# Patient Record
Sex: FEMALE | Race: WHITE | NOT HISPANIC OR LATINO
[De-identification: names, ages, dates, MRNs, and addresses within clinical notes are randomized per-mention and may not be internally consistent; named-entity substitution may affect disease eponyms.]

---

## 2017-03-30 ENCOUNTER — APPOINTMENT (OUTPATIENT)
Dept: RHEUMATOLOGY | Facility: CLINIC | Age: 67
End: 2017-03-30

## 2018-12-30 ENCOUNTER — INPATIENT (INPATIENT)
Facility: HOSPITAL | Age: 68
LOS: 2 days | Discharge: NURSING, REHAB OR LONG-TERM CARE | DRG: 689 | End: 2019-01-02
Attending: HOSPITALIST | Admitting: HOSPITALIST
Payer: MEDICARE

## 2018-12-30 VITALS
HEART RATE: 89 BPM | SYSTOLIC BLOOD PRESSURE: 118 MMHG | OXYGEN SATURATION: 96 % | WEIGHT: 145.06 LBS | HEIGHT: 65 IN | TEMPERATURE: 98 F | RESPIRATION RATE: 18 BRPM | DIASTOLIC BLOOD PRESSURE: 66 MMHG

## 2018-12-30 DIAGNOSIS — Z98.49 CATARACT EXTRACTION STATUS, UNSPECIFIED EYE: Chronic | ICD-10-CM

## 2018-12-30 DIAGNOSIS — E03.9 HYPOTHYROIDISM, UNSPECIFIED: ICD-10-CM

## 2018-12-30 DIAGNOSIS — F42.9 OBSESSIVE-COMPULSIVE DISORDER, UNSPECIFIED: ICD-10-CM

## 2018-12-30 DIAGNOSIS — Z96.653 PRESENCE OF ARTIFICIAL KNEE JOINT, BILATERAL: Chronic | ICD-10-CM

## 2018-12-30 DIAGNOSIS — Z98.89 OTHER SPECIFIED POSTPROCEDURAL STATES: Chronic | ICD-10-CM

## 2018-12-30 DIAGNOSIS — N30.00 ACUTE CYSTITIS WITHOUT HEMATURIA: ICD-10-CM

## 2018-12-30 DIAGNOSIS — E86.0 DEHYDRATION: ICD-10-CM

## 2018-12-30 DIAGNOSIS — Z29.9 ENCOUNTER FOR PROPHYLACTIC MEASURES, UNSPECIFIED: ICD-10-CM

## 2018-12-30 LAB
ALBUMIN SERPL ELPH-MCNC: 3.2 G/DL — LOW (ref 3.3–5)
ALP SERPL-CCNC: 98 U/L — SIGNIFICANT CHANGE UP (ref 30–120)
ALT FLD-CCNC: 28 U/L DA — SIGNIFICANT CHANGE UP (ref 10–60)
ANION GAP SERPL CALC-SCNC: 5 MMOL/L — SIGNIFICANT CHANGE UP (ref 5–17)
APPEARANCE UR: CLEAR — SIGNIFICANT CHANGE UP
AST SERPL-CCNC: 17 U/L — SIGNIFICANT CHANGE UP (ref 10–40)
BASOPHILS # BLD AUTO: 0.01 K/UL — SIGNIFICANT CHANGE UP (ref 0–0.2)
BASOPHILS NFR BLD AUTO: 0.2 % — SIGNIFICANT CHANGE UP (ref 0–2)
BILIRUB SERPL-MCNC: 0.3 MG/DL — SIGNIFICANT CHANGE UP (ref 0.2–1.2)
BILIRUB UR-MCNC: NEGATIVE — SIGNIFICANT CHANGE UP
BUN SERPL-MCNC: 18 MG/DL — SIGNIFICANT CHANGE UP (ref 7–23)
CALCIUM SERPL-MCNC: 9.3 MG/DL — SIGNIFICANT CHANGE UP (ref 8.4–10.5)
CHLORIDE SERPL-SCNC: 101 MMOL/L — SIGNIFICANT CHANGE UP (ref 96–108)
CO2 SERPL-SCNC: 32 MMOL/L — HIGH (ref 22–31)
COLOR SPEC: YELLOW — SIGNIFICANT CHANGE UP
CREAT SERPL-MCNC: 1.01 MG/DL — SIGNIFICANT CHANGE UP (ref 0.5–1.3)
DIFF PNL FLD: NEGATIVE — SIGNIFICANT CHANGE UP
EOSINOPHIL # BLD AUTO: 0.18 K/UL — SIGNIFICANT CHANGE UP (ref 0–0.5)
EOSINOPHIL NFR BLD AUTO: 4.4 % — SIGNIFICANT CHANGE UP (ref 0–6)
GLUCOSE SERPL-MCNC: 92 MG/DL — SIGNIFICANT CHANGE UP (ref 70–99)
GLUCOSE UR QL: NEGATIVE MG/DL — SIGNIFICANT CHANGE UP
HCT VFR BLD CALC: 30.3 % — LOW (ref 34.5–45)
HGB BLD-MCNC: 9.9 G/DL — LOW (ref 11.5–15.5)
IMM GRANULOCYTES NFR BLD AUTO: 0.5 % — SIGNIFICANT CHANGE UP (ref 0–1.5)
KETONES UR-MCNC: NEGATIVE — SIGNIFICANT CHANGE UP
LEUKOCYTE ESTERASE UR-ACNC: ABNORMAL
LYMPHOCYTES # BLD AUTO: 0.53 K/UL — LOW (ref 1–3.3)
LYMPHOCYTES # BLD AUTO: 12.9 % — LOW (ref 13–44)
MCHC RBC-ENTMCNC: 32.7 GM/DL — SIGNIFICANT CHANGE UP (ref 32–36)
MCHC RBC-ENTMCNC: 35.1 PG — HIGH (ref 27–34)
MCV RBC AUTO: 107.4 FL — HIGH (ref 80–100)
MONOCYTES # BLD AUTO: 0.38 K/UL — SIGNIFICANT CHANGE UP (ref 0–0.9)
MONOCYTES NFR BLD AUTO: 9.2 % — SIGNIFICANT CHANGE UP (ref 2–14)
NEUTROPHILS # BLD AUTO: 2.99 K/UL — SIGNIFICANT CHANGE UP (ref 1.8–7.4)
NEUTROPHILS NFR BLD AUTO: 72.8 % — SIGNIFICANT CHANGE UP (ref 43–77)
NITRITE UR-MCNC: POSITIVE
PH UR: 7 — SIGNIFICANT CHANGE UP (ref 5–8)
PLATELET # BLD AUTO: 165 K/UL — SIGNIFICANT CHANGE UP (ref 150–400)
POTASSIUM SERPL-MCNC: 4.7 MMOL/L — SIGNIFICANT CHANGE UP (ref 3.5–5.3)
POTASSIUM SERPL-SCNC: 4.7 MMOL/L — SIGNIFICANT CHANGE UP (ref 3.5–5.3)
PROT SERPL-MCNC: 6.3 G/DL — SIGNIFICANT CHANGE UP (ref 6–8.3)
PROT UR-MCNC: NEGATIVE MG/DL — SIGNIFICANT CHANGE UP
RBC # BLD: 2.82 M/UL — LOW (ref 3.8–5.2)
RBC # FLD: 12.8 % — SIGNIFICANT CHANGE UP (ref 10.3–14.5)
SODIUM SERPL-SCNC: 138 MMOL/L — SIGNIFICANT CHANGE UP (ref 135–145)
SP GR SPEC: 1 — LOW (ref 1.01–1.02)
UROBILINOGEN FLD QL: NEGATIVE MG/DL — SIGNIFICANT CHANGE UP
WBC # BLD: 4.11 K/UL — SIGNIFICANT CHANGE UP (ref 3.8–10.5)
WBC # FLD AUTO: 4.11 K/UL — SIGNIFICANT CHANGE UP (ref 3.8–10.5)

## 2018-12-30 PROCEDURE — 70450 CT HEAD/BRAIN W/O DYE: CPT | Mod: 26

## 2018-12-30 PROCEDURE — 72125 CT NECK SPINE W/O DYE: CPT | Mod: 26

## 2018-12-30 PROCEDURE — 99285 EMERGENCY DEPT VISIT HI MDM: CPT

## 2018-12-30 PROCEDURE — 99223 1ST HOSP IP/OBS HIGH 75: CPT | Mod: AI

## 2018-12-30 PROCEDURE — 71046 X-RAY EXAM CHEST 2 VIEWS: CPT | Mod: 26

## 2018-12-30 RX ORDER — CEFTRIAXONE 500 MG/1
1 INJECTION, POWDER, FOR SOLUTION INTRAMUSCULAR; INTRAVENOUS EVERY 24 HOURS
Qty: 0 | Refills: 0 | Status: DISCONTINUED | OUTPATIENT
Start: 2018-12-30 | End: 2019-01-02

## 2018-12-30 RX ORDER — METHYLPREDNISOLONE 4 MG
500 TABLET ORAL
Qty: 0 | Refills: 0 | Status: DISCONTINUED | OUTPATIENT
Start: 2018-12-30 | End: 2018-12-30

## 2018-12-30 RX ORDER — SODIUM CHLORIDE 9 MG/ML
1000 INJECTION, SOLUTION INTRAVENOUS
Qty: 0 | Refills: 0 | Status: DISCONTINUED | OUTPATIENT
Start: 2018-12-30 | End: 2019-01-02

## 2018-12-30 RX ORDER — BUPROPION HYDROCHLORIDE 150 MG/1
300 TABLET, EXTENDED RELEASE ORAL DAILY
Qty: 0 | Refills: 0 | Status: DISCONTINUED | OUTPATIENT
Start: 2018-12-30 | End: 2019-01-02

## 2018-12-30 RX ORDER — GABAPENTIN 400 MG/1
1200 CAPSULE ORAL AT BEDTIME
Qty: 0 | Refills: 0 | Status: DISCONTINUED | OUTPATIENT
Start: 2018-12-30 | End: 2019-01-02

## 2018-12-30 RX ORDER — MAGNESIUM OXIDE 400 MG ORAL TABLET 241.3 MG
400 TABLET ORAL
Qty: 0 | Refills: 0 | Status: DISCONTINUED | OUTPATIENT
Start: 2018-12-30 | End: 2019-01-02

## 2018-12-30 RX ORDER — BENZTROPINE MESYLATE 1 MG
1 TABLET ORAL AT BEDTIME
Qty: 0 | Refills: 0 | Status: DISCONTINUED | OUTPATIENT
Start: 2018-12-30 | End: 2019-01-02

## 2018-12-30 RX ORDER — LEVOTHYROXINE SODIUM 125 MCG
1 TABLET ORAL
Qty: 0 | Refills: 0 | COMMUNITY

## 2018-12-30 RX ORDER — GABAPENTIN 400 MG/1
600 CAPSULE ORAL DAILY
Qty: 0 | Refills: 0 | Status: DISCONTINUED | OUTPATIENT
Start: 2018-12-30 | End: 2019-01-02

## 2018-12-30 RX ORDER — ZIPRASIDONE HYDROCHLORIDE 20 MG/1
40 CAPSULE ORAL
Qty: 0 | Refills: 0 | Status: DISCONTINUED | OUTPATIENT
Start: 2018-12-30 | End: 2019-01-02

## 2018-12-30 RX ORDER — ALPRAZOLAM 0.25 MG
1 TABLET ORAL
Qty: 0 | Refills: 0 | Status: DISCONTINUED | OUTPATIENT
Start: 2018-12-30 | End: 2019-01-02

## 2018-12-30 RX ORDER — AMITRIPTYLINE HCL 25 MG
50 TABLET ORAL AT BEDTIME
Qty: 0 | Refills: 0 | Status: DISCONTINUED | OUTPATIENT
Start: 2018-12-30 | End: 2019-01-02

## 2018-12-30 RX ORDER — CEFTRIAXONE 500 MG/1
1 INJECTION, POWDER, FOR SOLUTION INTRAMUSCULAR; INTRAVENOUS ONCE
Qty: 0 | Refills: 0 | Status: COMPLETED | OUTPATIENT
Start: 2018-12-30 | End: 2018-12-30

## 2018-12-30 RX ORDER — LEVOTHYROXINE SODIUM 125 MCG
75 TABLET ORAL
Qty: 0 | Refills: 0 | Status: DISCONTINUED | OUTPATIENT
Start: 2018-12-30 | End: 2019-01-02

## 2018-12-30 RX ORDER — SODIUM CHLORIDE 9 MG/ML
1000 INJECTION INTRAMUSCULAR; INTRAVENOUS; SUBCUTANEOUS ONCE
Qty: 0 | Refills: 0 | Status: COMPLETED | OUTPATIENT
Start: 2018-12-30 | End: 2018-12-30

## 2018-12-30 RX ADMIN — Medication 1 MILLIGRAM(S): at 22:39

## 2018-12-30 RX ADMIN — GABAPENTIN 1200 MILLIGRAM(S): 400 CAPSULE ORAL at 22:38

## 2018-12-30 RX ADMIN — ZIPRASIDONE HYDROCHLORIDE 40 MILLIGRAM(S): 20 CAPSULE ORAL at 22:37

## 2018-12-30 RX ADMIN — SODIUM CHLORIDE 1000 MILLILITER(S): 9 INJECTION INTRAMUSCULAR; INTRAVENOUS; SUBCUTANEOUS at 16:00

## 2018-12-30 RX ADMIN — CEFTRIAXONE 1 GRAM(S): 500 INJECTION, POWDER, FOR SOLUTION INTRAMUSCULAR; INTRAVENOUS at 18:36

## 2018-12-30 RX ADMIN — CEFTRIAXONE 100 GRAM(S): 500 INJECTION, POWDER, FOR SOLUTION INTRAMUSCULAR; INTRAVENOUS at 18:36

## 2018-12-30 RX ADMIN — Medication 50 MILLIGRAM(S): at 22:38

## 2018-12-30 RX ADMIN — MAGNESIUM OXIDE 400 MG ORAL TABLET 400 MILLIGRAM(S): 241.3 TABLET ORAL at 22:38

## 2018-12-30 NOTE — ED PROVIDER NOTE - GASTROINTESTINAL NEGATIVE STATEMENT, MLM
no abdominal pain, no bloating, no nausea and no vomiting. + watery diarrhea x 5 days, 6-7 episodes in a day. NO diarrhea today.

## 2018-12-30 NOTE — H&P ADULT - PROBLEM SELECTOR PLAN 5
IMPROVE VTE Individual Risk Assessment          RISK                                                          Points  [  ] Previous VTE                                                 3  [  ] Thrombophilia                                              2  [  ] Lower limb paralysis                                    2        (unable to hold up >15 seconds)    [  ] Current Cancer                                             2         (within 6 months)  [  ] Immobilization > 24 hrs                              1  [  ] ICU/CCU stay > 24 hours                            1  [X] Age > 60                                                        1    IMPROVE VTE Score 1    - will use lovenox for DVT ppx

## 2018-12-30 NOTE — H&P ADULT - ASSESSMENT
68F with anxiety, OCD, non-Hodgkin's lymphoma (treated with chemo @[facility] [place], in remission for 2 years), hypothyroidism who presents with diarrhea, weakness, and AMS.   Likely 2/2 dehydration from UTI/diarrhea.

## 2018-12-30 NOTE — ED ADULT NURSE NOTE - NSIMPLEMENTINTERV_GEN_ALL_ED
Implemented All Universal Safety Interventions:  [place] to call system. Call bell, personal items and telephone within reach. Instruct patient to call for assistance. Room bathroom lighting operational. Non-slip footwear when patient is off stretcher. Physically safe environment: no spills, clutter or unnecessary equipment. Stretcher in lowest position, wheels locked, appropriate side rails in place. Specific interventions were implemented:

## 2018-12-30 NOTE — H&P ADULT - PMH
Bipolar depression    Depression    Hypercholesteremia    Lymphoma  NHL[place] [facility] [place]  OCD (obsessive compulsive disorder)    Osteoarthritis of left knee    Osteoarthritis of right knee

## 2018-12-30 NOTE — ED ADULT NURSE NOTE - INTERVENTIONS DEFINITIONS
[place] to call system/Non-slip footwear when patient is off stretcher/Physically safe environment: no spills, clutter or unnecessary equipment/Provide visual cue, wrist band, yellow gown, etc./Monitor for mental status changes and reorient to person, place, and time/Provide visual clues: red socks/Call bell, personal items and telephone within reach/Instruct patient to call for assistance/Reinforce activity limits and safety measures with patient and family/Stretcher in lowest position, wheels locked, appropriate side rails in place/Monitor gait and stability

## 2018-12-30 NOTE — ED PROVIDER NOTE - CHPI ED SYMPTOMS NEG
no chills/no hematuria/no abdominal distension/no burning urination/no vomiting/no blood in stool/no fever/no nausea/no dysuria

## 2018-12-30 NOTE — H&P ADULT - PSH
H/O oral surgery  2014  S/P cataract surgery    S/P knee surgery  1978  S/P TKR (total knee replacement), bilateral  discharged nov 5th, 2014

## 2018-12-30 NOTE — H&P ADULT - NSHPSOCIALHISTORY_GEN_ALL_CORE
- denies any smoking, etoh, or illicit drug use  - lives alone though son lives next door  - walks with a walker

## 2018-12-30 NOTE — ED PROVIDER NOTE - PROGRESS NOTE DETAILS
Attending Contribution to Care: Pt seen in exam room and discussed with PA. Agree with treatment and plan. 69 y/o F pt presents to the ED BIB son c/o increased moderate weakness, confusion, severe diarrhea for x5 days. Confirms lethargy, frequent falls. Denies recent abx use. PMD: Sajan Last. PE: dehydrated, more alert after rehydrations, otherwise benign. Labs reveal UTI. Treat with IV Rocephin. Admit for IV abx, IV fluids due to pt weakness and AMS. Head CT performed and negative.

## 2018-12-30 NOTE — H&P ADULT - NSHPLABSRESULTS_GEN_ALL_CORE
LABS:                        9.9<L>  4.11  )-----------( 165      ( 30 Dec 2018 16:33 )             30.3<L>    138    |  101    |  18     ----------------------------<  92       30 Dec 2018 16:33  4.7     |  32<H>  |  1.01         Ca 9.3           30 Dec 2018 16:33        TPro  6.3    /  Alb  3.2<L>  /  TBili  0.3    /  DBili  x      /  AST  17     /  ALT  28     /  AlkPhos  98     30 Dec 2018 16:33      Urinalysis Basic - ( 30 Dec 2018 17:27 )    Color: Yellow / Appearance: Clear / S.005 / pH: x  Gluc: x / Ketone: Negative  / Bili: Negative / Urobili: Negative mg/dL   Blood: x / Protein: Negative mg/dL / Nitrite: Positive   Leuk Esterase: Small / RBC: 0-2 /HPF / WBC 11-25   Sq Epi: x / Non Sq Epi: Few / Bacteria: Moderate

## 2018-12-30 NOTE — H&P ADULT - HISTORY OF PRESENT ILLNESS
68F with anxiety, OCD, non-Hodgkin's lymphoma (treated with [place] [place], [place] for 2 years), hypothyroidism who presents with diarrhea, weakness, and AMS.  Patient's symptoms started about a week ago at her daughter's place.  Patient would have bouts of constipation and diarrhea (she would take only prune juice when she was constipated).  Non-bloody.  Consequently, patient had become very weak, confused, and dizzy.  Multiple falls (at least 4x) in the past week  but denied any head trauma or lost of consciousness.  Denied any urinary symptoms.  Denied any pain.  No fevers.  No nausea/vomiting.  No sick contacts.  No CP or SOB.  No abdominal pain.  Patient came home and was still noted to be weak and confused and the son decided to bring her here for further evaluation.      In the ED, patient was given IV hydration with immediate improvement.  Mental status also improved as per son.  However, patient's UA showed possible UTI and was started on ceftriaxone.  Patient is being admitted because of AMS/weakness caused by dehydration caused by diarrhea/UTI.

## 2018-12-30 NOTE — H&P ADULT - FAMILY HISTORY
Family history of COPD (chronic obstructive pulmonary disease)     Family history of breast cancer     Grandparent  Still living? No  Family history of stomach cancer, Age at diagnosis: Age Unknown  Family history of prostate cancer, Age at diagnosis: Age Unknown     Father  Still living? Unknown  Family history of diabetes mellitus, Age at diagnosis: Age Unknown

## 2018-12-30 NOTE — H&P ADULT - NSHPREVIEWOFSYSTEMS_GEN_ALL_CORE
REVIEW OF SYSTEMS:  CONSTITUTIONAL:    +weakness, no fever or weight loss  EYES:    no eye pain or visual disturbances or discharge  ENMT:     no difficulty hearing or tinnitus or vertigo, no sinus or throat pain  NECK:    no pain or stiffness  RESPIRATORY:    no cough or wheezing or chills or hemoptysis, no shortness of breath  CARDIOVASCULAR:    no chest pain or palpitations or dizziness or leg swelling  GASTROINTESTINAL:    +diarrhea/constipation, no abdominal or epigastric pain. no nausea or vomiting or hematemesis, no melena or hematochezia.  GENITOURINARY:    no dysuria or frequency or hematuria or incontinence  NEUROLOGICAL:    no headaches or memory loss or loss of strength or numbness or tremors  SKIN:    no itching or burning or rashes or lesions   LYMPH NODES:    no enlarged glands  ENDOCRINE:    no heat or cold intolerance, no hair loss, no polydipsia or polyuria  MUSCULOSKELETAL:    no joint pain or swelling, no muscle or back or extremity pain  PSYCHIATRIC:    +was confused, +OCD, +anxiety,   HEME/LYMPH:    no easy bruising or bleeding gums  ALLERGY AND IMMUNOLOGIC:    no hives or eczema

## 2018-12-30 NOTE — H&P ADULT - NSHPPHYSICALEXAM_GEN_ALL_CORE
PHYSICAL EXAM:  Vital Signs Last 24 Hrs  T(C): 36.6 (30 Dec 2018 20:27), Max: 36.7 (30 Dec 2018 15:28)  T(F): 97.9 (30 Dec 2018 20:27), Max: 98 (30 Dec 2018 15:28)  HR: 84 (30 Dec 2018 20:27) (84 - 89)  BP: 104/66 (30 Dec 2018 20:27) (104/66 - 121/67)  BP(mean): --  RR: 16 (30 Dec 2018 20:27) (16 - 18)  SpO2: 97% (30 Dec 2018 20:27) (96% - 97%)    GENERAL:     elderly female in NAD, well-appearing  HEAD:     atraumatic, normocephalic  EYES:     EOMI, conjunctiva and sclera clear  ENMT:     no tonsillar erythema or exudates or enlargement, no oral lesions, moist mucous membranes, good dentition  NECK:     supple, no JVD  RESPIRATORY:     clear to auscultation bilaterally, no rales or rhonchi or wheezing or rubs  CARDIOVASCULAR:     regular rate and rhythm, no murmurs or rubs or gallops, 2+ peripheral pulses  GASTROINTESTINAL:     soft, nontender, nondistended, no hepatosplenomegaly palpated, bowel sounds present  EXTREMITIES:     no clubbing or cyanosis or edema  MUSCULOSKELETAL:     no joint pain or swelling or deformities  NERVOUS SYSTEM:     +facial tics occasionally, motor strength intact with 5/5 B/L upper and lower extremities, no gross sensory deficits  SKIN:     no rashes or lesions  PSYCH:     appropriate, alert and orientated x3, good concentration

## 2018-12-30 NOTE — H&P ADULT - PROBLEM SELECTOR PLAN 3
- cont with wellbutrin  - cont with ziprasidone  - cont with amitriptyline  - cont with alprazolam  - cont with benztropine

## 2018-12-30 NOTE — ED PROVIDER NOTE - OBJECTIVE STATEMENT
69 yo female, accompanied by son, c/o weakness, diarrhea x 5 days, 6-7 episodes x watery diarrhea. Per son patient had associated confusion, worse at night. Denies, fever, chills, vomiting, nausea. Denies urinary symptoms. Per son patient had fall several days ago.  Denies CP, SOB.

## 2018-12-31 LAB
ALBUMIN SERPL ELPH-MCNC: 3.2 G/DL — LOW (ref 3.3–5)
ALP SERPL-CCNC: 98 U/L — SIGNIFICANT CHANGE UP (ref 30–120)
ALT FLD-CCNC: 27 U/L DA — SIGNIFICANT CHANGE UP (ref 10–60)
ANION GAP SERPL CALC-SCNC: 9 MMOL/L — SIGNIFICANT CHANGE UP (ref 5–17)
AST SERPL-CCNC: 18 U/L — SIGNIFICANT CHANGE UP (ref 10–40)
BASOPHILS # BLD AUTO: 0.02 K/UL — SIGNIFICANT CHANGE UP (ref 0–0.2)
BASOPHILS NFR BLD AUTO: 0.3 % — SIGNIFICANT CHANGE UP (ref 0–2)
BILIRUB SERPL-MCNC: 0.2 MG/DL — SIGNIFICANT CHANGE UP (ref 0.2–1.2)
BUN SERPL-MCNC: 14 MG/DL — SIGNIFICANT CHANGE UP (ref 7–23)
CALCIUM SERPL-MCNC: 8.8 MG/DL — SIGNIFICANT CHANGE UP (ref 8.4–10.5)
CHLORIDE SERPL-SCNC: 107 MMOL/L — SIGNIFICANT CHANGE UP (ref 96–108)
CO2 SERPL-SCNC: 27 MMOL/L — SIGNIFICANT CHANGE UP (ref 22–31)
CREAT SERPL-MCNC: 0.97 MG/DL — SIGNIFICANT CHANGE UP (ref 0.5–1.3)
EOSINOPHIL # BLD AUTO: 0.22 K/UL — SIGNIFICANT CHANGE UP (ref 0–0.5)
EOSINOPHIL NFR BLD AUTO: 3.8 % — SIGNIFICANT CHANGE UP (ref 0–6)
GLUCOSE SERPL-MCNC: 86 MG/DL — SIGNIFICANT CHANGE UP (ref 70–99)
HCT VFR BLD CALC: 32.6 % — LOW (ref 34.5–45)
HGB BLD-MCNC: 10.5 G/DL — LOW (ref 11.5–15.5)
IMM GRANULOCYTES NFR BLD AUTO: 0.9 % — SIGNIFICANT CHANGE UP (ref 0–1.5)
LYMPHOCYTES # BLD AUTO: 0.57 K/UL — LOW (ref 1–3.3)
LYMPHOCYTES # BLD AUTO: 9.8 % — LOW (ref 13–44)
MCHC RBC-ENTMCNC: 32.2 GM/DL — SIGNIFICANT CHANGE UP (ref 32–36)
MCHC RBC-ENTMCNC: 35.2 PG — HIGH (ref 27–34)
MCV RBC AUTO: 109.4 FL — HIGH (ref 80–100)
MONOCYTES # BLD AUTO: 0.43 K/UL — SIGNIFICANT CHANGE UP (ref 0–0.9)
MONOCYTES NFR BLD AUTO: 7.4 % — SIGNIFICANT CHANGE UP (ref 2–14)
NEUTROPHILS # BLD AUTO: 4.52 K/UL — SIGNIFICANT CHANGE UP (ref 1.8–7.4)
NEUTROPHILS NFR BLD AUTO: 77.8 % — HIGH (ref 43–77)
NRBC # BLD: 0 /100 WBCS — SIGNIFICANT CHANGE UP (ref 0–0)
PLATELET # BLD AUTO: 184 K/UL — SIGNIFICANT CHANGE UP (ref 150–400)
POTASSIUM SERPL-MCNC: 4.1 MMOL/L — SIGNIFICANT CHANGE UP (ref 3.5–5.3)
POTASSIUM SERPL-SCNC: 4.1 MMOL/L — SIGNIFICANT CHANGE UP (ref 3.5–5.3)
PROT SERPL-MCNC: 6.6 G/DL — SIGNIFICANT CHANGE UP (ref 6–8.3)
RBC # BLD: 2.98 M/UL — LOW (ref 3.8–5.2)
RBC # FLD: 13 % — SIGNIFICANT CHANGE UP (ref 10.3–14.5)
SODIUM SERPL-SCNC: 143 MMOL/L — SIGNIFICANT CHANGE UP (ref 135–145)
WBC # BLD: 5.81 K/UL — SIGNIFICANT CHANGE UP (ref 3.8–10.5)
WBC # FLD AUTO: 5.81 K/UL — SIGNIFICANT CHANGE UP (ref 3.8–10.5)

## 2018-12-31 PROCEDURE — 99232 SBSQ HOSP IP/OBS MODERATE 35: CPT

## 2018-12-31 RX ORDER — INFLUENZA VIRUS VACCINE 15; 15; 15; 15 UG/.5ML; UG/.5ML; UG/.5ML; UG/.5ML
0.5 SUSPENSION INTRAMUSCULAR ONCE
Qty: 0 | Refills: 0 | Status: DISCONTINUED | OUTPATIENT
Start: 2018-12-31 | End: 2019-01-02

## 2018-12-31 RX ADMIN — BUPROPION HYDROCHLORIDE 300 MILLIGRAM(S): 150 TABLET, EXTENDED RELEASE ORAL at 12:24

## 2018-12-31 RX ADMIN — GABAPENTIN 600 MILLIGRAM(S): 400 CAPSULE ORAL at 12:24

## 2018-12-31 RX ADMIN — GABAPENTIN 1200 MILLIGRAM(S): 400 CAPSULE ORAL at 21:33

## 2018-12-31 RX ADMIN — MAGNESIUM OXIDE 400 MG ORAL TABLET 400 MILLIGRAM(S): 241.3 TABLET ORAL at 18:21

## 2018-12-31 RX ADMIN — MAGNESIUM OXIDE 400 MG ORAL TABLET 400 MILLIGRAM(S): 241.3 TABLET ORAL at 09:31

## 2018-12-31 RX ADMIN — SODIUM CHLORIDE 75 MILLILITER(S): 9 INJECTION, SOLUTION INTRAVENOUS at 18:46

## 2018-12-31 RX ADMIN — CEFTRIAXONE 100 GRAM(S): 500 INJECTION, POWDER, FOR SOLUTION INTRAMUSCULAR; INTRAVENOUS at 18:21

## 2018-12-31 RX ADMIN — ZIPRASIDONE HYDROCHLORIDE 40 MILLIGRAM(S): 20 CAPSULE ORAL at 21:33

## 2018-12-31 RX ADMIN — Medication 1 MILLIGRAM(S): at 18:21

## 2018-12-31 RX ADMIN — Medication 1 MILLIGRAM(S): at 06:41

## 2018-12-31 RX ADMIN — Medication 75 MICROGRAM(S): at 06:34

## 2018-12-31 RX ADMIN — Medication 50 MILLIGRAM(S): at 21:33

## 2018-12-31 RX ADMIN — Medication 1 MILLIGRAM(S): at 21:33

## 2018-12-31 NOTE — PROGRESS NOTE ADULT - SUBJECTIVE AND OBJECTIVE BOX
Patient is a 68y old  Female who presents with a chief complaint of diarrhea, weakness, AMS (30 Dec 2018 21:00)      INTERVAL History of Present Illness/OVERNIGHT EVENTS: per son, patient appears much improved.  more lucid  still generalized weakness - would benefit from rehab    MEDICATIONS  (STANDING):  ALPRAZolam 1 milliGRAM(s) Oral two times a day  amitriptyline 50 milliGRAM(s) Oral at bedtime  benztropine 1 milliGRAM(s) Oral at bedtime  buPROPion XL . 300 milliGRAM(s) Oral daily  cefTRIAXone   IVPB 1 Gram(s) IV Intermittent every 24 hours  gabapentin 600 milliGRAM(s) Oral daily  gabapentin 1200 milliGRAM(s) Oral at bedtime  influenza   Vaccine 0.5 milliLiter(s) IntraMuscular once  lactated ringers. 1000 milliLiter(s) (75 mL/Hr) IV Continuous <Continuous>  levothyroxine 75 MICROGram(s) Oral <User Schedule>  magnesium oxide 400 milliGRAM(s) Oral two times a day with meals  ziprasidone 40 milliGRAM(s) Oral <User Schedule>    MEDICATIONS  (PRN):      Allergies    honeydew melon (Other)  penicillin (Other; Hives)    Intolerances        REVIEW OF SYSTEMS:  Negative unless otherwise specified above.    Vital Signs Last 24 Hrs  T(C): 36.6 (31 Dec 2018 08:12), Max: 36.7 (30 Dec 2018 15:28)  T(F): 97.8 (31 Dec 2018 08:12), Max: 98.1 (31 Dec 2018 00:41)  HR: 82 (31 Dec 2018 08:12) (82 - 89)  BP: 116/77 (31 Dec 2018 08:12) (104/66 - 121/77)  BP(mean): 86 (31 Dec 2018 06:30) (86 - 92)  RR: 18 (31 Dec 2018 08:12) (16 - 18)  SpO2: 96% (31 Dec 2018 08:12) (96% - 97%)    Height (cm): 165.1 ( @ 15:28)  Weight (kg): 65.8 ( @ 15:28)  BMI (kg/m2): 24.1 ( @ 15:28)  BSA (m2): 1.73 ( @ 15:28)    PHYSICAL EXAM:  GENERAL: No apparent distress  HEAD:  Atraumatic, Normocephalic  EYES: conjunctiva and sclera clear  ENMT: Moist mucous membranes  NECK: Supple  CHEST/LUNG: Clear to auscultation bilaterally  HEART: Regular rate and rhythm  ABDOMEN: Soft, Nontender, Nondistended; Bowel sounds present  EXTREMITIES:  No clubbing, cyanosis or edema  SKIN: No rashes or lesions  NERVOUS SYSTEM:  Alert & Oriented X2; Bilateral Lower extremity mobile, sensation to light touch intact      LABS:                        10.5   5.81  )-----------( 184      ( 31 Dec 2018 11:53 )             32.6     31 Dec 2018 11:53    143    |  107    |  14     ----------------------------<  86     4.1     |  27     |  0.97     Ca    8.8        31 Dec 2018 11:53    TPro  6.6    /  Alb  3.2    /  TBili  0.2    /  DBili  x      /  AST  18     /  ALT  27     /  AlkPhos  98     31 Dec 2018 11:53      Urinalysis Basic - ( 30 Dec 2018 17:27 )    Color: Yellow / Appearance: Clear / S.005 / pH: x  Gluc: x / Ketone: Negative  / Bili: Negative / Urobili: Negative mg/dL   Blood: x / Protein: Negative mg/dL / Nitrite: Positive   Leuk Esterase: Small / RBC: 0-2 /HPF / WBC 11-25   Sq Epi: x / Non Sq Epi: Few / Bacteria: Moderate      CAPILLARY BLOOD GLUCOSE          RADIOLOGY & ADDITIONAL TESTS:    Images reviewed personally    Consultant Notes Reviewed and Care Discussed with relevant Consultants/Other Providers.

## 2018-12-31 NOTE — PHYSICAL THERAPY INITIAL EVALUATION ADULT - ADDITIONAL COMMENTS
Lives alone in house.  3 stairs to enter with railing; pt stays on main level.  Has  3 days x 4 hours.

## 2019-01-01 LAB
ALBUMIN SERPL ELPH-MCNC: 3 G/DL — LOW (ref 3.3–5)
ALP SERPL-CCNC: 93 U/L — SIGNIFICANT CHANGE UP (ref 30–120)
ALT FLD-CCNC: 26 U/L DA — SIGNIFICANT CHANGE UP (ref 10–60)
ANION GAP SERPL CALC-SCNC: 9 MMOL/L — SIGNIFICANT CHANGE UP (ref 5–17)
AST SERPL-CCNC: 15 U/L — SIGNIFICANT CHANGE UP (ref 10–40)
BILIRUB SERPL-MCNC: 0.2 MG/DL — SIGNIFICANT CHANGE UP (ref 0.2–1.2)
BUN SERPL-MCNC: 12 MG/DL — SIGNIFICANT CHANGE UP (ref 7–23)
CALCIUM SERPL-MCNC: 9 MG/DL — SIGNIFICANT CHANGE UP (ref 8.4–10.5)
CHLORIDE SERPL-SCNC: 104 MMOL/L — SIGNIFICANT CHANGE UP (ref 96–108)
CO2 SERPL-SCNC: 25 MMOL/L — SIGNIFICANT CHANGE UP (ref 22–31)
CREAT SERPL-MCNC: 0.89 MG/DL — SIGNIFICANT CHANGE UP (ref 0.5–1.3)
GLUCOSE SERPL-MCNC: 109 MG/DL — HIGH (ref 70–99)
HCT VFR BLD CALC: 29.7 % — LOW (ref 34.5–45)
HGB BLD-MCNC: 9.9 G/DL — LOW (ref 11.5–15.5)
MCHC RBC-ENTMCNC: 33.3 GM/DL — SIGNIFICANT CHANGE UP (ref 32–36)
MCHC RBC-ENTMCNC: 35.1 PG — HIGH (ref 27–34)
MCV RBC AUTO: 105.3 FL — HIGH (ref 80–100)
NRBC # BLD: 0 /100 WBCS — SIGNIFICANT CHANGE UP (ref 0–0)
PLATELET # BLD AUTO: 186 K/UL — SIGNIFICANT CHANGE UP (ref 150–400)
POTASSIUM SERPL-MCNC: 4.5 MMOL/L — SIGNIFICANT CHANGE UP (ref 3.5–5.3)
POTASSIUM SERPL-SCNC: 4.5 MMOL/L — SIGNIFICANT CHANGE UP (ref 3.5–5.3)
PROT SERPL-MCNC: 6.2 G/DL — SIGNIFICANT CHANGE UP (ref 6–8.3)
RBC # BLD: 2.82 M/UL — LOW (ref 3.8–5.2)
RBC # FLD: 12.8 % — SIGNIFICANT CHANGE UP (ref 10.3–14.5)
SODIUM SERPL-SCNC: 138 MMOL/L — SIGNIFICANT CHANGE UP (ref 135–145)
WBC # BLD: 3.91 K/UL — SIGNIFICANT CHANGE UP (ref 3.8–10.5)
WBC # FLD AUTO: 3.91 K/UL — SIGNIFICANT CHANGE UP (ref 3.8–10.5)

## 2019-01-01 PROCEDURE — 99232 SBSQ HOSP IP/OBS MODERATE 35: CPT

## 2019-01-01 RX ADMIN — GABAPENTIN 1200 MILLIGRAM(S): 400 CAPSULE ORAL at 21:43

## 2019-01-01 RX ADMIN — MAGNESIUM OXIDE 400 MG ORAL TABLET 400 MILLIGRAM(S): 241.3 TABLET ORAL at 17:34

## 2019-01-01 RX ADMIN — GABAPENTIN 600 MILLIGRAM(S): 400 CAPSULE ORAL at 12:55

## 2019-01-01 RX ADMIN — MAGNESIUM OXIDE 400 MG ORAL TABLET 400 MILLIGRAM(S): 241.3 TABLET ORAL at 09:52

## 2019-01-01 RX ADMIN — ZIPRASIDONE HYDROCHLORIDE 40 MILLIGRAM(S): 20 CAPSULE ORAL at 21:43

## 2019-01-01 RX ADMIN — Medication 1 MILLIGRAM(S): at 16:28

## 2019-01-01 RX ADMIN — Medication 50 MILLIGRAM(S): at 21:43

## 2019-01-01 RX ADMIN — Medication 1 MILLIGRAM(S): at 06:39

## 2019-01-01 RX ADMIN — CEFTRIAXONE 100 GRAM(S): 500 INJECTION, POWDER, FOR SOLUTION INTRAMUSCULAR; INTRAVENOUS at 17:35

## 2019-01-01 RX ADMIN — SODIUM CHLORIDE 75 MILLILITER(S): 9 INJECTION, SOLUTION INTRAVENOUS at 21:44

## 2019-01-01 RX ADMIN — BUPROPION HYDROCHLORIDE 300 MILLIGRAM(S): 150 TABLET, EXTENDED RELEASE ORAL at 12:55

## 2019-01-01 RX ADMIN — Medication 75 MICROGRAM(S): at 06:39

## 2019-01-01 RX ADMIN — Medication 1 MILLIGRAM(S): at 21:43

## 2019-01-01 NOTE — PROGRESS NOTE ADULT - SUBJECTIVE AND OBJECTIVE BOX
Patient is a 68y old  Female who presents with a chief complaint of diarrhea, weakness, AMS (31 Dec 2018 14:31)      INTERVAL History of Present Illness/OVERNIGHT EVENTS: no new issues.  await urine culture    MEDICATIONS  (STANDING):  ALPRAZolam 1 milliGRAM(s) Oral two times a day  amitriptyline 50 milliGRAM(s) Oral at bedtime  benztropine 1 milliGRAM(s) Oral at bedtime  buPROPion XL . 300 milliGRAM(s) Oral daily  cefTRIAXone   IVPB 1 Gram(s) IV Intermittent every 24 hours  gabapentin 600 milliGRAM(s) Oral daily  gabapentin 1200 milliGRAM(s) Oral at bedtime  influenza   Vaccine 0.5 milliLiter(s) IntraMuscular once  lactated ringers. 1000 milliLiter(s) (75 mL/Hr) IV Continuous <Continuous>  levothyroxine 75 MICROGram(s) Oral <User Schedule>  magnesium oxide 400 milliGRAM(s) Oral two times a day with meals  ziprasidone 40 milliGRAM(s) Oral <User Schedule>    MEDICATIONS  (PRN):      Allergies    honeydew melon (Other)  penicillin (Other; Hives)    Intolerances        REVIEW OF SYSTEMS:  Negative unless otherwise specified above.    Vital Signs Last 24 Hrs  T(C): 36.5 (2019 08:07), Max: 36.7 (31 Dec 2018 18:48)  T(F): 97.7 (2019 08:07), Max: 98.1 (31 Dec 2018 18:48)  HR: 81 (2019 08:07) (81 - 89)  BP: 104/62 (2019 08:07) (103/69 - 123/76)  BP(mean): --  RR: 16 (2019 08:07) (14 - 16)  SpO2: 97% (2019 08:07) (94% - 98%)        PHYSICAL EXAM:  GENERAL: No apparent distress  HEAD:  Atraumatic, Normocephalic  EYES: conjunctiva and sclera clear  ENMT: Moist mucous membranes  NECK: Supple  CHEST/LUNG: Clear to auscultation bilaterally  HEART: Regular rate and rhythm  ABDOMEN: Soft, Nontender, Nondistended; Bowel sounds present  EXTREMITIES:  No clubbing, cyanosis or edema  SKIN: No rashes or lesions  NERVOUS SYSTEM:  Alert & Oriented X3; Bilateral Lower extremity mobile, sensation to light touch intact      LABS:      Ca    8.8        31 Dec 2018 11:53        Urinalysis Basic - ( 30 Dec 2018 17:27 )    Color: Yellow / Appearance: Clear / S.005 / pH: x  Gluc: x / Ketone: Negative  / Bili: Negative / Urobili: Negative mg/dL   Blood: x / Protein: Negative mg/dL / Nitrite: Positive   Leuk Esterase: Small / RBC: 0-2 /HPF / WBC 11-25   Sq Epi: x / Non Sq Epi: Few / Bacteria: Moderate      CAPILLARY BLOOD GLUCOSE          RADIOLOGY & ADDITIONAL TESTS:    Images reviewed personally    Consultant Notes Reviewed and Care Discussed with relevant Consultants/Other Providers.

## 2019-01-02 ENCOUNTER — TRANSCRIPTION ENCOUNTER (OUTPATIENT)
Age: 69
End: 2019-01-02

## 2019-01-02 VITALS
TEMPERATURE: 99 F | HEART RATE: 112 BPM | SYSTOLIC BLOOD PRESSURE: 103 MMHG | RESPIRATION RATE: 18 BRPM | DIASTOLIC BLOOD PRESSURE: 69 MMHG | OXYGEN SATURATION: 96 %

## 2019-01-02 LAB
APPEARANCE UR: CLEAR — SIGNIFICANT CHANGE UP
BILIRUB UR-MCNC: NEGATIVE — SIGNIFICANT CHANGE UP
COLOR SPEC: YELLOW — SIGNIFICANT CHANGE UP
DIFF PNL FLD: NEGATIVE — SIGNIFICANT CHANGE UP
GLUCOSE UR QL: NEGATIVE MG/DL — SIGNIFICANT CHANGE UP
KETONES UR-MCNC: NEGATIVE — SIGNIFICANT CHANGE UP
LEUKOCYTE ESTERASE UR-ACNC: NEGATIVE — SIGNIFICANT CHANGE UP
NITRITE UR-MCNC: NEGATIVE — SIGNIFICANT CHANGE UP
PH UR: 8 — SIGNIFICANT CHANGE UP (ref 5–8)
PROT UR-MCNC: NEGATIVE MG/DL — SIGNIFICANT CHANGE UP
RBC CASTS # UR COMP ASSIST: SIGNIFICANT CHANGE UP /HPF (ref 0–4)
SP GR SPEC: 1.01 — SIGNIFICANT CHANGE UP (ref 1.01–1.02)
UROBILINOGEN FLD QL: NEGATIVE MG/DL — SIGNIFICANT CHANGE UP
WBC UR QL: SIGNIFICANT CHANGE UP

## 2019-01-02 PROCEDURE — 71046 X-RAY EXAM CHEST 2 VIEWS: CPT

## 2019-01-02 PROCEDURE — 80053 COMPREHEN METABOLIC PANEL: CPT

## 2019-01-02 PROCEDURE — 70450 CT HEAD/BRAIN W/O DYE: CPT

## 2019-01-02 PROCEDURE — 81001 URINALYSIS AUTO W/SCOPE: CPT

## 2019-01-02 PROCEDURE — 85027 COMPLETE CBC AUTOMATED: CPT

## 2019-01-02 PROCEDURE — 99285 EMERGENCY DEPT VISIT HI MDM: CPT | Mod: 25

## 2019-01-02 PROCEDURE — 97110 THERAPEUTIC EXERCISES: CPT

## 2019-01-02 PROCEDURE — 99239 HOSP IP/OBS DSCHRG MGMT >30: CPT

## 2019-01-02 PROCEDURE — 36415 COLL VENOUS BLD VENIPUNCTURE: CPT

## 2019-01-02 PROCEDURE — 97161 PT EVAL LOW COMPLEX 20 MIN: CPT

## 2019-01-02 PROCEDURE — 72125 CT NECK SPINE W/O DYE: CPT

## 2019-01-02 PROCEDURE — 97116 GAIT TRAINING THERAPY: CPT

## 2019-01-02 PROCEDURE — 97530 THERAPEUTIC ACTIVITIES: CPT

## 2019-01-02 RX ORDER — ENOXAPARIN SODIUM 100 MG/ML
40 INJECTION SUBCUTANEOUS DAILY
Qty: 0 | Refills: 0 | Status: DISCONTINUED | OUTPATIENT
Start: 2019-01-02 | End: 2019-01-02

## 2019-01-02 RX ORDER — BUPROPION HYDROCHLORIDE 150 MG/1
0 TABLET, EXTENDED RELEASE ORAL
Qty: 0 | Refills: 0 | COMMUNITY

## 2019-01-02 RX ORDER — BUPROPION HYDROCHLORIDE 150 MG/1
1 TABLET, EXTENDED RELEASE ORAL
Qty: 0 | Refills: 0 | COMMUNITY
Start: 2019-01-02

## 2019-01-02 RX ORDER — CEFUROXIME AXETIL 250 MG
250 TABLET ORAL EVERY 12 HOURS
Qty: 0 | Refills: 0 | Status: DISCONTINUED | OUTPATIENT
Start: 2019-01-02 | End: 2019-01-02

## 2019-01-02 RX ORDER — CEFUROXIME AXETIL 250 MG
1 TABLET ORAL
Qty: 0 | Refills: 0 | COMMUNITY
Start: 2019-01-02

## 2019-01-02 RX ORDER — BENZTROPINE MESYLATE 1 MG
1 TABLET ORAL
Qty: 0 | Refills: 0 | COMMUNITY
Start: 2019-01-02

## 2019-01-02 RX ADMIN — Medication 75 MICROGRAM(S): at 06:09

## 2019-01-02 RX ADMIN — BUPROPION HYDROCHLORIDE 300 MILLIGRAM(S): 150 TABLET, EXTENDED RELEASE ORAL at 12:27

## 2019-01-02 RX ADMIN — MAGNESIUM OXIDE 400 MG ORAL TABLET 400 MILLIGRAM(S): 241.3 TABLET ORAL at 08:00

## 2019-01-02 RX ADMIN — MAGNESIUM OXIDE 400 MG ORAL TABLET 400 MILLIGRAM(S): 241.3 TABLET ORAL at 17:52

## 2019-01-02 RX ADMIN — Medication 1 MILLIGRAM(S): at 06:09

## 2019-01-02 RX ADMIN — Medication 250 MILLIGRAM(S): at 17:54

## 2019-01-02 RX ADMIN — GABAPENTIN 600 MILLIGRAM(S): 400 CAPSULE ORAL at 12:27

## 2019-01-02 RX ADMIN — ENOXAPARIN SODIUM 40 MILLIGRAM(S): 100 INJECTION SUBCUTANEOUS at 12:30

## 2019-01-02 NOTE — PROGRESS NOTE ADULT - SUBJECTIVE AND OBJECTIVE BOX
Patient is a 68y old  Female who presents with a chief complaint of diarrhea, weakness, AMS (02 Jan 2019 11:28)      INTERVAL History of Present Illness/OVERNIGHT EVENTS: no new issues.  baseline status    MEDICATIONS  (STANDING):  ALPRAZolam 1 milliGRAM(s) Oral two times a day  amitriptyline 50 milliGRAM(s) Oral at bedtime  benztropine 1 milliGRAM(s) Oral at bedtime  buPROPion XL . 300 milliGRAM(s) Oral daily  cefTRIAXone   IVPB 1 Gram(s) IV Intermittent every 24 hours  enoxaparin Injectable 40 milliGRAM(s) SubCutaneous daily  gabapentin 600 milliGRAM(s) Oral daily  gabapentin 1200 milliGRAM(s) Oral at bedtime  influenza   Vaccine 0.5 milliLiter(s) IntraMuscular once  lactated ringers. 1000 milliLiter(s) (75 mL/Hr) IV Continuous <Continuous>  levothyroxine 75 MICROGram(s) Oral <User Schedule>  magnesium oxide 400 milliGRAM(s) Oral two times a day with meals  ziprasidone 40 milliGRAM(s) Oral <User Schedule>    MEDICATIONS  (PRN):      Allergies    honeydew melon (Other)  penicillin (Other; Hives)    Intolerances        REVIEW OF SYSTEMS:  Negative unless otherwise specified above.    Vital Signs Last 24 Hrs  T(C): 36.7 (02 Jan 2019 07:29), Max: 36.9 (01 Jan 2019 15:25)  T(F): 98.1 (02 Jan 2019 07:29), Max: 98.5 (01 Jan 2019 15:25)  HR: 102 (02 Jan 2019 07:29) (87 - 106)  BP: 98/74 (02 Jan 2019 07:29) (98/74 - 134/82)  BP(mean): --  RR: 18 (02 Jan 2019 07:29) (14 - 18)  SpO2: 97% (02 Jan 2019 07:29) (94% - 97%)        PHYSICAL EXAM:  GENERAL: No apparent distress  HEAD:  Atraumatic, Normocephalic  EYES: conjunctiva and sclera clear  ENMT: Moist mucous membranes  NECK: Supple  CHEST/LUNG: Clear to auscultation bilaterally  HEART: Regular rate and rhythm  ABDOMEN: Soft, Nontender, Nondistended; Bowel sounds present  EXTREMITIES:  No clubbing, cyanosis or edema  SKIN: No rashes or lesions  NERVOUS SYSTEM:  Alert & Oriented X3; Bilateral Lower extremity mobile, sensation to light touch intact      LABS:                        9.9    3.91  )-----------( 186      ( 01 Jan 2019 16:28 )             29.7     01 Jan 2019 16:28    138    |  104    |  12     ----------------------------<  109    4.5     |  25     |  0.89     Ca    9.0        01 Jan 2019 16:28    TPro  6.2    /  Alb  3.0    /  TBili  0.2    /  DBili  x      /  AST  15     /  ALT  26     /  AlkPhos  93     01 Jan 2019 16:28        CAPILLARY BLOOD GLUCOSE          RADIOLOGY & ADDITIONAL TESTS:    Images reviewed personally    Consultant Notes Reviewed and Care Discussed with relevant Consultants/Other Providers.

## 2019-01-02 NOTE — DISCHARGE NOTE ADULT - PLAN OF CARE
cure  infection finish abx course.  report any fever, chills or dysuria to local MD home meds resolved with IVF

## 2019-01-02 NOTE — DISCHARGE NOTE ADULT - HOSPITAL COURSE
68F with anxiety, OCD, non-Hodgkin's lymphoma (treated with chemo @[facility] [place], in remission for 2 years), hypothyroidism who presents with diarrhea, weakness, and AMS.  Patient's symptoms started about a week ago at her daughter's place.  Patient would have bouts of constipation and diarrhea (she would take only prune juice when she was constipated).  Non-bloody.  Consequently, patient had become very weak, confused, and dizzy.  Multiple falls (at least 4x) in the past week  but denied any head trauma or lost of consciousness.  Denied any urinary symptoms.  Denied any pain.  No fevers.  No nausea/vomiting.  No sick contacts.  No CP or SOB.  No abdominal pain.  Patient came home and was still noted to be weak and confused and the son decided to bring her here for further evaluation.      In the ED, patient was given IV hydration with immediate improvement.  Mental status also improved as per son.  However, patient's UA showed possible UTI and was started on ceftriaxone.  Patient is being admitted because of AMS/weakness caused by dehydration caused by diarrhea/UTI.   Back to baseline status - repeat urine studies sent for culture.  No fever or chills or urinary complaints.  seen by PT - NIKHIL discharge

## 2019-01-02 NOTE — DISCHARGE NOTE ADULT - PATIENT PORTAL LINK FT
You can access the [facility] Patient Portal, offered by [facility], by registering with the following website: [email or web address]

## 2019-01-02 NOTE — PROGRESS NOTE ADULT - PROBLEM SELECTOR PLAN 3
- cont with synthroid
- cont with synthroid
- cont with wellbutrin  - cont with ziprasidone  - cont with amitriptyline  - cont with alprazolam  - cont with benztropine

## 2019-01-02 NOTE — PROGRESS NOTE ADULT - PROBLEM SELECTOR PLAN 4
IMPROVE VTE Individual Risk Assessment          RISK                                                          Points  [  ] Previous VTE                                                 3  [  ] Thrombophilia                                              2  [  ] Lower limb paralysis                                    2        (unable to hold up >15 seconds)    [  ] Current Cancer                                             2         (within 6 months)  [  ] Immobilization > 24 hrs                              1  [  ] ICU/CCU stay > 24 hours                            1  [X] Age > 60                                                        1    IMPROVE VTE Score 1    - will use lovenox for DVT ppx
IMPROVE VTE Individual Risk Assessment          RISK                                                          Points  [  ] Previous VTE                                                 3  [  ] Thrombophilia                                              2  [  ] Lower limb paralysis                                    2        (unable to hold up >15 seconds)    [  ] Current Cancer                                             2         (within 6 months)  [  ] Immobilization > 24 hrs                              1  [  ] ICU/CCU stay > 24 hours                            1  [X] Age > 60                                                        1    IMPROVE VTE Score 1    - will use lovenox for DVT ppx
- cont with synthroid  - check TSH

## 2019-01-02 NOTE — PROGRESS NOTE ADULT - PROBLEM SELECTOR PROBLEM 2
Obsessive-compulsive disorder, unspecified type
Obsessive-compulsive disorder, unspecified type
Dehydration

## 2019-01-02 NOTE — PROGRESS NOTE ADULT - ATTENDING COMMENTS
Dispo: NIKHIL 1/2/2019 probably
Dispo: NIKHIL 1/2  d/w family/RN/SW
dispo: NKIHIL once repeat urine studies available

## 2019-01-02 NOTE — DISCHARGE NOTE ADULT - MEDICATION SUMMARY - MEDICATIONS TO TAKE
I will START or STAY ON the medications listed below when I get home from the hospital:    gabapentin 600 mg oral tablet  -- orally once a day in AM  -- Indication: For neuropathy    gabapentin 600 mg oral tablet  -- 2 cap(s) by mouth once a day (at bedtime)  -- Indication: For neuropathy    amitriptyline 50 mg oral tablet  -- 1 tab(s) by mouth once a day (at bedtime)  -- Indication: For insomnia    benztropine 1 mg oral tablet  -- 1 tab(s) by mouth once a day (at bedtime)  -- Indication: For OCD    ziprasidone 40 mg oral capsule  -- orally once a day  -- Indication: For OCD    ALPRAZolam 1 mg oral tablet  -- orally 2 times a day  -- Indication: For OCD    cefuroxime 250 mg oral tablet  -- 1 tab(s) by mouth every 12 hours  -- Indication: For UTI    magnesium amino acids chelate  -- 500 milligram(s) by mouth 2 times a day  -- Indication: For SUPPLEMENT    buPROPion 300 mg/24 hours (XL) oral tablet, extended release  -- 1 tab(s) by mouth once a day  -- Indication: For OCD    levothyroxine 75 mcg (0.075 mg)/mL oral solution  -- Takes Mon - Fri   -- Indication: For Hypothyroidism, unspecified type

## 2019-01-02 NOTE — PROGRESS NOTE ADULT - ASSESSMENT
68F with anxiety, OCD, non-Hodgkin's lymphoma (treated with chemo @[facility] [place], in remission for 2 years), hypothyroidism who presents with diarrhea, weakness, and AMS.   Likely 2/2 dehydration from UTI/diarrhea.
68F with anxiety, OCD, non-Hodgkin's lymphoma ([place] with [place] [place], [place] for 2 years), hypothyroidism who presents with diarrhea, weakness, and AMS.   Likely 2/2 dehydration from UTI/diarrhea.
68F with anxiety, OCD, non-Hodgkin's [place] [place] hypothyroidism who presents with diarrhea, weakness, and AMS.   Likely 2/2 dehydration from UTI/diarrhea.

## 2019-01-02 NOTE — DISCHARGE NOTE ADULT - CARE PLAN
Principal Discharge DX:	Acute cystitis without hematuria  Goal:	cure  infection  Assessment and plan of treatment:	finish abx course.  report any fever, chills or dysuria to local MD  Secondary Diagnosis:	Bipolar depression  Assessment and plan of treatment:	home meds  Secondary Diagnosis:	Dehydration  Assessment and plan of treatment:	resolved with IVF  Secondary Diagnosis:	Obsessive-compulsive disorder, unspecified type  Assessment and plan of treatment:	home meds

## 2019-01-02 NOTE — DISCHARGE NOTE ADULT - CARE PROVIDER_API CALL
Last, Sajan LUIS (), Internal Medicine  [address] [place] [address]  Phone: [phone]  Fax: [phone]

## 2019-01-02 NOTE — PROGRESS NOTE ADULT - PROBLEM SELECTOR PLAN 2
- cont with wellbutrin  - cont with ziprasidone  - cont with amitriptyline  - cont with alprazolam  - cont with benztropine
- cont with wellbutrin  - cont with ziprasidone  - cont with amitriptyline  - cont with alprazolam  - cont with benztropine
- monitor intake and output.  - improved

## 2019-01-02 NOTE — PROGRESS NOTE ADULT - PROBLEM SELECTOR PLAN 1
- complete abx course  - f/u UCX  - monitor intake and output.
- continue with ceftriaxone  - f/u UCX  - monitor intake and output.
- continue with ceftriaxone  - f/u UCX  - monitor intake and output.

## 2019-01-02 NOTE — PROGRESS NOTE ADULT - PROBLEM SELECTOR PROBLEM 3
Hypothyroidism, unspecified type
Hypothyroidism, unspecified type
Obsessive-compulsive disorder, unspecified type

## 2019-01-20 ENCOUNTER — TRANSCRIPTION ENCOUNTER (OUTPATIENT)
Age: 69
End: 2019-01-20

## 2019-01-20 ENCOUNTER — INPATIENT (INPATIENT)
Facility: HOSPITAL | Age: 69
LOS: 5 days | Discharge: NURSING, REHAB OR LONG-TERM CARE | DRG: 470 | End: 2019-01-26
Attending: FAMILY MEDICINE | Admitting: FAMILY MEDICINE
Payer: MEDICARE

## 2019-01-20 VITALS
SYSTOLIC BLOOD PRESSURE: 141 MMHG | TEMPERATURE: 99 F | RESPIRATION RATE: 16 BRPM | DIASTOLIC BLOOD PRESSURE: 78 MMHG | HEIGHT: 61 IN | WEIGHT: 139.99 LBS | OXYGEN SATURATION: 97 % | HEART RATE: 87 BPM

## 2019-01-20 DIAGNOSIS — Z98.89 OTHER SPECIFIED POSTPROCEDURAL STATES: Chronic | ICD-10-CM

## 2019-01-20 DIAGNOSIS — Z96.653 PRESENCE OF ARTIFICIAL KNEE JOINT, BILATERAL: Chronic | ICD-10-CM

## 2019-01-20 DIAGNOSIS — Z98.49 CATARACT EXTRACTION STATUS, UNSPECIFIED EYE: Chronic | ICD-10-CM

## 2019-01-20 DIAGNOSIS — S72.009A FRACTURE OF UNSPECIFIED PART OF NECK OF UNSPECIFIED FEMUR, INITIAL ENCOUNTER FOR CLOSED FRACTURE: ICD-10-CM

## 2019-01-20 DIAGNOSIS — S72.001A FRACTURE OF UNSPECIFIED PART OF NECK OF RIGHT FEMUR, INITIAL ENCOUNTER FOR CLOSED FRACTURE: ICD-10-CM

## 2019-01-20 LAB
ALBUMIN SERPL ELPH-MCNC: 3.7 G/DL — SIGNIFICANT CHANGE UP (ref 3.3–5)
ALP SERPL-CCNC: 121 U/L — HIGH (ref 30–120)
ALT FLD-CCNC: 53 U/L DA — SIGNIFICANT CHANGE UP (ref 10–60)
ANION GAP SERPL CALC-SCNC: 9 MMOL/L — SIGNIFICANT CHANGE UP (ref 5–17)
APPEARANCE UR: ABNORMAL
APTT BLD: 28.4 SEC — LOW (ref 28.5–37)
AST SERPL-CCNC: 23 U/L — SIGNIFICANT CHANGE UP (ref 10–40)
BACTERIA # UR AUTO: ABNORMAL
BASOPHILS # BLD AUTO: 0.01 K/UL — SIGNIFICANT CHANGE UP (ref 0–0.2)
BASOPHILS NFR BLD AUTO: 0.1 % — SIGNIFICANT CHANGE UP (ref 0–2)
BILIRUB SERPL-MCNC: 0.3 MG/DL — SIGNIFICANT CHANGE UP (ref 0.2–1.2)
BILIRUB UR-MCNC: NEGATIVE — SIGNIFICANT CHANGE UP
BLD GP AB SCN SERPL QL: SIGNIFICANT CHANGE UP
BUN SERPL-MCNC: 16 MG/DL — SIGNIFICANT CHANGE UP (ref 7–23)
CALCIUM SERPL-MCNC: 9.1 MG/DL — SIGNIFICANT CHANGE UP (ref 8.4–10.5)
CHLORIDE SERPL-SCNC: 100 MMOL/L — SIGNIFICANT CHANGE UP (ref 96–108)
CO2 SERPL-SCNC: 30 MMOL/L — SIGNIFICANT CHANGE UP (ref 22–31)
COLOR SPEC: YELLOW — SIGNIFICANT CHANGE UP
CREAT SERPL-MCNC: 1.12 MG/DL — SIGNIFICANT CHANGE UP (ref 0.5–1.3)
DIFF PNL FLD: NEGATIVE — SIGNIFICANT CHANGE UP
EOSINOPHIL # BLD AUTO: 0.01 K/UL — SIGNIFICANT CHANGE UP (ref 0–0.5)
EOSINOPHIL NFR BLD AUTO: 0.1 % — SIGNIFICANT CHANGE UP (ref 0–6)
EPI CELLS # UR: SIGNIFICANT CHANGE UP
GLUCOSE SERPL-MCNC: 134 MG/DL — HIGH (ref 70–99)
GLUCOSE UR QL: NEGATIVE MG/DL — SIGNIFICANT CHANGE UP
HCT VFR BLD CALC: 32.7 % — LOW (ref 34.5–45)
HGB BLD-MCNC: 10.7 G/DL — LOW (ref 11.5–15.5)
IMM GRANULOCYTES NFR BLD AUTO: 0.6 % — SIGNIFICANT CHANGE UP (ref 0–1.5)
INR BLD: 1.04 RATIO — SIGNIFICANT CHANGE UP (ref 0.88–1.16)
KETONES UR-MCNC: NEGATIVE — SIGNIFICANT CHANGE UP
LEUKOCYTE ESTERASE UR-ACNC: ABNORMAL
LYMPHOCYTES # BLD AUTO: 0.48 K/UL — LOW (ref 1–3.3)
LYMPHOCYTES # BLD AUTO: 5.8 % — LOW (ref 13–44)
MCHC RBC-ENTMCNC: 32.7 GM/DL — SIGNIFICANT CHANGE UP (ref 32–36)
MCHC RBC-ENTMCNC: 35.5 PG — HIGH (ref 27–34)
MCV RBC AUTO: 108.6 FL — HIGH (ref 80–100)
MONOCYTES # BLD AUTO: 0.42 K/UL — SIGNIFICANT CHANGE UP (ref 0–0.9)
MONOCYTES NFR BLD AUTO: 5.1 % — SIGNIFICANT CHANGE UP (ref 2–14)
NEUTROPHILS # BLD AUTO: 7.27 K/UL — SIGNIFICANT CHANGE UP (ref 1.8–7.4)
NEUTROPHILS NFR BLD AUTO: 88.3 % — HIGH (ref 43–77)
NITRITE UR-MCNC: NEGATIVE — SIGNIFICANT CHANGE UP
NRBC # BLD: 0 /100 WBCS — SIGNIFICANT CHANGE UP (ref 0–0)
PH UR: 7 — SIGNIFICANT CHANGE UP (ref 5–8)
PLATELET # BLD AUTO: 210 K/UL — SIGNIFICANT CHANGE UP (ref 150–400)
POTASSIUM SERPL-MCNC: 4.3 MMOL/L — SIGNIFICANT CHANGE UP (ref 3.5–5.3)
POTASSIUM SERPL-SCNC: 4.3 MMOL/L — SIGNIFICANT CHANGE UP (ref 3.5–5.3)
PROT SERPL-MCNC: 6.6 G/DL — SIGNIFICANT CHANGE UP (ref 6–8.3)
PROT UR-MCNC: NEGATIVE MG/DL — SIGNIFICANT CHANGE UP
PROTHROM AB SERPL-ACNC: 11.4 SEC — SIGNIFICANT CHANGE UP (ref 10–12.9)
RBC # BLD: 3.01 M/UL — LOW (ref 3.8–5.2)
RBC # FLD: 13.4 % — SIGNIFICANT CHANGE UP (ref 10.3–14.5)
RBC CASTS # UR COMP ASSIST: ABNORMAL /HPF (ref 0–4)
SODIUM SERPL-SCNC: 139 MMOL/L — SIGNIFICANT CHANGE UP (ref 135–145)
SP GR SPEC: 1.01 — SIGNIFICANT CHANGE UP (ref 1.01–1.02)
UROBILINOGEN FLD QL: NEGATIVE MG/DL — SIGNIFICANT CHANGE UP
WBC # BLD: 8.24 K/UL — SIGNIFICANT CHANGE UP (ref 3.8–10.5)
WBC # FLD AUTO: 8.24 K/UL — SIGNIFICANT CHANGE UP (ref 3.8–10.5)
WBC UR QL: ABNORMAL

## 2019-01-20 PROCEDURE — 71045 X-RAY EXAM CHEST 1 VIEW: CPT | Mod: 26

## 2019-01-20 PROCEDURE — 70450 CT HEAD/BRAIN W/O DYE: CPT | Mod: 26

## 2019-01-20 PROCEDURE — 93010 ELECTROCARDIOGRAM REPORT: CPT

## 2019-01-20 PROCEDURE — 73502 X-RAY EXAM HIP UNI 2-3 VIEWS: CPT | Mod: 26,RT

## 2019-01-20 PROCEDURE — 99223 1ST HOSP IP/OBS HIGH 75: CPT | Mod: AI

## 2019-01-20 PROCEDURE — 99285 EMERGENCY DEPT VISIT HI MDM: CPT

## 2019-01-20 RX ORDER — BUPROPION HYDROCHLORIDE 150 MG/1
300 TABLET, EXTENDED RELEASE ORAL DAILY
Qty: 0 | Refills: 0 | Status: DISCONTINUED | OUTPATIENT
Start: 2019-01-20 | End: 2019-01-21

## 2019-01-20 RX ORDER — ONDANSETRON 8 MG/1
4 TABLET, FILM COATED ORAL EVERY 6 HOURS
Qty: 0 | Refills: 0 | Status: DISCONTINUED | OUTPATIENT
Start: 2019-01-20 | End: 2019-01-22

## 2019-01-20 RX ORDER — SODIUM CHLORIDE 9 MG/ML
1000 INJECTION INTRAMUSCULAR; INTRAVENOUS; SUBCUTANEOUS ONCE
Qty: 0 | Refills: 0 | Status: COMPLETED | OUTPATIENT
Start: 2019-01-20 | End: 2019-01-20

## 2019-01-20 RX ORDER — ALPRAZOLAM 0.25 MG
1 TABLET ORAL DAILY
Qty: 0 | Refills: 0 | Status: DISCONTINUED | OUTPATIENT
Start: 2019-01-20 | End: 2019-01-22

## 2019-01-20 RX ORDER — LEVOTHYROXINE SODIUM 125 MCG
75 TABLET ORAL
Qty: 0 | Refills: 0 | Status: DISCONTINUED | OUTPATIENT
Start: 2019-01-20 | End: 2019-01-22

## 2019-01-20 RX ORDER — HEPARIN SODIUM 5000 [USP'U]/ML
5000 INJECTION INTRAVENOUS; SUBCUTANEOUS EVERY 12 HOURS
Qty: 0 | Refills: 0 | Status: DISCONTINUED | OUTPATIENT
Start: 2019-01-20 | End: 2019-01-21

## 2019-01-20 RX ORDER — INFLUENZA VIRUS VACCINE 15; 15; 15; 15 UG/.5ML; UG/.5ML; UG/.5ML; UG/.5ML
0.5 SUSPENSION INTRAMUSCULAR ONCE
Qty: 0 | Refills: 0 | Status: COMPLETED | OUTPATIENT
Start: 2019-01-20 | End: 2019-01-20

## 2019-01-20 RX ORDER — AMITRIPTYLINE HCL 25 MG
1 TABLET ORAL
Qty: 0 | Refills: 0 | COMMUNITY

## 2019-01-20 RX ORDER — MORPHINE SULFATE 50 MG/1
1 CAPSULE, EXTENDED RELEASE ORAL EVERY 6 HOURS
Qty: 0 | Refills: 0 | Status: DISCONTINUED | OUTPATIENT
Start: 2019-01-20 | End: 2019-01-22

## 2019-01-20 RX ORDER — MAGNESIUM HYDROXIDE 400 MG/1
30 TABLET, CHEWABLE ORAL DAILY
Qty: 0 | Refills: 0 | Status: DISCONTINUED | OUTPATIENT
Start: 2019-01-20 | End: 2019-01-22

## 2019-01-20 RX ORDER — GABAPENTIN 400 MG/1
100 CAPSULE ORAL DAILY
Qty: 0 | Refills: 0 | Status: DISCONTINUED | OUTPATIENT
Start: 2019-01-20 | End: 2019-01-20

## 2019-01-20 RX ORDER — MORPHINE SULFATE 50 MG/1
2 CAPSULE, EXTENDED RELEASE ORAL ONCE
Qty: 0 | Refills: 0 | Status: DISCONTINUED | OUTPATIENT
Start: 2019-01-20 | End: 2019-01-20

## 2019-01-20 RX ORDER — SODIUM CHLORIDE 9 MG/ML
1000 INJECTION, SOLUTION INTRAVENOUS
Qty: 0 | Refills: 0 | Status: DISCONTINUED | OUTPATIENT
Start: 2019-01-20 | End: 2019-01-21

## 2019-01-20 RX ORDER — GABAPENTIN 400 MG/1
300 CAPSULE ORAL
Qty: 0 | Refills: 0 | Status: DISCONTINUED | OUTPATIENT
Start: 2019-01-20 | End: 2019-01-22

## 2019-01-20 RX ADMIN — Medication 1 MILLIGRAM(S): at 17:37

## 2019-01-20 RX ADMIN — MORPHINE SULFATE 1 MILLIGRAM(S): 50 CAPSULE, EXTENDED RELEASE ORAL at 19:33

## 2019-01-20 RX ADMIN — SODIUM CHLORIDE 1000 MILLILITER(S): 9 INJECTION INTRAMUSCULAR; INTRAVENOUS; SUBCUTANEOUS at 17:15

## 2019-01-20 RX ADMIN — Medication 75 MICROGRAM(S): at 17:29

## 2019-01-20 RX ADMIN — SODIUM CHLORIDE 80 MILLILITER(S): 9 INJECTION, SOLUTION INTRAVENOUS at 15:55

## 2019-01-20 RX ADMIN — MORPHINE SULFATE 1 MILLIGRAM(S): 50 CAPSULE, EXTENDED RELEASE ORAL at 20:03

## 2019-01-20 RX ADMIN — SODIUM CHLORIDE 80 MILLILITER(S): 9 INJECTION, SOLUTION INTRAVENOUS at 19:39

## 2019-01-20 RX ADMIN — GABAPENTIN 300 MILLIGRAM(S): 400 CAPSULE ORAL at 17:29

## 2019-01-20 RX ADMIN — HEPARIN SODIUM 5000 UNIT(S): 5000 INJECTION INTRAVENOUS; SUBCUTANEOUS at 17:29

## 2019-01-20 RX ADMIN — MORPHINE SULFATE 2 MILLIGRAM(S): 50 CAPSULE, EXTENDED RELEASE ORAL at 15:46

## 2019-01-20 RX ADMIN — MORPHINE SULFATE 2 MILLIGRAM(S): 50 CAPSULE, EXTENDED RELEASE ORAL at 15:30

## 2019-01-20 RX ADMIN — BUPROPION HYDROCHLORIDE 300 MILLIGRAM(S): 150 TABLET, EXTENDED RELEASE ORAL at 17:29

## 2019-01-20 NOTE — H&P ADULT - PROBLEM SELECTOR PLAN 4
- cont with synthroid  - check TSH - cont with wellbutrin  - cont with ziprasidone  - cont with amitriptyline  - cont with alprazolam  - cont with benztropine - cont with wellbutrin  -  hold  ziprasidone    - cont with alprazolam

## 2019-01-20 NOTE — H&P ADULT - ASSESSMENT
68F with anxiety, OCD, non-Hodgkin's [place] [place] hypothyroidism , dementia  with recent diarrhea  UTI , sent to rehab . 68F with anxiety, OCD, non-Hodgkin's lymphoma (treated with chemo @[facility] [place], in remission for 2 years), hypothyroidism , dementia  with recent diarrhea  UTI , sent to rehab and came in today with s/p fall and right hip fracture.   .

## 2019-01-20 NOTE — H&P ADULT - PROBLEM SELECTOR PLAN 1
- admit to medicine  - continue with ceftriaxone  - f/u UCX Xray showed - Right hip with full pelvic view. 4 images submitted.   Patient fell with local trauma.  There is a fairly high neck fracture of the right femur with typical   upward displacement of the distal fracture fragment.    Hips are relatively free of degeneration and appear symmetric. Xray showed - Right hip with full pelvic view. 4 images submitted.   Patient fell with local trauma.  There is a fairly high neck fracture of the right femur with typical   upward displacement of the distal fracture fragment.  Hips are relatively free of degeneration and appear symmetric.  Orthopedic evaluation .   Possible OR in am.   NPO after midnight   c/w IV fluids .   PT evaluation .   Morphine PRN for pain. Xray showed - Right hip with full pelvic view. 4 images submitted.   Patient fell with local trauma.  There is a fairly high neck fracture of the right femur with typical   upward displacement of the distal fracture fragment.  Hips are relatively free of degeneration and appear symmetric.  Orthopedic evaluation .   Possible OR in am.   NPO after midnight   c/w IV fluids .   PT evaluation .   Morphine PRN for pain.  Cardiac evaluation. Xray showed - Right hip with full pelvic view. 4 images submitted.   Patient fell with local trauma.  There is a fairly high neck fracture of the right femur with typical   upward displacement of the distal fracture fragment.  Hips are relatively free of degeneration and appear symmetric.  Orthopedic evaluation .   Possible OR in am.   NPO after midnight   c/w IV fluids .   PT evaluation .   Morphine PRN for pain.  Cardiac evaluation.  Follow up CT head - to r/o ICH.

## 2019-01-20 NOTE — ED PROVIDER NOTE - PMH
Bipolar depression    Depression    Hypercholesteremia    Lymphoma  [place] [facility] [place]  OCD (obsessive compulsive disorder)    Osteoarthritis of left knee    Osteoarthritis of right knee

## 2019-01-20 NOTE — H&P ADULT - PROBLEM SELECTOR PLAN 3
- cont with wellbutrin  - cont with ziprasidone  - cont with amitriptyline  - cont with alprazolam  - cont with benztropine - continue with IV hydration  - physical therapy eval

## 2019-01-20 NOTE — CONSULT NOTE ADULT - ASSESSMENT
The patient is a 68 year old female with a history of NHL, hypothyroidism, dementia who presents with fall and right hip fracture.    Plan:  - ECG with no evidence of ischemia or infarction. Nonspecific findings noted.  - Appears clinically dry. Will give 1 L NS now and continue maintenance fluids.  - The patient is asymptomatic from a cardiac perspective and there are no active cardiac issues. The patient is low/intermediate risk for cardiac events for an intermediate risk surgery. She is optimized to proceed without additional cardiac testing.

## 2019-01-20 NOTE — H&P ADULT - PROBLEM SELECTOR PLAN 2
- continue with IV hydration  - physical therapy eval - admit to medicine  - continue with ceftriaxone  - f/u UCX - continue with  levofloxacin  - f/u UCX -UA positive   continue with  levofloxacin  - f/u UCX

## 2019-01-20 NOTE — H&P ADULT - PROBLEM SELECTOR PLAN 6
IMPROVE VTE Individual Risk Assessment          RISK                                                          Points  [  ] Previous VTE                                                 3  [  ] Thrombophilia                                              2  [  ] Lower limb paralysis                                    2        (unable to hold up >15 seconds)    [  ] Current Cancer                                             2         (within 6 months)  [  ] Immobilization > 24 hrs                              1  [  ] ICU/CCU stay > 24 hours                            1  [X] Age > 60                                                        1    IMPROVE VTE Score 1    - will use lovenox for DVT ppx IMPROVE VTE Individual Risk Assessment          RISK                                                          Points  [  ] Previous VTE                                                 3  [  ] Thrombophilia                                              2  [  ] Lower limb paralysis                                    2        (unable to hold up >15 seconds)    [  ] Current Cancer                                             2         (within 6 months)  [  ] Immobilization > 24 hrs                              1  [  ] ICU/CCU stay > 24 hours                            1  [X] Age > 60                                                        1    IMPROVE VTE Score 1    - Heparin s/q  for DVT ppx

## 2019-01-20 NOTE — ED ADULT NURSE NOTE - NSIMPLEMENTINTERV_GEN_ALL_ED
Implemented All Fall with Harm Risk Interventions:  [place] to call system. Call bell, personal items and telephone within reach. Instruct patient to call for assistance. Room bathroom lighting operational. Non-slip footwear when patient is off stretcher. Physically safe environment: no spills, clutter or unnecessary equipment. Stretcher in lowest position, wheels locked, appropriate side rails in place. Provide visual cue, wrist band, yellow gown, etc. Monitor gait and stability. Monitor for mental status changes and reorient to person, place, and time. Review medications for side effects contributing to fall risk. Reinforce activity limits and safety measures with patient and family. Provide visual clues: red socks.

## 2019-01-20 NOTE — ED PROVIDER NOTE - MEDICAL DECISION MAKING DETAILS
69 y/o female with a PMHx of bipolar depression, HLD, NHL inr remission, OCD, dementia presents to the ED from [facility] and rehabilitation center c/o right hip pain s/p fall today. Plan for XR, ortho consult and admit.

## 2019-01-20 NOTE — H&P ADULT - HISTORY OF PRESENT ILLNESS
69 y/o female with a PMHx of bipolar depression, HLD, NHL inr remission, OCD, dementia presents to the ED from [facility] and rehabilitation center c/o right hip pain s/p fall today. As per XR done at assisted, pt has a right hip fx. Denies head trauma, HA or neck pain. Pt recently admitted to [facility] for a UTI.  and was transferred to rehabilitation.   Ortho- Dr. Escalera. . Full HPI unobtainable secondary to dementia, hx obtained from son.  ICU Vital Signs Last 24 Hrs  T(C): 37 (20 Jan 2019 12:17), Max: 37 (20 Jan 2019 12:17)  T(F): 98.6 (20 Jan 2019 12:17), Max: 98.6 (20 Jan 2019 12:17)  HR: 87 (20 Jan 2019 12:17) (87 - 87)  BP: 141/78 (20 Jan 2019 12:17) (141/78 - 141/78)  BP(mean): --  ABP: --  ABP(mean): --  RR: 16 (20 Jan 2019 12:17) (16 - 16)  SpO2: 97% (20 Jan 2019 12:17) (97% - 97%) 67 y/o female with a PMHx of bipolar depression, HLD, NHL inr remission, OCD, dementia presents to the ED from [facility] and rehabilitation center c/o right hip pain s/p fall today. As per XR done at group home, pt has a right hip fx. Denies head trauma, HA or neck pain. Pt recently admitted to [facility] for a UTI.  and was transferred to rehabilitation.    . Patient is a poor historian secondary to dementia, information  obtained from son.  Patient was going to bathroom today , her room was dark , she fell down and injured her hip  , denied any head trauma   Consequently, patient  became very weak, confused, and  c/o  severe pain .    She Had Multiple falls (at least 4x) in the past week  but denied any head trauma or lost of consciousness.  Denied any urinary symptoms.  Denied any pain.  No fevers.  No nausea/vomiting.  No sick contacts.  No CP or SOB.  No abdominal pain.   ICU Vital Signs Last 24 Hrs  T(C): 37 (20 Jan 2019 12:17), Max: 37 (20 Jan 2019 12:17)  T(F): 98.6 (20 Jan 2019 12:17), Max: 98.6 (20 Jan 2019 12:17)  HR: 87 (20 Jan 2019 12:17) (87 - 87)  BP: 141/78 (20 Jan 2019 12:17) (141/78 - 141/78)  BP(mean): --  ABP: --  ABP(mean): --  RR: 16 (20 Jan 2019 12:17) (16 - 16)  SpO2: 97% (20 Jan 2019 12:17) (97% - 97%)

## 2019-01-20 NOTE — H&P ADULT - FAMILY HISTORY
Family history of COPD (chronic obstructive pulmonary disease)     Family history of breast cancer     Family history of diabetes mellitus     Grandparent  Still living? No  Family history of stomach cancer, Age at diagnosis: Age Unknown  Family history of prostate cancer, Age at diagnosis: Age Unknown

## 2019-01-20 NOTE — ED ADULT NURSE NOTE - PMH
Bipolar depression    Depression    Hypercholesteremia    Lymphoma  NHL, treated with chemo @ [facility] [place], in remission since 2016  OCD (obsessive compulsive disorder)    Osteoarthritis of left knee    Osteoarthritis of right knee

## 2019-01-20 NOTE — ED ADULT NURSE REASSESSMENT NOTE - NS ED NURSE REASSESS COMMENT FT1
pt with pains and spasm with movement offered and declined pains meds wants her xanax for anxiety instead pt medicated awaiting dinner and inpt bed
pains better ct scan complete antibiotics given pt feels a lot better wants food awaiting dinner pt offers no other c/o at present pending inpt bed
pt rolling over to side and c/o increased pains and medicated for pains  pt pending admission
pains better aFTER PAIN MEDS GIVEN  " a lot better now" pt pending admission no bed available

## 2019-01-20 NOTE — ED ADULT NURSE NOTE - OBJECTIVE STATEMENT
pt fell at facility on arrival right hip pains with palpation and bladder distended pt  skin warm and dry no resp distress lungs clear and equal b/l ascultation abd soft non tender + bs  straight cath for urine and 1 liter talha urine slightly cloudy obtained  right leg with slight shortening and extremal rotation

## 2019-01-20 NOTE — ED PROVIDER NOTE - OBJECTIVE STATEMENT
67 y/o female with a PMHx of bipolar depression, HLD, NHL inr remission, OCD, dementia presents to the ED from [facility] and rehabilitation center c/o right hip pain s/p fall today. As per XR done at care home, pt has a right hip fx. Denies head trauma, HA or neck pain. Pt recently admitted to [facility] for a UTI. Ortho- Dr. Escalera. PCP- Dr. Sajan Mora. Full HPI unobtainable secondary to dementia, hx obtained from son.

## 2019-01-20 NOTE — H&P ADULT - PROBLEM SELECTOR PROBLEM 1
Acute cystitis without hematuria Hip fracture requiring operative repair, right, closed, initial encounter

## 2019-01-20 NOTE — H&P ADULT - PMH
Bipolar depression    Depression    Hypercholesteremia    Lymphoma  NHL, treated with chemo @ [facility], in remission since 2016  OCD (obsessive compulsive disorder)    Osteoarthritis of left knee    Osteoarthritis of right knee

## 2019-01-21 ENCOUNTER — RESULT REVIEW (OUTPATIENT)
Age: 69
End: 2019-01-21

## 2019-01-21 LAB
ALBUMIN SERPL ELPH-MCNC: 2.7 G/DL — LOW (ref 3.3–5)
ALP SERPL-CCNC: 95 U/L — SIGNIFICANT CHANGE UP (ref 30–120)
ALT FLD-CCNC: 34 U/L DA — SIGNIFICANT CHANGE UP (ref 10–60)
ANION GAP SERPL CALC-SCNC: 8 MMOL/L — SIGNIFICANT CHANGE UP (ref 5–17)
APTT BLD: 27.3 SEC — LOW (ref 28.5–37)
AST SERPL-CCNC: 16 U/L — SIGNIFICANT CHANGE UP (ref 10–40)
BILIRUB SERPL-MCNC: 0.3 MG/DL — SIGNIFICANT CHANGE UP (ref 0.2–1.2)
BUN SERPL-MCNC: 10 MG/DL — SIGNIFICANT CHANGE UP (ref 7–23)
CALCIUM SERPL-MCNC: 7.9 MG/DL — LOW (ref 8.4–10.5)
CHLORIDE SERPL-SCNC: 104 MMOL/L — SIGNIFICANT CHANGE UP (ref 96–108)
CHOLEST SERPL-MCNC: 170 MG/DL — SIGNIFICANT CHANGE UP (ref 10–199)
CO2 SERPL-SCNC: 27 MMOL/L — SIGNIFICANT CHANGE UP (ref 22–31)
CREAT SERPL-MCNC: 0.87 MG/DL — SIGNIFICANT CHANGE UP (ref 0.5–1.3)
GLUCOSE SERPL-MCNC: 129 MG/DL — HIGH (ref 70–99)
HBA1C BLD-MCNC: 5.4 % — SIGNIFICANT CHANGE UP (ref 4–5.6)
HCT VFR BLD CALC: 25.4 % — LOW (ref 34.5–45)
HCT VFR BLD CALC: 27.1 % — LOW (ref 34.5–45)
HDLC SERPL-MCNC: 62 MG/DL — SIGNIFICANT CHANGE UP
HGB BLD-MCNC: 8.2 G/DL — LOW (ref 11.5–15.5)
HGB BLD-MCNC: 8.9 G/DL — LOW (ref 11.5–15.5)
INR BLD: 1.2 RATIO — HIGH (ref 0.88–1.16)
LIPID PNL WITH DIRECT LDL SERPL: 86 MG/DL — SIGNIFICANT CHANGE UP
MCHC RBC-ENTMCNC: 32.3 GM/DL — SIGNIFICANT CHANGE UP (ref 32–36)
MCHC RBC-ENTMCNC: 32.8 GM/DL — SIGNIFICANT CHANGE UP (ref 32–36)
MCHC RBC-ENTMCNC: 35.2 PG — HIGH (ref 27–34)
MCHC RBC-ENTMCNC: 35.3 PG — HIGH (ref 27–34)
MCV RBC AUTO: 107.1 FL — HIGH (ref 80–100)
MCV RBC AUTO: 109.5 FL — HIGH (ref 80–100)
MRSA PCR RESULT.: SIGNIFICANT CHANGE UP
NRBC # BLD: 0 /100 WBCS — SIGNIFICANT CHANGE UP (ref 0–0)
NRBC # BLD: 0 /100 WBCS — SIGNIFICANT CHANGE UP (ref 0–0)
PLATELET # BLD AUTO: 156 K/UL — SIGNIFICANT CHANGE UP (ref 150–400)
PLATELET # BLD AUTO: 163 K/UL — SIGNIFICANT CHANGE UP (ref 150–400)
POTASSIUM SERPL-MCNC: 3.9 MMOL/L — SIGNIFICANT CHANGE UP (ref 3.5–5.3)
POTASSIUM SERPL-SCNC: 3.9 MMOL/L — SIGNIFICANT CHANGE UP (ref 3.5–5.3)
PROT SERPL-MCNC: 5.1 G/DL — LOW (ref 6–8.3)
PROTHROM AB SERPL-ACNC: 13.2 SEC — HIGH (ref 10–12.9)
RBC # BLD: 2.32 M/UL — LOW (ref 3.8–5.2)
RBC # BLD: 2.53 M/UL — LOW (ref 3.8–5.2)
RBC # FLD: 13.3 % — SIGNIFICANT CHANGE UP (ref 10.3–14.5)
RBC # FLD: 13.4 % — SIGNIFICANT CHANGE UP (ref 10.3–14.5)
S AUREUS DNA NOSE QL NAA+PROBE: SIGNIFICANT CHANGE UP
SODIUM SERPL-SCNC: 139 MMOL/L — SIGNIFICANT CHANGE UP (ref 135–145)
T3 SERPL-MCNC: 61 NG/DL — LOW (ref 80–200)
T4 AB SER-ACNC: 5.5 UG/DL — SIGNIFICANT CHANGE UP (ref 4.6–12)
TOTAL CHOLESTEROL/HDL RATIO MEASUREMENT: 2.7 RATIO — LOW (ref 3.3–7.1)
TRIGL SERPL-MCNC: 112 MG/DL — SIGNIFICANT CHANGE UP (ref 10–149)
TSH SERPL-MCNC: 3.53 UIU/ML — SIGNIFICANT CHANGE UP (ref 0.27–4.2)
WBC # BLD: 4.85 K/UL — SIGNIFICANT CHANGE UP (ref 3.8–10.5)
WBC # BLD: 5.7 K/UL — SIGNIFICANT CHANGE UP (ref 3.8–10.5)
WBC # FLD AUTO: 4.85 K/UL — SIGNIFICANT CHANGE UP (ref 3.8–10.5)
WBC # FLD AUTO: 5.7 K/UL — SIGNIFICANT CHANGE UP (ref 3.8–10.5)

## 2019-01-21 PROCEDURE — 27125 PARTIAL HIP REPLACEMENT: CPT | Mod: AS,RT

## 2019-01-21 PROCEDURE — 88311 DECALCIFY TISSUE: CPT | Mod: 26

## 2019-01-21 PROCEDURE — 99233 SBSQ HOSP IP/OBS HIGH 50: CPT

## 2019-01-21 PROCEDURE — 72170 X-RAY EXAM OF PELVIS: CPT | Mod: 26

## 2019-01-21 PROCEDURE — 88305 TISSUE EXAM BY PATHOLOGIST: CPT | Mod: 26

## 2019-01-21 RX ORDER — SENNA PLUS 8.6 MG/1
2 TABLET ORAL AT BEDTIME
Qty: 0 | Refills: 0 | Status: DISCONTINUED | OUTPATIENT
Start: 2019-01-22 | End: 2019-01-26

## 2019-01-21 RX ORDER — VANCOMYCIN HCL 1 G
1000 VIAL (EA) INTRAVENOUS ONCE
Qty: 0 | Refills: 0 | Status: COMPLETED | OUTPATIENT
Start: 2019-01-21 | End: 2019-01-21

## 2019-01-21 RX ORDER — GABAPENTIN 400 MG/1
600 CAPSULE ORAL AT BEDTIME
Qty: 0 | Refills: 0 | Status: DISCONTINUED | OUTPATIENT
Start: 2019-01-22 | End: 2019-01-26

## 2019-01-21 RX ORDER — SODIUM CHLORIDE 9 MG/ML
1000 INJECTION, SOLUTION INTRAVENOUS ONCE
Qty: 0 | Refills: 0 | Status: COMPLETED | OUTPATIENT
Start: 2019-01-21 | End: 2019-01-21

## 2019-01-21 RX ORDER — ALPRAZOLAM 0.25 MG
1 TABLET ORAL DAILY
Qty: 0 | Refills: 0 | Status: DISCONTINUED | OUTPATIENT
Start: 2019-01-22 | End: 2019-01-26

## 2019-01-21 RX ORDER — OXYCODONE HYDROCHLORIDE 5 MG/1
10 TABLET ORAL
Qty: 0 | Refills: 0 | Status: DISCONTINUED | OUTPATIENT
Start: 2019-01-22 | End: 2019-01-23

## 2019-01-21 RX ORDER — LEVOTHYROXINE SODIUM 125 MCG
75 TABLET ORAL
Qty: 0 | Refills: 0 | Status: DISCONTINUED | OUTPATIENT
Start: 2019-01-22 | End: 2019-01-26

## 2019-01-21 RX ORDER — ACETAMINOPHEN 500 MG
1000 TABLET ORAL EVERY 8 HOURS
Qty: 0 | Refills: 0 | Status: DISCONTINUED | OUTPATIENT
Start: 2019-01-22 | End: 2019-01-26

## 2019-01-21 RX ORDER — DOCUSATE SODIUM 100 MG
100 CAPSULE ORAL THREE TIMES A DAY
Qty: 0 | Refills: 0 | Status: DISCONTINUED | OUTPATIENT
Start: 2019-01-22 | End: 2019-01-26

## 2019-01-21 RX ORDER — ZIPRASIDONE HYDROCHLORIDE 20 MG/1
40 CAPSULE ORAL
Qty: 0 | Refills: 0 | Status: DISCONTINUED | OUTPATIENT
Start: 2019-01-21 | End: 2019-01-26

## 2019-01-21 RX ORDER — OXYCODONE HYDROCHLORIDE 5 MG/1
5 TABLET ORAL
Qty: 0 | Refills: 0 | Status: DISCONTINUED | OUTPATIENT
Start: 2019-01-22 | End: 2019-01-23

## 2019-01-21 RX ORDER — ACETAMINOPHEN 500 MG
1000 TABLET ORAL EVERY 6 HOURS
Qty: 0 | Refills: 0 | Status: COMPLETED | OUTPATIENT
Start: 2019-01-22 | End: 2019-01-22

## 2019-01-21 RX ORDER — SODIUM CHLORIDE 9 MG/ML
1000 INJECTION, SOLUTION INTRAVENOUS
Qty: 0 | Refills: 0 | Status: DISCONTINUED | OUTPATIENT
Start: 2019-01-21 | End: 2019-01-22

## 2019-01-21 RX ORDER — BUPROPION HYDROCHLORIDE 150 MG/1
150 TABLET, EXTENDED RELEASE ORAL DAILY
Qty: 0 | Refills: 0 | Status: DISCONTINUED | OUTPATIENT
Start: 2019-01-21 | End: 2019-01-26

## 2019-01-21 RX ORDER — ENOXAPARIN SODIUM 100 MG/ML
40 INJECTION SUBCUTANEOUS EVERY 24 HOURS
Qty: 0 | Refills: 0 | Status: DISCONTINUED | OUTPATIENT
Start: 2019-01-22 | End: 2019-01-26

## 2019-01-21 RX ORDER — POLYETHYLENE GLYCOL 3350 17 G/17G
17 POWDER, FOR SOLUTION ORAL DAILY
Qty: 0 | Refills: 0 | Status: DISCONTINUED | OUTPATIENT
Start: 2019-01-22 | End: 2019-01-26

## 2019-01-21 RX ORDER — ONDANSETRON 8 MG/1
4 TABLET, FILM COATED ORAL EVERY 6 HOURS
Qty: 0 | Refills: 0 | Status: DISCONTINUED | OUTPATIENT
Start: 2019-01-22 | End: 2019-01-26

## 2019-01-21 RX ORDER — SODIUM CHLORIDE 9 MG/ML
1000 INJECTION, SOLUTION INTRAVENOUS
Qty: 0 | Refills: 0 | Status: DISCONTINUED | OUTPATIENT
Start: 2019-01-22 | End: 2019-01-25

## 2019-01-21 RX ORDER — HYDROMORPHONE HYDROCHLORIDE 2 MG/ML
0.5 INJECTION INTRAMUSCULAR; INTRAVENOUS; SUBCUTANEOUS
Qty: 0 | Refills: 0 | Status: DISCONTINUED | OUTPATIENT
Start: 2019-01-22 | End: 2019-01-26

## 2019-01-21 RX ORDER — MORPHINE SULFATE 50 MG/1
1 CAPSULE, EXTENDED RELEASE ORAL ONCE
Qty: 0 | Refills: 0 | Status: DISCONTINUED | OUTPATIENT
Start: 2019-01-21 | End: 2019-01-22

## 2019-01-21 RX ORDER — MAGNESIUM HYDROXIDE 400 MG/1
30 TABLET, CHEWABLE ORAL DAILY
Qty: 0 | Refills: 0 | Status: DISCONTINUED | OUTPATIENT
Start: 2019-01-22 | End: 2019-01-26

## 2019-01-21 RX ORDER — GABAPENTIN 400 MG/1
600 CAPSULE ORAL DAILY
Qty: 0 | Refills: 0 | Status: DISCONTINUED | OUTPATIENT
Start: 2019-01-22 | End: 2019-01-26

## 2019-01-21 RX ORDER — HYDROMORPHONE HYDROCHLORIDE 2 MG/ML
0.25 INJECTION INTRAMUSCULAR; INTRAVENOUS; SUBCUTANEOUS
Qty: 0 | Refills: 0 | Status: DISCONTINUED | OUTPATIENT
Start: 2019-01-21 | End: 2019-01-22

## 2019-01-21 RX ORDER — ALPRAZOLAM 0.25 MG
1 TABLET ORAL
Qty: 0 | Refills: 0 | Status: DISCONTINUED | OUTPATIENT
Start: 2019-01-22 | End: 2019-01-26

## 2019-01-21 RX ADMIN — SODIUM CHLORIDE 100 MILLILITER(S): 9 INJECTION, SOLUTION INTRAVENOUS at 20:33

## 2019-01-21 RX ADMIN — SODIUM CHLORIDE 1000 MILLILITER(S): 9 INJECTION, SOLUTION INTRAVENOUS at 23:17

## 2019-01-21 RX ADMIN — GABAPENTIN 300 MILLIGRAM(S): 400 CAPSULE ORAL at 06:02

## 2019-01-21 RX ADMIN — HYDROMORPHONE HYDROCHLORIDE 0.25 MILLIGRAM(S): 2 INJECTION INTRAMUSCULAR; INTRAVENOUS; SUBCUTANEOUS at 20:52

## 2019-01-21 RX ADMIN — MORPHINE SULFATE 1 MILLIGRAM(S): 50 CAPSULE, EXTENDED RELEASE ORAL at 08:45

## 2019-01-21 RX ADMIN — Medication 250 MILLIGRAM(S): at 12:09

## 2019-01-21 RX ADMIN — MORPHINE SULFATE 1 MILLIGRAM(S): 50 CAPSULE, EXTENDED RELEASE ORAL at 01:10

## 2019-01-21 RX ADMIN — Medication 75 MICROGRAM(S): at 06:02

## 2019-01-21 RX ADMIN — HEPARIN SODIUM 5000 UNIT(S): 5000 INJECTION INTRAVENOUS; SUBCUTANEOUS at 06:02

## 2019-01-21 RX ADMIN — MORPHINE SULFATE 1 MILLIGRAM(S): 50 CAPSULE, EXTENDED RELEASE ORAL at 01:40

## 2019-01-21 RX ADMIN — HYDROMORPHONE HYDROCHLORIDE 0.25 MILLIGRAM(S): 2 INJECTION INTRAMUSCULAR; INTRAVENOUS; SUBCUTANEOUS at 20:34

## 2019-01-21 RX ADMIN — MORPHINE SULFATE 1 MILLIGRAM(S): 50 CAPSULE, EXTENDED RELEASE ORAL at 09:20

## 2019-01-21 RX ADMIN — ZIPRASIDONE HYDROCHLORIDE 40 MILLIGRAM(S): 20 CAPSULE ORAL at 22:24

## 2019-01-21 RX ADMIN — Medication 1 MILLIGRAM(S): at 12:33

## 2019-01-21 NOTE — PROGRESS NOTE ADULT - ASSESSMENT
68F with anxiety, OCD, non-Hodgkin's [place] [place] hypothyroidism , dementia  with recent diarrhea  UTI , sent to rehab and came in today with s/p fall and right hip fracture.   .

## 2019-01-21 NOTE — PROGRESS NOTE ADULT - SUBJECTIVE AND OBJECTIVE BOX
Chief Complaint: Fall    Interval Events: No events overnight.    Review of Systems:  General: No fevers, chills, weight loss or gain  Skin: No rashes, color changes  Cardiovascular: No chest pain, orthopnea  Respiratory: No shortness of breath, cough  Gastrointestinal: No nausea, abdominal pain  Genitourinary: No incontinence, pain with urination  Musculoskeletal: No pain, swelling, decreased range of motion  Neurological: No headache, weakness  Psychiatric: No depression, anxiety  Endocrine: No weight loss or gain, increased thirst  All other systems are comprehensively negative.    Physical Exam:  Vitals:        Vital Signs Last 24 Hrs  T(C): 37.3 (21 Jan 2019 07:48), Max: 37.3 (21 Jan 2019 07:48)  T(F): 99.1 (21 Jan 2019 07:48), Max: 99.1 (21 Jan 2019 07:48)  HR: 87 (21 Jan 2019 08:45) (85 - 92)  BP: 125/52 (21 Jan 2019 08:45) (96/40 - 125/52)  BP(mean): --  RR: 16 (21 Jan 2019 07:48) (16 - 19)  SpO2: 99% (21 Jan 2019 07:48) (96% - 100%)  General: NAD  HEENT: MMM  Neck: No JVD, no carotid bruit  Lungs: CTAB  CV: RRR, nl S1/S2, no M/R/G  Abdomen: S/NT/ND, +BS  Extremities: No LE edema, no cyanosis  Neuro: AAOx3, non-focal  Skin: No rash    Labs:                        8.9    5.70  )-----------( 156      ( 21 Jan 2019 10:13 )             27.1     01-21    139  |  104  |  10  ----------------------------<  129<H>  3.9   |  27  |  0.87    Ca    7.9<L>      21 Jan 2019 05:52    TPro  5.1<L>  /  Alb  2.7<L>  /  TBili  0.3  /  DBili  x   /  AST  16  /  ALT  34  /  AlkPhos  95  01-21        PT/INR - ( 21 Jan 2019 05:52 )   PT: 13.2 sec;   INR: 1.20 ratio         PTT - ( 21 Jan 2019 05:52 )  PTT:27.3 sec

## 2019-01-21 NOTE — CONSULT NOTE ADULT - SUBJECTIVE AND OBJECTIVE BOX
History of Present Illness: The patient is a 68 year old female with a history of NHL, hypothyroidism, dementia who presents with fall. The patient is unable to describe what happened. However, she denies any chest pain or shortness of breath. She feels a bit dry and dehydrated. No recent cardiac testing.    Past Medical/Surgical History:  NHL, hypothyroidism, dementia     Medications:  Home Medications:  ALPRAZolam 1 mg oral tablet: orally 2 times a day (20 Jan 2019 15:19)  buPROPion 300 mg/24 hours (XL) oral tablet, extended release: 1 tab(s) orally once a day (20 Jan 2019 15:19)  Claritin 10 mg oral tablet: 1 tab(s) orally once a day (20 Jan 2019 15:19)  gabapentin 600 mg oral tablet: orally once a day in AM (20 Jan 2019 15:19)  gabapentin 600 mg oral tablet: 2 cap(s) orally once a day (at bedtime) (20 Jan 2019 15:19)  levothyroxine 75 mcg (0.075 mg)/mL oral solution: Takes Mon - Fri  (20 Jan 2019 15:19)  magnesium amino acids chelate: 500 milligram(s) orally 2 times a day (20 Jan 2019 15:19)  Milk of Magnesia: 30 milliliter(s) orally , As Needed (20 Jan 2019 15:19)  Xanax 1 mg oral tablet: orally once a day, As Needed (20 Jan 2019 15:19)  ziprasidone 40 mg oral capsule: orally once a day (20 Jan 2019 15:19)      Family History: Non-contributory family history of premature cardiovascular atherosclerotic disease    Social History: No tobacco, alcohol or drug use    Review of Systems:  General: No fevers, chills, weight loss or gain  Skin: No rashes, color changes  Cardiovascular: No chest pain, orthopnea  Respiratory: No shortness of breath, cough  Gastrointestinal: No nausea, abdominal pain  Genitourinary: No incontinence, pain with urination  Musculoskeletal: No pain, swelling, decreased range of motion  Neurological: No headache, weakness  Psychiatric: No depression, anxiety  Endocrine: No weight loss or gain, increased thirst  All other systems are comprehensively negative.    Physical Exam:  Vitals:        Vital Signs Last 24 Hrs  T(C): 37 (20 Jan 2019 12:17), Max: 37 (20 Jan 2019 12:17)  T(F): 98.6 (20 Jan 2019 12:17), Max: 98.6 (20 Jan 2019 12:17)  HR: 88 (20 Jan 2019 15:47) (87 - 88)  BP: 112/48 (20 Jan 2019 15:47) (112/48 - 141/78)  BP(mean): --  RR: 16 (20 Jan 2019 15:47) (16 - 16)  SpO2: 96% (20 Jan 2019 15:47) (96% - 97%)  General: NAD  HEENT: dry MM  Neck: No JVD, no carotid bruit  Lungs: CTAB  CV: RRR, nl S1/S2, no M/R/G  Abdomen: S/NT/ND, +BS  Extremities: No LE edema, no cyanosis  Neuro: AAOx3, non-focal  Skin: No rash    Labs:                        10.7   8.24  )-----------( 210      ( 20 Jan 2019 13:03 )             32.7     01-20    139  |  100  |  16  ----------------------------<  134<H>  4.3   |  30  |  1.12    Ca    9.1      20 Jan 2019 13:03    TPro  6.6  /  Alb  3.7  /  TBili  0.3  /  DBili  x   /  AST  23  /  ALT  53  /  AlkPhos  121<H>  01-20        PT/INR - ( 20 Jan 2019 13:03 )   PT: 11.4 sec;   INR: 1.04 ratio         PTT - ( 20 Jan 2019 13:03 )  PTT:28.4 sec    ECG: NSR, LAD, nonspecific ST abnormality
full consult dict [id number]  r fem neck fx  plan for hemiarthroplasty  spoke with son by phone risks/benefits/alternatives  seen by cardiology - stable  plan for OR later this afternoon.

## 2019-01-21 NOTE — PROGRESS NOTE ADULT - SUBJECTIVE AND OBJECTIVE BOX
Patient is a 68y old  Female who presents with a chief complaint of S/P Fall and Right Hip Fracture. (2019 09:52)      INTERVAL HPI/OVERNIGHT EVENTS: no acute overnight events , pt is awaiting surgery for hip fracture.     MEDICATIONS  (STANDING):  buPROPion XL . 300 milliGRAM(s) Oral daily  dextrose 5% + sodium chloride 0.9%. 1000 milliLiter(s) (80 mL/Hr) IV Continuous <Continuous>  gabapentin 300 milliGRAM(s) Oral two times a day  influenza   Vaccine 0.5 milliLiter(s) IntraMuscular once  levoFLOXacin IVPB 500 milliGRAM(s) IV Intermittent every 24 hours  levoFLOXacin IVPB      levothyroxine 75 MICROGram(s) Oral <User Schedule>    MEDICATIONS  (PRN):  ALPRAZolam 1 milliGRAM(s) Oral daily PRN anxiety  magnesium hydroxide Suspension 30 milliLiter(s) Oral daily PRN Constipation  morphine  - Injectable 1 milliGRAM(s) IV Push every 6 hours PRN Moderate Pain (4 - 6)  ondansetron Injectable 4 milliGRAM(s) IV Push every 6 hours PRN Nausea      Allergies    honeydew melon (Other)  penicillin (Other; Hives)    Intolerances        REVIEW OF SYSTEMS:  CONSTITUTIONAL: No fever or chills  ROS is limited .        Vital Signs Last 24 Hrs  T(C): 37.3 (2019 07:48), Max: 37.3 (2019 07:48)  T(F): 99.1 (2019 07:48), Max: 99.1 (2019 07:48)  HR: 87 (2019 08:45) (85 - 92)  BP: 125/52 (2019 08:45) (96/40 - 125/52)  BP(mean): --  RR: 16 (2019 07:48) (16 - 19)  SpO2: 99% (2019 07:48) (96% - 100%)    PHYSICAL EXAM:  GENERAL: Elderly  female lying in the bed not in acute distress.   HEENT:  EOMI, mmm  CHEST/LUNG:  CTA b/l, no rales, wheezes, or rhonchi  HEART:  RRR, S1, S2, []murmur  ABDOMEN:  BS+, soft, NT, ND  EXTREMITIES: no edema or calf tenderness , positive tenderness in the right hip and shortened right leg.   NERVOUS SYSTEM: AA&Ox3    DIET:     Cultures:     LABS:                        8.9    5.70  )-----------( 156      ( 2019 10:13 )             27.1     CBC Full  -  ( 2019 10:13 )  WBC Count : 5.70 K/uL  Hemoglobin : 8.9 g/dL  Hematocrit : 27.1 %  Platelet Count - Automated : 156 K/uL  Mean Cell Volume : 107.1 fl  Mean Cell Hemoglobin : 35.2 pg  Mean Cell Hemoglobin Concentration : 32.8 gm/dL  Auto Neutrophil # : x  Auto Lymphocyte # : x  Auto Monocyte # : x  Auto Eosinophil # : x  Auto Basophil # : x  Auto Neutrophil % : x  Auto Lymphocyte % : x  Auto Monocyte % : x  Auto Eosinophil % : x  Auto Basophil % : x    2019 05:52    139    |  104    |  10     ----------------------------<  129    3.9     |  27     |  0.87     Ca    7.9        2019 05:52    TPro  5.1    /  Alb  2.7    /  TBili  0.3    /  DBili  x      /  AST  16     /  ALT  34     /  AlkPhos  95     2019 05:52    PT/INR - ( 2019 05:52 )   PT: 13.2 sec;   INR: 1.20 ratio         PTT - ( 2019 05:52 )  PTT:27.3 sec  Urinalysis Basic - ( 2019 13:03 )    Color: Yellow / Appearance: Slightly Turbid / S.010 / pH: x  Gluc: x / Ketone: Negative  / Bili: Negative / Urobili: Negative mg/dL   Blood: x / Protein: Negative mg/dL / Nitrite: Negative   Leuk Esterase: Moderate / RBC: 3-5 /HPF / WBC 11-25   Sq Epi: x / Non Sq Epi: Occasional / Bacteria: Many      CAPILLARY BLOOD GLUCOSE              RADIOLOGY & ADDITIONAL TESTS:    Personally reviewed.     Consultant(s) Notes Reviewed:  [x] YES  [ ] NO    Care Discussed with [ ] Consultants  [x] Patient  [ ] Family  [x]      [ ] Other; RN  DVT ppx  Advanced directive

## 2019-01-21 NOTE — PROGRESS NOTE ADULT - PROBLEM SELECTOR PLAN 6
IMPROVE VTE Individual Risk Assessment          RISK                                                          Points  [  ] Previous VTE                                                 3  [  ] Thrombophilia                                              2  [  ] Lower limb paralysis                                    2        (unable to hold up >15 seconds)    [  ] Current Cancer                                             2         (within 6 months)  [  ] Immobilization > 24 hrs                              1  [  ] ICU/CCU stay > 24 hours                            1  [X] Age > 60                                                        1    IMPROVE VTE Score 1    - Heparin s/q  for DVT ppx IMPROVE VTE Individual Risk Assessment          RISK                                                          Points  [  ] Previous VTE                                                 3  [  ] Thrombophilia                                              2  [  ] Lower limb paralysis                                    2        (unable to hold up >15 seconds)    [  ] Current Cancer                                             2         (within 6 months)  [  ] Immobilization > 24 hrs                              1  [  ] ICU/CCU stay > 24 hours                            1  [X] Age > 60                                                        1    IMPROVE VTE Score 1    - DVT ppx as per orthopedic recommendation.

## 2019-01-21 NOTE — BRIEF OPERATIVE NOTE - PROCEDURE
<<-----Click on this checkbox to enter Procedure Hemiarthroplasty hip partial  01/21/2019  Right  Active  Papo Sandoval

## 2019-01-21 NOTE — PROGRESS NOTE ADULT - PROBLEM SELECTOR PLAN 1
Xray showed - Right hip with full pelvic view. 4 images submitted.   Patient fell with local trauma.  There is a fairly high neck fracture of the right femur with typical   upward displacement of the distal fracture fragment.  Hips are relatively free of degeneration and appear symmetric.  Orthopedic evaluation  appreciated .    OR at 5;30   I spoke to Dr Crowder -ortho , and ortho PA.  Hb is 8.9 , informed Dr Crowder , advised to hold transfusion at this time  .   Type and screen sent.   NPO    c/w IV fluids .   PT evaluation .   Morphine PRN for pain.   CT head - neg  ICH. Xray showed - Right hip with full pelvic view. 4 images submitted.   Patient fell with local trauma.  There is a fairly high neck fracture of the right femur with typical   upward displacement of the distal fracture fragment.  Hips are relatively free of degeneration and appear symmetric.  Orthopedic evaluation  appreciated .    OR at 5;30   I spoke to Dr Crowder -ortho , and ortho PA.  Hb is 8.9 , informed Dr Crowder , advised to hold transfusion at this time  .   Type and screen sent.   NPO    c/w IV fluids .   PT evaluation .   Morphine PRN for pain as per ortho.   CT head - neg  ICH.

## 2019-01-21 NOTE — PROGRESS NOTE ADULT - ASSESSMENT
The patient is a 68 year old female with a history of NHL, hypothyroidism, dementia who presents with fall and right hip fracture.    Plan:  - ECG with no evidence of ischemia or infarction. Nonspecific findings noted.  - Continue IV fluids  - The patient is asymptomatic from a cardiac perspective and there are no active cardiac issues. The patient is low/intermediate risk for cardiac events for an intermediate risk surgery. She is optimized to proceed without additional cardiac testing.

## 2019-01-22 ENCOUNTER — TRANSCRIPTION ENCOUNTER (OUTPATIENT)
Age: 69
End: 2019-01-22

## 2019-01-22 DIAGNOSIS — D50.0 IRON DEFICIENCY ANEMIA SECONDARY TO BLOOD LOSS (CHRONIC): ICD-10-CM

## 2019-01-22 LAB
-  AMIKACIN: SIGNIFICANT CHANGE UP
-  AMOXICILLIN/CLAVULANIC ACID: SIGNIFICANT CHANGE UP
-  AMPICILLIN/SULBACTAM: SIGNIFICANT CHANGE UP
-  AMPICILLIN: SIGNIFICANT CHANGE UP
-  AZTREONAM: SIGNIFICANT CHANGE UP
-  CEFAZOLIN: SIGNIFICANT CHANGE UP
-  CEFEPIME: SIGNIFICANT CHANGE UP
-  CEFOXITIN: SIGNIFICANT CHANGE UP
-  CEFTRIAXONE: SIGNIFICANT CHANGE UP
-  CIPROFLOXACIN: SIGNIFICANT CHANGE UP
-  ERTAPENEM: SIGNIFICANT CHANGE UP
-  GENTAMICIN: SIGNIFICANT CHANGE UP
-  IMIPENEM: SIGNIFICANT CHANGE UP
-  LEVOFLOXACIN: SIGNIFICANT CHANGE UP
-  MEROPENEM: SIGNIFICANT CHANGE UP
-  NITROFURANTOIN: SIGNIFICANT CHANGE UP
-  PIPERACILLIN/TAZOBACTAM: SIGNIFICANT CHANGE UP
-  TIGECYCLINE: SIGNIFICANT CHANGE UP
-  TOBRAMYCIN: SIGNIFICANT CHANGE UP
-  TRIMETHOPRIM/SULFAMETHOXAZOLE: SIGNIFICANT CHANGE UP
CULTURE RESULTS: SIGNIFICANT CHANGE UP
HCT VFR BLD CALC: 21.5 % — LOW (ref 34.5–45)
HCT VFR BLD CALC: 25.5 % — LOW (ref 34.5–45)
HGB BLD-MCNC: 7.2 G/DL — LOW (ref 11.5–15.5)
HGB BLD-MCNC: 8.6 G/DL — LOW (ref 11.5–15.5)
MCHC RBC-ENTMCNC: 33.7 GM/DL — SIGNIFICANT CHANGE UP (ref 32–36)
MCHC RBC-ENTMCNC: 34.5 PG — HIGH (ref 27–34)
MCV RBC AUTO: 102.4 FL — HIGH (ref 80–100)
METHOD TYPE: SIGNIFICANT CHANGE UP
NRBC # BLD: 0 /100 WBCS — SIGNIFICANT CHANGE UP (ref 0–0)
ORGANISM # SPEC MICROSCOPIC CNT: SIGNIFICANT CHANGE UP
ORGANISM # SPEC MICROSCOPIC CNT: SIGNIFICANT CHANGE UP
PLATELET # BLD AUTO: 140 K/UL — LOW (ref 150–400)
RBC # BLD: 2.49 M/UL — LOW (ref 3.8–5.2)
RBC # FLD: 16.9 % — HIGH (ref 10.3–14.5)
SPECIMEN SOURCE: SIGNIFICANT CHANGE UP
WBC # BLD: 5.81 K/UL — SIGNIFICANT CHANGE UP (ref 3.8–10.5)
WBC # FLD AUTO: 5.81 K/UL — SIGNIFICANT CHANGE UP (ref 3.8–10.5)

## 2019-01-22 PROCEDURE — 99233 SBSQ HOSP IP/OBS HIGH 50: CPT

## 2019-01-22 RX ORDER — VANCOMYCIN HCL 1 G
1000 VIAL (EA) INTRAVENOUS ONCE
Qty: 0 | Refills: 0 | Status: COMPLETED | OUTPATIENT
Start: 2019-01-22 | End: 2019-01-22

## 2019-01-22 RX ORDER — BUPROPION HYDROCHLORIDE 150 MG/1
300 TABLET, EXTENDED RELEASE ORAL DAILY
Qty: 0 | Refills: 0 | Status: DISCONTINUED | OUTPATIENT
Start: 2019-01-22 | End: 2019-01-22

## 2019-01-22 RX ORDER — SODIUM CHLORIDE 9 MG/ML
1000 INJECTION, SOLUTION INTRAVENOUS ONCE
Qty: 0 | Refills: 0 | Status: COMPLETED | OUTPATIENT
Start: 2019-01-22 | End: 2019-01-22

## 2019-01-22 RX ADMIN — SENNA PLUS 2 TABLET(S): 8.6 TABLET ORAL at 21:42

## 2019-01-22 RX ADMIN — Medication 1000 MILLIGRAM(S): at 01:46

## 2019-01-22 RX ADMIN — ZIPRASIDONE HYDROCHLORIDE 40 MILLIGRAM(S): 20 CAPSULE ORAL at 21:43

## 2019-01-22 RX ADMIN — Medication 400 MILLIGRAM(S): at 00:29

## 2019-01-22 RX ADMIN — Medication 75 MICROGRAM(S): at 08:54

## 2019-01-22 RX ADMIN — Medication 1000 MILLIGRAM(S): at 15:13

## 2019-01-22 RX ADMIN — Medication 1 MILLIGRAM(S): at 00:36

## 2019-01-22 RX ADMIN — Medication 1000 MILLIGRAM(S): at 21:42

## 2019-01-22 RX ADMIN — SODIUM CHLORIDE 100 MILLILITER(S): 9 INJECTION, SOLUTION INTRAVENOUS at 02:23

## 2019-01-22 RX ADMIN — ENOXAPARIN SODIUM 40 MILLIGRAM(S): 100 INJECTION SUBCUTANEOUS at 08:53

## 2019-01-22 RX ADMIN — Medication 100 MILLIGRAM(S): at 21:42

## 2019-01-22 RX ADMIN — Medication 400 MILLIGRAM(S): at 15:10

## 2019-01-22 RX ADMIN — Medication 1 MILLIGRAM(S): at 17:29

## 2019-01-22 RX ADMIN — Medication 250 MILLIGRAM(S): at 06:03

## 2019-01-22 RX ADMIN — BUPROPION HYDROCHLORIDE 150 MILLIGRAM(S): 150 TABLET, EXTENDED RELEASE ORAL at 08:54

## 2019-01-22 RX ADMIN — Medication 1 MILLIGRAM(S): at 05:15

## 2019-01-22 RX ADMIN — SODIUM CHLORIDE 1000 MILLILITER(S): 9 INJECTION, SOLUTION INTRAVENOUS at 15:15

## 2019-01-22 RX ADMIN — GABAPENTIN 600 MILLIGRAM(S): 400 CAPSULE ORAL at 12:55

## 2019-01-22 RX ADMIN — Medication 100 MILLIGRAM(S): at 15:13

## 2019-01-22 RX ADMIN — Medication 1000 MILLIGRAM(S): at 06:03

## 2019-01-22 RX ADMIN — GABAPENTIN 600 MILLIGRAM(S): 400 CAPSULE ORAL at 21:43

## 2019-01-22 RX ADMIN — Medication 400 MILLIGRAM(S): at 05:16

## 2019-01-22 NOTE — PROGRESS NOTE ADULT - SUBJECTIVE AND OBJECTIVE BOX
Post Op Day # 1    SUBJECTIVE    69yo Female status post Right hip hemiarthroplasty .   Patient is alert and comfortable.    Pain is controlled with current pain regimen.  Denies nausea, vomiting, chest pain, shortness of breath, abdominal pain or fever.   No new complaints.    OBJECTIVE    Vital Signs Last 24 Hrs  T(C): 36.7 (22 Jan 2019 08:10), Max: 37.3 (21 Jan 2019 20:02)  T(F): 98 (22 Jan 2019 08:10), Max: 99.2 (21 Jan 2019 20:02)  HR: 85 (22 Jan 2019 08:10) (82 - 97)  BP: 91/51 (22 Jan 2019 08:10) (91/51 - 129/56)  BP(mean): --  RR: 18 (22 Jan 2019 08:10) (14 - 18)  SpO2: 99% (22 Jan 2019 08:10) (97% - 100%)  I&O's Summary    21 Jan 2019 07:01  -  22 Jan 2019 07:00  --------------------------------------------------------  IN: 3000 mL / OUT: 4200 mL / NET: -1200 mL        PHYSICAL EXAM    Right Hip dressing is clean, dry and intact.    The calf is supple/nontender.   Passive range of motion is acceptable to due postoperative pain.   Sensation to light touch is grossly intact distally.   The lateral cutaneous nerve is intact.   Motor function distally is intact.   No foot drop.   (2+) dorsalis pedis pulse. Capillary refill is less than 2 seconds. No cyanosis.                          7.2<L>  x     )-----------( x        ( 22 Jan 2019 07:08 )             21.5<L>  22 Jan 2019 07:08                        8.9<L>  5.70  )-----------( 156      ( 21 Jan 2019 10:13 )             27.1<L>  21 Jan 2019 10:13    21 Jan 2019 05:52    139    |  104    |  10     ----------------------------<  129<H>  3.9     |  27     |  0.87   20 Jan 2019 13:03    139    |  100    |  16     ----------------------------<  134<H>  4.3     |  30     |  1.12     Ca    7.9<L>      21 Jan 2019 05:52  Ca    9.1        20 Jan 2019 13:03    TPro  5.1<L>  /  Alb  2.7<L>  /  TBili  0.3    /  DBili  x      /  AST  16     /  ALT  34     /  AlkPhos  95     21 Jan 2019 05:52  TPro  6.6    /  Alb  3.7    /  TBili  0.3    /  DBili  x      /  AST  23     /  ALT  53     /  AlkPhos  121<H>  20 Jan 2019 13:03      ASSESSMENT AND PLAN    - Orthopedically stable  - Anemia likely due to acute blood loss, with hypotension. Will transfuse 1 unit PRBC's. Risks/benefits/ possible side effects discussed with patient. She verbalized understanding of this and agrees to blood transfusion. D/W Dr. Bower  - Munguia catheter in place with 500cc in bag. Consider TOV after blood transfusion  - DVT prophylaxis: PAS + Lovenox  - Continue physical therapy and occupational therapy after blood transfusion  - Weight bearing as tolerated of the right lower extremity with assistance of a walker  - Incentive spirometry encouraged  - Pain control as clinically indicated  - Disposition: Home vs subacute rehabilitation pending progress

## 2019-01-22 NOTE — PROGRESS NOTE ADULT - PROBLEM SELECTOR PLAN 1
s/p orif.   Pain Management: acceptable- continue current care Tylenol ATC/ Oxycodone PRN  Continue PT/OT once bp and hgb improved.   DVT proph: [  ] low risk - Aspirin  [ x ] high risk -Lovenox [  ] high risk - Eliquis [  ] other:_________  DC plan:  [  ] Home with HC  [ x ] Rehab   [  ] TBD  [  ]other:___________

## 2019-01-22 NOTE — PROGRESS NOTE ADULT - SUBJECTIVE AND OBJECTIVE BOX
Patient is a 68y old  Female who presents with a chief complaint of S/P Fall and Right Hip Fracture. (2019 11:04)      INTERVAL HPI/OVERNIGHT EVENTS: denies complaints.  mildly confused.  tolerating diet.     MEDICATIONS  (STANDING):  acetaminophen   Tablet .. 1000 milliGRAM(s) Oral every 8 hours  acetaminophen  IVPB .. 1000 milliGRAM(s) IV Intermittent every 6 hours  ALPRAZolam 1 milliGRAM(s) Oral two times a day  buPROPion XL . 150 milliGRAM(s) Oral daily  docusate sodium 100 milliGRAM(s) Oral three times a day  enoxaparin Injectable 40 milliGRAM(s) SubCutaneous every 24 hours  gabapentin 600 milliGRAM(s) Oral daily  gabapentin 600 milliGRAM(s) Oral at bedtime  influenza   Vaccine 0.5 milliLiter(s) IntraMuscular once  lactated ringers. 1000 milliLiter(s) (100 mL/Hr) IV Continuous <Continuous>  levoFLOXacin IVPB 500 milliGRAM(s) IV Intermittent every 24 hours  levoFLOXacin IVPB      levothyroxine 75 MICROGram(s) Oral <User Schedule>  senna 2 Tablet(s) Oral at bedtime  ziprasidone 40 milliGRAM(s) Oral <User Schedule>    MEDICATIONS  (PRN):  ALPRAZolam 1 milliGRAM(s) Oral daily PRN anxiety  HYDROmorphone  Injectable 0.5 milliGRAM(s) IV Push every 3 hours PRN Severe Pain (7 - 10)  magnesium hydroxide Suspension 30 milliLiter(s) Oral daily PRN Constipation  morphine  - Injectable 1 milliGRAM(s) IV Push once PRN Severe Pain (7 - 10)  ondansetron Injectable 4 milliGRAM(s) IV Push every 6 hours PRN Nausea and/or Vomiting  oxyCODONE    IR 5 milliGRAM(s) Oral every 3 hours PRN Mild Pain (1 - 3)  oxyCODONE    IR 10 milliGRAM(s) Oral every 3 hours PRN Moderate Pain (4 - 6)  polyethylene glycol 3350 17 Gram(s) Oral daily PRN Constipation      Allergies  honeydew melon (Other)  penicillin (Other; Hives)    REVIEW OF SYSTEMS:  CONSTITUTIONAL: No fever, weight loss,  EYES: No eye pain, visual disturbances, or discharge  ENMT:  No difficulty hearing, tinnitus, vertigo; No sinus or throat pain  NECK: No pain or stiffness  BREASTS: No pain, masses, or nipple discharge  RESPIRATORY: No cough, wheezing, chills or hemoptysis; No shortness of breath  CARDIOVASCULAR: No chest pain, palpitations, lightheadedness,  GASTROINTESTINAL: No abdominal or epigastric pain. No nausea, vomiting, or hematemesis; No diarrhea or constipation. No melena or hematochezia.  GENITOURINARY: has yanes  NEUROLOGICAL: No headaches, , vertigo, loss of strength, numbness, or tremors  SKIN: No itching, burning, rashes, or lesions   LYMPH NODES: No enlarged glands  ENDOCRINE: No heat or cold intolerance; No hair loss; No polydipsia or polyuria  MUSCULOSKELETAL: No back pain  PSYCHIATRIC: No depression, anxiety, or mood swings  HEME/LYMPH: No easy bruising, or bleeding gums  ALLERGY AND IMMUNOLOGIC: No hives or eczema    Vital Signs Last 24 Hrs  T(C): 36.7 (2019 08:10), Max: 37.3 (2019 20:02)  T(F): 98 (2019 08:10), Max: 99.2 (2019 20:02)  HR: 87 (2019 11:49) (82 - 97)  BP: 70/40 (2019 11:49) (70/40 - 129/56)  BP(mean): --  RR: 18 (2019 08:10) (14 - 18)  SpO2: 99% (2019 08:10) (97% - 100%)    PHYSICAL EXAM:  GENERAL: NAD, pale  HEAD:  Atraumatic, Normocephalic  EYES: conjunctiva and sclera clear  ENMT: Moist mucous membranes,  NECK: Supple, No JVD,  NERVOUS SYSTEM:  Alert & Oriented X2 (person, place, events but not time), Good concentration; All 4 extremities mobile, no gross sensory deficits.   CHEST/LUNG: Clear to auscultation bilaterally;   HEART: Regular rate and rhythm;   ABDOMEN: Soft, Nontender, Nondistended; Bowel sounds present  EXTREMITIES:  2+ Peripheral Pulses,       LABS:                        7.2    x     )-----------( x        ( 2019 07:08 )             21.5       Ca    7.9        2019 05:52      PT/INR - ( 2019 05:52 )   PT: 13.2 sec;   INR: 1.20 ratio         PTT - ( 2019 05:52 )  PTT:27.3 sec  Urinalysis Basic - ( 2019 13:03 )    Color: Yellow / Appearance: Slightly Turbid / S.010 / pH: x  Gluc: x / Ketone: Negative  / Bili: Negative / Urobili: Negative mg/dL   Blood: x / Protein: Negative mg/dL / Nitrite: Negative   Leuk Esterase: Moderate / RBC: 3-5 /HPF / WBC 11-25   Sq Epi: x / Non Sq Epi: Occasional / Bacteria: Many      CAPILLARY BLOOD GLUCOSE      POCT Blood Glucose.: 146 mg/dL (2019 16:48)      RADIOLOGY & ADDITIONAL TESTS:    Imaging Personally Reviewed:  [ ] YES     Consultant(s) Notes Reviewed:      Care Discussed with Consultants/Other Providers: orthopedic pa - plan of care    Advanced Directives: [ ] DNR  [ ] No feeding tube  [ ] MOLST in chart  [ ] MOLST completed today  [ ] Unknown

## 2019-01-22 NOTE — DISCHARGE NOTE ADULT - CARE PROVIDER_API CALL
Conor Crowder), Orthopaedic Surgery  [address] [place] [address]  Phone: [phone]  Fax: [phone] Conor Crowder), Orthopaedic Surgery  [address] [place] [address]  Phone: [phone]  Fax: [phone]    Sajan Mora), Internal Medicine  [address] [place] [address]  Phone: [phone]  Fax: [phone]

## 2019-01-22 NOTE — DISCHARGE NOTE ADULT - ADDITIONAL INSTRUCTIONS
Follow up with Dr. Crowder 2 weeks after your surgery. Please call his office to make an appointment Follow up with Dr. Crowder 2 weeks after your surgery. Please call his office to make an appointment.  Finish Invanz course Follow up with Dr. Crowder 2 weeks after your surgery. Please call his office to make an appointment.  Finish Invanz course last day 1/29/2019

## 2019-01-22 NOTE — PROGRESS NOTE ADULT - ASSESSMENT
68F with anxiety, OCD, non-Hodgkin's lymphoma (treated with chemo [facility], in remission for 2 years), hypothyroidism , dementia  with recent diarrhea  UTI , sent to rehab and came in today with s/p fall and right hip fracture.

## 2019-01-22 NOTE — PROGRESS NOTE ADULT - SUBJECTIVE AND OBJECTIVE BOX
Chief Complaint: Fall    Interval Events: No events overnight.    Review of Systems:  General: No fevers, chills, weight loss or gain  Skin: No rashes, color changes  Cardiovascular: No chest pain, orthopnea  Respiratory: No shortness of breath, cough  Gastrointestinal: No nausea, abdominal pain  Genitourinary: No incontinence, pain with urination  Musculoskeletal: No pain, swelling, decreased range of motion  Neurological: No headache, weakness  Psychiatric: No depression, anxiety  Endocrine: No weight loss or gain, increased thirst  All other systems are comprehensively negative.    Physical Exam:  Vital Signs Last 24 Hrs  T(C): 36.7 (22 Jan 2019 08:10), Max: 37.3 (21 Jan 2019 20:02)  T(F): 98 (22 Jan 2019 08:10), Max: 99.2 (21 Jan 2019 20:02)  HR: 90 (22 Jan 2019 14:58) (82 - 97)  BP: 89/50 (22 Jan 2019 14:58) (70/40 - 129/56)  BP(mean): --  RR: 18 (22 Jan 2019 08:10) (14 - 18)  SpO2: 99% (22 Jan 2019 08:10) (97% - 100%)  General: NAD  HEENT: MMM  Neck: No JVD, no carotid bruit  Lungs: CTAB  CV: RRR, nl S1/S2, no M/R/G  Abdomen: S/NT/ND, +BS  Extremities: No LE edema, no cyanosis  Neuro: AAOx3, non-focal  Skin: No rash    Labs:             01-21    139  |  104  |  10  ----------------------------<  129<H>  3.9   |  27  |  0.87    Ca    7.9<L>      21 Jan 2019 05:52    TPro  5.1<L>  /  Alb  2.7<L>  /  TBili  0.3  /  DBili  x   /  AST  16  /  ALT  34  /  AlkPhos  95  01-21                        7.2    x     )-----------( x        ( 22 Jan 2019 07:08 )             21.5

## 2019-01-22 NOTE — PROGRESS NOTE ADULT - ASSESSMENT
The patient is a 68 year old female with a history of NHL, hypothyroidism, dementia who presents with fall and right hip fracture.    Plan:  - ECG with no evidence of ischemia or infarction. Nonspecific findings noted.  - Continue IV fluids as needed  - On levofloxacin  - Pain control  - PT

## 2019-01-22 NOTE — DISCHARGE NOTE ADULT - HOSPITAL COURSE
This is a 68 y.o.  F admitted to  [facility] through the ED on 1/20/19 after sustaining a right femoral neck hip fracture post fall at [facility].   Received H&P 1/20/19 including complete medical history, physical exam, and medication review.  Pre-Op medical evaluation was performed by Dr. Avila on admission. Surgical consent was obtained for Right Hip Hemiarthroplasty.  Transferred 1/20/19 into OR. Received Caren-Op parenteral antibiotics for SCIP protocol .  Underwent uncomplicated Right hip hemiarthroplasty by Dr. Florentino Crowder.   Stable PACU course then transferred to Surgical floor for acute postoperative care.  Pre-Op meds continued post-op and modified as medically necessary. Post-Op medical eval by Hospitalist team for post-op medical comanagement.  Extended course of IV  antibiotics to complete surgical prophylaxis.   Pain management with interval narcotic RX.  VTE prophylaxis with Lovenox + PAS.  Post-Op PT/OT modalities for ambulation stairs. ADLs, &  transfers.  Tolerated diet well.  Patient received 1 unit PRBCs on 1/22/19 for low H & H.   Stable neuromotor exam of Right Lower extremity.   Discharge planning for Sub-Acute Rehab.  All D/C instructions and medication reconciliation completed including pain Rx & VTEP Rx.  All Orthopedic care instructions on enclosed D/C plan.  Pt. to see Surgeon post facility discharge  in office for aftercare. This is a 68 y.o.  F admitted to  [facility] through the ED on 1/20/19 after sustaining a right femoral neck hip fracture post fall at [facility].   Received H&P 1/20/19 including complete medical history, physical exam, and medication review.  Pre-Op medical evaluation was performed by Dr. Avila on admission. Surgical consent was obtained for Right Hip Hemiarthroplasty.  Transferred 1/20/19 into OR. Received Caren-Op parenteral antibiotics for SCIP protocol .  Underwent uncomplicated Right hip hemiarthroplasty by Dr. Florentino Crowder.   Stable PACU course then transferred to Surgical floor for acute postoperative care.  Pre-Op meds continued post-op and modified as medically necessary. Post-Op medical eval by Hospitalist team for post-op medical comanagement.  Extended course of IV  antibiotics to complete surgical prophylaxis.   Pain management with interval narcotic RX.  VTE prophylaxis with Lovenox + PAS.  Post-Op PT/OT modalities for ambulation stairs. ADLs, &  transfers.  Tolerated diet well.  Patient received 1 unit PRBCs on 1/22/19 for low H & H. She received a second unit of PRBCs on 1/24 for h/h 7/23.  Stable neuromotor exam of Right Lower extremity.   Discharge planning for Sub-Acute Rehab.  All D/C instructions and medication reconciliation completed including pain Rx & VTEP Rx.  All Orthopedic care instructions on enclosed D/C plan.  Pt. to see Surgeon post facility discharge  in office for aftercare. This is a 68 y.o.  F admitted to  [facility] through the ED on 1/20/19 after sustaining a right femoral neck hip fracture post fall at [facility].   Received H&P 1/20/19 including complete medical history, physical exam, and medication review.  Pre-Op medical evaluation was performed by Dr. Avila on admission. Surgical consent was obtained for Right Hip Hemiarthroplasty.  Transferred 1/20/19 into OR. Received Caren-Op parenteral antibiotics for SCIP protocol .  Underwent uncomplicated Right hip hemiarthroplasty by Dr. Florentino Crowder.   Stable PACU course then transferred to Surgical floor for acute postoperative care.  Pre-Op meds continued post-op and modified as medically necessary. Post-Op medical eval by Hospitalist team for post-op medical comanagement.  UTI on admission - ESBL E Coli.  started on invanz 1gm daily x 7 days.  midline placed.   Yanes removed - failed TOV.  yanes replaced for urine retention.   Pain management with interval narcotic RX.  VTE prophylaxis with Lovenox + PAS.  Post-Op PT/OT modalities for ambulation stairs. ADLs, &  transfers.  Tolerated diet well.  Patient received 1 unit PRBCs on 1/22/19 for low H & H. She received a second unit of PRBCs on 1/24 for h/h 7/23.  Stable neuromotor exam of Right Lower extremity.   Discharge planning for Sub-Acute Rehab.  All D/C instructions and medication reconciliation completed including pain Rx & VTEP Rx.  All Orthopedic care instructions on enclosed D/C plan.  Pt. to see Surgeon post facility discharge  in office for aftercare.        PHYSICAL EXAM:  Vital Signs Last 24 Hrs  T(C): 36.9 (25 Jan 2019 07:44), Max: 36.9 (24 Jan 2019 15:15)  T(F): 98.4 (25 Jan 2019 07:44), Max: 98.4 (24 Jan 2019 15:15)  HR: 94 (25 Jan 2019 07:44) (89 - 96)  BP: 110/66 (25 Jan 2019 07:44) (102/51 - 123/73)  BP(mean): --  RR: 18 (25 Jan 2019 07:44) (17 - 18)  SpO2: 90% (25 Jan 2019 07:44) (90% - 95%)    GENERAL: NAD, well-groomed, well-developed  ENMT: Moist mucous membranes,   NECK: Supple, No JVD  NERVOUS SYSTEM:  Alert & Oriented X3, Good concentration;   CHEST/LUNG: Clear to auscultation bilaterally;   HEART: Regular rate and rhythm;   ABDOMEN: Soft, Nontender, Nondistended; Bowel sounds present  EXTREMITIES:  2+ Peripheral Pulses, edema left >right.     discharge time approx 40 minutes. This is a 68 y.o.  F admitted to  [facility] through the ED on 1/20/19 after sustaining a right femoral neck hip fracture post fall at [facility].   Received H&P 1/20/19 including complete medical history, physical exam, and medication review.  Pre-Op medical evaluation was performed by Dr. Avila on admission. Surgical consent was obtained for Right Hip Hemiarthroplasty.  Transferred 1/20/19 into OR. Received Caren-Op parenteral antibiotics for SCIP protocol .  Underwent uncomplicated Right hip hemiarthroplasty by Dr. Florentino Crowder.   Stable PACU course then transferred to Surgical floor for acute postoperative care.  Pre-Op meds continued post-op and modified as medically necessary. Post-Op medical eval by Hospitalist team for post-op medical comanagement.  UTI on admission - ESBL E Coli.  started on invanz 1gm daily x 7 days total.  midline placed.   Yanes removed - failed TOV.  ynaes replaced for urine retention.   Pain management with interval narcotic RX.  VTE prophylaxis with Lovenox + PAS.  Post-Op PT/OT modalities for ambulation stairs. ADLs, &  transfers.  Tolerated diet well.  Patient received 1 unit PRBCs on 1/22/19 for low H & H. She received a second unit of PRBCs on 1/24 for h/h 7/23.  Stable neuromotor exam of Right Lower extremity.   Discharge planning for Sub-Acute Rehab.  All D/C instructions and medication reconciliation completed including pain Rx & VTEP Rx.  All Orthopedic care instructions on enclosed D/C plan.  Pt. to see Surgeon post facility discharge  in office for aftercare.        PHYSICAL EXAM:  Vital Signs Last 24 Hrs  T(C): 36.9 (25 Jan 2019 07:44), Max: 36.9 (24 Jan 2019 15:15)  T(F): 98.4 (25 Jan 2019 07:44), Max: 98.4 (24 Jan 2019 15:15)  HR: 94 (25 Jan 2019 07:44) (89 - 96)  BP: 110/66 (25 Jan 2019 07:44) (102/51 - 123/73)  BP(mean): --  RR: 18 (25 Jan 2019 07:44) (17 - 18)  SpO2: 90% (25 Jan 2019 07:44) (90% - 95%)    GENERAL: NAD, well-groomed, well-developed  ENMT: Moist mucous membranes,   NECK: Supple, No JVD  NERVOUS SYSTEM:  Alert & Oriented X3, Good concentration;   CHEST/LUNG: Clear to auscultation bilaterally;   HEART: Regular rate and rhythm;   ABDOMEN: Soft, Nontender, Nondistended; Bowel sounds present  EXTREMITIES:  2+ Peripheral Pulses, edema left >right.     discharge time approx 40 minutes.

## 2019-01-22 NOTE — DISCHARGE NOTE ADULT - PLAN OF CARE
Restore function Your Physical Therapy /Occupational Therapy will include Ambulation, Transfers , Stairs, ADLs (activities of daily living), range of motion, and isometrics.  Your participation is vital for the fullest recovery and best results.  You may bear full weight as tolerated with rolling walker, cane or assistive device.     HIP PRECAUTIONS   Do not bend your hip more than 90 degrees.   Do not rotate your leg drastically inward.   Continue abduction pillow while in bed.   Sit in Hip Chair or a chair that does not allow your to bend more than 90 degrees.  Do not sit on low chairs or low toilet seats.  Do not take a tub bath yet.   Do not resume driving until you have your surgeon’s permission.     Keep incision clean and dry.  Change the dressing daily if there is drainage noted.  When there is no drainage the wound may be open to air.   The wound is closed with staples, which should be removed 2 weeks after surgery at rehab facility or in surgeon's office.  If there is no wet drainage you may shower and pat dry with a clean towel. esbl ecoli.   invanz 1gm iv x7 days started jan 23 failed TOV, probably retry next week.

## 2019-01-22 NOTE — DISCHARGE NOTE ADULT - MEDICATION SUMMARY - MEDICATIONS TO TAKE
I will START or STAY ON the medications listed below when I get home from the hospital:    acetaminophen 500 mg oral tablet  -- 2 tab(s) by mouth every 8 hours  -- Indication: For pain    HYDROmorphone 2 mg oral tablet  -- 1 tab(s) by mouth every 3 hours, As needed, Mild Pain (1 - 3)  -- Indication: For pain    HYDROmorphone 4 mg oral tablet  -- 1 tab(s) by mouth every 3 hours, As needed, Moderate Pain (4 - 6)  -- Indication: For pain    magnesium hydroxide 8% oral suspension  -- 30 milliliter(s) by mouth once a day, As needed, Constipation  -- Indication: For Constipation    enoxaparin  -- 40 milligram(s) subcutaneously once a day for 14 days total post-operatively  -- Indication: For dvt proph    gabapentin 600 mg oral tablet  -- orally once a day in AM  -- Indication: For neuropathy    gabapentin 600 mg oral tablet  -- 2 cap(s) by mouth once a day (at bedtime)  -- Indication: For neuropathy    Claritin 10 mg oral tablet  -- 1 tab(s) by mouth once a day  -- Indication: For Allergy    ziprasidone 40 mg oral capsule  -- orally once a day  -- Indication: For ocd    ALPRAZolam 1 mg oral tablet  -- orally 2 times a day  -- Indication: For ocd    Xanax 1 mg oral tablet  -- orally once a day, As Needed  -- Indication: For ocd    INVanz 1 g injection  -- 1 gram(s) injectable once a day  -- Indication: For esbl ecoli    senna oral tablet  -- 2 tab(s) by mouth once a day (at bedtime)  -- Indication: For Constipation    docusate sodium 100 mg oral capsule  -- 1 cap(s) by mouth 3 times a day  -- Indication: For Constipation    polyethylene glycol 3350 oral powder for reconstitution  -- 17 gram(s) by mouth once a day, As needed, Constipation  -- Indication: For Constipation    buPROPion 300 mg/24 hours (XL) oral tablet, extended release  -- 1 tab(s) by mouth once a day  -- Indication: For ocd    levothyroxine 75 mcg (0.075 mg)/mL oral solution  -- Takes Mon - Fri   -- Indication: For Hypothyroid

## 2019-01-22 NOTE — DISCHARGE NOTE ADULT - PATIENT PORTAL LINK FT
You can access the [facility] Patient Portal, offered by [facility], by registering with the following website: [email or web address]

## 2019-01-23 LAB
24R-OH-CALCIDIOL SERPL-MCNC: 28.4 NG/ML — LOW (ref 30–80)
ANION GAP SERPL CALC-SCNC: 5 MMOL/L — SIGNIFICANT CHANGE UP (ref 5–17)
BUN SERPL-MCNC: 10 MG/DL — SIGNIFICANT CHANGE UP (ref 7–23)
CALCIUM SERPL-MCNC: 8.4 MG/DL — SIGNIFICANT CHANGE UP (ref 8.4–10.5)
CHLORIDE SERPL-SCNC: 108 MMOL/L — SIGNIFICANT CHANGE UP (ref 96–108)
CO2 SERPL-SCNC: 27 MMOL/L — SIGNIFICANT CHANGE UP (ref 22–31)
CREAT SERPL-MCNC: 0.83 MG/DL — SIGNIFICANT CHANGE UP (ref 0.5–1.3)
GLUCOSE SERPL-MCNC: 80 MG/DL — SIGNIFICANT CHANGE UP (ref 70–99)
HCT VFR BLD CALC: 26.2 % — LOW (ref 34.5–45)
HGB BLD-MCNC: 8.9 G/DL — LOW (ref 11.5–15.5)
MCHC RBC-ENTMCNC: 34 GM/DL — SIGNIFICANT CHANGE UP (ref 32–36)
MCHC RBC-ENTMCNC: 34.9 PG — HIGH (ref 27–34)
MCV RBC AUTO: 102.7 FL — HIGH (ref 80–100)
NRBC # BLD: 0 /100 WBCS — SIGNIFICANT CHANGE UP (ref 0–0)
PLATELET # BLD AUTO: 125 K/UL — LOW (ref 150–400)
POTASSIUM SERPL-MCNC: 3.9 MMOL/L — SIGNIFICANT CHANGE UP (ref 3.5–5.3)
POTASSIUM SERPL-SCNC: 3.9 MMOL/L — SIGNIFICANT CHANGE UP (ref 3.5–5.3)
RBC # BLD: 2.55 M/UL — LOW (ref 3.8–5.2)
RBC # FLD: 17.4 % — HIGH (ref 10.3–14.5)
SODIUM SERPL-SCNC: 140 MMOL/L — SIGNIFICANT CHANGE UP (ref 135–145)
WBC # BLD: 4.35 K/UL — SIGNIFICANT CHANGE UP (ref 3.8–10.5)
WBC # FLD AUTO: 4.35 K/UL — SIGNIFICANT CHANGE UP (ref 3.8–10.5)

## 2019-01-23 PROCEDURE — 99233 SBSQ HOSP IP/OBS HIGH 50: CPT

## 2019-01-23 RX ORDER — SODIUM CHLORIDE 9 MG/ML
1000 INJECTION, SOLUTION INTRAVENOUS ONCE
Qty: 0 | Refills: 0 | Status: COMPLETED | OUTPATIENT
Start: 2019-01-23 | End: 2019-01-23

## 2019-01-23 RX ORDER — POLYETHYLENE GLYCOL 3350 17 G/17G
17 POWDER, FOR SOLUTION ORAL
Qty: 0 | Refills: 0 | COMMUNITY
Start: 2019-01-23

## 2019-01-23 RX ORDER — SENNA PLUS 8.6 MG/1
2 TABLET ORAL
Qty: 0 | Refills: 0 | COMMUNITY
Start: 2019-01-23

## 2019-01-23 RX ORDER — ENOXAPARIN SODIUM 100 MG/ML
40 INJECTION SUBCUTANEOUS
Qty: 0 | Refills: 0 | COMMUNITY
Start: 2019-01-23

## 2019-01-23 RX ORDER — ERTAPENEM SODIUM 1 G/1
1000 INJECTION, POWDER, LYOPHILIZED, FOR SOLUTION INTRAMUSCULAR; INTRAVENOUS EVERY 24 HOURS
Qty: 0 | Refills: 0 | Status: DISCONTINUED | OUTPATIENT
Start: 2019-01-23 | End: 2019-01-26

## 2019-01-23 RX ORDER — HYDROMORPHONE HYDROCHLORIDE 2 MG/ML
4 INJECTION INTRAMUSCULAR; INTRAVENOUS; SUBCUTANEOUS
Qty: 0 | Refills: 0 | Status: DISCONTINUED | OUTPATIENT
Start: 2019-01-23 | End: 2019-01-26

## 2019-01-23 RX ORDER — DOCUSATE SODIUM 100 MG
1 CAPSULE ORAL
Qty: 0 | Refills: 0 | COMMUNITY
Start: 2019-01-23

## 2019-01-23 RX ORDER — ACETAMINOPHEN 500 MG
2 TABLET ORAL
Qty: 0 | Refills: 0 | COMMUNITY
Start: 2019-01-23 | End: 2019-02-04

## 2019-01-23 RX ORDER — MAGNESIUM OXIDE/MAG AA CHELATE 133 MG
500 TABLET ORAL
Qty: 0 | Refills: 0 | COMMUNITY

## 2019-01-23 RX ORDER — MAGNESIUM HYDROXIDE 400 MG/1
30 TABLET, CHEWABLE ORAL
Qty: 0 | Refills: 0 | COMMUNITY

## 2019-01-23 RX ORDER — ACETAMINOPHEN 500 MG
3 TABLET ORAL
Qty: 0 | Refills: 0 | DISCHARGE
Start: 2019-01-23 | End: 2019-02-04

## 2019-01-23 RX ORDER — HYDROMORPHONE HYDROCHLORIDE 2 MG/ML
2 INJECTION INTRAMUSCULAR; INTRAVENOUS; SUBCUTANEOUS
Qty: 0 | Refills: 0 | Status: DISCONTINUED | OUTPATIENT
Start: 2019-01-23 | End: 2019-01-26

## 2019-01-23 RX ORDER — MAGNESIUM HYDROXIDE 400 MG/1
30 TABLET, CHEWABLE ORAL
Qty: 0 | Refills: 0 | COMMUNITY
Start: 2019-01-23

## 2019-01-23 RX ORDER — SODIUM CHLORIDE 9 MG/ML
500 INJECTION, SOLUTION INTRAVENOUS ONCE
Qty: 0 | Refills: 0 | Status: COMPLETED | OUTPATIENT
Start: 2019-01-23 | End: 2019-12-22

## 2019-01-23 RX ADMIN — GABAPENTIN 600 MILLIGRAM(S): 400 CAPSULE ORAL at 21:51

## 2019-01-23 RX ADMIN — Medication 100 MILLIGRAM(S): at 05:55

## 2019-01-23 RX ADMIN — Medication 1000 MILLIGRAM(S): at 03:39

## 2019-01-23 RX ADMIN — Medication 1 MILLIGRAM(S): at 05:56

## 2019-01-23 RX ADMIN — HYDROMORPHONE HYDROCHLORIDE 2 MILLIGRAM(S): 2 INJECTION INTRAMUSCULAR; INTRAVENOUS; SUBCUTANEOUS at 15:15

## 2019-01-23 RX ADMIN — Medication 100 MILLIGRAM(S): at 11:39

## 2019-01-23 RX ADMIN — SENNA PLUS 2 TABLET(S): 8.6 TABLET ORAL at 21:51

## 2019-01-23 RX ADMIN — BUPROPION HYDROCHLORIDE 150 MILLIGRAM(S): 150 TABLET, EXTENDED RELEASE ORAL at 10:40

## 2019-01-23 RX ADMIN — ERTAPENEM SODIUM 120 MILLIGRAM(S): 1 INJECTION, POWDER, LYOPHILIZED, FOR SOLUTION INTRAMUSCULAR; INTRAVENOUS at 10:39

## 2019-01-23 RX ADMIN — Medication 1000 MILLIGRAM(S): at 21:52

## 2019-01-23 RX ADMIN — SODIUM CHLORIDE 1000 MILLILITER(S): 9 INJECTION, SOLUTION INTRAVENOUS at 03:40

## 2019-01-23 RX ADMIN — Medication 1000 MILLIGRAM(S): at 05:55

## 2019-01-23 RX ADMIN — Medication 75 MICROGRAM(S): at 09:21

## 2019-01-23 RX ADMIN — ENOXAPARIN SODIUM 40 MILLIGRAM(S): 100 INJECTION SUBCUTANEOUS at 09:21

## 2019-01-23 RX ADMIN — ZIPRASIDONE HYDROCHLORIDE 40 MILLIGRAM(S): 20 CAPSULE ORAL at 21:51

## 2019-01-23 RX ADMIN — Medication 1 MILLIGRAM(S): at 20:14

## 2019-01-23 RX ADMIN — GABAPENTIN 600 MILLIGRAM(S): 400 CAPSULE ORAL at 10:39

## 2019-01-23 RX ADMIN — Medication 1000 MILLIGRAM(S): at 11:40

## 2019-01-23 RX ADMIN — Medication 100 MILLIGRAM(S): at 21:51

## 2019-01-23 RX ADMIN — Medication 1000 MILLIGRAM(S): at 05:56

## 2019-01-23 RX ADMIN — HYDROMORPHONE HYDROCHLORIDE 2 MILLIGRAM(S): 2 INJECTION INTRAMUSCULAR; INTRAVENOUS; SUBCUTANEOUS at 14:45

## 2019-01-23 NOTE — PHYSICAL THERAPY INITIAL EVALUATION ADULT - MANUAL MUSCLE TESTING RESULTS, REHAB EVAL
grossly assessed due to/Bilateral upper extremity and left lower extremity 3/5. Right lower extremity not tested due to pain post-surgery.

## 2019-01-23 NOTE — PROGRESS NOTE ADULT - SUBJECTIVE AND OBJECTIVE BOX
POST OPERATIVE DAY #:  2  STATUS POST: Right HHA                       SUBJECTIVE: Patient seen and examined. Resting in bed comfortably.  Was up with PT  Reported Pain Score = 0 at rest      OBJECTIVE:     Vital Signs Last 24 Hrs  T(C): 36.5 (23 Jan 2019 07:43), Max: 36.8 (22 Jan 2019 15:11)  T(F): 97.7 (23 Jan 2019 07:43), Max: 98.3 (22 Jan 2019 15:11)  HR: 79 (23 Jan 2019 07:43) (75 - 93)  BP: 93/59 (23 Jan 2019 07:43) (70/40 - 104/53)  RR: 16 (23 Jan 2019 07:43) (16 - 18)  SpO2: 99% (23 Jan 2019 07:43) (93% - 99%)    Right Hip:          Dressing: clean/dry/intact    Bilateral LEs:         Sensation:  intact to light touch          Motor exam:  5/5 dorsiflexion/plantarflexion/EHL          2+ DP pulses          calf supple, NT         Abduction pillow in place         SCDs in place  Munguia catheter in place    LABS:                        8.9    4.35  )-----------( 125      ( 23 Jan 2019 07:24 )             26.2     01-23    140  |  108  |  10  ----------------------------<  80  3.9   |  27  |  0.83    Ca    8.4      23 Jan 2019 07:24            MEDICATIONS:  Anticoagulation:  enoxaparin Injectable 40 milliGRAM(s) SubCutaneous every 24 hours      Pain medications:   acetaminophen   Tablet .. 1000 milliGRAM(s) Oral every 8 hours  gabapentin 600 milliGRAM(s) Oral daily  gabapentin 600 milliGRAM(s) Oral at bedtime  HYDROmorphone   Tablet 2 milliGRAM(s) Oral every 3 hours PRN  HYDROmorphone   Tablet 4 milliGRAM(s) Oral every 3 hours PRN  HYDROmorphone  Injectable 0.5 milliGRAM(s) IV Push every 3 hours PRN      A/P :  patient orthopedically stable s/p Right THR POD # 2  -    hemoglobin and hematocrit improved since transfusion yesterday  -    has UTI treated with Levaquin per Hospitalist  -    Pain control  -    DVT ppx: Lovenox  -    Weight bearing status: WBAT  -    Continue total hip precautions   -    Physical Therapy  -    Occupational Therapy  -    Discharge plan:  rehab tomorrow

## 2019-01-23 NOTE — PHYSICAL THERAPY INITIAL EVALUATION ADULT - ADDITIONAL COMMENTS
Pt lives at home by herself with no need for stair negotiation. Pt has rolling walker and straight cane.

## 2019-01-23 NOTE — PROVIDER CONTACT NOTE (CRITICAL VALUE NOTIFICATION) - SITUATION
Urine culture collected 1/20/19 came back positive for Ecoli, >[id number] CFU. Pt is currently on 500mg Levaquin IVPB q daily. Pt is afebrile.

## 2019-01-23 NOTE — PROGRESS NOTE ADULT - ASSESSMENT
The patient is a 68 year old female with a history of NHL, hypothyroidism, dementia who presents with fall and right hip fracture.    Plan:  - ECG with no evidence of ischemia or infarction. Nonspecific findings noted.  - On levofloxacin  - Pain control  - PT

## 2019-01-23 NOTE — OCCUPATIONAL THERAPY INITIAL EVALUATION ADULT - ADDITIONAL COMMENTS
Prior to being admitted to [facility]  Pt lives alone 3 [address] +railing with a HHA a few hour a day + stall shower with shower chair

## 2019-01-23 NOTE — PHARMACY COMMUNICATION NOTE - COMMENTS
ASP: restricted antibiotic  CrCl = 63.1ml/min  Ertapenem 1gm IVPB q24h  ID physician approved as per attending: Dr. Sutherland

## 2019-01-23 NOTE — OCCUPATIONAL THERAPY INITIAL EVALUATION ADULT - PERTINENT HX OF CURRENT PROBLEM, REHAB EVAL
Right Hip rhea  s/p fall. [facility] from [facility] c/o right hip pain s/p fall

## 2019-01-23 NOTE — PROGRESS NOTE ADULT - SUBJECTIVE AND OBJECTIVE BOX
Chief Complaint: Fall    Interval Events: No events overnight. Sleeping.    Review of Systems:  General: No fevers, chills, weight loss or gain  Skin: No rashes, color changes  Cardiovascular: No chest pain, orthopnea  Respiratory: No shortness of breath, cough  Gastrointestinal: No nausea, abdominal pain  Genitourinary: No incontinence, pain with urination  Musculoskeletal: No pain, swelling, decreased range of motion  Neurological: No headache, weakness  Psychiatric: No depression, anxiety  Endocrine: No weight loss or gain, increased thirst  All other systems are comprehensively negative.    Physical Exam:  Vital Signs Last 24 Hrs  T(C): 36.5 (23 Jan 2019 07:43), Max: 36.8 (22 Jan 2019 15:11)  T(F): 97.7 (23 Jan 2019 07:43), Max: 98.3 (22 Jan 2019 15:11)  HR: 79 (23 Jan 2019 07:43) (75 - 93)  BP: 93/59 (23 Jan 2019 07:43) (70/40 - 104/53)  BP(mean): --  RR: 16 (23 Jan 2019 07:43) (16 - 18)  SpO2: 99% (23 Jan 2019 07:43) (93% - 99%)  General: NAD  HEENT: MMM  Neck: No JVD, no carotid bruit  Lungs: CTAB  CV: RRR, nl S1/S2, no M/R/G  Abdomen: S/NT/ND, +BS  Extremities: No LE edema, no cyanosis  Neuro: AAOx3, non-focal  Skin: No rash    Labs:             01-23    140  |  108  |  10  ----------------------------<  80  3.9   |  27  |  0.83    Ca    8.4      23 Jan 2019 07:24                          8.9    4.35  )-----------( 125      ( 23 Jan 2019 07:24 )             26.2

## 2019-01-23 NOTE — PROGRESS NOTE ADULT - ASSESSMENT
68F with anxiety, OCD, non-Hodgkin's lymphoma (treated with chemo @[facility] [place], in remission for 2 years), hypothyroidism , dementia  with recent diarrhea  UTI , sent to rehab and came in today with s/p fall and right hip fracture.

## 2019-01-23 NOTE — PHYSICAL THERAPY INITIAL EVALUATION ADULT - PERTINENT HX OF CURRENT PROBLEM, REHAB EVAL
s/p right hemiarthroplasty hip partial s/p right hemiarthroplasty hip partial for right femoral neck fracture. Pt's CT Head Scan (+) for chronic small vessel ischemic changes.

## 2019-01-23 NOTE — PROGRESS NOTE ADULT - PROBLEM SELECTOR PLAN 3
-UA positive; Ucx growing e coli - ESBL  d/w Dr Dawson - Invanz 1gm iv daily x 7 days  will need midline for Rehab.

## 2019-01-23 NOTE — PROGRESS NOTE ADULT - SUBJECTIVE AND OBJECTIVE BOX
Patient is a 68y old  Female who presents with a chief complaint of S/P Fall and Right Hip Fracture. (23 Jan 2019 10:00)    INTERVAL HPI/OVERNIGHT EVENTS: feeling well,  no major issues.      MEDICATIONS  (STANDING):  acetaminophen   Tablet .. 1000 milliGRAM(s) Oral every 8 hours  ALPRAZolam 1 milliGRAM(s) Oral two times a day  buPROPion XL . 150 milliGRAM(s) Oral daily  docusate sodium 100 milliGRAM(s) Oral three times a day  enoxaparin Injectable 40 milliGRAM(s) SubCutaneous every 24 hours  ertapenem  IVPB 1000 milliGRAM(s) IV Intermittent every 24 hours  gabapentin 600 milliGRAM(s) Oral daily  gabapentin 600 milliGRAM(s) Oral at bedtime  influenza   Vaccine 0.5 milliLiter(s) IntraMuscular once  lactated ringers. 1000 milliLiter(s) (100 mL/Hr) IV Continuous <Continuous>  levothyroxine 75 MICROGram(s) Oral <User Schedule>  senna 2 Tablet(s) Oral at bedtime  ziprasidone 40 milliGRAM(s) Oral <User Schedule>    MEDICATIONS  (PRN):  ALPRAZolam 1 milliGRAM(s) Oral daily PRN anxiety  HYDROmorphone   Tablet 2 milliGRAM(s) Oral every 3 hours PRN Mild Pain (1 - 3)  HYDROmorphone   Tablet 4 milliGRAM(s) Oral every 3 hours PRN Moderate Pain (4 - 6)  HYDROmorphone  Injectable 0.5 milliGRAM(s) IV Push every 3 hours PRN Severe Pain (7 - 10)  magnesium hydroxide Suspension 30 milliLiter(s) Oral daily PRN Constipation  ondansetron Injectable 4 milliGRAM(s) IV Push every 6 hours PRN Nausea and/or Vomiting  polyethylene glycol 3350 17 Gram(s) Oral daily PRN Constipation      Allergies  honeydew melon (Other)  penicillin (Other; Hives)    REVIEW OF SYSTEMS:  CONSTITUTIONAL: No fever, weight loss, or fatigue  EYES: No eye pain, visual disturbances, or discharge  ENMT:  No difficulty hearing, tinnitus, vertigo; No sinus or throat pain  NECK: No pain or stiffness  BREASTS: No pain, masses, or nipple discharge  RESPIRATORY: No cough, wheezing, chills or hemoptysis; No shortness of breath  CARDIOVASCULAR: No chest pain, palpitations, or lightheadedness  GASTROINTESTINAL: No abdominal or epigastric pain. No nausea, vomiting, or hematemesis; No diarrhea or constipation. No melena or hematochezia.  GENITOURINARY: has yanes  NEUROLOGICAL: No headaches, vertigo, memory loss, loss of strength, numbness, or tremors  SKIN: No itching, burning, rashes, or lesions   LYMPH NODES: No enlarged glands  ENDOCRINE: No heat or cold intolerance; No hair loss; No polydipsia or polyuria  MUSCULOSKELETAL: No back pain  PSYCHIATRIC: No depression, anxiety, or mood swings  HEME/LYMPH: No easy bruising, or bleeding gums  ALLERGY AND IMMUNOLOGIC: No hives or eczema    Vital Signs Last 24 Hrs  T(C): 36.5 (23 Jan 2019 07:43), Max: 36.8 (22 Jan 2019 15:11)  T(F): 97.7 (23 Jan 2019 07:43), Max: 98.3 (22 Jan 2019 15:11)  HR: 79 (23 Jan 2019 07:43) (75 - 93)  BP: 93/59 (23 Jan 2019 07:43) (89/50 - 104/53)  BP(mean): --  RR: 16 (23 Jan 2019 07:43) (16 - 18)  SpO2: 99% (23 Jan 2019 07:43) (93% - 99%)    PHYSICAL EXAM:  GENERAL: NAD, well-groomed, well-developed  HEAD:  Atraumatic, Normocephalic  EYES: EOMI, PERRLA, conjunctiva and sclera clear  ENMT: Moist mucous membranes,  NECK: Supple, No JVD, Normal thyroid  NERVOUS SYSTEM:  Alert & Oriented X3, Good concentration; Bilateral LE mobile, sensation to light touch intact  CHEST/LUNG: Clear to auscultation bilaterally; No rales, rhonchi, wheezing, or rubs  HEART: Regular rate and rhythm; No murmurs, rubs, or gallops  ABDOMEN: Soft, Nontender, Nondistended; Bowel sounds present  EXTREMITIES:  2+ Peripheral Pulses, No clubbing or cyanosis  LYMPH: No lymphadenopathy noted  SKIN: No rashes or lesions  INCISION:  Dressing dry and intact    LABS:                        8.9    4.35  )-----------( 125      ( 23 Jan 2019 07:24 )             26.2     23 Jan 2019 07:24    140    |  108    |  10     ----------------------------<  80     3.9     |  27     |  0.83     Ca    8.4        23 Jan 2019 07:24      CAPILLARY BLOOD GLUCOSE          RADIOLOGY & ADDITIONAL TESTS:    Imaging Personally Reviewed:      [ ] Consultant(s) Notes Reviewed  [x] Care Discussed with Consultants/Other Providers:  Ortho PA- plan of care

## 2019-01-23 NOTE — PHYSICAL THERAPY INITIAL EVALUATION ADULT - RANGE OF MOTION EXAMINATION, REHAB EVAL
deficits as listed below/right lower extremity not tested due to pain post-surgery/bilateral upper extremity ROM was WFL (within functional limits)/Left LE ROM was WFL (within functional limits)

## 2019-01-24 LAB
ANION GAP SERPL CALC-SCNC: 7 MMOL/L — SIGNIFICANT CHANGE UP (ref 5–17)
BLD GP AB SCN SERPL QL: SIGNIFICANT CHANGE UP
BUN SERPL-MCNC: 12 MG/DL — SIGNIFICANT CHANGE UP (ref 7–23)
CALCIUM SERPL-MCNC: 8.2 MG/DL — LOW (ref 8.4–10.5)
CHLORIDE SERPL-SCNC: 106 MMOL/L — SIGNIFICANT CHANGE UP (ref 96–108)
CO2 SERPL-SCNC: 26 MMOL/L — SIGNIFICANT CHANGE UP (ref 22–31)
CREAT SERPL-MCNC: 0.77 MG/DL — SIGNIFICANT CHANGE UP (ref 0.5–1.3)
GLUCOSE SERPL-MCNC: 76 MG/DL — SIGNIFICANT CHANGE UP (ref 70–99)
HCT VFR BLD CALC: 23.8 % — LOW (ref 34.5–45)
HGB BLD-MCNC: 7.8 G/DL — LOW (ref 11.5–15.5)
MCHC RBC-ENTMCNC: 32.8 GM/DL — SIGNIFICANT CHANGE UP (ref 32–36)
MCHC RBC-ENTMCNC: 33.6 PG — SIGNIFICANT CHANGE UP (ref 27–34)
MCV RBC AUTO: 102.6 FL — HIGH (ref 80–100)
NRBC # BLD: 0 /100 WBCS — SIGNIFICANT CHANGE UP (ref 0–0)
PLATELET # BLD AUTO: 126 K/UL — LOW (ref 150–400)
POTASSIUM SERPL-MCNC: 3.9 MMOL/L — SIGNIFICANT CHANGE UP (ref 3.5–5.3)
POTASSIUM SERPL-SCNC: 3.9 MMOL/L — SIGNIFICANT CHANGE UP (ref 3.5–5.3)
RBC # BLD: 2.32 M/UL — LOW (ref 3.8–5.2)
RBC # FLD: 16.7 % — HIGH (ref 10.3–14.5)
SODIUM SERPL-SCNC: 139 MMOL/L — SIGNIFICANT CHANGE UP (ref 135–145)
WBC # BLD: 4.62 K/UL — SIGNIFICANT CHANGE UP (ref 3.8–10.5)
WBC # FLD AUTO: 4.62 K/UL — SIGNIFICANT CHANGE UP (ref 3.8–10.5)

## 2019-01-24 PROCEDURE — 73562 X-RAY EXAM OF KNEE 3: CPT | Mod: 26,RT

## 2019-01-24 PROCEDURE — 99233 SBSQ HOSP IP/OBS HIGH 50: CPT

## 2019-01-24 RX ORDER — SODIUM CHLORIDE 9 MG/ML
500 INJECTION, SOLUTION INTRAVENOUS ONCE
Qty: 0 | Refills: 0 | Status: DISCONTINUED | OUTPATIENT
Start: 2019-01-24 | End: 2019-01-24

## 2019-01-24 RX ORDER — SODIUM CHLORIDE 9 MG/ML
500 INJECTION, SOLUTION INTRAVENOUS ONCE
Qty: 0 | Refills: 0 | Status: COMPLETED | OUTPATIENT
Start: 2019-01-24 | End: 2019-01-24

## 2019-01-24 RX ADMIN — Medication 1000 MILLIGRAM(S): at 14:20

## 2019-01-24 RX ADMIN — Medication 100 MILLIGRAM(S): at 21:26

## 2019-01-24 RX ADMIN — HYDROMORPHONE HYDROCHLORIDE 2 MILLIGRAM(S): 2 INJECTION INTRAMUSCULAR; INTRAVENOUS; SUBCUTANEOUS at 09:39

## 2019-01-24 RX ADMIN — Medication 100 MILLIGRAM(S): at 05:48

## 2019-01-24 RX ADMIN — GABAPENTIN 600 MILLIGRAM(S): 400 CAPSULE ORAL at 21:26

## 2019-01-24 RX ADMIN — HYDROMORPHONE HYDROCHLORIDE 2 MILLIGRAM(S): 2 INJECTION INTRAMUSCULAR; INTRAVENOUS; SUBCUTANEOUS at 10:09

## 2019-01-24 RX ADMIN — Medication 1000 MILLIGRAM(S): at 05:48

## 2019-01-24 RX ADMIN — Medication 1000 MILLIGRAM(S): at 05:49

## 2019-01-24 RX ADMIN — ERTAPENEM SODIUM 120 MILLIGRAM(S): 1 INJECTION, POWDER, LYOPHILIZED, FOR SOLUTION INTRAMUSCULAR; INTRAVENOUS at 09:39

## 2019-01-24 RX ADMIN — BUPROPION HYDROCHLORIDE 150 MILLIGRAM(S): 150 TABLET, EXTENDED RELEASE ORAL at 11:58

## 2019-01-24 RX ADMIN — Medication 1 MILLIGRAM(S): at 05:49

## 2019-01-24 RX ADMIN — SENNA PLUS 2 TABLET(S): 8.6 TABLET ORAL at 21:26

## 2019-01-24 RX ADMIN — Medication 75 MICROGRAM(S): at 09:39

## 2019-01-24 RX ADMIN — SODIUM CHLORIDE 500 MILLILITER(S): 9 INJECTION, SOLUTION INTRAVENOUS at 02:41

## 2019-01-24 RX ADMIN — Medication 1 MILLIGRAM(S): at 18:39

## 2019-01-24 RX ADMIN — ZIPRASIDONE HYDROCHLORIDE 40 MILLIGRAM(S): 20 CAPSULE ORAL at 21:26

## 2019-01-24 RX ADMIN — ENOXAPARIN SODIUM 40 MILLIGRAM(S): 100 INJECTION SUBCUTANEOUS at 09:41

## 2019-01-24 RX ADMIN — GABAPENTIN 600 MILLIGRAM(S): 400 CAPSULE ORAL at 11:58

## 2019-01-24 RX ADMIN — Medication 100 MILLIGRAM(S): at 14:20

## 2019-01-24 NOTE — PROGRESS NOTE ADULT - SUBJECTIVE AND OBJECTIVE BOX
Procedure: Right  Posterior hemiarthroplasty for fracture  POD#: 3    S: Pt without complaints. No SOB,CP, N/V. Tolerated Fluids / Diet well.   Pain comfortable  Interval Rx + Tylenol + Celebrex. +BM + flatus, No abdominal pain.  acetaminophen   Tablet .. 1000 milliGRAM(s) Oral every 8 hours  ALPRAZolam 1 milliGRAM(s) Oral two times a day  ALPRAZolam 1 milliGRAM(s) Oral daily PRN  buPROPion XL . 150 milliGRAM(s) Oral daily  gabapentin 600 milliGRAM(s) Oral daily  gabapentin 600 milliGRAM(s) Oral at bedtime  HYDROmorphone   Tablet 2 milliGRAM(s) Oral every 3 hours PRN  HYDROmorphone   Tablet 4 milliGRAM(s) Oral every 3 hours PRN  HYDROmorphone  Injectable 0.5 milliGRAM(s) IV Push every 3 hours PRN  ondansetron Injectable 4 milliGRAM(s) IV Push every 6 hours PRN  ziprasidone 40 milliGRAM(s) Oral <User Schedule>    O: General: Pt Alert and oriented, On exam NAD,   VS: Vital Signs Last 24 Hrs  T(C): 36.6 (24 Jan 2019 07:10), Max: 36.8 (23 Jan 2019 23:13)  T(F): 97.9 (24 Jan 2019 07:10), Max: 98.3 (23 Jan 2019 23:13)  HR: 91 (24 Jan 2019 09:48) (81 - 93)  BP: 98/56 (24 Jan 2019 09:48) (84/54 - 104/66)  BP(mean): --  RR: 20 (24 Jan 2019 07:10) (16 - 20)  SpO2: 90% (24 Jan 2019 07:10) (90% - 99%)  Heart: RRR, no murmur  Lungs: BS clear bilat.  Abdomen: Soft; no distention, benign exam    Ext Right  Post. Hip : Aquacel   Dressing;  Posterior Incision clean, dry, & intact (old bloody drainage noted), ; thigh soft  Neurologic: Has sensation bilat. feet & toes ;  Full AROM bilat feet & toes. EHL / AT  = Bilat: 5/5   Vascular: Feet toes warm, pink. DP = 2+. Calves soft ; w/o tenderness bilat..  VTEP: On Bilat. Venodynes + enoxaparin Injectable 40 milliGRAM(s) SubCutaneous every 24 hours    Activity in PT yesterday Noted. Walked 5 ft. with walker  5                        7.8    4.62  )-----------( 126      ( 24 Jan 2019 07:58 )             23.8     01-24    139  |  106  |  12  ----------------------------<  76  3.9   |  26  |  0.77    Ca    8.2<L>      24 Jan 2019 07:58        Hospitalist input noted    Primary Orthopedic Assessment:  • Stable from Orthopedic perspective  • Neuro motor exam stable  • Labs: post op anemia      Plan:   • Continue:  PT/OT/Weightbearing as tolerated with assistance of a walker/Posterior THR precautions/Ice to hip/          Incentive spirometry encouraged   • Continue DVT prophylaxis as prescribed, including use of compression devices and ankle pumps  • Continue Pain Rx  • Posterior THR hip precautions reviewed with patient  • Plans per Medicine / Anesthesia / Cardiology  • Discharge planning – anticipated discharge is Home D/C with home care & home PT when medically stable & cleared by PT/OT Procedure: Right  Posterior hemiarthroplasty for fracture  POD#: 3    S: Pt without complaints. No SOB,CP, N/V. Tolerated Fluids / Diet well.   Pain comfortable  Interval Rx + Tylenol + Celebrex. +BM + flatus, No abdominal pain.  acetaminophen   Tablet .. 1000 milliGRAM(s) Oral every 8 hours  ALPRAZolam 1 milliGRAM(s) Oral two times a day  ALPRAZolam 1 milliGRAM(s) Oral daily PRN  buPROPion XL . 150 milliGRAM(s) Oral daily  gabapentin 600 milliGRAM(s) Oral daily  gabapentin 600 milliGRAM(s) Oral at bedtime  HYDROmorphone   Tablet 2 milliGRAM(s) Oral every 3 hours PRN  HYDROmorphone   Tablet 4 milliGRAM(s) Oral every 3 hours PRN  HYDROmorphone  Injectable 0.5 milliGRAM(s) IV Push every 3 hours PRN  ondansetron Injectable 4 milliGRAM(s) IV Push every 6 hours PRN  ziprasidone 40 milliGRAM(s) Oral <User Schedule>    O: General: Pt Alert and oriented, On exam NAD,   VS: Vital Signs Last 24 Hrs  T(C): 36.6 (24 Jan 2019 07:10), Max: 36.8 (23 Jan 2019 23:13)  T(F): 97.9 (24 Jan 2019 07:10), Max: 98.3 (23 Jan 2019 23:13)  HR: 91 (24 Jan 2019 09:48) (81 - 93)  BP: 98/56 (24 Jan 2019 09:48) (84/54 - 104/66)  BP(mean): --  RR: 20 (24 Jan 2019 07:10) (16 - 20)  SpO2: 90% (24 Jan 2019 07:10) (90% - 99%)  Heart: RRR, no murmur  Lungs: BS clear bilat.  Abdomen: Soft; no distention, benign exam    Ext Right  Post. Hip : Aquacel   Dressing;  Posterior Incision clean, dry, & intact (old bloody drainage noted), ; thigh soft  Neurologic: Has sensation bilat. feet & toes ;  Full AROM bilat feet & toes. EHL / AT  = Bilat: 5/5   Vascular: Feet toes warm, pink. DP = 2+. Calves soft ; w/o tenderness bilat..  VTEP: On Bilat. Venodynes + enoxaparin Injectable 40 milliGRAM(s) SubCutaneous every 24 hours    Activity in PT yesterday Noted. Walked 5 ft. with walker  5                        7.8    4.62  )-----------( 126      ( 24 Jan 2019 07:58 )             23.8     01-24    139  |  106  |  12  ----------------------------<  76  3.9   |  26  |  0.77    Ca    8.2<L>      24 Jan 2019 07:58        Hospitalist input noted    Primary Orthopedic Assessment:  • Stable from Orthopedic perspective  • Neuro motor exam stable  • Labs: post op anemia      Plan:   • Continue:  PT/OT/Weightbearing as tolerated with assistance of a walker/Posterior THR precautions/Ice to hip/          Incentive spirometry encouraged   • Continue DVT prophylaxis as prescribed, including use of compression devices and ankle pumps  • Continue Pain Rx  • Posterior THR hip precautions reviewed with patient  • Plans per Medicine / Anesthesia / Cardiology  • Discharge planning – anticipated discharge is subacute when medically stable & cleared by PT/OT

## 2019-01-24 NOTE — PROGRESS NOTE ADULT - ASSESSMENT
68F with anxiety, OCD, non-Hodgkin's lymphoma (treated with chemo @[facility] [place], in remission for 2 years), hypothyroidism , dementia  with recent diarrhea  UTI , sent to rehab and came in today with s/p fall and right hip fracture.

## 2019-01-24 NOTE — PROGRESS NOTE ADULT - SUBJECTIVE AND OBJECTIVE BOX
Patient is a 68y old  Female who presents with a chief complaint of S/P Fall and Right Hip Fracture. (24 Jan 2019 10:16)      INTERVAL HPI/OVERNIGHT EVENTS: feeling well.  complaining of pain in right knee.     MEDICATIONS  (STANDING):  acetaminophen   Tablet .. 1000 milliGRAM(s) Oral every 8 hours  ALPRAZolam 1 milliGRAM(s) Oral two times a day  buPROPion XL . 150 milliGRAM(s) Oral daily  docusate sodium 100 milliGRAM(s) Oral three times a day  enoxaparin Injectable 40 milliGRAM(s) SubCutaneous every 24 hours  ertapenem  IVPB 1000 milliGRAM(s) IV Intermittent every 24 hours  gabapentin 600 milliGRAM(s) Oral daily  gabapentin 600 milliGRAM(s) Oral at bedtime  influenza   Vaccine 0.5 milliLiter(s) IntraMuscular once  lactated ringers. 1000 milliLiter(s) (100 mL/Hr) IV Continuous <Continuous>  levothyroxine 75 MICROGram(s) Oral <User Schedule>  senna 2 Tablet(s) Oral at bedtime  ziprasidone 40 milliGRAM(s) Oral <User Schedule>    MEDICATIONS  (PRN):  ALPRAZolam 1 milliGRAM(s) Oral daily PRN anxiety  bisacodyl Suppository 10 milliGRAM(s) Rectal daily PRN If no bowel movement by POD#2  HYDROmorphone   Tablet 2 milliGRAM(s) Oral every 3 hours PRN Mild Pain (1 - 3)  HYDROmorphone   Tablet 4 milliGRAM(s) Oral every 3 hours PRN Moderate Pain (4 - 6)  HYDROmorphone  Injectable 0.5 milliGRAM(s) IV Push every 3 hours PRN Severe Pain (7 - 10)  magnesium hydroxide Suspension 30 milliLiter(s) Oral daily PRN Constipation  ondansetron Injectable 4 milliGRAM(s) IV Push every 6 hours PRN Nausea and/or Vomiting  polyethylene glycol 3350 17 Gram(s) Oral daily PRN Constipation      Allergies    honeydew melon (Other)  penicillin (Other; Hives)    REVIEW OF SYSTEMS:  CONSTITUTIONAL: No fever, weight loss, or fatigue  EYES: No eye pain, visual disturbances, or discharge  ENMT:  No difficulty hearing, tinnitus, vertigo; No sinus or throat pain  NECK: No pain or stiffness  BREASTS: No pain, masses, or nipple discharge  RESPIRATORY: No cough, wheezing, chills or hemoptysis; No shortness of breath  CARDIOVASCULAR: No chest pain, palpitations, or lightheadedness  GASTROINTESTINAL: No abdominal or epigastric pain. No nausea, vomiting, or hematemesis; No diarrhea or constipation. No melena or hematochezia.  GENITOURINARY: No dysuria, frequency, hematuria, or incontinence  NEUROLOGICAL: No headaches, vertigo, memory loss, loss of strength, numbness, or tremors  SKIN: No itching, burning, rashes, or lesions   LYMPH NODES: No enlarged glands  ENDOCRINE: No heat or cold intolerance; No hair loss; No polydipsia or polyuria  MUSCULOSKELETAL: No back pain  PSYCHIATRIC: No depression, anxiety, or mood swings  HEME/LYMPH: No easy bruising, or bleeding gums  ALLERGY AND IMMUNOLOGIC: No hives or eczema    Vital Signs Last 24 Hrs  T(C): 36.6 (24 Jan 2019 07:10), Max: 36.8 (23 Jan 2019 23:13)  T(F): 97.9 (24 Jan 2019 07:10), Max: 98.3 (23 Jan 2019 23:13)  HR: 91 (24 Jan 2019 09:48) (81 - 93)  BP: 98/56 (24 Jan 2019 09:48) (84/54 - 104/66)  BP(mean): --  RR: 20 (24 Jan 2019 07:10) (16 - 20)  SpO2: 90% (24 Jan 2019 07:10) (90% - 99%)    PHYSICAL EXAM:  GENERAL: NAD  EYES: conjunctiva and sclera clear  ENMT: Moist mucous membranes  NECK: Supple, No JVD  NERVOUS SYSTEM:  Alert & Oriented X2, Good concentration; Bilateral LE mobile, sensation to light touch intact  CHEST/LUNG: Clear to auscultation bilaterally; No rales, rhonchi, wheezing, or rubs  HEART: Regular rate and rhythm; No murmurs, rubs, or gallops  ABDOMEN: Soft, Nontender, Nondistended; Bowel sounds present  EXTREMITIES:  2+ Peripheral Pulses, No clubbing or cyanosis; right leg/thigh is very swollen, non pitting edema.   LYMPH: No lymphadenopathy noted  SKIN: No rashes or lesions  INCISION:  Dressing dry and intact    LABS:                        7.8    4.62  )-----------( 126      ( 24 Jan 2019 07:58 )             23.8     24 Jan 2019 07:58    139    |  106    |  12     ----------------------------<  76     3.9     |  26     |  0.77     Ca    8.2        24 Jan 2019 07:58      CAPILLARY BLOOD GLUCOSE      RADIOLOGY & ADDITIONAL TESTS:    Imaging Personally Reviewed:      [ ] Consultant(s) Notes Reviewed  [x] Care Discussed with Consultants/Other Providers:  Ortho PA- plan of care

## 2019-01-24 NOTE — PROGRESS NOTE ADULT - ASSESSMENT
The patient is a 68 year old female with a history of NHL, hypothyroidism, dementia who presents with fall and right hip fracture.    Plan:  - On ertapenem  - Transfuse to hemoglobin above 8  - BP on low side due to hypovolemia, blood loss  - Pain control  - PT

## 2019-01-24 NOTE — PROGRESS NOTE ADULT - SUBJECTIVE AND OBJECTIVE BOX
Chief Complaint: Fall    Interval Events: No significant changes. Hemoglobin low.    Review of Systems:  General: No fevers, chills, weight loss or gain  Skin: No rashes, color changes  Cardiovascular: No chest pain, orthopnea  Respiratory: No shortness of breath, cough  Gastrointestinal: No nausea, abdominal pain  Genitourinary: No incontinence, pain with urination  Musculoskeletal: No pain, swelling, decreased range of motion  Neurological: No headache, weakness  Psychiatric: No depression, anxiety  Endocrine: No weight loss or gain, increased thirst  All other systems are comprehensively negative.    Physical Exam:  Vital Signs Last 24 Hrs  T(C): 36.6 (24 Jan 2019 07:10), Max: 36.8 (23 Jan 2019 23:13)  T(F): 97.9 (24 Jan 2019 07:10), Max: 98.3 (23 Jan 2019 23:13)  HR: 91 (24 Jan 2019 09:48) (81 - 93)  BP: 98/56 (24 Jan 2019 09:48) (84/54 - 104/66)  BP(mean): --  RR: 20 (24 Jan 2019 07:10) (16 - 20)  SpO2: 90% (24 Jan 2019 07:10) (90% - 99%)  General: NAD  HEENT: MMM  Neck: No JVD, no carotid bruit  Lungs: CTAB  CV: RRR, nl S1/S2, no M/R/G  Abdomen: S/NT/ND, +BS  Extremities: No LE edema, no cyanosis  Neuro: AAOx3, non-focal  Skin: No rash    Labs:             01-24    139  |  106  |  12  ----------------------------<  76  3.9   |  26  |  0.77    Ca    8.2<L>      24 Jan 2019 07:58                          7.8    4.62  )-----------( 126      ( 24 Jan 2019 07:58 )             23.8

## 2019-01-24 NOTE — PROGRESS NOTE ADULT - PROBLEM SELECTOR PLAN 3
-UA positive; Ucx growing e coli - ESBL  d/w Dr Dawson - Invanz 1gm iv daily -today day 2/7  Midline in place.

## 2019-01-25 LAB
ANION GAP SERPL CALC-SCNC: 5 MMOL/L — SIGNIFICANT CHANGE UP (ref 5–17)
BUN SERPL-MCNC: 7 MG/DL — SIGNIFICANT CHANGE UP (ref 7–23)
CALCIUM SERPL-MCNC: 8.6 MG/DL — SIGNIFICANT CHANGE UP (ref 8.4–10.5)
CHLORIDE SERPL-SCNC: 104 MMOL/L — SIGNIFICANT CHANGE UP (ref 96–108)
CO2 SERPL-SCNC: 30 MMOL/L — SIGNIFICANT CHANGE UP (ref 22–31)
CREAT SERPL-MCNC: 0.82 MG/DL — SIGNIFICANT CHANGE UP (ref 0.5–1.3)
CULTURE RESULTS: SIGNIFICANT CHANGE UP
GLUCOSE SERPL-MCNC: 100 MG/DL — HIGH (ref 70–99)
HCT VFR BLD CALC: 28.2 % — LOW (ref 34.5–45)
HGB BLD-MCNC: 9.5 G/DL — LOW (ref 11.5–15.5)
MCHC RBC-ENTMCNC: 33 PG — SIGNIFICANT CHANGE UP (ref 27–34)
MCHC RBC-ENTMCNC: 33.7 GM/DL — SIGNIFICANT CHANGE UP (ref 32–36)
MCV RBC AUTO: 97.9 FL — SIGNIFICANT CHANGE UP (ref 80–100)
NRBC # BLD: 0 /100 WBCS — SIGNIFICANT CHANGE UP (ref 0–0)
PLATELET # BLD AUTO: 137 K/UL — LOW (ref 150–400)
POTASSIUM SERPL-MCNC: 3.6 MMOL/L — SIGNIFICANT CHANGE UP (ref 3.5–5.3)
POTASSIUM SERPL-SCNC: 3.6 MMOL/L — SIGNIFICANT CHANGE UP (ref 3.5–5.3)
RBC # BLD: 2.88 M/UL — LOW (ref 3.8–5.2)
RBC # FLD: 17.6 % — HIGH (ref 10.3–14.5)
SODIUM SERPL-SCNC: 139 MMOL/L — SIGNIFICANT CHANGE UP (ref 135–145)
SPECIMEN SOURCE: SIGNIFICANT CHANGE UP
WBC # BLD: 6.35 K/UL — SIGNIFICANT CHANGE UP (ref 3.8–10.5)
WBC # FLD AUTO: 6.35 K/UL — SIGNIFICANT CHANGE UP (ref 3.8–10.5)

## 2019-01-25 PROCEDURE — 99232 SBSQ HOSP IP/OBS MODERATE 35: CPT

## 2019-01-25 PROCEDURE — 12345: CPT | Mod: NC

## 2019-01-25 RX ORDER — HYDROMORPHONE HYDROCHLORIDE 2 MG/ML
1 INJECTION INTRAMUSCULAR; INTRAVENOUS; SUBCUTANEOUS
Qty: 0 | Refills: 0 | COMMUNITY
Start: 2019-01-25

## 2019-01-25 RX ORDER — ERTAPENEM SODIUM 1 G/1
1 INJECTION, POWDER, LYOPHILIZED, FOR SOLUTION INTRAMUSCULAR; INTRAVENOUS
Qty: 0 | Refills: 0 | COMMUNITY
Start: 2019-01-25

## 2019-01-25 RX ADMIN — Medication 1 MILLIGRAM(S): at 17:50

## 2019-01-25 RX ADMIN — Medication 100 MILLIGRAM(S): at 05:21

## 2019-01-25 RX ADMIN — BUPROPION HYDROCHLORIDE 150 MILLIGRAM(S): 150 TABLET, EXTENDED RELEASE ORAL at 15:03

## 2019-01-25 RX ADMIN — Medication 1000 MILLIGRAM(S): at 15:03

## 2019-01-25 RX ADMIN — ENOXAPARIN SODIUM 40 MILLIGRAM(S): 100 INJECTION SUBCUTANEOUS at 10:16

## 2019-01-25 RX ADMIN — Medication 75 MICROGRAM(S): at 15:02

## 2019-01-25 RX ADMIN — SENNA PLUS 2 TABLET(S): 8.6 TABLET ORAL at 21:44

## 2019-01-25 RX ADMIN — Medication 100 MILLIGRAM(S): at 15:02

## 2019-01-25 RX ADMIN — ERTAPENEM SODIUM 120 MILLIGRAM(S): 1 INJECTION, POWDER, LYOPHILIZED, FOR SOLUTION INTRAMUSCULAR; INTRAVENOUS at 10:16

## 2019-01-25 RX ADMIN — ZIPRASIDONE HYDROCHLORIDE 40 MILLIGRAM(S): 20 CAPSULE ORAL at 21:44

## 2019-01-25 RX ADMIN — HYDROMORPHONE HYDROCHLORIDE 2 MILLIGRAM(S): 2 INJECTION INTRAMUSCULAR; INTRAVENOUS; SUBCUTANEOUS at 09:27

## 2019-01-25 RX ADMIN — Medication 1000 MILLIGRAM(S): at 21:44

## 2019-01-25 RX ADMIN — Medication 100 MILLIGRAM(S): at 21:44

## 2019-01-25 RX ADMIN — GABAPENTIN 600 MILLIGRAM(S): 400 CAPSULE ORAL at 15:02

## 2019-01-25 RX ADMIN — GABAPENTIN 600 MILLIGRAM(S): 400 CAPSULE ORAL at 21:44

## 2019-01-25 RX ADMIN — Medication 1000 MILLIGRAM(S): at 05:21

## 2019-01-25 RX ADMIN — Medication 1 MILLIGRAM(S): at 05:22

## 2019-01-25 NOTE — PROGRESS NOTE ADULT - PROBLEM SELECTOR PLAN 3
-UA positive; Ucx growing e coli - ESBL  d/w Dr Dawson - Invanz 1gm iv daily -today day 3/7  Midline in place.

## 2019-01-25 NOTE — PROGRESS NOTE ADULT - SUBJECTIVE AND OBJECTIVE BOX
POST OPERATIVE DAY #: 4    68y Female  s/p right hip hemiarthroplasty                      SUBJECTIVE: Patient seen and examined at bedside.     Pain:  well controlled      poorly controlled  Pain scale:   /10  Denies CP, SOB, N/V/D, weakness, numbness   No new complains     OBJECTIVE:     Vital Signs Last 24 Hrs  T(C): 36.9 (25 Jan 2019 07:44), Max: 36.9 (24 Jan 2019 15:15)  T(F): 98.4 (25 Jan 2019 07:44), Max: 98.4 (24 Jan 2019 15:15)  HR: 94 (25 Jan 2019 07:44) (89 - 96)  BP: 110/66 (25 Jan 2019 07:44) (98/56 - 123/73)  BP(mean): --  RR: 18 (25 Jan 2019 07:44) (17 - 18)  SpO2: 90% (25 Jan 2019 07:44) (90% - 95%)    Affected extremity: RLE         Dressing: changed, + serosanguinous  discharge                      Sensation: intact to light touch          Motor exam:   5/ 5 Tibialis Anterior/Gastrocnemius-Soleus, EHL/FHL         warm, well-perfused; capillary refill < 3 seconds         negative calf tenderness B/L LE    LABS:                        9.5    6.35  )-----------( 137      ( 25 Jan 2019 08:40 )             28.2     01-25    139  |  104  |  7   ----------------------------<  100<H>  3.6   |  30  |  0.82    Ca    8.6      25 Jan 2019 08:40          MEDICATIONS:  Infection prophylaxis:  ertapenem  IVPB 1000 milliGRAM(s) IV Intermittent every 24 hours    Pain management:  acetaminophen   Tablet .. 1000 milliGRAM(s) Oral every 8 hours  ALPRAZolam 1 milliGRAM(s) Oral two times a day  ALPRAZolam 1 milliGRAM(s) Oral daily PRN  buPROPion XL . 150 milliGRAM(s) Oral daily  gabapentin 600 milliGRAM(s) Oral daily  gabapentin 600 milliGRAM(s) Oral at bedtime  HYDROmorphone   Tablet 2 milliGRAM(s) Oral every 3 hours PRN  HYDROmorphone   Tablet 4 milliGRAM(s) Oral every 3 hours PRN  HYDROmorphone  Injectable 0.5 milliGRAM(s) IV Push every 3 hours PRN  ondansetron Injectable 4 milliGRAM(s) IV Push every 6 hours PRN  ziprasidone 40 milliGRAM(s) Oral <User Schedule>    DVT prophylaxis:   enoxaparin Injectable 40 milliGRAM(s) SubCutaneous every 24 hours      RADIOLOGY & ADDITIONAL STUDIES:    ASSESSMENT AND PLAN:     - Analgesic pain control   - DVT prophylaxis: ASA 81 mg twice a  Lovenox 40mg daily    SCDs       - Antibiotics:  UTI IV Ertapenem through midline ID following  - PT/OT: Weight Bearing Status:  Weight bearing as tolerated, OOBTC          Posteiror Hip precautions   Abduction pillow   -  Incentive spirometry  - IVF  - Advance diet as tolerated  - Hospitalist is following  -  Follow up labs  -  Disposition: Subacute Rehab

## 2019-01-25 NOTE — PROGRESS NOTE ADULT - ASSESSMENT
68F with anxiety, OCD, non-Hodgkin's [place] [place] hypothyroidism , dementia  with recent diarrhea  UTI , sent to rehab and came in today with s/p fall and right hip fracture.

## 2019-01-25 NOTE — PROGRESS NOTE ADULT - PROBLEM SELECTOR PLAN 1
s/p orif.   Pain Management: acceptable- continue current care Tylenol ATC/ dilaudid PRN  Continue PT/OT once bp and hgb improved.   DVT proph: [  ] low risk - Aspirin  [ x ] high risk -Lovenox [  ] high risk - Eliquis [  ] other:_________  DC plan:  [  ] Home with HC  [ x ] Rehab   [  ] TBD  [  ]other:___________

## 2019-01-25 NOTE — PROGRESS NOTE ADULT - SUBJECTIVE AND OBJECTIVE BOX
Patient is a 68y old  Female who presents with a chief complaint of S/P Fall and Right Hip Fracture. (24 Jan 2019 10:16)      INTERVAL HPI/OVERNIGHT EVENTS: feeling well.  complaining of pain in right knee.     MEDICATIONS  (STANDING):  acetaminophen   Tablet .. 1000 milliGRAM(s) Oral every 8 hours  ALPRAZolam 1 milliGRAM(s) Oral two times a day  buPROPion XL . 150 milliGRAM(s) Oral daily  docusate sodium 100 milliGRAM(s) Oral three times a day  enoxaparin Injectable 40 milliGRAM(s) SubCutaneous every 24 hours  ertapenem  IVPB 1000 milliGRAM(s) IV Intermittent every 24 hours  gabapentin 600 milliGRAM(s) Oral daily  gabapentin 600 milliGRAM(s) Oral at bedtime  influenza   Vaccine 0.5 milliLiter(s) IntraMuscular once  lactated ringers. 1000 milliLiter(s) (100 mL/Hr) IV Continuous <Continuous>  levothyroxine 75 MICROGram(s) Oral <User Schedule>  senna 2 Tablet(s) Oral at bedtime  ziprasidone 40 milliGRAM(s) Oral <User Schedule>    MEDICATIONS  (PRN):  ALPRAZolam 1 milliGRAM(s) Oral daily PRN anxiety  bisacodyl Suppository 10 milliGRAM(s) Rectal daily PRN If no bowel movement by POD#2  HYDROmorphone   Tablet 2 milliGRAM(s) Oral every 3 hours PRN Mild Pain (1 - 3)  HYDROmorphone   Tablet 4 milliGRAM(s) Oral every 3 hours PRN Moderate Pain (4 - 6)  HYDROmorphone  Injectable 0.5 milliGRAM(s) IV Push every 3 hours PRN Severe Pain (7 - 10)  magnesium hydroxide Suspension 30 milliLiter(s) Oral daily PRN Constipation  ondansetron Injectable 4 milliGRAM(s) IV Push every 6 hours PRN Nausea and/or Vomiting  polyethylene glycol 3350 17 Gram(s) Oral daily PRN Constipation      Allergies    honeydew melon (Other)  penicillin (Other; Hives)    REVIEW OF SYSTEMS:  CONSTITUTIONAL: No fever, weight loss, or fatigue  EYES: No eye pain, visual disturbances, or discharge  ENMT:  No difficulty hearing, tinnitus, vertigo; No sinus or throat pain  NECK: No pain or stiffness  BREASTS: No pain, masses, or nipple discharge  RESPIRATORY: No cough, wheezing, chills or hemoptysis; No shortness of breath  CARDIOVASCULAR: No chest pain, palpitations, or lightheadedness  GASTROINTESTINAL: No abdominal or epigastric pain. No nausea, vomiting, or hematemesis; No diarrhea or constipation. No melena or hematochezia.  GENITOURINARY: No dysuria, frequency, hematuria, or incontinence  NEUROLOGICAL: No headaches, vertigo, memory loss, loss of strength, numbness, or tremors  SKIN: No itching, burning, rashes, or lesions   LYMPH NODES: No enlarged glands  ENDOCRINE: No heat or cold intolerance; No hair loss; No polydipsia or polyuria  MUSCULOSKELETAL: No back pain  PSYCHIATRIC: No depression, anxiety, or mood swings  HEME/LYMPH: No easy bruising, or bleeding gums  ALLERGY AND IMMUNOLOGIC: No hives or eczema    Vital Signs Last 24 Hrs  T(C): 36.8 (25 Jan 2019 15:06), Max: 36.9 (25 Jan 2019 07:44)  T(F): 98.3 (25 Jan 2019 15:06), Max: 98.4 (25 Jan 2019 07:44)  HR: 92 (25 Jan 2019 15:06) (86 - 96)  BP: 121/70 (25 Jan 2019 15:06) (97/61 - 123/73)  BP(mean): --  RR: 16 (25 Jan 2019 15:06) (16 - 18)  SpO2: 93% (25 Jan 2019 15:06) (90% - 99%)    PHYSICAL EXAM:  GENERAL: NAD  EYES: conjunctiva and sclera clear  ENMT: Moist mucous membranes  NECK: Supple, No JVD  NERVOUS SYSTEM:  Alert & Oriented X2, Good concentration; Bilateral LE mobile, sensation to light touch intact  CHEST/LUNG: Clear to auscultation bilaterally; No rales, rhonchi, wheezing, or rubs  HEART: Regular rate and rhythm; No murmurs, rubs, or gallops  ABDOMEN: Soft, Nontender, Nondistended; Bowel sounds present  EXTREMITIES:  2+ Peripheral Pulses, No clubbing or cyanosis; right leg/thigh is very swollen, non pitting edema.   LYMPH: No lymphadenopathy noted  SKIN: No rashes or lesions  INCISION:  Dressing dry and intact    LABS:                                     9.5    6.35  )-----------( 137      ( 25 Jan 2019 08:40 )             28.2         CAPILLARY BLOOD GLUCOSE      RADIOLOGY & ADDITIONAL TESTS:    Imaging Personally Reviewed:      [ ] Consultant(s) Notes Reviewed  [x] Care Discussed with Consultants/Other Providers:  Ortho PA- plan of care

## 2019-01-26 VITALS
OXYGEN SATURATION: 99 % | DIASTOLIC BLOOD PRESSURE: 71 MMHG | RESPIRATION RATE: 14 BRPM | TEMPERATURE: 98 F | SYSTOLIC BLOOD PRESSURE: 111 MMHG | HEART RATE: 75 BPM

## 2019-01-26 LAB
ANION GAP SERPL CALC-SCNC: 2 MMOL/L — LOW (ref 5–17)
BUN SERPL-MCNC: 11 MG/DL — SIGNIFICANT CHANGE UP (ref 7–23)
CALCIUM SERPL-MCNC: 8.5 MG/DL — SIGNIFICANT CHANGE UP (ref 8.4–10.5)
CHLORIDE SERPL-SCNC: 105 MMOL/L — SIGNIFICANT CHANGE UP (ref 96–108)
CO2 SERPL-SCNC: 33 MMOL/L — HIGH (ref 22–31)
CREAT SERPL-MCNC: 0.77 MG/DL — SIGNIFICANT CHANGE UP (ref 0.5–1.3)
GLUCOSE SERPL-MCNC: 84 MG/DL — SIGNIFICANT CHANGE UP (ref 70–99)
HCT VFR BLD CALC: 29.1 % — LOW (ref 34.5–45)
HGB BLD-MCNC: 9.6 G/DL — LOW (ref 11.5–15.5)
MCHC RBC-ENTMCNC: 33 GM/DL — SIGNIFICANT CHANGE UP (ref 32–36)
MCHC RBC-ENTMCNC: 33.2 PG — SIGNIFICANT CHANGE UP (ref 27–34)
MCV RBC AUTO: 100.7 FL — HIGH (ref 80–100)
NRBC # BLD: 0 /100 WBCS — SIGNIFICANT CHANGE UP (ref 0–0)
PLATELET # BLD AUTO: 129 K/UL — LOW (ref 150–400)
POTASSIUM SERPL-MCNC: 3.8 MMOL/L — SIGNIFICANT CHANGE UP (ref 3.5–5.3)
POTASSIUM SERPL-SCNC: 3.8 MMOL/L — SIGNIFICANT CHANGE UP (ref 3.5–5.3)
RBC # BLD: 2.89 M/UL — LOW (ref 3.8–5.2)
RBC # FLD: 17.6 % — HIGH (ref 10.3–14.5)
SODIUM SERPL-SCNC: 140 MMOL/L — SIGNIFICANT CHANGE UP (ref 135–145)
WBC # BLD: 4.18 K/UL — SIGNIFICANT CHANGE UP (ref 3.8–10.5)
WBC # FLD AUTO: 4.18 K/UL — SIGNIFICANT CHANGE UP (ref 3.8–10.5)

## 2019-01-26 PROCEDURE — 99285 EMERGENCY DEPT VISIT HI MDM: CPT | Mod: 25

## 2019-01-26 PROCEDURE — C1889: CPT

## 2019-01-26 PROCEDURE — 86923 COMPATIBILITY TEST ELECTRIC: CPT

## 2019-01-26 PROCEDURE — 86900 BLOOD TYPING SEROLOGIC ABO: CPT

## 2019-01-26 PROCEDURE — 84443 ASSAY THYROID STIM HORMONE: CPT

## 2019-01-26 PROCEDURE — 82962 GLUCOSE BLOOD TEST: CPT

## 2019-01-26 PROCEDURE — 85610 PROTHROMBIN TIME: CPT

## 2019-01-26 PROCEDURE — 99239 HOSP IP/OBS DSCHRG MGMT >30: CPT

## 2019-01-26 PROCEDURE — 87040 BLOOD CULTURE FOR BACTERIA: CPT

## 2019-01-26 PROCEDURE — 80061 LIPID PANEL: CPT

## 2019-01-26 PROCEDURE — 85730 THROMBOPLASTIN TIME PARTIAL: CPT

## 2019-01-26 PROCEDURE — 87641 MR-STAPH DNA AMP PROBE: CPT

## 2019-01-26 PROCEDURE — 85018 HEMOGLOBIN: CPT

## 2019-01-26 PROCEDURE — 97166 OT EVAL MOD COMPLEX 45 MIN: CPT

## 2019-01-26 PROCEDURE — 99233 SBSQ HOSP IP/OBS HIGH 50: CPT

## 2019-01-26 PROCEDURE — 97530 THERAPEUTIC ACTIVITIES: CPT

## 2019-01-26 PROCEDURE — 93005 ELECTROCARDIOGRAM TRACING: CPT

## 2019-01-26 PROCEDURE — 73502 X-RAY EXAM HIP UNI 2-3 VIEWS: CPT

## 2019-01-26 PROCEDURE — 85027 COMPLETE CBC AUTOMATED: CPT

## 2019-01-26 PROCEDURE — 97161 PT EVAL LOW COMPLEX 20 MIN: CPT

## 2019-01-26 PROCEDURE — 88305 TISSUE EXAM BY PATHOLOGIST: CPT

## 2019-01-26 PROCEDURE — 71045 X-RAY EXAM CHEST 1 VIEW: CPT

## 2019-01-26 PROCEDURE — 36573 INSJ PICC RS&I 5 YR+: CPT

## 2019-01-26 PROCEDURE — 84436 ASSAY OF TOTAL THYROXINE: CPT

## 2019-01-26 PROCEDURE — P9016: CPT

## 2019-01-26 PROCEDURE — 86901 BLOOD TYPING SEROLOGIC RH(D): CPT

## 2019-01-26 PROCEDURE — 84480 ASSAY TRIIODOTHYRONINE (T3): CPT

## 2019-01-26 PROCEDURE — 80053 COMPREHEN METABOLIC PANEL: CPT

## 2019-01-26 PROCEDURE — 88311 DECALCIFY TISSUE: CPT

## 2019-01-26 PROCEDURE — 73562 X-RAY EXAM OF KNEE 3: CPT

## 2019-01-26 PROCEDURE — 87086 URINE CULTURE/COLONY COUNT: CPT

## 2019-01-26 PROCEDURE — 97110 THERAPEUTIC EXERCISES: CPT

## 2019-01-26 PROCEDURE — 72170 X-RAY EXAM OF PELVIS: CPT

## 2019-01-26 PROCEDURE — C1751: CPT

## 2019-01-26 PROCEDURE — 86850 RBC ANTIBODY SCREEN: CPT

## 2019-01-26 PROCEDURE — 36430 TRANSFUSION BLD/BLD COMPNT: CPT

## 2019-01-26 PROCEDURE — 80048 BASIC METABOLIC PNL TOTAL CA: CPT

## 2019-01-26 PROCEDURE — 87640 STAPH A DNA AMP PROBE: CPT

## 2019-01-26 PROCEDURE — 36415 COLL VENOUS BLD VENIPUNCTURE: CPT

## 2019-01-26 PROCEDURE — 87186 SC STD MICRODIL/AGAR DIL: CPT

## 2019-01-26 PROCEDURE — 82306 VITAMIN D 25 HYDROXY: CPT

## 2019-01-26 PROCEDURE — 81001 URINALYSIS AUTO W/SCOPE: CPT

## 2019-01-26 PROCEDURE — 97116 GAIT TRAINING THERAPY: CPT

## 2019-01-26 PROCEDURE — 70450 CT HEAD/BRAIN W/O DYE: CPT

## 2019-01-26 PROCEDURE — 83036 HEMOGLOBIN GLYCOSYLATED A1C: CPT

## 2019-01-26 PROCEDURE — 85014 HEMATOCRIT: CPT

## 2019-01-26 PROCEDURE — 96374 THER/PROPH/DIAG INJ IV PUSH: CPT

## 2019-01-26 RX ORDER — ENOXAPARIN SODIUM 100 MG/ML
40 INJECTION SUBCUTANEOUS
Qty: 0 | Refills: 0 | COMMUNITY
Start: 2019-01-26

## 2019-01-26 RX ADMIN — Medication 1000 MILLIGRAM(S): at 12:36

## 2019-01-26 RX ADMIN — HYDROMORPHONE HYDROCHLORIDE 2 MILLIGRAM(S): 2 INJECTION INTRAMUSCULAR; INTRAVENOUS; SUBCUTANEOUS at 12:30

## 2019-01-26 RX ADMIN — ENOXAPARIN SODIUM 40 MILLIGRAM(S): 100 INJECTION SUBCUTANEOUS at 08:26

## 2019-01-26 RX ADMIN — Medication 100 MILLIGRAM(S): at 12:35

## 2019-01-26 RX ADMIN — Medication 1 MILLIGRAM(S): at 05:08

## 2019-01-26 RX ADMIN — BUPROPION HYDROCHLORIDE 150 MILLIGRAM(S): 150 TABLET, EXTENDED RELEASE ORAL at 12:35

## 2019-01-26 RX ADMIN — Medication 1000 MILLIGRAM(S): at 05:07

## 2019-01-26 RX ADMIN — ERTAPENEM SODIUM 120 MILLIGRAM(S): 1 INJECTION, POWDER, LYOPHILIZED, FOR SOLUTION INTRAMUSCULAR; INTRAVENOUS at 08:59

## 2019-01-26 RX ADMIN — Medication 100 MILLIGRAM(S): at 05:07

## 2019-01-26 RX ADMIN — GABAPENTIN 600 MILLIGRAM(S): 400 CAPSULE ORAL at 12:35

## 2019-01-26 NOTE — PROGRESS NOTE ADULT - PROBLEM SELECTOR PLAN 1
s/p hemiarthroplasty  Pain Management: acceptable- continue current care Tylenol ATC/ dilaudid PRN  Continue PT/OT   DVT proph: [  ] low risk - Aspirin  [ x ] high risk -Lovenox [  ] high risk - Eliquis [  ] other:_________  DC plan:  [  ] Home with HC  [ x ] Rehab   [  ] TBD  [  ]other:___________

## 2019-01-26 NOTE — PROGRESS NOTE ADULT - SUBJECTIVE AND OBJECTIVE BOX
Patient is a 68y old  Female who presents with a chief complaint of S/P Fall and Right Hip Fracture. (26 Jan 2019 09:37)      Subjective: day 4/7 Invanz for ESBL UTI    MEDICATIONS  (STANDING):  acetaminophen   Tablet .. 1000 milliGRAM(s) Oral every 8 hours  ALPRAZolam 1 milliGRAM(s) Oral two times a day  buPROPion XL . 150 milliGRAM(s) Oral daily  docusate sodium 100 milliGRAM(s) Oral three times a day  enoxaparin Injectable 40 milliGRAM(s) SubCutaneous every 24 hours  ertapenem  IVPB 1000 milliGRAM(s) IV Intermittent every 24 hours  gabapentin 600 milliGRAM(s) Oral daily  gabapentin 600 milliGRAM(s) Oral at bedtime  levothyroxine 75 MICROGram(s) Oral <User Schedule>  senna 2 Tablet(s) Oral at bedtime  ziprasidone 40 milliGRAM(s) Oral <User Schedule>    MEDICATIONS  (PRN):  ALPRAZolam 1 milliGRAM(s) Oral daily PRN anxiety  bisacodyl Suppository 10 milliGRAM(s) Rectal daily PRN If no bowel movement by POD#2  HYDROmorphone   Tablet 2 milliGRAM(s) Oral every 3 hours PRN Mild Pain (1 - 3)  HYDROmorphone   Tablet 4 milliGRAM(s) Oral every 3 hours PRN Moderate Pain (4 - 6)  HYDROmorphone  Injectable 0.5 milliGRAM(s) IV Push every 3 hours PRN Severe Pain (7 - 10)  magnesium hydroxide Suspension 30 milliLiter(s) Oral daily PRN Constipation  ondansetron Injectable 4 milliGRAM(s) IV Push every 6 hours PRN Nausea and/or Vomiting  polyethylene glycol 3350 17 Gram(s) Oral daily PRN Constipation      Allergies    honeydew melon (Other)  penicillin (Other; Hives)    Intolerances        REVIEW OF SYSTEMS:  negative unless otherwise specified above.    Vital Signs Last 24 Hrs  T(C): 36.4 (26 Jan 2019 07:06), Max: 36.8 (25 Jan 2019 15:06)  T(F): 97.5 (26 Jan 2019 07:06), Max: 98.3 (25 Jan 2019 15:06)  HR: 75 (26 Jan 2019 07:06) (75 - 92)  BP: 111/71 (26 Jan 2019 07:06) (97/61 - 121/70)  BP(mean): --  RR: 14 (26 Jan 2019 07:06) (14 - 16)  SpO2: 99% (26 Jan 2019 07:06) (93% - 99%)    PHYSICAL EXAM:  GENERAL: No apparent distress  HEAD:  Atraumatic, Normocephalic  EYES: conjunctiva and sclera clear  ENMT: Moist mucous membranes  NECK: Supple  CHEST/LUNG: Clear to auscultation bilaterally  HEART: Regular rate and rhythm  ABDOMEN: Soft, Nontender, Nondistended; Bowel sounds present  EXTREMITIES:  No clubbing, cyanosis or edema  SKIN: No rashes or lesions  NERVOUS SYSTEM:  Alert & Oriented X3; Bilateral Lower extremity mobile, sensation to light touch intact  INCISION:  Dressing dry and intact  : [place]        LABS:   Platelet Count - Automated: 129 K/uL <L> (01-26 @ 07:52)  Hematocrit: 29.1 % <L> (01-26 @ 07:52)  RBC Count: 2.89 M/uL <L> (01-26 @ 07:52)  Hemoglobin: 9.6 g/dL <L> (01-26 @ 07:52)  WBC Count: 4.18 K/uL (01-26 @ 07:52)      01-26    140  |  105  |  11  ----------------------------<  84  3.8   |  33<H>  |  0.77    Ca    8.5      26 Jan 2019 07:52                                        IMAGING: images reviewed personally      Consultant Notes Reviewed and Care Discussed with relevant Consultants/Other Providers.

## 2019-01-26 NOTE — PROGRESS NOTE ADULT - ASSESSMENT
68F with anxiety, OCD, non-Hodgkin's lymphoma ([place] with [place] [place], in [place] for 2 years), hypothyroidism , dementia  with recent diarrhea  UTI, sent to rehab and came in today with s/p fall and right hip fracture.

## 2019-01-26 NOTE — PROGRESS NOTE ADULT - ASSESSMENT
The patient is a 68 year old female with a history of NHL, hypothyroidism, dementia who presents with fall and right hip fracture.    1/26/19  Patient sitting in chair.  No significant cardiac complaints offered.  Son at bedside.    Plan:  - On ertapenem  - Pain control  - PT  - Post-op care  - Discharge planning

## 2019-01-26 NOTE — PROGRESS NOTE ADULT - PROVIDER SPECIALTY LIST ADULT
Cardiology
Hospitalist
Orthopedics

## 2019-01-26 NOTE — PROGRESS NOTE ADULT - SUBJECTIVE AND OBJECTIVE BOX
Patient is a 68y Female with a known history of :  Anemia due to blood loss (D50.0)  Hip fracture requiring operative repair, right, closed, initial encounter (S72.001A)    HPI:  67 y/o female with a PMHx of bipolar depression, HLD, NHL inr remission, OCD, dementia presents to the ED from [facility] c/o right hip pain s/p fall today. As per XR done at [facility], pt has a right hip fx. Denies head trauma, HA or neck pain. Pt recently admitted to [facility] for a UTI.  and was transferred to rehabilitation.    . Patient is a poor historian secondary to dementia, information  obtained from son.  Patient was going to bathroom today , her room was dark , she fell down and injured her hip  , denied any head trauma   Consequently, patient  became very weak, confused, and  c/o  severe pain .    She Had Multiple falls (at least 4x) in the past week  but denied any head trauma or lost of consciousness.  Denied any urinary symptoms.  Denied any pain.  No fevers.  No nausea/vomiting.  No sick contacts.  No CP or SOB.  No abdominal pain.   ICU Vital Signs Last 24 Hrs  T(C): 37 (20 Jan 2019 12:17), Max: 37 (20 Jan 2019 12:17)  T(F): 98.6 (20 Jan 2019 12:17), Max: 98.6 (20 Jan 2019 12:17)  HR: 87 (20 Jan 2019 12:17) (87 - 87)  BP: 141/78 (20 Jan 2019 12:17) (141/78 - 141/78)  BP(mean): --  ABP: --  ABP(mean): --  RR: 16 (20 Jan 2019 12:17) (16 - 16)  SpO2: 97% (20 Jan 2019 12:17) (97% - 97%) (20 Jan 2019 15:34)      REVIEW OF SYSTEMS:    CONSTITUTIONAL: No fever, weight loss, or fatigue  EYES: No eye pain, visual disturbances, or discharge  ENMT:  No difficulty hearing, tinnitus, vertigo; No sinus or throat pain  NECK: No pain or stiffness  BREASTS: No pain, masses, or nipple discharge  RESPIRATORY: No cough, wheezing, chills or hemoptysis; No shortness of breath  CARDIOVASCULAR: No chest pain, palpitations, dizziness, or leg swelling  GASTROINTESTINAL: No abdominal or epigastric pain. No nausea, vomiting, or hematemesis; No diarrhea or constipation. No melena or hematochezia.  GENITOURINARY: No dysuria, frequency, hematuria, or incontinence  NEUROLOGICAL: No headaches, memory loss, loss of strength, numbness, or tremors  SKIN: No itching, burning, rashes, or lesions   LYMPH NODES: No enlarged glands  ENDOCRINE: No heat or cold intolerance; No hair loss  MUSCULOSKELETAL: No joint pain or swelling; No muscle, back, or extremity pain  PSYCHIATRIC: No depression, anxiety, mood swings, or difficulty sleeping  HEME/LYMPH: No easy bruising, or bleeding gums  ALLERGY AND IMMUNOLOGIC: No hives or eczema    MEDICATIONS  (STANDING):  acetaminophen   Tablet .. 1000 milliGRAM(s) Oral every 8 hours  ALPRAZolam 1 milliGRAM(s) Oral two times a day  buPROPion XL . 150 milliGRAM(s) Oral daily  docusate sodium 100 milliGRAM(s) Oral three times a day  enoxaparin Injectable 40 milliGRAM(s) SubCutaneous every 24 hours  ertapenem  IVPB 1000 milliGRAM(s) IV Intermittent every 24 hours  gabapentin 600 milliGRAM(s) Oral daily  gabapentin 600 milliGRAM(s) Oral at bedtime  levothyroxine 75 MICROGram(s) Oral <User Schedule>  senna 2 Tablet(s) Oral at bedtime  ziprasidone 40 milliGRAM(s) Oral <User Schedule>    MEDICATIONS  (PRN):  ALPRAZolam 1 milliGRAM(s) Oral daily PRN anxiety  bisacodyl Suppository 10 milliGRAM(s) Rectal daily PRN If no bowel movement by POD#2  HYDROmorphone   Tablet 2 milliGRAM(s) Oral every 3 hours PRN Mild Pain (1 - 3)  HYDROmorphone   Tablet 4 milliGRAM(s) Oral every 3 hours PRN Moderate Pain (4 - 6)  HYDROmorphone  Injectable 0.5 milliGRAM(s) IV Push every 3 hours PRN Severe Pain (7 - 10)  magnesium hydroxide Suspension 30 milliLiter(s) Oral daily PRN Constipation  ondansetron Injectable 4 milliGRAM(s) IV Push every 6 hours PRN Nausea and/or Vomiting  polyethylene glycol 3350 17 Gram(s) Oral daily PRN Constipation      ALLERGIES: honeydew melon (Other)  penicillin (Other; Hives)      FAMILY HISTORY:  Family history of diabetes mellitus  Family history of prostate cancer (Grandparent)  Family history of stomach cancer (Grandparent)  Family history of breast cancer  Family history of COPD (chronic obstructive pulmonary disease)      Social history:  Alochol:   Smoking:   Drug Use:   Marital Status:     PHYSICAL EXAMINATION:  -----------------------------  T(C): 36.4 (01-26-19 @ 07:06), Max: 36.8 (01-25-19 @ 15:06)  HR: 75 (01-26-19 @ 07:06) (75 - 92)  BP: 111/71 (01-26-19 @ 07:06) (97/61 - 121/70)  RR: 14 (01-26-19 @ 07:06) (14 - 16)  SpO2: 99% (01-26-19 @ 07:06) (93% - 99%)  Wt(kg): --    01-25 @ 07:01 - 01-26 @ 07:00  --------------------------------------------------------  IN:  Total IN: 0 mL    OUT:    Indwelling Catheter - Urethral: 2000 mL  Total OUT: 2000 mL    Total NET: -2000 mL      01-26 @ 07:01 - 01-26 @ 12:50  --------------------------------------------------------  IN:  Total IN: 0 mL    OUT:    Indwelling Catheter - Urethral: 700 mL  Total OUT: 700 mL    Total NET: -700 mL            Constitutional: well developed, normal appearance, well groomed, well nourished, no deformities and no acute distress.   Eyes: the conjunctiva exhibited no abnormalities and the eyelids demonstrated no xanthelasmas.   HEENT: normal oral mucosa, no oral pallor and no oral cyanosis.   Neck: normal jugular venous A waves present, normal jugular venous V waves present and no jugular venous bonilla A waves.   Pulmonary: no respiratory distress, normal respiratory rhythm and effort, no accessory muscle use and lungs were clear to auscultation bilaterally. Anteriorly  Cardiovascular: heart rate and rhythm were normal, normal S1 and S2 and no murmur, gallop, rub, heave or thrill are present.    Musculoskeletal: the gait could not be assessed.   Extremities: no clubbing of the fingernails, no localized cyanosis, no petechial hemorrhages and no ischemic changes.   Skin: normal skin color and pigmentation, no rash, no venous stasis, no skin lesions, no skin ulcer and no xanthoma was observed.   Psychiatric: oriented to person, place, and time, the affect was normal, the mood was normal and not feeling anxious.     LABS:   --------  01-26    140  |  105  |  11  ----------------------------<  84  3.8   |  33<H>  |  0.77    Ca    8.5      26 Jan 2019 07:52                           9.6    4.18  )-----------( 129      ( 26 Jan 2019 07:52 )             29.1                   Radiology:

## 2019-01-26 NOTE — PROGRESS NOTE ADULT - REASON FOR ADMISSION
S/P Fall and Right Hip Fracture.

## 2019-01-26 NOTE — PROGRESS NOTE ADULT - SUBJECTIVE AND OBJECTIVE BOX
ORTHOPEDIC PA PROGRESS NOTE  WILI PARKER      68y Female                                                                                                                               POD #    5    STATUS POST:               Pre-Op Dx: Fracture of hip: Right    Post-Op Dx:  Fracture of hip: Right    Procedure: Hemiarthroplasty hip partial: Right                                                Pain (0-10):   Current Pain Management:  [ ] PCA   [x ] Po Analgesics [ ] IM /IV Anagesics     T(F): 97.5  HR: 75  BP: 111/71  RR: 14  SpO2: 99%                        9.6    4.18  )-----------( 129      ( 26 Jan 2019 07:52 )             29.1                     01-26    140  |  105  |  11  ----------------------------<  84  3.8   |  33<H>  |  0.77    Ca    8.5      26 Jan 2019 07:52      Physical Exam :    -   Dressing changed sterile.   -   Wound C/D/I.   -   Distal Neurvascular status intact grossly.   -   Warm well perfused; capillary refill <3 seconds   -   (+)EHL/FHL 5/5  -   (+) Sensation to light touch  -   (-) Calf tenderness Bilaterally    A/P: 68y Female s/p Fracture of hip: Right    -   Ortho Stable  -   Pain control   -   Medicine to follow  -   DVT ppx:     [x ]SCDs     [ ] ASA     [ ] Eliquis     [x ] Lovenox  -   Weight bearing status:  WBAT [ ]        PWB    [ ]     TTWB  [ ]      NWB  [ ]   -  Dispo:     Home [ ]     Acute Rehab [ ]     NIKHIL [x ]     TBD [ ]

## 2019-01-26 NOTE — PROGRESS NOTE ADULT - PROBLEM SELECTOR PLAN 3
-UA positive; Ucx growing e coli - ESBL  d/w Dr Dawson - Invanz 1gm iv daily -today day 4/7  Midline in place.

## 2019-02-04 ENCOUNTER — INPATIENT (INPATIENT)
Facility: HOSPITAL | Age: 69
LOS: 42 days | Discharge: NURSING, REHAB OR LONG-TERM CARE | DRG: 853 | End: 2019-03-19
Attending: INTERNAL MEDICINE | Admitting: SURGERY
Payer: MEDICARE

## 2019-02-04 VITALS
RESPIRATION RATE: 29 BRPM | WEIGHT: 134.7 LBS | SYSTOLIC BLOOD PRESSURE: 87 MMHG | DIASTOLIC BLOOD PRESSURE: 49 MMHG | TEMPERATURE: 98 F | HEIGHT: 61 IN | OXYGEN SATURATION: 100 % | HEART RATE: 94 BPM

## 2019-02-04 DIAGNOSIS — Z98.49 CATARACT EXTRACTION STATUS, UNSPECIFIED EYE: Chronic | ICD-10-CM

## 2019-02-04 DIAGNOSIS — Z96.653 PRESENCE OF ARTIFICIAL KNEE JOINT, BILATERAL: Chronic | ICD-10-CM

## 2019-02-04 DIAGNOSIS — I95.9 HYPOTENSION, UNSPECIFIED: ICD-10-CM

## 2019-02-04 DIAGNOSIS — Z98.89 OTHER SPECIFIED POSTPROCEDURAL STATES: Chronic | ICD-10-CM

## 2019-02-04 LAB
ALBUMIN SERPL ELPH-MCNC: 3 G/DL — LOW (ref 3.3–5)
ALP SERPL-CCNC: 105 U/L — SIGNIFICANT CHANGE UP (ref 40–120)
ALT FLD-CCNC: 11 U/L — SIGNIFICANT CHANGE UP (ref 10–45)
ANION GAP SERPL CALC-SCNC: 12 MMOL/L — SIGNIFICANT CHANGE UP (ref 5–17)
APPEARANCE UR: CLEAR — SIGNIFICANT CHANGE UP
APTT BLD: 26.4 SEC — LOW (ref 27.5–36.3)
AST SERPL-CCNC: 10 U/L — SIGNIFICANT CHANGE UP (ref 10–40)
BACTERIA # UR AUTO: ABNORMAL
BASOPHILS # BLD AUTO: 0 K/UL — SIGNIFICANT CHANGE UP (ref 0–0.2)
BASOPHILS NFR BLD AUTO: 0 % — SIGNIFICANT CHANGE UP (ref 0–2)
BILIRUB SERPL-MCNC: 0.6 MG/DL — SIGNIFICANT CHANGE UP (ref 0.2–1.2)
BILIRUB UR-MCNC: NEGATIVE — SIGNIFICANT CHANGE UP
BLD GP AB SCN SERPL QL: NEGATIVE — SIGNIFICANT CHANGE UP
BUN SERPL-MCNC: 21 MG/DL — SIGNIFICANT CHANGE UP (ref 7–23)
C DIFF GDH STL QL: POSITIVE — SIGNIFICANT CHANGE UP
C DIFF GDH STL QL: SIGNIFICANT CHANGE UP
CALCIUM SERPL-MCNC: 8.7 MG/DL — SIGNIFICANT CHANGE UP (ref 8.4–10.5)
CHLORIDE SERPL-SCNC: 97 MMOL/L — SIGNIFICANT CHANGE UP (ref 96–108)
CK SERPL-CCNC: 79 U/L — SIGNIFICANT CHANGE UP (ref 25–170)
CO2 SERPL-SCNC: 25 MMOL/L — SIGNIFICANT CHANGE UP (ref 22–31)
COLOR SPEC: YELLOW — SIGNIFICANT CHANGE UP
CREAT SERPL-MCNC: 1.06 MG/DL — SIGNIFICANT CHANGE UP (ref 0.5–1.3)
DIFF PNL FLD: ABNORMAL
EOSINOPHIL # BLD AUTO: 0 K/UL — SIGNIFICANT CHANGE UP (ref 0–0.5)
EOSINOPHIL NFR BLD AUTO: 0 % — SIGNIFICANT CHANGE UP (ref 0–6)
EPI CELLS # UR: 1 /HPF — SIGNIFICANT CHANGE UP
GAS PNL BLDA: SIGNIFICANT CHANGE UP
GAS PNL BLDV: SIGNIFICANT CHANGE UP
GAS PNL BLDV: SIGNIFICANT CHANGE UP
GLUCOSE SERPL-MCNC: 179 MG/DL — HIGH (ref 70–99)
GLUCOSE UR QL: NEGATIVE — SIGNIFICANT CHANGE UP
HCT VFR BLD CALC: 28.5 % — LOW (ref 34.5–45)
HCT VFR BLD CALC: 36.3 % — SIGNIFICANT CHANGE UP (ref 34.5–45)
HCT VFR BLD CALC: 38.5 % — SIGNIFICANT CHANGE UP (ref 34.5–45)
HGB BLD-MCNC: 12.7 G/DL — SIGNIFICANT CHANGE UP (ref 11.5–15.5)
HGB BLD-MCNC: 12.9 G/DL — SIGNIFICANT CHANGE UP (ref 11.5–15.5)
HGB BLD-MCNC: 9.9 G/DL — LOW (ref 11.5–15.5)
HYALINE CASTS # UR AUTO: 1 /LPF — SIGNIFICANT CHANGE UP (ref 0–2)
INR BLD: 1.2 RATIO — HIGH (ref 0.88–1.16)
KETONES UR-MCNC: NEGATIVE — SIGNIFICANT CHANGE UP
LEUKOCYTE ESTERASE UR-ACNC: NEGATIVE — SIGNIFICANT CHANGE UP
LIDOCAIN IGE QN: 5 U/L — LOW (ref 7–60)
LYMPHOCYTES # BLD AUTO: 0.6 K/UL — LOW (ref 1–3.3)
LYMPHOCYTES # BLD AUTO: 5 % — LOW (ref 13–44)
MCHC RBC-ENTMCNC: 32.6 PG — SIGNIFICANT CHANGE UP (ref 27–34)
MCHC RBC-ENTMCNC: 33.5 GM/DL — SIGNIFICANT CHANGE UP (ref 32–36)
MCHC RBC-ENTMCNC: 33.6 PG — SIGNIFICANT CHANGE UP (ref 27–34)
MCHC RBC-ENTMCNC: 34.9 GM/DL — SIGNIFICANT CHANGE UP (ref 32–36)
MCHC RBC-ENTMCNC: 35 GM/DL — SIGNIFICANT CHANGE UP (ref 32–36)
MCHC RBC-ENTMCNC: 35.2 PG — HIGH (ref 27–34)
MCV RBC AUTO: 101 FL — HIGH (ref 80–100)
MCV RBC AUTO: 96.3 FL — SIGNIFICANT CHANGE UP (ref 80–100)
MCV RBC AUTO: 97.3 FL — SIGNIFICANT CHANGE UP (ref 80–100)
MONOCYTES # BLD AUTO: 0.7 K/UL — SIGNIFICANT CHANGE UP (ref 0–0.9)
MONOCYTES NFR BLD AUTO: 4 % — SIGNIFICANT CHANGE UP (ref 2–14)
NEUTROPHILS # BLD AUTO: 8.5 K/UL — HIGH (ref 1.8–7.4)
NEUTROPHILS NFR BLD AUTO: 73 % — SIGNIFICANT CHANGE UP (ref 43–77)
NEUTS BAND # BLD: 18 % — HIGH (ref 0–8)
NITRITE UR-MCNC: NEGATIVE — SIGNIFICANT CHANGE UP
PH UR: 6 — SIGNIFICANT CHANGE UP (ref 5–8)
PLAT MORPH BLD: NORMAL — SIGNIFICANT CHANGE UP
PLATELET # BLD AUTO: 226 K/UL — SIGNIFICANT CHANGE UP (ref 150–400)
PLATELET # BLD AUTO: 248 K/UL — SIGNIFICANT CHANGE UP (ref 150–400)
PLATELET # BLD AUTO: 280 K/UL — SIGNIFICANT CHANGE UP (ref 150–400)
POTASSIUM SERPL-MCNC: 4 MMOL/L — SIGNIFICANT CHANGE UP (ref 3.5–5.3)
POTASSIUM SERPL-SCNC: 4 MMOL/L — SIGNIFICANT CHANGE UP (ref 3.5–5.3)
PROT SERPL-MCNC: 5.4 G/DL — LOW (ref 6–8.3)
PROT UR-MCNC: SIGNIFICANT CHANGE UP
PROTHROM AB SERPL-ACNC: 13.8 SEC — HIGH (ref 10–12.9)
RBC # BLD: 2.82 M/UL — LOW (ref 3.8–5.2)
RBC # BLD: 3.77 M/UL — LOW (ref 3.8–5.2)
RBC # BLD: 3.95 M/UL — SIGNIFICANT CHANGE UP (ref 3.8–5.2)
RBC # FLD: 14.9 % — HIGH (ref 10.3–14.5)
RBC # FLD: 15.9 % — HIGH (ref 10.3–14.5)
RBC # FLD: 15.9 % — HIGH (ref 10.3–14.5)
RBC BLD AUTO: SIGNIFICANT CHANGE UP
RBC CASTS # UR COMP ASSIST: 3 /HPF — SIGNIFICANT CHANGE UP (ref 0–4)
SODIUM SERPL-SCNC: 134 MMOL/L — LOW (ref 135–145)
SP GR SPEC: 1.03 — HIGH (ref 1.01–1.02)
UROBILINOGEN FLD QL: NEGATIVE — SIGNIFICANT CHANGE UP
WBC # BLD: 8.9 K/UL — SIGNIFICANT CHANGE UP (ref 3.8–10.5)
WBC # BLD: 9.8 K/UL — SIGNIFICANT CHANGE UP (ref 3.8–10.5)
WBC # BLD: 9.9 K/UL — SIGNIFICANT CHANGE UP (ref 3.8–10.5)
WBC # FLD AUTO: 8.9 K/UL — SIGNIFICANT CHANGE UP (ref 3.8–10.5)
WBC # FLD AUTO: 9.8 K/UL — SIGNIFICANT CHANGE UP (ref 3.8–10.5)
WBC # FLD AUTO: 9.9 K/UL — SIGNIFICANT CHANGE UP (ref 3.8–10.5)
WBC UR QL: 1 /HPF — SIGNIFICANT CHANGE UP (ref 0–5)

## 2019-02-04 PROCEDURE — 76705 ECHO EXAM OF ABDOMEN: CPT | Mod: 26

## 2019-02-04 PROCEDURE — 99285 EMERGENCY DEPT VISIT HI MDM: CPT | Mod: GC

## 2019-02-04 PROCEDURE — 73502 X-RAY EXAM HIP UNI 2-3 VIEWS: CPT | Mod: 26,RT

## 2019-02-04 PROCEDURE — 72125 CT NECK SPINE W/O DYE: CPT | Mod: 26

## 2019-02-04 PROCEDURE — 74177 CT ABD & PELVIS W/CONTRAST: CPT | Mod: 26

## 2019-02-04 PROCEDURE — 73552 X-RAY EXAM OF FEMUR 2/>: CPT | Mod: 26,RT

## 2019-02-04 PROCEDURE — 99291 CRITICAL CARE FIRST HOUR: CPT

## 2019-02-04 PROCEDURE — 72128 CT CHEST SPINE W/O DYE: CPT | Mod: 26

## 2019-02-04 PROCEDURE — 71260 CT THORAX DX C+: CPT | Mod: 26

## 2019-02-04 PROCEDURE — 72131 CT LUMBAR SPINE W/O DYE: CPT | Mod: 26

## 2019-02-04 PROCEDURE — 72170 X-RAY EXAM OF PELVIS: CPT | Mod: 26,59

## 2019-02-04 PROCEDURE — 70450 CT HEAD/BRAIN W/O DYE: CPT | Mod: 26

## 2019-02-04 RX ORDER — VANCOMYCIN HCL 1 G
125 VIAL (EA) INTRAVENOUS EVERY 6 HOURS
Qty: 0 | Refills: 0 | Status: DISCONTINUED | OUTPATIENT
Start: 2019-02-04 | End: 2019-02-05

## 2019-02-04 RX ORDER — SENNA PLUS 8.6 MG/1
2 TABLET ORAL AT BEDTIME
Qty: 0 | Refills: 0 | Status: DISCONTINUED | OUTPATIENT
Start: 2019-02-04 | End: 2019-02-04

## 2019-02-04 RX ORDER — DOCUSATE SODIUM 100 MG
100 CAPSULE ORAL THREE TIMES A DAY
Qty: 0 | Refills: 0 | Status: DISCONTINUED | OUTPATIENT
Start: 2019-02-04 | End: 2019-02-04

## 2019-02-04 RX ORDER — MEROPENEM 1 G/30ML
1000 INJECTION INTRAVENOUS ONCE
Qty: 0 | Refills: 0 | Status: COMPLETED | OUTPATIENT
Start: 2019-02-04 | End: 2019-02-04

## 2019-02-04 RX ORDER — ENOXAPARIN SODIUM 100 MG/ML
40 INJECTION SUBCUTANEOUS DAILY
Qty: 0 | Refills: 0 | Status: DISCONTINUED | OUTPATIENT
Start: 2019-02-04 | End: 2019-02-05

## 2019-02-04 RX ORDER — SODIUM CHLORIDE 9 MG/ML
500 INJECTION, SOLUTION INTRAVENOUS ONCE
Qty: 0 | Refills: 0 | Status: COMPLETED | OUTPATIENT
Start: 2019-02-04 | End: 2019-02-04

## 2019-02-04 RX ORDER — MEROPENEM 1 G/30ML
1000 INJECTION INTRAVENOUS EVERY 8 HOURS
Qty: 0 | Refills: 0 | Status: DISCONTINUED | OUTPATIENT
Start: 2019-02-04 | End: 2019-02-04

## 2019-02-04 RX ORDER — INFLUENZA VIRUS VACCINE 15; 15; 15; 15 UG/.5ML; UG/.5ML; UG/.5ML; UG/.5ML
0.5 SUSPENSION INTRAMUSCULAR ONCE
Qty: 0 | Refills: 0 | Status: DISCONTINUED | OUTPATIENT
Start: 2019-02-04 | End: 2019-03-19

## 2019-02-04 RX ORDER — ACETAMINOPHEN 500 MG
1000 TABLET ORAL ONCE
Qty: 0 | Refills: 0 | Status: COMPLETED | OUTPATIENT
Start: 2019-02-04 | End: 2019-02-04

## 2019-02-04 RX ORDER — LEVOTHYROXINE SODIUM 125 MCG
37 TABLET ORAL AT BEDTIME
Qty: 0 | Refills: 0 | Status: DISCONTINUED | OUTPATIENT
Start: 2019-02-04 | End: 2019-02-05

## 2019-02-04 RX ORDER — CHLORHEXIDINE GLUCONATE 213 G/1000ML
1 SOLUTION TOPICAL
Qty: 0 | Refills: 0 | Status: DISCONTINUED | OUTPATIENT
Start: 2019-02-04 | End: 2019-02-22

## 2019-02-04 RX ORDER — SODIUM CHLORIDE 9 MG/ML
1000 INJECTION, SOLUTION INTRAVENOUS ONCE
Qty: 0 | Refills: 0 | Status: COMPLETED | OUTPATIENT
Start: 2019-02-04 | End: 2019-02-04

## 2019-02-04 RX ORDER — SODIUM CHLORIDE 9 MG/ML
1000 INJECTION, SOLUTION INTRAVENOUS
Qty: 0 | Refills: 0 | Status: DISCONTINUED | OUTPATIENT
Start: 2019-02-04 | End: 2019-02-09

## 2019-02-04 RX ORDER — MEROPENEM 1 G/30ML
INJECTION INTRAVENOUS
Qty: 0 | Refills: 0 | Status: DISCONTINUED | OUTPATIENT
Start: 2019-02-04 | End: 2019-02-04

## 2019-02-04 RX ADMIN — Medication 37 MICROGRAM(S): at 21:32

## 2019-02-04 RX ADMIN — Medication 125 MILLIGRAM(S): at 17:45

## 2019-02-04 RX ADMIN — Medication 1000 MILLIGRAM(S): at 11:30

## 2019-02-04 RX ADMIN — SODIUM CHLORIDE 100 MILLILITER(S): 9 INJECTION, SOLUTION INTRAVENOUS at 17:45

## 2019-02-04 RX ADMIN — Medication 1000 MILLIGRAM(S): at 17:00

## 2019-02-04 RX ADMIN — Medication 400 MILLIGRAM(S): at 11:00

## 2019-02-04 RX ADMIN — Medication 125 MILLIGRAM(S): at 20:33

## 2019-02-04 RX ADMIN — SODIUM CHLORIDE 3000 MILLILITER(S): 9 INJECTION, SOLUTION INTRAVENOUS at 13:33

## 2019-02-04 RX ADMIN — SODIUM CHLORIDE 100 MILLILITER(S): 9 INJECTION, SOLUTION INTRAVENOUS at 11:04

## 2019-02-04 RX ADMIN — SODIUM CHLORIDE 3000 MILLILITER(S): 9 INJECTION, SOLUTION INTRAVENOUS at 17:46

## 2019-02-04 RX ADMIN — MEROPENEM 100 MILLIGRAM(S): 1 INJECTION INTRAVENOUS at 11:12

## 2019-02-04 RX ADMIN — Medication 400 MILLIGRAM(S): at 16:30

## 2019-02-04 NOTE — CONSULT NOTE ADULT - ASSESSMENT
A/P: 68 y F w/ R Hemiarthroplasty done on 1/21, s/p MF, r/out R periprosthetic Hip Fracture     -Based on Physical exam, patient does not currently examine as a hip periprosthetic hip fracture   -Will FU dedicated CT of the R hip and Femur for further definitive imaging   -Based on current CT abd/pel, possible subacute periprosthetic fracture, will FU official repeat imaging to confirm. If present, possible fracture is subacute in nature, perioperative?  - Low Back/Lower Right Sided flank pain, possible due to sacral insufficiency fracture S1-S2, seen and CT and L5 VCF  -C/w SICU care, recs appreciated  -Analgesia  -DVT ppx, per primary team  -Non weight bearing RLE   -Will follow up imaging  -Will discuss with attending and advise if plan changes.

## 2019-02-04 NOTE — ED PROVIDER NOTE - MEDICAL DECISION MAKING DETAILS
Attending Isabel Ly: 67 y/o female presenting after fall. unclear how long pt on the ground.upon arrival pt with slurred speech. airway intact with b/l breath sounds. repeat BP's stable and abdomen soft. will obtain labs, ct head and d/w trauma

## 2019-02-04 NOTE — CONSULT NOTE ADULT - ASSESSMENT
Rohini Sheikh  68F pmhx NHL s/p recent R hip replacement found down at rehab brought in as level 1 trauma, hypotensive and tachycardic, found to have mild L5 compression NAS leyva, FC.  - No acute neurosurgical intervention  - Pain control  - LSO brace for comfort (can call Binh Parra [phone] for fitting prn)  - f/u outpatient in 4-6 weeks from discharge or sooner with symptoms

## 2019-02-04 NOTE — ED ADULT NURSE NOTE - CHPI ED NUR SYMPTOMS NEG
no tingling/no abrasion/no confusion/no vomiting/no bleeding/no fever/no weakness/no deformity/no loss of consciousness/no numbness

## 2019-02-04 NOTE — H&P ADULT - ASSESSMENT
68 year old woman who presents with multiple fractures and pancolitis.    Plan:  - Admit to Trauma; Dr. Vu  - Consult SICU  - Consult Orthopedics  - Consult Neurosurgery  - Multimodal pain control  - F/u labs and imaging  - DVT PPx  - Diet: NPO  - Activity: advance as tolerated  - Tertiary in AM  - Care per SICU  - D/w Dr. Vu 68 year old woman who presents with multiple fractures and C. diff colitis.    Plan:  - Admit to Trauma; Dr. Vu  - Consult SICU  - Consult Orthopedics  - Consult Neurosurgery  - Multimodal pain control  - IV antibiotics  - F/u labs and imaging  - DVT PPx  - Diet: NPO  - Activity: advance as tolerated  - Tertiary in AM  - Care per SICU  - D/w Dr. Vu

## 2019-02-04 NOTE — H&P ADULT - NSHPLABSRESULTS_GEN_ALL_CORE
LABS:                        12.7   9.8   )-----------( 248      ( 04 Feb 2019 16:54 )             36.3       02-04    134<L>  |  97  |  21  ----------------------------<  179<H>  4.0   |  25  |  1.06    Ca    8.7      04 Feb 2019 09:06    TPro  5.4<L>  /  Alb  3.0<L>  /  TBili  0.6  /  DBili  x   /  AST  10  /  ALT  11  /  AlkPhos  105  02-04      ABG - ( 02-04-19 @ 11:03 )  pH: 7.40 /  pCO2: 40 /  pO2: 101 / HCO3: 24 / Base Excess: .0 /  SaO2: 98<H> on 21 FiO2      Negative 02-04-19 @ 11:01    IMAGING:    < from: CT Thoracic Spine Reform No Cont (02.04.19 @ 09:50) >    IMPRESSION:    Head CT: . Volume loss and microvascular disease with areas of remote and   age-indeterminate lacunar infarction, MR is more sensitive for new   ischemic change. There is no  hemorrhage, mass effect or displaced   calvarial fracture    Cervical spine CT: No acute fracture or traumatic malalignment.    Thoracic spine CT: No acute fracture or traumatic malalignment.    Lumbar spine CT: Nondisplaced sacral insufficiency fracture extending   from the left sacroiliac joint to the ipsilateral S1-S2 sacral foramen.   Age-indeterminate mild superior endplate compression of L5.    MR is more sensitive for soft tissue, disc or ligamentous injury and may   be obtained if persistent pain as clinically warranted.    < end of copied text >    < from: CT Chest w/ IV Cont (02.04.19 @ 09:50) >    IMPRESSION:   Pancolitis.  Partially included right proximal femoral periprosthetic fracture which   is most likely early subacute. CT of the right hip can be performed for   full visualization of the fracture line if clinically warranted.  Skin staples and a fluid collection in the right greater trochanteric   bursa in this recent postoperative patient which could be related to a   seroma although superinfection should be evaluated for on a clinical   basis.  Age-indeterminate moderate L5 compression fracture.    < end of copied text >

## 2019-02-04 NOTE — H&P ADULT - HISTORY OF PRESENT ILLNESS
68 year old female with a PMHx of bipolar depression, Hyperlipidemia, NHL (in remission), OCD, dementia, with recent hospitalization (1/20-1/26) for right hip hemiarthroplasty for right femoral neck fracture after fall. Hospital course complicated by ESBL E. Coli UTI on Ertapenem (1/23-1/29). Patient found down in rehab, reported as possibly rolling out of bed.     On arrival to [facility] ED, Level 2 trauma activated was upgraded to a Level 1 for worsening hypotension during secondary exam despite fluid resuscitation. SBP 80s, 2 large-bore pIV placed, e-FAST done twice, with no fluid in the intraperitoneal space or pericardial space, no pneumothorax, suggestion of a very small pleural effusion on the left.

## 2019-02-04 NOTE — ED PROVIDER NOTE - PHYSICAL EXAMINATION
Attending Isabel Ly: Gen: NAD, heent: atrauamtic, eomi, perrla, mmm, op pink, uvula midline, neck; nttp, no nuchal rigidity, chest: nttp, no crepitus, cv: rrr, no murmurs, lungs: ctab, abd: soft, nontender, nondistended, no peritoneal signs, +BS, no guarding, ext: wwp, swelling to right hip, back: thoracolumbar ttpskin: no rash, neuro: awake and alert, following commands, speech slurred, sensation and strength intact, no focal deficits

## 2019-02-04 NOTE — CONSULT NOTE ADULT - ATTENDING COMMENTS
Pt evaluated with JENIFFER Valerio continued to be reevaluated  WILI PARKER is a 68 year old female with a PMHx of bipolar depression, Hyperlipidemia, NHL (in remission), OCD, dementia, with recent hospitalization (1/20-1/26) for right hip hemiarthroplasty for right femoral neck fracture after fall. Hospital course complicated by ESBL E. Coli UTI on Ertapenem (1/23-1/29  Hypotensive likely from possible C dif colitis  Stool for C dif, start Vanco  IVF  Not bleeding, HCT stable  Ortho called for pelvic fracture    Spoke to Dr Vu  Time spent > 45 mts managing this critically ill pt, this does not include procedure or teaching time

## 2019-02-04 NOTE — ED PROVIDER NOTE - PROGRESS NOTE DETAILS
Attending Isabel Ly: pt became hypotensive in the ed. on repeat exam pt with lower abdominal ttp. upgraded to level 1. repeat FAST negative Attending Isabel Ly: pt admitted to trauma to sicu

## 2019-02-04 NOTE — H&P ADULT - NSHPPHYSICALEXAM_GEN_ALL_CORE
Primary survey:  Airway intact, patient able to speak (questionably slurred speech)  Bilateral breath sounds; CTAB;  Circulation: HR/BP soft (90s/60s then hypotensive to systolic of 70s), extremities well perfused  Able to move all 4 extremities; follows commands    Secondary survey:  HEENT: NCAT, PERRL, GCS 15, C collar in place  Chest: CTAB, rrr, no m/r/g  Abd: soft, NT, ND  Back: midline tenderness over thoracic and lumbar spine  Pelvis: stable, tender  Ext: no deformities

## 2019-02-04 NOTE — CONSULT NOTE ADULT - ASSESSMENT
68 year old female with a PMHx of bipolar depression, Hyperlipidemia, NHL (in remission), OCD, dementia, with recent hospitalization (1/20-1/26) for right hip hemiarthroplasty for right femoral neck fracture after fall. Hospital course complicated by ESBL E. Coli UTI on Ertapenem (1/23-1/29) now presents with likely Hypovolemic/ Distributive shock secondary to sepsis 2/2 Pancolitis, likely secondary to C. Diff Colitis, Age Indeterminate L5 vertebral body fracture, possible acute as tender to palpation, Nondisplaced sacral insufficiency, Right proximal femoral periprosthetic fracture following unwitnessed fall    NEURO: ?Dementia, Bipolar disorder, OCD, ? Acute L5 Vetebral body fracture  - Will continue to hold home neurological medications, given somnolence at the moment  - Acetaminophen for acute pain  - Will consult Neurospine  - Patient will likely need a LSO brace for comfort    RESP: no active issues  - Encourage ISS/ OOB, Non-weight bearing RLE when neurologically improved    CV: Hypotension, likely related to hypovolemia/ distributive shock secondary to Sepsis, unlikely hemorrhage after reviewing CT images  - Will administer 1L Plasmalyte now  - Will perform bedside TTE  - Plasmalyte @ 100ml/hr  - Will trend Lactate    GI: Pancolitis likely related to C. Diff  - Will maintain NPO for now    RENAL: Acute (AKIN I) on Chronic Kidney Disease, CKD II, Baseline Cr-0.7, GFR: 72ml/min, Hypotonic Hyponatremia, S. Osm- 285, likely related to hypovolemia  - Will continue with Plasmalyte @ 100ml/hr  - BMP daily    HEME: ? Hemorrhagic shock, although unlikely given CT images  -Will check CBC q 4hours x 2, if stable will start VTE prophylaxis, Favor Lovenox 40mg daily    ID: Pancolitis, likely related to C. Diff Coltiis  - Will send C. Diff EIA  - Will start PO Vanco 125mg q 6hours    ENDO: Hypothyroidism  - Will administer IV synthroid for now, until tolerating PO    MUSCULOSKELETAL Nondisplaced sacral insufficiency, Right proximal femoral periprosthetic fracture  - Will consult Orthopedic services  - Will maintain NWB RLE for now    Dispo: Guarded  Discussed with Dr Tod Nava PA-C  Critical Care Physician Assistant  [facility] #[id number]

## 2019-02-04 NOTE — CONSULT NOTE ADULT - SUBJECTIVE AND OBJECTIVE BOX
p ([id number])     HPI:  69 y/o female h/o NHL presenting after unwitnessed fall. pt found on the ground at rehab s/p recent R hip replacement. pt reports pain to her right leg. per EMS en route slurring speech and mildly hypotensive requesting trauma.unclear how long pt on the ground.    Imaging:  Mild L5 compression fx    Exam:  AOx3, FC, PERRL, EOMI  LOVELL ag  SILT      --Anticoagulation:    =====================  PAST MEDICAL HISTORY   Osteoarthritis of left knee  Lymphoma  Hypercholesteremia  Bipolar depression  OCD (obsessive compulsive disorder)  Depression  Osteoarthritis of right knee    PAST SURGICAL HISTORY   S/P TKR (total knee replacement), bilateral  S/P cataract surgery  H/O oral surgery  S/P knee surgery    honeydew melon (Other)  penicillin (Other; Hives)      MEDICATIONS:  Antibiotics:    Neuro:    Other:  docusate sodium 100 milliGRAM(s) Oral three times a day  levothyroxine Injectable 37 MICROGram(s) IV Push at bedtime  multiple electrolytes Injection Type 1 1000 milliLiter(s) IV Continuous <Continuous>  multiple electrolytes Injection Type 1 Bolus 1000 milliLiter(s) IV Bolus once  senna 2 Tablet(s) Oral at bedtime      SOCIAL HISTORY:   Occupation:   Marital Status:     FAMILY HISTORY:  Family history of diabetes mellitus  Family history of prostate cancer (Grandparent)  Family history of stomach cancer (Grandparent)  Family history of breast cancer  Family history of COPD (chronic obstructive pulmonary disease)      ROS: Negative except per HPI    LABS:  PT/INR - ( 04 Feb 2019 09:06 )   PT: 13.8 sec;   INR: 1.20 ratio         PTT - ( 04 Feb 2019 09:06 )  PTT:26.4 sec                        9.9    9.9   )-----------( 280      ( 04 Feb 2019 09:06 )             28.5     02-04    134<L>  |  97  |  21  ----------------------------<  179<H>  4.0   |  25  |  1.06    Ca    8.7      04 Feb 2019 09:06    TPro  5.4<L>  /  Alb  3.0<L>  /  TBili  0.6  /  DBili  x   /  AST  10  /  ALT  11  /  AlkPhos  105  02-04

## 2019-02-04 NOTE — ED ADULT NURSE NOTE - PMH
Bipolar depression    Depression    Hypercholesteremia    Lymphoma  NHL, treated with chemo @ [facility], in remission since 2016  OCD (obsessive compulsive disorder)    Osteoarthritis of left knee    Osteoarthritis of right knee

## 2019-02-04 NOTE — H&P ADULT - ATTENDING COMMENTS
Pt seen and examined during level one trauma activation. Pt is recently s/p R hip hemiarthroplasty after mechanical fall resulted in a R femoral neck fracture. She also had a recent UTI. She rolled off her bed onto the floor this morning at SNF and was brought into ED, where she was hypotensive.    Airway intact  Breathing normal, O2 sat 100% on 2L N/C  SBP 80s, HR normal. 2 large-bore pIV in place. e-FAST done twice, with no fluid in the intraperitoneal space or pericardial space, no pneumothorax, suggestion of a very small pleural effusion on the left. Pelvic xray without fracture. Transfusion started.  GCS 15, moving all extremities, PERLL  Exposure obtained    Pt brought to CT with suspicion of sepsis, then transported to SICU. Two units of PRBC were given in the interim.     CT scans significant for sacral fracture, possible periprosthetic R femur fracture (appreciate Ortho recs) and colitis. C.diff was positive. Pt started on antibiotics. Case d/w  and SICU staff.

## 2019-02-04 NOTE — CONSULT NOTE ADULT - SUBJECTIVE AND OBJECTIVE BOX
68 y F community ambulator with rolling walker presents to the ED for s/p MF at nursing home. Patient has a hx of bipolar depression, HLD, NHL in remission, OCD, Dementia. Patient was found on the floor and was brought to the ED by ambulance. She reports she rolled off her bed onto her buttock and her right side. Denies HH or LOC. Patient recently sustained a R femoral neck hip fracture that was treated with a R Hip Hemiarthroplasty by Dr. Crowder at [facility] on 1/21. Patient reports she has not yet followed up with Dr. Crowder as outpatient. Denies numbness or tingling of the RLE. History is limited by confusion/dementia. Patient is currenlty in the SICU for cardiovascular monitoring. No other complaints at this time.      PAST MEDICAL & SURGICAL HISTORY:  Osteoarthritis of left knee  Lymphoma: NHL, treated with chemo @ [facility] [place], in remission since 2016  Hypercholesteremia  Bipolar depression  OCD (obsessive compulsive disorder)  Depression  Osteoarthritis of right knee  S/P TKR (total knee replacement), bilateral: discharged nov 5th, 2014  S/P cataract surgery  H/O oral surgery: 2014  S/P knee surgery: 1978    Home Medications:  acetaminophen 500 mg oral tablet: 2 tab(s) orally every 8 hours (23 Jan 2019 10:21)  ALPRAZolam 1 mg oral tablet: orally 2 times a day (20 Jan 2019 15:19)  buPROPion 300 mg/24 hours (XL) oral tablet, extended release: 1 tab(s) orally once a day (20 Jan 2019 15:19)  Claritin 10 mg oral tablet: 1 tab(s) orally once a day (20 Jan 2019 15:19)  docusate sodium 100 mg oral capsule: 1 cap(s) orally 3 times a day (23 Jan 2019 10:21)  enoxaparin: 40 milligram(s) subcutaneously once a day for 14 days total post-operatively (23 Jan 2019 10:21)  gabapentin 600 mg oral tablet: orally once a day in AM (20 Jan 2019 15:19)  gabapentin 600 mg oral tablet: 2 cap(s) orally once a day (at bedtime) (20 Jan 2019 15:19)  HYDROmorphone 2 mg oral tablet: 1 tab(s) orally every 3 hours, As needed, Mild Pain (1 - 3) (25 Jan 2019 13:55)  HYDROmorphone 4 mg oral tablet: 1 tab(s) orally every 3 hours, As needed, Moderate Pain (4 - 6) (25 Jan 2019 13:55)  INVanz 1 g injection: 1 gram(s) injectable once a day (25 Jan 2019 13:55)  levothyroxine 75 mcg (0.075 mg)/mL oral solution: Takes Mon - Fri  (20 Jan 2019 15:19)  magnesium hydroxide 8% oral suspension: 30 milliliter(s) orally once a day, As needed, Constipation (23 Jan 2019 10:21)  polyethylene glycol 3350 oral powder for reconstitution: 17 gram(s) orally once a day, As needed, Constipation (23 Jan 2019 10:21)  senna oral tablet: 2 tab(s) orally once a day (at bedtime) (23 Jan 2019 10:21)  Xanax 1 mg oral tablet: orally once a day, As Needed (20 Jan 2019 15:19)  ziprasidone 40 mg oral capsule: orally once a day (20 Jan 2019 15:19)      LABS:                        12.9   8.9   )-----------( 226      ( 04 Feb 2019 12:04 )             38.5     04 Feb 2019 09:06    134    |  97     |  21     ----------------------------<  179    4.0     |  25     |  1.06     Ca    8.7        04 Feb 2019 09:06    TPro  5.4    /  Alb  3.0    /  TBili  0.6    /  DBili  x      /  AST  10     /  ALT  11     /  AlkPhos  105    04 Feb 2019 09:06    PT/INR - ( 04 Feb 2019 09:06 )   PT: 13.8 sec;   INR: 1.20 ratio         PTT - ( 04 Feb 2019 09:06 )  PTT:26.4 sec      Allergies    honeydew melon (Other)  penicillin (Other; Hives)    Intolerances      XRAY R Hip:     Nondisplaced right proximal femoral periprosthetic fracture is better   appreciated on the CT performed the same day    CT ABD/Pel:    < from: CT Abdomen and Pelvis w/ IV Cont (02.04.19 @ 09:50) >  Partially included right proximal femoral periprosthetic fracture which   is most likely early subacute. CT of the right hip can be performed for   full visualization of the fracture line if clinically warranted.    < end of copied text >  < from: CT Abdomen and Pelvis w/ IV Cont (02.04.19 @ 09:50) >  L5 compression fracture.    < end of copied text >            PE:    Vital Signs Last 24 Hrs  T(C): 37.1 (04 Feb 2019 15:00), Max: 37.1 (04 Feb 2019 15:00)  T(F): 98.8 (04 Feb 2019 15:00), Max: 98.8 (04 Feb 2019 15:00)  HR: 109 (04 Feb 2019 15:00) (63 - 109)  BP: 110/44 (04 Feb 2019 15:00) (77/50 - 110/44)  BP(mean): 64 (04 Feb 2019 15:00) (55 - 73)  RR: 15 (04 Feb 2019 15:00) (15 - 44)  SpO2: 97% (04 Feb 2019 15:00) (93% - 100%)    GEN: NAD, resting comfortably    Neuro: A&O x 3    RLE:  Incision intact, staples in place, no erythema or dehiscence  SILT L3-S1  Unable to SLR  Non TTP over the thigh/groin region  No pain with log roll or heel strike  Full painless passive/active ROM of the Knee joint   Able to passively flex the Hip to 90 degrees without pain    EHL/FHL/TA/GSC intact   DP pulse 2+  compartments soft and compressible  No calf tenderness         Secondary Survey: TTP over the SI joints B/L, No TTP over other bony prominences, SILT, palpable pulses, full/painless range of motion, compartments soft, able to SLR LLE

## 2019-02-04 NOTE — CONSULT NOTE ADULT - SUBJECTIVE AND OBJECTIVE BOX
HISTORY OF PRESENT ILLNESS:  WILI PARKER is a 68 year old female with a PMHx of bipolar depression, Hyperlipidemia, NHL (in remission), OCD, dementia, with recent hospitalization (1/20-1/26) for right hip hemiarthroplasty for right femoral neck fracture after fall. Hospital course complicated by ESBL E. Coli UTI on Ertapenem (1/23-1/29).      PAST MEDICAL HISTORY: Osteoarthritis of left knee  Lymphoma  Hypercholesteremia  Bipolar depression  OCD (obsessive compulsive disorder)  Depression  Osteoarthritis of right knee      PAST SURGICAL HISTORY: S/P TKR (total knee replacement), bilateral  S/P cataract surgery  H/O oral surgery  S/P knee surgery      FAMILY HISTORY: Family history of diabetes mellitus  Family history of prostate cancer (Grandparent)  Family history of stomach cancer (Grandparent)  Family history of breast cancer  Family history of COPD (chronic obstructive pulmonary disease)      SOCIAL HISTORY:    CODE STATUS:     HOME MEDICATIONS:    ALLERGIES: honeydew melon (Other)  penicillin (Other; Hives)      VITAL SIGNS:  ICU Vital Signs Last 24 Hrs  T(C): 36.6 (04 Feb 2019 11:00), Max: 36.7 (04 Feb 2019 09:59)  T(F): 97.8 (04 Feb 2019 11:00), Max: 98.1 (04 Feb 2019 09:59)  HR: 92 (04 Feb 2019 11:00) (83 - 94)  BP: 90/52 (04 Feb 2019 11:00) (87/49 - 99/55)  BP(mean): 67 (04 Feb 2019 11:00) (65 - 73)  ABP: --  ABP(mean): --  RR: 22 (04 Feb 2019 11:00) (19 - 39)  SpO2: 95% (04 Feb 2019 11:00) (95% - 100%)      NEURO  Exam:      RESPIRATORY  Mechanical Ventilation:   ABG - ( 04 Feb 2019 11:03 )  pH: 7.40  /  pCO2: 40    /  pO2: 101   / HCO3: 24    / Base Excess: .0    /  SaO2: 98      Lactate: x                Exam:      CARDIOVASCULAR  VBG - ( 04 Feb 2019 09:23 )  pH: 7.41  /  pCO2: 42    /  pO2: 32    / HCO3: 26    / Base Excess: 2.2   /  SaO2: 56     Lactate: 2.3              Exam:  Cardiac Rhythm:      GI/NUTRITION  Exam:  Diet:  docusate sodium 100 milliGRAM(s) Oral three times a day  senna 2 Tablet(s) Oral at bedtime      GENITOURINARY/RENAL  multiple electrolytes Injection Type 1 1000 milliLiter(s) IV Continuous <Continuous>  multiple electrolytes Injection Type 1 Bolus 1000 milliLiter(s) IV Bolus once      Weight (kg): 61.1 (02-04 @ 09:59)  02-04    134<L>  |  97  |  21  ----------------------------<  179<H>  4.0   |  25  |  1.06    Ca    8.7      04 Feb 2019 09:06    TPro  5.4<L>  /  Alb  3.0<L>  /  TBili  0.6  /  DBili  x   /  AST  10  /  ALT  11  /  AlkPhos  105  02-04    [ ] Munguia catheter, indication: urine output monitoring in critically ill patient    HEMATOLOGIC  [ ] VTE Prophylaxis:                          12.9   8.9   )-----------( 226      ( 04 Feb 2019 12:04 )             38.5     PT/INR - ( 04 Feb 2019 09:06 )   PT: 13.8 sec;   INR: 1.20 ratio         PTT - ( 04 Feb 2019 09:06 )  PTT:26.4 sec  Transfusion: [ ] PRBC	[ ] Platelets	[ ] FFP	[ ] Cryoprecipitate      INFECTIOUS DISEASES    RECENT CULTURES:      ENDOCRINE  levothyroxine Injectable 37 MICROGram(s) IV Push at bedtime    CAPILLARY BLOOD GLUCOSE          PATIENT CARE ACCESS DEVICES:  [ ] Peripheral IV  [ ] Central Venous Line	[ ] R	[ ] L	[ ] IJ	[ ] Fem	[ ] SC	Placed:   [ ] Arterial Line		[ ] R	[ ] L	[ ] Fem	[ ] Rad	[ ] Ax	Placed:   [ ] PICC:					[ ] Mediport  [ ] Urinary Catheter, Date Placed:   [x] Necessity of urinary, arterial, and venous catheters discussed    OTHER MEDICATIONS: chlorhexidine 4% Liquid 1 Application(s) Topical <User Schedule>      IMAGING STUDIES: HISTORY OF PRESENT ILLNESS:  WILI PARKER is a 68 year old female with a PMHx of bipolar depression, Hyperlipidemia, NHL (in remission), OCD, dementia, with recent hospitalization (1/20-1/26) for right hip hemiarthroplasty for right femoral neck fracture after fall. Hospital course complicated by ESBL E. Coli UTI on Ertapenem (1/23-1/29). Patient found down in rehab, reported as possibly rolling out of bed.   On arrival to [facility] ED, Level 1 trauma activated for hypotension. SBP 80s, 2 large-bore pIV placed, e-FAST done twice, with no fluid in the intraperitoneal space or pericardial space, no pneumothorax, suggestion of a very small pleural effusion on the left. Pelvic x-ray without fracture. Transfusion started, 2U PRBC. Pt brought to CT with suspicion of sepsis, then transported to SICU.   On arrival to SICU, Patient with complaint of lower back pain, non radiating exacerbated with movement. Otherwise, ROS is negative, although appears to be poor historian.       PAST MEDICAL HISTORY: Osteoarthritis of left knee  Lymphoma  Hypercholesteremia  Bipolar depression  OCD (obsessive compulsive disorder)  Depression  Osteoarthritis of right knee      PAST SURGICAL HISTORY: S/P TKR (total knee replacement), bilateral  S/P cataract surgery  H/O oral surgery  S/P knee surgery      FAMILY HISTORY: Family history of diabetes mellitus  Family history of prostate cancer (Grandparent)  Family history of stomach cancer (Grandparent)  Family history of breast cancer  Family history of COPD (chronic obstructive pulmonary disease)      SOCIAL HISTORY: [place]    CODE STATUS: Full Code    HOME MEDICATIONS:    ALLERGIES: honeydew melon (Other)  penicillin (Other; Hives)      VITAL SIGNS:  ICU Vital Signs Last 24 Hrs  T(C): 36.6 (04 Feb 2019 11:00), Max: 36.7 (04 Feb 2019 09:59)  T(F): 97.8 (04 Feb 2019 11:00), Max: 98.1 (04 Feb 2019 09:59)  HR: 92 (04 Feb 2019 11:00) (83 - 94)  BP: 90/52 (04 Feb 2019 11:00) (87/49 - 99/55)  BP(mean): 67 (04 Feb 2019 11:00) (65 - 73)  ABP: --  ABP(mean): --  RR: 22 (04 Feb 2019 11:00) (19 - 39)  SpO2: 95% (04 Feb 2019 11:00) (95% - 100%)      NEURO  Exam: Alert, Oriented x 1 (Self), No acute distress      RESPIRATORY  Mechanical Ventilation:   ABG - ( 04 Feb 2019 11:03 )  pH: 7.40  /  pCO2: 40    /  pO2: 101   / HCO3: 24    / Base Excess: .0    /  SaO2: 98      Lactate: x      Exam: Clear to auscultation bilaterally, no wheeze or rhonchi      CARDIOVASCULAR  VBG - ( 04 Feb 2019 09:23 )  pH: 7.41  /  pCO2: 42    /  pO2: 32    / HCO3: 26    / Base Excess: 2.2   /  SaO2: 56     Lactate: 2.3    Exam: No murmur  Cardiac Rhythm: Sinus tachycardia      GI/NUTRITION  Exam: Softly distended,   Diet:  docusate sodium 100 milliGRAM(s) Oral three times a day  senna 2 Tablet(s) Oral at bedtime      GENITOURINARY/RENAL  multiple electrolytes Injection Type 1 1000 milliLiter(s) IV Continuous <Continuous>  multiple electrolytes Injection Type 1 Bolus 1000 milliLiter(s) IV Bolus once      Weight (kg): 61.1 (02-04 @ 09:59)  02-04    134<L>  |  97  |  21  ----------------------------<  179<H>  4.0   |  25  |  1.06    Ca    8.7      04 Feb 2019 09:06    TPro  5.4<L>  /  Alb  3.0<L>  /  TBili  0.6  /  DBili  x   /  AST  10  /  ALT  11  /  AlkPhos  105  02-04    [ ] Munguia catheter, indication: urine output monitoring in critically ill patient    HEMATOLOGIC  [ ] VTE Prophylaxis:                          12.9   8.9   )-----------( 226      ( 04 Feb 2019 12:04 )             38.5     PT/INR - ( 04 Feb 2019 09:06 )   PT: 13.8 sec;   INR: 1.20 ratio         PTT - ( 04 Feb 2019 09:06 )  PTT:26.4 sec  Transfusion: [ ] PRBC	[ ] Platelets	[ ] FFP	[ ] Cryoprecipitate      INFECTIOUS DISEASES    RECENT CULTURES:      ENDOCRINE  levothyroxine Injectable 37 MICROGram(s) IV Push at bedtime    CAPILLARY BLOOD GLUCOSE          PATIENT CARE ACCESS DEVICES:  [ ] Peripheral IV  [ ] Central Venous Line	[ ] R	[ ] L	[ ] IJ	[ ] Fem	[ ] SC	Placed:   [ ] Arterial Line		[ ] R	[ ] L	[ ] Fem	[ ] Rad	[ ] Ax	Placed:   [ ] PICC:					[ ] Mediport  [ ] Urinary Catheter, Date Placed:   [x] Necessity of urinary, arterial, and venous catheters discussed    OTHER MEDICATIONS: chlorhexidine 4% Liquid 1 Application(s) Topical <User Schedule>      IMAGING STUDIES: HISTORY OF PRESENT ILLNESS:  WILI PARKER is a 68 year old female with a PMHx of bipolar depression, Hyperlipidemia, NHL (in remission), OCD, dementia, with recent hospitalization (1/20-1/26) for right hip hemiarthroplasty for right femoral neck fracture after fall. Hospital course complicated by ESBL E. Coli UTI on Ertapenem (1/23-1/29). Patient found down in rehab, reported as possibly rolling out of bed.   On arrival to [facility] ED, Level 1 trauma activated for hypotension. SBP 80s, 2 large-bore pIV placed, e-FAST done twice, with no fluid in the intraperitoneal space or pericardial space, no pneumothorax, suggestion of a very small pleural effusion on the left. Pelvic x-ray without fracture. Transfusion started, 2U PRBC. Pt brought to CT with suspicion of sepsis, then transported to SICU.   On arrival to SICU, Patient with complaint of lower back pain, non radiating exacerbated with movement. Otherwise, ROS is negative, although appears to be poor historian.       PAST MEDICAL HISTORY: Osteoarthritis of left knee  Lymphoma  Hypercholesteremia  Bipolar depression  OCD (obsessive compulsive disorder)  Depression  Osteoarthritis of right knee      PAST SURGICAL HISTORY: S/P TKR (total knee replacement), bilateral  S/P cataract surgery  H/O oral surgery  S/P knee surgery      FAMILY HISTORY: Family history of diabetes mellitus  Family history of prostate cancer (Grandparent)  Family history of stomach cancer (Grandparent)  Family history of breast cancer  Family history of COPD (chronic obstructive pulmonary disease)      SOCIAL HISTORY: [place]    CODE STATUS: Full Code    HOME MEDICATIONS:    ALLERGIES: honeydew melon (Other)  penicillin (Other; Hives)      VITAL SIGNS:  ICU Vital Signs Last 24 Hrs  T(C): 36.6 (04 Feb 2019 11:00), Max: 36.7 (04 Feb 2019 09:59)  T(F): 97.8 (04 Feb 2019 11:00), Max: 98.1 (04 Feb 2019 09:59)  HR: 92 (04 Feb 2019 11:00) (83 - 94)  BP: 90/52 (04 Feb 2019 11:00) (87/49 - 99/55)  BP(mean): 67 (04 Feb 2019 11:00) (65 - 73)  ABP: --  ABP(mean): --  RR: 22 (04 Feb 2019 11:00) (19 - 39)  SpO2: 95% (04 Feb 2019 11:00) (95% - 100%)      NEURO  Exam: Alert, Oriented x 1 (Self), No acute distress      RESPIRATORY  Mechanical Ventilation:   ABG - ( 04 Feb 2019 11:03 )  pH: 7.40  /  pCO2: 40    /  pO2: 101   / HCO3: 24    / Base Excess: .0    /  SaO2: 98      Lactate: x      Exam: Clear to auscultation bilaterally, no wheeze or rhonchi      CARDIOVASCULAR  VBG - ( 04 Feb 2019 09:23 )  pH: 7.41  /  pCO2: 42    /  pO2: 32    / HCO3: 26    / Base Excess: 2.2   /  SaO2: 56     Lactate: 2.3    Exam: No murmur  Cardiac Rhythm: Sinus tachycardia      GI/NUTRITION  Exam: Softly distended, Tender to deep palpation throughout all four quadrants  Diet: NPO  docusate sodium 100 milliGRAM(s) Oral three times a day  senna 2 Tablet(s) Oral at bedtime      GENITOURINARY/RENAL  multiple electrolytes Injection Type 1 1000 milliLiter(s) IV Continuous <Continuous>  multiple electrolytes Injection Type 1 Bolus 1000 milliLiter(s) IV Bolus once      Weight (kg): 61.1 (02-04 @ 09:59)  02-04    134<L>  |  97  |  21  ----------------------------<  179<H>  4.0   |  25  |  1.06    Ca    8.7      04 Feb 2019 09:06    TPro  5.4<L>  /  Alb  3.0<L>  /  TBili  0.6  /  DBili  x   /  AST  10  /  ALT  11  /  AlkPhos  105  02-04    [ x] Munguia catheter, indication: urine output monitoring in critically ill patient    HEMATOLOGIC  [ ] VTE Prophylaxis:                          12.9   8.9   )-----------( 226      ( 04 Feb 2019 12:04 )             38.5     PT/INR - ( 04 Feb 2019 09:06 )   PT: 13.8 sec;   INR: 1.20 ratio         PTT - ( 04 Feb 2019 09:06 )  PTT:26.4 sec  Transfusion: [ ] PRBC	[ ] Platelets	[ ] FFP	[ ] Cryoprecipitate      INFECTIOUS DISEASES    RECENT CULTURES:      ENDOCRINE  levothyroxine Injectable 37 MICROGram(s) IV Push at bedtime    CAPILLARY BLOOD GLUCOSE          PATIENT CARE ACCESS DEVICES:  [x ] Peripheral IV  [ ] Central Venous Line	[ ] R	[ ] L	[ ] IJ	[ ] Fem	[ ] SC	Placed:   [ ] Arterial Line		[ ] R	[ ] L	[ ] Fem	[ ] Rad	[ ] Ax	Placed:   [ ] PICC:					[ ] Mediport  [ ] Urinary Catheter, Date Placed:   [x] Necessity of urinary, arterial, and venous catheters discussed    OTHER MEDICATIONS: chlorhexidine 4% Liquid 1 Application(s) Topical <User Schedule>      IMAGING STUDIES:  < from: CT Head No Cont (02.04.19 @ 09:50) >  IMPRESSION:    Head CT: . Volume loss and microvascular disease with areas of remote and   age-indeterminate lacunar infarction, MR is more sensitive for new   ischemic change. There is no  hemorrhage, mass effect or displaced   calvarial fracture    Cervical spine CT: No acute fracture or traumatic malalignment.    Thoracic spine CT: No acute fracture or traumatic malalignment.    Lumbar spine CT: Nondisplaced sacral insufficiency fracture extending   from the left sacroiliac joint to the ipsilateral S1-S2 sacral foramen.   Age-indeterminate mild superior endplate compression of L5.    < end of copied text >  < from: CT Chest w/ IV Cont (02.04.19 @ 09:50) >  IMPRESSION:   Pancolitis.  Partially included right proximal femoral periprosthetic fracture which   is most likely early subacute. CT of the right hip can be performed for   full visualization of the fracture line if clinically warranted.  Skin staples and a fluid collection in the right greater trochanteric   bursa in this recent postoperative patient which could be related to a   seroma although superinfection should be evaluated for on a clinical   basis.  Age-indeterminate moderate L5 compression fracture.    < end of copied text >

## 2019-02-04 NOTE — ED PROVIDER NOTE - OBJECTIVE STATEMENT
Attending Isabel Ly: 69 y/o female h/o demential NHL presenting after unwitnessed fall. pt found on the ground at rehab. is on lovenox after hip fracture. pt reports pain to her right leg. per EMS en route slurring speech and mildly hypotensive requesting trauma.unclear how long pt on the ground.

## 2019-02-04 NOTE — ED CLERICAL - NS ED CLERK NOTE PRE-ARRIVAL INFORMATION; ADDITIONAL PRE-ARRIVAL INFORMATION
This patient is enrolled in the comprehensive joint replacement (CJR) program and has active care navigation.  This patient can be followed up by the care navigation team within 24 hours. To arrange close follow-up or to obtain additional clinical information about this patient, please call the contact number above. Please call the orthopedic resident ([phone]) for ALL patients who are admitted or placed in observation.

## 2019-02-04 NOTE — ED PROVIDER NOTE - ATTENDING CONTRIBUTION TO CARE
Attending MD Isabel Ly:  I personally have seen and examined this patient.  Resident note reviewed and agree on plan of care and except where noted.  See HPI, PE, and MDM for details.

## 2019-02-04 NOTE — ED ADULT NURSE NOTE - NSIMPLEMENTINTERV_GEN_ALL_ED
Implemented All Fall with Harm Risk Interventions:  [place] to call system. Call bell, personal items and telephone within reach. Instruct patient to call for assistance. Room bathroom lighting operational. Non-slip footwear when patient is off stretcher. Physically safe environment: no spills, clutter or unnecessary equipment. Stretcher in lowest position, wheels locked, appropriate side rails in place. Provide visual cue, wrist band, yellow gown, etc. Monitor gait and stability. Monitor for mental status changes and reorient to person, place, and time. Review medications for side effects contributing to fall risk. Reinforce activity limits and safety measures with patient and family. Provide visual clues: red socks.

## 2019-02-04 NOTE — H&P ADULT - PMH
Bipolar depression    Depression    Hypercholesteremia    Lymphoma  [place] [facility] [place]  OCD (obsessive compulsive disorder)    Osteoarthritis of left knee    Osteoarthritis of right knee

## 2019-02-05 LAB
ALBUMIN SERPL ELPH-MCNC: 2.2 G/DL — LOW (ref 3.3–5)
ALP SERPL-CCNC: 83 U/L — SIGNIFICANT CHANGE UP (ref 40–120)
ALT FLD-CCNC: 18 U/L — SIGNIFICANT CHANGE UP (ref 10–45)
ANION GAP SERPL CALC-SCNC: 12 MMOL/L — SIGNIFICANT CHANGE UP (ref 5–17)
APTT BLD: 26.7 SEC — LOW (ref 27.5–36.3)
AST SERPL-CCNC: 19 U/L — SIGNIFICANT CHANGE UP (ref 10–40)
BILIRUB DIRECT SERPL-MCNC: 0.3 MG/DL — HIGH (ref 0–0.2)
BILIRUB INDIRECT FLD-MCNC: 0.6 MG/DL — SIGNIFICANT CHANGE UP (ref 0.2–1)
BILIRUB SERPL-MCNC: 0.9 MG/DL — SIGNIFICANT CHANGE UP (ref 0.2–1.2)
BUN SERPL-MCNC: 16 MG/DL — SIGNIFICANT CHANGE UP (ref 7–23)
CALCIUM SERPL-MCNC: 7.5 MG/DL — LOW (ref 8.4–10.5)
CHLORIDE SERPL-SCNC: 102 MMOL/L — SIGNIFICANT CHANGE UP (ref 96–108)
CO2 SERPL-SCNC: 21 MMOL/L — LOW (ref 22–31)
CREAT SERPL-MCNC: 0.72 MG/DL — SIGNIFICANT CHANGE UP (ref 0.5–1.3)
GAS PNL BLDA: SIGNIFICANT CHANGE UP
GLUCOSE SERPL-MCNC: 94 MG/DL — SIGNIFICANT CHANGE UP (ref 70–99)
HCT VFR BLD CALC: 35.3 % — SIGNIFICANT CHANGE UP (ref 34.5–45)
HGB BLD-MCNC: 11.9 G/DL — SIGNIFICANT CHANGE UP (ref 11.5–15.5)
INR BLD: 1.29 RATIO — HIGH (ref 0.88–1.16)
MAGNESIUM SERPL-MCNC: 2 MG/DL — SIGNIFICANT CHANGE UP (ref 1.6–2.6)
MCHC RBC-ENTMCNC: 32.9 PG — SIGNIFICANT CHANGE UP (ref 27–34)
MCHC RBC-ENTMCNC: 33.8 GM/DL — SIGNIFICANT CHANGE UP (ref 32–36)
MCV RBC AUTO: 97.5 FL — SIGNIFICANT CHANGE UP (ref 80–100)
PHOSPHATE SERPL-MCNC: 2.7 MG/DL — SIGNIFICANT CHANGE UP (ref 2.5–4.5)
PLATELET # BLD AUTO: 245 K/UL — SIGNIFICANT CHANGE UP (ref 150–400)
POTASSIUM SERPL-MCNC: 3.8 MMOL/L — SIGNIFICANT CHANGE UP (ref 3.5–5.3)
POTASSIUM SERPL-SCNC: 3.8 MMOL/L — SIGNIFICANT CHANGE UP (ref 3.5–5.3)
PROCALCITONIN SERPL-MCNC: 2.4 NG/ML — HIGH (ref 0.02–0.1)
PROT SERPL-MCNC: 4.4 G/DL — LOW (ref 6–8.3)
PROTHROM AB SERPL-ACNC: 15 SEC — HIGH (ref 10–12.9)
RBC # BLD: 3.62 M/UL — LOW (ref 3.8–5.2)
RBC # FLD: 16.2 % — HIGH (ref 10.3–14.5)
SODIUM SERPL-SCNC: 135 MMOL/L — SIGNIFICANT CHANGE UP (ref 135–145)
TROPONIN T, HIGH SENSITIVITY RESULT: 19 NG/L — SIGNIFICANT CHANGE UP (ref 0–51)
WBC # BLD: 10.3 K/UL — SIGNIFICANT CHANGE UP (ref 3.8–10.5)
WBC # FLD AUTO: 10.3 K/UL — SIGNIFICANT CHANGE UP (ref 3.8–10.5)

## 2019-02-05 PROCEDURE — 99291 CRITICAL CARE FIRST HOUR: CPT

## 2019-02-05 PROCEDURE — 99232 SBSQ HOSP IP/OBS MODERATE 35: CPT | Mod: GC

## 2019-02-05 PROCEDURE — 76377 3D RENDER W/INTRP POSTPROCES: CPT | Mod: 26,76

## 2019-02-05 PROCEDURE — 71045 X-RAY EXAM CHEST 1 VIEW: CPT | Mod: 26

## 2019-02-05 PROCEDURE — 73700 CT LOWER EXTREMITY W/O DYE: CPT | Mod: 26,RT

## 2019-02-05 PROCEDURE — 72192 CT PELVIS W/O DYE: CPT | Mod: 26

## 2019-02-05 RX ORDER — ONDANSETRON 8 MG/1
4 TABLET, FILM COATED ORAL ONCE
Qty: 0 | Refills: 0 | Status: COMPLETED | OUTPATIENT
Start: 2019-02-05 | End: 2019-02-05

## 2019-02-05 RX ORDER — GABAPENTIN 400 MG/1
600 CAPSULE ORAL
Qty: 0 | Refills: 0 | Status: DISCONTINUED | OUTPATIENT
Start: 2019-02-05 | End: 2019-02-05

## 2019-02-05 RX ORDER — GABAPENTIN 400 MG/1
1200 CAPSULE ORAL
Qty: 0 | Refills: 0 | Status: DISCONTINUED | OUTPATIENT
Start: 2019-02-05 | End: 2019-02-06

## 2019-02-05 RX ORDER — ZIPRASIDONE HYDROCHLORIDE 20 MG/1
40 CAPSULE ORAL
Qty: 0 | Refills: 0 | Status: DISCONTINUED | OUTPATIENT
Start: 2019-02-05 | End: 2019-02-22

## 2019-02-05 RX ORDER — VANCOMYCIN HCL 1 G
500 VIAL (EA) INTRAVENOUS EVERY 6 HOURS
Qty: 0 | Refills: 0 | Status: DISCONTINUED | OUTPATIENT
Start: 2019-02-05 | End: 2019-02-17

## 2019-02-05 RX ORDER — BUPROPION HYDROCHLORIDE 150 MG/1
300 TABLET, EXTENDED RELEASE ORAL DAILY
Qty: 0 | Refills: 0 | Status: DISCONTINUED | OUTPATIENT
Start: 2019-02-06 | End: 2019-02-22

## 2019-02-05 RX ORDER — GABAPENTIN 400 MG/1
300 CAPSULE ORAL AT BEDTIME
Qty: 0 | Refills: 0 | Status: DISCONTINUED | OUTPATIENT
Start: 2019-02-05 | End: 2019-02-05

## 2019-02-05 RX ORDER — BUPROPION HYDROCHLORIDE 150 MG/1
300 TABLET, EXTENDED RELEASE ORAL ONCE
Qty: 0 | Refills: 0 | Status: COMPLETED | OUTPATIENT
Start: 2019-02-05 | End: 2019-02-05

## 2019-02-05 RX ORDER — METRONIDAZOLE 500 MG
TABLET ORAL
Qty: 0 | Refills: 0 | Status: DISCONTINUED | OUTPATIENT
Start: 2019-02-05 | End: 2019-02-06

## 2019-02-05 RX ORDER — METRONIDAZOLE 500 MG
500 TABLET ORAL EVERY 8 HOURS
Qty: 0 | Refills: 0 | Status: DISCONTINUED | OUTPATIENT
Start: 2019-02-05 | End: 2019-02-06

## 2019-02-05 RX ORDER — POTASSIUM CHLORIDE 20 MEQ
20 PACKET (EA) ORAL ONCE
Qty: 0 | Refills: 0 | Status: COMPLETED | OUTPATIENT
Start: 2019-02-05 | End: 2019-02-05

## 2019-02-05 RX ORDER — METRONIDAZOLE 500 MG
500 TABLET ORAL ONCE
Qty: 0 | Refills: 0 | Status: COMPLETED | OUTPATIENT
Start: 2019-02-05 | End: 2019-02-05

## 2019-02-05 RX ORDER — ACETAMINOPHEN 500 MG
1000 TABLET ORAL ONCE
Qty: 0 | Refills: 0 | Status: COMPLETED | OUTPATIENT
Start: 2019-02-05 | End: 2019-02-05

## 2019-02-05 RX ORDER — CALCIUM GLUCONATE 100 MG/ML
1 VIAL (ML) INTRAVENOUS ONCE
Qty: 0 | Refills: 0 | Status: COMPLETED | OUTPATIENT
Start: 2019-02-05 | End: 2019-02-05

## 2019-02-05 RX ORDER — ENOXAPARIN SODIUM 100 MG/ML
40 INJECTION SUBCUTANEOUS DAILY
Qty: 0 | Refills: 0 | Status: COMPLETED | OUTPATIENT
Start: 2019-02-05 | End: 2019-02-05

## 2019-02-05 RX ORDER — ZIPRASIDONE HYDROCHLORIDE 20 MG/1
40 CAPSULE ORAL ONCE
Qty: 0 | Refills: 0 | Status: COMPLETED | OUTPATIENT
Start: 2019-02-05 | End: 2019-02-05

## 2019-02-05 RX ORDER — GABAPENTIN 400 MG/1
300 CAPSULE ORAL
Qty: 0 | Refills: 0 | Status: DISCONTINUED | OUTPATIENT
Start: 2019-02-05 | End: 2019-02-07

## 2019-02-05 RX ORDER — SODIUM CHLORIDE 9 MG/ML
1000 INJECTION, SOLUTION INTRAVENOUS ONCE
Qty: 0 | Refills: 0 | Status: COMPLETED | OUTPATIENT
Start: 2019-02-05 | End: 2019-02-05

## 2019-02-05 RX ORDER — LEVOTHYROXINE SODIUM 125 MCG
75 TABLET ORAL DAILY
Qty: 0 | Refills: 0 | Status: DISCONTINUED | OUTPATIENT
Start: 2019-02-05 | End: 2019-02-08

## 2019-02-05 RX ORDER — MAGNESIUM SULFATE 500 MG/ML
2 VIAL (ML) INJECTION ONCE
Qty: 0 | Refills: 0 | Status: COMPLETED | OUTPATIENT
Start: 2019-02-05 | End: 2019-02-05

## 2019-02-05 RX ADMIN — Medication 500 MILLIGRAM(S): at 12:12

## 2019-02-05 RX ADMIN — GABAPENTIN 1200 MILLIGRAM(S): 400 CAPSULE ORAL at 22:39

## 2019-02-05 RX ADMIN — Medication 400 MILLIGRAM(S): at 23:19

## 2019-02-05 RX ADMIN — SODIUM CHLORIDE 4000 MILLILITER(S): 9 INJECTION, SOLUTION INTRAVENOUS at 23:19

## 2019-02-05 RX ADMIN — ENOXAPARIN SODIUM 40 MILLIGRAM(S): 100 INJECTION SUBCUTANEOUS at 12:12

## 2019-02-05 RX ADMIN — CHLORHEXIDINE GLUCONATE 1 APPLICATION(S): 213 SOLUTION TOPICAL at 05:19

## 2019-02-05 RX ADMIN — Medication 100 MILLIGRAM(S): at 08:06

## 2019-02-05 RX ADMIN — Medication 100 MILLIGRAM(S): at 15:55

## 2019-02-05 RX ADMIN — Medication 125 MILLIGRAM(S): at 08:06

## 2019-02-05 RX ADMIN — Medication 1000 MILLIGRAM(S): at 23:34

## 2019-02-05 RX ADMIN — Medication 100 MILLIGRAM(S): at 23:20

## 2019-02-05 RX ADMIN — Medication 50 GRAM(S): at 07:08

## 2019-02-05 RX ADMIN — ONDANSETRON 4 MILLIGRAM(S): 8 TABLET, FILM COATED ORAL at 23:09

## 2019-02-05 RX ADMIN — Medication 500 MILLIGRAM(S): at 18:21

## 2019-02-05 RX ADMIN — BUPROPION HYDROCHLORIDE 300 MILLIGRAM(S): 150 TABLET, EXTENDED RELEASE ORAL at 15:55

## 2019-02-05 RX ADMIN — Medication 200 GRAM(S): at 07:06

## 2019-02-05 RX ADMIN — SODIUM CHLORIDE 100 MILLILITER(S): 9 INJECTION, SOLUTION INTRAVENOUS at 23:19

## 2019-02-05 RX ADMIN — ONDANSETRON 4 MILLIGRAM(S): 8 TABLET, FILM COATED ORAL at 18:32

## 2019-02-05 RX ADMIN — Medication 125 MILLIGRAM(S): at 01:52

## 2019-02-05 RX ADMIN — ZIPRASIDONE HYDROCHLORIDE 40 MILLIGRAM(S): 20 CAPSULE ORAL at 12:11

## 2019-02-05 RX ADMIN — Medication 500 MILLIGRAM(S): at 23:19

## 2019-02-05 RX ADMIN — Medication 20 MILLIEQUIVALENT(S): at 05:42

## 2019-02-05 NOTE — PROGRESS NOTE ADULT - ATTENDING COMMENTS
Evaluated in AM round, continue to be re evaluated  Episodes of hypotensions requiring fluid boluses, IV flagyl added, increase dose of Vanco  IVF, point of care US not in fluid overload  NPO for now awaiting ortho plan  Will start her psy meds  HCT stable, not bleeding    Discussed with Dr Ballard    Time spent >35 mts managing this critically ill pt

## 2019-02-05 NOTE — PROGRESS NOTE ADULT - SUBJECTIVE AND OBJECTIVE BOX
GENERAL SURGERY DAILY PROGRESS NOTE:     Subjective:  Pt seen and examined. Pt confused this am at bedside. Found to be C. diff positive. No acute events overnight. Per ortho, pt will need hip joint aspiration to r/o infection and surgery at a later time.     Objective:  NAD, awake and alert, sitting up in bed, confused  Respirations nonlabored  Abdomen soft, nontender, nondistended  No guarding or rebound tenderness      MEDICATIONS  (STANDING):  chlorhexidine 4% Liquid 1 Application(s) Topical <User Schedule>  gabapentin 300 milliGRAM(s) Oral <User Schedule>  gabapentin 1200 milliGRAM(s) Oral <User Schedule>  influenza   Vaccine 0.5 milliLiter(s) IntraMuscular once  levothyroxine 75 MICROGram(s) Oral daily  metroNIDAZOLE  IVPB 500 milliGRAM(s) IV Intermittent every 8 hours  metroNIDAZOLE  IVPB      multiple electrolytes Injection Type 1 1000 milliLiter(s) (100 mL/Hr) IV Continuous <Continuous>  ondansetron Injectable 4 milliGRAM(s) IV Push once  vancomycin    Solution 500 milliGRAM(s) Oral every 6 hours    MEDICATIONS  (PRN):      Vital Signs Last 24 Hrs  T(C): 37.3 (2019 15:00), Max: 37.5 (2019 03:00)  T(F): 99.2 (2019 15:00), Max: 99.5 (2019 03:00)  HR: 112 (2019 16:00) (99 - 112)  BP: 92/55 (2019 16:00) (89/46 - 120/60)  BP(mean): 69 (2019 16:00) (65 - 80)  RR: 26 (2019 16:00) (16 - 33)  SpO2: 93% (2019 16:00) (91% - 100%)    I&O's Detail    2019 07:01  -  2019 07:00  --------------------------------------------------------  IN:    IV PiggyBack: 150 mL    multiple electrolytes Injection Type 1: 1900 mL    Packed Red Blood Cells: 320 mL    Solution: 1500 mL  Total IN: 3870 mL    OUT:    Ureteral Catheter: 2400 mL  Total OUT: 2400 mL    Total NET: 1470 mL      2019 07:01  -  2019 17:12  --------------------------------------------------------  IN:    IV PiggyBack: 300 mL    multiple electrolytes Injection Type 1: 900 mL  Total IN: 1200 mL    OUT:    Ureteral Catheter: 420 mL  Total OUT: 420 mL    Total NET: 780 mL          Daily     Daily     LABS:                        11.9   10.3  )-----------( 245      ( 2019 02:27 )             35.3     02-    135  |  102  |  16  ----------------------------<  94  3.8   |  21<L>  |  0.72    Ca    7.5<L>      2019 02:27  Phos  2.7     02-  Mg     2.0     -05    TPro  4.4<L>  /  Alb  2.2<L>  /  TBili  0.9  /  DBili  0.3<H>  /  AST  19  /  ALT  18  /  AlkPhos  83  02-05    PT/INR - ( 2019 02:27 )   PT: 15.0 sec;   INR: 1.29 ratio         PTT - ( 2019 02:27 )  PTT:26.7 sec  Urinalysis Basic - ( 2019 11:01 )    Color: Yellow / Appearance: Clear / S.027 / pH: x  Gluc: x / Ketone: Negative  / Bili: Negative / Urobili: Negative   Blood: x / Protein: Trace / Nitrite: Negative   Leuk Esterase: Negative / RBC: 3 /hpf / WBC 1 /HPF   Sq Epi: x / Non Sq Epi: 1 /hpf / Bacteria: Few        RADIOLOGY & ADDITIONAL STUDIES:

## 2019-02-05 NOTE — PROGRESS NOTE ADULT - ATTENDING COMMENTS
I agree with the above note and have personally seen and examined this patient. All pertinent films have been reviewed. Please refer to clinical documentation of the history, physical examinations, data summary, and both assessment and plan as documented above and with which I agree.    pending further imaging to determine if this is artifact versus periprosthetic fx, radiographs show no e/o implant subsidence or fracture  until further imaging, keep nwb and bedrest    Conor George MD  Attending Orthopedic Surgeon

## 2019-02-05 NOTE — PROGRESS NOTE ADULT - SUBJECTIVE AND OBJECTIVE BOX
Pt seen & examined. Pain controlled.  No acute events overnight in SICU, pt confused this Am and overnight.  Pt Denies pain in R femur.     Vital Signs Last 24 Hrs  T(C): 37.5 (05 Feb 2019 03:00), Max: 37.5 (05 Feb 2019 03:00)  T(F): 99.5 (05 Feb 2019 03:00), Max: 99.5 (05 Feb 2019 03:00)  HR: 109 (05 Feb 2019 05:00) (63 - 114)  BP: 95/51 (05 Feb 2019 05:00) (77/50 - 120/60)  BP(mean): 70 (05 Feb 2019 05:00) (55 - 80)  RR: 27 (05 Feb 2019 05:00) (15 - 44)  SpO2: 92% (05 Feb 2019 05:00) (92% - 100%)    Gen: NAD  RLE:  Staples in place, no signs of wound breakdown or drainage, well approximated   Minimal to no TTP along femur, able to SLR w/o pain, no pain with hip ROM of Knee ROM  +sensation L2-S1  +dorsiflexion/plantarflexion of ankle/hallux  +dorsalis pedis pulse  Soft compartments, - calf tenderness Pt seen & examined. Pain controlled.  No acute events overnight in SICU, pt confused this Am and overnight.  Pt Denies pain in R femur.     Vital Signs Last 24 Hrs  T(C): 37.5 (05 Feb 2019 03:00), Max: 37.5 (05 Feb 2019 03:00)  T(F): 99.5 (05 Feb 2019 03:00), Max: 99.5 (05 Feb 2019 03:00)  HR: 109 (05 Feb 2019 05:00) (63 - 114)  BP: 95/51 (05 Feb 2019 05:00) (77/50 - 120/60)  BP(mean): 70 (05 Feb 2019 05:00) (55 - 80)  RR: 27 (05 Feb 2019 05:00) (15 - 44)  SpO2: 92% (05 Feb 2019 05:00) (92% - 100%)    Gen: NAD  RLE:  Staples in place, no signs of wound breakdown or drainage, well approximated   Minimal to no TTP along femur, able to SLR w/o pain, no pain with hip ROM of Knee ROM  +sensation L2-S1  +dorsiflexion/plantarflexion of ankle/hallux  +dorsalis pedis pulse  Soft compartments, - calf tenderness      < from: CT Lumbar Spine Reform No Cont (02.04.19 @ 09:50) >  Lumbar spine CT: Nondisplaced sacral insufficiency fracture extending   from the left sacroiliac joint to the ipsilateral S1-S2 sacral foramen.   Age-indeterminate mild superior endplate compression of L5.    < end of copied text >

## 2019-02-05 NOTE — PROGRESS NOTE ADULT - ASSESSMENT
68 year old woman who presents with R hip fracture and found to have C. diff colitis.    Plan:  - will require R hip arthrocentesis to r/o infection, surgery at a later time  - Multimodal pain control  - C.diff positive - vanc and flagyl  - F/u labs and imaging  - DVT PPx  - Diet: NPO  - Activity: advance as tolerated  - Tertiary in AM  - Care per SICU    Trauma [id number]

## 2019-02-05 NOTE — CONSULT NOTE ADULT - SUBJECTIVE AND OBJECTIVE BOX
67 y/o female h/o demential NHL presenting after unwitnessed fall. pt found on the ground at rehab. is on lovenox after hip fracture. pt reports pain to her right leg. per EMS en route slurring speech and mildly hypotensive requesting trauma.unclear how long pt on the ground. Ptfound to have C diff with worsening sepsis and Patient was moved to the SICU for continued cardiopulmonary monitoring. seen by ortho for further treatment of hip        PAST MEDICAL & SURGICAL HISTORY:  Osteoarthritis of left knee  Lymphoma: [place] [facility] [place]  Hypercholesteremia  Bipolar depression  OCD (obsessive compulsive disorder)  Depression  Osteoarthritis of right knee  S/P TKR (total knee replacement), bilateral: discharged nov 5th, 2014  S/P cataract surgery  H/O oral surgery: 2014  S/P knee surgery: 1978        MEDICATIONS  (STANDING):  buPROPion XL . 300 milliGRAM(s) Oral daily  chlorhexidine 4% Liquid 1 Application(s) Topical <User Schedule>  enoxaparin Injectable 40 milliGRAM(s) SubCutaneous daily  gabapentin 300 milliGRAM(s) Oral <User Schedule>  gabapentin 300 milliGRAM(s) Oral <User Schedule>  influenza   Vaccine 0.5 milliLiter(s) IntraMuscular once  levothyroxine 75 MICROGram(s) Oral daily  metroNIDAZOLE  IVPB 500 milliGRAM(s) IV Intermittent every 8 hours  multiple electrolytes Injection Type 1 1000 milliLiter(s) (100 mL/Hr) IV Continuous <Continuous>  vancomycin    Solution 500 milliGRAM(s) Oral every 6 hours  ziprasidone 40 milliGRAM(s) Oral <User Schedule>    MEDICATIONS  (PRN):        CONSTITUTIONAL: No weakness, fevers or chills  EYES/ENT: No visual changes;  No vertigo or throat pain   NECK: No pain or stiffness  RESPIRATORY: No cough, wheezing, hemoptysis; No shortness of breath  CARDIOVASCULAR: No chest pain or palpitations  GASTROINTESTINAL: No abdominal or epigastric pain. No nausea, vomiting, or hematemesis; No diarrhea or constipation. No melena or hematochezia.  GENITOURINARY: No dysuria, frequency or hematuria  NEUROLOGICAL: No numbness or weakness  SKIN: No itching, burning, rashes, or lesions   All other review of systems is negative unless indicated above.    INTERVAL HPI/OVERNIGHT EVENTS:  T(C): 37.1 (02-06-19 @ 19:00), Max: 37.2 (02-06-19 @ 15:22)  HR: 115 (02-06-19 @ 22:00) (95 - 121)  BP: 88/60 (02-06-19 @ 21:00) (74/38 - 106/50)  RR: 32 (02-06-19 @ 22:00) (24 - 32)  SpO2: 93% (02-06-19 @ 22:00) (86% - 97%)  Wt(kg): --  I&O's Summary    05 Feb 2019 07:01  -  06 Feb 2019 07:00  --------------------------------------------------------  IN: 3800 mL / OUT: 1000 mL / NET: 2800 mL    06 Feb 2019 07:01  -  07 Feb 2019 00:24  --------------------------------------------------------  IN: 1300 mL / OUT: 410 mL / NET: 890 mL        PHYSICAL EXAM:  GENERAL: NAD, well-groomed, well-developed  HEAD:  Atraumatic, Normocephalic  EYES: EOMI, PERRLA, conjunctiva and sclera clear  ENMT: No tonsillar erythema, exudates, or enlargement; Moist mucous membranes, Good dentition, No lesions  NECK: Supple, No JVD, Normal thyroid  NERVOUS SYSTEM:  Alert & Oriented X3, Good concentration; Motor Strength 5/5 B/L upper and lower extremities; DTRs 2+ intact and symmetric  CHEST/LUNG: Clear to percussion bilaterally; No rales, rhonchi, wheezing, or rubs  HEART: Regular rate and rhythm; No murmurs, rubs, or gallops  ABDOMEN: Soft, Nontender, Nondistended; Bowel sounds present  EXTREMITIES:  2+ Peripheral Pulses, No clubbing, cyanosis, or edema  LYMPH: No lymphadenopathy noted  SKIN: No rashes or lesions        LABS:                        13.8   11.0  )-----------( 235      ( 06 Feb 2019 03:08 )             42.5     02-06    133<L>  |  101  |  19  ----------------------------<  141<H>  3.8   |  20<L>  |  0.67    Ca    6.9<L>      06 Feb 2019 03:08  Phos  3.0     02-06  Mg     2.7     02-06    TPro  4.4<L>  /  Alb  2.2<L>  /  TBili  0.9  /  DBili  0.3<H>  /  AST  19  /  ALT  18  /  AlkPhos  83  02-05    PT/INR - ( 06 Feb 2019 03:08 )   PT: 13.5 sec;   INR: 1.18 ratio         PTT - ( 06 Feb 2019 03:08 )  PTT:27.2 sec    CAPILLARY BLOOD GLUCOSE        ABG - ( 05 Feb 2019 02:24 )  pH, Arterial: 7.49  pH, Blood: x     /  pCO2: 34    /  pO2: 73    / HCO3: 26    / Base Excess: 3.0   /  SaO2: 95                      Radiology reports:

## 2019-02-05 NOTE — PROGRESS NOTE ADULT - ASSESSMENT
A/P: 68 y F w/ R Hemiarthroplasty done on 1/21, s/p MF, r/out R periprosthetic Hip Fracture     -Based on Physical exam, patient does not currently examine as a hip periprosthetic hip fracture   -Will Need dedicated CT of the R hip and Femur for further definitive imaging - Ordered, pt BP needs to be stable in order to have study  -Based on current CT abd/pel, possible subacute periprosthetic fracture- however, demonstration of callus formation, non displaced, based on pt's current status, likely leaning toward Non operative intervention with NWB of RLE- all will depend on Official CT of R hip.  - Low Back/Lower Right Sided flank pain, possible due to sacral insufficiency fracture S1-S2, seen and CT and L5 VCF  -C/w SICU care, recs appreciated  -Analgesia  -DVT ppx, per primary team  -Non weight bearing RLE   -Will follow up imaging  -Dr George will be giving Recs on possible surgery, will continue to monitor A/P: 68 y F w/ R Hemiarthroplasty done on 1/21, s/p MF, with L S1-2 sacral insufficiency fracture and r/out R periprosthetic Hip Fracture     -Based on Physical exam, patient does not currently examine as a hip periprosthetic hip fracture   -Will Need dedicated CT of the R hip and Femur for further definitive imaging - Ordered, pt BP needs to be stable in order to have study  -Based on current CT abd/pel, possible subacute periprosthetic fracture- however, demonstration of callus formation, non displaced, based on pt's current status, likely leaning toward Non operative intervention with NWB of RLE- all will depend on Official CT of R hip.  - Low Back/Lower Right Sided flank pain, possible due to sacral insufficiency fracture S1-S2, seen and CT and L5 VCF  -C/w SICU care, recs appreciated  -Analgesia  -DVT ppx, per primary team  -Non weight bearing RLE/bedrest  -May weight bear as tolerated on LLE when not on bedrest  -Will follow up imaging  -Dr George will be giving Recs on possible surgery, will continue to monitor

## 2019-02-05 NOTE — PROGRESS NOTE ADULT - ASSESSMENT
68 year old female with a PMHx of bipolar depression, Hyperlipidemia, NHL (in remission), OCD, dementia, with recent hospitalization (1/20-1/26) for right hip hemiarthroplasty for right femoral neck fracture after fall. Hospital course complicated by ESBL E. Coli UTI on Ertapenem (1/23-1/29) now presents with likely Hypovolemic/ Distributive shock secondary to sepsis 2/2 Pancolitis, likely secondary to C. Diff Colitis, Age Indeterminate L5 vertebral body fracture, possible acute as tender to palpation, Nondisplaced sacral insufficiency, Right proximal femoral periprosthetic fracture following unwitnessed fall    NEURO: ?Dementia, Bipolar disorder, OCD, ? Acute L5 Vetebral body fracture  - Will continue to hold home neurological medications  - Acetaminophen for acute pain  - Patient will likely need a LSO brace for comfort    RESP: no active issues  - Encourage ISS/ OOB, Non-weight bearing RLE when neurologically improved    CV: Hypotension, likely related to hypovolemia/ distributive shock secondary to Sepsis, unlikely hemorrhage after reviewing CT images. Resolved  - Continue hemodynamic monitoring    GI: Pancolitis, C. diff positive   - Will maintain NPO for now    RENAL: Acute (AKIN I) on Chronic Kidney Disease, CKD II, Baseline Cr-0.7, GFR: 72ml/min, Hypotonic Hyponatremia, S. Osm- 285, likely related to hypovolemia  - Will continue with Plasmalyte @ 100ml/hr  - BMP daily    HEME: No evidence of ongoing hemorrhage, Hct stable and hemodynamics stable   - Continue Lovenox     ID: Pancolitis, C. diff positive   - Continue PO Vanco 125mg q 6hours    ENDO: Hypothyroidism  - Will administer IV synthroid for now, until tolerating PO    MUSCULOSKELETAL Nondisplaced sacral insufficiency, Right proximal femoral periprosthetic fracture  - Will maintain NWB RLE for now    Dispo: SICU full code    - Sawyer Coleman PA-C

## 2019-02-05 NOTE — CONSULT NOTE ADULT - ATTENDING COMMENTS
continue ATC cardio pulmonary monitoring in this critically ill Patient continue ATC cardio pulmonary monitoring in this critically ill Patient  I am a non participating [employer] physician seeing Pt in coverage for Dr Barraza continue ATC cardio pulmonary monitoring in this critically ill Patient  I am a non participating [facility] physician seeing Pt in coverage for Dr Barraza. The above patient examination was reviewed with Dr. Awan and I agree with his evaluation, assessment and treatment plan.  Orlin Barraza M.D.

## 2019-02-05 NOTE — CONSULT NOTE ADULT - ASSESSMENT
67 yo woman with a hx of dementia present after recent Hip surgery present after a fall with a femur fx. Patient was found to be cdiff positive. will need revision of hip surgery

## 2019-02-05 NOTE — PROGRESS NOTE ADULT - SUBJECTIVE AND OBJECTIVE BOX
HISTORY  68y Female female with a PMHx of bipolar depression, Hyperlipidemia, NHL (in remission), OCD, dementia, with recent hospitalization (1/20-1/26) for right hip hemiarthroplasty for right femoral neck fracture after fall. Hospital course complicated by ESBL E. Coli UTI on Ertapenem (1/23-1/29). Patient found down in rehab, reported as possibly rolling out of bed.   On arrival to [facility] ED, Level 1 trauma activated for hypotension. SBP 80s, 2 large-bore pIV placed, e-FAST done twice, with no fluid in the intraperitoneal space or pericardial space, no pneumothorax, suggestion of a very small pleural effusion on the left. Pelvic x-ray without fracture. Transfusion started, 2U PRBC. Pt brought to CT with suspicion of sepsis, then transported to SICU.   On arrival to SICU, Patient with complaint of lower back pain, non radiating exacerbated with movement. Otherwise, ROS is negative, although appears to be poor historian.       24 HOUR EVENTS: patient given 500ml of crystalloid after point of care ultrasound demonstrated a hyperdynamic left ventricle. Otherwise patients lactate has cleared and all other perfusion parameters appear normal. Hemodynamics stable with a MAP > 65 off any vasopressors.     SUBJECTIVE/ROS:  [ ] A ten-point review of systems was otherwise negative except as noted.  [ ] Due to altered mental status/intubation, subjective information were not able to be obtained from the patient. History was obtained, to the extent possible, from review of the chart and collateral sources of information.      NEURO  RASS:     GCS:     CAM ICU:  Exam: awake, alert, oriented  Meds:   [x] Adequacy of sedation and pain control has been assessed and adjusted      RESPIRATORY  RR: 25 (02-05-19 @ 00:00) (15 - 44)  SpO2: 95% (02-05-19 @ 00:00) (92% - 100%)  Wt(kg): --  Exam: unlabored, clear to auscultation bilaterally  Mechanical Ventilation:   ABG - ( 04 Feb 2019 11:03 )  pH: 7.40  /  pCO2: 40    /  pO2: 101   / HCO3: 24    / Base Excess: .0    /  SaO2: 98      Lactate: x                [N/A] Extubation Readiness Assessed  Meds:       CARDIOVASCULAR  HR: 109 (02-05-19 @ 00:00) (63 - 114)  BP: 112/51 (02-05-19 @ 00:00) (77/50 - 120/60)  BP(mean): 73 (02-05-19 @ 00:00) (55 - 80)  ABP: --  ABP(mean): --  Wt(kg): --  CVP(cm H2O): --  VBG - ( 04 Feb 2019 16:54 )  pH: 7.43  /  pCO2: 40    /  pO2: 48    / HCO3: 26    / Base Excess: 1.9   /  SaO2: 84     Lactate: 1.9                Exam: regular rate and rhythm  Cardiac Rhythm: sinus  Perfusion     [x]Adequate   [ ]Inadequate  Mentation   [x]Normal       [ ]Reduced  Extremities  [x]Warm         [ ]Cool  Volume Status [ ]Hypervolemic [x]Euvolemic [ ]Hypovolemic  Meds:       GI/NUTRITION  Exam: soft, nontender, nondistended, incision C/D/I  Diet:  Meds:     GENITOURINARY  I&O's Detail    02-04 @ 07:01  -  02-05 @ 00:57  --------------------------------------------------------  IN:    multiple electrolytes Injection Type 1: 1050 mL    Packed Red Blood Cells: 320 mL    Solution: 1500 mL  Total IN: 2870 mL    OUT:    Ureteral Catheter: 2115 mL  Total OUT: 2115 mL    Total NET: 755 mL        Weight (kg): 61.1 (02-04 @ 09:59)  02-04    134<L>  |  97  |  21  ----------------------------<  179<H>  4.0   |  25  |  1.06    Ca    8.7      04 Feb 2019 09:06    TPro  5.4<L>  /  Alb  3.0<L>  /  TBili  0.6  /  DBili  x   /  AST  10  /  ALT  11  /  AlkPhos  105  02-04    [ ] Munguia catheter, indication: N/A  Meds: multiple electrolytes Injection Type 1 1000 milliLiter(s) IV Continuous <Continuous>        HEMATOLOGIC  Meds: enoxaparin Injectable 40 milliGRAM(s) SubCutaneous daily    [x] VTE Prophylaxis                        12.7   9.8   )-----------( 248      ( 04 Feb 2019 16:54 )             36.3     PT/INR - ( 04 Feb 2019 09:06 )   PT: 13.8 sec;   INR: 1.20 ratio         PTT - ( 04 Feb 2019 09:06 )  PTT:26.4 sec  Transfusion     [ ] PRBC   [ ] Platelets   [ ] FFP   [ ] Cryoprecipitate      INFECTIOUS DISEASES  WBC Count: 9.8 K/uL (02-04 @ 16:54)  WBC Count: 8.9 K/uL (02-04 @ 12:04)  WBC Count: 9.9 K/uL (02-04 @ 09:06)    RECENT CULTURES:    Meds: influenza   Vaccine 0.5 milliLiter(s) IntraMuscular once  vancomycin    Solution 125 milliGRAM(s) Oral every 6 hours        ENDOCRINE  CAPILLARY BLOOD GLUCOSE        Meds: levothyroxine Injectable 37 MICROGram(s) IV Push at bedtime        ACCESS DEVICES:  [ ] Peripheral IV  [ ] Central Venous Line	[ ] R	[ ] L	[ ] IJ	[ ] Fem	[ ] SC	Placed:   [ ] Arterial Line		[ ] R	[ ] L	[ ] Fem	[ ] Rad	[ ] Ax	Placed:   [ ] PICC:					[ ] Mediport  [ ] Urinary Catheter, Date Placed:   [x] Necessity of urinary, arterial, and venous catheters discussed    OTHER MEDICATIONS:  chlorhexidine 4% Liquid 1 Application(s) Topical <User Schedule>      CODE STATUS:      IMAGING: HISTORY  68y Female female with a PMHx of bipolar depression, Hyperlipidemia, NHL (in remission), OCD, dementia, with recent hospitalization (1/20-1/26) for right hip hemiarthroplasty for right femoral neck fracture after fall. Hospital course complicated by ESBL E. Coli UTI on Ertapenem (1/23-1/29). Patient found down in rehab, reported as possibly rolling out of bed.   On arrival to [facility] ED, Level 1 trauma activated for hypotension. SBP 80s, 2 large-bore pIV placed, e-FAST done twice, with no fluid in the intraperitoneal space or pericardial space, no pneumothorax, suggestion of a very small pleural effusion on the left. Pelvic x-ray without fracture. Transfusion started, 2U PRBC. Pt brought to CT with suspicion of sepsis, then transported to SICU.   On arrival to SICU, Patient with complaint of lower back pain, non radiating exacerbated with movement. Otherwise, ROS is negative, although appears to be poor historian.       24 HOUR EVENTS: patient given 500ml of crystalloid after point of care ultrasound demonstrated a hyperdynamic left ventricle. Otherwise patients lactate has cleared and all other perfusion parameters appear normal. Hemodynamics stable with a MAP > 65 off any vasopressors. C. Diff specimen sent which was positive. Lovenox started for VTE prophylaxis.     SUBJECTIVE/ROS:  [ ] A ten-point review of systems was otherwise negative except as noted.  [ ] Due to altered mental status/intubation, subjective information were not able to be obtained from the patient. History was obtained, to the extent possible, from review of the chart and collateral sources of information.      NEURO  Exam: awake, alert, oriented to person, disoriented to place.  Meds: none  [x] Adequacy of sedation and pain control has been assessed and adjusted      RESPIRATORY  RR: 25 (02-05-19 @ 00:00) (15 - 44)  SpO2: 95% (02-05-19 @ 00:00) (92% - 100%)  Exam: unlabored, clear to auscultation bilaterally  Mechanical Ventilation: none  ABG - ( 04 Feb 2019 11:03 )  pH: 7.40  /  pCO2: 40    /  pO2: 101   / HCO3: 24    / Base Excess: .0    /  SaO2: 98      Lactate: x      [N/A] Extubation Readiness Assessed  Meds: none      CARDIOVASCULAR  HR: 109 (02-05-19 @ 00:00) (63 - 114)  BP: 112/51 (02-05-19 @ 00:00) (77/50 - 120/60)  BP(mean): 73 (02-05-19 @ 00:00) (55 - 80)  VBG - ( 04 Feb 2019 16:54 )  pH: 7.43  /  pCO2: 40    /  pO2: 48    / HCO3: 26    / Base Excess: 1.9   /  SaO2: 84     Lactate: 1.9    Exam: regular rate and rhythm  Cardiac Rhythm: sinus  Perfusion     [x]Adequate   [ ]Inadequate  Mentation   [x]Normal       [ ]Reduced  Extremities  [x]Warm         [ ]Cool  Volume Status [ ]Hypervolemic [x]Euvolemic [ ]Hypovolemic  Meds: none      GI/NUTRITION  Exam: soft, nontender, nondistended  Diet: NPO  Meds: none    GENITOURINARY  I&O's Detail    02-04 @ 07:01  -  02-05 @ 00:57  --------------------------------------------------------  IN:    multiple electrolytes Injection Type 1: 1050 mL    Packed Red Blood Cells: 320 mL    Solution: 1500 mL  Total IN: 2870 mL    OUT:    Ureteral Catheter: 2115 mL  Total OUT: 2115 mL    Total NET: 755 mL        Weight (kg): 61.1 (02-04 @ 09:59)  02-04    134<L>  |  97  |  21  ----------------------------<  179<H>  4.0   |  25  |  1.06    Ca    8.7      04 Feb 2019 09:06    TPro  5.4<L>  /  Alb  3.0<L>  /  TBili  0.6  /  DBili  x   /  AST  10  /  ALT  11  /  AlkPhos  105  02-04    [ ] Munguia catheter, indication: N/A  Meds: multiple electrolytes Injection Type 1 1000 milliLiter(s) IV Continuous <Continuous>        HEMATOLOGIC  Meds: enoxaparin Injectable 40 milliGRAM(s) SubCutaneous daily    [x] VTE Prophylaxis                        12.7   9.8   )-----------( 248      ( 04 Feb 2019 16:54 )             36.3     PT/INR - ( 04 Feb 2019 09:06 )   PT: 13.8 sec;   INR: 1.20 ratio         PTT - ( 04 Feb 2019 09:06 )  PTT:26.4 sec  Transfusion     [ ] PRBC   [ ] Platelets   [ ] FFP   [ ] Cryoprecipitate      INFECTIOUS DISEASES  WBC Count: 9.8 K/uL (02-04 @ 16:54)  WBC Count: 8.9 K/uL (02-04 @ 12:04)  WBC Count: 9.9 K/uL (02-04 @ 09:06)    RECENT CULTURES:    Meds: influenza   Vaccine 0.5 milliLiter(s) IntraMuscular once  vancomycin    Solution 125 milliGRAM(s) Oral every 6 hours        ENDOCRINE  CAPILLARY BLOOD GLUCOSE        Meds: levothyroxine Injectable 37 MICROGram(s) IV Push at bedtime        ACCESS DEVICES:  [x] Peripheral IV  [ ] Central Venous Line	[ ] R	[ ] L	[ ] IJ	[ ] Fem	[ ] SC	Placed:   [ ] Arterial Line		[ ] R	[ ] L	[ ] Fem	[ ] Rad	[ ] Ax	Placed:   [ ] PICC:					[ ] Mediport  [ ] Urinary Catheter, Date Placed:   [x] Necessity of urinary, arterial, and venous catheters discussed    OTHER MEDICATIONS:  chlorhexidine 4% Liquid 1 Application(s) Topical <User Schedule>      CODE STATUS: full code       IMAGING: < from: CT Thoracic Spine Reform No Cont (02.04.19 @ 09:50) >  IMPRESSION:    Head CT: . Volume loss and microvascular disease with areas of remote and   age-indeterminate lacunar infarction, MR is more sensitive for new   ischemic change. There is no  hemorrhage, mass effect or displaced   calvarial fracture    Cervical spine CT: No acute fracture or traumatic malalignment.    Thoracic spine CT: No acute fracture or traumatic malalignment.    Lumbar spine CT: Nondisplaced sacral insufficiency fracture extending   from the left sacroiliac joint to the ipsilateral S1-S2 sacral foramen.   Age-indeterminate mild superior endplate compression of L5.    MR is more sensitive for soft tissue, disc or ligamentous injury and may   be obtained if persistent pain as clinically warranted.      < end of copied text > HISTORY  68y Female female with a PMHx of bipolar depression, Hyperlipidemia, NHL (in remission), OCD, dementia, with recent hospitalization (1/20-1/26) for right hip hemiarthroplasty for right femoral neck fracture after fall. Hospital course complicated by ESBL E. Coli UTI on Ertapenem (1/23-1/29). Patient found down in rehab, reported as possibly rolling out of bed.   On arrival to [facility] ED, Level 1 trauma activated for hypotension. SBP 80s, 2 large-bore pIV placed, e-FAST done twice, with no fluid in the intraperitoneal space or pericardial space, no pneumothorax, suggestion of a very small pleural effusion on the left. Pelvic x-ray without fracture. Transfusion started, 2U PRBC. Pt brought to CT with suspicion of sepsis, then transported to SICU.   On arrival to SICU, Patient with complaint of lower back pain, non radiating exacerbated with movement. Otherwise, ROS is negative, although appears to be poor historian.       24 HOUR EVENTS: patient given 500ml of crystalloid after point of care ultrasound demonstrated a hyperdynamic left ventricle. Otherwise patients lactate has cleared and all other perfusion parameters appear normal. Hemodynamics stable with a MAP > 65 off any vasopressors. C. Diff specimen sent which was positive. Lovenox started for VTE prophylaxis.     SUBJECTIVE/ROS:  [ ] A ten-point review of systems was otherwise negative except as noted.  [ ] Due to altered mental status/intubation, subjective information were not able to be obtained from the patient. History was obtained, to the extent possible, from review of the chart and collateral sources of information.      NEURO  Exam: awake, alert, oriented to person, disoriented to place.  Meds: none  [x] Adequacy of sedation and pain control has been assessed and adjusted      RESPIRATORY  RR: 25 (02-05-19 @ 00:00) (15 - 44)  SpO2: 95% (02-05-19 @ 00:00) (92% - 100%)  Exam: unlabored, clear to auscultation bilaterally  Mechanical Ventilation: none  ABG - ( 04 Feb 2019 11:03 )  pH: 7.40  /  pCO2: 40    /  pO2: 101   / HCO3: 24    / Base Excess: .0    /  SaO2: 98      Lactate: x      [N/A] Extubation Readiness Assessed  Meds: none      CARDIOVASCULAR  HR: 109 (02-05-19 @ 00:00) (63 - 114)  BP: 112/51 (02-05-19 @ 00:00) (77/50 - 120/60)  BP(mean): 73 (02-05-19 @ 00:00) (55 - 80)  VBG - ( 04 Feb 2019 16:54 )  pH: 7.43  /  pCO2: 40    /  pO2: 48    / HCO3: 26    / Base Excess: 1.9   /  SaO2: 84     Lactate: 1.9    Exam: regular rate and rhythm  Cardiac Rhythm: sinus  Perfusion     [x]Adequate   [ ]Inadequate  Mentation   [x]Normal       [ ]Reduced  Extremities  [x]Warm         [ ]Cool  Volume Status [ ]Hypervolemic [x]Euvolemic [ ]Hypovolemic  Meds: none      GI/NUTRITION  Exam: soft, nontender, nondistended  Diet: NPO  Meds: none    GENITOURINARY  I&O's Detail    02-04 @ 07:01  -  02-05 @ 00:57  --------------------------------------------------------  IN:    multiple electrolytes Injection Type 1: 1050 mL    Packed Red Blood Cells: 320 mL    Solution: 1500 mL  Total IN: 2870 mL    OUT:    Ureteral Catheter: 2115 mL  Total OUT: 2115 mL    Total NET: 755 mL        Weight (kg): 61.1 (02-04 @ 09:59)  02-04                11.9                 135  | 21   | 16           10.3  >-----------< 245     ------------------------< 94                    35.3                 3.8  | 102  | 0.72                                         Ca 7.5   Mg 2.0   Ph 2.7      [ ] Munguia catheter, indication: N/A  Meds: multiple electrolytes Injection Type 1 1000 milliLiter(s) IV Continuous <Continuous>        HEMATOLOGIC  Meds: enoxaparin Injectable 40 milliGRAM(s) SubCutaneous daily    [x] VTE Prophylaxis             PT/INR -  15.0 sec / 1.29 ratio   ( 05 Feb 2019 02:27 )       PTT -  26.7 sec   ( 05 Feb 2019 02:27 )    Transfusion     [ ] PRBC   [ ] Platelets   [ ] FFP   [ ] Cryoprecipitate      INFECTIOUS DISEASES  WBC Count: 9.8 K/uL (02-04 @ 16:54)  WBC Count: 8.9 K/uL (02-04 @ 12:04)  WBC Count: 9.9 K/uL (02-04 @ 09:06)    RECENT CULTURES:    Meds: influenza   Vaccine 0.5 milliLiter(s) IntraMuscular once  vancomycin    Solution 125 milliGRAM(s) Oral every 6 hours        ENDOCRINE  CAPILLARY BLOOD GLUCOSE        Meds: levothyroxine Injectable 37 MICROGram(s) IV Push at bedtime        ACCESS DEVICES:  [x] Peripheral IV  [ ] Central Venous Line	[ ] R	[ ] L	[ ] IJ	[ ] Fem	[ ] SC	Placed:   [ ] Arterial Line		[ ] R	[ ] L	[ ] Fem	[ ] Rad	[ ] Ax	Placed:   [ ] PICC:					[ ] Mediport  [ ] Urinary Catheter, Date Placed:   [x] Necessity of urinary, arterial, and venous catheters discussed    OTHER MEDICATIONS:  chlorhexidine 4% Liquid 1 Application(s) Topical <User Schedule>      CODE STATUS: full code       IMAGING: < from: CT Thoracic Spine Reform No Cont (02.04.19 @ 09:50) >  IMPRESSION:    Head CT: . Volume loss and microvascular disease with areas of remote and   age-indeterminate lacunar infarction, MR is more sensitive for new   ischemic change. There is no  hemorrhage, mass effect or displaced   calvarial fracture    Cervical spine CT: No acute fracture or traumatic malalignment.    Thoracic spine CT: No acute fracture or traumatic malalignment.    Lumbar spine CT: Nondisplaced sacral insufficiency fracture extending   from the left sacroiliac joint to the ipsilateral S1-S2 sacral foramen.   Age-indeterminate mild superior endplate compression of L5.    MR is more sensitive for soft tissue, disc or ligamentous injury and may   be obtained if persistent pain as clinically warranted.      < end of copied text >

## 2019-02-06 DIAGNOSIS — D72.825 BANDEMIA: ICD-10-CM

## 2019-02-06 DIAGNOSIS — A04.72 ENTEROCOLITIS DUE TO CLOSTRIDIUM DIFFICILE, NOT SPECIFIED AS RECURRENT: ICD-10-CM

## 2019-02-06 DIAGNOSIS — I95.9 HYPOTENSION, UNSPECIFIED: ICD-10-CM

## 2019-02-06 DIAGNOSIS — Z88.0 ALLERGY STATUS TO PENICILLIN: ICD-10-CM

## 2019-02-06 DIAGNOSIS — M97.8XXA PERIPROSTHETIC FRACTURE AROUND OTHER INTERNAL PROSTHETIC JOINT, INITIAL ENCOUNTER: ICD-10-CM

## 2019-02-06 DIAGNOSIS — R50.9 FEVER, UNSPECIFIED: ICD-10-CM

## 2019-02-06 LAB
ANION GAP SERPL CALC-SCNC: 12 MMOL/L — SIGNIFICANT CHANGE UP (ref 5–17)
APTT BLD: 27.2 SEC — LOW (ref 27.5–36.3)
B PERT IGG+IGM PNL SER: ABNORMAL
BUN SERPL-MCNC: 19 MG/DL — SIGNIFICANT CHANGE UP (ref 7–23)
CALCIUM SERPL-MCNC: 6.9 MG/DL — LOW (ref 8.4–10.5)
CHLORIDE SERPL-SCNC: 101 MMOL/L — SIGNIFICANT CHANGE UP (ref 96–108)
CO2 SERPL-SCNC: 20 MMOL/L — LOW (ref 22–31)
COLOR FLD: SIGNIFICANT CHANGE UP
CREAT SERPL-MCNC: 0.67 MG/DL — SIGNIFICANT CHANGE UP (ref 0.5–1.3)
CRP SERPL-MCNC: 32.47 MG/DL — HIGH (ref 0–0.4)
ERYTHROCYTE [SEDIMENTATION RATE] IN BLOOD: 16 MM/HR — SIGNIFICANT CHANGE UP (ref 0–20)
FLUID INTAKE SUBSTANCE CLASS: SIGNIFICANT CHANGE UP
FLUID SEGMENTED GRANULOCYTES: 16 % — SIGNIFICANT CHANGE UP
GLUCOSE SERPL-MCNC: 141 MG/DL — HIGH (ref 70–99)
GRAM STN FLD: SIGNIFICANT CHANGE UP
HCT VFR BLD CALC: 42.5 % — SIGNIFICANT CHANGE UP (ref 34.5–45)
HGB BLD-MCNC: 13.8 G/DL — SIGNIFICANT CHANGE UP (ref 11.5–15.5)
INR BLD: 1.18 RATIO — HIGH (ref 0.88–1.16)
LYMPHOCYTES # FLD: 4 % — SIGNIFICANT CHANGE UP
MAGNESIUM SERPL-MCNC: 2.7 MG/DL — HIGH (ref 1.6–2.6)
MCHC RBC-ENTMCNC: 32 PG — SIGNIFICANT CHANGE UP (ref 27–34)
MCHC RBC-ENTMCNC: 32.6 GM/DL — SIGNIFICANT CHANGE UP (ref 32–36)
MCV RBC AUTO: 98.3 FL — SIGNIFICANT CHANGE UP (ref 80–100)
MONOS+MACROS # FLD: 80 % — SIGNIFICANT CHANGE UP
PHOSPHATE SERPL-MCNC: 3 MG/DL — SIGNIFICANT CHANGE UP (ref 2.5–4.5)
PLATELET # BLD AUTO: 235 K/UL — SIGNIFICANT CHANGE UP (ref 150–400)
POTASSIUM SERPL-MCNC: 3.8 MMOL/L — SIGNIFICANT CHANGE UP (ref 3.5–5.3)
POTASSIUM SERPL-SCNC: 3.8 MMOL/L — SIGNIFICANT CHANGE UP (ref 3.5–5.3)
PROCALCITONIN SERPL-MCNC: 1.68 NG/ML — HIGH (ref 0.02–0.1)
PROTHROM AB SERPL-ACNC: 13.5 SEC — HIGH (ref 10–12.9)
RBC # BLD: 4.32 M/UL — SIGNIFICANT CHANGE UP (ref 3.8–5.2)
RBC # FLD: 16.2 % — HIGH (ref 10.3–14.5)
RCV VOL RI: HIGH /UL (ref 0–5)
SODIUM SERPL-SCNC: 133 MMOL/L — LOW (ref 135–145)
SPECIMEN SOURCE: SIGNIFICANT CHANGE UP
TOTAL NUCLEATED CELL COUNT, BODY FLUID: 156 /UL — HIGH (ref 0–5)
TUBE TYPE: SIGNIFICANT CHANGE UP
WBC # BLD: 11 K/UL — HIGH (ref 3.8–10.5)
WBC # FLD AUTO: 11 K/UL — HIGH (ref 3.8–10.5)

## 2019-02-06 PROCEDURE — 99232 SBSQ HOSP IP/OBS MODERATE 35: CPT | Mod: GC

## 2019-02-06 PROCEDURE — 99233 SBSQ HOSP IP/OBS HIGH 50: CPT

## 2019-02-06 PROCEDURE — 99223 1ST HOSP IP/OBS HIGH 75: CPT

## 2019-02-06 PROCEDURE — 77002 NEEDLE LOCALIZATION BY XRAY: CPT | Mod: 26,RT

## 2019-02-06 PROCEDURE — 20610 DRAIN/INJ JOINT/BURSA W/O US: CPT | Mod: RT

## 2019-02-06 RX ORDER — GABAPENTIN 400 MG/1
300 CAPSULE ORAL
Qty: 0 | Refills: 0 | Status: DISCONTINUED | OUTPATIENT
Start: 2019-02-06 | End: 2019-02-07

## 2019-02-06 RX ORDER — ENOXAPARIN SODIUM 100 MG/ML
40 INJECTION SUBCUTANEOUS DAILY
Qty: 0 | Refills: 0 | Status: DISCONTINUED | OUTPATIENT
Start: 2019-02-06 | End: 2019-02-09

## 2019-02-06 RX ORDER — POTASSIUM CHLORIDE 20 MEQ
20 PACKET (EA) ORAL ONCE
Qty: 0 | Refills: 0 | Status: COMPLETED | OUTPATIENT
Start: 2019-02-06 | End: 2019-02-06

## 2019-02-06 RX ORDER — SODIUM CHLORIDE 9 MG/ML
500 INJECTION INTRAMUSCULAR; INTRAVENOUS; SUBCUTANEOUS ONCE
Qty: 0 | Refills: 0 | Status: COMPLETED | OUTPATIENT
Start: 2019-02-06 | End: 2019-02-06

## 2019-02-06 RX ORDER — METRONIDAZOLE 500 MG
500 TABLET ORAL EVERY 8 HOURS
Qty: 0 | Refills: 0 | Status: DISCONTINUED | OUTPATIENT
Start: 2019-02-07 | End: 2019-02-14

## 2019-02-06 RX ADMIN — Medication 500 MILLIGRAM(S): at 12:26

## 2019-02-06 RX ADMIN — Medication 500 MILLIGRAM(S): at 18:32

## 2019-02-06 RX ADMIN — Medication 100 MILLIGRAM(S): at 23:50

## 2019-02-06 RX ADMIN — SODIUM CHLORIDE 3000 MILLILITER(S): 9 INJECTION INTRAMUSCULAR; INTRAVENOUS; SUBCUTANEOUS at 18:33

## 2019-02-06 RX ADMIN — Medication 500 MILLIGRAM(S): at 23:52

## 2019-02-06 RX ADMIN — Medication 100 MILLIGRAM(S): at 10:05

## 2019-02-06 RX ADMIN — ZIPRASIDONE HYDROCHLORIDE 40 MILLIGRAM(S): 20 CAPSULE ORAL at 06:10

## 2019-02-06 RX ADMIN — GABAPENTIN 300 MILLIGRAM(S): 400 CAPSULE ORAL at 06:10

## 2019-02-06 RX ADMIN — Medication 20 MILLIEQUIVALENT(S): at 06:10

## 2019-02-06 RX ADMIN — Medication 500 MILLIGRAM(S): at 06:10

## 2019-02-06 RX ADMIN — Medication 75 MICROGRAM(S): at 06:10

## 2019-02-06 RX ADMIN — Medication 100 MILLIGRAM(S): at 15:43

## 2019-02-06 RX ADMIN — CHLORHEXIDINE GLUCONATE 1 APPLICATION(S): 213 SOLUTION TOPICAL at 06:10

## 2019-02-06 RX ADMIN — BUPROPION HYDROCHLORIDE 300 MILLIGRAM(S): 150 TABLET, EXTENDED RELEASE ORAL at 12:26

## 2019-02-06 RX ADMIN — SODIUM CHLORIDE 3000 MILLILITER(S): 9 INJECTION INTRAMUSCULAR; INTRAVENOUS; SUBCUTANEOUS at 15:33

## 2019-02-06 RX ADMIN — SODIUM CHLORIDE 100 MILLILITER(S): 9 INJECTION, SOLUTION INTRAVENOUS at 15:32

## 2019-02-06 NOTE — DIETITIAN INITIAL EVALUATION ADULT. - PERTINENT MEDS FT
MEDICATIONS  (STANDING):  buPROPion XL . 300 milliGRAM(s) Oral daily  chlorhexidine 4% Liquid 1 Application(s) Topical <User Schedule>  gabapentin 300 milliGRAM(s) Oral <User Schedule>  gabapentin 300 milliGRAM(s) Oral <User Schedule>  influenza   Vaccine 0.5 milliLiter(s) IntraMuscular once  levothyroxine 75 MICROGram(s) Oral daily  metroNIDAZOLE  IVPB 500 milliGRAM(s) IV Intermittent every 8 hours  metroNIDAZOLE  IVPB      multiple electrolytes Injection Type 1 1000 milliLiter(s) (100 mL/Hr) IV Continuous <Continuous>  vancomycin    Solution 500 milliGRAM(s) Oral every 6 hours  ziprasidone 40 milliGRAM(s) Oral <User Schedule>

## 2019-02-06 NOTE — DIETITIAN INITIAL EVALUATION ADULT. - FACTORS AFF FOOD INTAKE
Pt ordered for clear liquids, however currently NPO since midnight for procedure. Multiple BMs noted 2/05, rectal tube placed with 100ml output as of 2/06.

## 2019-02-06 NOTE — DIETITIAN INITIAL EVALUATION ADULT. - NS AS NUTRI INTERV MEALS SNACK
General/healthful diet/Advance diet as tolerated to Regular: encourage intake of high protein, nutrient dense foods

## 2019-02-06 NOTE — DIETITIAN INITIAL EVALUATION ADULT. - NS AS NUTRI INTERV MEDICAL AND FOOD SUPPLEMENTS
Monitor need for oral supplements if po intake is inadequate to meet protein needs/Commercial beverage

## 2019-02-06 NOTE — PROGRESS NOTE ADULT - ATTENDING COMMENTS
continue ATC cardio pulmonary monitoring in this critically ill Patient  I am a non participating [employer] physician seeing Pt in coverage for Dr Barraza continue ATC cardio pulmonary monitoring in this critically ill Patient  I am a non participating [facility] physician seeing Pt in coverage for Dr Barraza. The above patient examination was reviewed with Dr. Awan and I agree with his evaluation, assessment and treatment plan.  Orlin Barraza M.D.

## 2019-02-06 NOTE — DIETITIAN INITIAL EVALUATION ADULT. - OTHER INFO
Nutrition Assessment warranted for length of stay in SICU. Per chart, "68 year old female with a PMHx of bipolar depression, Hyperlipidemia, NHL (in remission), OCD, dementia, with recent hospitalization (1/20-1/26) for right hip hemiarthroplasty for right femoral neck fracture after fall. Hospital course complicated by ESBL E. Coli UTI on Ertapenem (1/23-1/29) now presents with likely Hypovolemic/ Distributive shock secondary to sepsis 2/2 Pancolitis, likely secondary to C. Diff Colitis, Age Indeterminate L5 vertebral body fracture, possible acute as tender to palpation, Nondisplaced sacral insufficiency, Right proximal femoral periprosthetic fracture following unwitnessed fall." Plan for OR on 2/7.

## 2019-02-06 NOTE — PROGRESS NOTE ADULT - ASSESSMENT
68 year old female with a PMHx of bipolar depression, Hyperlipidemia, NHL (in remission), OCD, dementia, with recent hospitalization (1/20-1/26) for right hip hemiarthroplasty for right femoral neck fracture after fall. Hospital course complicated by ESBL E. Coli UTI on Ertapenem (1/23-1/29) now presents with likely Hypovolemic/ Distributive shock secondary to sepsis 2/2 Pancolitis, likely secondary to C. Diff Colitis, Age Indeterminate L5 vertebral body fracture, possible acute as tender to palpation, Nondisplaced sacral insufficiency, Right proximal femoral periprosthetic fracture following unwitnessed fall    NEURO: Dementia, Bipolar disorder, OCD, L5  vertebral body fracture. Nondisplaced sacral insufficiency fracture and R proximal femoral periprosthetic fracture following unwitnessed fall  - Continue with home gabapentin, ziprasidone and Wellbutrin Continue to hold xanax.   - Not currently on pain meds. Concerns over mental status. Previously received IV Tylenol yesterday but can revaluate going foreward.       RESP: no active issues  - Encourage ISS/ OOB, Non-weight bearing RLE when neurologically improved    CV: Hypotension, likely related to hypovolemia/ distributive shock secondary to Sepsis, unlikely hemorrhage after reviewing CT images.   - Hemodynamically stable with MAP >64 for 24 hrs.   Continue hemodynamic monitoring.    GI: Pancolitis, C. diff positive   - NPO at midnight for IR arthrocentesis but CLD now.       RENAL: Acute (AKIN I) on Chronic Kidney Disease, CKD II, Baseline Cr-0.7, GFR: 72ml/min, Hypotonic Hyponatremia, S. Osm- 285, likely related to hypovolemia  - Will continue with Plasmalyte @ 100ml/hr  - BMP daily    HEME: No evidence of ongoing hemorrhage, Hct stable and hemodynamics stable   - Continue Lovenox for VTE prophylaxis.    ID: Pancolitis, C. diff positive   - Continue PO Vanco 500mg q 6hours and IV Flagyl 500 mg q8h    ENDO: Hypothyroidism  - C/w PO Synthroid 75mcg qd    MUSCULOSKELETAL Nondisplaced sacral insufficiency, Right proximal femoral periprosthetic fracture. L5 vertebral body fracture  - Will maintain NWB RLE for now.  - OR tomorrow for IR arthrocentesis and tentative RTOR w/ orthto on thurs.     Dispo: SICU full code    S Annelise PGY-2  SICU

## 2019-02-06 NOTE — PROGRESS NOTE ADULT - ATTENDING COMMENTS
I agree with the above note and have personally seen and examined this patient. All pertinent films have been reviewed. Please refer to clinical documentation of the history, physical examinations, data summary, and both assessment and plan as documented above and with which I agree.    appreciate Yani consult  if medically stabilized will plan for ORIF vs rhea revision Friday afternoon, otherwise will need to wait until next week when more stable  discussed options with patient at length today regarding nonoperative treatment with nonweightbearing and bedrest for 6 weeks versus operative intervention with orif vs revision hemiarthroplasty. risks of nonop are fracture propagation versus orif if implant loosens vs revision in current setting of recent surgery and active cdiff of resulting in periprosthetic joint infection. all risks and benefits discussed with patient. she opts for orif with back up plan of reviison hemiarthroplasty when able.  aspiration not consistent with active hemiarthroplasty infection based on cell count    The patient is an appropriate candidate for consideration of right hip hemiarthroplasty open reduction internal fixation versus revision hemiarthroplasty with orif as well as irrigation and debridement. This recommendation is based on the patients current diagnosis of YONATAN PJI. An extensive discussion was conducted of the natural history of this particular problem and the variety of surgical and non-surgical treatment options available to the patient. A risk/benefit analysis was discussed with the patient reviewing the advantages and disadvantages of surgical intervention at this time. A full explanation was given of the nature and the purpose of the procedure and anesthesia, its benefits, possible alternative methods of diagnosis or treatment, the risks involved, the possibility of complications, the foreseeable consequences of the procedure and the possible results of the non-treatment.    No guarantee or assurance was made as to the results that may be obtained. Specifically, the risks were identified to include, but are not limited to the following: Infection, phlebitis, pulmonary embolism, death, paralysis, dislocation, pain, stiffness, instability, limp, weakness, breakage, leg-length inequality, uncontrolled bleeding, nerve injury, blood vessel injury, pressure sores, anesthetic risks, delayed healing of wound and bone, and wear and loosening. The patient understands that they will likely need to modulate their weightbearing for at least 6 weeks if orif only is utilized with toe touch weightbearing 20% for fracture healing. Further implant subsidence can result in hemiarthroplasty instability and/or leg shortening requiring future revision. Further discussion was undertaken with the patient about the details of surgical preparation, treatment, and postoperative rehabilitation including medical clearance, the hospital course, and the postoperative rehabilitation involved.  All in all, I feel that this patient is a good candidate for surgical intervention once she is medically optimized    Conor George MD  Attending Orthopedic Surgeon

## 2019-02-06 NOTE — DIETITIAN INITIAL EVALUATION ADULT. - ENERGY NEEDS
ht: 5 feet 1 inches, wt: 135 pounds, BMI: 25.5 Kg/m2, IBW: 105 pounds (+/- 10%), 129% IBW. Edema: 2+ generalized, left/right arm/leg; Skin: no pressure injuries noted per nursing flowsheet.

## 2019-02-06 NOTE — CONSULT NOTE ADULT - ATTENDING COMMENTS
Pierre Lomeli  Attending Physician   Division of Infectious Disease  Pager #[phone]  After 5pm/weekend or no response, call #[phone]

## 2019-02-06 NOTE — PROGRESS NOTE ADULT - SUBJECTIVE AND OBJECTIVE BOX
Patient seen and examined. Pain controlled. No acute events overnight per nursing, PT remains confused, CT R hip demonstrated PP femur fracture.    MEDICATIONS  (STANDING):  buPROPion XL . 300 milliGRAM(s) Oral daily  chlorhexidine 4% Liquid 1 Application(s) Topical <User Schedule>  gabapentin 300 milliGRAM(s) Oral <User Schedule>  gabapentin 1200 milliGRAM(s) Oral <User Schedule>  influenza   Vaccine 0.5 milliLiter(s) IntraMuscular once  levothyroxine 75 MICROGram(s) Oral daily  metroNIDAZOLE  IVPB 500 milliGRAM(s) IV Intermittent every 8 hours  metroNIDAZOLE  IVPB      multiple electrolytes Injection Type 1 1000 milliLiter(s) IV Continuous <Continuous>  vancomycin    Solution 500 milliGRAM(s) Oral every 6 hours  ziprasidone 40 milliGRAM(s) Oral <User Schedule>    Allergies    honeydew melon (Other)  penicillin (Other; Hives)    Intolerances                            13.8   11.0  )-----------( 235      ( 06 Feb 2019 03:08 )             42.5     06 Feb 2019 03:08    133    |  101    |  19     ----------------------------<  141    3.8     |  20     |  0.67     Ca    6.9        06 Feb 2019 03:08  Phos  3.0       06 Feb 2019 03:08  Mg     2.7       06 Feb 2019 03:08    TPro  4.4    /  Alb  2.2    /  TBili  0.9    /  DBili  0.3    /  AST  19     /  ALT  18     /  AlkPhos  83     05 Feb 2019 02:27    PT/INR - ( 06 Feb 2019 03:08 )   PT: 13.5 sec;   INR: 1.18 ratio         PTT - ( 06 Feb 2019 03:08 )  PTT:27.2 sec  Vital Signs Last 24 Hrs  T(C): 36.7 (02-06-19 @ 03:00), Max: 38.2 (02-05-19 @ 23:00)  T(F): 98 (02-06-19 @ 03:00), Max: 100.8 (02-05-19 @ 23:00)  HR: 102 (02-06-19 @ 06:00) (95 - 112)  BP: 92/54 (02-06-19 @ 06:00) (90/64 - 111/53)  BP(mean): 68 (02-06-19 @ 06:00) (64 - 77)  RR: 26 (02-06-19 @ 06:00) (16 - 33)  SpO2: 94% (02-06-19 @ 06:00) (89% - 100%)    Physical Exam  Gen: NAD  RLE:   Staples in place, incision well approximated and no signs of breakdown or drainage  Pain with ROM of hip/knee, TTP to femur  +ehl/fhl/ta/gs function  L2-S1 silt  Dp/pt pulse intact  No calf ttp  Compartments soft    Plan:   A/P: 68 y F w/ R Hemiarthroplasty done on 1/21, s/p MF, with L S1-2 sacral insufficiency fracture and  R periprosthetic Hip Fracture     - CT R hip demonstrates PP Femur fracture around Stem, will Need ORIF  -C/w SICU care, recs appreciated  - IR joint aspiration to Rule out infection   -Analgesia  -DVT ppx, per primary team  -Non weight bearing RLE/bedrest  -May weight bear as tolerated on LLE when not on bedrest  -Will follow up imaging  -Dr Brown will be giving Recs on possible surgery, will continue to monitor   - When stable needs revision right hip hemiarthroplasty and open reduction internal fixation with irrigation and debridement, plan for Friday in OR  -Medical clearance required.  - will advise if plan changes  - Dr brown aware and agrees with plan

## 2019-02-06 NOTE — PROGRESS NOTE ADULT - SUBJECTIVE AND OBJECTIVE BOX
67 y/o female h/o demential NHL presenting after unwitnessed fall. pt found on the ground at rehab. is on lovenox after hip fracture. pt reports pain to her right leg. per EMS en route slurring speech and mildly hypotensive requesting trauma.unclear how long pt on the ground. Patient found to have C diff with worsening sepsis and Patient was moved to the SICU for continued cardiopulmonary monitoring. seen by ortho for further treatment of hip       MEDICATIONS  (STANDING):  buPROPion XL . 300 milliGRAM(s) Oral daily  chlorhexidine 4% Liquid 1 Application(s) Topical <User Schedule>  enoxaparin Injectable 40 milliGRAM(s) SubCutaneous daily  gabapentin 300 milliGRAM(s) Oral <User Schedule>  gabapentin 300 milliGRAM(s) Oral <User Schedule>  influenza   Vaccine 0.5 milliLiter(s) IntraMuscular once  levothyroxine 75 MICROGram(s) Oral daily  metroNIDAZOLE  IVPB 500 milliGRAM(s) IV Intermittent every 8 hours  multiple electrolytes Injection Type 1 1000 milliLiter(s) (100 mL/Hr) IV Continuous <Continuous>  vancomycin    Solution 500 milliGRAM(s) Oral every 6 hours  ziprasidone 40 milliGRAM(s) Oral <User Schedule>    MEDICATIONS  (PRN):          VITALS:   T(C): 37.1 (02-06-19 @ 19:00), Max: 37.2 (02-06-19 @ 15:22)  HR: 115 (02-06-19 @ 22:00) (95 - 121)  BP: 88/60 (02-06-19 @ 21:00) (74/38 - 106/50)  RR: 32 (02-06-19 @ 22:00) (24 - 32)  SpO2: 93% (02-06-19 @ 22:00) (86% - 97%)  Wt(kg): --        LABS:  ABG - ( 05 Feb 2019 02:24 )  pH, Arterial: 7.49  pH, Blood: x     /  pCO2: 34    /  pO2: 73    / HCO3: 26    / Base Excess: 3.0   /  SaO2: 95                    CBC Full  -  ( 06 Feb 2019 03:08 )  WBC Count : 11.0 K/uL  Hemoglobin : 13.8 g/dL  Hematocrit : 42.5 %  Platelet Count - Automated : 235 K/uL  Mean Cell Volume : 98.3 fl  Mean Cell Hemoglobin : 32.0 pg  Mean Cell Hemoglobin Concentration : 32.6 gm/dL  Auto Neutrophil # : x  Auto Lymphocyte # : x  Auto Monocyte # : x  Auto Eosinophil # : x  Auto Basophil # : x  Auto Neutrophil % : x  Auto Lymphocyte % : x  Auto Monocyte % : x  Auto Eosinophil % : x  Auto Basophil % : x    02-06    133<L>  |  101  |  19  ----------------------------<  141<H>  3.8   |  20<L>  |  0.67    Ca    6.9<L>      06 Feb 2019 03:08  Phos  3.0     02-06  Mg     2.7     02-06    TPro  4.4<L>  /  Alb  2.2<L>  /  TBili  0.9  /  DBili  0.3<H>  /  AST  19  /  ALT  18  /  AlkPhos  83  02-05    LIVER FUNCTIONS - ( 05 Feb 2019 02:27 )  Alb: 2.2 g/dL / Pro: 4.4 g/dL / ALK PHOS: 83 U/L / ALT: 18 U/L / AST: 19 U/L / GGT: x           PT/INR - ( 06 Feb 2019 03:08 )   PT: 13.5 sec;   INR: 1.18 ratio         PTT - ( 06 Feb 2019 03:08 )  PTT:27.2 sec    CAPILLARY BLOOD GLUCOSE          RADIOLOGY & ADDITIONAL TESTS:

## 2019-02-06 NOTE — PROGRESS NOTE ADULT - SUBJECTIVE AND OBJECTIVE BOX
HISTORY  68y Female female with a PMHx of bipolar depression, Hyperlipidemia, NHL (in remission), OCD, dementia, with recent hospitalization (1/20-1/26) for right hip hemiarthroplasty for right femoral neck fracture after fall. Hospital course complicated by ESBL E. Coli UTI on Ertapenem (1/23-1/29). Patient found down in rehab, reported as possibly rolling out of bed.   On arrival to [facility] ED, Level 1 trauma activated for hypotension. SBP 80s, 2 large-bore pIV placed, e-FAST done twice, with no fluid in the intraperitoneal space or pericardial space, no pneumothorax, suggestion of a very small pleural effusion on the left. Pelvic x-ray without fracture. Transfusion started, 2U PRBC. Pt brought to CT with suspicion of sepsis, then transported to SICU.   On arrival to SICU, Patient with complaint of lower back pain, non radiating exacerbated with movement. Otherwise, ROS is negative, although appears to be poor historian. CT demonstrated L5  veterebral body fracture. Nondisplaced sacral insufficiency fracture, subacute R proximal femoral periprosthetic fracture and radiographic evidence of proctocolitis for which patient was started on Vancomycin following a positive stool C.Diff test.      24 HOUR EVENTS:   - Started on Flagyl 500mg IV for severe C. diff colitis with hypotension (SBP ~80's). Vanc increased from 125mg to 500mg IV q6h  - Restarted on home neuropsych meds (Gabapentin, Wellbutrin and ziprasidone). Holding Xanax at this time.   -  Hemodynamically stable. MAP >64mm Hg without need for vasopressor support or plasmalyte boluses. C/w plasmalyte at 100cc/hr  - CT Pelvis/Femur and 3d reconstruction for better evaluation of periprosthetic fracture.   - Will go for IR R arthrocentesis to r/o infection prior to OR on thurs with ortho for revison R hip arthroplasty, open reduction internal fixation with irragation and debridement. NPO after midnight  -    SUBJECTIVE/ROS:  [X ] A ten-point review of systems was otherwise negative except as noted.  [ ] Due to altered mental status/intubation, subjective information were not able to be obtained from the patient. History was obtained, to the extent possible, from review of the chart and collateral sources of information.      NEURO  Exam: awake, alert, oriented to person, disoriented to place and situation  Meds: gabapentin 300 milliGRAM(s) Oral <User Schedule>  gabapentin 1200 milliGRAM(s) Oral <User Schedule>  ziprasidone 40 mg oral capsule: orally once a day  buPROPion 300 mg/24 hours (XL) oral tablet, extended release: 1 tab(s) orally once a day       RESPIRATORY  Ventilated: yes / no  Vent Settings:     RR: RR: 26 (02-05-19 @ 20:00) (16 - 33) | O2 Sat: SpO2: 93% (02-05-19 @ 20:00) (91% - 100%)  ABG:   ABG - ( 05 Feb 2019 02:24 )  pH, Arterial: 7.49  pH, Blood: x     /  pCO2: 34    /  pO2: 73    / HCO3: 26    / Base Excess: 3.0   /  SaO2: 95      Exam: unlabored, clear to auscultation bilaterally  [N/A] Extubation Readiness Assessed  Meds: none      CARDIOVASCULAR  HR: HR: 110 (02-05-19 @ 20:00) (100 - 112) | BP: BP: 96/50 (02-05-19 @ 20:00) (90/64 - 112/51) | MAP: BP(mean): 70 (02-05-19 @ 20:00) (64 - 77)  Exam: regular rate and rhythm  Cardiac Rhythm: sinus  Perfusion     [x]Adequate   [ ]Inadequate  Mentation   [x]Normal       [ ]Reduced  Extremities  [x]Warm         [ ]Cool  Volume Status [ ]Hypervolemic [ ]Euvolemic [X ]Hypovolemic  Meds: None       GI/NUTRITION  Exam: Soft, non-tender, nondistended  Diet: CLD  Meds: None    GENITOURINARY  I&O's Detail    02-04 @ 07:01  -  02-05 @ 07:00  --------------------------------------------------------  IN:    IV PiggyBack: 150 mL    multiple electrolytes Injection Type 1: 1900 mL    Packed Red Blood Cells: 320 mL    Solution: 1500 mL  Total IN: 3870 mL    OUT:    Ureteral Catheter: 2400 mL  Total OUT: 2400 mL    Total NET: 1470 mL      02-05 @ 07:01  -  02-05 @ 21:29  --------------------------------------------------------  IN:    IV PiggyBack: 300 mL    multiple electrolytes Injection Type 1: 1300 mL  Total IN: 1600 mL    OUT:    Ureteral Catheter: 635 mL  Total OUT: 635 mL    Total NET: 965 mL          02-05    135  |  102  |  16  ----------------------------<  94  3.8   |  21<L>  |  0.72    Ca    7.5<L>      05 Feb 2019 02:27  Phos  2.7     02-05  Mg     2.0     02-05    TPro  4.4<L>  /  Alb  2.2<L>  /  TBili  0.9  /  DBili  0.3<H>  /  AST  19  /  ALT  18  /  AlkPhos  83  02-05    [ ] Munguia catheter, indication: N/A  Meds: multiple electrolytes Injection Type 1 1000 milliLiter(s) IV Continuous <Continuous>        HEMATOLOGIC  Meds: enoxaparin Injectable 40 milliGRAM(s) SubCutaneous daily  [x] VTE Prophylaxis                        11.9   10.3  )-----------( 245      ( 05 Feb 2019 02:27 )             35.3     PT/INR - ( 05 Feb 2019 02:27 )   PT: 15.0 sec;   INR: 1.29 ratio         PTT - ( 05 Feb 2019 02:27 )  PTT:26.7 sec  Transfusion     [ ] PRBC   [ ] Platelets   [ ] FFP   [ ] Cryoprecipitate      INFECTIOUS DISEASES  WBC Count: 10.3 K/uL (02-05 @ 02:27)    RECENT CULTURES:    Meds: influenza   Vaccine 0.5 milliLiter(s) IntraMuscular once  metroNIDAZOLE  IVPB 500 milliGRAM(s) IV Intermittent every 8 hours  metroNIDAZOLE  IVPB      vancomycin    Solution 500 milliGRAM(s) Oral every 6 hours        ENDOCRINE  CAPILLARY BLOOD GLUCOSE        Meds: levothyroxine 75 MICROGram(s) Oral daily        ACCESS DEVICES:  [X] Peripheral IV  [X] Rectal tube  [ ] Central Venous Line	[ ] R	[ ] L	[ ] IJ	[ ] Fem	[ ] SC	                Placed:   [ ] Arterial Line		        [ ] R	[ ] L	        [ ] Fem	[ ] Rad	[ ] Ax	Placed:   [ ] PICC:					[ ] Mediport  [ ] Urinary Catheter, Date Placed:   [x] Necessity of urinary, arterial, and venous catheters discussed      OTHER MEDICATIONS:  chlorhexidine 4% Liquid 1 Application(s) Topical <User Schedule>  CODE STATUS: full code      IMAGING: < from: CT Thoracic Spine Reform No Cont (02.04.19 @ 09:50) >  IMPRESSION:    Head CT: . Volume loss and microvascular disease with areas of remote and   age-indeterminate lacunar infarction, MR is more sensitive for new   ischemic change. There is no  hemorrhage, mass effect or displaced   calvarial fracture    Cervical spine CT: No acute fracture or traumatic malalignment.    Thoracic spine CT: No acute fracture or traumatic malalignment.    Lumbar spine CT: Nondisplaced sacral insufficiency fracture extending   from the left sacroiliac joint to the ipsilateral S1-S2 sacral foramen.   Age-indeterminate mild superior endplate compression of L5.    MR is more sensitive for soft tissue, disc or ligamentous injury and may   be obtained if persistent pain as clinically warranted.      < end of copied text >    < from: CT 3D Reconstruct w/ Workstation (02.05.19 @ 09:33) >    EXAM:  CT PELVIS BONY ONLY                          EXAM:  CT 3D RECONSTRUCT W HANY                          EXAM:  CT FEMUR ONLY RT                          EXAM:  CT 3D RECONSTRUCT W [place]                            PROCEDURE DATE:  02/05/2019            INTERPRETATION:  EXAMINATION: CT of the bony pelvis and right femur   without contrast    CLINICAL INFORMATION: Right femur periprosthetic fracture    TECHNIQUE: Axial CT images were obtained through the pelvis and right   femur fromthe level the hip to the level of the mid femoral diaphysis.   Coronal and sagittal reformatted images were made. 3-D reconstruction   images were performed on an independent workstation.    FINDINGS: The patient is status post right hip hemiarthroplasty. There is   a minimally displaced ununited periprosthetic fracture along the anterior   cortex of the proximal femur extending from the intertrochanteric region   to the level of the proximal third of the femoral diaphysis. There may be   minimal/faint callus formation anteriorly. The fracture gap measures up   to 4 mm superiorly. There is a fluid collection in the right trochanteric   bursa which may represent a postoperative seroma, although, superimposed   infection should be evaluated foron a clinical basis. There is skin   thickening and posterolateral skin staples overlying the right hip with   adjacent nonspecific ill-defined subcutaneous fat   reticulation/infiltration.    The bones are demineralized. There is mild to moderate grade compression   deformity of the superior endplate of L5. The left hip joint space is   maintained. There is pubic symphysis arthrosis. There are mild   degenerative changes at the sacroiliac joints bilaterally. There is lower   lumbar spondylosis. There is diffuse colonic and rectal wall thickening   with surrounding inflammatory changes, consistent with colitis. The   bladder is collapsed and a Munguia catheter balloon.    IMPRESSION: Minimally displaced right proximal femoral periprosthetic   fracture, as above.    Pancolitis.    Nonspecific fluid collection in the right trochanteric bursa.                    RENATE DONAHUE M.D., ATTENDING RADIOLOGIST  This document has been electronically signed. Feb 5 2019 10:01AM      < end of copied text >

## 2019-02-06 NOTE — DIETITIAN INITIAL EVALUATION ADULT. - ORAL INTAKE PTA
Unable to assess. Per chart, pt admitted from rehab. Per chart review, pt takes no nutrition supplements; reports allergy to honeydew melon.

## 2019-02-06 NOTE — CONSULT NOTE ADULT - SUBJECTIVE AND OBJECTIVE BOX
WILI PARKER 68y Female  MRN-[id number]    Patient is a 68y old  Female who presents with a chief complaint of     HPI:  68 year old female with a PMHx of bipolar depression, Hyperlipidemia, NHL (in remission), OCD, dementia, with recent hospitalization (1/20-1/26) for right hip hemiarthroplasty for right femoral neck fracture after fall. Hospital course complicated by ESBL E. Coli UTI on Ertapenem (1/23-1/29). Patient found down in rehab, reported as possibly rolling out of bed.     On arrival to [facility] ED, Level 2 trauma activated was upgraded to a Level 1 for worsening hypotension during secondary exam despite fluid resuscitation. SBP 80s, 2 large-bore pIV placed, e-FAST done twice, with no fluid in the intraperitoneal space or pericardial space, no pneumothorax, suggestion of a very small pleural effusion on the left. (04 Feb 2019 10:32)    Pt found to have Cdiff and on PO vanco + IV flagyl.    Pt in SICU currently. Doing better. Diarrhea less per RN    PAST MEDICAL & SURGICAL HISTORY:  Osteoarthritis of left knee  Lymphoma: NHL, [place] [facility] [place]  Hypercholesteremia  Bipolar depression  OCD (obsessive compulsive disorder)  Depression  Osteoarthritis of right knee  S/P TKR (total knee replacement), bilateral: discharged nov 5th, 2014  S/P cataract surgery  H/O oral surgery: 2014  S/P knee surgery: 1978      Allergies    honeydew melon (Other)  penicillin (Other; Hives)    Intolerances        ANTIMICROBIALS:  metroNIDAZOLE  IVPB 500 every 8 hours  metroNIDAZOLE  IVPB    vancomycin    Solution 500 every 6 hours      MEDICATIONS  (STANDING):  buPROPion XL . 300 milliGRAM(s) Oral daily  chlorhexidine 4% Liquid 1 Application(s) Topical <User Schedule>  gabapentin 300 milliGRAM(s) Oral <User Schedule>  gabapentin 300 milliGRAM(s) Oral <User Schedule>  influenza   Vaccine 0.5 milliLiter(s) IntraMuscular once  levothyroxine 75 MICROGram(s) Oral daily  metroNIDAZOLE  IVPB 500 milliGRAM(s) IV Intermittent every 8 hours  metroNIDAZOLE  IVPB      multiple electrolytes Injection Type 1 1000 milliLiter(s) (100 mL/Hr) IV Continuous <Continuous>  sodium chloride 0.9% Bolus 500 milliLiter(s) IV Bolus once  vancomycin    Solution 500 milliGRAM(s) Oral every 6 hours  ziprasidone 40 milliGRAM(s) Oral <User Schedule>      Social History  Smoking: no  Etoh: no  Drug use: no      FAMILY HISTORY:  Family history of diabetes mellitus  Family history of prostate cancer (Grandparent)  Family history of stomach cancer (Grandparent)  Family history of breast cancer  Family history of COPD (chronic obstructive pulmonary disease)      Vital Signs Last 24 Hrs  T(C): 37.2 (06 Feb 2019 15:22), Max: 38.2 (05 Feb 2019 23:00)  T(F): 99 (06 Feb 2019 15:22), Max: 100.8 (05 Feb 2019 23:00)  HR: 107 (06 Feb 2019 16:00) (95 - 121)  BP: 91/49 (06 Feb 2019 16:00) (74/38 - 106/52)  BP(mean): 68 (06 Feb 2019 16:00) (50 - 75)  RR: 26 (06 Feb 2019 16:00) (22 - 31)  SpO2: 96% (06 Feb 2019 16:00) (86% - 97%)    CBC Full  -  ( 06 Feb 2019 03:08 )  WBC Count : 11.0 K/uL  Hemoglobin : 13.8 g/dL  Hematocrit : 42.5 %  Platelet Count - Automated : 235 K/uL  Mean Cell Volume : 98.3 fl  Mean Cell Hemoglobin : 32.0 pg  Mean Cell Hemoglobin Concentration : 32.6 gm/dL  Auto Neutrophil # : x  Auto Lymphocyte # : x  Auto Monocyte # : x  Auto Eosinophil # : x  Auto Basophil # : x  Auto Neutrophil % : x  Auto Lymphocyte % : x  Auto Monocyte % : x  Auto Eosinophil % : x  Auto Basophil % : x    02-06    133<L>  |  101  |  19  ----------------------------<  141<H>  3.8   |  20<L>  |  0.67    Ca    6.9<L>      06 Feb 2019 03:08  Phos  3.0     02-06  Mg     2.7     02-06    TPro  4.4<L>  /  Alb  2.2<L>  /  TBili  0.9  /  DBili  0.3<H>  /  AST  19  /  ALT  18  /  AlkPhos  83  02-05    LIVER FUNCTIONS - ( 05 Feb 2019 02:27 )  Alb: 2.2 g/dL / Pro: 4.4 g/dL / ALK PHOS: 83 U/L / ALT: 18 U/L / AST: 19 U/L / GGT: x               MICROBIOLOGY:  .Blood Blood  01-20-19   No growth at 5 days.  --  --      .Urine Catheterized  01-20-19   >100,000 CFU/ml Escherichia coli ESBL  --  Escherichia coli ESBL      Cell Count, Body Fluid (02.06.19 @ 14:59)    Monocyte/Macrophage Count - Body Fluid: 80 %    Fluid Segmented Granulocytes: 16: Differential is based on 100 cells counted after cytocentrifugation. %    Fluid Type: Synovial fluid Right hip joint    Body Fluid Appearance: Bloody    BF Lymphocytes: 4 %    Tube Type: Lavender    Color - Body Fluid: Red    Total Nucleated Cell Count, Body Fluid: 156: Includes WBC and other nucleated cells if present. /uL    Total RBC Count: 07564 /uL          Clostridium difficile GDH Toxins A&amp;B, EIA:   Positive (02-04-19 @ 14:51)  Clostridium difficile GDH Interpretation: Positive for toxigenic C. Difficile.  This specimen is positive for C.  Difficile glutamate dehydrogenase (GDH) antigen and positive for C.  Difficile Toxins A & B, by EIA.  GDH is a highly sensitive marker for C.  Difficile that is produced in largeamounts by all C. Difficile strains,  both toxigenic and nontoxigenic.  This assay has not been validated as a  test of cure.  The results of this assay should always be interpreted in  conjunction with patient's clinical history. (02-04-19 @ 14:51)      RADIOLOGY    CT 3D Reconstruct w/ Workstation (02.05.19 @ 09:33) >  Minimally displaced right proximal femoral periprosthetic   fracture, as above.    Pancolitis.    Nonspecific fluid collection in the right trochanteric bursa.

## 2019-02-06 NOTE — CONSULT NOTE ADULT - ASSESSMENT
68 year old female with a PMHx of bipolar depression, Hyperlipidemia, NHL (in remission), OCD, dementia, with recent hospitalization (1/20-1/26) for right hip hemiarthroplasty for right femoral neck fracture after fall. Hospital course complicated by ESBL E. Coli UTI on Ertapenem (1/23-1/29) now presents with likely Hypovolemic/ Distributive shock secondary to sepsis 2/2 Pancolitis, likely secondary to C. Diff Colitis, Age Indeterminate L5 vertebral body fracture, possible acute as tender to palpation, Nondisplaced sacral insufficiency, Right proximal femoral periprosthetic fracture following unwitnessed fall with cdiff, bandemia, fever

## 2019-02-06 NOTE — DIETITIAN INITIAL EVALUATION ADULT. - PERTINENT LABORATORY DATA
02-06 @ 03:08: Sodium 133<L>, Potassium 3.8, Chloride 101, Calcium 6.9<L>, Magnesium 2.7<H>, Phosphorus 3.0, BUN 19, Creatinine 0.67, <H>, Total Prote Hemoglobin 13.8, Hematocrit 42.5

## 2019-02-06 NOTE — PHYSICAL THERAPY INITIAL EVALUATION ADULT - DID THE PATIENT HAVE SURGERY?
n/a/When stable needs revision right hip hemiarthroplasty and open reduction internal fixation with irrigation and debridement, plan for Friday in OR

## 2019-02-06 NOTE — PROGRESS NOTE ADULT - ATTENDING COMMENTS
Evaluated in AM round  Hemodynamically improved,  no episode of hypotension  Continue Rx of c dif colitis with Po Vanco and IV Flagyl  IVF plasmalyte 100cc, monitor borderline oliguria, renal function stable  For IR drain of pelvic collection  HCT stable, on pharmacologic DVt prophylaxis  PT

## 2019-02-06 NOTE — PHYSICAL THERAPY INITIAL EVALUATION ADULT - IMPAIRMENTS CONTRIBUTING TO GAIT DEVIATIONS, PT EVAL
impaired balance/impaired postural control/decreased strength/unable to maintain RLE NWB ; PT in front

## 2019-02-07 DIAGNOSIS — F31.30 BIPOLAR DISORDER, CURRENT EPISODE DEPRESSED, MILD OR MODERATE SEVERITY, UNSPECIFIED: ICD-10-CM

## 2019-02-07 LAB
ANION GAP SERPL CALC-SCNC: 15 MMOL/L — SIGNIFICANT CHANGE UP (ref 5–17)
BUN SERPL-MCNC: 30 MG/DL — HIGH (ref 7–23)
CA-I BLD-SCNC: 1.02 MMOL/L — LOW (ref 1.12–1.3)
CALCIUM SERPL-MCNC: 6.8 MG/DL — LOW (ref 8.4–10.5)
CHLORIDE SERPL-SCNC: 99 MMOL/L — SIGNIFICANT CHANGE UP (ref 96–108)
CO2 SERPL-SCNC: 18 MMOL/L — LOW (ref 22–31)
CREAT SERPL-MCNC: 1.07 MG/DL — SIGNIFICANT CHANGE UP (ref 0.5–1.3)
GAS PNL BLDA: SIGNIFICANT CHANGE UP
GAS PNL BLDA: SIGNIFICANT CHANGE UP
GLUCOSE SERPL-MCNC: 181 MG/DL — HIGH (ref 70–99)
HCT VFR BLD CALC: 45.2 % — HIGH (ref 34.5–45)
HGB BLD-MCNC: 14.9 G/DL — SIGNIFICANT CHANGE UP (ref 11.5–15.5)
MAGNESIUM SERPL-MCNC: 2.5 MG/DL — SIGNIFICANT CHANGE UP (ref 1.6–2.6)
MCHC RBC-ENTMCNC: 32.6 PG — SIGNIFICANT CHANGE UP (ref 27–34)
MCHC RBC-ENTMCNC: 33 GM/DL — SIGNIFICANT CHANGE UP (ref 32–36)
MCV RBC AUTO: 98.6 FL — SIGNIFICANT CHANGE UP (ref 80–100)
PHOSPHATE SERPL-MCNC: 3.7 MG/DL — SIGNIFICANT CHANGE UP (ref 2.5–4.5)
PLATELET # BLD AUTO: 178 K/UL — SIGNIFICANT CHANGE UP (ref 150–400)
POTASSIUM SERPL-MCNC: 4.4 MMOL/L — SIGNIFICANT CHANGE UP (ref 3.5–5.3)
POTASSIUM SERPL-SCNC: 4.4 MMOL/L — SIGNIFICANT CHANGE UP (ref 3.5–5.3)
RBC # BLD: 4.58 M/UL — SIGNIFICANT CHANGE UP (ref 3.8–5.2)
RBC # FLD: 16.3 % — HIGH (ref 10.3–14.5)
SODIUM SERPL-SCNC: 132 MMOL/L — LOW (ref 135–145)
SYNOVIAL CRYSTALS CLARITY: ABNORMAL
SYNOVIAL CRYSTALS COLOR: ABNORMAL
SYNOVIAL CRYSTALS ID: SIGNIFICANT CHANGE UP
SYNOVIAL CRYSTALS TUBE: SIGNIFICANT CHANGE UP
WBC # BLD: 13.2 K/UL — HIGH (ref 3.8–10.5)
WBC # FLD AUTO: 13.2 K/UL — HIGH (ref 3.8–10.5)

## 2019-02-07 PROCEDURE — 71260 CT THORAX DX C+: CPT | Mod: 26

## 2019-02-07 PROCEDURE — 99291 CRITICAL CARE FIRST HOUR: CPT

## 2019-02-07 PROCEDURE — 99232 SBSQ HOSP IP/OBS MODERATE 35: CPT | Mod: GC

## 2019-02-07 PROCEDURE — 74177 CT ABD & PELVIS W/CONTRAST: CPT | Mod: 26

## 2019-02-07 PROCEDURE — 93010 ELECTROCARDIOGRAM REPORT: CPT | Mod: 76

## 2019-02-07 PROCEDURE — 74018 RADEX ABDOMEN 1 VIEW: CPT | Mod: 26

## 2019-02-07 PROCEDURE — 99233 SBSQ HOSP IP/OBS HIGH 50: CPT

## 2019-02-07 RX ORDER — SODIUM CHLORIDE 9 MG/ML
500 INJECTION, SOLUTION INTRAVENOUS ONCE
Qty: 0 | Refills: 0 | Status: COMPLETED | OUTPATIENT
Start: 2019-02-07 | End: 2019-02-07

## 2019-02-07 RX ORDER — CALCIUM GLUCONATE 100 MG/ML
2 VIAL (ML) INTRAVENOUS ONCE
Qty: 0 | Refills: 0 | Status: COMPLETED | OUTPATIENT
Start: 2019-02-07 | End: 2019-02-07

## 2019-02-07 RX ORDER — SODIUM CHLORIDE 9 MG/ML
1000 INJECTION INTRAMUSCULAR; INTRAVENOUS; SUBCUTANEOUS ONCE
Qty: 0 | Refills: 0 | Status: COMPLETED | OUTPATIENT
Start: 2019-02-07 | End: 2019-02-07

## 2019-02-07 RX ORDER — VANCOMYCIN HCL 1 G
500 VIAL (EA) INTRAVENOUS EVERY 6 HOURS
Qty: 0 | Refills: 0 | Status: COMPLETED | OUTPATIENT
Start: 2019-02-07 | End: 2019-02-12

## 2019-02-07 RX ADMIN — GABAPENTIN 300 MILLIGRAM(S): 400 CAPSULE ORAL at 05:48

## 2019-02-07 RX ADMIN — ENOXAPARIN SODIUM 40 MILLIGRAM(S): 100 INJECTION SUBCUTANEOUS at 12:12

## 2019-02-07 RX ADMIN — Medication 100 MILLIGRAM(S): at 15:41

## 2019-02-07 RX ADMIN — SODIUM CHLORIDE 3000 MILLILITER(S): 9 INJECTION, SOLUTION INTRAVENOUS at 16:34

## 2019-02-07 RX ADMIN — ENOXAPARIN SODIUM 40 MILLIGRAM(S): 100 INJECTION SUBCUTANEOUS at 00:16

## 2019-02-07 RX ADMIN — Medication 100 MILLIGRAM(S): at 09:01

## 2019-02-07 RX ADMIN — Medication 500 MILLIGRAM(S): at 18:34

## 2019-02-07 RX ADMIN — SODIUM CHLORIDE 4000 MILLILITER(S): 9 INJECTION INTRAMUSCULAR; INTRAVENOUS; SUBCUTANEOUS at 01:30

## 2019-02-07 RX ADMIN — Medication 200 GRAM(S): at 17:00

## 2019-02-07 RX ADMIN — SODIUM CHLORIDE 100 MILLILITER(S): 9 INJECTION, SOLUTION INTRAVENOUS at 16:33

## 2019-02-07 RX ADMIN — GABAPENTIN 300 MILLIGRAM(S): 400 CAPSULE ORAL at 00:16

## 2019-02-07 RX ADMIN — CHLORHEXIDINE GLUCONATE 1 APPLICATION(S): 213 SOLUTION TOPICAL at 05:45

## 2019-02-07 RX ADMIN — Medication 500 MILLIGRAM(S): at 23:06

## 2019-02-07 RX ADMIN — SODIUM CHLORIDE 100 MILLILITER(S): 9 INJECTION, SOLUTION INTRAVENOUS at 09:18

## 2019-02-07 RX ADMIN — Medication 100 MILLIGRAM(S): at 23:05

## 2019-02-07 RX ADMIN — Medication 500 MILLIGRAM(S): at 23:05

## 2019-02-07 RX ADMIN — BUPROPION HYDROCHLORIDE 300 MILLIGRAM(S): 150 TABLET, EXTENDED RELEASE ORAL at 12:12

## 2019-02-07 RX ADMIN — Medication 75 MICROGRAM(S): at 05:45

## 2019-02-07 RX ADMIN — Medication 200 GRAM(S): at 05:47

## 2019-02-07 RX ADMIN — ZIPRASIDONE HYDROCHLORIDE 40 MILLIGRAM(S): 20 CAPSULE ORAL at 09:01

## 2019-02-07 RX ADMIN — Medication 500 MILLIGRAM(S): at 12:12

## 2019-02-07 RX ADMIN — Medication 500 MILLIGRAM(S): at 05:46

## 2019-02-07 NOTE — PROGRESS NOTE ADULT - ATTENDING COMMENTS
I agree with the above note and have personally seen and examined this patient. All pertinent films have been reviewed. Please refer to clinical documentation of the history, physical examinations, data summary, and both assessment and plan as documented above and with which I agree.    Given continued tachy and hypotension with worsening abd distension and now rectal tube will defer surgery at this point recognizing risk of furhter displacement of the fracture. At this point the risks of causing a periprosthetic joint infection are greater than risk of fracture displacement. Will defer surgery until cdiff colitis more stable. Possible FMT per ID. Keep NWB RLE please.    Conor George MD  Attending Orthopedic Surgeon

## 2019-02-07 NOTE — PROGRESS NOTE ADULT - ATTENDING COMMENTS
continue ATC cardio pulmonary monitoring in this critically ill Patient  I am a non participating [facility] physician seeing Pt in coverage for Dr Barraza. The above patient examination was reviewed with Dr. Awan and I agree with his evaluation, assessment and treatment plan.  Orlin Barraza M.D.

## 2019-02-07 NOTE — PROGRESS NOTE ADULT - SUBJECTIVE AND OBJECTIVE BOX
HISTORY:  68 year old female    24 HOUR EVENTS:  - Notably distended overnight so downgraded from CLD to NPO except medications. Abdominal x-ray demonstrate multiple distended loops of bowel.  - Joint aspiration of right knee.  - Hypotensive w/ SBP in the 70s so bolused with 1.5 L of NS in the last 24 hours. HISTORY:  68 year old female with a past medical history of bipolar depression, OCD, dementia, HLD, NHL (in remission)    24 HOUR EVENTS:  - Notably distended overnight so downgraded from CLD to NPO except medications. Abdominal x-ray demonstrate multiple distended loops of bowel.  - Joint aspiration of right knee.  - Hypotensive w/ SBP in the 70s so bolused with 1.5 L of NS in the last 24 hours. HISTORY:  68 year old female with a past medical history of bipolar depression, OCD, dementia, HLD, NHL (in remission since 2016), ESBL UTI, osteoarthritis s/p bilateral TKR, and s/p right YONATAN who presented as a level two trauma after a fall. Patient was found down in rehab after likely rolling out of bed. Patient was upgraded to a level 1 due to hypotension w/ SBP in the 80s despite fluid resuscitation. CT scans     24 HOUR EVENTS:  - Notably distended overnight so downgraded from CLD to NPO except medications. Abdominal x-ray demonstrate multiple distended loops of bowel.  - Joint aspiration of right knee.  - Hypotensive w/ SBP in the 70s twice so bolused with 1.5 L of NS in the last 24 hours. HISTORY:  68 year old female with a past medical history of bipolar depression, OCD, dementia, HLD, NHL (in remission since 2016), osteoarthritis s/p bilateral TKR, and recent right YONATAN (1/20-1/26)  who presented as a level two trauma after a fall. Patient was found down in rehab after likely rolling out of bed. Patient was upgraded to a level 1 due to hypotension w/ SBP in the 80s despite fluid resuscitation and transfusions. Imaging revealed pancolitis and a right periprosthetic femoral neck fracture. Patient tested for C. difficile and was positive. She was admitted to SICU for hemodynamic monitoring in the setting of sepsis.    24 HOUR EVENTS:  - Notably distended overnight so downgraded from CLD to NPO except medications. Abdominal x-ray demonstrate multiple distended loops of bowel.  - Joint aspiration of right knee yielded clear serosanguineous fluid with nothing seen on Gram stain.  - Hypotensive w/ SBP in the 70s twice so bolused with 1.5 L of NS in the last 24 hours.    SUBJECTIVE/ROS:  [x] A ten-point review of systems was otherwise negative except as noted.  [ ] Due to altered mental status/intubation, subjective information were not able to be obtained from the patient. History was obtained, to the extent possible, from review of the chart and collateral sources of information.    NEURO  Exam: awake, alert, oriented x4, no acute distress, no focal deficits  Meds:  - buPROPion XL . 300 milliGRAM(s) Oral daily  - gabapentin 300 milliGRAM(s) Oral <User Schedule>  - gabapentin 300 milliGRAM(s) Oral <User Schedule>  - ziprasidone 40 milliGRAM(s) Oral <User Schedule>  [x] Adequacy of sedation and pain control has been assessed and adjusted    RESPIRATORY  RR: 33 (02-07-19 @ 04:00) (25 - 38)  SpO2: 91% (02-07-19 @ 04:00) (83% - 97%)  Exam: decreased bibasilar breath sounds  Mechanical Ventilation: no  [N/A] Extubation Readiness Assessed  Meds: none    CARDIOVASCULAR  HR: 111 (02-07-19 @ 04:00) (87 - 121)  BP: 103/68 (02-07-19 @ 04:00) (74/38 - 115/78)  BP(mean): 80 (02-07-19 @ 04:00) (50 - 84)  Exam: regular rate and rhythm, S1S2  Cardiac Rhythm:  Perfusion    [x]Adequate    [ ]Inadequate  Mentation   [x]Normal       [ ]Reduced  Extremities  [x]Warm         [ ]Cool  Volume Status [ ]Hypervolemic [x]Euvolemic [ ]Hypovolemic  Meds: none    GI/NUTRITION  Exam: soft, distended, mild diffuse abdominal tenderness  Diet: NPO except medications  Meds: none    GENITOURINARY  I&O's Detail    02-06 @ 07:01  -  02-07 @ 07:00  --------------------------------------------------------  IN:    multiple electrolytes Injection Type 1: 1700 mL    Sodium Chloride 0.9% IV Bolus: 1000 mL    Solution: 100 mL  Total IN: 2800 mL    OUT:    Rectal Tube: 500 mL    Ureteral Catheter: 455 mL  Total OUT: 955 mL    Total NET: 1845 mL    132<L>  |  99  |  30<H>  ----------------------------<  181<H>  4.4   |  18<L>  |  1.07    Ca    6.8<L>      07 Feb 2019 03:16  Phos  3.7  Mg     2.5    [ ] Munguia catheter, indication:   Meds: multiple electrolytes Injection Type 1 infuse at 100 mL/hr    HEMATOLOGIC  Meds: enoxaparin Injectable 40 milliGRAM(s) SubCutaneous daily  [x] VTE Prophylaxis                        14.9   13.2  )-----------( 178      ( 07 Feb 2019 03:16 )             45.2     INFECTIOUS DISEASES  T(C): 37.1 (02-07-19 @ 03:00), Max: 37.2 (02-06-19 @ 15:22)  WBC Count: 13.2 K/uL (02-07 @ 03:16)  Recent Cultures:  - Specimen Source: .Body Fluid, 02-06 @ 18:59; Results --  Gram Stain: No polymorphonuclear cells seen per low power field. No organisms seen  Organism: --  Meds:  - metroNIDAZOLE  IVPB 500 milliGRAM(s) IV Intermittent every 8 hours  - vancomycin    Solution 500 milliGRAM(s) Oral every 6 hours    ENDOCRINE  Capillary Blood Glucose: none  Meds: levothyroxine 75 MICROGram(s) Oral daily    ACCESS DEVICES:  [x] Peripheral IV  [ ] Central Venous Line	[ ] R	[ ] L	[ ] IJ	[ ] Fem	[ ] SC	Placed:   [ ] Arterial Line		[ ] R	[ ] L	[ ] Fem	[ ] Rad	[ ] Ax	Placed:   [ ] PICC:					[ ] Mediport  [ ] Urinary Catheter, Date Placed:   [x] Necessity of urinary, arterial, and venous catheters discussed    OTHER MEDICATIONS: chlorhexidine 4% Liquid 1 Application(s) Topical <User Schedule>    CODE STATUS: Full code.    IMAGING:

## 2019-02-07 NOTE — PROGRESS NOTE ADULT - ATTENDING COMMENTS
Pierre Lomeli  Attending Physician   Division of Infectious Disease  Pager #[phone]  After 5pm/weekend or no response, call #[phone]

## 2019-02-07 NOTE — PROGRESS NOTE ADULT - PROBLEM SELECTOR PLAN 1
-cont po vanco/iv flagyl  -abd less distended -cont po vanco/iv flagyl  -abd less distended  -rising wbc - will ask my associates to evaluate for possible inpatient FMT

## 2019-02-07 NOTE — PROGRESS NOTE ADULT - SUBJECTIVE AND OBJECTIVE BOX
67 y/o female h/o demential NHL presenting after unwitnessed fall. pt found on the ground at rehab. is on lovenox after hip fracture. pt reports pain to her right leg. per EMS en route slurring speech and mildly hypotensive requesting trauma.unclear how long pt on the ground. Patient found to have C diff with worsening sepsis and Patient was moved to the SICU for continued cardiopulmonary monitoring. seen by ortho for further treatment of hip. Patient seen lethargic but arousable    MEDICATIONS  (STANDING):  buPROPion XL . 300 milliGRAM(s) Oral daily  chlorhexidine 4% Liquid 1 Application(s) Topical <User Schedule>  enoxaparin Injectable 40 milliGRAM(s) SubCutaneous daily  influenza   Vaccine 0.5 milliLiter(s) IntraMuscular once  levothyroxine 75 MICROGram(s) Oral daily  metroNIDAZOLE  IVPB 500 milliGRAM(s) IV Intermittent every 8 hours  multiple electrolytes Injection Type 1 1000 milliLiter(s) (100 mL/Hr) IV Continuous <Continuous>  vancomycin    Solution 500 milliGRAM(s) Oral every 6 hours  vancomycin  Retention Enema 500 milliGRAM(s) Rectal every 6 hours  ziprasidone 40 milliGRAM(s) Oral <User Schedule>    MEDICATIONS  (PRN):          VITALS:   T(C): 36.6 (02-07-19 @ 19:00), Max: 37.1 (02-07-19 @ 03:00)  HR: 108 (02-07-19 @ 20:00) (87 - 121)  BP: 95/49 (02-07-19 @ 20:00) (80/42 - 127/58)  RR: 26 (02-07-19 @ 20:00) (25 - 38)  SpO2: 95% (02-07-19 @ 20:00) (83% - 97%)  Wt(kg): --    PHYSICAL EXAM:  GENERAL: NAD, well-groomed, well-developed  HEAD:  Atraumatic, Normocephalic  EYES: EOMI, PERRLA, conjunctiva and sclera clear  ENMT: No tonsillar erythema, exudates, or enlargement; Moist mucous membranes, Good dentition, No lesions  NECK: Supple, No JVD, Normal thyroid  NERVOUS SYSTEM:  Alert & Oriented X3, Good concentration; Motor Strength 5/5 B/L upper and lower extremities; DTRs 2+ intact and symmetric  CHEST/LUNG: Clear to percussion bilaterally; No rales, rhonchi, wheezing, or rubs  HEART: Regular rate and rhythm; No murmurs, rubs, or gallops  ABDOMEN: Soft, Nontender, Nondistended; Bowel sounds present  EXTREMITIES:  2+ Peripheral Pulses, No clubbing, cyanosis, or edema  LYMPH: No lymphadenopathy noted  SKIN: No rashes or lesions    LABS:  ABG - ( 07 Feb 2019 19:39 )  pH, Arterial: 7.41  pH, Blood: x     /  pCO2: 35    /  pO2: 82    / HCO3: 22    / Base Excess: -1.9  /  SaO2: 95                    CBC Full  -  ( 07 Feb 2019 03:16 )  WBC Count : 13.2 K/uL  Hemoglobin : 14.9 g/dL  Hematocrit : 45.2 %  Platelet Count - Automated : 178 K/uL  Mean Cell Volume : 98.6 fl  Mean Cell Hemoglobin : 32.6 pg  Mean Cell Hemoglobin Concentration : 33.0 gm/dL  Auto Neutrophil # : x  Auto Lymphocyte # : x  Auto Monocyte # : x  Auto Eosinophil # : x  Auto Basophil # : x  Auto Neutrophil % : x  Auto Lymphocyte % : x  Auto Monocyte % : x  Auto Eosinophil % : x  Auto Basophil % : x    02-07    132<L>  |  99  |  30<H>  ----------------------------<  181<H>  4.4   |  18<L>  |  1.07    Ca    6.8<L>      07 Feb 2019 03:16  Phos  3.7     02-07  Mg     2.5     02-07        PT/INR - ( 06 Feb 2019 03:08 )   PT: 13.5 sec;   INR: 1.18 ratio         PTT - ( 06 Feb 2019 03:08 )  PTT:27.2 sec    CAPILLARY BLOOD GLUCOSE          RADIOLOGY & ADDITIONAL TESTS:          MEDICATIONS  (STANDING):  buPROPion XL . 300 milliGRAM(s) Oral daily  chlorhexidine 4% Liquid 1 Application(s) Topical <User Schedule>  enoxaparin Injectable 40 milliGRAM(s) SubCutaneous daily  influenza   Vaccine 0.5 milliLiter(s) IntraMuscular once  levothyroxine 75 MICROGram(s) Oral daily  metroNIDAZOLE  IVPB 500 milliGRAM(s) IV Intermittent every 8 hours  multiple electrolytes Injection Type 1 1000 milliLiter(s) (100 mL/Hr) IV Continuous <Continuous>  vancomycin    Solution 500 milliGRAM(s) Oral every 6 hours  vancomycin  Retention Enema 500 milliGRAM(s) Rectal every 6 hours  ziprasidone 40 milliGRAM(s) Oral <User Schedule>    MEDICATIONS  (PRN):          VITALS:   T(C): 36.6 (02-07-19 @ 19:00), Max: 37.1 (02-07-19 @ 03:00)  HR: 108 (02-07-19 @ 20:00) (87 - 121)  BP: 95/49 (02-07-19 @ 20:00) (80/42 - 127/58)  RR: 26 (02-07-19 @ 20:00) (25 - 38)  SpO2: 95% (02-07-19 @ 20:00) (83% - 97%)  Wt(kg): --        LABS:  ABG - ( 07 Feb 2019 19:39 )  pH, Arterial: 7.41  pH, Blood: x     /  pCO2: 35    /  pO2: 82    / HCO3: 22    / Base Excess: -1.9  /  SaO2: 95                    CBC Full  -  ( 07 Feb 2019 03:16 )  WBC Count : 13.2 K/uL  Hemoglobin : 14.9 g/dL  Hematocrit : 45.2 %  Platelet Count - Automated : 178 K/uL  Mean Cell Volume : 98.6 fl  Mean Cell Hemoglobin : 32.6 pg  Mean Cell Hemoglobin Concentration : 33.0 gm/dL  Auto Neutrophil # : x  Auto Lymphocyte # : x  Auto Monocyte # : x  Auto Eosinophil # : x  Auto Basophil # : x  Auto Neutrophil % : x  Auto Lymphocyte % : x  Auto Monocyte % : x  Auto Eosinophil % : x  Auto Basophil % : x    02-07    132<L>  |  99  |  30<H>  ----------------------------<  181<H>  4.4   |  18<L>  |  1.07    Ca    6.8<L>      07 Feb 2019 03:16  Phos  3.7     02-07  Mg     2.5     02-07        PT/INR - ( 06 Feb 2019 03:08 )   PT: 13.5 sec;   INR: 1.18 ratio         PTT - ( 06 Feb 2019 03:08 )  PTT:27.2 sec    CAPILLARY BLOOD GLUCOSE          RADIOLOGY & ADDITIONAL TESTS:

## 2019-02-07 NOTE — PROGRESS NOTE ADULT - SUBJECTIVE AND OBJECTIVE BOX
Patient seen and examined.  No acute events overnight.    Vital Signs Last 24 Hrs  T(C): 37.1 (07 Feb 2019 03:00), Max: 37.2 (06 Feb 2019 15:22)  T(F): 98.8 (07 Feb 2019 03:00), Max: 99 (06 Feb 2019 15:22)  HR: 111 (07 Feb 2019 04:00) (87 - 121)  BP: 103/68 (07 Feb 2019 04:00) (74/38 - 115/78)  BP(mean): 80 (07 Feb 2019 04:00) (50 - 84)  RR: 33 (07 Feb 2019 04:00) (24 - 38)  SpO2: 91% (07 Feb 2019 04:00) (83% - 97%)    02-05 @ 07:01  -  02-06 @ 07:00  --------------------------------------------------------  IN: 3800 mL / OUT: 1000 mL / NET: 2800 mL    02-06 @ 07:01  -  02-07 @ 06:44  --------------------------------------------------------  IN: 2800 mL / OUT: 955 mL / NET: 1845 mL                            14.9   13.2  )-----------( 178      ( 07 Feb 2019 03:16 )             45.2   02-07    132<L>  |  99  |  30<H>  ----------------------------<  181<H>  4.4   |  18<L>  |  1.07    Ca    6.8<L>      07 Feb 2019 03:16  Phos  3.7     02-07  Mg     2.5     02-07        Physical Exam  Gen: NAD  RLE:   Staples in place, incision well approximated and no signs of breakdown or drainage  Pain with ROM of hip/knee, TTP to femur  +ehl/fhl/ta/gs function  L2-S1 silt  Dp/pt pulse intact  No calf ttp  Compartments soft

## 2019-02-07 NOTE — PROGRESS NOTE ADULT - SUBJECTIVE AND OBJECTIVE BOX
WILI PARKER 68y [id number]    Patient is a 68y old  Female who presents with a chief complaint of     Follow Up/CC:  ID following for cdiff    Interval History/ROS: no fever, in sicu    Allergies    honeydew melon (Other)  penicillin (Other; Hives)    Intolerances        ANTIMICROBIALS:  metroNIDAZOLE  IVPB 500 every 8 hours  vancomycin    Solution 500 every 6 hours  vancomycin  Retention Enema 500 every 6 hours      MEDICATIONS  (STANDING):  buPROPion XL . 300 milliGRAM(s) Oral daily  calcium gluconate IVPB 2 Gram(s) IV Intermittent once  chlorhexidine 4% Liquid 1 Application(s) Topical <User Schedule>  enoxaparin Injectable 40 milliGRAM(s) SubCutaneous daily  influenza   Vaccine 0.5 milliLiter(s) IntraMuscular once  levothyroxine 75 MICROGram(s) Oral daily  metroNIDAZOLE  IVPB 500 milliGRAM(s) IV Intermittent every 8 hours  multiple electrolytes Injection Type 1 1000 milliLiter(s) (100 mL/Hr) IV Continuous <Continuous>  vancomycin    Solution 500 milliGRAM(s) Oral every 6 hours  vancomycin  Retention Enema 500 milliGRAM(s) Rectal every 6 hours  ziprasidone 40 milliGRAM(s) Oral <User Schedule>    MEDICATIONS  (PRN):        Vital Signs Last 24 Hrs  T(C): 36.4 (07 Feb 2019 15:00), Max: 37.1 (06 Feb 2019 19:00)  T(F): 97.6 (07 Feb 2019 15:00), Max: 98.8 (06 Feb 2019 19:00)  HR: 106 (07 Feb 2019 16:00) (87 - 121)  BP: 104/52 (07 Feb 2019 16:00) (80/42 - 127/58)  BP(mean): 74 (07 Feb 2019 16:00) (56 - 86)  RR: 26 (07 Feb 2019 16:00) (25 - 38)  SpO2: 97% (07 Feb 2019 16:00) (83% - 97%)    CBC Full  -  ( 07 Feb 2019 03:16 )  WBC Count : 13.2 K/uL  Hemoglobin : 14.9 g/dL  Hematocrit : 45.2 %  Platelet Count - Automated : 178 K/uL  Mean Cell Volume : 98.6 fl  Mean Cell Hemoglobin : 32.6 pg  Mean Cell Hemoglobin Concentration : 33.0 gm/dL  Auto Neutrophil # : x  Auto Lymphocyte # : x  Auto Monocyte # : x  Auto Eosinophil # : x  Auto Basophil # : x  Auto Neutrophil % : x  Auto Lymphocyte % : x  Auto Monocyte % : x  Auto Eosinophil % : x  Auto Basophil % : x    02-07    132<L>  |  99  |  30<H>  ----------------------------<  181<H>  4.4   |  18<L>  |  1.07    Ca    6.8<L>      07 Feb 2019 03:16  Phos  3.7     02-07  Mg     2.5     02-07            MICROBIOLOGY:  .Body Fluid  02-06-19 --  --    No polymorphonuclear cells seen per low power field  No organisms seen      .Blood Blood  01-20-19   No growth at 5 days.  --  --      .Urine Catheterized  01-20-19   >100,000 CFU/ml Escherichia coli ESBL  --  Escherichia coli ESBL      Clostridium difficile GDH Toxins A&amp;B, EIA:   Positive (02-04-19 @ 14:51)  Clostridium difficile GDH Interpretation: Positive for toxigenic C. Difficile.  This specimen is positive for C.  Difficile glutamate dehydrogenase (GDH) antigen and positive for C.  Difficile Toxins A & B, by EIA.  GDH is a highly sensitive marker for C.  Difficile that is produced in largeamounts by all C. Difficile strains,  both toxigenic and nontoxigenic.  This assay has not been validated as a  test of cure.  The results of this assay should always be interpreted in  conjunction with patient's clinical history. (02-04-19 @ 14:51)      RADIOLOGY    CT Abdomen and Pelvis w/ Oral Cont and w/ IV Cont (02.07.19 @ 14:41) >  *  Pancolitis.  *  Moderate hiatal hernia. Reflux of oral contrast to the level of the   upper esophagus.  *  Mild abdominal and pelvic ascites. Bilateral pleural effusions.

## 2019-02-07 NOTE — PROGRESS NOTE ADULT - ATTENDING COMMENTS
Hypotensive with abd distension, metabolic lactic acidosis, increase in serum Cr    IVF, CT A/P b/l effusion with atelectasis, dilated loop of colon  Serial lactate  On PO Vanco and IV Flagyl, will add vanco enema  Occ sleepy but very easily arousable, on multiple psy meds, DC Neurontin, may cut down dose on her psy meds  Discussed with dr Vu, may need total colectomy if continues to deteriorate    Time spent > 45 mts managing this critically ill pt  Case discussed in multidisciplinary round

## 2019-02-07 NOTE — PROGRESS NOTE ADULT - ASSESSMENT
A/P: 68 y F w/ R Hemiarthroplasty done on 1/21, s/p MF, with L S1-2 sacral insufficiency fracture and  R periprosthetic Hip Fracture     - CT R hip demonstrates PP Femur fracture around Stem, will Need ORIF  -C/w SICU care, recs appreciated  - IR joint aspiration low suspicion of periprosthetic infection  -Analgesia  -DVT ppx, per primary team  -Non weight bearing RLE/bedrest  -May weight bear as tolerated on LLE when not on bedrest  -Dr George will be giving Recs on possible surgery, will continue to monitor   -Plan for ORIF on 2/8  -Medical/ID clearance required.  - will advise if plan changes

## 2019-02-07 NOTE — PROGRESS NOTE ADULT - ASSESSMENT
68F p/w R hip fracture and found to have C. diff colitis.    - eventual surgery with Orthopedics   - Multimodal pain control  - C.diff positive - vanc and flagyl  - F/u labs and imaging  - DVT PPx  - Diet: NPO  - Activity: advance as tolerated  - Tertiary  - Care per SICU

## 2019-02-08 ENCOUNTER — APPOINTMENT (OUTPATIENT)
Dept: ORTHOPEDIC SURGERY | Facility: HOSPITAL | Age: 69
End: 2019-02-08

## 2019-02-08 LAB
ANION GAP SERPL CALC-SCNC: 16 MMOL/L — SIGNIFICANT CHANGE UP (ref 5–17)
ANION GAP SERPL CALC-SCNC: 17 MMOL/L — SIGNIFICANT CHANGE UP (ref 5–17)
BLD GP AB SCN SERPL QL: NEGATIVE — SIGNIFICANT CHANGE UP
BUN SERPL-MCNC: 40 MG/DL — HIGH (ref 7–23)
BUN SERPL-MCNC: 44 MG/DL — HIGH (ref 7–23)
CA-I BLD-SCNC: 1.09 MMOL/L — LOW (ref 1.12–1.3)
CALCIUM SERPL-MCNC: 7.6 MG/DL — LOW (ref 8.4–10.5)
CALCIUM SERPL-MCNC: 7.6 MG/DL — LOW (ref 8.4–10.5)
CHLORIDE SERPL-SCNC: 96 MMOL/L — SIGNIFICANT CHANGE UP (ref 96–108)
CHLORIDE SERPL-SCNC: 98 MMOL/L — SIGNIFICANT CHANGE UP (ref 96–108)
CO2 SERPL-SCNC: 18 MMOL/L — LOW (ref 22–31)
CO2 SERPL-SCNC: 21 MMOL/L — LOW (ref 22–31)
CREAT SERPL-MCNC: 1.02 MG/DL — SIGNIFICANT CHANGE UP (ref 0.5–1.3)
CREAT SERPL-MCNC: 1.12 MG/DL — SIGNIFICANT CHANGE UP (ref 0.5–1.3)
GAS PNL BLDA: SIGNIFICANT CHANGE UP
GAS PNL BLDA: SIGNIFICANT CHANGE UP
GLUCOSE SERPL-MCNC: 148 MG/DL — HIGH (ref 70–99)
GLUCOSE SERPL-MCNC: 160 MG/DL — HIGH (ref 70–99)
HCT VFR BLD CALC: 42.8 % — SIGNIFICANT CHANGE UP (ref 34.5–45)
HGB BLD-MCNC: 14.3 G/DL — SIGNIFICANT CHANGE UP (ref 11.5–15.5)
MAGNESIUM SERPL-MCNC: 2.7 MG/DL — HIGH (ref 1.6–2.6)
MAGNESIUM SERPL-MCNC: 2.8 MG/DL — HIGH (ref 1.6–2.6)
MCHC RBC-ENTMCNC: 33 PG — SIGNIFICANT CHANGE UP (ref 27–34)
MCHC RBC-ENTMCNC: 33.4 GM/DL — SIGNIFICANT CHANGE UP (ref 32–36)
MCV RBC AUTO: 98.9 FL — SIGNIFICANT CHANGE UP (ref 80–100)
PHOSPHATE SERPL-MCNC: 4.1 MG/DL — SIGNIFICANT CHANGE UP (ref 2.5–4.5)
PHOSPHATE SERPL-MCNC: 4.1 MG/DL — SIGNIFICANT CHANGE UP (ref 2.5–4.5)
PLATELET # BLD AUTO: 107 K/UL — LOW (ref 150–400)
POTASSIUM SERPL-MCNC: 4.5 MMOL/L — SIGNIFICANT CHANGE UP (ref 3.5–5.3)
POTASSIUM SERPL-MCNC: 4.6 MMOL/L — SIGNIFICANT CHANGE UP (ref 3.5–5.3)
POTASSIUM SERPL-SCNC: 4.5 MMOL/L — SIGNIFICANT CHANGE UP (ref 3.5–5.3)
POTASSIUM SERPL-SCNC: 4.6 MMOL/L — SIGNIFICANT CHANGE UP (ref 3.5–5.3)
PROCALCITONIN SERPL-MCNC: 1.48 NG/ML — HIGH (ref 0.02–0.1)
RBC # BLD: 4.32 M/UL — SIGNIFICANT CHANGE UP (ref 3.8–5.2)
RBC # FLD: 15.9 % — HIGH (ref 10.3–14.5)
RH IG SCN BLD-IMP: POSITIVE — SIGNIFICANT CHANGE UP
SODIUM SERPL-SCNC: 132 MMOL/L — LOW (ref 135–145)
SODIUM SERPL-SCNC: 134 MMOL/L — LOW (ref 135–145)
TROPONIN T, HIGH SENSITIVITY RESULT: 18 NG/L — SIGNIFICANT CHANGE UP (ref 0–51)
TROPONIN T, HIGH SENSITIVITY RESULT: 21 NG/L — SIGNIFICANT CHANGE UP (ref 0–51)
WBC # BLD: 17 K/UL — HIGH (ref 3.8–10.5)
WBC # FLD AUTO: 17 K/UL — HIGH (ref 3.8–10.5)

## 2019-02-08 PROCEDURE — 99291 CRITICAL CARE FIRST HOUR: CPT

## 2019-02-08 PROCEDURE — 93010 ELECTROCARDIOGRAM REPORT: CPT | Mod: 76

## 2019-02-08 PROCEDURE — 99233 SBSQ HOSP IP/OBS HIGH 50: CPT

## 2019-02-08 PROCEDURE — 71045 X-RAY EXAM CHEST 1 VIEW: CPT | Mod: 26

## 2019-02-08 PROCEDURE — 74018 RADEX ABDOMEN 1 VIEW: CPT | Mod: 26

## 2019-02-08 PROCEDURE — 99232 SBSQ HOSP IP/OBS MODERATE 35: CPT | Mod: GC

## 2019-02-08 RX ORDER — IPRATROPIUM/ALBUTEROL SULFATE 18-103MCG
3 AEROSOL WITH ADAPTER (GRAM) INHALATION ONCE
Qty: 0 | Refills: 0 | Status: COMPLETED | OUTPATIENT
Start: 2019-02-08 | End: 2019-02-08

## 2019-02-08 RX ORDER — SODIUM CHLORIDE 9 MG/ML
500 INJECTION, SOLUTION INTRAVENOUS ONCE
Qty: 0 | Refills: 0 | Status: COMPLETED | OUTPATIENT
Start: 2019-02-08 | End: 2019-02-08

## 2019-02-08 RX ORDER — CALCIUM GLUCONATE 100 MG/ML
2 VIAL (ML) INTRAVENOUS ONCE
Qty: 0 | Refills: 0 | Status: COMPLETED | OUTPATIENT
Start: 2019-02-08 | End: 2019-02-08

## 2019-02-08 RX ORDER — SODIUM CHLORIDE 9 MG/ML
1000 INJECTION INTRAMUSCULAR; INTRAVENOUS; SUBCUTANEOUS ONCE
Qty: 0 | Refills: 0 | Status: DISCONTINUED | OUTPATIENT
Start: 2019-02-08 | End: 2019-02-08

## 2019-02-08 RX ORDER — ACETAMINOPHEN 500 MG
650 TABLET ORAL EVERY 6 HOURS
Qty: 0 | Refills: 0 | Status: DISCONTINUED | OUTPATIENT
Start: 2019-02-08 | End: 2019-02-11

## 2019-02-08 RX ORDER — OXYCODONE HYDROCHLORIDE 5 MG/1
2.5 TABLET ORAL ONCE
Qty: 0 | Refills: 0 | Status: DISCONTINUED | OUTPATIENT
Start: 2019-02-08 | End: 2019-02-08

## 2019-02-08 RX ORDER — LEVOTHYROXINE SODIUM 125 MCG
37.5 TABLET ORAL AT BEDTIME
Qty: 0 | Refills: 0 | Status: DISCONTINUED | OUTPATIENT
Start: 2019-02-08 | End: 2019-02-10

## 2019-02-08 RX ORDER — METOPROLOL TARTRATE 50 MG
12.5 TABLET ORAL
Qty: 0 | Refills: 0 | Status: DISCONTINUED | OUTPATIENT
Start: 2019-02-08 | End: 2019-02-11

## 2019-02-08 RX ADMIN — Medication 500 MILLIGRAM(S): at 05:27

## 2019-02-08 RX ADMIN — Medication 100 MILLIGRAM(S): at 08:11

## 2019-02-08 RX ADMIN — Medication 100 MILLIGRAM(S): at 23:01

## 2019-02-08 RX ADMIN — ZIPRASIDONE HYDROCHLORIDE 40 MILLIGRAM(S): 20 CAPSULE ORAL at 06:53

## 2019-02-08 RX ADMIN — Medication 200 GRAM(S): at 17:55

## 2019-02-08 RX ADMIN — OXYCODONE HYDROCHLORIDE 2.5 MILLIGRAM(S): 5 TABLET ORAL at 21:15

## 2019-02-08 RX ADMIN — OXYCODONE HYDROCHLORIDE 2.5 MILLIGRAM(S): 5 TABLET ORAL at 19:45

## 2019-02-08 RX ADMIN — Medication 37.5 MICROGRAM(S): at 21:26

## 2019-02-08 RX ADMIN — CHLORHEXIDINE GLUCONATE 1 APPLICATION(S): 213 SOLUTION TOPICAL at 05:27

## 2019-02-08 RX ADMIN — Medication 100 MILLIGRAM(S): at 15:27

## 2019-02-08 RX ADMIN — BUPROPION HYDROCHLORIDE 300 MILLIGRAM(S): 150 TABLET, EXTENDED RELEASE ORAL at 11:44

## 2019-02-08 RX ADMIN — SODIUM CHLORIDE 100 MILLILITER(S): 9 INJECTION, SOLUTION INTRAVENOUS at 08:11

## 2019-02-08 RX ADMIN — Medication 500 MILLIGRAM(S): at 11:43

## 2019-02-08 RX ADMIN — SODIUM CHLORIDE 2000 MILLILITER(S): 9 INJECTION, SOLUTION INTRAVENOUS at 09:20

## 2019-02-08 RX ADMIN — Medication 3 MILLILITER(S): at 22:22

## 2019-02-08 RX ADMIN — SODIUM CHLORIDE 125 MILLILITER(S): 9 INJECTION, SOLUTION INTRAVENOUS at 09:20

## 2019-02-08 RX ADMIN — Medication 200 GRAM(S): at 03:59

## 2019-02-08 RX ADMIN — Medication 500 MILLIGRAM(S): at 17:55

## 2019-02-08 RX ADMIN — Medication 500 MILLIGRAM(S): at 17:49

## 2019-02-08 RX ADMIN — Medication 500 MILLIGRAM(S): at 23:01

## 2019-02-08 RX ADMIN — ENOXAPARIN SODIUM 40 MILLIGRAM(S): 100 INJECTION SUBCUTANEOUS at 11:44

## 2019-02-08 RX ADMIN — Medication 500 MILLIGRAM(S): at 12:03

## 2019-02-08 RX ADMIN — Medication 12.5 MILLIGRAM(S): at 17:50

## 2019-02-08 NOTE — PROGRESS NOTE ADULT - ASSESSMENT
A/P: 68 y F w/ R Hemiarthroplasty done on 1/21, s/p MF, with L S1-2 sacral insufficiency fracture and  R periprosthetic Hip Fracture. Patient has C Diff colitis, will wait for more resolution of symptoms before R hip surgery.     - CT R hip demonstrates PP Femur fracture around Stem, will Need ORIF  -C/w SICU care, recs appreciated  - IR joint aspiration low suspicion of periprosthetic infection  -Analgesia  -DVT ppx, per primary team  -Non weight bearing RLE/bedrest  -May weight bear as tolerated on LLE when not on bedrest  -Plan for ORIF on next week, surgery on hold due to colitis   -Medical/ID clearance required.  - will advise if plan changes

## 2019-02-08 NOTE — PROGRESS NOTE ADULT - ATTENDING COMMENTS
Remains borderline hypotensive on IVF resuscitation, no pressure requirement  On Vanco PO and enema and IV flagyl  Son refused fecal transplant   Short runs of SVT, electrolytes wnl, will try small dose BB as tolerated  More awake off high dose Neurontin, on psy meds  Will discuss goals of care with son  Critical Care Time spent more than 45 minutes examining the patient, reviewing the data, radiology, other consultants recommendation and formulating the plan of care.  This does not include procedure or teaching time.  Case discussed in multidisciplinary round.

## 2019-02-08 NOTE — PROGRESS NOTE ADULT - PROBLEM SELECTOR PLAN 3
Fluid Cx negative  Low suspicion for infectious etiology  Given severe C diff no antimicrobials unless definite s/s infection  Monitor site  Will defer to ortho on surgical plan, though presently doubt patient is stable for surgery

## 2019-02-08 NOTE — PROGRESS NOTE ADULT - PROBLEM SELECTOR PLAN 1
Severe  X ray was s/o ileus earlier-CT with some distention but more s/o pancolitis  Patient already on IV flagyl, SD and po vanco  Given severity candidate for fecal transplant via NGT (if no ileus)  Risks/benefits/alternatives and (at present ) investigational nature discussed withy patient and son.Have explained also that given her severity progression and worsening have  ahigh likelihood and FMT may benefit immensely at this stage.  Patient is unable to fully comprehend and asked to d/w son,I spoke to son Liang telephonically and discussed R/B/A in great detail .  He declined procedure at present and said he  wants .to think about and discuss  Given this, would rec:  1) Continue IV flagyl,po and rectal vanco  2) Monitor abdomen  3) Surgical eval-if any s/s toxic megacolon will likely need surgical intervention  Continue ICU monitorinh Severe  X ray was s/o ileus earlier-CT with some distention but more s/o pancolitis  Patient already on IV flagyl, ND and po vanco  Given severity candidate for fecal transplant via NGT (if no ileus)  Risks/benefits/alternatives and (at present ) investigational nature discussed withy patient and son. Have explained also that given her severity progression and worsening have  a high likelihood and FMT may benefit immensely at this stage.  Patient is unable to fully comprehend and asked to d/w son,I spoke to son Liang telephonically and discussed R/B/A in great detail .  He declined procedure at present and said he  wants .to think about and discuss  Given this, would rec:  1) Continue IV flagyl,po and rectal vanco  2) Monitor abdomen  3) Surgical eval-if any s/s toxic megacolon will likely need surgical intervention  Continue ICU monitoring

## 2019-02-08 NOTE — PROGRESS NOTE ADULT - SUBJECTIVE AND OBJECTIVE BOX
69 y/o female h/o demential NHL presenting after unwitnessed fall. pt found on the ground at rehab. is on lovenox after hip fracture. pt reports pain to her right leg. per EMS en route slurring speech and mildly hypotensive requesting trauma.unclear how long pt on the ground. Patient found to have C diff with worsening sepsis and Patient was moved to the SICU for continued cardiopulmonary monitoring. seen by ortho for further treatment of hip. Patient seen lethargic but arousable    MEDICATIONS  (STANDING):  buPROPion XL . 300 milliGRAM(s) Oral daily  chlorhexidine 4% Liquid 1 Application(s) Topical <User Schedule>  enoxaparin Injectable 40 milliGRAM(s) SubCutaneous daily  influenza   Vaccine 0.5 milliLiter(s) IntraMuscular once  levothyroxine 75 MICROGram(s) Oral daily  metroNIDAZOLE  IVPB 500 milliGRAM(s) IV Intermittent every 8 hours  multiple electrolytes Injection Type 1 1000 milliLiter(s) (100 mL/Hr) IV Continuous <Continuous>  vancomycin    Solution 500 milliGRAM(s) Oral every 6 hours  vancomycin  Retention Enema 500 milliGRAM(s) Rectal every 6 hours  ziprasidone 40 milliGRAM(s) Oral <User Schedule>    MEDICATIONS  (PRN):    Vital Signs Last 24 Hrs  T(C): 36.6 (08 Feb 2019 19:00), Max: 36.9 (07 Feb 2019 23:00)  T(F): 97.8 (08 Feb 2019 19:00), Max: 98.5 (07 Feb 2019 23:00)  HR: 80 (08 Feb 2019 21:00) (80 - 120)  BP: 95/60 (08 Feb 2019 21:00) (83/51 - 121/56)  BP(mean): 58 (08 Feb 2019 21:00) (57 - 87)  RR: 27 (08 Feb 2019 21:00) (21 - 45)  SpO2: 97% (08 Feb 2019 21:00) (90% - 97%)    I&O's Summary    07 Feb 2019 07:01  -  08 Feb 2019 07:00  --------------------------------------------------------  IN: 3900 mL / OUT: 1310 mL / NET: 2590 mL    08 Feb 2019 07:01  -  08 Feb 2019 21:47  --------------------------------------------------------  IN: 2775 mL / OUT: 360 mL / NET: 2415 mL        PHYSICAL EXAM:  GENERAL: NAD, well-groomed, well-developed  HEAD:  Atraumatic, Normocephalic  EYES: EOMI, PERRLA, conjunctiva and sclera clear  ENMT: No tonsillar erythema, exudates, or enlargement; Moist mucous membranes, Good dentition, No lesions  NECK: Supple, No JVD, Normal thyroid  NERVOUS SYSTEM:  Alert & Oriented X3, Good concentration; Motor Strength 5/5 B/L upper and lower extremities; DTRs 2+ intact and symmetric  CHEST/LUNG: Clear to percussion bilaterally; No rales, rhonchi, wheezing, or rubs  HEART: Regular rate and rhythm; No murmurs, rubs, or gallops  ABDOMEN: Soft, Nontender, Nondistended; Bowel sounds present  EXTREMITIES:  2+ Peripheral Pulses, No clubbing, cyanosis, or edema  LYMPH: No lymphadenopathy noted  SKIN: No rashes or lesions    LABS:  ABG - ( 07 Feb 2019 19:39 )  pH, Arterial: 7.41  pH, Blood: x     /  pCO2: 35    /  pO2: 82    / HCO3: 22    / Base Excess: -1.9  /  SaO2: 95              ABG - ( 08 Feb 2019 16:56 )  pH, Arterial: 7.41  pH, Blood: x     /  pCO2: 36    /  pO2: 82    / HCO3: 22    / Base Excess: -1.1  /  SaO2: 96                    CBC Full  -  ( 08 Feb 2019 02:55 )  WBC Count : 17.0 K/uL  Hemoglobin : 14.3 g/dL  Hematocrit : 42.8 %  Platelet Count - Automated : 107 K/uL  Mean Cell Volume : 98.9 fl  Mean Cell Hemoglobin : 33.0 pg  Mean Cell Hemoglobin Concentration : 33.4 gm/dL  Auto Neutrophil # : x  Auto Lymphocyte # : x  Auto Monocyte # : x  Auto Eosinophil # : x  Auto Basophil # : x  Auto Neutrophil % : x  Auto Lymphocyte % : x  Auto Monocyte % : x  Auto Eosinophil % : x  Auto Basophil % : x    02-08    134<L>  |  96  |  44<H>  ----------------------------<  160<H>  4.6   |  21<L>  |  1.12    Ca    7.6<L>      08 Feb 2019 13:09  Phos  4.1     02-08  Mg     2.8     02-08 67 y/o critically ill  female h/o demential NHL presenting after unwitnessed fall. pt found on the ground at rehab. is on lovenox after hip fracture. pt reports pain to her right leg. per EMS en route slurring speech and mildly hypotensive requesting trauma.unclear how long pt on the ground. Patient found to have C diff with worsening hypotension requiring pressors on and off and  Patient requiring the SICU for continued cardiopulmonary monitoring. Orthopedic procedure on hold due to  hypotension and pan colitis and need for pressors due to unstable hemodynamics.    MEDICATIONS  (STANDING):  buPROPion XL . 300 milliGRAM(s) Oral daily  chlorhexidine 4% Liquid 1 Application(s) Topical <User Schedule>  enoxaparin Injectable 40 milliGRAM(s) SubCutaneous daily  influenza   Vaccine 0.5 milliLiter(s) IntraMuscular once  levothyroxine 75 MICROGram(s) Oral daily  metroNIDAZOLE  IVPB 500 milliGRAM(s) IV Intermittent every 8 hours  multiple electrolytes Injection Type 1 1000 milliLiter(s) (100 mL/Hr) IV Continuous <Continuous>  vancomycin    Solution 500 milliGRAM(s) Oral every 6 hours  vancomycin  Retention Enema 500 milliGRAM(s) Rectal every 6 hours  ziprasidone 40 milliGRAM(s) Oral <User Schedule>    MEDICATIONS  (PRN):          PAST MEDICAL & SURGICAL HISTORY:  Osteoarthritis of left knee  Lymphoma: NHL, treated with [place] [facility] [place], in [place] since [place]  Hypercholesteremia  Bipolar depression  OCD (obsessive compulsive disorder)  Depression  Osteoarthritis of right knee  S/P TKR (total knee replacement), bilateral: discharged nov 5th, 2014  S/P cataract surgery  H/O oral surgery: 2014  S/P knee surgery: 1978    Social History  Smoking: no  Etoh: no  Drug use: no      FAMILY HISTORY:  Family history of diabetes mellitus  Family history of prostate cancer (Grandparent)  Family history of stomach cancer (Grandparent)  Family history of breast cancer  Family history of COPD (chronic obstructive pulmonary disease)    Review of system   unable due to lethargy and  patient senile dementia       Vital Signs Last 24 Hrs  T(C): 36.6 (08 Feb 2019 19:00), Max: 36.9 (07 Feb 2019 23:00)  T(F): 97.8 (08 Feb 2019 19:00), Max: 98.5 (07 Feb 2019 23:00)  HR: 80 (08 Feb 2019 21:00) (80 - 120)  BP: 95/60 (08 Feb 2019 21:00) (83/51 - 121/56)  BP(mean): 58 (08 Feb 2019 21:00) (57 - 87)  RR: 27 (08 Feb 2019 21:00) (21 - 45)  SpO2: 97% (08 Feb 2019 21:00) (90% - 97%)    I&O's Summary    07 Feb 2019 07:01  -  08 Feb 2019 07:00  --------------------------------------------------------  IN: 3900 mL / OUT: 1310 mL / NET: 2590 mL    08 Feb 2019 07:01  -  08 Feb 2019 21:47  --------------------------------------------------------  IN: 2775 mL / OUT: 360 mL / NET: 2415 mL        PHYSICAL EXAM:  GENERAL: NAD, well-groomed, well-developed  HEAD:  Atraumatic, Normocephalic  EYES: EOMI, PERRLA, conjunctiva and sclera clear  ENMT: No tonsillar erythema, exudates, or enlargement; Moist mucous membranes, Good dentition, No lesions  NECK: Supple, No JVD, Normal thyroid  NERVOUS SYSTEM:  Alert & Oriented X3, Good concentration; Motor Strength 5/5 B/L upper and lower extremities; DTRs 2+ intact and symmetric  CHEST/LUNG: Clear to percussion bilaterally; No rales, rhonchi, wheezing, or rubs  HEART: Regular rate and rhythm; No murmurs, rubs, or gallops  ABDOMEN: Soft, Nontender, Nondistended; Bowel sounds present  EXTREMITIES:  2+ Peripheral Pulses, No clubbing, cyanosis, or edema  LYMPH: No lymphadenopathy noted  SKIN: No rashes or lesions    LABS:  ABG - ( 07 Feb 2019 19:39 )  pH, Arterial: 7.41  pH, Blood: x     /  pCO2: 35    /  pO2: 82    / HCO3: 22    / Base Excess: -1.9  /  SaO2: 95              ABG - ( 08 Feb 2019 16:56 )  pH, Arterial: 7.41  pH, Blood: x     /  pCO2: 36    /  pO2: 82    / HCO3: 22    / Base Excess: -1.1  /  SaO2: 96        CBC Full  -  ( 08 Feb 2019 02:55 )  WBC Count : 17.0 K/uL  Hemoglobin : 14.3 g/dL  Hematocrit : 42.8 %  Platelet Count - Automated : 107 K/uL  Mean Cell Volume : 98.9 fl  Mean Cell Hemoglobin : 33.0 pg  Mean Cell Hemoglobin Concentration : 33.4 gm/dL  Auto Neutrophil # : x  Auto Lymphocyte # : x  Auto Monocyte # : x  Auto Eosinophil # : x  Auto Basophil # : x  Auto Neutrophil % : x  Auto Lymphocyte % : x  Auto Monocyte % : x  Auto Eosinophil % : x  Auto Basophil % : x    02-08    134<L>  |  96  |  44<H>  ----------------------------<  160<H>  4.6   |  21<L>  |  1.12    Ca    7.6<L>      08 Feb 2019 13:09  Phos  4.1     02-08  Mg     2.8     02-08

## 2019-02-08 NOTE — PROGRESS NOTE ADULT - ATTENDING COMMENTS
case d/w SICU team  Infectious Diseases Service will cover over weekend.  Please call [phone] if issues

## 2019-02-08 NOTE — PROGRESS NOTE ADULT - ASSESSMENT
ASSESSMENT:  68 year old female with bipolar depression, OCD, dementia, HLD, NHL (in remission since 2016), osteoarthritis s/p bilateral TKR s/p fall presenting with a right periprosthetic femoral neck fracture and C. difficile colitis.    PLAN:    Neuro: bipolar depression, OCD, dementia, acute traumatic pain  - Monitor mental status.  - Continue home bupropion, gabapentin, and ziprasidone.  - Pain control as needed with Tylenol.    Resp: no acute issues  - Monitor pulse oximeter.  - Deep breathing exercises and incentive spirometry to prevent atelectasis.    CV: intermittent episodes of hypotension, HLD  - Monitor vital signs.  - Frequent reassessments of volume status, volume resuscitate as needed    GI: C. difficile colitis  - NPO except medications due to worsening abdominal distention. Follow up abdominal x-ray this AM.  - Monitor rectal tube output.  - PO vancomycin, vancomycin retention enemas, IV Flagyl    Renal CKD stage III  - [place], rectal tube, monitor I&Os  - Monitor electrolytes and replete as necessary.  - Plasmalyte @ 100 mL/hr while NPO.    Heme: no acute issues  - Monitor CBC and coags.  - Lovenox for VTE prophylaxis.    ID: C. difficile colitis  - Monitor WBC, temperature, and procalcitonin.  - PO vancomycin, vancomycin retention enemas, and IV Flagyl for severe C. difficile colitis.    Endo: no acute issues  - Monitor glucose on BMP.    Disposition:   - Full code.  - Will remain in SICU. Plan for OR for repair of periprosthetic fracture on Fri.    ELIZABETH GibsonC     [id number]

## 2019-02-08 NOTE — PROGRESS NOTE ADULT - SUBJECTIVE AND OBJECTIVE BOX
Acute Care Surgery Progress Note    Subjective:  Yesterday pt got AXR and CT chest/abd/pelvis. Pt desatted a little so ABG was sent, showing a some metabolic acidosis. Pt put on mitten restraints No acute events overnight. Pt seen at bedside. Pt passed gas, had bm.     Objective:  Physical Exam:  NAD, awake and alert, sitting up in bed, confused  Respirations nonlabored  Abdomen soft, nontender, mildly distended  No guarding or rebound tenderness        T(C): 36.9 (02-08-19 @ 03:00), Max: 36.9 (02-07-19 @ 23:00)  HR: 106 (02-08-19 @ 06:00) (99 - 121)  BP: 103/57 (02-08-19 @ 06:00) (80/42 - 127/58)  RR: 26 (02-08-19 @ 06:00) (22 - 34)  SpO2: 94% (02-08-19 @ 06:00) (90% - 97%)    02-07-19 @ 07:01  -  02-08-19 @ 07:00  --------------------------------------------------------  IN: 3900 mL / OUT: 1310 mL / NET: 2590 mL        LABS                        14.3   17.0  )-----------( 107      ( 08 Feb 2019 02:55 )             42.8     02-08    132<L>  |  98  |  40<H>  ----------------------------<  148<H>  4.5   |  18<L>  |  1.02    Ca    7.6<L>      08 Feb 2019 02:55  Phos  4.1     02-08  Mg     2.7     02-08

## 2019-02-08 NOTE — PROGRESS NOTE ADULT - PROBLEM SELECTOR PLAN 3
pressors as needed  continue ICU care pressors as needed  continue ICU care  Vigorous fluid hydration pressors as needed  continue ICU care  Vigorous fluid hydration  hemodynamically unstable

## 2019-02-08 NOTE — PROGRESS NOTE ADULT - PROBLEM SELECTOR PLAN 1
awaiting surgical intervention  pain meds as needed PO as tolerated  scheduled as per ICU awaiting surgical intervention - currently too unstable to proceed with surgery  pain meds as needed PO as tolerated

## 2019-02-08 NOTE — PROGRESS NOTE ADULT - SUBJECTIVE AND OBJECTIVE BOX
24hr:  - Pt with episode of acute hypotension (SBP 70s). Lactate was slightly elevated at 3.4 She was bolused 1L crystalloid with good rise in BP.  - CT abdomen/pelvis obtained to r/o ischemic bowel. It demonstrated pancolitis, moderate hiatal hernia with reflux of oral contrast to the level of the upper esophagus, and mild abdominal and pelvic ascites. Bilateral pleural effusions.  - Vancomycin retention enemas initiated. 24hr:  - Patient was found to be distended the previous evening with an AXR demonstrating multiple distended loops of bowel. Repeat CT ordered over concerns of rising lactate and leukocytosis in setting of intermittent hypotension- did not demonstrate bowel ischemia but noted interval increase in abdominal/pelvic fluid.   - Continues to have hypotensive episodes systolic in mid-80s requiring multiple boluses totaling 2L in past 24 hours (1L plasmalyte and 1L NS). Added vanco enema to ensure appropriate abx coverage.   - No growth on 2/6 blood cultures.  - Given low acuity of periprosthetic fracture, ortho plans to hold off on RTOR in order for patient’s symptoms/infections to resolve.   - Scheduled for AXR this AM to evaluate 24 hr interval changes.   - Discontinued gabapentin given pt’s oversedation and lack of clinical indication. 24 HOUR EVENTS:  - Patient was found to be distended the previous evening with an AXR demonstrating multiple distended loops of bowel. Repeat CT ordered over concerns of rising lactate and leukocytosis in setting of intermittent hypotension- did not demonstrate bowel ischemia but noted interval increase in abdominal/pelvic fluid.   - Continues to have hypotensive episodes systolic in mid-80s requiring multiple boluses totaling 2L in past 24 hours (1L plasmalyte and 1L NS). Added vanco enema to ensure appropriate abx coverage.   - No growth on 2/6 blood cultures.  - Given low acuity of periprosthetic fracture, ortho plans to hold off on RTOR in order for patient’s symptoms/infections to resolve.   - Scheduled for AXR this AM to evaluate 24 hr interval changes.   - Discontinued gabapentin given pt’s oversedation and lack of clinical indication.    - Overnight complained of chest discomfort. EKG and troponin ordered.    HISTORY  68 year old female with a past medical history of bipolar depression, OCD, dementia, HLD, NHL (in remission since 2016), osteoarthritis s/p bilateral TKR, and recent right YONATAN (1/20-1/26)  who presented on 2/4 as a level two trauma after a fall. Patient was found down in rehab after likely rolling out of bed. Patient was upgraded to a level 1 due to hypotension w/ SBP in the 80s despite fluid resuscitation and transfusions. Imaging revealed pancolitis and a right periprosthetic femoral neck fracture. Patient tested for C. difficile and was positive. She was admitted to SICU for hemodynamic monitoring in the setting of sepsis.    SUBJECTIVE/ROS:  [ x] A ten-point review of systems was otherwise negative except as noted.  [ ] Due to altered mental status/intubation, subjective information were not able to be obtained from the patient. History was obtained, to the extent possible, from review of the chart and collateral sources of information.      NEURO  Exam: awake, alert and oriented x3, NAD  Meds: buPROPion XL . 300 milliGRAM(s) Oral daily  ziprasidone 40 milliGRAM(s) Oral <User Schedule>    [x] Adequacy of sedation and pain control has been assessed and adjusted      RESPIRATORY  RR: 32 (02-08-19 @ 03:00) (22 - 34)  SpO2: 94% (02-08-19 @ 03:00) (91% - 97%)  Exam: unlabored, clear to auscultation bilaterally  ABG - ( 08 Feb 2019 02:39 )  pH: 7.39  /  pCO2: 35    /  pO2: 75    / HCO3: 21    / Base Excess: -3.1  /  SaO2: 94      Blood Gas Arterial, Lactate: 2.7 mmol/L (02.08.19 @ 02:39)  Meds: x      CARDIOVASCULAR  HR: 108 (02-08-19 @ 03:00) (99 - 121)  BP: 118/58 (02-08-19 @ 03:00) (80/42 - 127/58)  BP(mean): 79 (02-08-19 @ 03:00) (56 - 86)    Exam: regular rate and rhythm  Cardiac Rhythm: sinus  Perfusion     [x ]Adequate   [ ]Inadequate  Mentation   [x ]Normal       [ ]Reduced  Extremities  [x ]Warm         [ ]Cool  Volume Status [ ]Hypervolemic [ x]Euvolemic [ ]Hypovolemic  Meds: x      GI/NUTRITION  Exam: distended, nontender  Diet: NPO  Meds:  x    GENITOURINARY  I&O's Detail    02-06 @ 07:01  -  02-07 @ 07:00  --------------------------------------------------------  IN:    multiple electrolytes Injection Type 1: 2000 mL    Sodium Chloride 0.9% IV Bolus: 1000 mL    Solution: 100 mL  Total IN: 3100 mL    OUT:    Rectal Tube: 500 mL    Ureteral Catheter: 550 mL  Total OUT: 1050 mL    Total NET: 2050 mL      02-07 @ 07:01  -  02-08 @ 03:36  --------------------------------------------------------  IN:    multiple electrolytes Injection Type 1: 2000 mL    Solution: 200 mL    Solution: 1000 mL    Solution: 200 mL  Total IN: 3400 mL    OUT:    Indwelling Catheter - Urethral: 615 mL    Rectal Tube: 100 mL  Total OUT: 715 mL    Total NET: 2685 mL      02-08    132<L>  |  98  |  40<H>  ----------------------------<  148<H>  4.5   |  18<L>  |  1.02    Ca    7.6<L>      08 Feb 2019 02:55  Phos  4.1     02-08  Mg     2.7     02-08      [x ] Munguia catheter, indication: critically ill  Meds: calcium gluconate IVPB 2 Gram(s) IV Intermittent once  multiple electrolytes Injection Type 1 1000 milliLiter(s) IV Continuous <Continuous>        HEMATOLOGIC  Meds: enoxaparin Injectable 40 milliGRAM(s) SubCutaneous daily    [x] VTE Prophylaxis                        14.3   17.0  )-----------( 107      ( 08 Feb 2019 02:55 )             42.8       Transfusion     [ ] PRBC   [ ] Platelets   [ ] FFP   [ ] Cryoprecipitate      INFECTIOUS DISEASES  T(C): 36.9 (02-08-19 @ 03:00), Max: 36.9 (02-07-19 @ 23:00)  WBC Count: 17.0 K/uL (02-08 @ 02:55)    Recent Cultures:  Specimen Source: .Body Fluid, 02-06 @ 18:59; Results   No growth; Gram Stain:   No polymorphonuclear cells seen per low power field  No organisms seen; Organism: --  Specimen Source: .Blood Blood-Peripheral, 02-06 @ 16:55; Results   No growth to date.; Gram Stain: --; Organism: --    Meds: influenza   Vaccine 0.5 milliLiter(s) IntraMuscular once  metroNIDAZOLE  IVPB 500 milliGRAM(s) IV Intermittent every 8 hours  vancomycin    Solution 500 milliGRAM(s) Oral every 6 hours  vancomycin  Retention Enema 500 milliGRAM(s) Rectal every 6 hours        ENDOCRINE  Meds: levothyroxine Injectable 37.5 MICROGram(s) IV Push at bedtime        ACCESS DEVICES:  [ x] Peripheral IV  [ ] Central Venous Line	[ ] R	[ ] L	[ ] IJ	[ ] Fem	[ ] SC	Placed:   [ ] Arterial Line		[ ] R	[ ] L	[ ] Fem	[ ] Rad	[ ] Ax	Placed:   [ ] PICC:					[ ] Mediport  [x ] Urinary Catheter, Date Placed: 2/4  [x ] Necessity of urinary, arterial, and venous catheters discussed    OTHER MEDICATIONS:  chlorhexidine 4% Liquid 1 Application(s) Topical <User Schedule>      CODE STATUS: full code    IMAGING: < from: CT Abdomen and Pelvis w/ Oral Cont and w/ IV Cont (02.07.19 @ 14:41) >  IMPRESSION:   *  Pancolitis.  *  Moderate hiatal hernia. Reflux of oral contrast to the level of the   upper esophagus.  *  Mild abdominal and pelvic ascites. Bilateral pleural effusions.

## 2019-02-08 NOTE — PROGRESS NOTE ADULT - SUBJECTIVE AND OBJECTIVE BOX
Patient is a 68y old  Female who presents with a chief complaint of hip pain     Being followed by ID for C Dif  was transferred to [facility] -was followed by dr Lomeli at [facility]    Interval history:In SICU  awake but confused though answers some queries  Has rectal tube  no pressors  Denies abd pain but not reliable historian   No other acute events      ROS:  No cough,SOB,CP  No N/V/D  No abd pain  No urinary complaints  No HA  + leg pain   No other complaints      Antimicrobials:    metroNIDAZOLE  IVPB 500 milliGRAM(s) IV Intermittent every 8 hours  vancomycin    Solution 500 milliGRAM(s) Oral every 6 hours  vancomycin  Retention Enema 500 milliGRAM(s) Rectal every 6 hours      other medications reviewed    Vital Signs Last 24 Hrs  T(C): 36.7 (02-08-19 @ 07:00), Max: 36.9 (02-07-19 @ 23:00)  T(F): 98 (02-08-19 @ 07:00), Max: 98.5 (02-07-19 @ 23:00)  HR: 120 (02-08-19 @ 10:00) (99 - 120)  BP: 114/56 (02-08-19 @ 10:00) (89/42 - 121/56)  BP(mean): 80 (02-08-19 @ 10:00) (61 - 81)  RR: 45 (02-08-19 @ 10:00) (22 - 45)  SpO2: 91% (02-08-19 @ 10:00) (90% - 97%)    Physical Exam:    Constitutional well preserved,,pleasant    HEENT PERRLA EOMI,No pallor or icterus    No oral exudate or erythema    Neck supple no JVD or LN    Chest Good AE,CTA    CVS RRR S1 S2 WNl + ESM no rub or gallop    Abd Distended decreased BS Mild diffuse tenderness No rebound or guarding  No masses    Ext Hip painful ROM     IV site no erythema tenderness or discharge    Joints no swelling or LOM    CNS AAO X 2 no focal    Lab Data:                          14.3   17.0  )-----------( 107      ( 08 Feb 2019 02:55 )             42.8   WBC Count: 17.0 (02-08-19 @ 02:55)  WBC Count: 13.2 (02-07-19 @ 03:16)  WBC Count: 11.0 (02-06-19 @ 03:08)  WBC Count: 10.3 (02-05-19 @ 02:27)  WBC Count: 9.8 (02-04-19 @ 16:54)  WBC Count: 8.9 (02-04-19 @ 12:04)  WBC Count: 9.9 (02-04-19 @ 09:06)      02-08    132<L>  |  98  |  40<H>  ----------------------------<  148<H>  4.5   |  18<L>  |  1.02    Ca    7.6<L>      08 Feb 2019 02:55  Phos  4.1     02-08  Mg     2.7     02-08          Culture - Body Fluid with Gram Stain (collected 06 Feb 2019 18:59)  Source: .Body Fluid  Gram Stain (06 Feb 2019 23:47):    No polymorphonuclear cells seen per low power field    No organisms seen  Preliminary Report (07 Feb 2019 19:55):    No growth    Culture - Blood (collected 06 Feb 2019 16:55)  Source: .Blood Blood-Peripheral  Preliminary Report (07 Feb 2019 17:02):    No growth to date.    Culture - Blood (collected 06 Feb 2019 16:55)  Source: .Blood Blood-Peripheral  Preliminary Report (07 Feb 2019 17:02):    No growth to date.        C. difficile GDH &amp; toxins A/B by EIA . (02.04.19 @ 14:51)    Clostridium difficile GDH Toxins A&amp;B, EIA:   Positive    Clostridium difficile GDH Interpretation: Positive for toxigenic C. Difficile.  This specimen is positive for C.  Difficile glutamate dehydrogenase (GDH) antigen and positive for C.  Difficile Toxins A & B, by EIA.  GDH is a highly sensitive marker for C.  Difficile that is produced in largeamounts by all C. Difficile strains,  both toxigenic and nontoxigenic.  This assay has not been validated as a  test of cure.  The results of this assay should always be interpreted in  conjunction with patient's clinical history.                  < from: CT Abdomen and Pelvis w/ Oral Cont and w/ IV Cont (02.07.19 @ 14:41) >  IMPRESSION:   *  Pancolitis.  *  Moderate hiatal hernia. Reflux of oral contrast to the level of the   upper esophagus.  *  Mild abdominal and pelvic ascites. Bilateral pleural effusions.              < end of copied text >      Abd X ray pending      < from: CT 3D Reconstruct w/ Workstation (02.05.19 @ 09:33) >  IMPRESSION: Minimally displaced right proximal femoral periprosthetic   fracture, as above.    Pancolitis.    Nonspecific fluid collection in the right trochanteric bursa.      < end of copied text >

## 2019-02-08 NOTE — PROGRESS NOTE ADULT - PROBLEM SELECTOR PLAN 2
follow output and keep up with output  continue flagyl follow output and keep up with output  continue flagyl and add  Vanco  consider stool transplant

## 2019-02-08 NOTE — PROGRESS NOTE ADULT - ATTENDING COMMENTS
continue ATC cardio pulmonary monitoring in this critically ill Patient  I am a non participating [facility] physician seeing Pt in coverage for Dr Barraza. The above patient examination was reviewed with Dr. Awan and I agree with his evaluation, assessment and treatment plan.  Orlin Barraza M.D. continue ATC cardio pulmonary monitoring in this critically ill Patient Continue ATC cardio pulmonary monitoring for  this critically ill Patient in the SICU.

## 2019-02-08 NOTE — PROGRESS NOTE ADULT - SUBJECTIVE AND OBJECTIVE BOX
Patient seen and examined.  No acute events overnight.  Abdominal pain less than yesterday.     Vital Signs Last 24 Hrs  T(C): 36.9 (08 Feb 2019 03:00), Max: 36.9 (07 Feb 2019 23:00)  T(F): 98.5 (08 Feb 2019 03:00), Max: 98.5 (07 Feb 2019 23:00)  HR: 106 (08 Feb 2019 06:00) (99 - 121)  BP: 103/57 (08 Feb 2019 06:00) (80/42 - 127/58)  BP(mean): 73 (08 Feb 2019 06:00) (56 - 86)  RR: 26 (08 Feb 2019 06:00) (22 - 34)  SpO2: 94% (08 Feb 2019 06:00) (90% - 97%)    02-06 @ 07:01  -  02-07 @ 07:00  --------------------------------------------------------  IN: 3100 mL / OUT: 1050 mL / NET: 2050 mL    02-07 @ 07:01  -  02-08 @ 06:54  --------------------------------------------------------  IN: 3900 mL / OUT: 1290 mL / NET: 2610 mL                            14.3   17.0  )-----------( 107      ( 08 Feb 2019 02:55 )             42.8   02-08    132<L>  |  98  |  40<H>  ----------------------------<  148<H>  4.5   |  18<L>  |  1.02    Ca    7.6<L>      08 Feb 2019 02:55  Phos  4.1     02-08  Mg     2.7     02-08        Exam:  Gen: NAD  RLE:  Skin intact over proximal femur  Motor: 5/5 EHL/FHL/TA/Gastrocnemius  Sensory: SILT DP/SP/S/S/T nerve distributions  Vascular: 2+ Dorsalis Pedis pulse

## 2019-02-08 NOTE — PROGRESS NOTE ADULT - ATTENDING COMMENTS
I agree with the above note and have personally seen and examined this patient. All pertinent films have been reviewed. Please refer to clinical documentation of the history, physical examinations, data summary, and both assessment and plan as documented above and with which I agree.    possible consideration for FMT per ID  delay surgical intervention until patient more stable and out of ICU  NWB    Conor George MD  Attending Orthopedic Surgeon

## 2019-02-08 NOTE — PROGRESS NOTE ADULT - ASSESSMENT
69 yo woman with a hx of dementia present after recent Hip surgery present after a fall with a femur fx. Patient was found to be cdiff positive. will need revision of hip surgery 68 year old female with a PMHx of bipolar depression, Hyperlipidemia, NHL (in remission), OCD, dementia, with recent hospitalization (1/20-1/26) for right hip hemiarthroplasty for right femoral neck fracture after fall. Hospital course complicated by ESBL E. Coli UTI on Ertapenem (1/23-1/29) now presents with likely Hypovolemic/ Distributive shock secondary to sepsis 2/2 Pancolitis, likely secondary to C. Diff Colitis, Age Indeterminate L5 vertebral body fracture, possible acute as tender to palpation, Nondisplaced sacral insufficiency, Right proximal femoral periprosthetic fracture following unwitnessed fall with cdiff, bandemia, fever

## 2019-02-09 LAB
ANION GAP SERPL CALC-SCNC: 16 MMOL/L — SIGNIFICANT CHANGE UP (ref 5–17)
APTT BLD: 20.6 SEC — LOW (ref 27.5–36.3)
BUN SERPL-MCNC: 45 MG/DL — HIGH (ref 7–23)
CALCIUM SERPL-MCNC: 7.4 MG/DL — LOW (ref 8.4–10.5)
CHLORIDE SERPL-SCNC: 101 MMOL/L — SIGNIFICANT CHANGE UP (ref 96–108)
CO2 SERPL-SCNC: 17 MMOL/L — LOW (ref 22–31)
CREAT SERPL-MCNC: 0.89 MG/DL — SIGNIFICANT CHANGE UP (ref 0.5–1.3)
GAS PNL BLDA: SIGNIFICANT CHANGE UP
GAS PNL BLDA: SIGNIFICANT CHANGE UP
GLUCOSE SERPL-MCNC: 156 MG/DL — HIGH (ref 70–99)
HCT VFR BLD CALC: 37.3 % — SIGNIFICANT CHANGE UP (ref 34.5–45)
HGB BLD-MCNC: 12.4 G/DL — SIGNIFICANT CHANGE UP (ref 11.5–15.5)
INR BLD: 1.21 RATIO — HIGH (ref 0.88–1.16)
MAGNESIUM SERPL-MCNC: 2.7 MG/DL — HIGH (ref 1.6–2.6)
MCHC RBC-ENTMCNC: 32.9 PG — SIGNIFICANT CHANGE UP (ref 27–34)
MCHC RBC-ENTMCNC: 33.2 GM/DL — SIGNIFICANT CHANGE UP (ref 32–36)
MCV RBC AUTO: 98.9 FL — SIGNIFICANT CHANGE UP (ref 80–100)
PHOSPHATE SERPL-MCNC: 3.4 MG/DL — SIGNIFICANT CHANGE UP (ref 2.5–4.5)
PLATELET # BLD AUTO: 69 K/UL — LOW (ref 150–400)
POTASSIUM SERPL-MCNC: 4.7 MMOL/L — SIGNIFICANT CHANGE UP (ref 3.5–5.3)
POTASSIUM SERPL-SCNC: 4.7 MMOL/L — SIGNIFICANT CHANGE UP (ref 3.5–5.3)
PROCALCITONIN SERPL-MCNC: 0.74 NG/ML — HIGH (ref 0.02–0.1)
PROTHROM AB SERPL-ACNC: 14 SEC — HIGH (ref 10–12.9)
RBC # BLD: 3.77 M/UL — LOW (ref 3.8–5.2)
RBC # FLD: 15.9 % — HIGH (ref 10.3–14.5)
SODIUM SERPL-SCNC: 134 MMOL/L — LOW (ref 135–145)
WBC # BLD: 17.4 K/UL — HIGH (ref 3.8–10.5)
WBC # FLD AUTO: 17.4 K/UL — HIGH (ref 3.8–10.5)

## 2019-02-09 PROCEDURE — 71045 X-RAY EXAM CHEST 1 VIEW: CPT | Mod: 26

## 2019-02-09 PROCEDURE — 99223 1ST HOSP IP/OBS HIGH 75: CPT | Mod: GC

## 2019-02-09 PROCEDURE — 99232 SBSQ HOSP IP/OBS MODERATE 35: CPT | Mod: GC

## 2019-02-09 PROCEDURE — 99291 CRITICAL CARE FIRST HOUR: CPT

## 2019-02-09 PROCEDURE — 99233 SBSQ HOSP IP/OBS HIGH 50: CPT

## 2019-02-09 PROCEDURE — 93306 TTE W/DOPPLER COMPLETE: CPT | Mod: 26

## 2019-02-09 RX ORDER — ALBUMIN HUMAN 25 %
250 VIAL (ML) INTRAVENOUS ONCE
Qty: 0 | Refills: 0 | Status: COMPLETED | OUTPATIENT
Start: 2019-02-09 | End: 2019-02-09

## 2019-02-09 RX ORDER — SODIUM CHLORIDE 9 MG/ML
1000 INJECTION, SOLUTION INTRAVENOUS
Qty: 0 | Refills: 0 | Status: DISCONTINUED | OUTPATIENT
Start: 2019-02-09 | End: 2019-02-10

## 2019-02-09 RX ORDER — FUROSEMIDE 40 MG
20 TABLET ORAL ONCE
Qty: 0 | Refills: 0 | Status: COMPLETED | OUTPATIENT
Start: 2019-02-09 | End: 2019-02-09

## 2019-02-09 RX ORDER — IPRATROPIUM/ALBUTEROL SULFATE 18-103MCG
3 AEROSOL WITH ADAPTER (GRAM) INHALATION EVERY 4 HOURS
Qty: 0 | Refills: 0 | Status: DISCONTINUED | OUTPATIENT
Start: 2019-02-09 | End: 2019-02-14

## 2019-02-09 RX ORDER — ALBUMIN HUMAN 25 %
50 VIAL (ML) INTRAVENOUS ONCE
Qty: 0 | Refills: 0 | Status: COMPLETED | OUTPATIENT
Start: 2019-02-09 | End: 2019-02-09

## 2019-02-09 RX ORDER — FONDAPARINUX SODIUM 2.5 MG/.5ML
2.5 INJECTION, SOLUTION SUBCUTANEOUS DAILY
Qty: 0 | Refills: 0 | Status: DISCONTINUED | OUTPATIENT
Start: 2019-02-09 | End: 2019-02-10

## 2019-02-09 RX ADMIN — Medication 650 MILLIGRAM(S): at 22:48

## 2019-02-09 RX ADMIN — Medication 500 MILLIGRAM(S): at 23:00

## 2019-02-09 RX ADMIN — Medication 3 MILLILITER(S): at 22:45

## 2019-02-09 RX ADMIN — Medication 500 MILLIGRAM(S): at 11:52

## 2019-02-09 RX ADMIN — Medication 50 MILLILITER(S): at 02:06

## 2019-02-09 RX ADMIN — Medication 100 MILLIGRAM(S): at 08:17

## 2019-02-09 RX ADMIN — Medication 500 MILLIGRAM(S): at 11:51

## 2019-02-09 RX ADMIN — Medication 100 MILLIGRAM(S): at 15:39

## 2019-02-09 RX ADMIN — CHLORHEXIDINE GLUCONATE 1 APPLICATION(S): 213 SOLUTION TOPICAL at 05:20

## 2019-02-09 RX ADMIN — Medication 650 MILLIGRAM(S): at 22:18

## 2019-02-09 RX ADMIN — BUPROPION HYDROCHLORIDE 300 MILLIGRAM(S): 150 TABLET, EXTENDED RELEASE ORAL at 11:51

## 2019-02-09 RX ADMIN — SODIUM CHLORIDE 75 MILLILITER(S): 9 INJECTION, SOLUTION INTRAVENOUS at 11:50

## 2019-02-09 RX ADMIN — ZIPRASIDONE HYDROCHLORIDE 40 MILLIGRAM(S): 20 CAPSULE ORAL at 08:16

## 2019-02-09 RX ADMIN — Medication 12.5 MILLIGRAM(S): at 17:45

## 2019-02-09 RX ADMIN — Medication 650 MILLIGRAM(S): at 15:38

## 2019-02-09 RX ADMIN — FONDAPARINUX SODIUM 2.5 MILLIGRAM(S): 2.5 INJECTION, SOLUTION SUBCUTANEOUS at 11:51

## 2019-02-09 RX ADMIN — Medication 37.5 MICROGRAM(S): at 21:40

## 2019-02-09 RX ADMIN — Medication 500 MILLIGRAM(S): at 05:21

## 2019-02-09 RX ADMIN — Medication 3 MILLILITER(S): at 09:57

## 2019-02-09 RX ADMIN — Medication 500 MILLIGRAM(S): at 17:45

## 2019-02-09 RX ADMIN — Medication 20 MILLIGRAM(S): at 23:21

## 2019-02-09 RX ADMIN — Medication 3 MILLILITER(S): at 18:16

## 2019-02-09 RX ADMIN — Medication 3 MILLILITER(S): at 15:56

## 2019-02-09 RX ADMIN — Medication 125 MILLILITER(S): at 19:47

## 2019-02-09 RX ADMIN — Medication 500 MILLIGRAM(S): at 17:49

## 2019-02-09 RX ADMIN — Medication 500 MILLIGRAM(S): at 23:01

## 2019-02-09 RX ADMIN — Medication 100 MILLIGRAM(S): at 23:01

## 2019-02-09 RX ADMIN — Medication 3 MILLILITER(S): at 02:55

## 2019-02-09 RX ADMIN — Medication 3 MILLILITER(S): at 06:13

## 2019-02-09 RX ADMIN — Medication 650 MILLIGRAM(S): at 16:00

## 2019-02-09 NOTE — PROGRESS NOTE ADULT - SUBJECTIVE AND OBJECTIVE BOX
Patient is a 68y old  Female who presents with a chief complaint of Periprosthetic hip fracture, hypovolemic shock with C, diff pancolitis and (08 Feb 2019 17:46)    Being followed by ID for C diff    Interval history:still with rectal tube  denies abd pain   No other acute events         ROS:  No cough,SOB,CP  No N/V/  No urinary complaints  No HA  No joint or limb pain  No other complaints      Antimicrobials:    metroNIDAZOLE  IVPB 500 milliGRAM(s) IV Intermittent every 8 hours  vancomycin    Solution 500 milliGRAM(s) Oral every 6 hours  vancomycin  Retention Enema 500 milliGRAM(s) Rectal every 6 hours      other medications reviewed    Vital Signs Last 24 Hrs  Constitutional well preserved,,pleasant    HEENT PERRLA EOMI,No pallor or icterus    No oral exudate or erythema    Neck supple no JVD or LN    Chest Good AE,CTA    CVS RRR S1 S2 WNl + ESM no rub or gallop    Abd Distended decreased BS Mild diffuse tenderness No rebound or guarding  No masses    Ext Hip painful ROM     IV site no erythema tenderness or discharge    Joints no swelling or LOM    CNS AAO X 2 no focalT(C): 36.4 (02-09-19 @ 11:00), Max: 36.7 (02-08-19 @ 15:00)  T(F): 97.6 (02-09-19 @ 11:00), Max: 98 (02-08-19 @ 15:00)  HR: 105 (02-09-19 @ 12:00) (78 - 107)  BP: 102/61 (02-09-19 @ 12:00) (83/51 - 117/54)  BP(mean): 76 (02-09-19 @ 12:00) (57 - 87)  RR: 28 (02-09-19 @ 12:00) (22 - 41)  SpO2: 94% (02-09-19 @ 12:00) (86% - 100%)    Physical Exam:    Lab Data:                          12.4   17.4  )-----------( 69       ( 09 Feb 2019 04:53 )             37.3       02-09    134<L>  |  101  |  45<H>  ----------------------------<  156<H>  4.7   |  17<L>  |  0.89    Ca    7.4<L>      09 Feb 2019 03:26  Phos  3.4     02-09  Mg     2.7     02-09          Culture - Body Fluid with Gram Stain (collected 06 Feb 2019 18:59)  Source: .Body Fluid  Gram Stain (06 Feb 2019 23:47):    No polymorphonuclear cells seen per low power field    No organisms seen  Preliminary Report (07 Feb 2019 19:55):    No growth    Culture - Blood (collected 06 Feb 2019 16:55)  Source: .Blood Blood-Peripheral  Preliminary Report (07 Feb 2019 17:02):    No growth to date.    Culture - Blood (collected 06 Feb 2019 16:55)  Source: .Blood Blood-Peripheral  Preliminary Report (07 Feb 2019 17:02):    No growth to date.    < from: Xray Chest 1 View- PORTABLE-Routine (02.09.19 @ 07:00) >  FINDINGS/  IMPRESSION:    Stable bilateral pleural effusions. No pneumothorax. Interstitial edema   is unchanged.          < end of copied text >

## 2019-02-09 NOTE — PROGRESS NOTE ADULT - SUBJECTIVE AND OBJECTIVE BOX
HISTORY  68y Female    24 HOUR EVENTS:    SUBJECTIVE/ROS:  [ ] A ten-point review of systems was otherwise negative except as noted.  [ ] Due to altered mental status/intubation, subjective information were not able to be obtained from the patient. History was obtained, to the extent possible, from review of the chart and collateral sources of information.    NEURO  RASS:     GCS:     CAM ICU:  Exam: awake, alert, oriented  Meds: acetaminophen   Tablet .. 650 milliGRAM(s) Oral every 6 hours PRN Mild Pain (1 - 3)  buPROPion XL . 300 milliGRAM(s) Oral daily  ziprasidone 40 milliGRAM(s) Oral <User Schedule>    [x] Adequacy of sedation and pain control has been assessed and adjusted    RESPIRATORY  RR: 28 (02-09-19 @ 03:00) (21 - 45)  SpO2: 97% (02-09-19 @ 03:00) (86% - 97%)  Wt(kg): --  Exam: unlabored, clear to auscultation bilaterally  Mechanical Ventilation:   ABG - ( 08 Feb 2019 16:56 )  pH: 7.41  /  pCO2: 36    /  pO2: 82    / HCO3: 22    / Base Excess: -1.1  /  SaO2: 96      Lactate: x                [N/A] Extubation Readiness Assessed  Meds: ALBUTerol/ipratropium for Nebulization 3 milliLiter(s) Nebulizer every 4 hours      CARDIOVASCULAR  HR: 84 (02-09-19 @ 03:00) (78 - 885)  BP: 95/53 (02-09-19 @ 03:00) (83/51 - 118/53)  BP(mean): 69 (02-09-19 @ 03:00) (57 - 87)  ABP: --  ABP(mean): --  Wt(kg): --  CVP(cm H2O): --      Exam: regular rate and rhythm  Cardiac Rhythm: sinus  Perfusion     [x]Adequate   [ ]Inadequate  Mentation   [x]Normal       [ ]Reduced  Extremities  [x]Warm         [ ]Cool  Volume Status [ ]Hypervolemic [x]Euvolemic [ ]Hypovolemic  Meds: metoprolol tartrate 12.5 milliGRAM(s) Oral two times a day      GI/NUTRITION  Exam: soft, nontender, nondistended, incision C/D/I  Diet:  Meds:     GENITOURINARY  I&O's Detail    02-07 @ 07:01 - 02-08 @ 07:00  --------------------------------------------------------  IN:    multiple electrolytes Injection Type 1multiple electrolytes Injection Type 1: 2400 mL    Solution: 1000 mL    Solution: 200 mL    Solution: 300 mL  Total IN: 3900 mL    OUT:    Indwelling Catheter - Urethral: 710 mL    Rectal Tube: 600 mL  Total OUT: 1310 mL    Total NET: 2590 mL      02-08 @ 07:01 - 02-09 @ 03:03  --------------------------------------------------------  IN:    multiple electrolytes Injection Type 1multiple electrolytes Injection Type 1: 1725 mL    Solution: 100 mL    Solution: 300 mL    Solution: 1050 mL  Total IN: 3175 mL    OUT:    Indwelling Catheter - Urethral: 360 mL    Rectal Tube: 300 mL  Total OUT: 660 mL    Total NET: 2515 mL          02-08    134<L>  |  96  |  44<H>  ----------------------------<  160<H>  4.6   |  21<L>  |  1.12    Ca    7.6<L>      08 Feb 2019 13:09  Phos  4.1     02-08  Mg     2.8     02-08      [ ] Munguia catheter, indication: N/A  Meds:     HEMATOLOGIC  Meds: enoxaparin Injectable 40 milliGRAM(s) SubCutaneous daily    [x] VTE Prophylaxis                        14.3   17.0  )-----------( 107      ( 08 Feb 2019 02:55 )             42.8       Transfusion     [ ] PRBC   [ ] Platelets   [ ] FFP   [ ] Cryoprecipitate    INFECTIOUS DISEASES    RECENT CULTURES:  Specimen Source: .Body Fluid  Date/Time: 02-06 @ 18:59  Culture Results:   No growth  Gram Stain:   No polymorphonuclear cells seen per low power field  No organisms seen  Organism: --  Specimen Source: .Blood Blood-Peripheral  Date/Time: 02-06 @ 16:55  Culture Results:   No growth to date.  Gram Stain: --  Organism: --    Meds: influenza   Vaccine 0.5 milliLiter(s) IntraMuscular once  metroNIDAZOLE  IVPB 500 milliGRAM(s) IV Intermittent every 8 hours  vancomycin    Solution 500 milliGRAM(s) Oral every 6 hours  vancomycin  Retention Enema 500 milliGRAM(s) Rectal every 6 hours      ENDOCRINE  CAPILLARY BLOOD GLUCOSE        Meds: levothyroxine Injectable 37.5 MICROGram(s) IV Push at bedtime      ACCESS DEVICES:  [ ] Peripheral IV  [ ] Central Venous Line	[ ] R	[ ] L	[ ] IJ	[ ] Fem	[ ] SC	Placed:   [ ] Arterial Line		[ ] R	[ ] L	[ ] Fem	[ ] Rad	[ ] Ax	Placed:   [ ] PICC:					[ ] Mediport  [ ] Urinary Catheter, Date Placed:   [x] Necessity of urinary, arterial, and venous catheters discussed    OTHER MEDICATIONS:  chlorhexidine 4% Liquid 1 Application(s) Topical <User Schedule>      CODE STATUS:      IMAGING: HISTORY  68 year old female with a past medical history of bipolar depression, OCD, dementia, HLD, NHL (in remission since 2016), osteoarthritis s/p bilateral TKR, and recent right YONATAN (1/20-1/26)  who presented on 2/4 as a level two trauma after a fall. Patient was found down in rehab after likely rolling out of bed. Patient was upgraded to a level 1 due to hypotension w/ SBP in the 80s despite fluid resuscitation and transfusions. Imaging revealed pancolitis and a right periprosthetic femoral neck fracture. Patient tested for C. difficile and was positive. She was admitted to SICU for hemodynamic monitoring in the setting of sepsis.    24 HOUR EVENTS:  - started on metoprolol 12.5 BID for intermittent episodes of tachycardia, with rates up to 180s-200s, which last a few seconds and self resolve   - given 500 cc plasmalyte x 2 for episodes of hypotension (SBPs 70s)   - fluids advanced from 100 to 125 cc/hour, but then diet changed to clear liquid diet   - yanes discontinued but then replaced given POCUS showed full bladder  - ultrasound guided IV placed which patient subsequently took out  - POCUS overnight showed minimal B lines, b/l pleural effusions, L>R with intrabdominal ascites.   - given albumin 25% IVPB 50 cc as patient appeared to have weeping interstitial edema with pleural effusions and ascites with the hopes of the albumin promoting intravascular fluid retention    SUBJECTIVE/ROS:  [ ] A ten-point review of systems was otherwise negative except as noted.  [ ] Due to altered mental status/intubation, subjective information were not able to be obtained from the patient. History was obtained, to the extent possible, from review of the chart and collateral sources of information.    NEURO  RASS:     GCS:     CAM ICU:  Exam: awake, alert, oriented x 2 (does not know year), moving all extremities.   Meds: acetaminophen   Tablet .. 650 milliGRAM(s) Oral every 6 hours PRN Mild Pain (1 - 3)  buPROPion XL . 300 milliGRAM(s) Oral daily  ziprasidone 40 milliGRAM(s) Oral <User Schedule>    [x] Adequacy of sedation and pain control has been assessed and adjusted    RESPIRATORY  RR: 28 (02-09-19 @ 03:00) (21 - 45)  SpO2: 97% (02-09-19 @ 03:00) (86% - 97%)  Wt(kg): --  Exam: b/l expiratory wheezes, intermittent desaturation to 80s   Mechanical Ventilation:   ABG - ( 08 Feb 2019 16:56 )  pH: 7.41  /  pCO2: 36    /  pO2: 82    / HCO3: 22    / Base Excess: -1.1  /  SaO2: 96      Lactate: x                [N/A] Extubation Readiness Assessed  Meds: ALBUTerol/ipratropium for Nebulization 3 milliLiter(s) Nebulizer every 4 hours      CARDIOVASCULAR  HR: 84 (02-09-19 @ 03:00) (78 - 885)  BP: 95/53 (02-09-19 @ 03:00) (83/51 - 118/53)  BP(mean): 69 (02-09-19 @ 03:00) (57 - 87)  ABP: --  ABP(mean): --  Wt(kg): --  CVP(cm H2O): --      Exam: regular rate and rhythm  Cardiac Rhythm: sinus  Perfusion     [x]Adequate   [ ]Inadequate  Mentation   [x]Normal       [ ]Reduced  Extremities  [x]Warm         [ ]Cool  Volume Status [ ]Hypervolemic [x]Euvolemic [ ]Hypovolemic  Meds: metoprolol tartrate 12.5 milliGRAM(s) Oral two times a day      GI/NUTRITION  Exam: soft, nontender, nondistended, incision C/D/I  Diet:  Meds:     GENITOURINARY  I&O's Detail    02-07 @ 07:01  -  02-08 @ 07:00  --------------------------------------------------------  IN:    multiple electrolytes Injection Type 1multiple electrolytes Injection Type 1: 2400 mL    Solution: 1000 mL    Solution: 200 mL    Solution: 300 mL  Total IN: 3900 mL    OUT:    Indwelling Catheter - Urethral: 710 mL    Rectal Tube: 600 mL  Total OUT: 1310 mL    Total NET: 2590 mL      02-08 @ 07:01  -  02-09 @ 03:03  --------------------------------------------------------  IN:    multiple electrolytes Injection Type 1multiple electrolytes Injection Type 1: 1725 mL    Solution: 100 mL    Solution: 300 mL    Solution: 1050 mL  Total IN: 3175 mL    OUT:    Indwelling Catheter - Urethral: 360 mL    Rectal Tube: 300 mL  Total OUT: 660 mL    Total NET: 2515 mL          02-08    134<L>  |  96  |  44<H>  ----------------------------<  160<H>  4.6   |  21<L>  |  1.12    Ca    7.6<L>      08 Feb 2019 13:09  Phos  4.1     02-08  Mg     2.8     02-08      [ ] Yanes catheter, indication: N/A  Meds:     HEMATOLOGIC  Meds: enoxaparin Injectable 40 milliGRAM(s) SubCutaneous daily    [x] VTE Prophylaxis                        14.3   17.0  )-----------( 107      ( 08 Feb 2019 02:55 )             42.8       Transfusion     [ ] PRBC   [ ] Platelets   [ ] FFP   [ ] Cryoprecipitate    INFECTIOUS DISEASES    RECENT CULTURES:  Specimen Source: .Body Fluid  Date/Time: 02-06 @ 18:59  Culture Results:   No growth  Gram Stain:   No polymorphonuclear cells seen per low power field  No organisms seen  Organism: --  Specimen Source: .Blood Blood-Peripheral  Date/Time: 02-06 @ 16:55  Culture Results:   No growth to date.  Gram Stain: --  Organism: --    Meds: influenza   Vaccine 0.5 milliLiter(s) IntraMuscular once  metroNIDAZOLE  IVPB 500 milliGRAM(s) IV Intermittent every 8 hours  vancomycin    Solution 500 milliGRAM(s) Oral every 6 hours  vancomycin  Retention Enema 500 milliGRAM(s) Rectal every 6 hours      ENDOCRINE  CAPILLARY BLOOD GLUCOSE        Meds: levothyroxine Injectable 37.5 MICROGram(s) IV Push at bedtime      ACCESS DEVICES:  [ ] Peripheral IV  [ ] Central Venous Line	[ ] R	[ ] L	[ ] IJ	[ ] Fem	[ ] SC	Placed:   [ ] Arterial Line		[ ] R	[ ] L	[ ] Fem	[ ] Rad	[ ] Ax	Placed:   [ ] PICC:					[ ] Mediport  [ ] Urinary Catheter, Date Placed:   [x] Necessity of urinary, arterial, and venous catheters discussed    OTHER MEDICATIONS:  chlorhexidine 4% Liquid 1 Application(s) Topical <User Schedule>      CODE STATUS:      IMAGING: HISTORY  68 year old female with a past medical history of bipolar depression, OCD, dementia, HLD, NHL (in remission since 2016), osteoarthritis s/p bilateral TKR, and recent right YONATAN (1/20-1/26)  who presented on 2/4 as a level two trauma after a fall. Patient was found down in rehab after likely rolling out of bed. Patient was upgraded to a level 1 due to hypotension w/ SBP in the 80s despite fluid resuscitation and transfusions. Imaging revealed pancolitis and a right periprosthetic femoral neck fracture. Patient tested for C. difficile and was positive. She was admitted to SICU for hemodynamic monitoring in the setting of sepsis.    24 HOUR EVENTS:  - started on metoprolol 12.5 BID for intermittent episodes of tachycardia, with rates up to 180s-200s, which last a few seconds and self resolve   - given 500 cc plasmalyte x 2 for episodes of hypotension (SBPs 70s)   - fluids advanced from 100 to 125 cc/hour, but then diet changed to clear liquid diet   - yanes discontinued but then replaced given POCUS showed full bladder  - ultrasound guided IV placed which patient subsequently took out  - POCUS overnight showed minimal B lines, b/l pleural effusions, L>R with intrabdominal ascites.   - given albumin 25% IVPB 50 cc as patient appeared to have weeping interstitial edema with pleural effusions and ascites with the hopes of the albumin promoting intravascular fluid retention    SUBJECTIVE/ROS:  [ ] A ten-point review of systems was otherwise negative except as noted.  [ ] Due to altered mental status/intubation, subjective information were not able to be obtained from the patient. History was obtained, to the extent possible, from review of the chart and collateral sources of information.    NEURO  RASS:     GCS:     CAM ICU:  Exam: awake, alert, oriented x 2 (does not know year), moving all extremities.   Meds: acetaminophen   Tablet .. 650 milliGRAM(s) Oral every 6 hours PRN Mild Pain (1 - 3)  buPROPion XL . 300 milliGRAM(s) Oral daily  ziprasidone 40 milliGRAM(s) Oral <User Schedule>    [x] Adequacy of sedation and pain control has been assessed and adjusted    RESPIRATORY  RR: 28 (02-09-19 @ 03:00) (21 - 45)  SpO2: 97% (02-09-19 @ 03:00) (86% - 97%)  Wt(kg): --  Exam: b/l expiratory wheezes, intermittent desaturation to 80s   Mechanical Ventilation:   ABG - ( 08 Feb 2019 16:56 )  pH: 7.41  /  pCO2: 36    /  pO2: 82    / HCO3: 22    / Base Excess: -1.1  /  SaO2: 96      Lactate: x          [N/A] Extubation Readiness Assessed  Meds: ALBUTerol/ipratropium for Nebulization 3 milliLiter(s) Nebulizer every 4 hours      CARDIOVASCULAR  HR: 84 (02-09-19 @ 03:00) (78 - 885)  BP: 95/53 (02-09-19 @ 03:00) (83/51 - 118/53)  BP(mean): 69 (02-09-19 @ 03:00) (57 - 87)  ABP: --  ABP(mean): --  Wt(kg): --  CVP(cm H2O): --      Exam: intermittently tachycardic to the 180s to 200s, self resolving, currently regular rate and rhythm  Cardiac Rhythm: sinus  Perfusion     [x]Adequate   [ ]Inadequate  Mentation   [x]Normal       [ ]Reduced  Extremities  [x]Warm         [ ]Cool  Volume Status [ ]Hypervolemic [x]Euvolemic [ ]Hypovolemic  Meds: metoprolol tartrate 12.5 milliGRAM(s) Oral two times a day      GI/NUTRITION  Exam: soft with epigastric distension, diffusely ttp  Diet: CLD    GENITOURINARY  I&O's Detail    02-07 @ 07:01  -  02-08 @ 07:00  --------------------------------------------------------  IN:    multiple electrolytes Injection Type 1multiple electrolytes Injection Type 1: 2400 mL    Solution: 1000 mL    Solution: 200 mL    Solution: 300 mL  Total IN: 3900 mL    OUT:    Indwelling Catheter - Urethral: 710 mL    Rectal Tube: 600 mL  Total OUT: 1310 mL    Total NET: 2590 mL      02-08 @ 07:01  -  02-09 @ 03:03  --------------------------------------------------------  IN:    multiple electrolytes Injection Type 1multiple electrolytes Injection Type 1: 1725 mL    Solution: 100 mL    Solution: 300 mL    Solution: 1050 mL  Total IN: 3175 mL    OUT:    Indwelling Catheter - Urethral: 360 mL    Rectal Tube: 300 mL  Total OUT: 660 mL    Total NET: 2515 mL        02-08    134<L>  |  96  |  44<H>  ----------------------------<  160<H>  4.6   |  21<L>  |  1.12    Ca    7.6<L>      08 Feb 2019 13:09  Phos  4.1     02-08  Mg     2.8     02-08      [X ] Yanes catheter, indication: monitoring in critically ill       HEMATOLOGIC  Meds: enoxaparin Injectable 40 milliGRAM(s) SubCutaneous daily    [x] VTE Prophylaxis                        14.3   17.0  )-----------( 107      ( 08 Feb 2019 02:55 )             42.8       Transfusion     [ ] PRBC   [ ] Platelets   [ ] FFP   [ ] Cryoprecipitate    INFECTIOUS DISEASES    RECENT CULTURES:  Specimen Source: .Body Fluid  Date/Time: 02-06 @ 18:59  Culture Results:   No growth  Gram Stain:   No polymorphonuclear cells seen per low power field  No organisms seen  Organism: --  Specimen Source: .Blood Blood-Peripheral  Date/Time: 02-06 @ 16:55  Culture Results:   No growth to date.  Gram Stain: --  Organism: --    Meds: influenza   Vaccine 0.5 milliLiter(s) IntraMuscular once  metroNIDAZOLE  IVPB 500 milliGRAM(s) IV Intermittent every 8 hours  vancomycin    Solution 500 milliGRAM(s) Oral every 6 hours  vancomycin  Retention Enema 500 milliGRAM(s) Rectal every 6 hours      ENDOCRINE  CAPILLARY BLOOD GLUCOSE        Meds: levothyroxine Injectable 37.5 MICROGram(s) IV Push at bedtime      ACCESS DEVICES:  [X ] Peripheral IV  [ ] Central Venous Line	[ ] R	[ ] L	[ ] IJ	[ ] Fem	[ ] SC	Placed:   [ ] Arterial Line		[ ] R	[ ] L	[ ] Fem	[ ] Rad	[ ] Ax	Placed:   [ ] PICC:					[ ] Mediport  [ X] Urinary Catheter  [x] Necessity of urinary, arterial, and venous catheters discussed    OTHER MEDICATIONS:  chlorhexidine 4% Liquid 1 Application(s) Topical <User Schedule>      CODE STATUS: full code    IMAGING:  < from: CT Chest w/ IV Cont (02.07.19 @ 14:41) >  EXAM:  CT ABDOMEN AND PELVIS OC IC                          EXAM:  CT CHEST IC                            PROCEDURE DATE:  02/07/2019            INTERPRETATION:  CLINICAL INFORMATION: 68-year-old female. Evaluate for   source of infection. Status post fall.     COMPARISON: CT scan 2/4/2019    PROCEDURE:   CT of the Chest, Abdomen and Pelvis was performed with intravenous   contrast.   Intravenous contrast: 90 ml Omnipaque 350. 10 ml discarded.  Oral contrast: positive contrast was administered.  Sagittal and coronal reformats were performed.    FINDINGS:    CHEST:     LUNGS AND LARGE AIRWAYS: Patent central airways. Bibasilar passive   atelectasis.  PLEURA: Moderate right and small left pleural effusion.  VESSELS: Atherosclerotic calcifications.  HEART: Heart size is normal. No pericardial effusion.  MEDIASTINUM AND RAFAEL: No lymphadenopathy. Moderate hiatal hernia. Reflux   of oral contrast to the level of the upper esophagus.  CHEST WALL AND LOWER NECK: Within normal limits.    ABDOMENAND PELVIS:    LIVER: Within normal limits.  BILE DUCTS: Normal caliber.  GALLBLADDER: Small gallstone.  SPLEEN: Normal size. Small splenic lesion is reidentified.  PANCREAS: Diffusely atrophic, without evidence of ductal dilatation.   Small cystic lesion in the uncinate process measuring 10 mm, likely   low-grade cystic pancreatic neoplasm such as IPMN.  ADRENALS: Within normal limits.  KIDNEYS/URETERS: Within normal limits.    BLADDER: Decompressed with a Yanes catheter.  REPRODUCTIVE ORGANS: Uterus and adnexa are within normal limits.    BOWEL: No bowel obstruction. Appendix is normal. There is diffuse colonic   wall thickening, consistent with pancolitis. Rectal tube in place.  PERITONEUM: Mild abdominal and pelvic ascites.  VESSELS:  Atherosclerotic calcifications.  RETROPERITONEUM: No lymphadenopathy.    ABDOMINAL WALL: Diffuse subcutaneous edema.  BONES: Right hip arthroplasty. Bones are osteopenic. Superior endplate   deformity of L5.    IMPRESSION:   *  Pancolitis.  *  Moderate hiatal hernia. Reflux of oral contrast to the level of the   upper esophagus.  *  Mild abdominal and pelvic ascites. Bilateral pleural effusions.      < end of copied text >

## 2019-02-09 NOTE — CONSULT NOTE ADULT - PROBLEM SELECTOR RECOMMENDATION 3
multifactorial, difficult to ascertain in setting of know mental illness.
continue hydrarion   transfuse as needed  follow monitor
-joint fluid aspiration looks more like hemarthrosis  -low suspicion of infection  -possible OR on Friday

## 2019-02-09 NOTE — CONSULT NOTE ADULT - PROBLEM SELECTOR RECOMMENDATION 9
not a surgical candidate, son refusing fecal transplant.  on po vanco, IV flagyl and vanco enemas.
scheduled for repair of fx  will need ID to clear Patient with current issues with C Diff  pain meds as needed  follow for oversedation
-slowly improving  -cont po vanco 500 mg po q6 + flagyl 500 mg iv q8  -output less per RN

## 2019-02-09 NOTE — PROGRESS NOTE ADULT - ATTENDING COMMENTS
Continue ATC cardio pulmonary monitoring for  this critically ill Patient in the SICU. Left periprosthetic femur fracture. awaiting surgical intervention - currently too unstable to proceed with surgery  p.ain meds as needed orally  as tolerated. Continue ATC cardio pulmonary monitoring for  this critically ill Patient in the SICU. Right periprosthetic femur fracture. awaiting surgical intervention - currently too unstable to proceed with surgery  p.ain meds as needed orally  as tolerated. Continue ATC cardio pulmonary monitoring for  this critically ill Patient in the SICU.

## 2019-02-09 NOTE — PROGRESS NOTE ADULT - ATTENDING COMMENTS
Desaturating, CXR developing pul edema, decrease IVF  Abd exam unchanged, can start clears  On Abx vanco Po and enema and IV Flagyl, son refused fecal transplant  Palliative care called

## 2019-02-09 NOTE — CONSULT NOTE ADULT - SUBJECTIVE AND OBJECTIVE BOX
HPI:  68 year old female with a PMHx of bipolar depression, Hyperlipidemia, NHL (in remission), OCD, dementia, with recent hospitalization (1/20-1/26) for right hip hemiarthroplasty for right femoral neck fracture after fall. Hospital course complicated by ESBL E. Coli UTI on Ertapenem (1/23-1/29). Patient found down in rehab, reported as possibly rolling out of bed.     On arrival to [facility] ED, Level 2 trauma activated was upgraded to a Level 1 for worsening hypotension during secondary exam despite fluid resuscitation. SBP 80s, 2 large-bore pIV placed, e-FAST done twice, with no fluid in the intraperitoneal space or pericardial space, no pneumothorax, suggestion of a very small pleural effusion on the left. (04 Feb 2019 10:32)    PERTINENT PM/SXH:   Osteoarthritis of left knee  Lymphoma  Hypercholesteremia  Bipolar depression  OCD (obsessive compulsive disorder)  Depression  Osteoarthritis of right knee    S/P TKR (total knee replacement), bilateral  S/P cataract surgery  H/O oral surgery  S/P knee surgery    FAMILY HISTORY:  Family history of diabetes mellitus  Family history of prostate cancer (Grandparent)  Family history of stomach cancer (Grandparent)  Family history of breast cancer  Family history of COPD (chronic obstructive pulmonary disease)    ITEMS NOT CHECKED ARE NOT PRESENT    SOCIAL HISTORY:   Significant other/partner:  [ ]  Children:  [ ]  Church/Spirituality:  Substance hx:  [ ]   Tobacco hx:  [ ]   Alcohol hx: [ ]   Home Opioid hx:  [ ] I-Stop Reference No:  Living Situation: [ ]Home  [ ]Long term care  [ ]Rehab [ ]Other    ADVANCE DIRECTIVES:    DNR  MOLST  [ ]  Living Will  [ ]   DECISION MAKER(s):  [ ] Health Care Proxy(s)  [ ] Surrogate(s)  [ ] Guardian           Name(s): Phone Number(s):    BASELINE (I)ADL(s) (prior to admission):  [place]: [ ]Total  [ ] Moderate [ ]Dependent    Allergies    honeydew melon (Other)  penicillin (Other; Hives)    Intolerances    MEDICATIONS  (STANDING):  ALBUTerol/ipratropium for Nebulization 3 milliLiter(s) Nebulizer every 4 hours  buPROPion XL . 300 milliGRAM(s) Oral daily  chlorhexidine 4% Liquid 1 Application(s) Topical <User Schedule>  fondaparinux Injectable 2.5 milliGRAM(s) SubCutaneous daily  influenza   Vaccine 0.5 milliLiter(s) IntraMuscular once  levothyroxine Injectable 37.5 MICROGram(s) IV Push at bedtime  metoprolol tartrate 12.5 milliGRAM(s) Oral two times a day  metroNIDAZOLE  IVPB 500 milliGRAM(s) IV Intermittent every 8 hours  multiple electrolytes Injection Type 1 1000 milliLiter(s) (75 mL/Hr) IV Continuous <Continuous>  vancomycin    Solution 500 milliGRAM(s) Oral every 6 hours  vancomycin  Retention Enema 500 milliGRAM(s) Rectal every 6 hours  ziprasidone 40 milliGRAM(s) Oral <User Schedule>    MEDICATIONS  (PRN):  acetaminophen   Tablet .. 650 milliGRAM(s) Oral every 6 hours PRN Mild Pain (1 - 3)    PRESENT SYMPTOMS: [ ]Unable to obtain due to poor mentation   Source if other than patient:  [ ]Family   [ ]Team     Pain (Impact on QOL):    Location -         Minimal acceptable level (0-10 scale):                    Aggravating factors -  Quality -  Radiation -  Severity (0-10 scale) -    Timing -    PAIN AD Score:     [email or web address] (press ctrl +  left click to view)    Dyspnea:                           [ ]Mild [ ]Moderate [ ]Severe  Anxiety:                             [ ]Mild [ ]Moderate [ ]Severe  Fatigue:                             [ ]Mild [ ]Moderate [ ]Severe  Nausea:                             [ ]Mild [ ]Moderate [ ]Severe  Loss of appetite:              [ ]Mild [ ]Moderate [ ]Severe  Constipation:                    [ ]Mild [ ]Moderate [ ]Severe    Other Symptoms:  [ ]All other review of systems negative     Karnofsky Performance Score/Palliative Performance Status Version 2:         %    http://palliative.info/resource_material/[email or web address].pdf  PHYSICAL EXAM:  Vital Signs Last 24 Hrs  T(C): 36.5 (09 Feb 2019 07:00), Max: 36.7 (08 Feb 2019 15:00)  T(F): 97.7 (09 Feb 2019 07:00), Max: 98 (08 Feb 2019 15:00)  HR: 97 (09 Feb 2019 09:59) (78 - 115)  BP: 104/57 (09 Feb 2019 08:00) (83/51 - 117/54)  BP(mean): 75 (09 Feb 2019 08:00) (57 - 87)  RR: 22 (09 Feb 2019 08:00) (21 - 41)  SpO2: 95% (09 Feb 2019 09:59) (86% - 100%) I&O's Summary    08 Feb 2019 07:01  -  09 Feb 2019 07:00  --------------------------------------------------------  IN: 3175 mL / OUT: 1315 mL / NET: 1860 mL    09 Feb 2019 07:01  -  09 Feb 2019 10:19  --------------------------------------------------------  IN: 512 mL / OUT: 20 mL / NET: 492 mL    GENERAL:  [ ]Alert  [ ]Oriented x   [ ]Lethargic  [ ]Cachexia  [ ]Unarousable  [ ]Verbal  [ ]Non-Verbal  Behavioral:   [ ] Anxiety  [ ] Delirium [ ] Agitation [ ] Other  HEENT:  [ ]Normal   [ ]Dry mouth   [ ]ET Tube/Trach  [ ]Oral lesions  PULMONARY:   [ ]Clear [ ]Tachypnea  [ ]Audible excessive secretions   [ ]Rhonchi        [ ]Right [ ]Left [ ]Bilateral  [ ]Crackles        [ ]Right [ ]Left [ ]Bilateral  [ ]Wheezing     [ ]Right [ ]Left [ ]Bilateral  CARDIOVASCULAR:    [ ]Regular [ ]Irregular [ ]Tachy  [ ]Dav [ ]Murmur [ ]Other  GASTROINTESTINAL:  [ ]Soft  [ ]Distended   [ ]+BS  [ ]Non tender [ ]Tender  [ ]PEG [ ]OGT/ NGT  Last BM:   02-05-19 @ 07:01  -  02-06-19 @ 07:00  --------------------------------------------------------  OUT: 100 mL    02-06-19 @ 07:01  -  02-07-19 @ 07:00  --------------------------------------------------------  OUT: 500 mL    02-07-19 @ 07:01  -  02-08-19 @ 07:00  --------------------------------------------------------  OUT: 600 mL    02-08-19 @ 07:01  -  02-09-19 @ 07:00  --------------------------------------------------------  OUT: 800 mL    GENITOURINARY:  [ ]Normal [ ] Incontinent   [ ]Oliguria/Anuria   [ ]Munguia  MUSCULOSKELETAL:   [ ]Normal   [ ]Weakness  [ ]Bed/Wheelchair bound [ ]Edema  NEUROLOGIC:   [ ]No focal deficits  [ ] Cognitive impairment  [ ] Dysphagia [ ]Dysarthria [ ] Paresis [ ]Other   SKIN:   [ ]Normal   [ ]Pressure ulcer(s)  [ ]Rash    CRITICAL CARE:  [ ] Shock Present  [ ]Septic [ ]Cardiogenic [ ]Neurologic [ ]Hypovolemic  [ ]  Vasopressors [ ]  Inotropes   [ ] Respiratory failure present  [ ] Acute  [ ] Chronic [ ] Hypoxic  [ ] Hypercarbic [ ] Other  [ ] Other organ failure     LABS:                        12.4   17.4  )-----------( 69       ( 09 Feb 2019 04:53 )             37.3   02-09    134<L>  |  101  |  45<H>  ----------------------------<  156<H>  4.7   |  17<L>  |  0.89    Ca    7.4<L>      09 Feb 2019 03:26  Phos  3.4     02-09  Mg     2.7     02-09    PT/INR - ( 09 Feb 2019 04:53 )   PT: 14.0 sec;   INR: 1.21 ratio         PTT - ( 09 Feb 2019 04:53 )  PTT:20.6 sec      RADIOLOGY & ADDITIONAL STUDIES:  < from: Xray Hip w/ Pelvis 2 or 3 Views, Right (01.20.19 @ 13:37) >  INTERPRETATION:  Right hip with full pelvic view. 4 images submitted.   Patient fell with local trauma.    There is a fairly high neck fracture ofthe right femur with typical   upward displacement of the distal fracture fragment.    Hips are relatively free of degeneration and appear symmetric.    IMPRESSION: Right hip fracture as above.                  CORI RODRIGES M.D., ATTENDING RADIOLOGIST  This document has been electronically signed. Jan 20 2019  2:02PM        < end of copied text >    < from: CT Abdomen and Pelvis w/ Oral Cont and w/ IV Cont (02.07.19 @ 14:41) >  IMPRESSION:   *  Pancolitis.  *  Moderate hiatal hernia. Reflux of oral contrast to the level of the   upper esophagus.  *  Mild abdominal and pelvic ascites. Bilateral pleural effusions.    RE CULLEN M.D., ATTENDING RADIOLOGIST  This document has been electronically signed. Feb 7 2019  3:07PM        < end of copied text >    PROTEIN CALORIE MALNUTRITION PRESENT: [ ] Yes [ ] No  [ ] PPSV2 < or = to 30% [ ] significant weight loss  [ ] poor nutritional intake [ ] catabolic state [ ] anasarca     Albumin, Serum: 2.2 g/dL (02-05-19 @ 02:27)  Artificial Nutrition [ ]     REFERRALS:   [ ]Chaplaincy  [ ] Hospice  [ ]Child Life  [ ]Social Work  [ ]Case management [ ]Holistic Therapy   Goals of Care Discussion Document: HPI:  68 year old female with a PMHx of bipolar depression, Hyperlipidemia, NHL (in remission), OCD, dementia, with recent hospitalization (1/20-1/26) for right hip hemiarthroplasty for right femoral neck fracture after fall. Hospital course complicated by ESBL E. Coli UTI on Ertapenem (1/23-1/29). Patient found down in rehab, reported as possibly rolling out of bed.     On arrival to [facility] ED, Level 2 trauma activated was upgraded to a Level 1 for worsening hypotension during secondary exam despite fluid resuscitation. SBP 80s, 2 large-bore pIV placed, e-FAST done twice, with no fluid in the intraperitoneal space or pericardial space, no pneumothorax, suggestion of a very small pleural effusion on the left. (04 Feb 2019 10:32)    PERTINENT PM/SXH:   Osteoarthritis of left knee  Lymphoma  Hypercholesteremia  Bipolar depression  OCD (obsessive compulsive disorder)  Depression  Osteoarthritis of right knee    S/P TKR (total knee replacement), bilateral  S/P cataract surgery  H/O oral surgery  S/P knee surgery    FAMILY HISTORY:  Family history of diabetes mellitus  Family history of prostate cancer (Grandparent)  Family history of stomach cancer (Grandparent)  Family history of breast cancer  Family history of COPD (chronic obstructive pulmonary disease)    ITEMS NOT CHECKED ARE NOT PRESENT    SOCIAL HISTORY:   Significant other/partner:   [ ]  Children:  [x ]  Sikh/Spirituality: Alevism  Substance hx:  [ ]   Tobacco hx:  [ ]   Alcohol hx: [ ]   Home Opioid hx:  [ ] I-Stop Reference No:  Living Situation: [ ]Home  [ ]Long term care  [x ]Rehab [ ]Other    ADVANCE DIRECTIVES:    DNR  MOLST  [ ]  Living Will  [ ]   DECISION MAKER(s):  [ ] Health Care Proxy(s)  [x] Surrogate(s)  [ ] Guardian           Name(s): Liang Sheikh [phone] Phone Number(s):    BASELINE (I)ADL(s) (prior to admission):  [place]: [ ]Total  [ x] Moderate [ ]Dependent    Allergies    honeydew melon (Other)  penicillin (Other; Hives)    Intolerances    MEDICATIONS  (STANDING):  ALBUTerol/ipratropium for Nebulization 3 milliLiter(s) Nebulizer every 4 hours  buPROPion XL . 300 milliGRAM(s) Oral daily  chlorhexidine 4% Liquid 1 Application(s) Topical <User Schedule>  fondaparinux Injectable 2.5 milliGRAM(s) SubCutaneous daily  influenza   Vaccine 0.5 milliLiter(s) IntraMuscular once  levothyroxine Injectable 37.5 MICROGram(s) IV Push at bedtime  metoprolol tartrate 12.5 milliGRAM(s) Oral two times a day  metroNIDAZOLE  IVPB 500 milliGRAM(s) IV Intermittent every 8 hours  multiple electrolytes Injection Type 1 1000 milliLiter(s) (75 mL/Hr) IV Continuous <Continuous>  vancomycin    Solution 500 milliGRAM(s) Oral every 6 hours  vancomycin  Retention Enema 500 milliGRAM(s) Rectal every 6 hours  ziprasidone 40 milliGRAM(s) Oral <User Schedule>    MEDICATIONS  (PRN):  acetaminophen   Tablet .. 650 milliGRAM(s) Oral every 6 hours PRN Mild Pain (1 - 3)    PRESENT SYMPTOMS: [x ]Unable to obtain due to poor mentation  patient confused  Source if other than patient:  [ ]Family   [ ]Team     Pain (Impact on QOL):    Location -         Minimal acceptable level (0-10 scale):                    Aggravating factors -  Quality -  Radiation -  Severity (0-10 scale) -    Timing -    PAIN AD Score:  0    [email or web address] (press ctrl +  left click to view)    Dyspnea:                           [ ]Mild [ ]Moderate [ ]Severe  Anxiety:                             [ ]Mild [ ]Moderate [ ]Severe  Fatigue:                             [ ]Mild [ ]Moderate [ ]Severe  Nausea:                             [ ]Mild [ ]Moderate [ ]Severe  Loss of appetite:              [ ]Mild [ ]Moderate [ ]Severe  Constipation:                    [ ]Mild [ ]Moderate [ ]Severe    Other Symptoms:  [ ]All other review of systems negative     Karnofsky Performance Score/Palliative Performance Status Version 2:   40      %    http://palliative.info/resource_material/[email or web address].pdf  PHYSICAL EXAM:  Vital Signs Last 24 Hrs  T(C): 36.5 (09 Feb 2019 07:00), Max: 36.7 (08 Feb 2019 15:00)  T(F): 97.7 (09 Feb 2019 07:00), Max: 98 (08 Feb 2019 15:00)  HR: 97 (09 Feb 2019 09:59) (78 - 115)  BP: 104/57 (09 Feb 2019 08:00) (83/51 - 117/54)  BP(mean): 75 (09 Feb 2019 08:00) (57 - 87)  RR: 22 (09 Feb 2019 08:00) (21 - 41)  SpO2: 95% (09 Feb 2019 09:59) (86% - 100%) I&O's Summary    08 Feb 2019 07:01  -  09 Feb 2019 07:00  --------------------------------------------------------  IN: 3175 mL / OUT: 1315 mL / NET: 1860 mL    09 Feb 2019 07:01  -  09 Feb 2019 10:19  --------------------------------------------------------  IN: 512 mL / OUT: 20 mL / NET: 492 mL    GENERAL:  [x ]Alert  [x ]Oriented x1   [ ]Lethargic  [ ]Cachexia  [ ]Unarousable  [x ]Verbal  [ ]Non-Verbal  Behavioral:   [ ] Anxiety  [x ] Delirium [ ] Agitation [ ] Other  HEENT:  [x ]Normal   [ ]Dry mouth   [ ]ET Tube/Trach  [ ]Oral lesions  PULMONARY:   [x ]Clear [ ]Tachypnea  [x ]Audible excessive secretions  wet cough, decreased BS BL  [ ]Rhonchi        [ ]Right [ ]Left [ ]Bilateral  [ ]Crackles        [ ]Right [ ]Left [ ]Bilateral  [ ]Wheezing     [ ]Right [ ]Left [ ]Bilateral  CARDIOVASCULAR:    [x ]Regular [ ]Irregular [ ]Tachy  [ ]Dav [ ]Murmur [ ]Other +S1 +S2   GASTROINTESTINAL:  [ ]Soft  [ ]Distended   [ ]+BS  [ ]Non tender [ ]Tender  [ ]PEG [ ]OGT/ NGT  Last BM:  rectal tube for diarrhea  02-05-19 @ 07:01  -  02-06-19 @ 07:00  --------------------------------------------------------  OUT: 100 mL    02-06-19 @ 07:01  -  02-07-19 @ 07:00  --------------------------------------------------------  OUT: 500 mL    02-07-19 @ 07:01  -  02-08-19 @ 07:00  --------------------------------------------------------  OUT: 600 mL    02-08-19 @ 07:01  -  02-09-19 @ 07:00  --------------------------------------------------------  OUT: 800 mL    GENITOURINARY:  [ ]Normal [ ] Incontinent   [ ]Oliguria/Anuria   [ x]Munguia  MUSCULOSKELETAL:   [ ]Normal   [x ]Weakness  [ x]Bed/Wheelchair bound [x ]Edema  NEUROLOGIC:   [ ]No focal deficits  [x ] Cognitive impairment  [ ] Dysphagia [ ]Dysarthria [ ] Paresis [ ]Other   SKIN:   [x ]Normal   [ ]Pressure ulcer(s)  [ ]Rash    CRITICAL CARE:  [ ] Shock Present  [ ]Septic [ ]Cardiogenic [ ]Neurologic [ ]Hypovolemic  [ ]  Vasopressors [ ]  Inotropes   [ ] Respiratory failure present  [ ] Acute  [ ] Chronic [ ] Hypoxic  [ ] Hypercarbic [ ] Other  [ ] Other organ failure     LABS:                        12.4   17.4  )-----------( 69       ( 09 Feb 2019 04:53 )             37.3   02-09    134<L>  |  101  |  45<H>  ----------------------------<  156<H>  4.7   |  17<L>  |  0.89    Ca    7.4<L>      09 Feb 2019 03:26  Phos  3.4     02-09  Mg     2.7     02-09    PT/INR - ( 09 Feb 2019 04:53 )   PT: 14.0 sec;   INR: 1.21 ratio         PTT - ( 09 Feb 2019 04:53 )  PTT:20.6 sec      RADIOLOGY & ADDITIONAL STUDIES:  < from: Xray Hip w/ Pelvis 2 or 3 Views, Right (01.20.19 @ 13:37) >  INTERPRETATION:  Right hip with full pelvic view. 4 images submitted.   Patient fell with local trauma.    There is a fairly high neck fracture ofthe right femur with typical   upward displacement of the distal fracture fragment.    Hips are relatively free of degeneration and appear symmetric.    IMPRESSION: Right hip fracture as above.        CORI RODRIGES M.D., ATTENDING RADIOLOGIST  This document has been electronically signed. Jan 20 2019  2:02PM        < end of copied text >    < from: CT Abdomen and Pelvis w/ Oral Cont and w/ IV Cont (02.07.19 @ 14:41) >  IMPRESSION:   *  Pancolitis.  *  Moderate hiatal hernia. Reflux of oral contrast to the level of the   upper esophagus.  *  Mild abdominal and pelvic ascites. Bilateral pleural effusions.    RE CULLEN M.D., ATTENDING RADIOLOGIST  This document has been electronically signed. Feb 7 2019  3:07PM        < end of copied text >    Micro - + urine cx 1/20/19 treated  c diff +    PROTEIN CALORIE MALNUTRITION PRESENT: [ ] Yes [ ] No  [x ] PPSV2 < or = to 30% [ ] significant weight loss  [ x] poor nutritional intake [ ] catabolic state [ ] anasarca     Albumin, Serum: 2.2 g/dL (02-05-19 @ 02:27)  Artificial Nutrition [ ]     REFERRALS:   [ ]Chaplaincy  [ ] Hospice  [ ]Child Life  [ x]Social Work  [ ]Case management [ ]Holistic Therapy   Goals of Care Discussion Document: HPI:  68 year old female with a PMHx of bipolar depression, Hyperlipidemia, NHL (in remission), OCD, dementia, with recent hospitalization (1/20-1/26) for right hip hemiarthroplasty for right femoral neck fracture after fall. Hospital course complicated by ESBL E. Coli UTI on Ertapenem (1/23-1/29). Patient found down in rehab, reported as possibly rolling out of bed.     On arrival to [facility] ED, Level 2 trauma activated was upgraded to a Level 1 for worsening hypotension during secondary exam despite fluid resuscitation. SBP 80s, 2 large-bore pIV placed, e-FAST done twice, with no fluid in the intraperitoneal space or pericardial space, no pneumothorax, suggestion of a very small pleural effusion on the left. (04 Feb 2019 10:32)    PERTINENT PM/SXH:   Osteoarthritis of left knee  Lymphoma  Hypercholesteremia  Bipolar depression  OCD (obsessive compulsive disorder)  Depression  Osteoarthritis of right knee    S/P TKR (total knee replacement), bilateral  S/P cataract surgery  H/O oral surgery  S/P knee surgery    FAMILY HISTORY:  Family history of diabetes mellitus  Family history of prostate cancer (Grandparent)  Family history of stomach cancer (Grandparent)  Family history of breast cancer  Family history of COPD (chronic obstructive pulmonary disease)    ITEMS NOT CHECKED ARE NOT PRESENT    SOCIAL HISTORY:   Significant other/partner:   [ ]  Children:  [x ]  Orthodox/Spirituality: Faith  Substance hx:  [ ]   Tobacco hx:  [ ]   Alcohol hx: [ ]   Home Opioid hx:  [ ] I-Stop Reference No:  Living Situation: [ ]Home  [ ]Long term care  [x ]Rehab [ ]Other    ADVANCE DIRECTIVES:    DNR  MOLST  [ ]  Living Will  [ ]   DECISION MAKER(s):  [ ] Health Care Proxy(s)  [x] Surrogate(s)  [ ] Guardian           Name(s): Liang Sheikh [phone] Phone Number(s):    BASELINE (I)ADL(s) (prior to admission):  [place]: [ ]Total  [ x] Moderate [ ]Dependent    Allergies    honeydew melon (Other)  penicillin (Other; Hives)    Intolerances    MEDICATIONS  (STANDING):  ALBUTerol/ipratropium for Nebulization 3 milliLiter(s) Nebulizer every 4 hours  buPROPion XL . 300 milliGRAM(s) Oral daily  chlorhexidine 4% Liquid 1 Application(s) Topical <User Schedule>  fondaparinux Injectable 2.5 milliGRAM(s) SubCutaneous daily  influenza   Vaccine 0.5 milliLiter(s) IntraMuscular once  levothyroxine Injectable 37.5 MICROGram(s) IV Push at bedtime  metoprolol tartrate 12.5 milliGRAM(s) Oral two times a day  metroNIDAZOLE  IVPB 500 milliGRAM(s) IV Intermittent every 8 hours  multiple electrolytes Injection Type 1 1000 milliLiter(s) (75 mL/Hr) IV Continuous <Continuous>  vancomycin    Solution 500 milliGRAM(s) Oral every 6 hours  vancomycin  Retention Enema 500 milliGRAM(s) Rectal every 6 hours  ziprasidone 40 milliGRAM(s) Oral <User Schedule>    MEDICATIONS  (PRN):  acetaminophen   Tablet .. 650 milliGRAM(s) Oral every 6 hours PRN Mild Pain (1 - 3)    PRESENT SYMPTOMS: [x ]Unable to obtain due to poor mentation  patient confused  Source if other than patient:  [ ]Family   [ ]Team     Pain (Impact on QOL):    Location -         Minimal acceptable level (0-10 scale):                    Aggravating factors -  Quality -  Radiation -  Severity (0-10 scale) -    Timing -    PAIN AD Score:  0    [email or web address] (press ctrl +  left click to view)    Dyspnea:                           [ ]Mild [ ]Moderate [ ]Severe  Anxiety:                             [ ]Mild [ ]Moderate [ ]Severe  Fatigue:                             [ ]Mild [ ]Moderate [ ]Severe  Nausea:                             [ ]Mild [ ]Moderate [ ]Severe  Loss of appetite:              [ ]Mild [ ]Moderate [ ]Severe  Constipation:                    [ ]Mild [ ]Moderate [ ]Severe    Other Symptoms:  [ ]All other review of systems negative     Karnofsky Performance Score/Palliative Performance Status Version 2:   40      %    http://palliative.info/resource_material/[email or web address].pdf  PHYSICAL EXAM:  Vital Signs Last 24 Hrs  T(C): 36.5 (09 Feb 2019 07:00), Max: 36.7 (08 Feb 2019 15:00)  T(F): 97.7 (09 Feb 2019 07:00), Max: 98 (08 Feb 2019 15:00)  HR: 97 (09 Feb 2019 09:59) (78 - 115)  BP: 104/57 (09 Feb 2019 08:00) (83/51 - 117/54)  BP(mean): 75 (09 Feb 2019 08:00) (57 - 87)  RR: 22 (09 Feb 2019 08:00) (21 - 41)  SpO2: 95% (09 Feb 2019 09:59) (86% - 100%) I&O's Summary    08 Feb 2019 07:01  -  09 Feb 2019 07:00  --------------------------------------------------------  IN: 3175 mL / OUT: 1315 mL / NET: 1860 mL    09 Feb 2019 07:01  -  09 Feb 2019 10:19  --------------------------------------------------------  IN: 512 mL / OUT: 20 mL / NET: 492 mL    GENERAL:  [x ]Alert  [x ]Oriented x1   [ ]Lethargic  [ ]Cachexia  [ ]Unarousable  [x ]Verbal  [ ]Non-Verbal  Behavioral:   [ ] Anxiety  [x ] Delirium [ ] Agitation [ ] Other  HEENT:  [x ]Normal   [ ]Dry mouth   [ ]ET Tube/Trach  [ ]Oral lesions  PULMONARY:   [x ]Clear [ ]Tachypnea  [x ]Audible excessive secretions  wet cough, decreased BS BL  [ ]Rhonchi        [ ]Right [ ]Left [ ]Bilateral  [ ]Crackles        [ ]Right [ ]Left [ ]Bilateral  [ ]Wheezing     [ ]Right [ ]Left [ ]Bilateral  CARDIOVASCULAR:    [x ]Regular [ ]Irregular [ ]Tachy  [ ]Dav [ ]Murmur [ ]Other +S1 +S2   GASTROINTESTINAL:  [x ]Soft  [x ]Distended   [x ]+BS  [ x]Non tender [ ]Tender  [ ]PEG [ ]OGT/ NGT  Last BM:  rectal tube for diarrhea  02-05-19 @ 07:01  -  02-06-19 @ 07:00  --------------------------------------------------------  OUT: 100 mL    02-06-19 @ 07:01  -  02-07-19 @ 07:00  --------------------------------------------------------  OUT: 500 mL    02-07-19 @ 07:01  -  02-08-19 @ 07:00  --------------------------------------------------------  OUT: 600 mL    02-08-19 @ 07:01  -  02-09-19 @ 07:00  --------------------------------------------------------  OUT: 800 mL    GENITOURINARY:  [ ]Normal [ ] Incontinent   [ ]Oliguria/Anuria   [ x]Munguia  MUSCULOSKELETAL:   [ ]Normal   [x ]Weakness  [ x]Bed/Wheelchair bound [x ]Edema  NEUROLOGIC:   [ ]No focal deficits  [x ] Cognitive impairment  [ ] Dysphagia [ ]Dysarthria [ ] Paresis [ ]Other   SKIN:   [x ]Normal   [ ]Pressure ulcer(s)  [ ]Rash    CRITICAL CARE:  [ ] Shock Present  [ ]Septic [ ]Cardiogenic [ ]Neurologic [ ]Hypovolemic  [ ]  Vasopressors [ ]  Inotropes   [ ] Respiratory failure present  [ ] Acute  [ ] Chronic [ ] Hypoxic  [ ] Hypercarbic [ ] Other  [ ] Other organ failure     LABS:                        12.4   17.4  )-----------( 69       ( 09 Feb 2019 04:53 )             37.3   02-09    134<L>  |  101  |  45<H>  ----------------------------<  156<H>  4.7   |  17<L>  |  0.89    Ca    7.4<L>      09 Feb 2019 03:26  Phos  3.4     02-09  Mg     2.7     02-09    PT/INR - ( 09 Feb 2019 04:53 )   PT: 14.0 sec;   INR: 1.21 ratio         PTT - ( 09 Feb 2019 04:53 )  PTT:20.6 sec      RADIOLOGY & ADDITIONAL STUDIES:  < from: Xray Hip w/ Pelvis 2 or 3 Views, Right (01.20.19 @ 13:37) >  INTERPRETATION:  Right hip with full pelvic view. 4 images submitted.   Patient fell with local trauma.    There is a fairly high neck fracture ofthe right femur with typical   upward displacement of the distal fracture fragment.    Hips are relatively free of degeneration and appear symmetric.    IMPRESSION: Right hip fracture as above.        CORI RODRIGES M.D., ATTENDING RADIOLOGIST  This document has been electronically signed. Jan 20 2019  2:02PM        < end of copied text >    < from: CT Abdomen and Pelvis w/ Oral Cont and w/ IV Cont (02.07.19 @ 14:41) >  IMPRESSION:   *  Pancolitis.  *  Moderate hiatal hernia. Reflux of oral contrast to the level of the   upper esophagus.  *  Mild abdominal and pelvic ascites. Bilateral pleural effusions.    RE CULLEN M.D., ATTENDING RADIOLOGIST  This document has been electronically signed. Feb 7 2019  3:07PM        < end of copied text >    Micro - + urine cx 1/20/19 treated    c diff +    PROTEIN CALORIE MALNUTRITION PRESENT: [ ] Yes [ ] No  [x ] PPSV2 < or = to 30% [ ] significant weight loss  [ x] poor nutritional intake [ ] catabolic state [ ] anasarca     Albumin, Serum: 2.2 g/dL (02-05-19 @ 02:27)  Artificial Nutrition [ ]     REFERRALS:   [ ]Chaplaincy  [ ] Hospice  [ ]Child Life  [ x]Social Work  [ ]Case management [ ]Holistic Therapy   Goals of Care Discussion Document:

## 2019-02-09 NOTE — PROGRESS NOTE ADULT - ATTENDING COMMENTS
Case d/w SICU team  Will tailor plan for ID issues  per course,results.Will defer to primary team on management of other issues.    Infectious Diseases Service will cover over weekend.  Please call [phone] if issues

## 2019-02-09 NOTE — PROGRESS NOTE ADULT - ASSESSMENT
68F p/w R hip fracture and found to have C. diff colitis. Family refused fecal transplant.     - eventual surgery with Orthopedics   - Multimodal pain control  - C.diff positive - vanc and flagyl  - F/u labs and imaging  - DVT PPx  - Activity: advance as tolerated  - Tertiary  - Appreciate care per SICU

## 2019-02-09 NOTE — CONSULT NOTE ADULT - ASSESSMENT
68 year old female with a PMHx of bipolar depression, Hyperlipidemia, NHL (in remission), OCD, dementia, with recent hospitalization (1/20-1/26) for right hip hemiarthroplasty for right femoral neck fracture after fall. Hospital course complicated by ESBL E. Coli UTI on Ertapenem (1/23-1/29). Patient found down in rehab, reported as possibly rolling out of bed.     In SICU with pancolitis due to c diff and femoral neck fx (prosthetic), poor candidate for wire fixation.  Patient lacks capacity for medical decision making.  Son Liang interested in full comfort care.

## 2019-02-09 NOTE — PROGRESS NOTE ADULT - SUBJECTIVE AND OBJECTIVE BOX
Patient seen and examined at bedside. pain is well controlled. No acute overnight events. no other complaints     Vital Signs Last 24 Hrs  T(C): 36.6 (09 Feb 2019 03:00), Max: 36.7 (08 Feb 2019 15:00)  T(F): 97.8 (09 Feb 2019 03:00), Max: 98 (08 Feb 2019 15:00)  HR: 81 (09 Feb 2019 06:13) (78 - 120)  BP: 103/54 (09 Feb 2019 06:00) (83/51 - 117/54)  BP(mean): 77 (09 Feb 2019 06:00) (57 - 87)  RR: 22 (09 Feb 2019 06:00) (21 - 45)  SpO2: 97% (09 Feb 2019 06:13) (86% - 100%)    LABS:                        12.4   17.4  )-----------( 69       ( 09 Feb 2019 04:53 )             37.3     09 Feb 2019 03:26    134    |  101    |  45     ----------------------------<  156    4.7     |  17     |  0.89     Ca    7.4        09 Feb 2019 03:26  Phos  3.4       09 Feb 2019 03:26  Mg     2.7       09 Feb 2019 03:26      PT/INR - ( 09 Feb 2019 04:53 )   PT: 14.0 sec;   INR: 1.21 ratio         PTT - ( 09 Feb 2019 04:53 )  PTT:20.6 sec    Exam:  Gen: NAD  RLE:  Skin intact over proximal femur  Motor: 5/5 EHL/FHL/TA/Gastrocnemius  Sensory: SILT DP/SP/S/S/T nerve distributions  Vascular: 2+ Dorsalis Pedis pulse

## 2019-02-09 NOTE — PROGRESS NOTE ADULT - ASSESSMENT
A/P: 68 y F w/ R Hemiarthroplasty done on 1/21, s/p MF, with L S1-2 sacral insufficiency fracture and  R periprosthetic Hip Fracture. Patient has C Diff colitis, will wait for more resolution of symptoms before R hip surgery.     - CT R hip demonstrates PP Femur fracture around Stem, will Need ORIF  -C/w SICU care, recs appreciated  - IR joint aspiration low suspicion of periprosthetic infection  -Analgesia  -DVT ppx, per primary team  -Non weight bearing RLE/bedrest  -May weight bear as tolerated on LLE when not on bedrest  -Plan for ORIF on next week, surgery on hold due to colitis. Will Follow when patient is more stable per SICU  -c/w with C diff management.   -Medical/ID clearance required.  - will advise if plan changes

## 2019-02-09 NOTE — PROGRESS NOTE ADULT - PROBLEM SELECTOR PLAN 1
awaiting surgical intervention - currently too unstable to proceed with surgery  pain meds as needed PO as tolerated

## 2019-02-09 NOTE — PROGRESS NOTE ADULT - PROBLEM SELECTOR PLAN 1
Severe  pancolitis  Patient already on IV flagyl, KS and po vanco  family refused FMT  Given this, would rec:  1) Continue IV flagyl,po and rectal vanco  2) Monitor abdomen  3) Surgical eval-if any s/s toxic megacolon will likely need surgical intervention  4) will reconsider FMT if family/patient agreeable  Continue ICU monitoring

## 2019-02-09 NOTE — CONSULT NOTE ADULT - PROBLEM SELECTOR RECOMMENDATION 2
Wire fixation option held due to poor status
continue abx as per ID
-monitor BP  -per SICU  -cont abx  -blood cx negative  -likely from cdiff

## 2019-02-09 NOTE — PROGRESS NOTE ADULT - SUBJECTIVE AND OBJECTIVE BOX
69 y/o critically ill  female h/o demential NHL presenting after unwitnessed fall. pt found on the ground at rehab. is on lovenox after hip fracture. pt reports pain to her right leg. per EMS en route slurring speech and mildly hypotensive requesting trauma.unclear how long pt on the ground. Patient found to have C diff with worsening hypotension requiring pressors on and off and  Patient requiring the SICU for continued cardiopulmonary monitoring. Orthopedic procedure on hold due to  hypotension and pan colitis and need for pressors due to unstable hemodynamics.    MEDICATIONS  (STANDING):  buPROPion XL . 300 milliGRAM(s) Oral daily  chlorhexidine 4% Liquid 1 Application(s) Topical <User Schedule>  enoxaparin Injectable 40 milliGRAM(s) SubCutaneous daily  influenza   Vaccine 0.5 milliLiter(s) IntraMuscular once  levothyroxine 75 MICROGram(s) Oral daily  metroNIDAZOLE  IVPB 500 milliGRAM(s) IV Intermittent every 8 hours  multiple electrolytes Injection Type 1 1000 milliLiter(s) (100 mL/Hr) IV Continuous <Continuous>  vancomycin    Solution 500 milliGRAM(s) Oral every 6 hours  vancomycin  Retention Enema 500 milliGRAM(s) Rectal every 6 hours  ziprasidone 40 milliGRAM(s) Oral <User Schedule>    MEDICATIONS  (PRN):          PAST MEDICAL & SURGICAL HISTORY:  Osteoarthritis of left knee  Lymphoma: NHL, treated with chemo @ [facility] [place], in remission since 2016  Hypercholesteremia  Bipolar depression  OCD (obsessive compulsive disorder)  Depression  Osteoarthritis of right knee  S/P TKR (total knee replacement), bilateral: discharged nov 5th, 2014  S/P cataract surgery  H/O oral surgery: 2014  S/P knee surgery: 1978    Social History  Smoking: no  Etoh: no  Drug use: no      FAMILY HISTORY:  Family history of diabetes mellitus  Family history of prostate cancer (Grandparent)  Family history of stomach cancer (Grandparent)  Family history of breast cancer  Family history of COPD (chronic obstructive pulmonary disease)    Review of system   unable due to lethargy and  patient senile dementia       Vital Signs Last 24 Hrs  T(C): 36.6 (08 Feb 2019 19:00), Max: 36.9 (07 Feb 2019 23:00)  T(F): 97.8 (08 Feb 2019 19:00), Max: 98.5 (07 Feb 2019 23:00)  HR: 80 (08 Feb 2019 21:00) (80 - 120)  BP: 95/60 (08 Feb 2019 21:00) (83/51 - 121/56)  BP(mean): 58 (08 Feb 2019 21:00) (57 - 87)  RR: 27 (08 Feb 2019 21:00) (21 - 45)  SpO2: 97% (08 Feb 2019 21:00) (90% - 97%)    I&O's Summary    07 Feb 2019 07:01  -  08 Feb 2019 07:00  --------------------------------------------------------  IN: 3900 mL / OUT: 1310 mL / NET: 2590 mL    08 Feb 2019 07:01  -  08 Feb 2019 21:47  --------------------------------------------------------  IN: 2775 mL / OUT: 360 mL / NET: 2415 mL        PHYSICAL EXAM:  GENERAL: NAD, well-groomed, well-developed  HEAD:  Atraumatic, Normocephalic  EYES: EOMI, PERRLA, conjunctiva and sclera clear  ENMT: No tonsillar erythema, exudates, or enlargement; Moist mucous membranes, Good dentition, No lesions  NECK: Supple, No JVD, Normal thyroid  NERVOUS SYSTEM:  Alert & Oriented X3, Good concentration; Motor Strength 5/5 B/L upper and lower extremities; DTRs 2+ intact and symmetric  CHEST/LUNG: Clear to percussion bilaterally; No rales, rhonchi, wheezing, or rubs  HEART: Regular rate and rhythm; No murmurs, rubs, or gallops  ABDOMEN: Soft, Nontender, Nondistended; Bowel sounds present  EXTREMITIES:  2+ Peripheral Pulses, No clubbing, cyanosis, or edema  LYMPH: No lymphadenopathy noted  SKIN: No rashes or lesions    LABS:  ABG - ( 07 Feb 2019 19:39 )  pH, Arterial: 7.41  pH, Blood: x     /  pCO2: 35    /  pO2: 82    / HCO3: 22    / Base Excess: -1.9  /  SaO2: 95              ABG - ( 08 Feb 2019 16:56 )  pH, Arterial: 7.41  pH, Blood: x     /  pCO2: 36    /  pO2: 82    / HCO3: 22    / Base Excess: -1.1  /  SaO2: 96        CBC Full  -  ( 08 Feb 2019 02:55 )  WBC Count : 17.0 K/uL  Hemoglobin : 14.3 g/dL  Hematocrit : 42.8 %  Platelet Count - Automated : 107 K/uL  Mean Cell Volume : 98.9 fl  Mean Cell Hemoglobin : 33.0 pg  Mean Cell Hemoglobin Concentration : 33.4 gm/dL  Auto Neutrophil # : x  Auto Lymphocyte # : x  Auto Monocyte # : x  Auto Eosinophil # : x  Auto Basophil # : x  Auto Neutrophil % : x  Auto Lymphocyte % : x  Auto Monocyte % : x  Auto Eosinophil % : x  Auto Basophil % : x    02-08    134<L>  |  96  |  44<H>  ----------------------------<  160<H>  4.6   |  21<L>  |  1.12    Ca    7.6<L>      08 Feb 2019 13:09  Phos  4.1     02-08  Mg     2.8     02-08 69 y/o critically ill  female h/o demential NHL presenting after unwitnessed fall. pt found on the ground at rehab. is on lovenox after hip fracture. pt reports pain to her right leg. per EMS en route slurring speech and mildly hypotensive requesting trauma.unclear how long pt on the ground. Patient found to have C diff with worsening hypotension requiring pressors on and off and  Patient requiring the SICU for continued cardiopulmonary monitoring. Orthopedic procedure on hold due to  hypotension and pan colitis and need for pressors due to unstable hemodynamics.    MEDICATIONS  (STANDING):  ALBUTerol/ipratropium for Nebulization 3 milliLiter(s) Nebulizer every 4 hours  buPROPion XL . 300 milliGRAM(s) Oral daily  chlorhexidine 4% Liquid 1 Application(s) Topical <User Schedule>  fondaparinux Injectable 2.5 milliGRAM(s) SubCutaneous daily  furosemide   Injectable 80 milliGRAM(s) IV Push once  influenza   Vaccine 0.5 milliLiter(s) IntraMuscular once  levothyroxine Injectable 37.5 MICROGram(s) IV Push at bedtime  metoprolol tartrate 12.5 milliGRAM(s) Oral two times a day  metroNIDAZOLE  IVPB 500 milliGRAM(s) IV Intermittent every 8 hours  multiple electrolytes Injection Type 1 1000 milliLiter(s) (75 mL/Hr) IV Continuous <Continuous>  vancomycin    Solution 500 milliGRAM(s) Oral every 6 hours  vancomycin  Retention Enema 500 milliGRAM(s) Rectal every 6 hours  ziprasidone 40 milliGRAM(s) Oral <User Schedule>    MEDICATIONS  (PRN):  acetaminophen   Tablet .. 650 milliGRAM(s) Oral every 6 hours PRN Mild Pain (1 - 3)    PAST MEDICAL & SURGICAL HISTORY:  Osteoarthritis of left knee  Lymphoma: NHL, treated with chemo @ [facility] [place], in remission since 2016  Hypercholesteremia  Bipolar depression  OCD (obsessive compulsive disorder)  Depression  Osteoarthritis of right knee  S/P TKR (total knee replacement), bilateral: discharged nov 5th, 2014  S/P cataract surgery  H/O oral surgery: 2014  S/P knee surgery: 1978    Social History  Smoking: no  Etoh: no  Drug use: no    Vital Signs Last 24 Hrs  T(C): 36.6 (09 Feb 2019 22:00), Max: 36.9 (09 Feb 2019 19:00)  T(F): 97.8 (09 Feb 2019 22:00), Max: 98.5 (09 Feb 2019 19:00)  HR: 91 (10 Feb 2019 00:00) (81 - 108)  BP: 123/57 (10 Feb 2019 00:00) (82/51 - 123/57)  BP(mean): 82 (10 Feb 2019 00:00) (63 - 82)  RR: 32 (10 Feb 2019 00:00) (17 - 38)  SpO2: 98% (10 Feb 2019 00:00) (92% - 100%)          PHYSICAL EXAM:  GENERAL: NAD, well-groomed, well-developed  HEAD:  Atraumatic, Normocephalic  EYES: EOMI, PERRLA, conjunctiva and sclera clear  ENMT: No tonsillar erythema, exudates, or enlargement; Moist mucous membranes, Good dentition, No lesions  NECK: Supple, No JVD, Normal thyroid  NERVOUS SYSTEM:  Alert & Oriented X3, Good concentration; Motor Strength 5/5 B/L upper and lower extremities; DTRs 2+ intact and symmetric  CHEST/LUNG: Clear to percussion bilaterally; No rales, rhonchi, wheezing, or rubs  HEART: Regular rate and rhythm; No murmurs, rubs, or gallops  ABDOMEN: Soft, Nontender, Nondistended; Bowel sounds present  EXTREMITIES:  2+ Peripheral Pulses, No clubbing, cyanosis, or edema  LYMPH: No lymphadenopathy noted  SKIN: No rashes or lesions    LABS:    ABG - ( 09 Feb 2019 04:08 )  pH, Arterial: 7.42  pH, Blood: x     /  pCO2: 36    /  pO2: 76    / HCO3: 23    / Base Excess: -.8   /  SaO2: 95                    CBC Full  -  ( 09 Feb 2019 04:53 )  WBC Count : 17.4 K/uL  Hemoglobin : 12.4 g/dL  Hematocrit : 37.3 %  Platelet Count - Automated : 69 K/uL  Mean Cell Volume : 98.9 fl  Mean Cell Hemoglobin : 32.9 pg  Mean Cell Hemoglobin Concentration : 33.2 gm/dL  Auto Neutrophil # : x  Auto Lymphocyte # : x  Auto Monocyte # : x  Auto Eosinophil # : x  Auto Basophil # : x  Auto Neutrophil % : x  Auto Lymphocyte % : x  Auto Monocyte % : x  Auto Eosinophil % : x  Auto Basophil % : x    02-09    134<L>  |  101  |  45<H>  ----------------------------<  156<H>  4.7   |  17<L>  |  0.89    Ca    7.4<L>      09 Feb 2019 03:26  Phos  3.4     02-09  Mg     2.7     02-09        PT/INR - ( 09 Feb 2019 04:53 )   PT: 14.0 sec;   INR: 1.21 ratio         PTT - ( 09 Feb 2019 04:53 )  PTT:20.6 sec 67 y/o critically ill  female h/o dementia NHL presenting after unwitnessed fall. Pt found on the ground at rehab. is on lovenox after hip fracture. pt reports pain to her right leg. per EMS en route slurring speech and mildly hypotensive requesting trauma.unclear how long pt on the ground. Patient found to have C diff with worsening hypotension requiring pressors on and off and  Patient requiring the SICU for continued cardiopulmonary monitoring. Orthopedic procedure on hold due to  hypotension and pan colitis and need for pressors due to unstable hemodynamics. Diarrhea slowing with IV Flagyl + oral and rectal vancomycin. Stable leukocytosis and improving but still absent cognitive function.    MEDICATIONS  (STANDING):  ALBUTerol/ipratropium for Nebulization 3 milliLiter(s) Nebulizer every 4 hours  buPROPion XL . 300 milliGRAM(s) Oral daily  chlorhexidine 4% Liquid 1 Application(s) Topical <User Schedule>  fondaparinux Injectable 2.5 milliGRAM(s) SubCutaneous daily  furosemide   Injectable 80 milliGRAM(s) IV Push once  influenza   Vaccine 0.5 milliLiter(s) IntraMuscular once  levothyroxine Injectable 37.5 MICROGram(s) IV Push at bedtime  metoprolol tartrate 12.5 milliGRAM(s) Oral two times a day  metroNIDAZOLE  IVPB 500 milliGRAM(s) IV Intermittent every 8 hours  multiple electrolytes Injection Type 1 1000 milliLiter(s) (75 mL/Hr) IV Continuous <Continuous>  vancomycin    Solution 500 milliGRAM(s) Oral every 6 hours  vancomycin  Retention Enema 500 milliGRAM(s) Rectal every 6 hours  ziprasidone 40 milliGRAM(s) Oral <User Schedule>    MEDICATIONS  (PRN):  acetaminophen   Tablet .. 650 milliGRAM(s) Oral every 6 hours PRN Mild Pain (1 - 3)    PAST MEDICAL & SURGICAL HISTORY:  Osteoarthritis of left knee  Lymphoma: NHL, treated with chemo @ [facility] [place], in remission since 2016  Hypercholesteremia  Bipolar depression  OCD (obsessive compulsive disorder)  Depression  Osteoarthritis of right knee  S/P TKR (total knee replacement), bilateral: discharged nov 5th, 2014  S/P cataract surgery  H/O oral surgery: 2014  S/P knee surgery: 1978    Social History  Smoking: no  Etoh: no  Drug use: no    Vital Signs Last 24 Hrs  T(C): 36.6 (09 Feb 2019 22:00), Max: 36.9 (09 Feb 2019 19:00)  T(F): 97.8 (09 Feb 2019 22:00), Max: 98.5 (09 Feb 2019 19:00)  HR: 91 (10 Feb 2019 00:00) (81 - 108)  BP: 123/57 (10 Feb 2019 00:00) (82/51 - 123/57)  BP(mean): 82 (10 Feb 2019 00:00) (63 - 82)  RR: 32 (10 Feb 2019 00:00) (17 - 38)  SpO2: 98% (10 Feb 2019 00:00) (92% - 100%)          PHYSICAL EXAM:  GENERAL: NAD, well-groomed, well-developed  HEAD:  Atraumatic, Normocephalic  EYES: EOMI, PERRLA, conjunctiva and sclera clear  ENMT: No tonsillar erythema, exudates, or enlargement; Moist mucous membranes, Good dentition, No lesions  NECK: Supple, No JVD, Normal thyroid  NERVOUS SYSTEM:  Alert & Oriented X3, Good concentration; Motor Strength 5/5 B/L upper and lower extremities; DTRs 2+ intact and symmetric  CHEST/LUNG: Clear to percussion bilaterally; No rales, rhonchi, wheezing, or rubs  HEART: Regular rate and rhythm; No murmurs, rubs, or gallops  ABDOMEN: Soft, Nontender, Nondistended; Bowel sounds present  EXTREMITIES:  2+ Peripheral Pulses, No clubbing, cyanosis, or edema  LYMPH: No lymphadenopathy noted  SKIN: No rashes or lesions    LABS:    ABG - ( 09 Feb 2019 04:08 )  pH, Arterial: 7.42  pH, Blood: x     /  pCO2: 36    /  pO2: 76    / HCO3: 23    / Base Excess: -.8   /  SaO2: 95                    CBC Full  -  ( 09 Feb 2019 04:53 )  WBC Count : 17.4 K/uL  Hemoglobin : 12.4 g/dL  Hematocrit : 37.3 %  Platelet Count - Automated : 69 K/uL  Mean Cell Volume : 98.9 fl  Mean Cell Hemoglobin : 32.9 pg  Mean Cell Hemoglobin Concentration : 33.2 gm/dL  Auto Neutrophil # : x  Auto Lymphocyte # : x  Auto Monocyte # : x  Auto Eosinophil # : x  Auto Basophil # : x  Auto Neutrophil % : x  Auto Lymphocyte % : x  Auto Monocyte % : x  Auto Eosinophil % : x  Auto Basophil % : x    02-09    134<L>  |  101  |  45<H>  ----------------------------<  156<H>  4.7   |  17<L>  |  0.89    Ca    7.4<L>      09 Feb 2019 03:26  Phos  3.4     02-09  Mg     2.7     02-09        PT/INR - ( 09 Feb 2019 04:53 )   PT: 14.0 sec;   INR: 1.21 ratio         PTT - ( 09 Feb 2019 04:53 )  PTT:20.6 sec

## 2019-02-09 NOTE — PROGRESS NOTE ADULT - ASSESSMENT
ASSESSMENT:  68 year old female with bipolar depression, OCD, dementia, HLD, NHL (in remission since 2016), osteoarthritis s/p bilateral TKR s/p fall presenting with a right periprosthetic femoral neck fracture and C. difficile colitis.    PLAN:  Neuro: bipolar depression, OCD, dementia, acute traumatic pain  - Monitor mental status.  - Continue home bupropion, gabapentin, and ziprasidone.  - Pain control as needed with Tylenol.    Resp: no acute issues  - Monitor pulse oximeter.  - Deep breathing exercises and incentive spirometry to prevent atelectasis.    CV: intermittent episodes of hypotension, HLD  - Monitor vital signs.  - Frequent reassessments of volume status, volume resuscitate as needed    GI: C. difficile colitis  - NPO except medications due to worsening abdominal distention. Follow up abdominal x-ray this AM.  - Monitor rectal tube output.  - PO vancomycin, vancomycin retention enemas, IV Flagyl    Renal CKD stage III  - Munguia, rectal tube, monitor I&Os  - Monitor electrolytes and replete as necessary.  - Plasmalyte @ 100 mL/hr while NPO.    Heme: no acute issues  - Monitor CBC and coags.  - Lovenox for VTE prophylaxis.    ID: C. difficile colitis  - Monitor WBC, temperature, and procalcitonin.  - PO vancomycin, vancomycin retention enemas, and IV Flagyl for severe C. difficile colitis.    Endo: no acute issues  - Monitor glucose on BMP.    Disposition:   - Full code.  - Will remain in SICU. Plan for OR for repair of periprosthetic fracture on Fri. ASSESSMENT:  68 year old female with bipolar depression, OCD, dementia, HLD, NHL (in remission since 2016), osteoarthritis s/p bilateral TKR s/p fall presenting with a right periprosthetic femoral neck fracture and C. difficile colitis.    PLAN:  Neuro: bipolar depression, OCD, dementia, acute traumatic pain  - Monitor mental status.  - Continue home bupropion, ziprasidone.  - Pain control as needed with Tylenol.    Resp: intermittently desaturating, currently 99% on 5L  - Monitor pulse oximeter.  - Deep breathing exercises and incentive spirometry to prevent atelectasis.  -Titrate NC to Sp02 >92%***    CV: intermittent episodes of hypotension, HLD  - Monitor vital signs.  - Frequent reassessments of volume status, volume resuscitate as needed    GI: C. difficile colitis  - NPO except medications due to worsening abdominal distention. Follow up abdominal x-ray this AM.  - Monitor rectal tube output.  - PO vancomycin, vancomycin retention enemas, IV Flagyl    Renal CKD stage III  - Munguia, rectal tube, monitor I&Os  - Monitor electrolytes and replete as necessary.  - Plasmalyte @ 100 mL/hr while NPO.    Heme: no acute issues  - Monitor CBC and coags.  - Lovenox for VTE prophylaxis.    ID: C. difficile colitis  - Monitor WBC, temperature, and procalcitonin.  - PO vancomycin, vancomycin retention enemas, and IV Flagyl for severe C. difficile colitis.    Endo: no acute issues  - Monitor glucose on BMP.    Disposition:   - Full code.  - Will remain in SICU. Plan for OR for repair of periprosthetic fracture on Fri. ASSESSMENT:  68 year old female with bipolar depression, OCD, dementia, HLD, NHL (in remission since 2016), osteoarthritis s/p bilateral TKR s/p fall presenting with a right periprosthetic femoral neck fracture and C. difficile colitis.    PLAN:  Neuro: bipolar depression, OCD, dementia, acute traumatic pain  - Monitor mental status.  - Continue home bupropion, ziprasidone.  - Pain control as needed with Tylenol.    Resp: intermittently desaturating, currently 99% on 5L, minimal B lines on POCUS  - Monitor pulse oximeter.  - Deep breathing exercises and incentive spirometry to prevent atelectasis.  -Titrate NC to Sp02 >92%  -duonebs q4    CV: intermittent episodes of hypotension, HLD  - Monitor vital signs.  - Frequent reassessments of volume status, volume resuscitate as needed  - POCUS with ascites and pleural effusions   - consider placement of arterial line for hemodynamic monitoring and central line for fluid and medication administration  -c/w lopressor 12.5 BID for intermittent episodes of tachycardia, consider SVT    GI: C. difficile colitis  - tolerating CLD  - Monitor rectal tube output.  - PO vancomycin, vancomycin retention enemas, IV Flagyl    Renal CKD stage III  - Munguia, rectal tube, monitor I&Os  - Monitor electrolytes and replete as necessary.  - s/p albumin 25% IVPB 50 cc, monitor UOP and blood pressures in response to albumin  -lactate 2.5, downtrending, continue to trend    Heme: no acute issues  - Monitor CBC and coags.  - Lovenox for VTE prophylaxis.    ID: C. difficile colitis  - Monitor WBC, temperature, and procalcitonin.  - PO vancomycin, vancomycin retention enemas, and IV Flagyl for severe C. difficile colitis.    Endo: no acute issues  - Monitor glucose on BMP.  -c/w home synthroid    Disposition:   - Full code.  - Will remain in SICU. Plan for OR for repair of periprosthetic fracture on Fri. ASSESSMENT:  68 year old female with bipolar depression, OCD, dementia, HLD, NHL (in remission since 2016), osteoarthritis s/p bilateral TKR s/p fall presenting with a right periprosthetic femoral neck fracture and C. difficile colitis.    PLAN:  Neuro: bipolar depression, OCD, dementia, acute traumatic pain  - Monitor mental status.  - Continue home bupropion, ziprasidone.  - Pain control as needed with Tylenol.    Resp: intermittently desaturating, currently 99% on 5L, minimal B lines on POCUS  - Monitor pulse oximeter.  - Deep breathing exercises and incentive spirometry to prevent atelectasis.  -Titrate NC to Sp02 >92%  -duonebs q4    CV: intermittent episodes of hypotension, HLD  - Monitor vital signs.  - Frequent reassessments of volume status, volume resuscitate as needed  - POCUS with ascites and pleural effusions   - consider placement of arterial line for hemodynamic monitoring and central line for fluid and medication administration  -c/w lopressor 12.5 BID for intermittent episodes of tachycardia, consider SVT    GI: C. difficile colitis  - tolerating CLD, advance as tolerated  - Monitor rectal tube output.  - PO vancomycin, vancomycin retention enemas, IV Flagyl    Renal CKD stage III  - Munguia, rectal tube, monitor I&Os  - Monitor electrolytes and replete as necessary.  - s/p albumin 25% IVPB 50 cc, monitor UOP and blood pressures in response to albumin  -lactate 2.5, downtrending, continue to trend    Heme: no acute issues  - Monitor CBC and coags.  - Lovenox for VTE prophylaxis.    ID: C. difficile colitis  - Monitor WBC, temperature, and procalcitonin.  - PO vancomycin, vancomycin retention enemas, and IV Flagyl for severe C. difficile colitis.    Endo: no acute issues  - Monitor glucose on BMP.  -c/w home synthroid    Disposition:   - Full code.  - Will remain in SICU.     Mariana Lewis MD PGY-1   Emergency Medicine, SICU

## 2019-02-09 NOTE — PROGRESS NOTE ADULT - SUBJECTIVE AND OBJECTIVE BOX
Acute Care Surgery Progress Note    Subjective:  Yesterday pt was given clears diet. No acute events overnight. Pt seen at bedside.    Objective:  Physical Exam:  NAD, awake and alert, sitting up in bed, confused  Respirations nonlabored  Abdomen soft, nontender, mildly distended  No guarding or rebound tenderness        T(C): 36.4 (02-09-19 @ 11:00), Max: 36.6 (02-08-19 @ 19:00)  HR: 101 (02-09-19 @ 14:19) (78 - 107)  BP: 95/52 (02-09-19 @ 14:00) (83/51 - 108/58)  RR: 19 (02-09-19 @ 14:00) (19 - 41)  SpO2: 96% (02-09-19 @ 14:19) (86% - 100%)    02-08-19 @ 07:01  -  02-09-19 @ 07:00  --------------------------------------------------------  IN: 3175 mL / OUT: 1315 mL / NET: 1860 mL    02-09-19 @ 07:01  -  02-09-19 @ 15:18  --------------------------------------------------------  IN: 1012 mL / OUT: 95 mL / NET: 917 mL        LABS                        12.4   17.4  )-----------( 69       ( 09 Feb 2019 04:53 )             37.3     02-09    134<L>  |  101  |  45<H>  ----------------------------<  156<H>  4.7   |  17<L>  |  0.89    Ca    7.4<L>      09 Feb 2019 03:26  Phos  3.4     02-09  Mg     2.7     02-09      PT/INR - ( 09 Feb 2019 04:53 )   PT: 14.0 sec;   INR: 1.21 ratio         PTT - ( 09 Feb 2019 04:53 )  PTT:20.6 sec

## 2019-02-09 NOTE — PROGRESS NOTE ADULT - ATTENDING COMMENTS
I agree with the above note and have personally seen and examined this patient. All pertinent films have been reviewed. Please refer to clinical documentation of the history, physical examinations, data summary, and both assessment and plan as documented above and with which I agree.    new thrombocytopenia, to be eval by SICU  possible FMT   surgery on hold for ORIF 2/2 acute medical issues, patient still unstable    Conor George MD  Attending Orthopedic Surgeon 4

## 2019-02-09 NOTE — CONSULT NOTE ADULT - PROBLEM SELECTOR RECOMMENDATION 4
Family meeting with son Liang to be arranged.  Patient to be transferred to the floor.
Psych eval for further treatment
-better  -from cdiff  -trend cbc

## 2019-02-09 NOTE — CONSULT NOTE ADULT - ATTENDING COMMENTS
I have personally seen and examined the patient and completed the note.    Discussed with SICU team.

## 2019-02-10 DIAGNOSIS — Z51.5 ENCOUNTER FOR PALLIATIVE CARE: ICD-10-CM

## 2019-02-10 DIAGNOSIS — R41.0 DISORIENTATION, UNSPECIFIED: ICD-10-CM

## 2019-02-10 LAB
ALBUMIN SERPL ELPH-MCNC: 2.1 G/DL — LOW (ref 3.3–5)
ALP SERPL-CCNC: 68 U/L — SIGNIFICANT CHANGE UP (ref 40–120)
ALT FLD-CCNC: 18 U/L — SIGNIFICANT CHANGE UP (ref 10–45)
ANION GAP SERPL CALC-SCNC: 17 MMOL/L — SIGNIFICANT CHANGE UP (ref 5–17)
APTT BLD: 30.4 SEC — SIGNIFICANT CHANGE UP (ref 27.5–36.3)
AST SERPL-CCNC: 25 U/L — SIGNIFICANT CHANGE UP (ref 10–40)
BILIRUB SERPL-MCNC: 0.5 MG/DL — SIGNIFICANT CHANGE UP (ref 0.2–1.2)
BUN SERPL-MCNC: 42 MG/DL — HIGH (ref 7–23)
CA-I BLD-SCNC: 1.06 MMOL/L — LOW (ref 1.12–1.3)
CALCIUM SERPL-MCNC: 7.3 MG/DL — LOW (ref 8.4–10.5)
CHLORIDE SERPL-SCNC: 100 MMOL/L — SIGNIFICANT CHANGE UP (ref 96–108)
CO2 SERPL-SCNC: 20 MMOL/L — LOW (ref 22–31)
CREAT SERPL-MCNC: 0.79 MG/DL — SIGNIFICANT CHANGE UP (ref 0.5–1.3)
GAS PNL BLDA: SIGNIFICANT CHANGE UP
GLUCOSE SERPL-MCNC: 150 MG/DL — HIGH (ref 70–99)
HCT VFR BLD CALC: 35.5 % — SIGNIFICANT CHANGE UP (ref 34.5–45)
HGB BLD-MCNC: 12.1 G/DL — SIGNIFICANT CHANGE UP (ref 11.5–15.5)
INR BLD: 1.21 RATIO — HIGH (ref 0.88–1.16)
MAGNESIUM SERPL-MCNC: 2.3 MG/DL — SIGNIFICANT CHANGE UP (ref 1.6–2.6)
MCHC RBC-ENTMCNC: 33.5 PG — SIGNIFICANT CHANGE UP (ref 27–34)
MCHC RBC-ENTMCNC: 34 GM/DL — SIGNIFICANT CHANGE UP (ref 32–36)
MCV RBC AUTO: 98.4 FL — SIGNIFICANT CHANGE UP (ref 80–100)
PF4 HEPARIN CMPLX AB SER-ACNC: NEGATIVE — SIGNIFICANT CHANGE UP
PF4 HEPARIN CMPLX AB SERPL QL IA: 0.11 ABS — SIGNIFICANT CHANGE UP
PHOSPHATE SERPL-MCNC: 2.9 MG/DL — SIGNIFICANT CHANGE UP (ref 2.5–4.5)
PLATELET # BLD AUTO: 57 K/UL — LOW (ref 150–400)
POTASSIUM SERPL-MCNC: 3.9 MMOL/L — SIGNIFICANT CHANGE UP (ref 3.5–5.3)
POTASSIUM SERPL-SCNC: 3.9 MMOL/L — SIGNIFICANT CHANGE UP (ref 3.5–5.3)
PROT SERPL-MCNC: 3.7 G/DL — LOW (ref 6–8.3)
PROTHROM AB SERPL-ACNC: 13.9 SEC — HIGH (ref 10–12.9)
RBC # BLD: 3.6 M/UL — LOW (ref 3.8–5.2)
RBC # FLD: 15.4 % — HIGH (ref 10.3–14.5)
SODIUM SERPL-SCNC: 137 MMOL/L — SIGNIFICANT CHANGE UP (ref 135–145)
WBC # BLD: 13.9 K/UL — HIGH (ref 3.8–10.5)
WBC # FLD AUTO: 13.9 K/UL — HIGH (ref 3.8–10.5)

## 2019-02-10 PROCEDURE — 71045 X-RAY EXAM CHEST 1 VIEW: CPT | Mod: 26

## 2019-02-10 PROCEDURE — 99233 SBSQ HOSP IP/OBS HIGH 50: CPT

## 2019-02-10 PROCEDURE — 99233 SBSQ HOSP IP/OBS HIGH 50: CPT | Mod: GC

## 2019-02-10 RX ORDER — DOCUSATE SODIUM 100 MG
100 CAPSULE ORAL THREE TIMES A DAY
Qty: 0 | Refills: 0 | Status: DISCONTINUED | OUTPATIENT
Start: 2019-02-10 | End: 2019-02-10

## 2019-02-10 RX ORDER — SENNA PLUS 8.6 MG/1
2 TABLET ORAL AT BEDTIME
Qty: 0 | Refills: 0 | Status: DISCONTINUED | OUTPATIENT
Start: 2019-02-10 | End: 2019-02-10

## 2019-02-10 RX ORDER — LEVOTHYROXINE SODIUM 125 MCG
75 TABLET ORAL DAILY
Qty: 0 | Refills: 0 | Status: DISCONTINUED | OUTPATIENT
Start: 2019-02-10 | End: 2019-02-11

## 2019-02-10 RX ORDER — CALCIUM GLUCONATE 100 MG/ML
2 VIAL (ML) INTRAVENOUS ONCE
Qty: 0 | Refills: 0 | Status: COMPLETED | OUTPATIENT
Start: 2019-02-10 | End: 2019-02-10

## 2019-02-10 RX ORDER — ENOXAPARIN SODIUM 100 MG/ML
40 INJECTION SUBCUTANEOUS DAILY
Qty: 0 | Refills: 0 | Status: DISCONTINUED | OUTPATIENT
Start: 2019-02-10 | End: 2019-02-21

## 2019-02-10 RX ORDER — FUROSEMIDE 40 MG
80 TABLET ORAL ONCE
Qty: 0 | Refills: 0 | Status: COMPLETED | OUTPATIENT
Start: 2019-02-10 | End: 2019-02-10

## 2019-02-10 RX ADMIN — Medication 3 MILLILITER(S): at 02:42

## 2019-02-10 RX ADMIN — Medication 100 MILLIGRAM(S): at 14:24

## 2019-02-10 RX ADMIN — Medication 3 MILLILITER(S): at 18:08

## 2019-02-10 RX ADMIN — Medication 500 MILLIGRAM(S): at 11:36

## 2019-02-10 RX ADMIN — Medication 3 MILLILITER(S): at 06:09

## 2019-02-10 RX ADMIN — Medication 100 MILLIGRAM(S): at 07:41

## 2019-02-10 RX ADMIN — Medication 3 MILLILITER(S): at 22:15

## 2019-02-10 RX ADMIN — Medication 80 MILLIGRAM(S): at 01:05

## 2019-02-10 RX ADMIN — Medication 75 MICROGRAM(S): at 11:36

## 2019-02-10 RX ADMIN — Medication 500 MILLIGRAM(S): at 13:34

## 2019-02-10 RX ADMIN — Medication 100 MILLIGRAM(S): at 15:58

## 2019-02-10 RX ADMIN — BUPROPION HYDROCHLORIDE 300 MILLIGRAM(S): 150 TABLET, EXTENDED RELEASE ORAL at 11:36

## 2019-02-10 RX ADMIN — Medication 500 MILLIGRAM(S): at 05:02

## 2019-02-10 RX ADMIN — Medication 3 MILLILITER(S): at 09:40

## 2019-02-10 RX ADMIN — Medication 500 MILLIGRAM(S): at 18:02

## 2019-02-10 RX ADMIN — Medication 200 GRAM(S): at 06:37

## 2019-02-10 RX ADMIN — Medication 500 MILLIGRAM(S): at 18:01

## 2019-02-10 RX ADMIN — Medication 3 MILLILITER(S): at 13:46

## 2019-02-10 RX ADMIN — Medication 100 MILLIGRAM(S): at 23:26

## 2019-02-10 RX ADMIN — CHLORHEXIDINE GLUCONATE 1 APPLICATION(S): 213 SOLUTION TOPICAL at 05:02

## 2019-02-10 RX ADMIN — FONDAPARINUX SODIUM 2.5 MILLIGRAM(S): 2.5 INJECTION, SOLUTION SUBCUTANEOUS at 11:36

## 2019-02-10 RX ADMIN — Medication 500 MILLIGRAM(S): at 23:33

## 2019-02-10 RX ADMIN — ZIPRASIDONE HYDROCHLORIDE 40 MILLIGRAM(S): 20 CAPSULE ORAL at 06:38

## 2019-02-10 NOTE — PROGRESS NOTE ADULT - SUBJECTIVE AND OBJECTIVE BOX
HISTORY  68 year old female with a past medical history of bipolar depression, OCD, dementia, HLD, NHL (in remission since 2016), osteoarthritis s/p bilateral TKR, and recent right YONATAN (1/20-1/26)  who presented on 2/4 as a level two trauma after a fall. Patient was found down in rehab after likely rolling out of bed. Patient was upgraded to a level 1 due to hypotension w/ SBP in the 80s despite fluid resuscitation and transfusions. Imaging revealed pancolitis and a right periprosthetic femoral neck fracture. Patient tested for C. difficile and was positive. She was admitted to SICU for hemodynamic monitoring in the setting of sepsis.    24 HOUR EVENTS:  - REBEL panel sent for concern for worsening thrombocytopenia; pt started on fondaparinux  - Palliative care consult placed; after discussion with pt and family, she is now DNR/DNI  - Pt ripped out IVs, new US IV infiltrated  - Started metoprolol  - Hypotensive; received 250cc 5% albumin x2, 1L plasmalyte  - Overnight developed worsening respiratory distress; received 20mg lasix initially and then an additional 80mg lasix later in the evening       SUBJECTIVE/ROS:  [ ] A ten-point review of systems was otherwise negative except as noted.  [x ] Due to altered mental status/intubation, subjective information were not able to be obtained from the patient. History was obtained, to the extent possible, from review of the chart and collateral sources of information.    NEURO  RASS: +1    GCS:  14   CAM ICU: Positive  Exam: awake, alert, not oriented  Meds: acetaminophen   Tablet .. 650 milliGRAM(s) Oral every 6 hours PRN Mild Pain (1 - 3)  buPROPion XL . 300 milliGRAM(s) Oral daily  ziprasidone 40 milliGRAM(s) Oral <User Schedule>    [x] Adequacy of sedation and pain control has been assessed and adjusted    RESPIRATORY  RR: 39 (02-10-19 @ 02:00) (17 - 39)  SpO2: 99% (02-10-19 @ 02:44) (92% - 100%)  Wt(kg): --  Exam: labored, crackles bilaterally at the bases  Mechanical Ventilation:   ABG - ( 10 Feb 2019 02:27 )  pH: 7.43  /  pCO2: 39    /  pO2: 92    / HCO3: 25    / Base Excess: 1.5   /  SaO2: 97      Lactate: x                [N/A] Extubation Readiness Assessed  Meds: ALBUTerol/ipratropium for Nebulization 3 milliLiter(s) Nebulizer every 4 hours      CARDIOVASCULAR  HR: 90 (02-10-19 @ 02:44) (81 - 108)  BP: 109/59 (02-10-19 @ 01:00) (82/51 - 123/57)  BP(mean): 79 (02-10-19 @ 01:00) (63 - 82)  ABP: --  ABP(mean): --  Wt(kg): --  CVP(cm H2O): --      Exam: regular rate and rhythm  Cardiac Rhythm: sinus  Perfusion     [x]Adequate   [ ]Inadequate  Mentation   [x]Normal       [ ]Reduced  Extremities  [x]Warm         [ ]Cool  Volume Status [ x]Hypervolemic [ ]Euvolemic [ ]Hypovolemic  Meds: metoprolol tartrate 12.5 milliGRAM(s) Oral two times a day      GI/NUTRITION  Exam: soft, nontender, nondistended, incision C/D/I  Diet: CLD  Meds:     GENITOURINARY  I&O's Detail    02-08 @ 07:01  -  02-09 @ 07:00  --------------------------------------------------------  IN:    multiple electrolytes Injection Type 1multiple electrolytes Injection Type 1: 1725 mL    Solution: 100 mL    Solution: 300 mL    Solution: 1050 mL  Total IN: 3175 mL    OUT:    Indwelling Catheter - Urethral: 515 mL    Rectal Tube: 800 mL  Total OUT: 1315 mL    Total NET: 1860 mL      02-09 @ 07:01  -  02-10 @ 02:58  --------------------------------------------------------  IN:    multiple electrolytes Injection Type 1multiple electrolytes Injection Type 1: 1125 mL    Oral Fluid: 637 mL    Solution: 250 mL    Solution: 250 mL  Total IN: 2262 mL    OUT:    Indwelling Catheter - Urethral: 975 mL    Rectal Tube: 220 mL  Total OUT: 1195 mL    Total NET: 1067 mL          02-09    134<L>  |  101  |  45<H>  ----------------------------<  156<H>  4.7   |  17<L>  |  0.89    Ca    7.4<L>      09 Feb 2019 03:26  Phos  2.9     02-10  Mg     2.3     02-10      [ ] Munguia catheter, indication: N/A  Meds:     HEMATOLOGIC  Meds: fondaparinux Injectable 2.5 milliGRAM(s) SubCutaneous daily    [x] VTE Prophylaxis                        12.1   13.9  )-----------( 57       ( 10 Feb 2019 02:26 )             35.5     PT/INR - ( 10 Feb 2019 02:26 )   PT: 13.9 sec;   INR: 1.21 ratio         PTT - ( 10 Feb 2019 02:26 )  PTT:30.4 sec  Transfusion     [ ] PRBC   [ ] Platelets   [ ] FFP   [ ] Cryoprecipitate    INFECTIOUS DISEASES  WBC Count: 13.9 K/uL (02-10 @ 02:26)  WBC Count: 17.4 K/uL (02-09 @ 04:53)    RECENT CULTURES:  Specimen Source: .Body Fluid  Date/Time: 02-06 @ 18:59  Culture Results:   No growth  Gram Stain:   No polymorphonuclear cells seen per low power field  No organisms seen  Organism: --  Specimen Source: .Blood Blood-Peripheral  Date/Time: 02-06 @ 16:55  Culture Results:   No growth to date.  Gram Stain: --  Organism: --    Meds: influenza   Vaccine 0.5 milliLiter(s) IntraMuscular once  metroNIDAZOLE  IVPB 500 milliGRAM(s) IV Intermittent every 8 hours  vancomycin    Solution 500 milliGRAM(s) Oral every 6 hours  vancomycin  Retention Enema 500 milliGRAM(s) Rectal every 6 hours      ENDOCRINE  CAPILLARY BLOOD GLUCOSE        Meds: levothyroxine Injectable 37.5 MICROGram(s) IV Push at bedtime      ACCESS DEVICES:  [x ] Peripheral IV  [ ] Central Venous Line	[ ] R	[ ] L	[ ] IJ	[ ] Fem	[ ] SC	Placed:   [ ] Arterial Line		[ ] R	[ ] L	[ ] Fem	[ ] Rad	[ ] Ax	Placed:   [ ] PICC:					[ ] Mediport  [x ] Urinary Catheter, Date Placed:   [x] Necessity of urinary, arterial, and venous catheters discussed    OTHER MEDICATIONS:  chlorhexidine 4% Liquid 1 Application(s) Topical <User Schedule>      CODE STATUS: DNR/DNI      IMAGING:

## 2019-02-10 NOTE — PROGRESS NOTE ADULT - ATTENDING COMMENTS
Thrombocytopenia-? from LMWH-will defer to SICU  Monitor for s/s any other foci    Case d/w SICU team  Will tailor plan for ID issues  per course,results.Will defer to primary team on management of other issues.    Infectious Diseases Service will cover over weekend.  Please call [phone] if issues

## 2019-02-10 NOTE — PROGRESS NOTE ADULT - ATTENDING COMMENTS
I agree with the above note and have personally seen and examined this patient. All pertinent films have been reviewed. Please refer to clinical documentation of the history, physical examinations, data summary, and both assessment and plan as documented above and with which I agree.    new thrombocytopenia, to be eval by SICU  possible FMT   surgery on hold for ORIF 2/2 acute medical issues, patient still unstable    Conor George MD  Attending Orthopedic Surgeon 4 I agree with the above note and have personally seen and examined this patient. All pertinent films have been reviewed. Please refer to clinical documentation of the history, physical examinations, data summary, and both assessment and plan as documented above and with which I agree.    new thrombocytopenia, to be eval by SICU, ?HIT, worsening today  now tachypnic on o2 mask  family made DNR and refused FMT  surgery on hold for ORIF 2/2 acute medical issues, patient still unstable for operative intervention  continue NWB SRI George MD  Attending Orthopedic Surgeon 4

## 2019-02-10 NOTE — PROGRESS NOTE ADULT - ASSESSMENT
68F p/w R hip fracture and found to have C. diff colitis. Family refused fecal transplant.     - f/u REBEL panel (semt 2/9)  - eventual surgery with Orthopedics   - Multimodal pain control  - C.diff positive - vanc and flagyl  - DVT PPx  - Tertiary  - Appreciate care per SICU

## 2019-02-10 NOTE — PROGRESS NOTE ADULT - SUBJECTIVE AND OBJECTIVE BOX
Acute Care Surgery Progress Note    Subjective:  Yesterday new IV was placed as prior one leaked. REBEL panel was sent and pt started on fondaparinux. Pt is DNR DNI. No acute events overnight. Pt seen at bedside.    Objective:  Physical Exam:  NAD, awake and alert, sitting up in bed, confused  Respirations nonlabored  Abdomen soft, nontender, mildly distended  No guarding or rebound tenderness        T(C): 36.6 (02-09-19 @ 22:00), Max: 36.9 (02-09-19 @ 19:00)  HR: 91 (02-10-19 @ 00:00) (81 - 108)  BP: 123/57 (02-10-19 @ 00:00) (82/51 - 123/57)  RR: 32 (02-10-19 @ 00:00) (17 - 38)  SpO2: 98% (02-10-19 @ 00:00) (89% - 100%)    02-08-19 @ 07:01  -  02-09-19 @ 07:00  --------------------------------------------------------  IN: 3175 mL / OUT: 1315 mL / NET: 1860 mL    02-09-19 @ 07:01  -  02-10-19 @ 00:54  --------------------------------------------------------  IN: 2187 mL / OUT: 595 mL / NET: 1592 mL        LABS                        12.4   17.4  )-----------( 69       ( 09 Feb 2019 04:53 )             37.3     02-09    134<L>  |  101  |  45<H>  ----------------------------<  156<H>  4.7   |  17<L>  |  0.89    Ca    7.4<L>      09 Feb 2019 03:26  Phos  3.4     02-09  Mg     2.7     02-09      PT/INR - ( 09 Feb 2019 04:53 )   PT: 14.0 sec;   INR: 1.21 ratio         PTT - ( 09 Feb 2019 04:53 )  PTT:20.6 sec Acute Care Surgery Progress Note    Subjective:  Yesterday new IV was placed as prior one leaked. REBEL panel was sent and pt started on fondaparinux. Pt is DNR DNI. In the evening pt had episode of hypotn. Pt was given fluids but then had resp distress so was diuresed. No acute events overnight. Pt seen at bedside.    Objective:  Physical Exam:  NAD, awake and alert, sitting up in bed, confused  Respirations nonlabored  Abdomen soft, nontender, mildly distended  No guarding or rebound tenderness        T(C): 36.6 (02-09-19 @ 22:00), Max: 36.9 (02-09-19 @ 19:00)  HR: 91 (02-10-19 @ 00:00) (81 - 108)  BP: 123/57 (02-10-19 @ 00:00) (82/51 - 123/57)  RR: 32 (02-10-19 @ 00:00) (17 - 38)  SpO2: 98% (02-10-19 @ 00:00) (89% - 100%)    02-08-19 @ 07:01  -  02-09-19 @ 07:00  --------------------------------------------------------  IN: 3175 mL / OUT: 1315 mL / NET: 1860 mL    02-09-19 @ 07:01  -  02-10-19 @ 00:54  --------------------------------------------------------  IN: 2187 mL / OUT: 595 mL / NET: 1592 mL        LABS                        12.4   17.4  )-----------( 69       ( 09 Feb 2019 04:53 )             37.3     02-09    134<L>  |  101  |  45<H>  ----------------------------<  156<H>  4.7   |  17<L>  |  0.89    Ca    7.4<L>      09 Feb 2019 03:26  Phos  3.4     02-09  Mg     2.7     02-09      PT/INR - ( 09 Feb 2019 04:53 )   PT: 14.0 sec;   INR: 1.21 ratio         PTT - ( 09 Feb 2019 04:53 )  PTT:20.6 sec

## 2019-02-10 NOTE — PROGRESS NOTE ADULT - ATTENDING COMMENTS
Dev resp distress, responded to large dose of IV Lasix  DC IVF  Tolerating clears, will advance as tolerated  Abx Vanco PO and enema, and Flagyl  Bronchodilators  Spoke to son again, made her DNR and DNI  Possible transfer to floor  Spoke to dr Gambino from palliative care Dev resp distress, responded to large dose of IV Lasix  DC IVF  Tolerating clears, will advance as tolerated  Abx Vanco PO and enema, and Flagyl  Bronchodilators  Spoke to son again, made her DNR and DNI  Possible transfer to floor  Spoke to Dr Gambino from palliative care Dev resp distress, responded to large dose of IV Lasix  DC IVF  Tolerating clears, will advance as tolerated  Abx Vanco PO and enema, and Flagyl  Bronchodilators  Spoke to son again, made her DNR and DNI  Possible transfer to floor  Spoke to Dr Moss from palliative care

## 2019-02-10 NOTE — PROGRESS NOTE ADULT - ATTENDING COMMENTS
Left periprosthetic femur fracture. awaiting surgical intervention - currently too unstable to proceed with surgery  p.ain meds as needed orally  as tolerated. Continue ATC cardio pulmonary monitoring for  this critically ill Patient in the SICU. Left periprosthetic femur fracture. awaiting surgical intervention - currently too unstable to proceed with surgery, but is clearly improving  Pain meds as needed orally  as tolerated. Continue ATC cardio pulmonary monitoring for  this critically ill Patient in the SICU. Right periprosthetic femur fracture. awaiting surgical intervention - currently too unstable to proceed with surgery, but is clearly improving  Pain meds as needed orally  as tolerated. Continue ATC cardio pulmonary monitoring for  this critically ill Patient in the SICU.

## 2019-02-10 NOTE — PROGRESS NOTE ADULT - SUBJECTIVE AND OBJECTIVE BOX
Patient seen and examined at bedside. pain is well controlled. Hypotensive and with respiratory distress during day. Made DNR by family after discussion with palliative.   Vital Signs Last 24 Hrs  T(C): 36.6 (09 Feb 2019 22:00), Max: 36.9 (09 Feb 2019 19:00)  T(F): 97.8 (09 Feb 2019 22:00), Max: 98.5 (09 Feb 2019 19:00)  HR: 108 (10 Feb 2019 04:00) (81 - 108)  BP: 102/57 (10 Feb 2019 04:00) (82/51 - 123/57)  BP(mean): 74 (10 Feb 2019 04:00) (63 - 82)  RR: 47 (10 Feb 2019 04:00) (17 - 47)  SpO2: 92% (10 Feb 2019 04:00) (92% - 100%)    LABS:                                              12.1   13.9  )-----------( 57       ( 10 Feb 2019 02:26 )             35.5   02-10    137  |  100  |  42<H>  ----------------------------<  150<H>  3.9   |  20<L>  |  0.79    Ca    7.3<L>      10 Feb 2019 02:26  Phos  2.9     02-10  Mg     2.3     02-10    TPro  3.7<L>  /  Alb  2.1<L>  /  TBili  0.5  /  DBili  x   /  AST  25  /  ALT  18  /  AlkPhos  68  02-10      Exam:  Gen: NAD  RLE:  Skin intact over proximal femur  Motor: 5/5 EHL/FHL/TA/Gastrocnemius  Sensory: SILT DP/SP/S/S/T nerve distributions  Vascular: 2+ Dorsalis Pedis pulse

## 2019-02-10 NOTE — PROGRESS NOTE ADULT - SUBJECTIVE AND OBJECTIVE BOX
67 y/o critically ill  female h/o dementia NHL presenting after unwitnessed fall. Pt found on the ground at rehab. is on lovenox after hip fracture. pt reports pain to her right leg. per EMS en route slurring speech and mildly hypotensive requesting trauma.unclear how long pt on the ground. Patient found to have C diff with worsening hypotension requiring pressors on and off and  Patient requiring the SICU for continued cardiopulmonary monitoring. Orthopedic procedure on hold due to  hypotension and pan colitis and need for pressors due to unstable hemodynamics. Diarrhea slowing with IV Flagyl + oral and rectal vancomycin. Stable leukocytosis and improving but still absent cognitive function.    MEDICATIONS  (STANDING):  ALBUTerol/ipratropium for Nebulization 3 milliLiter(s) Nebulizer every 4 hours  buPROPion XL . 300 milliGRAM(s) Oral daily  chlorhexidine 4% Liquid 1 Application(s) Topical <User Schedule>  fondaparinux Injectable 2.5 milliGRAM(s) SubCutaneous daily  furosemide   Injectable 80 milliGRAM(s) IV Push once  influenza   Vaccine 0.5 milliLiter(s) IntraMuscular once  levothyroxine Injectable 37.5 MICROGram(s) IV Push at bedtime  metoprolol tartrate 12.5 milliGRAM(s) Oral two times a day  metroNIDAZOLE  IVPB 500 milliGRAM(s) IV Intermittent every 8 hours  multiple electrolytes Injection Type 1 1000 milliLiter(s) (75 mL/Hr) IV Continuous <Continuous>  vancomycin    Solution 500 milliGRAM(s) Oral every 6 hours  vancomycin  Retention Enema 500 milliGRAM(s) Rectal every 6 hours  ziprasidone 40 milliGRAM(s) Oral <User Schedule>    MEDICATIONS  (PRN):  acetaminophen   Tablet .. 650 milliGRAM(s) Oral every 6 hours PRN Mild Pain (1 - 3)    PAST MEDICAL & SURGICAL HISTORY:  Osteoarthritis of left knee  Lymphoma: NHL, treated with chemo @ [facility] [place], in remission since 2016  Hypercholesteremia  Bipolar depression  OCD (obsessive compulsive disorder)  Depression  Osteoarthritis of right knee  S/P TKR (total knee replacement), bilateral: discharged nov 5th, 2014  S/P cataract surgery  H/O oral surgery: 2014  S/P knee surgery: 1978    Social History  Smoking: no  Etoh: no  Drug use: no    Vital Signs Last 24 Hrs  T(C): 36.6 (09 Feb 2019 22:00), Max: 36.9 (09 Feb 2019 19:00)  T(F): 97.8 (09 Feb 2019 22:00), Max: 98.5 (09 Feb 2019 19:00)  HR: 91 (10 Feb 2019 00:00) (81 - 108)  BP: 123/57 (10 Feb 2019 00:00) (82/51 - 123/57)  BP(mean): 82 (10 Feb 2019 00:00) (63 - 82)  RR: 32 (10 Feb 2019 00:00) (17 - 38)  SpO2: 98% (10 Feb 2019 00:00) (92% - 100%)          PHYSICAL EXAM:  GENERAL: NAD, well-groomed, well-developed  HEAD:  Atraumatic, Normocephalic  EYES: EOMI, PERRLA, conjunctiva and sclera clear  ENMT: No tonsillar erythema, exudates, or enlargement; Moist mucous membranes, Good dentition, No lesions  NECK: Supple, No JVD, Normal thyroid  NERVOUS SYSTEM:  Alert & Oriented X3, Good concentration; Motor Strength 5/5 B/L upper and lower extremities; DTRs 2+ intact and symmetric  CHEST/LUNG: Clear to percussion bilaterally; No rales, rhonchi, wheezing, or rubs  HEART: Regular rate and rhythm; No murmurs, rubs, or gallops  ABDOMEN: Soft, Nontender, Nondistended; Bowel sounds present  EXTREMITIES:  2+ Peripheral Pulses, No clubbing, cyanosis, or edema  LYMPH: No lymphadenopathy noted  SKIN: No rashes or lesions    LABS:    ABG - ( 09 Feb 2019 04:08 )  pH, Arterial: 7.42  pH, Blood: x     /  pCO2: 36    /  pO2: 76    / HCO3: 23    / Base Excess: -.8   /  SaO2: 95                    CBC Full  -  ( 09 Feb 2019 04:53 )  WBC Count : 17.4 K/uL  Hemoglobin : 12.4 g/dL  Hematocrit : 37.3 %  Platelet Count - Automated : 69 K/uL  Mean Cell Volume : 98.9 fl  Mean Cell Hemoglobin : 32.9 pg  Mean Cell Hemoglobin Concentration : 33.2 gm/dL  Auto Neutrophil # : x  Auto Lymphocyte # : x  Auto Monocyte # : x  Auto Eosinophil # : x  Auto Basophil # : x  Auto Neutrophil % : x  Auto Lymphocyte % : x  Auto Monocyte % : x  Auto Eosinophil % : x  Auto Basophil % : x    02-09    134<L>  |  101  |  45<H>  ----------------------------<  156<H>  4.7   |  17<L>  |  0.89    Ca    7.4<L>      09 Feb 2019 03:26  Phos  3.4     02-09  Mg     2.7     02-09        PT/INR - ( 09 Feb 2019 04:53 )   PT: 14.0 sec;   INR: 1.21 ratio         PTT - ( 09 Feb 2019 04:53 )  PTT:20.6 sec 69 y/o critically ill  female h/o dementia NHL presenting after unwitnessed fall. Pt found on the ground at rehab. is on lovenox after hip fracture. pt reports pain to her right leg. per EMS en route slurring speech and mildly hypotensive requesting trauma.unclear how long pt on the ground. Patient found to have C diff with worsening hypotension requiring pressors on and off and  Patient requiring the SICU for continued cardiopulmonary monitoring. Orthopedic procedure on hold due to  hypotension and pan colitis and need for pressors due to unstable hemodynamics. Diarrhea slowing with IV Flagyl + oral and rectal vancomycin. Stable leukocytosis and improving but still absent cognitive function.    MEDICATIONS  (STANDING):  ALBUTerol/ipratropium for Nebulization 3 milliLiter(s) Nebulizer every 4 hours  buPROPion XL . 300 milliGRAM(s) Oral daily  chlorhexidine 4% Liquid 1 Application(s) Topical <User Schedule>  fondaparinux Injectable 2.5 milliGRAM(s) SubCutaneous daily  influenza   Vaccine 0.5 milliLiter(s) IntraMuscular once  levothyroxine 75 MICROGram(s) Oral daily  metoprolol tartrate 12.5 milliGRAM(s) Oral two times a day  metroNIDAZOLE  IVPB 500 milliGRAM(s) IV Intermittent every 8 hours  vancomycin    Solution 500 milliGRAM(s) Oral every 6 hours  vancomycin  Retention Enema 500 milliGRAM(s) Rectal every 6 hours  ziprasidone 40 milliGRAM(s) Oral <User Schedule>    MEDICATIONS  (PRN):  acetaminophen   Tablet .. 650 milliGRAM(s) Oral every 6 hours PRN Mild Pain (1 - 3)      PAST MEDICAL & SURGICAL HISTORY:  Osteoarthritis of left knee  L[place]: NHL[place] [facility] [place]  Hypercholesteremia  Bipolar depression  OCD (obsessive compulsive disorder)  Depression  Osteoarthritis of right knee  S/P TKR (total knee replacement), bilateral: discharged nov 5th, 2014  S/P cataract surgery  H/O oral surgery: 2014  S/P knee surgery: 1978    Vital Signs Last 24 Hrs  T(C): 36.5 (10 Feb 2019 15:00), Max: 36.7 (10 Feb 2019 07:00)  T(F): 97.7 (10 Feb 2019 15:00), Max: 98.1 (10 Feb 2019 11:00)  HR: 102 (10 Feb 2019 19:00) (86 - 173)  BP: 93/54 (10 Feb 2019 19:00) (90/52 - 138/64)  BP(mean): 67 (10 Feb 2019 19:00) (63 - 92)  RR: 30 (10 Feb 2019 19:00) (21 - 64)  SpO2: 99% (10 Feb 2019 19:00) (83% - 100%)      PHYSICAL EXAM:  GENERAL: NAD, well-groomed, well-developed  HEAD:  Atraumatic, Normocephalic  EYES: EOMI, PERRLA, conjunctiva and sclera clear  ENMT: No tonsillar erythema, exudates, or enlargement; Moist mucous membranes, Good dentition, No lesions  NECK: Supple, No JVD, Normal thyroid  NERVOUS SYSTEM:  Alert & Oriented X3, Good concentration; Motor Strength 5/5 B/L upper and lower extremities; DTRs 2+ intact and symmetric  CHEST/LUNG: Clear to percussion bilaterally; No rales, rhonchi, wheezing, or rubs  HEART: Regular rate and rhythm; No murmurs, rubs, or gallops  ABDOMEN: Soft, Nontender, Nondistended; Bowel sounds present  EXTREMITIES:  2+ Peripheral Pulses, No clubbing, cyanosis, or edema  LYMPH: No lymphadenopathy noted  SKIN: No rashes or lesions    LABS:    ABG - ( 09 Feb 2019 04:08 )  pH, Arterial: 7.42  pH, Blood: x     /  pCO2: 36    /  pO2: 76    / HCO3: 23    / Base Excess: -.8   /  SaO2: 95          ABG - ( 10 Feb 2019 02:27 )  pH, Arterial: 7.43  pH, Blood: x     /  pCO2: 39    /  pO2: 92    / HCO3: 25    / Base Excess: 1.5   /  SaO2: 97                    CBC Full  -  ( 10 Feb 2019 02:26 )  WBC Count : 13.9 K/uL  Hemoglobin : 12.1 g/dL  Hematocrit : 35.5 %  Platelet Count - Automated : 57 K/uL  Mean Cell Volume : 98.4 fl  Mean Cell Hemoglobin : 33.5 pg  Mean Cell Hemoglobin Concentration : 34.0 gm/dL  Auto Neutrophil # : x  Auto Lymphocyte # : x  Auto Monocyte # : x  Auto Eosinophil # : x  Auto Basophil # : x  Auto Neutrophil % : x  Auto Lymphocyte % : x  Auto Monocyte % : x  Auto Eosinophil % : x  Auto Basophil % : x    02-10    137  |  100  |  42<H>  ----------------------------<  150<H>  3.9   |  20<L>  |  0.79    Ca    7.3<L>      10 Feb 2019 02:26  Phos  2.9     02-10  Mg     2.3     02-10    TPro  3.7<L>  /  Alb  2.1<L>  /  TBili  0.5  /  DBili  x   /  AST  25  /  ALT  18  /  AlkPhos  68  02-10    LIVER FUNCTIONS - ( 10 Feb 2019 02:26 )  Alb: 2.1 g/dL / Pro: 3.7 g/dL / ALK PHOS: 68 U/L / ALT: 18 U/L / AST: 25 U/L / GGT: x           PT/INR - ( 10 Feb 2019 02:26 )   PT: 13.9 sec;   INR: 1.21 ratio         PTT - ( 10 Feb 2019 02:26 )  PTT:30.4 sec 69 y/o critically ill  female h/o dementia NHL presenting after unwitnessed fall. Pt found on the ground at rehab. is on lovenox after hip fracture. pt reports pain to her right leg. per EMS en route slurring speech and mildly hypotensive requesting trauma.unclear how long pt on the ground. Patient found to have C diff with worsening hypotension requiring pressors on and off and  Patient requiring the SICU for continued cardiopulmonary monitoring. Orthopedic procedure on hold due to  hypotension and pan colitis and need for pressors due to unstable hemodynamics. Diarrhea slowing with IV Flagyl + oral and rectal vancomycin. Patient now has a rectal tube.  Stable leukocytosis and improving but still absent cognitive function.    MEDICATIONS  (STANDING):  ALBUTerol/ipratropium for Nebulization 3 milliLiter(s) Nebulizer every 4 hours  buPROPion XL . 300 milliGRAM(s) Oral daily  chlorhexidine 4% Liquid 1 Application(s) Topical <User Schedule>  fondaparinux Injectable 2.5 milliGRAM(s) SubCutaneous daily  influenza   Vaccine 0.5 milliLiter(s) IntraMuscular once  levothyroxine 75 MICROGram(s) Oral daily  metoprolol tartrate 12.5 milliGRAM(s) Oral two times a day  metroNIDAZOLE  IVPB 500 milliGRAM(s) IV Intermittent every 8 hours  vancomycin    Solution 500 milliGRAM(s) Oral every 6 hours  vancomycin  Retention Enema 500 milliGRAM(s) Rectal every 6 hours  ziprasidone 40 milliGRAM(s) Oral <User Schedule>    MEDICATIONS  (PRN):  acetaminophen   Tablet .. 650 milliGRAM(s) Oral every 6 hours PRN Mild Pain (1 - 3)      PAST MEDICAL & SURGICAL HISTORY:  Osteoarthritis of left knee  Lymphoma: NHL, treated with chemo @ [facility] [place], in remission since 2016  Hypercholesteremia  Bipolar depression  OCD (obsessive compulsive disorder)  Depression  Osteoarthritis of right knee  S/P TKR (total knee replacement), bilateral: discharged nov 5th, 2014  S/P cataract surgery  H/O oral surgery: 2014  S/P knee surgery: 1978    Vital Signs Last 24 Hrs  T(C): 36.5 (10 Feb 2019 15:00), Max: 36.7 (10 Feb 2019 07:00)  T(F): 97.7 (10 Feb 2019 15:00), Max: 98.1 (10 Feb 2019 11:00)  HR: 102 (10 Feb 2019 19:00) (86 - 173)  BP: 93/54 (10 Feb 2019 19:00) (90/52 - 138/64)  BP(mean): 67 (10 Feb 2019 19:00) (63 - 92)  RR: 30 (10 Feb 2019 19:00) (21 - 64)  SpO2: 99% (10 Feb 2019 19:00) (83% - 100%)      PHYSICAL EXAM:  GENERAL: NAD, well-groomed, well-developed  HEAD:  Atraumatic, Normocephalic  EYES: EOMI, PERRLA, conjunctiva and sclera clear  ENMT: No tonsillar erythema, exudates, or enlargement; Moist mucous membranes, Good dentition, No lesions  NECK: Supple, No JVD, Normal thyroid  NERVOUS SYSTEM:  Alert & Oriented X3, Good concentration; Motor Strength 5/5 B/L upper and lower extremities; DTRs 2+ intact and symmetric  CHEST/LUNG: Clear to percussion bilaterally; No rales, rhonchi, wheezing, or rubs  HEART: Regular rate and rhythm; No murmurs, rubs, or gallops  ABDOMEN: Soft, Nontender, Nondistended; Bowel sounds present  EXTREMITIES:  2+ Peripheral Pulses, No clubbing, cyanosis, or edema  LYMPH: No lymphadenopathy noted  SKIN: No rashes or lesions    LABS:    ABG - ( 09 Feb 2019 04:08 )  pH, Arterial: 7.42  pH, Blood: x     /  pCO2: 36    /  pO2: 76    / HCO3: 23    / Base Excess: -.8   /  SaO2: 95          ABG - ( 10 Feb 2019 02:27 )  pH, Arterial: 7.43  pH, Blood: x     /  pCO2: 39    /  pO2: 92    / HCO3: 25    / Base Excess: 1.5   /  SaO2: 97                    CBC Full  -  ( 10 Feb 2019 02:26 )  WBC Count : 13.9 K/uL  Hemoglobin : 12.1 g/dL  Hematocrit : 35.5 %  Platelet Count - Automated : 57 K/uL  Mean Cell Volume : 98.4 fl  Mean Cell Hemoglobin : 33.5 pg  Mean Cell Hemoglobin Concentration : 34.0 gm/dL  Auto Neutrophil # : x  Auto Lymphocyte # : x  Auto Monocyte # : x  Auto Eosinophil # : x  Auto Basophil # : x  Auto Neutrophil % : x  Auto Lymphocyte % : x  Auto Monocyte % : x  Auto Eosinophil % : x  Auto Basophil % : x    02-10    137  |  100  |  42<H>  ----------------------------<  150<H>  3.9   |  20<L>  |  0.79    Ca    7.3<L>      10 Feb 2019 02:26  Phos  2.9     02-10  Mg     2.3     02-10    TPro  3.7<L>  /  Alb  2.1<L>  /  TBili  0.5  /  DBili  x   /  AST  25  /  ALT  18  /  AlkPhos  68  02-10    LIVER FUNCTIONS - ( 10 Feb 2019 02:26 )  Alb: 2.1 g/dL / Pro: 3.7 g/dL / ALK PHOS: 68 U/L / ALT: 18 U/L / AST: 25 U/L / GGT: x           PT/INR - ( 10 Feb 2019 02:26 )   PT: 13.9 sec;   INR: 1.21 ratio         PTT - ( 10 Feb 2019 02:26 )  PTT:30.4 sec

## 2019-02-10 NOTE — PROGRESS NOTE ADULT - SUBJECTIVE AND OBJECTIVE BOX
Patient is a 68y old  Female who presents with a chief complaint of Hip pain (10 Feb 2019 02:57)    Being followed by ID for C diff    Interval history:still with rectal tube  Some abd and leg pain    No other acute events      ROS:  No cough,+ occ SOB,no CP  No N/V  No urinary complaints  No HA    No other complaints      Antimicrobials:    metroNIDAZOLE  IVPB 500 milliGRAM(s) IV Intermittent every 8 hours  vancomycin    Solution 500 milliGRAM(s) Oral every 6 hours  vancomycin  Retention Enema 500 milliGRAM(s) Rectal every 6 hours      other medications reviewed  MEDICATIONS  (STANDING):  ALBUTerol/ipratropium for Nebulization 3 milliLiter(s) Nebulizer every 4 hours  buPROPion XL . 300 milliGRAM(s) Oral daily  chlorhexidine 4% Liquid 1 Application(s) Topical <User Schedule>  docusate sodium 100 milliGRAM(s) Oral three times a day  fondaparinux Injectable 2.5 milliGRAM(s) SubCutaneous daily  influenza   Vaccine 0.5 milliLiter(s) IntraMuscular once  levothyroxine 75 MICROGram(s) Oral daily  metoprolol tartrate 12.5 milliGRAM(s) Oral two times a day    senna 2 Tablet(s) Oral at bedtime    ziprasidone 40 milliGRAM(s) Oral <User Schedule>      Vital Signs Last 24 Hrs  T(C): 36.7 (02-10-19 @ 07:00), Max: 36.9 (02-09-19 @ 19:00)  T(F): 98 (02-10-19 @ 07:00), Max: 98.5 (02-09-19 @ 19:00)  HR: 100 (02-10-19 @ 09:43) (86 - 108)  BP: 124/59 (02-10-19 @ 09:00) (82/51 - 124/59)  BP(mean): 85 (02-10-19 @ 09:00) (63 - 89)  RR: 44 (02-10-19 @ 09:00) (17 - 48)  SpO2: 91% (02-10-19 @ 09:43) (91% - 100%)    Physical Exam:    HEENT PERRLA EOMI,No pallor or icterus    No oral exudate or erythema    Neck supple no JVD or LN    Chest Good AE,CTA    CVS RRR S1 S2 WNl + ESM no rub or gallop    Abd Distended decreased BS Mild diffuse tenderness No rebound or guarding  No masses    Ext Hip painful ROM     IV site no erythema tenderness or discharge    Joints no swelling or LOM      Lab Data:                          12.1   13.9  )-----------( 57       ( 10 Feb 2019 02:26 )             35.5   WBC Count: 13.9 (02-10-19 @ 02:26)  WBC Count: 17.4 (02-09-19 @ 04:53)  WBC Count: 17.0 (02-08-19 @ 02:55)  WBC Count: 13.2 (02-07-19 @ 03:16)  WBC Count: 11.0 (02-06-19 @ 03:08)  WBC Count: 10.3 (02-05-19 @ 02:27)  WBC Count: 9.8 (02-04-19 @ 16:54)  WBC Count: 8.9 (02-04-19 @ 12:04)  WBC Count: 9.9 (02-04-19 @ 09:06)      02-10    137  |  100  |  42<H>  ----------------------------<  150<H>  3.9   |  20<L>  |  0.79    Ca    7.3<L>      10 Feb 2019 02:26  Phos  2.9     02-10  Mg     2.3     02-10    TPro  3.7<L>  /  Alb  2.1<L>  /  TBili  0.5  /  DBili  x   /  AST  25  /  ALT  18  /  AlkPhos  68  02-10        Culture - Body Fluid with Gram Stain (collected 06 Feb 2019 18:59)  Source: .Body Fluid  Gram Stain (06 Feb 2019 23:47):    No polymorphonuclear cells seen per low power field    No organisms seen  Preliminary Report (07 Feb 2019 19:55):    No growth    Culture - Blood (collected 06 Feb 2019 16:55)  Source: .Blood Blood-Peripheral  Preliminary Report (07 Feb 2019 17:02):    No growth to date.    Culture - Blood (collected 06 Feb 2019 16:55)  Source: .Blood Blood-Peripheral  Preliminary Report (07 Feb 2019 17:02):    No growth to date.          < from: Xray Chest 1 View- PORTABLE-Routine (02.10.19 @ 06:43) >  FINDINGS/  IMPRESSION:    Stable bilateral pleural effusions.                < end of copied text >

## 2019-02-10 NOTE — PROGRESS NOTE ADULT - ASSESSMENT
ASSESSMENT:  68 year old female with bipolar depression, OCD, dementia, HLD, NHL (in remission since 2016), osteoarthritis s/p bilateral TKR s/p fall presenting with a right periprosthetic femoral neck fracture and C. difficile colitis.    PLAN:  Neuro: bipolar depression, OCD, dementia, acute traumatic pain  - Monitor mental status.  - Continue home bupropion, ziprasidone.  - Pain control as needed with Tylenol.    Resp: intermittently desaturating, currently 99% on 5L, minimal B lines on POCUS  - Monitor pulse oximeter.  - Deep breathing exercises and incentive spirometry to prevent atelectasis.  -Titrate NC to Sp02 >92%; may require HFNC if diuresis does not accomplish improved oxygenation  -duonebs q4    CV: intermittent episodes of hypotension, HLD  - Monitor vital signs.  - Frequent reassessments of volume status, volume resuscitate as needed  - POCUS with ascites and pleural effusions, now s/p 20mg then 80mg IV lasix in PM  - consider placement of arterial line for hemodynamic monitoring and central line for fluid and medication administration  -c/w lopressor 12.5 BID for intermittent episodes of tachycardia, consider SVT    GI: C. difficile colitis  - tolerating CLD, advance as tolerated  - Monitor rectal tube output.  - PO vancomycin, vancomycin retention enemas, IV Flagyl    Renal CKD stage III  - [place], rectal tube, monitor I&Os  - Monitor electrolytes and replete as necessary.  - s/p albumin 5% IVPB 250 cc x2 + 1L LR last 24 hours, monitor UOP and blood pressures in response to albumin  -lactate 2.4, unchanged last 24hrs, continue to trend    Heme: no acute issues  - Monitor CBC and coags.  - Lovenox for VTE prophylaxis.    ID: C. difficile colitis  - Monitor WBC, temperature, and procalcitonin.  - PO vancomycin, vancomycin retention enemas, and IV Flagyl for severe C. difficile colitis.    Endo: no acute issues  - Monitor glucose on BMP.  -c/w home synthroid    Disposition:   - Full code.  - Will remain in SICU.     Pablo Abreu MD  Resident Physician  Emergency Medicine &  Internal Medicine  PGY-3  SICU Spectra: [phone]

## 2019-02-10 NOTE — PROGRESS NOTE ADULT - PROBLEM SELECTOR PLAN 1
Severe  pancolitis  Patient already on IV flagyl, GA and po vanco  family refused FMT  Given this, would rec:  1) Continue IV flagyl,po and rectal vanco  2) Monitor abdomen  WBC improving   3) Surgical eval-if any s/s toxic megacolon will likely need surgical intervention    Continue ICU monitoring

## 2019-02-11 LAB
ANION GAP SERPL CALC-SCNC: 13 MMOL/L — SIGNIFICANT CHANGE UP (ref 5–17)
ANION GAP SERPL CALC-SCNC: 13 MMOL/L — SIGNIFICANT CHANGE UP (ref 5–17)
BUN SERPL-MCNC: 25 MG/DL — HIGH (ref 7–23)
BUN SERPL-MCNC: 29 MG/DL — HIGH (ref 7–23)
CALCIUM SERPL-MCNC: 7.2 MG/DL — LOW (ref 8.4–10.5)
CALCIUM SERPL-MCNC: 7.6 MG/DL — LOW (ref 8.4–10.5)
CHLORIDE SERPL-SCNC: 102 MMOL/L — SIGNIFICANT CHANGE UP (ref 96–108)
CHLORIDE SERPL-SCNC: 98 MMOL/L — SIGNIFICANT CHANGE UP (ref 96–108)
CO2 SERPL-SCNC: 26 MMOL/L — SIGNIFICANT CHANGE UP (ref 22–31)
CO2 SERPL-SCNC: 29 MMOL/L — SIGNIFICANT CHANGE UP (ref 22–31)
CREAT SERPL-MCNC: 0.62 MG/DL — SIGNIFICANT CHANGE UP (ref 0.5–1.3)
CREAT SERPL-MCNC: 0.63 MG/DL — SIGNIFICANT CHANGE UP (ref 0.5–1.3)
GAS PNL BLDA: SIGNIFICANT CHANGE UP
GLUCOSE SERPL-MCNC: 137 MG/DL — HIGH (ref 70–99)
GLUCOSE SERPL-MCNC: 156 MG/DL — HIGH (ref 70–99)
HCT VFR BLD CALC: 31.9 % — LOW (ref 34.5–45)
HGB BLD-MCNC: 10.9 G/DL — LOW (ref 11.5–15.5)
MAGNESIUM SERPL-MCNC: 1.9 MG/DL — SIGNIFICANT CHANGE UP (ref 1.6–2.6)
MAGNESIUM SERPL-MCNC: 2.3 MG/DL — SIGNIFICANT CHANGE UP (ref 1.6–2.6)
MCHC RBC-ENTMCNC: 33.4 PG — SIGNIFICANT CHANGE UP (ref 27–34)
MCHC RBC-ENTMCNC: 34 GM/DL — SIGNIFICANT CHANGE UP (ref 32–36)
MCV RBC AUTO: 98.2 FL — SIGNIFICANT CHANGE UP (ref 80–100)
PHOSPHATE SERPL-MCNC: 2.6 MG/DL — SIGNIFICANT CHANGE UP (ref 2.5–4.5)
PHOSPHATE SERPL-MCNC: 2.8 MG/DL — SIGNIFICANT CHANGE UP (ref 2.5–4.5)
PLATELET # BLD AUTO: 46 K/UL — LOW (ref 150–400)
POTASSIUM SERPL-MCNC: 3.3 MMOL/L — LOW (ref 3.5–5.3)
POTASSIUM SERPL-MCNC: 3.4 MMOL/L — LOW (ref 3.5–5.3)
POTASSIUM SERPL-SCNC: 3.3 MMOL/L — LOW (ref 3.5–5.3)
POTASSIUM SERPL-SCNC: 3.4 MMOL/L — LOW (ref 3.5–5.3)
RBC # BLD: 3.25 M/UL — LOW (ref 3.8–5.2)
RBC # FLD: 15.2 % — HIGH (ref 10.3–14.5)
SODIUM SERPL-SCNC: 140 MMOL/L — SIGNIFICANT CHANGE UP (ref 135–145)
SODIUM SERPL-SCNC: 141 MMOL/L — SIGNIFICANT CHANGE UP (ref 135–145)
WBC # BLD: 11.5 K/UL — HIGH (ref 3.8–10.5)
WBC # FLD AUTO: 11.5 K/UL — HIGH (ref 3.8–10.5)

## 2019-02-11 PROCEDURE — 99232 SBSQ HOSP IP/OBS MODERATE 35: CPT | Mod: GC

## 2019-02-11 PROCEDURE — 99497 ADVNCD CARE PLAN 30 MIN: CPT | Mod: 25

## 2019-02-11 PROCEDURE — 99233 SBSQ HOSP IP/OBS HIGH 50: CPT | Mod: GC

## 2019-02-11 PROCEDURE — 99291 CRITICAL CARE FIRST HOUR: CPT

## 2019-02-11 PROCEDURE — 71045 X-RAY EXAM CHEST 1 VIEW: CPT | Mod: 26

## 2019-02-11 PROCEDURE — 74018 RADEX ABDOMEN 1 VIEW: CPT | Mod: 26

## 2019-02-11 PROCEDURE — 99233 SBSQ HOSP IP/OBS HIGH 50: CPT

## 2019-02-11 RX ORDER — POTASSIUM CHLORIDE 20 MEQ
10 PACKET (EA) ORAL
Qty: 0 | Refills: 0 | Status: COMPLETED | OUTPATIENT
Start: 2019-02-11 | End: 2019-02-11

## 2019-02-11 RX ORDER — POTASSIUM CHLORIDE 20 MEQ
40 PACKET (EA) ORAL EVERY 4 HOURS
Qty: 0 | Refills: 0 | Status: DISCONTINUED | OUTPATIENT
Start: 2019-02-11 | End: 2019-02-11

## 2019-02-11 RX ORDER — POTASSIUM PHOSPHATE, MONOBASIC POTASSIUM PHOSPHATE, DIBASIC 236; 224 MG/ML; MG/ML
15 INJECTION, SOLUTION INTRAVENOUS ONCE
Qty: 0 | Refills: 0 | Status: COMPLETED | OUTPATIENT
Start: 2019-02-11 | End: 2019-02-11

## 2019-02-11 RX ORDER — CALCIUM GLUCONATE 100 MG/ML
2 VIAL (ML) INTRAVENOUS ONCE
Qty: 0 | Refills: 0 | Status: COMPLETED | OUTPATIENT
Start: 2019-02-11 | End: 2019-02-11

## 2019-02-11 RX ORDER — MAGNESIUM SULFATE 500 MG/ML
2 VIAL (ML) INJECTION ONCE
Qty: 0 | Refills: 0 | Status: COMPLETED | OUTPATIENT
Start: 2019-02-11 | End: 2019-02-11

## 2019-02-11 RX ORDER — METOPROLOL TARTRATE 50 MG
2.5 TABLET ORAL EVERY 6 HOURS
Qty: 0 | Refills: 0 | Status: DISCONTINUED | OUTPATIENT
Start: 2019-02-11 | End: 2019-02-12

## 2019-02-11 RX ORDER — LEVOTHYROXINE SODIUM 125 MCG
37.5 TABLET ORAL AT BEDTIME
Qty: 0 | Refills: 0 | Status: DISCONTINUED | OUTPATIENT
Start: 2019-02-11 | End: 2019-02-14

## 2019-02-11 RX ORDER — FUROSEMIDE 40 MG
40 TABLET ORAL ONCE
Qty: 0 | Refills: 0 | Status: COMPLETED | OUTPATIENT
Start: 2019-02-11 | End: 2019-02-11

## 2019-02-11 RX ADMIN — Medication 500 MILLIGRAM(S): at 00:55

## 2019-02-11 RX ADMIN — Medication 2.5 MILLIGRAM(S): at 12:44

## 2019-02-11 RX ADMIN — CHLORHEXIDINE GLUCONATE 1 APPLICATION(S): 213 SOLUTION TOPICAL at 05:37

## 2019-02-11 RX ADMIN — Medication 500 MILLIGRAM(S): at 19:05

## 2019-02-11 RX ADMIN — Medication 100 MILLIEQUIVALENT(S): at 14:42

## 2019-02-11 RX ADMIN — Medication 3 MILLILITER(S): at 02:37

## 2019-02-11 RX ADMIN — Medication 500 MILLIGRAM(S): at 12:48

## 2019-02-11 RX ADMIN — Medication 500 MILLIGRAM(S): at 12:44

## 2019-02-11 RX ADMIN — Medication 50 MILLIEQUIVALENT(S): at 08:35

## 2019-02-11 RX ADMIN — Medication 50 MILLIEQUIVALENT(S): at 11:51

## 2019-02-11 RX ADMIN — Medication 50 MILLIEQUIVALENT(S): at 05:38

## 2019-02-11 RX ADMIN — Medication 500 MILLIGRAM(S): at 05:17

## 2019-02-11 RX ADMIN — Medication 100 MILLIGRAM(S): at 08:47

## 2019-02-11 RX ADMIN — Medication 37.5 MICROGRAM(S): at 23:14

## 2019-02-11 RX ADMIN — Medication 500 MILLIGRAM(S): at 23:14

## 2019-02-11 RX ADMIN — BUPROPION HYDROCHLORIDE 300 MILLIGRAM(S): 150 TABLET, EXTENDED RELEASE ORAL at 16:25

## 2019-02-11 RX ADMIN — Medication 3 MILLILITER(S): at 06:45

## 2019-02-11 RX ADMIN — Medication 100 MILLIEQUIVALENT(S): at 15:50

## 2019-02-11 RX ADMIN — Medication 40 MILLIGRAM(S): at 05:38

## 2019-02-11 RX ADMIN — Medication 3 MILLILITER(S): at 14:46

## 2019-02-11 RX ADMIN — Medication 2.5 MILLIGRAM(S): at 18:29

## 2019-02-11 RX ADMIN — Medication 50 GRAM(S): at 05:36

## 2019-02-11 RX ADMIN — POTASSIUM PHOSPHATE, MONOBASIC POTASSIUM PHOSPHATE, DIBASIC 62.5 MILLIMOLE(S): 236; 224 INJECTION, SOLUTION INTRAVENOUS at 10:01

## 2019-02-11 RX ADMIN — ENOXAPARIN SODIUM 40 MILLIGRAM(S): 100 INJECTION SUBCUTANEOUS at 12:45

## 2019-02-11 RX ADMIN — Medication 100 MILLIGRAM(S): at 23:15

## 2019-02-11 RX ADMIN — Medication 100 MILLIEQUIVALENT(S): at 16:55

## 2019-02-11 RX ADMIN — Medication 200 GRAM(S): at 10:00

## 2019-02-11 RX ADMIN — Medication 40 MILLIEQUIVALENT(S): at 05:15

## 2019-02-11 NOTE — PROGRESS NOTE ADULT - ATTENDING COMMENTS
I agree with the above note and have personally seen and examined this patient. All pertinent films have been reviewed. Please refer to clinical documentation of the history, physical examinations, data summary, and both assessment and plan as documented above and with which I agree.    new thrombocytopenia, to be eval by SICU, ?HIT, worsening today  now tachypnic on o2 mask  family made DNR and refused FMT  surgery on hold for ORIF 2/2 acute medical issues, patient still unstable for operative intervention  continue NWB SRI George MD  Attending Orthopedic Surgeon 4 I agree with the above note and have personally seen and examined this patient. All pertinent films have been reviewed. Please refer to clinical documentation of the history, physical examinations, data summary, and both assessment and plan as documented above and with which I agree.    new thrombocytopenia, to be eval by SICU  tachypnic and acutely ill still  family made DNR and refused FMT  surgery on hold for ORIF 2/2 acute medical issues, patient still unstable for operative intervention, may need to treat nonoperatively given how medically sick patient has been for the past week  continue NWB SRI George MD  Attending Orthopedic Surgeon 4

## 2019-02-11 NOTE — PROGRESS NOTE ADULT - SUBJECTIVE AND OBJECTIVE BOX
HISTORY  68 year old female with a past medical history of bipolar depression, OCD, dementia, HLD, NHL (in remission since 2016), osteoarthritis s/p bilateral TKR, and recent right YONATAN (1/20-1/26)  who presented on 2/4 as a level two trauma after a fall. Patient was found down in rehab after likely rolling out of bed. Patient was upgraded to a level 1 due to hypotension w/ SBP in the 80s despite fluid resuscitation and transfusions. Imaging revealed pancolitis and a right periprosthetic femoral neck fracture. Patient tested for C. difficile and was positive. She was admitted to SICU for hemodynamic monitoring in the setting of sepsis.    24 HOUR EVENTS:  - Patient restarted on PO meds  - Advanced to regular diet  - HIT panel negative, DVT ppx switched back to lovenox (from fondiparinux)  - Free water pushes stopped  - Pt with productive cough overnight, dark brown/black sputum    SUBJECTIVE/ROS:  [ ] A ten-point review of systems was otherwise negative except as noted.  [ ] Due to altered mental status/intubation, subjective information were not able to be obtained from the patient. History was obtained, to the extent possible, from review of the chart and collateral sources of information.      NEURO  RASS: 0  Meds: acetaminophen   Tablet .. 650 milliGRAM(s) Oral every 6 hours PRN Mild Pain (1 - 3)  buPROPion XL . 300 milliGRAM(s) Oral daily  ziprasidone 40 milliGRAM(s) Oral <User Schedule>  Exam: Awake and alert, oriented to person and sometimes place  [x] Adequacy of sedation and pain control has been assessed and adjusted      RESPIRATORY  RR: 34 (02-11-19 @ 02:00) (21 - 64)  SpO2: 96% (02-11-19 @ 02:44) (83% - 100%)  Wt(kg): --  Mechanical Ventilation:   ABG - ( 10 Feb 2019 02:27 )  pH: 7.43  /  pCO2: 39    /  pO2: 92    / HCO3: 25    / Base Excess: 1.5   /  SaO2: 97      Lactate: x      Exam: Coarse breath sounds          Meds: ALBUTerol/ipratropium for Nebulization 3 milliLiter(s) Nebulizer every 4 hours        CARDIOVASCULAR  HR: 102 (02-11-19 @ 02:44) (91 - 173)  BP: 127/60 (02-11-19 @ 02:00) (90/52 - 138/64)  BP(mean): 84 (02-11-19 @ 02:00) (63 - 92)  ABP: --  ABP(mean): --  Wt(kg): --  CVP(cm H2O): --  Exam: Tachycardic, regular rhythm, nl S1/S2        GI/NUTRITION  Diet: Regular  Meds:   Exam: Soft, NT/ND    GENITOURINARY  I&O's Detail    02-09 @ 07:01  -  02-10 @ 07:00  --------------------------------------------------------  IN:    multiple electrolytes Injection Type 1multiple electrolytes Injection Type 1: 1125 mL    Oral Fluid: 837 mL    Solution: 100 mL    Solution: 250 mL    Solution: 250 mL  Total IN: 2562 mL    OUT:    Indwelling Catheter - Urethral: 2375 mL    Rectal Tube: 520 mL  Total OUT: 2895 mL    Total NET: -333 mL      02-10 @ 07:01 - 02-11 @ 02:56  --------------------------------------------------------  IN:    Oral Fluid: 500 mL    Solution: 300 mL  Total IN: 800 mL    OUT:    Indwelling Catheter - Urethral: 1360 mL    Rectal Tube: 200 mL  Total OUT: 1560 mL    Total NET: -760 mL          02-10    137  |  100  |  42<H>  ----------------------------<  150<H>  3.9   |  20<L>  |  0.79    Ca    7.3<L>      10 Feb 2019 02:26  Phos  2.9     02-10  Mg     2.3     02-10    TPro  3.7<L>  /  Alb  2.1<L>  /  TBili  0.5  /  DBili  x   /  AST  25  /  ALT  18  /  AlkPhos  68  02-10    Meds:       HEMATOLOGIC  Meds: enoxaparin Injectable 40 milliGRAM(s) SubCutaneous daily    [x] VTE Prophylaxis                        12.1   13.9  )-----------( 57       ( 10 Feb 2019 02:26 )             35.5     PT/INR - ( 10 Feb 2019 02:26 )   PT: 13.9 sec;   INR: 1.21 ratio         PTT - ( 10 Feb 2019 02:26 )  PTT:30.4 sec      INFECTIOUS DISEASES  T(C): 36.7 (02-11-19 @ 00:00), Max: 36.8 (02-10-19 @ 19:00)  Wt(kg): --    Recent Cultures:  Specimen Source: .Body Fluid, 02-06 @ 18:59; Results   No growth; Gram Stain:   No polymorphonuclear cells seen per low power field  No organisms seen; Organism: --  Specimen Source: .Blood Blood-Peripheral, 02-06 @ 16:55; Results   No growth to date.; Gram Stain: --; Organism: --    Meds: influenza   Vaccine 0.5 milliLiter(s) IntraMuscular once  metroNIDAZOLE  IVPB 500 milliGRAM(s) IV Intermittent every 8 hours  vancomycin    Solution 500 milliGRAM(s) Oral every 6 hours  vancomycin  Retention Enema 500 milliGRAM(s) Rectal every 6 hours        ENDOCRINE  Capillary Blood Glucose    Meds: levothyroxine 75 MICROGram(s) Oral daily        ACCESS DEVICES:  [ ] Peripheral IV  [ ] Central Venous Line	[ ] R	[ ] L	[ ] IJ	[ ] Fem	[ ] SC	Placed:   [ ] Arterial Line		[ ] R	[ ] L	[ ] Fem	[ ] Rad	[ ] Ax	Placed:   [ ] PICC:					[ ] Mediport  [ ] Urinary Catheter, Date Placed:   [ ] Necessity of urinary, arterial, and venous catheters discussed      OTHER MEDICATIONS:  chlorhexidine 4% Liquid 1 Application(s) Topical <User Schedule>

## 2019-02-11 NOTE — PROGRESS NOTE ADULT - SUBJECTIVE AND OBJECTIVE BOX
Patient is a 68y old  Female who presents with a chief complaint of C Diff (10 Feb 2019 11:50)    Being followed by ID for c diff    Interval history:still with rectal tube  denies pain  No pressors  No acute events  No other acute events      ROS:  No cough,SOB,CP  No N/V/D  No abd pain  No urinary complaints  No HA  some leg pain   No other complaints      Antimicrobials:    metroNIDAZOLE  IVPB 500 milliGRAM(s) IV Intermittent every 8 hours  vancomycin    Solution 500 milliGRAM(s) Oral every 6 hours  vancomycin  Retention Enema 500 milliGRAM(s) Rectal every 6 hours      other medications reviewed    Vital Signs Last 24 Hrs  T(C): 36.7 (02-11-19 @ 15:00), Max: 36.9 (02-11-19 @ 11:00)  T(F): 98 (02-11-19 @ 15:00), Max: 98.4 (02-11-19 @ 11:00)  HR: 92 (02-11-19 @ 15:00) (87 - 173)  BP: 98/51 (02-11-19 @ 15:00) (93/54 - 150/53)  BP(mean): 73 (02-11-19 @ 15:00) (67 - 93)  RR: 21 (02-11-19 @ 15:00) (20 - 46)  SpO2: 97% (02-11-19 @ 15:00) (84% - 100%)    Physical Exam:    HEENT PERRLA EOMI,No pallor or icterus    No oral exudate or erythema    Neck supple no JVD or LN    Chest Good AE,CTA    CVS RRR S1 S2 WNl + ESM no rub or gallop    Abd Distended-decreased  + BS Mild diffuse tenderness No rebound or guarding  No masses    Ext Hip painful ROM     IV site no erythema tenderness or discharge    Joints no swelling or LOM    Lab Data:                          10.9   11.5  )-----------( 46       ( 11 Feb 2019 03:15 )             31.9       02-11    140  |  98  |  25<H>  ----------------------------<  156<H>  3.4<L>   |  29  |  0.63    Ca    7.6<L>      11 Feb 2019 11:47  Phos  2.8     02-11  Mg     2.3     02-11    TPro  3.7<L>  /  Alb  2.1<L>  /  TBili  0.5  /  DBili  x   /  AST  25  /  ALT  18  /  AlkPhos  68  02-10        Culture - Body Fluid with Gram Stain (collected 06 Feb 2019 18:59)  Source: .Body Fluid  Gram Stain (06 Feb 2019 23:47):    No polymorphonuclear cells seen per low power field    No organisms seen  Preliminary Report (07 Feb 2019 19:55):    No growth    Culture - Blood (collected 06 Feb 2019 16:55)  Source: .Blood Blood-Peripheral  Preliminary Report (07 Feb 2019 17:02):    No growth to date.    Culture - Blood (collected 06 Feb 2019 16:55)  Source: .Blood Blood-Peripheral  Preliminary Report (07 Feb 2019 17:02):    No growth to date.

## 2019-02-11 NOTE — PROGRESS NOTE ADULT - ATTENDING COMMENTS
Dr. Patrick (Attending Physician)  Delirium - dc'd gabapentin, on wellbutrin and ziprasidone.  SVT overnight resolved spontaneously started metoprolol 2.5 IV q6 hours, hypokalemic with diuresis repleting potassium  Acute resp insufficiency - improved today with diuresis  Ileus on abd. xray - Vomited this am   Thrombocytopenia  Right periprosthetic fx  C. diff on vanc oral and rectal, and flagyl improving today with improved rectal output, improved wbc  DM - SSI, Hypothyroid on synthroid

## 2019-02-11 NOTE — GOALS OF CARE CONVERSATION - PERSONAL ADVANCE DIRECTIVE - TREATMENT GUIDELINE COMMENT
Limited medical interventions, considering hip fx revision as he feels there is no QOL if she cannot walk.  SICU team to discuss with son.  She is high surgical risk.

## 2019-02-11 NOTE — PROGRESS NOTE ADULT - ASSESSMENT
68F p/w R hip fracture and found to have C. diff colitis. Family refused fecal transplant.     PLAN:  - eventual surgery with Orthopedics   - Multimodal pain control  - C.diff positive - vanc and flagyl  - DVT PPx  - Tertiary  - Appreciate care per SICU    [facility]  [id number]

## 2019-02-11 NOTE — PROGRESS NOTE ADULT - SUBJECTIVE AND OBJECTIVE BOX
69 y/o critically ill  female h/o dementia NHL presenting after unwitnessed fall. Pt found on the ground at rehab. is on lovenox after hip fracture. pt reports pain to her right leg. per EMS en route slurring speech and mildly hypotensive requesting trauma.unclear how long pt on the ground. Patient found to have C diff with worsening hypotension requiring pressors on and off and  Patient requiring the SICU for continued cardiopulmonary monitoring. Orthopedic procedure on hold due to  hypotension and pan colitis and need for pressors due to unstable hemodynamics. Diarrhea slowing with IV Flagyl + oral and rectal vancomycin. Patient now has a rectal tube.  Stable leukocytosis and improving but still absent cognitive function.      MEDICATIONS  (STANDING):  ALBUTerol/ipratropium for Nebulization 3 milliLiter(s) Nebulizer every 4 hours  buPROPion XL . 300 milliGRAM(s) Oral daily  chlorhexidine 4% Liquid 1 Application(s) Topical <User Schedule>  enoxaparin Injectable 40 milliGRAM(s) SubCutaneous daily  influenza   Vaccine 0.5 milliLiter(s) IntraMuscular once  levothyroxine Injectable 37.5 MICROGram(s) IV Push at bedtime  metoprolol tartrate Injectable 2.5 milliGRAM(s) IV Push every 6 hours  metroNIDAZOLE  IVPB 500 milliGRAM(s) IV Intermittent every 8 hours  vancomycin    Solution 500 milliGRAM(s) Oral every 6 hours  vancomycin  Retention Enema 500 milliGRAM(s) Rectal every 6 hours  ziprasidone 40 milliGRAM(s) Oral <User Schedule>    MEDICATIONS  (PRN):          VITALS:   T(C): 36.9 (02-11-19 @ 23:00), Max: 37 (02-11-19 @ 19:00)  HR: 90 (02-12-19 @ 01:00) (83 - 109)  BP: 100/51 (02-12-19 @ 01:00) (90/56 - 150/53)  RR: 21 (02-12-19 @ 01:00) (20 - 46)  SpO2: 96% (02-12-19 @ 01:00) (90% - 99%)  Wt(kg): --    PHYSICAL EXAM:  GENERAL: NAD, well-groomed, well-developed  HEAD:  Atraumatic, Normocephalic  EYES: EOMI, PERRLA, conjunctiva and sclera clear  ENMT: No tonsillar erythema, exudates, or enlargement; Moist mucous membranes, Good dentition, No lesions  NECK: Supple, No JVD, Normal thyroid  NERVOUS SYSTEM:  Alert & Oriented X3, Good concentration; Motor Strength 5/5 B/L upper and lower extremities; DTRs 2+ intact and symmetric  CHEST/LUNG: Clear to percussion bilaterally; No rales, rhonchi, wheezing, or rubs  HEART: Regular rate and rhythm; No murmurs, rubs, or gallops  ABDOMEN: Soft, Nontender, Nondistended; Bowel sounds present  EXTREMITIES:  2+ Peripheral Pulses, No clubbing, cyanosis, or edema  LYMPH: No lymphadenopathy noted  SKIN: No rashes or lesions    LABS:  ABG - ( 11 Feb 2019 03:11 )  pH, Arterial: 7.46  pH, Blood: x     /  pCO2: 44    /  pO2: 95    / HCO3: 31    / Base Excess: 6.7   /  SaO2: 98                    CBC Full  -  ( 11 Feb 2019 03:15 )  WBC Count : 11.5 K/uL  Hemoglobin : 10.9 g/dL  Hematocrit : 31.9 %  Platelet Count - Automated : 46 K/uL  Mean Cell Volume : 98.2 fl  Mean Cell Hemoglobin : 33.4 pg  Mean Cell Hemoglobin Concentration : 34.0 gm/dL  Auto Neutrophil # : x  Auto Lymphocyte # : x  Auto Monocyte # : x  Auto Eosinophil # : x  Auto Basophil # : x  Auto Neutrophil % : x  Auto Lymphocyte % : x  Auto Monocyte % : x  Auto Eosinophil % : x  Auto Basophil % : x    02-11    140  |  98  |  25<H>  ----------------------------<  156<H>  3.4<L>   |  29  |  0.63    Ca    7.6<L>      11 Feb 2019 11:47  Phos  2.8     02-11  Mg     2.3     02-11    TPro  3.7<L>  /  Alb  2.1<L>  /  TBili  0.5  /  DBili  x   /  AST  25  /  ALT  18  /  AlkPhos  68  02-10    LIVER FUNCTIONS - ( 10 Feb 2019 02:26 )  Alb: 2.1 g/dL / Pro: 3.7 g/dL / ALK PHOS: 68 U/L / ALT: 18 U/L / AST: 25 U/L / GGT: x           PT/INR - ( 10 Feb 2019 02:26 )   PT: 13.9 sec;   INR: 1.21 ratio         PTT - ( 10 Feb 2019 02:26 )  PTT:30.4 sec    CAPILLARY BLOOD GLUCOSE          RADIOLOGY & ADDITIONAL TESTS:

## 2019-02-11 NOTE — PROGRESS NOTE ADULT - SUBJECTIVE AND OBJECTIVE BOX
SUBJECTIVE AND OBJECTIVE:  INTERVAL HPI/OVERNIGHT EVENTS:    DNR on chart: Yes  Yes    Allergies    honeydew melon (Other)  penicillin (Other; Hives)    Intolerances    MEDICATIONS  (STANDING):  ALBUTerol/ipratropium for Nebulization 3 milliLiter(s) Nebulizer every 4 hours  buPROPion XL . 300 milliGRAM(s) Oral daily  chlorhexidine 4% Liquid 1 Application(s) Topical <User Schedule>  enoxaparin Injectable 40 milliGRAM(s) SubCutaneous daily  influenza   Vaccine 0.5 milliLiter(s) IntraMuscular once  levothyroxine Injectable 37.5 MICROGram(s) IV Push at bedtime  metoprolol tartrate Injectable 2.5 milliGRAM(s) IV Push every 6 hours  metroNIDAZOLE  IVPB 500 milliGRAM(s) IV Intermittent every 8 hours  vancomycin    Solution 500 milliGRAM(s) Oral every 6 hours  vancomycin  Retention Enema 500 milliGRAM(s) Rectal every 6 hours  ziprasidone 40 milliGRAM(s) Oral <User Schedule>    MEDICATIONS  (PRN):      ITEMS UNCHECKED ARE NOT PRESENT    PRESENT SYMPTOMS: [ ]Unable to obtain due to poor mentation   Source if other than patient:  [ ]Family   [ ]Team     Pain (Impact on QOL):    Location:  Minimal acceptable level (0-10 scale):                   Aggravating factors:  Quality:  Radiation:  Severity (0-10 scale):    Timing:    Dyspnea:                           [ ]Mild [ ]Moderate [ ]Severe  Anxiety:                             [ ]Mild [ ]Moderate [ ]Severe  Fatigue:                             [ ]Mild [ ]Moderate [ ]Severe  Nausea:                             [ ]Mild [ ]Moderate [ ]Severe  Loss of appetite:              [ ]Mild [ ]Moderate [ ]Severe  Constipation:                    [ ]Mild [ ]Moderate [ ]Severe    PAIN AD Score:	  http://geriatrictoolkit.[place].[facility]/cog/painad.pdf (Ctrl + left click to view)    Other Symptoms:  [ ]All other review of systems negative     Karnofsky Performance Score/Palliative Performance Status Version 2:         %    http://palliative.info/resource_material/PPSv2.pdf  PHYSICAL EXAM:  Vital Signs Last 24 Hrs  T(C): 36.7 (11 Feb 2019 15:00), Max: 36.9 (11 Feb 2019 11:00)  T(F): 98 (11 Feb 2019 15:00), Max: 98.4 (11 Feb 2019 11:00)  HR: 90 (11 Feb 2019 18:00) (87 - 109)  BP: 90/56 (11 Feb 2019 18:00) (90/56 - 150/53)  BP(mean): 65 (11 Feb 2019 18:00) (65 - 93)  RR: 21 (11 Feb 2019 18:00) (20 - 46)  SpO2: 99% (11 Feb 2019 18:00) (84% - 100%) I&O's Summary    10 Feb 2019 07:01  -  11 Feb 2019 07:00  --------------------------------------------------------  IN: 900 mL / OUT: 2995 mL / NET: -2095 mL    11 Feb 2019 07:01  -  11 Feb 2019 18:44  --------------------------------------------------------  IN: 850 mL / OUT: 1430 mL / NET: -580 mL     GENERAL:  [ ]Alert  [ ]Oriented x   [ ]Lethargic  [ ]Cachexia  [ ]Unarousable  [ ]Verbal  [ ]Non-Verbal  Behavioral:   [ ] Anxiety  [ ] Delirium [ ] Agitation [ ] Other  HEENT:  [ ]Normal   [ ]Dry mouth   [ ]ET Tube/Trach  [ ]Oral lesions  PULMONARY:   [ ]Clear [ ]Tachypnea  [ ]Audible excessive secretions   [ ]Rhonchi        [ ]Right [ ]Left [ ]Bilateral  [ ]Crackles        [ ]Right [ ]Left [ ]Bilateral  [ ]Wheezing     [ ]Right [ ]Left [ ]Bilateral  CARDIOVASCULAR:    [ ]Regular [ ]Irregular [ ]Tachy  [ ]Dav [ ]Murmur [ ]Other  GASTROINTESTINAL:  [ ]Soft  [ ]Distended   [ ]+BS  [ ]Non tender [ ]Tender  [ ]PEG [ ]OGT/ NGT   Last BM:   02-05-19 @ 07:01  -  02-06-19 @ 07:00  --------------------------------------------------------  OUT: 100 mL    02-06-19 @ 07:01  -  02-07-19 @ 07:00  --------------------------------------------------------  OUT: 500 mL    02-07-19 @ 07:01  -  02-08-19 @ 07:00  --------------------------------------------------------  OUT: 600 mL    02-08-19 @ 07:01  -  02-09-19 @ 07:00  --------------------------------------------------------  OUT: 800 mL    02-09-19 @ 07:01  -  02-10-19 @ 07:00  --------------------------------------------------------  OUT: 520 mL    02-10-19 @ 07:01  -  02-11-19 @ 07:00  --------------------------------------------------------  OUT: 600 mL     GENITOURINARY:  [ ]Normal [ ] Incontinent   [ ]Oliguria/Anuria   [ ]Munguia  MUSCULOSKELETAL:   [ ]Normal   [ ]Weakness  [ ]Bed/Wheelchair bound [ ]Edema  NEUROLOGIC:   [ ]No focal deficits  [ ] Cognitive impairment  [ ] Dysphagia [ ]Dysarthria [ ] Paresis [ ]Other   SKIN:   [ ]Normal   [ ]Pressure ulcer(s)  [ ]Rash    CRITICAL CARE:  [ ] Shock Present  [ ]Septic [ ]Cardiogenic [ ]Neurologic [ ]Hypovolemic  [ ]  Vasopressors [ ]  Inotropes   [ ] Respiratory failure present  [ ] Acute  [ ] Chronic [ ] Hypoxic  [ ] Hypercarbic [ ] Other  [ ] Other organ failure     LABS:                        10.9   11.5  )-----------( 46       ( 11 Feb 2019 03:15 )             31.9   02-11    140  |  98  |  25<H>  ----------------------------<  156<H>  3.4<L>   |  29  |  0.63    Ca    7.6<L>      11 Feb 2019 11:47  Phos  2.8     02-11  Mg     2.3     02-11    TPro  3.7<L>  /  Alb  2.1<L>  /  TBili  0.5  /  DBili  x   /  AST  25  /  ALT  18  /  AlkPhos  68  02-10  PT/INR - ( 10 Feb 2019 02:26 )   PT: 13.9 sec;   INR: 1.21 ratio         PTT - ( 10 Feb 2019 02:26 )  PTT:30.4 sec      RADIOLOGY & ADDITIONAL STUDIES:    Protein Calorie Malnutrition Present: [ ] yes [ ] no  [ ] PPSV2 < or = 30%  [ ] significant weight loss [ ] poor nutritional intake [ ] anasarca [ ] catabolic state Albumin, Serum: 2.1 g/dL (02-10-19 @ 02:26)  Artificial Nutrition [ ]     REFERRALS:   [ ]Chaplaincy  [ ] Hospice  [ ]Child Life  [ ]Social Work  [ ]Case management [ ]Holistic Therapy   Goals of Care Document: SUBJECTIVE AND OBJECTIVE:  INTERVAL HPI/OVERNIGHT EVENTS: Patient oob to chair, less confused.  Son Liang at bedside.      DNR on chart: Yes  Yes    Allergies    honeydew melon (Other)  penicillin (Other; Hives)    Intolerances    MEDICATIONS  (STANDING):  ALBUTerol/ipratropium for Nebulization 3 milliLiter(s) Nebulizer every 4 hours  buPROPion XL . 300 milliGRAM(s) Oral daily  chlorhexidine 4% Liquid 1 Application(s) Topical <User Schedule>  enoxaparin Injectable 40 milliGRAM(s) SubCutaneous daily  influenza   Vaccine 0.5 milliLiter(s) IntraMuscular once  levothyroxine Injectable 37.5 MICROGram(s) IV Push at bedtime  metoprolol tartrate Injectable 2.5 milliGRAM(s) IV Push every 6 hours  metroNIDAZOLE  IVPB 500 milliGRAM(s) IV Intermittent every 8 hours  vancomycin    Solution 500 milliGRAM(s) Oral every 6 hours  vancomycin  Retention Enema 500 milliGRAM(s) Rectal every 6 hours  ziprasidone 40 milliGRAM(s) Oral <User Schedule>    MEDICATIONS  (PRN):      ITEMS UNCHECKED ARE NOT PRESENT    PRESENT SYMPTOMS: [ ]Unable to obtain due to poor mentation   Source if other than patient:  [ ]Family   [ ]Team     Pain (Impact on QOL):  None  Location:  Minimal acceptable level (0-10 scale):                   Aggravating factors:  Quality:  Radiation:  Severity (0-10 scale):    Timing:    Dyspnea:                           [ ]Mild [ ]Moderate [ ]Severe  Anxiety:                             [ ]Mild [ ]Moderate [ ]Severe  Fatigue:                             [x ]Mild [ ]Moderate [ ]Severe  Nausea:                             [ ]Mild [ ]Moderate [ ]Severe  Loss of appetite:              [ ]Mild [ ]Moderate [ ]Severe  Constipation:                    [ ]Mild [ ]Moderate [ ]Severe    PAIN AD Score:	  http://geriatrictoolkit.[place].[facility]/cog/painad.pdf (Ctrl + left click to view)    Other Symptoms:  [ ]All other review of systems negative     Karnofsky Performance Score/Palliative Performance Status Version 2:    40     %    http://palliative.info/resource_material/PPSv2.pdf  PHYSICAL EXAM:  Vital Signs Last 24 Hrs  T(C): 36.7 (11 Feb 2019 15:00), Max: 36.9 (11 Feb 2019 11:00)  T(F): 98 (11 Feb 2019 15:00), Max: 98.4 (11 Feb 2019 11:00)  HR: 90 (11 Feb 2019 18:00) (87 - 109)  BP: 90/56 (11 Feb 2019 18:00) (90/56 - 150/53)  BP(mean): 65 (11 Feb 2019 18:00) (65 - 93)  RR: 21 (11 Feb 2019 18:00) (20 - 46)  SpO2: 99% (11 Feb 2019 18:00) (84% - 100%) I&O's Summary    10 Feb 2019 07:01  -  11 Feb 2019 07:00  --------------------------------------------------------  IN: 900 mL / OUT: 2995 mL / NET: -2095 mL    11 Feb 2019 07:01  -  11 Feb 2019 18:44  --------------------------------------------------------  IN: 850 mL / OUT: 1430 mL / NET: -580 mL     GENERAL:  [x ]Alert  [x ]Oriented x   [ ]Lethargic  [ ]Cachexia  [ ]Unarousable  [x ]Verbal  [ ]Non-Verbal  Behavioral:   [ ] Anxiety  [x ] Delirium improved today  [ ] Agitation [ ] Other  HEENT:  [ ]Normal   [ ]Dry mouth   [ ]ET Tube/Trach  [ ]Oral lesions  PULMONARY:   [x ]Clear [ ]Tachypnea  [ ]Audible excessive secretions  decreased BS bilaterally  [ ]Rhonchi        [ ]Right [ ]Left [ ]Bilateral  [ ]Crackles        [ ]Right [ ]Left [ ]Bilateral  [ ]Wheezing     [ ]Right [ ]Left [ ]Bilateral  CARDIOVASCULAR:    [ x]Regular [ ]Irregular [ ]Tachy  [ ]Dav [ ]Murmur [ ]Other +S1 +S2   GASTROINTESTINAL:  [x ]Soft  [x ]Distended   [x ]+BS  [ ]Non tender [ ]Tender  [ ]PEG [ ]OGT/ NGT   Last BM:  rectal tube draining  02-05-19 @ 07:01 - 02-06-19 @ 07:00  --------------------------------------------------------  OUT: 100 mL    02-06-19 @ 07:01  -  02-07-19 @ 07:00  --------------------------------------------------------  OUT: 500 mL    02-07-19 @ 07:01  -  02-08-19 @ 07:00  --------------------------------------------------------  OUT: 600 mL    02-08-19 @ 07:01  -  02-09-19 @ 07:00  --------------------------------------------------------  OUT: 800 mL    02-09-19 @ 07:01  -  02-10-19 @ 07:00  --------------------------------------------------------  OUT: 520 mL    02-10-19 @ 07:01  -  02-11-19 @ 07:00  --------------------------------------------------------  OUT: 600 mL     GENITOURINARY:  [ ]Normal [ ] Incontinent   [ ]Oliguria/Anuria   [x ]Munguia  MUSCULOSKELETAL:   [ ]Normal   [x ]Weakness  [ ]Bed/Wheelchair bound [x ]Edema  NEUROLOGIC:   [ ]No focal deficits  [ x] Cognitive impairment  [ ] Dysphagia [ ]Dysarthria [ ] Paresis [ ]Other   SKIN:   [x ]Normal   [ ]Pressure ulcer(s)  [ ]Rash incision rt hip    CRITICAL CARE:  [ ] Shock Present  [ ]Septic [ ]Cardiogenic [ ]Neurologic [ ]Hypovolemic  [ ]  Vasopressors [ ]  Inotropes   [ ] Respiratory failure present  [ ] Acute  [ ] Chronic [ ] Hypoxic  [ ] Hypercarbic [ ] Other  [ ] Other organ failure     LABS:                        10.9   11.5  )-----------( 46       ( 11 Feb 2019 03:15 )             31.9   02-11    140  |  98  |  25<H>  ----------------------------<  156<H>  3.4<L>   |  29  |  0.63    Ca    7.6<L>      11 Feb 2019 11:47  Phos  2.8     02-11  Mg     2.3     02-11    TPro  3.7<L>  /  Alb  2.1<L>  /  TBili  0.5  /  DBili  x   /  AST  25  /  ALT  18  /  AlkPhos  68  02-10  PT/INR - ( 10 Feb 2019 02:26 )   PT: 13.9 sec;   INR: 1.21 ratio         PTT - ( 10 Feb 2019 02:26 )  PTT:30.4 sec      RADIOLOGY & ADDITIONAL STUDIES: reviewed    Protein Calorie Malnutrition Present: [x ] yes [ ] no  [ ] PPSV2 < or = 30%  [ ] significant weight loss [x ] poor nutritional intake [x ] anasarca [x ] catabolic state Albumin, Serum: 2.1 g/dL (02-10-19 @ 02:26)  Artificial Nutrition [ ]     REFERRALS:   [x ]Chaplaincy  [ ] Hospice  [ ]Child Life  [x]Social Work  [ ]Case management [ ]Holistic Therapy   Goals of Care Document:

## 2019-02-11 NOTE — PROGRESS NOTE ADULT - PROBLEM SELECTOR PLAN 1
Severe  pancolitis  Patient already on IV flagyl, AR and po vanco  family refused FMT  clinically stable  Given this, would rec:  1) Continue IV flagyl,po and rectal vanco  2) Monitor abdomen  WBC improving   3) Surgical eval-if any s/s toxic megacolon will likely need surgical intervention    Continue ICU monitoring

## 2019-02-11 NOTE — PROGRESS NOTE ADULT - SUBJECTIVE AND OBJECTIVE BOX
[facility] ATP SURGERY DAILY PROGRESS NOTE    SUBJECTIVE:  -  positive for C. diff, having profuse diarrhea  -  awaiting OR procedure with ORTHO when stabilized    OBJECTIVE:    Vital Signs Last 24 Hrs  T(C): 36.7 (11 Feb 2019 15:00), Max: 36.9 (11 Feb 2019 11:00)  T(F): 98 (11 Feb 2019 15:00), Max: 98.4 (11 Feb 2019 11:00)  HR: 90 (11 Feb 2019 18:00) (87 - 109)  BP: 90/56 (11 Feb 2019 18:00) (90/56 - 150/53)  BP(mean): 65 (11 Feb 2019 18:00) (65 - 93)  RR: 21 (11 Feb 2019 18:00) (20 - 46)  SpO2: 99% (11 Feb 2019 18:00) (84% - 100%)    I&O's Detail    10 Feb 2019 07:01  -  11 Feb 2019 07:00  --------------------------------------------------------  IN:    Oral Fluid: 500 mL    Solution: 300 mL    Solution: 100 mL  Total IN: 900 mL    OUT:    Indwelling Catheter - Urethral: 2395 mL    Rectal Tube: 600 mL  Total OUT: 2995 mL    Total NET: -2095 mL      11 Feb 2019 07:01  -  11 Feb 2019 19:29  --------------------------------------------------------  IN:    Solution: 200 mL    Solution: 100 mL    Solution: 550 mL  Total IN: 850 mL    OUT:    Indwelling Catheter - Urethral: 1430 mL  Total OUT: 1430 mL    Total NET: -580 mL        LABS:                        10.9   11.5  )-----------( 46       ( 11 Feb 2019 03:15 )             31.9     02-11    140  |  98  |  25<H>  ----------------------------<  156<H>  3.4<L>   |  29  |  0.63    Ca    7.6<L>      11 Feb 2019 11:47  Phos  2.8     02-11  Mg     2.3     02-11    TPro  3.7<L>  /  Alb  2.1<L>  /  TBili  0.5  /  DBili  x   /  AST  25  /  ALT  18  /  AlkPhos  68  02-10    EXAM:  NAD, awake and alert, sitting up in bed, confused  Respirations nonlabored  Abdomen soft, nontender, moderately distended  No guarding or rebound tenderness

## 2019-02-11 NOTE — GOALS OF CARE CONVERSATION - PERSONAL ADVANCE DIRECTIVE - WHAT MATTERS MOST
Son feels that his mom has suffered greatly and although he would like for her to get well, he is hesitant to pursue life prolonging measures if there is no quality of life.

## 2019-02-11 NOTE — PROGRESS NOTE ADULT - ASSESSMENT
A/P: 68 y F w/ R Hemiarthroplasty done on 1/21, s/p MF, with L S1-2 sacral insufficiency fracture and  R periprosthetic Hip Fracture.     - CT R hip demonstrates PP Femur fracture around Stem, will Need ORIF but pt is unstable, son weighing more towards conservative management  will revisit surgery when pt is more stable.  -C/w SICU care, recs appreciated  - IR joint aspiration low suspicion of periprosthetic infection  -Analgesia  -DVT ppx, per primary team  -Non weight bearing RLE/bedrest  -May weight bear as tolerated on LLE when not on bedrest  -No planned date for ORIF, will continue to monitor   - C dif management per ID  -Medical/ID clearance required.  - will advise if plan changes

## 2019-02-11 NOTE — PROGRESS NOTE ADULT - PROBLEM SELECTOR PLAN 4
Met with son at bedside, completed MOLST and capacity determination.  Son unsure as to next steps as him mother is in an indeterminate zone - she is improving somewhat but is very high risk for ongoing deterioration and will not likely go back to baseline.  We discussed options for care beyond this hospitalization such as long term care, home with aids at home and hospice at home (although no clear cut terminal diagnosis).  He understands that without medicaid most costs will be out of pocket.

## 2019-02-11 NOTE — PROGRESS NOTE ADULT - ATTENDING COMMENTS
I have personally seen and examined this patient and agree with the above assessment and plan, which I have reviewed and edited where appropriate.   20    minutes for advanced care planning discussion separate and in addition to the e and m service provided.

## 2019-02-11 NOTE — PROGRESS NOTE ADULT - ATTENDING COMMENTS
case d/w SICU team   Will tailor plan for ID issues  per course,results.Will defer to primary team on management of other issues.  Will Follow.  Beeper [id number]/afterhours/No response-[id number]

## 2019-02-11 NOTE — PROGRESS NOTE ADULT - PROBLEM SELECTOR PLAN 2
follow output and keep up with output  continue flagyl and add  Vanco  consider stool transplant if no improvement in symptomd

## 2019-02-11 NOTE — PROGRESS NOTE ADULT - ASSESSMENT
ASSESSMENT:  68 year old female with bipolar depression, OCD, dementia, HLD, NHL (in remission since 2016), osteoarthritis s/p bilateral TKR s/p fall presenting with a right periprosthetic femoral neck fracture and C. difficile colitis.    PLAN:  Neuro: bipolar depression, OCD, dementia, acute traumatic pain  - Monitor mental status.  - Continue home bupropion, ziprasidone.  - Pain control as needed with Tylenol.    Resp: intermittently desaturating, currently 99% on 5L, minimal B lines on POCUS  - Monitor pulse oximeter.  - Deep breathing exercises and incentive spirometry to prevent atelectasis.  -Titrate NC to Sp02 >92%; may require HFNC if diuresis does not accomplish improved oxygenation  -duonebs q4 prn    CV: intermittent episodes of hypotension, HLD  - Monitor vital signs.  - Frequent reassessments of volume status, volume resuscitate as needed  -c/w lopressor 12.5 BID for intermittent episodes of tachycardia, consider SVT    GI: C. difficile colitis  - Continue regular diet  - Monitor rectal tube output.  - PO vancomycin, vancomycin retention enemas, IV Flagyl    Renal CKD stage III  - Munguia, rectal tube, monitor I&Os  - Monitor electrolytes and replete as necessary.    Heme: no acute issues  - Monitor CBC and coags.  - Lovenox for VTE prophylaxis.    ID: C. difficile colitis  - Monitor WBC, temperature, and procalcitonin.  - PO vancomycin, vancomycin retention enemas, and IV Flagyl for severe C. difficile colitis.    Endo: no acute issues  - Monitor glucose on BMP  -c/w home synthroid    Disposition:   - Full code      Papo Toribio, PGY-2   Surgical ICU, [id number]

## 2019-02-11 NOTE — PROGRESS NOTE ADULT - SUBJECTIVE AND OBJECTIVE BOX
Patient seen and examined at bedside. pain is well controlled. Hypoxic during the day, states pain in right leg is improved.     Vital Signs Last 24 Hrs  T(C): 36.8 (11 Feb 2019 03:00), Max: 36.8 (10 Feb 2019 19:00)  T(F): 98.3 (11 Feb 2019 03:00), Max: 98.3 (10 Feb 2019 19:00)  HR: 107 (11 Feb 2019 06:00) (97 - 173)  BP: 133/66 (11 Feb 2019 06:00) (93/54 - 138/64)  BP(mean): 93 (11 Feb 2019 06:00) (67 - 93)  RR: 42 (11 Feb 2019 06:00) (29 - 64)  SpO2: 90% (11 Feb 2019 06:00) (83% - 100%)          Exam:  Gen: NAD  RLE:  Staples in place, skin well approximated, no signs of drainage  minimal TTP to tight, + Quad function  Motor: 5/5 EHL/FHL/TA/Gastrocnemius  Sensory: SILT DP/SP/S/S/T nerve distributions  Vascular: 2+ Dorsalis Pedis pulse

## 2019-02-11 NOTE — GOALS OF CARE CONVERSATION - PERSONAL ADVANCE DIRECTIVE - CONVERSATION DETAILS
Met with son at bedside, completed MOLST and capacity determination.  Son unsure as to next steps as him mother is in an indeterminate zone - she is improving somewhat but is very high risk for ongoing deterioration and will not likely go back to baseline.  Recovery will be a long slow process if at all.  We discussed options for care beyond this hospitalization such as long term care, home with aids at home and hospice at home (although no clear cut terminal diagnosis).  He understands that without medicaid most costs will be out of pocket.

## 2019-02-11 NOTE — PROGRESS NOTE ADULT - ASSESSMENT
68 year old female with a PMHx of bipolar depression, Hyperlipidemia, NHL (in remission), OCD, dementia, with recent hospitalization (1/20-1/26) for right hip hemiarthroplasty for right femoral neck fracture after fall. Hospital course complicated by ESBL E. Coli UTI on Ertapenem (1/23-1/29). Patient found down in rehab, reported as possibly rolling out of bed.     In SICU with pancolitis due to c diff and femoral neck fx (prosthetic), poor candidate for wire fixation.  Patient lacks capacity for medical decision making.  Son Liang interested in full comfort care. 68 year old female with a PMHx of bipolar depression, Hyperlipidemia, NHL (in remission), OCD, dementia, with recent hospitalization (1/20-1/26) for right hip hemiarthroplasty for right femoral neck fracture after fall. Hospital course complicated by ESBL E. Coli UTI on Ertapenem (1/23-1/29). Patient found down in rehab, reported as possibly rolling out of bed.     In SICU with pancolitis due to c diff and femoral neck fx (prosthetic), poor candidate for wire fixation at this time.  Patient lacks capacity for medical decision making.  Son Liang at bedside.

## 2019-02-11 NOTE — PROGRESS NOTE ADULT - ATTENDING COMMENTS
Left periprosthetic femur fracture. awaiting surgical intervention - currently too unstable to proceed with surgery, but is clearly improving  Pain meds as needed orally  as tolerated. Continue ATC cardio pulmonary monitoring for  this critically ill Patient in the SICU. I am a non participating [facility] physician seeing Pt in coverage for Dr Barraza Left periprosthetic femur fracture. awaiting surgical intervention - currently too unstable to proceed with surgery, but is clearly improving  Pain meds as needed orally  as tolerated. Continue ATC cardio pulmonary monitoring for  this critically ill Patient in the SICU. I am a non participating [facility] physician seeing Pt in coverage for Dr Barraza. The above patient examination was reviewed with Dr. Awan and I agree with his evaluation, assessment and treatment plan.  Orlin Barraza M.D. Right periprosthetic femur fracture. awaiting surgical intervention - currently too unstable to proceed with surgery, but is clearly improving  Pain meds as needed orally  as tolerated. Continue ATC cardio pulmonary monitoring for  this critically ill Patient in the SICU. I am a non participating [facility] physician seeing Pt in coverage for Dr Barraza. The above patient examination was reviewed with Dr. Awan and I agree with his evaluation, assessment and treatment plan.  Orlin Barraza M.D.

## 2019-02-12 LAB
ANION GAP SERPL CALC-SCNC: 11 MMOL/L — SIGNIFICANT CHANGE UP (ref 5–17)
BUN SERPL-MCNC: 23 MG/DL — SIGNIFICANT CHANGE UP (ref 7–23)
CALCIUM SERPL-MCNC: 7 MG/DL — LOW (ref 8.4–10.5)
CHLORIDE SERPL-SCNC: 99 MMOL/L — SIGNIFICANT CHANGE UP (ref 96–108)
CO2 SERPL-SCNC: 29 MMOL/L — SIGNIFICANT CHANGE UP (ref 22–31)
CREAT SERPL-MCNC: 0.52 MG/DL — SIGNIFICANT CHANGE UP (ref 0.5–1.3)
GLUCOSE SERPL-MCNC: 122 MG/DL — HIGH (ref 70–99)
HCT VFR BLD CALC: 33.3 % — LOW (ref 34.5–45)
HGB BLD-MCNC: 11 G/DL — LOW (ref 11.5–15.5)
MAGNESIUM SERPL-MCNC: 2 MG/DL — SIGNIFICANT CHANGE UP (ref 1.6–2.6)
MCHC RBC-ENTMCNC: 32.6 PG — SIGNIFICANT CHANGE UP (ref 27–34)
MCHC RBC-ENTMCNC: 33 GM/DL — SIGNIFICANT CHANGE UP (ref 32–36)
MCV RBC AUTO: 98.8 FL — SIGNIFICANT CHANGE UP (ref 80–100)
PHOSPHATE SERPL-MCNC: 2.3 MG/DL — LOW (ref 2.5–4.5)
PLATELET # BLD AUTO: 48 K/UL — LOW (ref 150–400)
POTASSIUM SERPL-MCNC: 3.7 MMOL/L — SIGNIFICANT CHANGE UP (ref 3.5–5.3)
POTASSIUM SERPL-SCNC: 3.7 MMOL/L — SIGNIFICANT CHANGE UP (ref 3.5–5.3)
RBC # BLD: 3.37 M/UL — LOW (ref 3.8–5.2)
RBC # FLD: 15.6 % — HIGH (ref 10.3–14.5)
SODIUM SERPL-SCNC: 139 MMOL/L — SIGNIFICANT CHANGE UP (ref 135–145)
WBC # BLD: 10.4 K/UL — SIGNIFICANT CHANGE UP (ref 3.8–10.5)
WBC # FLD AUTO: 10.4 K/UL — SIGNIFICANT CHANGE UP (ref 3.8–10.5)

## 2019-02-12 PROCEDURE — 71045 X-RAY EXAM CHEST 1 VIEW: CPT | Mod: 26

## 2019-02-12 PROCEDURE — 99233 SBSQ HOSP IP/OBS HIGH 50: CPT | Mod: GC

## 2019-02-12 PROCEDURE — 99233 SBSQ HOSP IP/OBS HIGH 50: CPT

## 2019-02-12 PROCEDURE — 99232 SBSQ HOSP IP/OBS MODERATE 35: CPT | Mod: GC

## 2019-02-12 RX ORDER — SPIRONOLACTONE 25 MG/1
50 TABLET, FILM COATED ORAL ONCE
Qty: 0 | Refills: 0 | Status: COMPLETED | OUTPATIENT
Start: 2019-02-12 | End: 2019-02-12

## 2019-02-12 RX ORDER — METOPROLOL TARTRATE 50 MG
12.5 TABLET ORAL EVERY 12 HOURS
Qty: 0 | Refills: 0 | Status: DISCONTINUED | OUTPATIENT
Start: 2019-02-12 | End: 2019-02-20

## 2019-02-12 RX ORDER — METOPROLOL TARTRATE 50 MG
25 TABLET ORAL EVERY 12 HOURS
Qty: 0 | Refills: 0 | Status: DISCONTINUED | OUTPATIENT
Start: 2019-02-12 | End: 2019-02-12

## 2019-02-12 RX ORDER — MAGNESIUM SULFATE 500 MG/ML
2 VIAL (ML) INJECTION ONCE
Qty: 0 | Refills: 0 | Status: COMPLETED | OUTPATIENT
Start: 2019-02-12 | End: 2019-02-12

## 2019-02-12 RX ORDER — HALOPERIDOL DECANOATE 100 MG/ML
1 INJECTION INTRAMUSCULAR ONCE
Qty: 0 | Refills: 0 | Status: COMPLETED | OUTPATIENT
Start: 2019-02-12 | End: 2019-02-12

## 2019-02-12 RX ORDER — POTASSIUM PHOSPHATE, MONOBASIC POTASSIUM PHOSPHATE, DIBASIC 236; 224 MG/ML; MG/ML
15 INJECTION, SOLUTION INTRAVENOUS ONCE
Qty: 0 | Refills: 0 | Status: COMPLETED | OUTPATIENT
Start: 2019-02-12 | End: 2019-02-12

## 2019-02-12 RX ORDER — POTASSIUM CHLORIDE 20 MEQ
20 PACKET (EA) ORAL EVERY 8 HOURS
Qty: 0 | Refills: 0 | Status: COMPLETED | OUTPATIENT
Start: 2019-02-12 | End: 2019-02-13

## 2019-02-12 RX ORDER — POTASSIUM CHLORIDE 20 MEQ
10 PACKET (EA) ORAL
Qty: 0 | Refills: 0 | Status: COMPLETED | OUTPATIENT
Start: 2019-02-12 | End: 2019-02-12

## 2019-02-12 RX ORDER — FUROSEMIDE 40 MG
40 TABLET ORAL ONCE
Qty: 0 | Refills: 0 | Status: COMPLETED | OUTPATIENT
Start: 2019-02-12 | End: 2019-02-12

## 2019-02-12 RX ADMIN — Medication 100 MILLIGRAM(S): at 16:02

## 2019-02-12 RX ADMIN — Medication 100 MILLIEQUIVALENT(S): at 11:00

## 2019-02-12 RX ADMIN — Medication 100 MILLIEQUIVALENT(S): at 10:00

## 2019-02-12 RX ADMIN — Medication 3 MILLILITER(S): at 17:37

## 2019-02-12 RX ADMIN — Medication 40 MILLIGRAM(S): at 11:33

## 2019-02-12 RX ADMIN — BUPROPION HYDROCHLORIDE 300 MILLIGRAM(S): 150 TABLET, EXTENDED RELEASE ORAL at 11:36

## 2019-02-12 RX ADMIN — ZIPRASIDONE HYDROCHLORIDE 40 MILLIGRAM(S): 20 CAPSULE ORAL at 06:31

## 2019-02-12 RX ADMIN — Medication 100 MILLIEQUIVALENT(S): at 08:36

## 2019-02-12 RX ADMIN — Medication 500 MILLIGRAM(S): at 18:29

## 2019-02-12 RX ADMIN — POTASSIUM PHOSPHATE, MONOBASIC POTASSIUM PHOSPHATE, DIBASIC 62.5 MILLIMOLE(S): 236; 224 INJECTION, SOLUTION INTRAVENOUS at 03:59

## 2019-02-12 RX ADMIN — Medication 50 GRAM(S): at 10:00

## 2019-02-12 RX ADMIN — Medication 500 MILLIGRAM(S): at 23:38

## 2019-02-12 RX ADMIN — HALOPERIDOL DECANOATE 1 MILLIGRAM(S): 100 INJECTION INTRAMUSCULAR at 11:49

## 2019-02-12 RX ADMIN — Medication 12.5 MILLIGRAM(S): at 18:29

## 2019-02-12 RX ADMIN — SPIRONOLACTONE 50 MILLIGRAM(S): 25 TABLET, FILM COATED ORAL at 12:32

## 2019-02-12 RX ADMIN — Medication 100 MILLIGRAM(S): at 23:38

## 2019-02-12 RX ADMIN — Medication 500 MILLIGRAM(S): at 06:43

## 2019-02-12 RX ADMIN — Medication 20 MILLIEQUIVALENT(S): at 21:07

## 2019-02-12 RX ADMIN — Medication 37.5 MICROGRAM(S): at 21:07

## 2019-02-12 RX ADMIN — Medication 2.5 MILLIGRAM(S): at 11:33

## 2019-02-12 RX ADMIN — Medication 3 MILLILITER(S): at 09:05

## 2019-02-12 RX ADMIN — Medication 3 MILLILITER(S): at 13:40

## 2019-02-12 RX ADMIN — Medication 20 MILLIEQUIVALENT(S): at 11:34

## 2019-02-12 RX ADMIN — Medication 500 MILLIGRAM(S): at 12:33

## 2019-02-12 RX ADMIN — Medication 3 MILLILITER(S): at 05:00

## 2019-02-12 RX ADMIN — CHLORHEXIDINE GLUCONATE 1 APPLICATION(S): 213 SOLUTION TOPICAL at 06:35

## 2019-02-12 RX ADMIN — Medication 100 MILLIGRAM(S): at 08:40

## 2019-02-12 RX ADMIN — Medication 3 MILLILITER(S): at 23:01

## 2019-02-12 RX ADMIN — Medication 500 MILLIGRAM(S): at 05:11

## 2019-02-12 RX ADMIN — Medication 500 MILLIGRAM(S): at 11:49

## 2019-02-12 RX ADMIN — ENOXAPARIN SODIUM 40 MILLIGRAM(S): 100 INJECTION SUBCUTANEOUS at 11:31

## 2019-02-12 NOTE — PROGRESS NOTE ADULT - PROBLEM SELECTOR PLAN 3
Fluid Cx negative  Low suspicion for infectious etiology  Given severe C diff no antimicrobials unless definite s/s infection  Monitor site  Will defer to ortho on surgical plan, family reconsidering surgical options

## 2019-02-12 NOTE — PROGRESS NOTE ADULT - ATTENDING COMMENTS
Dr. Patrick (Attending Physician)  Ileus but abdomen soft nontender.  No vomiting since yesterday. Will dc rectal tube after todays dose of vanc  Acute resp insufficiency - will diurese with spironolactone and lasix  Start oral meds and clears

## 2019-02-12 NOTE — PROGRESS NOTE ADULT - ATTENDING COMMENTS
Left periprosthetic femur fracture. awaiting surgical intervention - currently too unstable to proceed with surgery, but is clearly improving  Pain meds as needed orally  as tolerated. Continue ATC cardio pulmonary monitoring for  this critically ill Patient in the SICU. I am a non participating [facility] physician seeing Pt in coverage for Dr Barraza. The above patient examination was reviewed with Dr. Awan and I agree with his evaluation, assessment and treatment plan.  Orlin Barraza M.D. Left periprosthetic femur fracture. awaiting surgical intervention - currently improving and may soon proceed with surgery  Pain meds as needed orally  as tolerated. Continue ATC cardio pulmonary monitoring for  this critically ill Patient in the SICU. Right periprosthetic femur fracture. awaiting surgical intervention - currently improving and may soon proceed with surgery  Pain meds as needed orally  as tolerated. Continue ATC cardio pulmonary monitoring for  this critically ill Patient in the SICU.

## 2019-02-12 NOTE — PROGRESS NOTE ADULT - ASSESSMENT
ASSESSMENT:  68 year old female with bipolar depression, OCD, dementia, HLD, NHL (in remission since 2016), osteoarthritis s/p bilateral TKR s/p fall presenting with a right periprosthetic femoral neck fracture and C. difficile colitis.    PLAN:  Neuro: bipolar depression, OCD, dementia, acute traumatic pain  - Monitor mental status.  - Continue home bupropion, ziprasidone.  - Pain control as needed with Tylenol.    Resp: intermittently desaturating, currently 99% on 5L, minimal B lines on POCUS  - Monitor pulse oximeter.  - Deep breathing exercises and incentive spirometry to prevent atelectasis.  -Titrate NC to Sp02 >92%; may require HFNC if diuresis does not accomplish improved oxygenation  - duonebs q4 prn    CV: intermittent episodes of hypotension, HLD  - Monitor vital signs.  - Frequent reassessments of volume status, volume resuscitate as needed  -c/w lopressor 12.5 BID for intermittent episodes of tachycardia, consider SVT    GI: C. difficile colitis  - Continue regular diet  - Monitor rectal tube output.  - PO vancomycin, vancomycin retention enemas, IV Flagyl    Renal CKD stage III  - Munguia, rectal tube, monitor I&Os  - Monitor electrolytes and replete as necessary.    Heme: no acute issues  - Monitor CBC and coags.  - Lovenox for VTE prophylaxis.    ID: C. difficile colitis  - Monitor WBC, temperature, and procalcitonin.  - PO vancomycin, vancomycin retention enemas, and IV Flagyl for severe C. difficile colitis.    Endo: no acute issues  - Monitor glucose on BMP  -c/w home synthroid    Disposition: DNR/DNI. SICU.    Neida Turpin, MS4  Surgical ICU, [id number] ASSESSMENT:  68 year old female with bipolar depression, OCD, dementia, HLD, NHL (in remission since 2016), osteoarthritis s/p bilateral TKR s/p fall presenting with a right periprosthetic femoral neck fracture and C. difficile colitis.    PLAN:  Neuro: bipolar depression, OCD, dementia, acute traumatic pain  - Monitor mental status.  - Continue home bupropion, ziprasidone.  - Pain control as needed with Tylenol.    Resp: intermittently desaturating, currently 99% on 5L, minimal B lines on POCUS  - Monitor pulse oximeter.  - Deep breathing exercises and incentive spirometry to prevent atelectasis.  - Titrate NC to Sp02 >92%; may require HFNC if diuresis does not accomplish improved oxygenation  - duonebs q4 prn    CV: intermittent episodes of hypotension, HLD  - Monitor vital signs.  - Frequent reassessments of volume status, volume resuscitate as needed  - c/w lopressor 12.5 BID for intermittent episodes of tachycardia    GI: C. difficile colitis  - Continue regular diet  - Monitor rectal tube output  - PO vancomycin, vancomycin retention enemas, IV Flagyl    Renal CKD stage III  - Munguia, rectal tube, monitor I&Os  - Monitor electrolytes and replete as necessary.    Heme: no acute issues  - Monitor CBC and coags.  - Lovenox for VTE prophylaxis.    ID: C. difficile colitis  - Monitor WBC, temperature, and procalcitonin.  - PO vancomycin, vancomycin retention enemas, and IV Flagyl for severe C. difficile colitis.    Endo: no acute issues  - Monitor glucose on BMP  - c/w home synthroid    Disposition: DNR/DNI. SICU.    Neida Turpin, MS4  Surgical ICU, [id number]

## 2019-02-12 NOTE — PROGRESS NOTE ADULT - ATTENDING COMMENTS
Left periprosthetic femur fracture. awaiting surgical intervention - currently too unstable to proceed with surgery, but is clearly improving  Pain meds as needed orally  as tolerated. Continue ATC cardio pulmonary monitoring for  this critically ill Patient in the SICU.

## 2019-02-12 NOTE — PROGRESS NOTE ADULT - SUBJECTIVE AND OBJECTIVE BOX
Patient is a 68y old  Female who presents with a chief complaint of C Diff (10 Feb 2019 11:50)    Being followed by ID for C diff    Interval history:still with rectal tube  denies abd pain   No other acute events      ROS:  No cough,SOB,CP  No N/V  No urinary complaints  No HA  No joint or limb pain  No other complaints      Antimicrobials:    metroNIDAZOLE  IVPB 500 milliGRAM(s) IV Intermittent every 8 hours  vancomycin    Solution 500 milliGRAM(s) Oral every 6 hours      other medications reviewed    Vital Signs Last 24 Hrs  T(C): 36.5 (02-12-19 @ 15:00), Max: 37 (02-11-19 @ 19:00)  T(F): 97.7 (02-12-19 @ 15:00), Max: 98.6 (02-11-19 @ 19:00)  HR: 104 (02-12-19 @ 16:00) (83 - 114)  BP: 101/68 (02-12-19 @ 16:00) (84/53 - 124/94)  BP(mean): 76 (02-12-19 @ 16:00) (63 - 108)  RR: 23 (02-12-19 @ 16:00) (21 - 52)  SpO2: 99% (02-12-19 @ 16:00) (84% - 100%)    Physical Exam:    HEENT PERRLA EOMI,No pallor or icterus    No oral exudate or erythema    Neck supple no JVD or LN    Chest Good AE,CTA    CVS RRR S1 S2 WNl + ESM no rub or gallop    Abd Distended-decreased  + BS Mild diffuse tenderness No rebound or guarding  No masses    Ext Hip painful ROM     IV site no erythema tenderness or discharge    Joints no swelling or LOM  Lab Data:                          11.0   10.4  )-----------( 48       ( 12 Feb 2019 03:18 )             33.3       02-12    139  |  99  |  23  ----------------------------<  122<H>  3.7   |  29  |  0.52    Ca    7.0<L>      12 Feb 2019 03:18  Phos  2.3     02-12  Mg     2.0     02-12          < from: Xray Chest 1 View- PORTABLE-Routine (02.12.19 @ 07:03) >  Impression:    Poor inspiratory effort. The heart is normal in size. Small right pleural   effusion cannot be ruled out entirely. Left lower lobe pneumonia and/or   atelectasis.          < end of copied text >      < from: Xray Abdomen 1 View PORTABLE -Urgent (02.11.19 @ 07:46) >    Impression:    Distended loops of small and large bowel. Changes compatible with ileus.   No radiographic evidence for obstruction. Follow-up examination should be   obtained if clinically warranted.            < end of copied text >

## 2019-02-12 NOTE — PROGRESS NOTE ADULT - SUBJECTIVE AND OBJECTIVE BOX
HISTORY  68 year old female with a past medical history of bipolar depression, OCD, dementia, HLD, NHL (in remission since 2016), osteoarthritis s/p bilateral TKR, and recent right YONATAN (1/20-1/26)  who presented on 2/4 as a level two trauma after a fall. Patient was found down in rehab after likely rolling out of bed. Patient was upgraded to a level 1 due to hypotension w/ SBP in the 80s despite fluid resuscitation and transfusions. Imaging revealed pancolitis and a right periprosthetic femoral neck fracture. Patient tested for C. difficile and was positive. She was admitted to SICU for hemodynamic monitoring in the setting of sepsis.    24 HOUR EVENTS:  - patient placed NPO except meds for ileus  - Run of SVT, improved with IV Lopressor  - Pt and son seen by palliative care, pt does not have capacity, son interested in comfort care    Patient restarted on PO meds  - Advanced to regular diet  - HIT panel negative, DVT ppx switched back to lovenox (from fondiparinux)  - Free water pushes stopped  - Pt with productive cough overnight, dark brown/black sputum    SUBJECTIVE/ROS:  [X] A ten-point review of systems was otherwise negative except as noted.  [ ] Due to altered mental status/intubation, subjective information were not able to be obtained from the patient. History was obtained, to the extent possible, from review of the chart and collateral sources of information.      NEURO  Exam: alert, no apparent distress  Meds: buPROPion XL . 300 milliGRAM(s) Oral daily  ziprasidone 40 milliGRAM(s) Oral <User Schedule>    [x] Adequacy of sedation and pain control has been assessed and adjusted      RESPIRATORY  RR: 21 (02-12-19 @ 01:00) (20 - 46)  SpO2: 96% (02-12-19 @ 01:00) (90% - 99%)  Wt(kg): --  Exam: coarse breath sounds  ABG - ( 11 Feb 2019 03:11 )  pH: 7.46  /  pCO2: 44    /  pO2: 95    / HCO3: 31    / Base Excess: 6.7   /  SaO2: 98      Lactate: x      Meds: ALBUTerol/ipratropium for Nebulization 3 milliLiter(s) Nebulizer every 4 hours        CARDIOVASCULAR  HR: 90 (02-12-19 @ 01:00) (83 - 109)  BP: 100/51 (02-12-19 @ 01:00) (90/56 - 150/53)  BP(mean): 63 (02-12-19 @ 01:00) (63 - 93)    Exam: tachycardic, regular rhythm  Cardiac Rhythm: sinus  Perfusion     [X]Adequate   [ ]Inadequate  Mentation   [X]Normal       [ ]Reduced  Extremities  [X]Warm         [ ]Cool  Volume Status [ ]Hypervolemic [X]Euvolemic [ ]Hypovolemic  Meds: metoprolol tartrate Injectable 2.5 milliGRAM(s) IV Push every 6 hours        GI/NUTRITION  Exam: soft, nontender, distended  Diet: NPO except meds  Meds:     GENITOURINARY  I&O's Detail    02-10 @ 07:01  -  02-11 @ 07:00  --------------------------------------------------------  IN:    Oral Fluid: 500 mL    Solution: 300 mL    Solution: 100 mL  Total IN: 900 mL    OUT:    Indwelling Catheter - Urethral: 2395 mL    Rectal Tube: 600 mL  Total OUT: 2995 mL    Total NET: -2095 mL      02-11 @ 07:01  -  02-12 @ 02:58  --------------------------------------------------------  IN:    Solution: 100 mL    Solution: 550 mL    Solution: 300 mL  Total IN: 950 mL    OUT:    Indwelling Catheter - Urethral: 1570 mL  Total OUT: 1570 mL    Total NET: -620 mL          02-11    140  |  98  |  25<H>  ----------------------------<  156<H>  3.4<L>   |  29  |  0.63    Ca    7.6<L>      11 Feb 2019 11:47  Phos  2.8     02-11  Mg     2.3     02-11      [X] Munguia catheter, indication:   Meds:       HEMATOLOGIC  Meds: enoxaparin Injectable 40 milliGRAM(s) SubCutaneous daily    [x] VTE Prophylaxis                        10.9   11.5  )-----------( 46       ( 11 Feb 2019 03:15 )             31.9       Transfusion     [ ] PRBC   [ ] Platelets   [ ] FFP   [ ] Cryoprecipitate      INFECTIOUS DISEASES  T(C): 36.9 (02-11-19 @ 23:00), Max: 37 (02-11-19 @ 19:00)  Wt(kg): --  WBC Count: 11.5 K/uL (02-11 @ 03:15)    Recent Cultures:  Specimen Source: .Body Fluid, 02-06 @ 18:59; Results   No growth; Gram Stain:   No polymorphonuclear cells seen per low power field  No organisms seen; Organism: --  Specimen Source: .Blood Blood-Peripheral, 02-06 @ 16:55; Results   No growth at 5 days.; Gram Stain: --; Organism: --    Meds: influenza   Vaccine 0.5 milliLiter(s) IntraMuscular once  metroNIDAZOLE  IVPB 500 milliGRAM(s) IV Intermittent every 8 hours  vancomycin    Solution 500 milliGRAM(s) Oral every 6 hours  vancomycin  Retention Enema 500 milliGRAM(s) Rectal every 6 hours        ENDOCRINE  Capillary Blood Glucose    Meds: levothyroxine Injectable 37.5 MICROGram(s) IV Push at bedtime        ACCESS DEVICES:  [X] Peripheral IV  [ ] Central Venous Line	[ ] R	[ ] L	[ ] IJ	[ ] Fem	[ ] SC	Placed:   [ ] Arterial Line		[ ] R	[ ] L	[ ] Fem	[ ] Rad	[ ] Ax	Placed:   [ ] PICC:					[ ] Mediport  [X] Urinary Catheter, Date Placed:   [ ] Necessity of urinary, arterial, and venous catheters discussed    OTHER MEDICATIONS:  chlorhexidine 4% Liquid 1 Application(s) Topical <User Schedule>      CODE STATUS: DNR/DNI    IMAGING: < from: Xray Chest 1 View- PORTABLE-Urgent (02.11.19 @ 07:04) >  XAM:  XR CHEST PORTABLE URGENT 1V                            PROCEDURE DATE:  02/11/2019            INTERPRETATION:  A single chest x-ray was obtained on February 11, 2019.    Indication: Shortness of breath.    Impression:    The heart is normal in size. Small left pleural effusion. Left lower lobe   pneumonia and/or atelectasis. The right lung is clear.    < end of copied text > HISTORY  68 year old female with a past medical history of bipolar depression, OCD, dementia, HLD, NHL (in remission since 2016), osteoarthritis s/p bilateral TKR, and recent right YONATAN (1/20-1/26)  who presented on 2/4 as a level two trauma after a fall. Patient was found down in rehab after likely rolling out of bed. Patient was upgraded to a level 1 due to hypotension w/ SBP in the 80s despite fluid resuscitation and transfusions. Imaging revealed pancolitis and a right periprosthetic femoral neck fracture. Patient tested for C. difficile and was positive. She was admitted to SICU for hemodynamic monitoring in the setting of sepsis.    24 HOUR EVENTS:  - Patient vomited x2, AXR w/ nonobstructive gas pattern concerning for ileus  - Pt w/ multiple short episodes of SVT, improved with IV Lopressor  - Pt and son seen by palliative care, pt does not have capacity, son interested in comfort care    SUBJECTIVE/ROS:  [X] A ten-point review of systems was otherwise negative except as noted.  [ ] Due to altered mental status/intubation, subjective information were not able to be obtained from the patient. History was obtained, to the extent possible, from review of the chart and collateral sources of information.    NEURO  Exam: alert, no apparent distress  Meds: buPROPion XL . 300 milliGRAM(s) Oral daily  ziprasidone 40 milliGRAM(s) Oral <User Schedule>    [x] Adequacy of sedation and pain control has been assessed and adjusted    RESPIRATORY  RR: 21 (02-12-19 @ 01:00) (20 - 46)  SpO2: 96% (02-12-19 @ 01:00) (90% - 99%)  Wt(kg): --  Exam: coarse breath sounds  ABG - ( 11 Feb 2019 03:11 )  pH: 7.46  /  pCO2: 44    /  pO2: 95    / HCO3: 31    / Base Excess: 6.7   /  SaO2: 98      Lactate: x      Meds: ALBUTerol/ipratropium for Nebulization 3 milliLiter(s) Nebulizer every 4 hours    CARDIOVASCULAR  HR: 90 (02-12-19 @ 01:00) (83 - 109)  BP: 100/51 (02-12-19 @ 01:00) (90/56 - 150/53)  BP(mean): 63 (02-12-19 @ 01:00) (63 - 93)    Exam: tachycardic, regular rhythm  Cardiac Rhythm: sinus  Perfusion     [X]Adequate   [ ]Inadequate  Mentation   [X]Normal       [ ]Reduced  Extremities  [X]Warm         [ ]Cool  Volume Status [ ]Hypervolemic [X]Euvolemic [ ]Hypovolemic  Meds: metoprolol tartrate Injectable 2.5 milliGRAM(s) IV Push every 6 hours    GI/NUTRITION  Exam: soft, nontender, distended  Diet: NPO except meds    GENITOURINARY  I&O's Detail    02-10 @ 07:01  -  02-11 @ 07:00  --------------------------------------------------------  IN:    Oral Fluid: 500 mL    Solution: 300 mL    Solution: 100 mL  Total IN: 900 mL    OUT:    Indwelling Catheter - Urethral: 2395 mL    Rectal Tube: 600 mL  Total OUT: 2995 mL    Total NET: -2095 mL      02-11 @ 07:01  -  02-12 @ 02:58  --------------------------------------------------------  IN:    Solution: 100 mL    Solution: 550 mL    Solution: 300 mL  Total IN: 950 mL    OUT:    Indwelling Catheter - Urethral: 1570 mL  Total OUT: 1570 mL    Total NET: -620 mL    02-11    140  |  98  |  25<H>  ----------------------------<  156<H>  3.4<L>   |  29  |  0.63    Ca    7.6<L>      11 Feb 2019 11:47  Phos  2.8     02-11  Mg     2.3     02-11    [X] Munguia catheter, indication:   Meds:     HEMATOLOGIC  Meds: enoxaparin Injectable 40 milliGRAM(s) SubCutaneous daily    [x] VTE Prophylaxis                        10.9   11.5  )-----------( 46       ( 11 Feb 2019 03:15 )             31.9     Transfusion     [ ] PRBC   [ ] Platelets   [ ] FFP   [ ] Cryoprecipitate    INFECTIOUS DISEASES  T(C): 36.9 (02-11-19 @ 23:00), Max: 37 (02-11-19 @ 19:00)  Wt(kg): --  WBC Count: 11.5 K/uL (02-11 @ 03:15)    Recent Cultures:  Specimen Source: .Body Fluid, 02-06 @ 18:59; Results   No growth; Gram Stain:   No polymorphonuclear cells seen per low power field  No organisms seen; Organism: --  Specimen Source: .Blood Blood-Peripheral, 02-06 @ 16:55; Results   No growth at 5 days.; Gram Stain: --; Organism: --    Meds: influenza   Vaccine 0.5 milliLiter(s) IntraMuscular once  metroNIDAZOLE  IVPB 500 milliGRAM(s) IV Intermittent every 8 hours  vancomycin    Solution 500 milliGRAM(s) Oral every 6 hours  vancomycin  Retention Enema 500 milliGRAM(s) Rectal every 6 hours    ENDOCRINE  Capillary Blood Glucose    Meds: levothyroxine Injectable 37.5 MICROGram(s) IV Push at bedtime    ACCESS DEVICES:  [X] Peripheral IV  [ ] Central Venous Line	[ ] R	[ ] L	[ ] IJ	[ ] Fem	[ ] SC	Placed:   [ ] Arterial Line		[ ] R	[ ] L	[ ] Fem	[ ] Rad	[ ] Ax	Placed:   [ ] PICC:					[ ] Mediport  [X] Urinary Catheter, Date Placed:   [ ] Necessity of urinary, arterial, and venous catheters discussed    OTHER MEDICATIONS:  chlorhexidine 4% Liquid 1 Application(s) Topical <User Schedule>    CODE STATUS: DNR/DNI    IMAGING: < from: Xray Chest 1 View- PORTABLE-Urgent (02.11.19 @ 07:04) >  XAM:  XR CHEST PORTABLE URGENT 1V                            PROCEDURE DATE:  02/11/2019        INTERPRETATION:  A single chest x-ray was obtained on February 11, 2019.    Indication: Shortness of breath.    Impression:    The heart is normal in size. Small left pleural effusion. Left lower lobe   pneumonia and/or atelectasis. The right lung is clear.    < end of copied text >

## 2019-02-12 NOTE — PROGRESS NOTE ADULT - SUBJECTIVE AND OBJECTIVE BOX
Patient seen and examined at bedside. pain is well controlled, resting comfortably in chair, no respiratory distress.  Per nursing, confused overnight, also SOB that resolved with breathing treatment, no issues with SVT overnight.     Vital Signs Last 24 Hrs  T(C): 36.6 (12 Feb 2019 03:00), Max: 37 (11 Feb 2019 19:00)  T(F): 97.9 (12 Feb 2019 03:00), Max: 98.6 (11 Feb 2019 19:00)  HR: 92 (12 Feb 2019 05:01) (83 - 109)  BP: 93/54 (12 Feb 2019 05:00) (90/56 - 150/53)  BP(mean): 69 (12 Feb 2019 05:00) (63 - 85)  RR: 26 (12 Feb 2019 05:00) (20 - 52)  SpO2: 98% (12 Feb 2019 05:01) (90% - 99%)        Exam:  Gen: NAD  RLE:  Staples in place, skin well approximated, no signs of drainage  minimal TTP to thigh, + Quad function  Motor: 5/5 EHL/FHL/TA/Gastrocnemius  Sensory: SILT DP/SP/S/S/T nerve distributions  Vascular: 2+ Dorsalis Pedis pulse

## 2019-02-12 NOTE — PROGRESS NOTE ADULT - PROBLEM SELECTOR PLAN 1
Severe  pancolitis  family refused FMT  clinically stable though X ray with ileus  Abdomen much improved on exam   Given this, would rec:  1) Continue IV flagyl,po  vanco-if ileus better could DC rectal tube and vanco  2) Monitor abdomen  WBC improving   3) Surgical eval-if any s/s toxic megacolon will likely need surgical intervention    Continue ICU monitoring

## 2019-02-12 NOTE — PROGRESS NOTE ADULT - ASSESSMENT
68F p/w R hip fracture and found to have C. diff colitis. Family refused fecal transplant.     PLAN:  - c/w sicu care  - pain control as needed  - clears  - lovenox for dvt ppx  - f/u Orthopedics for eventual hip surg  - cont vanc/flagyl for c. diff      [facility]  [id number]

## 2019-02-12 NOTE — PROGRESS NOTE ADULT - ATTENDING COMMENTS
I agree with the above note and have personally seen and examined this patient. All pertinent films have been reviewed. Please refer to clinical documentation of the history, physical examinations, data summary, and both assessment and plan as documented above and with which I agree.    d/w patient son, will continue to monitor with XR Wed to assess for implant movement, if no change then will continue with nonop treatment with NWB RLE but if movement of implant then may require revision vs ORIF  he understands that implant may loosen or subside resulting in failure or fracture may worsen however given patients thrombocytopenia and dififcult SICU course not a good candidate for surgical intervention at this time.    Conor George MD  Attending Orthopedic Surgeon

## 2019-02-12 NOTE — PROGRESS NOTE ADULT - SUBJECTIVE AND OBJECTIVE BOX
67 y/o critically ill  female h/o dementia NHL presenting after unwitnessed fall. Pt found on the ground at rehab. is on lovenox after hip fracture. pt reports pain to her right leg. per EMS en route slurring speech and mildly hypotensive requesting trauma.unclear how long pt on the ground. Patient found to have C diff with worsening hypotension requiring pressors on and off and  Patient requiring the SICU for continued cardiopulmonary monitoring. Orthopedic procedure on hold due to  hypotension and pan colitis and need for pressors due to unstable hemodynamics. Diarrhea slowing with IV Flagyl + oral and rectal vancomycin. Patient now has a rectal tube.  Stable leukocytosis and improving but still absent cognitive function.      MEDICATIONS  (STANDING):  ALBUTerol/ipratropium for Nebulization 3 milliLiter(s) Nebulizer every 4 hours  buPROPion XL . 300 milliGRAM(s) Oral daily  chlorhexidine 4% Liquid 1 Application(s) Topical <User Schedule>  enoxaparin Injectable 40 milliGRAM(s) SubCutaneous daily  influenza   Vaccine 0.5 milliLiter(s) IntraMuscular once  levothyroxine Injectable 37.5 MICROGram(s) IV Push at bedtime  metoprolol tartrate Injectable 2.5 milliGRAM(s) IV Push every 6 hours  metroNIDAZOLE  IVPB 500 milliGRAM(s) IV Intermittent every 8 hours  vancomycin    Solution 500 milliGRAM(s) Oral every 6 hours  vancomycin  Retention Enema 500 milliGRAM(s) Rectal every 6 hours  ziprasidone 40 milliGRAM(s) Oral <User Schedule>    MEDICATIONS  (PRN):          VITALS:   T(C): 36.9 (02-11-19 @ 23:00), Max: 37 (02-11-19 @ 19:00)  HR: 90 (02-12-19 @ 01:00) (83 - 109)  BP: 100/51 (02-12-19 @ 01:00) (90/56 - 150/53)  RR: 21 (02-12-19 @ 01:00) (20 - 46)  SpO2: 96% (02-12-19 @ 01:00) (90% - 99%)  Wt(kg): --    PHYSICAL EXAM:  GENERAL: NAD, well-groomed, well-developed  HEAD:  Atraumatic, Normocephalic  EYES: EOMI, PERRLA, conjunctiva and sclera clear  ENMT: No tonsillar erythema, exudates, or enlargement; Moist mucous membranes, Good dentition, No lesions  NECK: Supple, No JVD, Normal thyroid  NERVOUS SYSTEM:  Alert & Oriented X3, Good concentration; Motor Strength 5/5 B/L upper and lower extremities; DTRs 2+ intact and symmetric  CHEST/LUNG: Clear to percussion bilaterally; No rales, rhonchi, wheezing, or rubs  HEART: Regular rate and rhythm; No murmurs, rubs, or gallops  ABDOMEN: Soft, Nontender, Nondistended; Bowel sounds present  EXTREMITIES:  2+ Peripheral Pulses, No clubbing, cyanosis, or edema  LYMPH: No lymphadenopathy noted  SKIN: No rashes or lesions    LABS:  ABG - ( 11 Feb 2019 03:11 )  pH, Arterial: 7.46  pH, Blood: x     /  pCO2: 44    /  pO2: 95    / HCO3: 31    / Base Excess: 6.7   /  SaO2: 98                    CBC Full  -  ( 11 Feb 2019 03:15 )  WBC Count : 11.5 K/uL  Hemoglobin : 10.9 g/dL  Hematocrit : 31.9 %  Platelet Count - Automated : 46 K/uL  Mean Cell Volume : 98.2 fl  Mean Cell Hemoglobin : 33.4 pg  Mean Cell Hemoglobin Concentration : 34.0 gm/dL  Auto Neutrophil # : x  Auto Lymphocyte # : x  Auto Monocyte # : x  Auto Eosinophil # : x  Auto Basophil # : x  Auto Neutrophil % : x  Auto Lymphocyte % : x  Auto Monocyte % : x  Auto Eosinophil % : x  Auto Basophil % : x    02-11    140  |  98  |  25<H>  ----------------------------<  156<H>  3.4<L>   |  29  |  0.63    Ca    7.6<L>      11 Feb 2019 11:47  Phos  2.8     02-11  Mg     2.3     02-11    TPro  3.7<L>  /  Alb  2.1<L>  /  TBili  0.5  /  DBili  x   /  AST  25  /  ALT  18  /  AlkPhos  68  02-10    LIVER FUNCTIONS - ( 10 Feb 2019 02:26 )  Alb: 2.1 g/dL / Pro: 3.7 g/dL / ALK PHOS: 68 U/L / ALT: 18 U/L / AST: 25 U/L / GGT: x           PT/INR - ( 10 Feb 2019 02:26 )   PT: 13.9 sec;   INR: 1.21 ratio         PTT - ( 10 Feb 2019 02:26 )  PTT:30.4 sec    CAPILLARY BLOOD GLUCOSE          RADIOLOGY & ADDITIONAL TESTS: 69 y/o critically ill  female h/o dementia NHL presenting after unwitnessed fall. Pt found on the ground at rehab. is on lovenox after hip fracture. pt reports pain to her right leg. per EMS en route slurring speech and mildly hypotensive requesting trauma.unclear how long pt on the ground. Patient found to have C diff with worsening hypotension requiring pressors on and off and  Patient requiring the SICU for continued cardiopulmonary monitoring. Orthopedic procedure on hold due to  hypotension and pan colitis and need for pressors due to unstable hemodynamics. Diarrhea slowing with IV Flagyl + oral and rectal vancomycin. Patient now has a rectal tube.  Stable leukocytosis and improving but still absent cognitive function.    MEDICATIONS  (STANDING):  ALBUTerol/ipratropium for Nebulization 3 milliLiter(s) Nebulizer every 4 hours  buPROPion XL . 300 milliGRAM(s) Oral daily  chlorhexidine 4% Liquid 1 Application(s) Topical <User Schedule>  enoxaparin Injectable 40 milliGRAM(s) SubCutaneous daily  influenza   Vaccine 0.5 milliLiter(s) IntraMuscular once  levothyroxine Injectable 37.5 MICROGram(s) IV Push at bedtime  metoprolol tartrate 12.5 milliGRAM(s) Oral every 12 hours  metroNIDAZOLE  IVPB 500 milliGRAM(s) IV Intermittent every 8 hours  potassium chloride   Solution 20 milliEquivalent(s) Oral every 8 hours  vancomycin    Solution 500 milliGRAM(s) Oral every 6 hours  ziprasidone 40 milliGRAM(s) Oral <User Schedule>    MEDICATIONS  (PRN):    Vital Signs Last 24 Hrs  T(C): 36.8 (12 Feb 2019 19:00), Max: 36.8 (12 Feb 2019 07:00)  T(F): 98.3 (12 Feb 2019 19:00), Max: 98.3 (12 Feb 2019 19:00)  HR: 98 (12 Feb 2019 23:02) (83 - 114)  BP: 98/62 (12 Feb 2019 23:00) (84/53 - 124/94)  BP(mean): 75 (12 Feb 2019 23:00) (63 - 108)  RR: 27 (12 Feb 2019 23:00) (21 - 52)  SpO2: 93% (12 Feb 2019 23:02) (84% - 100%)    PHYSICAL EXAM:  GENERAL: NAD, well-groomed, well-developed  HEAD:  Atraumatic, Normocephalic  EYES: EOMI, PERRLA, conjunctiva and sclera clear  ENMT: No tonsillar erythema, exudates, or enlargement; Moist mucous membranes, Good dentition, No lesions  NECK: Supple, No JVD, Normal thyroid  NERVOUS SYSTEM:  Alert & Oriented X3, Good concentration; Motor Strength 5/5 B/L upper and lower extremities; DTRs 2+ intact and symmetric  CHEST/LUNG: Clear to percussion bilaterally; No rales, rhonchi, wheezing, or rubs  HEART: Regular rate and rhythm; No murmurs, rubs, or gallops  ABDOMEN: Soft, Nontender, Nondistended; Bowel sounds present  EXTREMITIES:  2+ Peripheral Pulses, No clubbing, cyanosis, or edema  LYMPH: No lymphadenopathy noted  SKIN: No rashes or lesions    LABS:      ABG - ( 11 Feb 2019 03:11 )  pH, Arterial: 7.46  pH, Blood: x     /  pCO2: 44    /  pO2: 95    / HCO3: 31    / Base Excess: 6.7   /  SaO2: 98                    CBC Full  -  ( 12 Feb 2019 03:18 )  WBC Count : 10.4 K/uL  Hemoglobin : 11.0 g/dL  Hematocrit : 33.3 %  Platelet Count - Automated : 48 K/uL  Mean Cell Volume : 98.8 fl  Mean Cell Hemoglobin : 32.6 pg  Mean Cell Hemoglobin Concentration : 33.0 gm/dL  Auto Neutrophil # : x  Auto Lymphocyte # : x  Auto Monocyte # : x  Auto Eosinophil # : x  Auto Basophil # : x  Auto Neutrophil % : x  Auto Lymphocyte % : x  Auto Monocyte % : x  Auto Eosinophil % : x  Auto Basophil % : x    02-12    139  |  99  |  23  ----------------------------<  122<H>  3.7   |  29  |  0.52    Ca    7.0<L>      12 Feb 2019 03:18  Phos  2.3     02-12  Mg     2.0     02-12 67 y/o critically ill  female h/o dementia NHL presenting after unwitnessed fall. Pt found on the ground at rehab. is on lovenox after hip fracture. pt reports pain to her right leg. per EMS en route slurring speech and mildly hypotensive requesting trauma.unclear how long pt on the ground. Patient found to have C diff with worsening hypotension requiring pressors on and off and  Patient requiring the SICU for continued cardiopulmonary monitoring. Orthopedic procedure on hold due to  hypotension and pan colitis and need for pressors due to unstable hemodynamics. Diarrhea slowing with IV Flagyl + oral and rectal vancomycin. Patient now has  rectal tube removed with no diarrhea..  Stable leukocytosis and improving but still confused.    MEDICATIONS  (STANDING):  ALBUTerol/ipratropium for Nebulization 3 milliLiter(s) Nebulizer every 4 hours  buPROPion XL . 300 milliGRAM(s) Oral daily  chlorhexidine 4% Liquid 1 Application(s) Topical <User Schedule>  enoxaparin Injectable 40 milliGRAM(s) SubCutaneous daily  influenza   Vaccine 0.5 milliLiter(s) IntraMuscular once  levothyroxine Injectable 37.5 MICROGram(s) IV Push at bedtime  metoprolol tartrate 12.5 milliGRAM(s) Oral every 12 hours  metroNIDAZOLE  IVPB 500 milliGRAM(s) IV Intermittent every 8 hours  potassium chloride   Solution 20 milliEquivalent(s) Oral every 8 hours  vancomycin    Solution 500 milliGRAM(s) Oral every 6 hours  ziprasidone 40 milliGRAM(s) Oral <User Schedule>    MEDICATIONS  (PRN):    Vital Signs Last 24 Hrs  T(C): 36.8 (12 Feb 2019 19:00), Max: 36.8 (12 Feb 2019 07:00)  T(F): 98.3 (12 Feb 2019 19:00), Max: 98.3 (12 Feb 2019 19:00)  HR: 98 (12 Feb 2019 23:02) (83 - 114)  BP: 98/62 (12 Feb 2019 23:00) (84/53 - 124/94)  BP(mean): 75 (12 Feb 2019 23:00) (63 - 108)  RR: 27 (12 Feb 2019 23:00) (21 - 52)  SpO2: 93% (12 Feb 2019 23:02) (84% - 100%)    PHYSICAL EXAM:  GENERAL: NAD, well-groomed, well-developed  HEAD:  Atraumatic, Normocephalic  EYES: EOMI, PERRLA, conjunctiva and sclera clear  ENMT: No tonsillar erythema, exudates, or enlargement; Moist mucous membranes, Good dentition, No lesions  NECK: Supple, No JVD, Normal thyroid  NERVOUS SYSTEM:  Alert & Oriented X3, Good concentration; Motor Strength 5/5 B/L upper and lower extremities; DTRs 2+ intact and symmetric  CHEST/LUNG: Clear to percussion bilaterally; No rales, rhonchi, wheezing, or rubs  HEART: Regular rate and rhythm; No murmurs, rubs, or gallops  ABDOMEN: Soft, Nontender, Nondistended; Bowel sounds present  EXTREMITIES:  2+ Peripheral Pulses, No clubbing, cyanosis, or edema  LYMPH: No lymphadenopathy noted  SKIN: No rashes or lesions    LABS:      ABG - ( 11 Feb 2019 03:11 )  pH, Arterial: 7.46  pH, Blood: x     /  pCO2: 44    /  pO2: 95    / HCO3: 31    / Base Excess: 6.7   /  SaO2: 98                    CBC Full  -  ( 12 Feb 2019 03:18 )  WBC Count : 10.4 K/uL  Hemoglobin : 11.0 g/dL  Hematocrit : 33.3 %  Platelet Count - Automated : 48 K/uL  Mean Cell Volume : 98.8 fl  Mean Cell Hemoglobin : 32.6 pg  Mean Cell Hemoglobin Concentration : 33.0 gm/dL  Auto Neutrophil # : x  Auto Lymphocyte # : x  Auto Monocyte # : x  Auto Eosinophil # : x  Auto Basophil # : x  Auto Neutrophil % : x  Auto Lymphocyte % : x  Auto Monocyte % : x  Auto Eosinophil % : x  Auto Basophil % : x    02-12    139  |  99  |  23  ----------------------------<  122<H>  3.7   |  29  |  0.52    Ca    7.0<L>      12 Feb 2019 03:18  Phos  2.3     02-12  Mg     2.0     02-12

## 2019-02-12 NOTE — PROGRESS NOTE ADULT - ASSESSMENT
A/P: 68 y F w/ R Hemiarthroplasty done on 1/21, s/p MF, with L S1-2 sacral insufficiency fracture and  R periprosthetic Hip Fracture.     - CT R hip demonstrates PP Femur fracture around Stem, will Need ORIF but pt is unstable, son weighing more towards conservative management  - Likely non operative treatment being considered now  -C/w SICU care, recs appreciated  -Analgesia  -DVT ppx, per primary team  -Non weight bearing RLE/bedrest  -May weight bear as tolerated on LLE when not on bedrest  -No planned date for ORIF, will continue to monitor   - C dif management per ID  -Medical/ID clearance required.  - will advise if plan changes

## 2019-02-12 NOTE — PROGRESS NOTE ADULT - SUBJECTIVE AND OBJECTIVE BOX
Trauma Surgery Progress Note    SUBJECTIVE:  The patient was seen and examined. No acute events overnight. Pain well controlled.     OBJECTIVE:     ** VITAL SIGNS / I&O's **    Vital Signs Last 24 Hrs  T(C): 36.8 (12 Feb 2019 07:00), Max: 37 (11 Feb 2019 19:00)  T(F): 98.2 (12 Feb 2019 07:00), Max: 98.6 (11 Feb 2019 19:00)  HR: 110 (12 Feb 2019 10:00) (83 - 110)  BP: 124/94 (12 Feb 2019 10:00) (84/53 - 124/94)  BP(mean): 108 (12 Feb 2019 10:00) (63 - 108)  RR: 51 (12 Feb 2019 10:00) (21 - 52)  SpO2: 97% (12 Feb 2019 10:00) (90% - 99%)      11 Feb 2019 07:01  -  12 Feb 2019 07:00  --------------------------------------------------------  IN:    Solution: 737.5 mL    Solution: 100 mL    Solution: 300 mL  Total IN: 1137.5 mL    OUT:    Indwelling Catheter - Urethral: 1670 mL    Rectal Tube: 400 mL  Total OUT: 2070 mL    Total NET: -932.5 mL      12 Feb 2019 07:01  -  12 Feb 2019 12:00  --------------------------------------------------------  IN:    Solution: 100 mL    Solution: 200 mL  Total IN: 300 mL    OUT:    Indwelling Catheter - Urethral: 75 mL  Total OUT: 75 mL    Total NET: 225 mL          ** PHYSICAL EXAM **    -- CONSTITUTIONAL: Alert, NAD  -- PULMONARY: non-labored respirations  -- ABDOMEN: soft, mod-distended, non-tender      ** LABS **                          11.0   10.4  )-----------( 48       ( 12 Feb 2019 03:18 )             33.3     12 Feb 2019 03:18    139    |  99     |  23     ----------------------------<  122    3.7     |  29     |  0.52     Ca    7.0        12 Feb 2019 03:18  Phos  2.3       12 Feb 2019 03:18  Mg     2.0       12 Feb 2019 03:18        CAPILLARY BLOOD GLUCOSE                    MEDICATIONS  (STANDING):  ALBUTerol/ipratropium for Nebulization 3 milliLiter(s) Nebulizer every 4 hours  buPROPion XL . 300 milliGRAM(s) Oral daily  chlorhexidine 4% Liquid 1 Application(s) Topical <User Schedule>  enoxaparin Injectable 40 milliGRAM(s) SubCutaneous daily  influenza   Vaccine 0.5 milliLiter(s) IntraMuscular once  levothyroxine Injectable 37.5 MICROGram(s) IV Push at bedtime  metoprolol tartrate 12.5 milliGRAM(s) Oral every 12 hours  metroNIDAZOLE  IVPB 500 milliGRAM(s) IV Intermittent every 8 hours  potassium chloride   Solution 20 milliEquivalent(s) Oral every 8 hours  spironolactone 50 milliGRAM(s) Oral once  vancomycin    Solution 500 milliGRAM(s) Oral every 6 hours  vancomycin  Retention Enema 500 milliGRAM(s) Rectal every 6 hours  ziprasidone 40 milliGRAM(s) Oral <User Schedule>    MEDICATIONS  (PRN):

## 2019-02-12 NOTE — PROGRESS NOTE ADULT - ATTENDING COMMENTS
Case d/w SICU team  Will tailor plan for ID issues  per course,results.Will defer to primary team on management of other issues.  I am away tomorrow.My colleagues in ID service will cover .Please call [phone] if any issues or questions.

## 2019-02-13 LAB
ANION GAP SERPL CALC-SCNC: 11 MMOL/L — SIGNIFICANT CHANGE UP (ref 5–17)
BUN SERPL-MCNC: 19 MG/DL — SIGNIFICANT CHANGE UP (ref 7–23)
CALCIUM SERPL-MCNC: 7.3 MG/DL — LOW (ref 8.4–10.5)
CHLORIDE SERPL-SCNC: 98 MMOL/L — SIGNIFICANT CHANGE UP (ref 96–108)
CO2 SERPL-SCNC: 30 MMOL/L — SIGNIFICANT CHANGE UP (ref 22–31)
CREAT SERPL-MCNC: 0.6 MG/DL — SIGNIFICANT CHANGE UP (ref 0.5–1.3)
GLUCOSE SERPL-MCNC: 127 MG/DL — HIGH (ref 70–99)
HCT VFR BLD CALC: 34.2 % — LOW (ref 34.5–45)
HGB BLD-MCNC: 11.5 G/DL — SIGNIFICANT CHANGE UP (ref 11.5–15.5)
MAGNESIUM SERPL-MCNC: 2.3 MG/DL — SIGNIFICANT CHANGE UP (ref 1.6–2.6)
MCHC RBC-ENTMCNC: 33.1 PG — SIGNIFICANT CHANGE UP (ref 27–34)
MCHC RBC-ENTMCNC: 33.5 GM/DL — SIGNIFICANT CHANGE UP (ref 32–36)
MCV RBC AUTO: 98.8 FL — SIGNIFICANT CHANGE UP (ref 80–100)
PHOSPHATE SERPL-MCNC: 2.4 MG/DL — LOW (ref 2.5–4.5)
PLATELET # BLD AUTO: 57 K/UL — LOW (ref 150–400)
POTASSIUM SERPL-MCNC: 4.2 MMOL/L — SIGNIFICANT CHANGE UP (ref 3.5–5.3)
POTASSIUM SERPL-SCNC: 4.2 MMOL/L — SIGNIFICANT CHANGE UP (ref 3.5–5.3)
RBC # BLD: 3.46 M/UL — LOW (ref 3.8–5.2)
RBC # FLD: 15.6 % — HIGH (ref 10.3–14.5)
SODIUM SERPL-SCNC: 139 MMOL/L — SIGNIFICANT CHANGE UP (ref 135–145)
WBC # BLD: 10.2 K/UL — SIGNIFICANT CHANGE UP (ref 3.8–10.5)
WBC # FLD AUTO: 10.2 K/UL — SIGNIFICANT CHANGE UP (ref 3.8–10.5)

## 2019-02-13 PROCEDURE — 99232 SBSQ HOSP IP/OBS MODERATE 35: CPT | Mod: GC

## 2019-02-13 PROCEDURE — 71045 X-RAY EXAM CHEST 1 VIEW: CPT | Mod: 26

## 2019-02-13 RX ORDER — SPIRONOLACTONE 25 MG/1
50 TABLET, FILM COATED ORAL DAILY
Qty: 0 | Refills: 0 | Status: DISCONTINUED | OUTPATIENT
Start: 2019-02-14 | End: 2019-02-18

## 2019-02-13 RX ORDER — FUROSEMIDE 40 MG
20 TABLET ORAL DAILY
Qty: 0 | Refills: 0 | Status: DISCONTINUED | OUTPATIENT
Start: 2019-02-14 | End: 2019-02-19

## 2019-02-13 RX ORDER — SPIRONOLACTONE 25 MG/1
50 TABLET, FILM COATED ORAL ONCE
Qty: 0 | Refills: 0 | Status: COMPLETED | OUTPATIENT
Start: 2019-02-13 | End: 2019-02-13

## 2019-02-13 RX ORDER — POTASSIUM CHLORIDE 20 MEQ
20 PACKET (EA) ORAL
Qty: 0 | Refills: 0 | Status: COMPLETED | OUTPATIENT
Start: 2019-02-13 | End: 2019-02-14

## 2019-02-13 RX ORDER — FUROSEMIDE 40 MG
40 TABLET ORAL ONCE
Qty: 0 | Refills: 0 | Status: COMPLETED | OUTPATIENT
Start: 2019-02-13 | End: 2019-02-13

## 2019-02-13 RX ORDER — ACETAMINOPHEN 500 MG
650 TABLET ORAL EVERY 6 HOURS
Qty: 0 | Refills: 0 | Status: DISCONTINUED | OUTPATIENT
Start: 2019-02-13 | End: 2019-02-22

## 2019-02-13 RX ADMIN — SPIRONOLACTONE 50 MILLIGRAM(S): 25 TABLET, FILM COATED ORAL at 11:31

## 2019-02-13 RX ADMIN — Medication 40 MILLIGRAM(S): at 11:28

## 2019-02-13 RX ADMIN — Medication 100 MILLIGRAM(S): at 23:23

## 2019-02-13 RX ADMIN — Medication 12.5 MILLIGRAM(S): at 17:34

## 2019-02-13 RX ADMIN — Medication 37.5 MICROGRAM(S): at 22:16

## 2019-02-13 RX ADMIN — Medication 3 MILLILITER(S): at 05:51

## 2019-02-13 RX ADMIN — Medication 3 MILLILITER(S): at 09:54

## 2019-02-13 RX ADMIN — Medication 650 MILLIGRAM(S): at 01:42

## 2019-02-13 RX ADMIN — Medication 62.5 MILLIMOLE(S): at 05:32

## 2019-02-13 RX ADMIN — Medication 3 MILLILITER(S): at 17:28

## 2019-02-13 RX ADMIN — Medication 20 MILLIEQUIVALENT(S): at 05:04

## 2019-02-13 RX ADMIN — CHLORHEXIDINE GLUCONATE 1 APPLICATION(S): 213 SOLUTION TOPICAL at 05:05

## 2019-02-13 RX ADMIN — Medication 500 MILLIGRAM(S): at 05:04

## 2019-02-13 RX ADMIN — Medication 3 MILLILITER(S): at 21:20

## 2019-02-13 RX ADMIN — ENOXAPARIN SODIUM 40 MILLIGRAM(S): 100 INJECTION SUBCUTANEOUS at 11:30

## 2019-02-13 RX ADMIN — Medication 650 MILLIGRAM(S): at 13:20

## 2019-02-13 RX ADMIN — Medication 650 MILLIGRAM(S): at 02:12

## 2019-02-13 RX ADMIN — BUPROPION HYDROCHLORIDE 300 MILLIGRAM(S): 150 TABLET, EXTENDED RELEASE ORAL at 11:30

## 2019-02-13 RX ADMIN — Medication 650 MILLIGRAM(S): at 12:48

## 2019-02-13 RX ADMIN — Medication 20 MILLIEQUIVALENT(S): at 17:34

## 2019-02-13 RX ADMIN — ZIPRASIDONE HYDROCHLORIDE 40 MILLIGRAM(S): 20 CAPSULE ORAL at 07:37

## 2019-02-13 RX ADMIN — Medication 100 MILLIGRAM(S): at 07:37

## 2019-02-13 RX ADMIN — Medication 3 MILLILITER(S): at 13:12

## 2019-02-13 RX ADMIN — Medication 500 MILLIGRAM(S): at 11:30

## 2019-02-13 RX ADMIN — Medication 100 MILLIGRAM(S): at 16:03

## 2019-02-13 RX ADMIN — Medication 500 MILLIGRAM(S): at 17:34

## 2019-02-13 NOTE — PROGRESS NOTE ADULT - ATTENDING COMMENTS
I agree with the above note and have personally seen and examined this patient. All pertinent films have been reviewed. Please refer to clinical documentation of the history, physical examinations, data summary, and both assessment and plan as documented above and with which I agree.    improving clinically  still thrombocytopenic so not an operative candidate yet but possible in future if conitnues to improve  please obtain ap pelvis xr today    Conor George MD  Attending Orthopedic Surgeon

## 2019-02-13 NOTE — PROGRESS NOTE ADULT - ASSESSMENT
68F p/w R hip fracture and found to have C. diff colitis. Family refused fecal transplant, recovering well.    PLAN:  - c/w sicu care  - pain control as needed  - diet: regular  - lovenox for dvt ppx  - cont to appreciate Orthopedics re: hip  - home meds  - cont vanc/flagyl for c. diff      [facility]  [id number]

## 2019-02-13 NOTE — PROGRESS NOTE ADULT - SUBJECTIVE AND OBJECTIVE BOX
Trauma Surgery Progress Note    SUBJECTIVE:  The patient was seen and examined. No acute events overnight. Doing well.    OBJECTIVE:     ** VITAL SIGNS / I&O's **    Vital Signs Last 24 Hrs  T(C): 36.8 (13 Feb 2019 07:00), Max: 36.9 (13 Feb 2019 03:00)  T(F): 98.2 (13 Feb 2019 07:00), Max: 98.5 (13 Feb 2019 03:00)  HR: 91 (13 Feb 2019 10:00) (86 - 114)  BP: 99/59 (13 Feb 2019 10:00) (90/61 - 118/87)  BP(mean): 68 (13 Feb 2019 10:00) (64 - 95)  RR: 23 (13 Feb 2019 10:00) (21 - 38)  SpO2: 98% (13 Feb 2019 10:00) (84% - 100%)      12 Feb 2019 07:01  -  13 Feb 2019 07:00  --------------------------------------------------------  IN:    Oral Fluid: 240 mL    Solution: 200 mL    Solution: 325 mL  Total IN: 765 mL    OUT:    Indwelling Catheter - Urethral: 1400 mL    Rectal Tube: 300 mL    Voided: 200 mL  Total OUT: 1900 mL    Total NET: -1135 mL      13 Feb 2019 07:01  -  13 Feb 2019 12:18  --------------------------------------------------------  IN:    Solution: 125 mL    Solution: 100 mL  Total IN: 225 mL    OUT:  Total OUT: 0 mL    Total NET: 225 mL          ** PHYSICAL EXAM **    -- CONSTITUTIONAL: Alert, NAD  -- PULMONARY: non-labored respirations  -- ABDOMEN: soft, non-distended, non-tender      ** LABS **                          11.5   10.2  )-----------( 57       ( 13 Feb 2019 03:39 )             34.2     13 Feb 2019 03:39    139    |  98     |  19     ----------------------------<  127    4.2     |  30     |  0.60     Ca    7.3        13 Feb 2019 03:39  Phos  2.4       13 Feb 2019 03:39  Mg     2.3       13 Feb 2019 03:39        CAPILLARY BLOOD GLUCOSE                    MEDICATIONS  (STANDING):  ALBUTerol/ipratropium for Nebulization 3 milliLiter(s) Nebulizer every 4 hours  buPROPion XL . 300 milliGRAM(s) Oral daily  chlorhexidine 4% Liquid 1 Application(s) Topical <User Schedule>  enoxaparin Injectable 40 milliGRAM(s) SubCutaneous daily  influenza   Vaccine 0.5 milliLiter(s) IntraMuscular once  levothyroxine Injectable 37.5 MICROGram(s) IV Push at bedtime  metoprolol tartrate 12.5 milliGRAM(s) Oral every 12 hours  metroNIDAZOLE  IVPB 500 milliGRAM(s) IV Intermittent every 8 hours  potassium chloride   Solution 20 milliEquivalent(s) Oral two times a day  vancomycin    Solution 500 milliGRAM(s) Oral every 6 hours  ziprasidone 40 milliGRAM(s) Oral <User Schedule>    MEDICATIONS  (PRN):  acetaminophen   Tablet .. 650 milliGRAM(s) Oral every 6 hours PRN Mild Pain (1 - 3)

## 2019-02-13 NOTE — PROGRESS NOTE ADULT - ATTENDING COMMENTS
Dr. Patrick (Attending Physician)  Delirium day/night reset  Bilateral Pleural Effusions - will continue to diurese with lasix and spironolactone  C. diff removed rectal tube yesterday, had BM after removal no longer profuse or watery, c/w flagyl, oral vancomycin  SVT resolved with improved electrolytes and beta-blocker  Ortho considering operative repair of periprosthetic fx  Tx to the floor

## 2019-02-13 NOTE — PROGRESS NOTE ADULT - ATTENDING COMMENTS
Left periprosthetic femur fracture. awaiting surgical intervention - currently improving and may soon proceed with surgery  Pain meds as needed orally  as tolerated. Continue ATC cardio pulmonary monitoring for  this critically ill Patient in the SICU.  I am a non participating [facility] physician seeing Pt in coverage for Dr Barraza Left periprosthetic femur fracture. awaiting surgical intervention - currently improving and may soon proceed with surgery  Pain meds as needed orally  as tolerated. Continue ATC cardio pulmonary monitoring for  this critically ill Patient in the SICU.  I am a non participating [facility] physician seeing Pt in coverage for Dr Barraza. The above patient examination was reviewed with Dr. Awan and I agree with his evaluation, assessment and treatment plan.  Orlin Barraza M.D. Right periprosthetic femur fracture. awaiting surgical intervention - currently improving and may soon proceed with surgery  Pain meds as needed orally  as tolerated. Continue ATC cardio pulmonary monitoring for  this critically ill Patient in the SICU.  I am a non participating [facility] physician seeing Pt in coverage for Dr Barraza. The above patient examination was reviewed with Dr. Awan and I agree with his evaluation, assessment and treatment plan.  Orlin Barraza M.D.

## 2019-02-13 NOTE — PROGRESS NOTE ADULT - SUBJECTIVE AND OBJECTIVE BOX
HISTORY  68 year old female with a past medical history of bipolar depression, OCD, dementia, HLD, NHL (in remission since 2016), osteoarthritis s/p bilateral TKR, and recent right YONATAN (1/20-1/26)  who presented on 2/4 as a level two trauma after a fall. Patient was found down in rehab after likely rolling out of bed. Patient was upgraded to a level 1 due to hypotension w/ SBP in the 80s despite fluid resuscitation and transfusions. Imaging revealed pancolitis and a right periprosthetic femoral neck fracture. Patient tested for C. difficile and was positive. She was admitted to SICU for hemodynamic monitoring in the setting of sepsis.    24 HOUR EVENTS:   - Advanced to CLD.  - Diuresed with 40mg IV Lasix and 50mg Spironolactone.  - Standing potassium chloride initiated.  - Rectal tube D/Sergio and therefore rectal vanc enemas D/Sergio.    SUBJECTIVE/ROS:  [x ] A ten-point review of systems was otherwise negative except as noted.  [ ] Due to altered mental status/intubation, subjective information were not able to be obtained from the patient. History was obtained, to the extent possible, from review of the chart and collateral sources of information.      NEURO  Exam: awake, alert and oriented  Meds: acetaminophen   Tablet .. 650 milliGRAM(s) Oral every 6 hours PRN Mild Pain (1 - 3)  buPROPion XL . 300 milliGRAM(s) Oral daily  ziprasidone 40 milliGRAM(s) Oral <User Schedule>    [x] Adequacy of sedation and pain control has been assessed and adjusted      RESPIRATORY  RR: 25 (02-13-19 @ 05:00) (21 - 51)  SpO2: 94% (02-13-19 @ 05:52) (84% - 100%)  Exam: unlabored, clear to auscultation bilaterally  Meds: ALBUTerol/ipratropium for Nebulization 3 milliLiter(s) Nebulizer every 4 hours      CARDIOVASCULAR  HR: 96 (02-13-19 @ 05:52) (86 - 114)  BP: 101/55 (02-13-19 @ 05:00) (90/61 - 124/94)  BP(mean): 71 (02-13-19 @ 05:00) (64 - 108)    Exam: regular rate and rhythm  Cardiac Rhythm: sinus  Perfusion     [x ]Adequate   [ ]Inadequate  Mentation   [ x]Normal       [ ]Reduced  Extremities  [ x]Warm         [ ]Cool  Volume Status [ ]Hypervolemic [ x]Euvolemic [ ]Hypovolemic  Meds: metoprolol tartrate 12.5 milliGRAM(s) Oral every 12 hours        GI/NUTRITION  Exam: distended, nontender  Diet: CLD  Meds:  x    GENITOURINARY  I&O's Detail    02-11 @ 07:01  -  02-12 @ 07:00  --------------------------------------------------------  IN:    Solution: 737.5 mL    Solution: 100 mL    Solution: 300 mL  Total IN: 1137.5 mL    OUT:    Indwelling Catheter - Urethral: 1670 mL    Rectal Tube: 400 mL  Total OUT: 2070 mL    Total NET: -932.5 mL      02-12 @ 07:01  - 02-13 @ 06:02  --------------------------------------------------------  IN:    Solution: 200 mL    Solution: 200 mL  Total IN: 400 mL    OUT:    Indwelling Catheter - Urethral: 1400 mL    Rectal Tube: 300 mL    Voided: 200 mL  Total OUT: 1900 mL    Total NET: -1500 mL          02-13    139  |  98  |  19  ----------------------------<  127<H>  4.2   |  30  |  0.60    Ca    7.3<L>      13 Feb 2019 03:39  Phos  2.4     02-13  Mg     2.3     02-13      [n/a ] Munguia catheter, indication: N/A  Meds: x      HEMATOLOGIC  Meds: enoxaparin Injectable 40 milliGRAM(s) SubCutaneous daily    [x] VTE Prophylaxis                        11.5   10.2  )-----------( 57       ( 13 Feb 2019 03:39 )             34.2       Transfusion     [ ] PRBC   [ ] Platelets   [ ] FFP   [ ] Cryoprecipitate      INFECTIOUS DISEASES  T(C): 36.9 (02-13-19 @ 03:00), Max: 36.9 (02-13-19 @ 03:00)  WBC Count: 10.2 K/uL (02-13 @ 03:39)    Recent Cultures:  Specimen Source: .Body Fluid, 02-06 @ 18:59; Results   No growth; Gram Stain:   No polymorphonuclear cells seen per low power field  No organisms seen; Organism: --  Specimen Source: .Blood Blood-Peripheral, 02-06 @ 16:55; Results   No growth at 5 days.; Gram Stain: --; Organism: --    Meds: influenza   Vaccine 0.5 milliLiter(s) IntraMuscular once  metroNIDAZOLE  IVPB 500 milliGRAM(s) IV Intermittent every 8 hours  vancomycin    Solution 500 milliGRAM(s) Oral every 6 hours        ENDOCRINE  Meds: levothyroxine Injectable 37.5 MICROGram(s) IV Push at bedtime        ACCESS DEVICES:  [x ] Peripheral IV  [ ] Central Venous Line	[ ] R	[ ] L	[ ] IJ	[ ] Fem	[ ] SC	Placed:   [ ] Arterial Line		[ ] R	[ ] L	[ ] Fem	[ ] Rad	[ ] Ax	Placed:   [ ] PICC:					[ ] Mediport  [ ] Urinary Catheter, Date Placed:   [x ] Necessity of urinary, arterial, and venous catheters discussed    OTHER MEDICATIONS:  chlorhexidine 4% Liquid 1 Application(s) Topical <User Schedule>      CODE STATUS: DNR/DNI    IMAGING: x

## 2019-02-13 NOTE — PROGRESS NOTE ADULT - SUBJECTIVE AND OBJECTIVE BOX
Patient seen and examined at bedside. pain is well controlled, resting comfortably in chair, no respiratory distress.  Per nursing, no issues with SVT overnight, rectal tube removed, no breathing issues.     Vital Signs Last 24 Hrs  T(C): 36.9 (13 Feb 2019 03:00), Max: 36.9 (13 Feb 2019 03:00)  T(F): 98.5 (13 Feb 2019 03:00), Max: 98.5 (13 Feb 2019 03:00)  HR: 96 (13 Feb 2019 05:52) (86 - 114)  BP: 101/55 (13 Feb 2019 05:00) (90/61 - 124/94)  BP(mean): 71 (13 Feb 2019 05:00) (64 - 108)  RR: 25 (13 Feb 2019 05:00) (21 - 51)  SpO2: 94% (13 Feb 2019 05:52) (84% - 100%)        Exam:  Gen: NAD  RLE:  Staples in place, skin well approximated, no signs of drainage  TTP to thigh, + Quad function with pain in thigh   Motor: 5/5 EHL/FHL/TA/Gastrocnemius  Sensory: SILT DP/SP/S/S/T nerve distributions  Vascular: 2+ Dorsalis Pedis pulse

## 2019-02-13 NOTE — PROGRESS NOTE ADULT - ASSESSMENT
A/P: 68 y F w/ R Hemiarthroplasty done on 1/21, s/p MF, with L S1-2 sacral insufficiency fracture and  R periprosthetic Hip Fracture.     - CT R hip demonstrates PP Femur fracture around Stem  - will get Xray pelvis (ordered) to determine if stable and implant has not subsided  - Likely non operative treatment being considered now  -FU Transfer to floor as pt more stable  -Analgesia  -DVT ppx, per primary team  -Non weight bearing RLE/bedrest  -May weight bear as tolerated on LLE when not on bedrest  -No planned date for ORIF, will continue to monitor   - C dif management per ID- rectal tube removed  - further recs pending xrays

## 2019-02-13 NOTE — PROGRESS NOTE ADULT - PROBLEM SELECTOR PLAN 2
follow output and keep up with output  continue flagyl and add  Vanco  Patient with decreased diarrhea

## 2019-02-14 LAB
ANION GAP SERPL CALC-SCNC: 12 MMOL/L — SIGNIFICANT CHANGE UP (ref 5–17)
BUN SERPL-MCNC: 16 MG/DL — SIGNIFICANT CHANGE UP (ref 7–23)
CALCIUM SERPL-MCNC: 7 MG/DL — LOW (ref 8.4–10.5)
CHLORIDE SERPL-SCNC: 99 MMOL/L — SIGNIFICANT CHANGE UP (ref 96–108)
CO2 SERPL-SCNC: 25 MMOL/L — SIGNIFICANT CHANGE UP (ref 22–31)
CREAT SERPL-MCNC: 0.49 MG/DL — LOW (ref 0.5–1.3)
GLUCOSE SERPL-MCNC: 115 MG/DL — HIGH (ref 70–99)
MAGNESIUM SERPL-MCNC: 2.1 MG/DL — SIGNIFICANT CHANGE UP (ref 1.6–2.6)
PHOSPHATE SERPL-MCNC: 3 MG/DL — SIGNIFICANT CHANGE UP (ref 2.5–4.5)
POTASSIUM SERPL-MCNC: 4.9 MMOL/L — SIGNIFICANT CHANGE UP (ref 3.5–5.3)
POTASSIUM SERPL-SCNC: 4.9 MMOL/L — SIGNIFICANT CHANGE UP (ref 3.5–5.3)
SODIUM SERPL-SCNC: 136 MMOL/L — SIGNIFICANT CHANGE UP (ref 135–145)

## 2019-02-14 PROCEDURE — 99291 CRITICAL CARE FIRST HOUR: CPT

## 2019-02-14 PROCEDURE — 72170 X-RAY EXAM OF PELVIS: CPT | Mod: 26

## 2019-02-14 PROCEDURE — 71045 X-RAY EXAM CHEST 1 VIEW: CPT | Mod: 26

## 2019-02-14 PROCEDURE — 99232 SBSQ HOSP IP/OBS MODERATE 35: CPT

## 2019-02-14 PROCEDURE — 99232 SBSQ HOSP IP/OBS MODERATE 35: CPT | Mod: GC

## 2019-02-14 RX ORDER — SODIUM CHLORIDE 9 MG/ML
500 INJECTION, SOLUTION INTRAVENOUS ONCE
Qty: 0 | Refills: 0 | Status: COMPLETED | OUTPATIENT
Start: 2019-02-14 | End: 2019-02-14

## 2019-02-14 RX ORDER — IPRATROPIUM/ALBUTEROL SULFATE 18-103MCG
3 AEROSOL WITH ADAPTER (GRAM) INHALATION EVERY 6 HOURS
Qty: 0 | Refills: 0 | Status: DISCONTINUED | OUTPATIENT
Start: 2019-02-14 | End: 2019-02-22

## 2019-02-14 RX ORDER — LEVOTHYROXINE SODIUM 125 MCG
75 TABLET ORAL DAILY
Qty: 0 | Refills: 0 | Status: DISCONTINUED | OUTPATIENT
Start: 2019-02-14 | End: 2019-02-22

## 2019-02-14 RX ORDER — MIRTAZAPINE 45 MG/1
15 TABLET, ORALLY DISINTEGRATING ORAL
Qty: 0 | Refills: 0 | Status: DISCONTINUED | OUTPATIENT
Start: 2019-02-14 | End: 2019-02-22

## 2019-02-14 RX ADMIN — Medication 100 MILLIGRAM(S): at 08:48

## 2019-02-14 RX ADMIN — Medication 500 MILLIGRAM(S): at 21:11

## 2019-02-14 RX ADMIN — Medication 500 MILLIGRAM(S): at 08:54

## 2019-02-14 RX ADMIN — MIRTAZAPINE 15 MILLIGRAM(S): 45 TABLET, ORALLY DISINTEGRATING ORAL at 21:11

## 2019-02-14 RX ADMIN — Medication 20 MILLIGRAM(S): at 05:42

## 2019-02-14 RX ADMIN — Medication 500 MILLIGRAM(S): at 13:17

## 2019-02-14 RX ADMIN — ZIPRASIDONE HYDROCHLORIDE 40 MILLIGRAM(S): 20 CAPSULE ORAL at 08:49

## 2019-02-14 RX ADMIN — SPIRONOLACTONE 50 MILLIGRAM(S): 25 TABLET, FILM COATED ORAL at 08:48

## 2019-02-14 RX ADMIN — BUPROPION HYDROCHLORIDE 300 MILLIGRAM(S): 150 TABLET, EXTENDED RELEASE ORAL at 13:17

## 2019-02-14 RX ADMIN — CHLORHEXIDINE GLUCONATE 1 APPLICATION(S): 213 SOLUTION TOPICAL at 05:42

## 2019-02-14 RX ADMIN — SODIUM CHLORIDE 500 MILLILITER(S): 9 INJECTION, SOLUTION INTRAVENOUS at 06:50

## 2019-02-14 RX ADMIN — ENOXAPARIN SODIUM 40 MILLIGRAM(S): 100 INJECTION SUBCUTANEOUS at 13:16

## 2019-02-14 RX ADMIN — Medication 12.5 MILLIGRAM(S): at 05:42

## 2019-02-14 NOTE — PROGRESS NOTE ADULT - SUBJECTIVE AND OBJECTIVE BOX
HISTORY  68 year old female with a past medical history of bipolar depression, OCD, dementia, HLD, NHL (in remission since 2016), osteoarthritis s/p bilateral TKR, and recent right YONATAN (1/20-1/26)  who presented on 2/4 as a level two trauma after a fall. Patient was found down in rehab after likely rolling out of bed. Patient was upgraded to a level 1 due to hypotension w/ SBP in the 80s despite fluid resuscitation and transfusions. Imaging revealed pancolitis and a right periprosthetic femoral neck fracture. Patient tested for C. difficile and was positive. She was admitted to SICU for hemodynamic monitoring in the setting of sepsis.    24 HOUR EVENTS:  - Administered 40mg Lasix IVP, 50mg Aldactone PO for pulmonary vascular congestion, bilateral pleural effusions  - Advanced to regular diet    SUBJECTIVE/ROS:  [x ] A ten-point review of systems was otherwise negative except as noted.  [ ] Due to altered mental status/intubation, subjective information were not able to be obtained from the patient. History was obtained, to the extent possible, from review of the chart and collateral sources of information.      NEURO  RASS:     GCS:     CAM ICU:  Exam: awake, alert, oriented x 2, NAD  Meds: acetaminophen   Tablet .. 650 milliGRAM(s) Oral every 6 hours PRN Mild Pain (1 - 3)  buPROPion XL . 300 milliGRAM(s) Oral daily  ziprasidone 40 milliGRAM(s) Oral <User Schedule>    [x] Adequacy of sedation and pain control has been assessed and adjusted      RESPIRATORY  RR: 23 (02-13-19 @ 22:00) (20 - 35)  SpO2: 96% (02-13-19 @ 22:00) (90% - 100%)  Wt(kg): --  Exam: unlabored, clear to auscultation bilaterally, yet decreased at the bases  Mechanical Ventilation:     [N/A] Extubation Readiness Assessed  Meds: ALBUTerol/ipratropium for Nebulization 3 milliLiter(s) Nebulizer every 4 hours        CARDIOVASCULAR  HR: 102 (02-13-19 @ 22:00) (86 - 115)  BP: 104/54 (02-13-19 @ 22:00) (87/51 - 113/80)  BP(mean): 74 (02-13-19 @ 22:00) (61 - 91)  ABP: --  ABP(mean): --  Wt(kg): --  CVP(cm H2O): --      Exam: regular rate and rhythm  Cardiac Rhythm: sinus  Perfusion     [x]Adequate   [ ]Inadequate  Mentation   []Normal       [x ]Reduced   Extremities  [x]Warm         [ ]Cool  Volume Status [ ]Hypervolemic [x]Euvolemic [ ]Hypovolemic  Meds: furosemide Solution 20 milliGRAM(s) Oral daily  metoprolol tartrate 12.5 milliGRAM(s) Oral every 12 hours  spironolactone Suspension 50 milliGRAM(s) Oral daily        GI/NUTRITION  Exam: softly distended, non-tender  Diet: Regular diet  Meds:     GENITOURINARY  I&O's Detail    02-12 @ 07:01  -  02-13 @ 07:00  --------------------------------------------------------  IN:    Oral Fluid: 240 mL    Solution: 200 mL    Solution: 325 mL  Total IN: 765 mL    OUT:    Indwelling Catheter - Urethral: 1400 mL    Rectal Tube: 300 mL    Voided: 200 mL  Total OUT: 1900 mL    Total NET: -1135 mL      02-13 @ 07:01 - 02-14 @ 01:13  --------------------------------------------------------  IN:    Solution: 125 mL    Solution: 200 mL  Total IN: 325 mL    OUT:    Stool: 4 mL    Voided: 800 mL  Total OUT: 804 mL    Total NET: -479 mL          02-13    139  |  98  |  19  ----------------------------<  127<H>  4.2   |  30  |  0.60    Ca    7.3<L>      13 Feb 2019 03:39  Phos  2.4     02-13  Mg     2.3     02-13      [ ] Munguia catheter, indication: N/A  Meds: potassium chloride   Solution 20 milliEquivalent(s) Oral two times a day        HEMATOLOGIC  Meds: enoxaparin Injectable 40 milliGRAM(s) SubCutaneous daily    [x] VTE Prophylaxis                        11.5   10.2  )-----------( 57       ( 13 Feb 2019 03:39 )             34.2       Transfusion     [ ] PRBC   [ ] Platelets   [ ] FFP   [ ] Cryoprecipitate      INFECTIOUS DISEASES  WBC Count: 10.2 K/uL (02-13 @ 03:39)    RECENT CULTURES:    Meds: influenza   Vaccine 0.5 milliLiter(s) IntraMuscular once  metroNIDAZOLE  IVPB 500 milliGRAM(s) IV Intermittent every 8 hours  vancomycin    Solution 500 milliGRAM(s) Oral every 6 hours        ENDOCRINE  CAPILLARY BLOOD GLUCOSE        Meds: levothyroxine Injectable 37.5 MICROGram(s) IV Push at bedtime        ACCESS DEVICES:  [x ] Peripheral IV  [ ] Central Venous Line	[ ] R	[ ] L	[ ] IJ	[ ] Fem	[ ] SC	Placed:   [ ] Arterial Line		[ ] R	[ ] L	[ ] Fem	[ ] Rad	[ ] Ax	Placed:   [ ] PICC:					[ ] Mediport  [ ] Urinary Catheter, Date Placed:   [x] Necessity of urinary, arterial, and venous catheters discussed    OTHER MEDICATIONS:  chlorhexidine 4% Liquid 1 Application(s) Topical <User Schedule>      CODE STATUS: DNR/ DNI        IMAGING: HISTORY  68 year old female with a past medical history of bipolar depression, OCD, dementia, HLD, NHL (in remission since 2016), osteoarthritis s/p bilateral TKR, and recent right YONATAN (1/20-1/26)  who presented on 2/4 as a level two trauma after a fall. Patient was found down in rehab after likely rolling out of bed. Patient was upgraded to a level 1 due to hypotension w/ SBP in the 80s despite fluid resuscitation and transfusions. Imaging revealed pancolitis and a right periprosthetic femoral neck fracture. Patient tested for C. difficile and was positive. She was admitted to SICU for hemodynamic monitoring in the setting of sepsis.    24 HOUR EVENTS:  - Administered 40mg Lasix IVP, 50mg Aldactone PO for pulmonary vascular congestion, bilateral pleural effusions  - Advanced to regular diet    SUBJECTIVE/ROS:  [x ] A ten-point review of systems was otherwise negative except as noted.  [ ] Due to altered mental status/intubation, subjective information were not able to be obtained from the patient. History was obtained, to the extent possible, from review of the chart and collateral sources of information.      NEURO  RASS:     GCS:     CAM ICU:  Exam: awake, alert, oriented x 2, NAD  Meds: acetaminophen   Tablet .. 650 milliGRAM(s) Oral every 6 hours PRN Mild Pain (1 - 3)  buPROPion XL . 300 milliGRAM(s) Oral daily  ziprasidone 40 milliGRAM(s) Oral <User Schedule>    [x] Adequacy of sedation and pain control has been assessed and adjusted      RESPIRATORY  RR: 23 (02-13-19 @ 22:00) (20 - 35)  SpO2: 96% (02-13-19 @ 22:00) (90% - 100%)  Wt(kg): --  Exam: unlabored, clear to auscultation bilaterally, yet decreased at the bases  Mechanical Ventilation:     [N/A] Extubation Readiness Assessed  Meds: ALBUTerol/ipratropium for Nebulization 3 milliLiter(s) Nebulizer every 4 hours        CARDIOVASCULAR  HR: 102 (02-13-19 @ 22:00) (86 - 115)  BP: 104/54 (02-13-19 @ 22:00) (87/51 - 113/80)  BP(mean): 74 (02-13-19 @ 22:00) (61 - 91)  ABP: --  ABP(mean): --  Wt(kg): --  CVP(cm H2O): --      Exam: regular rate and rhythm  Cardiac Rhythm: sinus  Perfusion     [x]Adequate   [ ]Inadequate  Mentation   []Normal       [x ]Reduced   Extremities  [x]Warm         [ ]Cool  Volume Status [ ]Hypervolemic [x]Euvolemic [ ]Hypovolemic  Meds: furosemide Solution 20 milliGRAM(s) Oral daily  metoprolol tartrate 12.5 milliGRAM(s) Oral every 12 hours  spironolactone Suspension 50 milliGRAM(s) Oral daily        GI/NUTRITION  Exam: softly distended, non-tender  Diet: Regular diet  Meds:     GENITOURINARY  I&O's Detail    13 Feb 2019 07:01  -  14 Feb 2019 07:00  --------------------------------------------------------  IN:    Solution: 125 mL    Solution: 300 mL    Solution: 500 mL  Total IN: 925 mL    OUT:    Stool: 5 mL    Voided: 900 mL  Total OUT: 905 mL    Total NET: 20 mL                02-13    139  |  98  |  19  ----------------------------<  127<H>  4.2   |  30  |  0.60    Ca    7.3<L>      13 Feb 2019 03:39  Phos  2.4     02-13  Mg     2.3     02-13      [ ] Munguia catheter, indication: N/A  Meds: potassium chloride   Solution 20 milliEquivalent(s) Oral two times a day        HEMATOLOGIC  Meds: enoxaparin Injectable 40 milliGRAM(s) SubCutaneous daily    [x] VTE Prophylaxis                        11.5   10.2  )-----------( 57       ( 13 Feb 2019 03:39 )             34.2       Transfusion     [ ] PRBC   [ ] Platelets   [ ] FFP   [ ] Cryoprecipitate      INFECTIOUS DISEASES  WBC Count: 10.2 K/uL (02-13 @ 03:39)    RECENT CULTURES:    Meds: influenza   Vaccine 0.5 milliLiter(s) IntraMuscular once  metroNIDAZOLE  IVPB 500 milliGRAM(s) IV Intermittent every 8 hours  vancomycin    Solution 500 milliGRAM(s) Oral every 6 hours        ENDOCRINE  CAPILLARY BLOOD GLUCOSE        Meds: levothyroxine Injectable 37.5 MICROGram(s) IV Push at bedtime        ACCESS DEVICES:  [x ] Peripheral IV  [ ] Central Venous Line	[ ] R	[ ] L	[ ] IJ	[ ] Fem	[ ] SC	Placed:   [ ] Arterial Line		[ ] R	[ ] L	[ ] Fem	[ ] Rad	[ ] Ax	Placed:   [ ] PICC:					[ ] Mediport  [ ] Urinary Catheter, Date Placed:   [x] Necessity of urinary, arterial, and venous catheters discussed    OTHER MEDICATIONS:  chlorhexidine 4% Liquid 1 Application(s) Topical <User Schedule>      CODE STATUS: DNR/ DNI        IMAGING:

## 2019-02-14 NOTE — PROGRESS NOTE ADULT - SUBJECTIVE AND OBJECTIVE BOX
Patient is a 68y old  Female who presents with a chief complaint of Severe C. Diff Colitis (14 Feb 2019 01:12)    Being followed by ID for C diff     Interval history:rectal tube removed   patient had 1 BM   No other acute events      ROS:  No cough,SOB,CP  No N/V  No abd pain  No urinary complaints  No HA  No joint or limb pain  No other complaints      Antimicrobials:    metroNIDAZOLE  IVPB 500 milliGRAM(s) IV Intermittent every 8 hours  vancomycin    Solution 500 milliGRAM(s) Oral every 6 hours      other medications reviewed    Vital Signs Last 24 Hrs  T(C): 36.7 (02-14-19 @ 07:00), Max: 36.7 (02-14-19 @ 00:00)  T(F): 98.1 (02-14-19 @ 07:00), Max: 98.1 (02-14-19 @ 04:00)  HR: 88 (02-14-19 @ 15:00) (85 - 103)  BP: 93/49 (02-14-19 @ 15:00) (83/50 - 122/53)  BP(mean): 65 (02-14-19 @ 15:00) (59 - 88)  RR: 17 (02-14-19 @ 15:00) (17 - 29)  SpO2: 100% (02-14-19 @ 15:00) (94% - 100%)      Physical Exam:    HEENT PERRLA EOMI,No pallor or icterus    No oral exudate or erythema    Neck supple no JVD or LN    Chest Good AE,CTA    CVS RRR S1 S2 WNl + ESM no rub or gallop    Abd distention reduced   + BS no tenderness No rebound or guarding  No masses    Ext Hip painful ROM     IV site no erythema tenderness or discharge    Joints no swelling or LOM  Lab Data:                          11.5   10.2  )-----------( 57       ( 13 Feb 2019 03:39 )             34.2       02-14    136  |  99  |  16  ----------------------------<  115<H>  4.9   |  25  |  0.49<L>    Ca    7.0<L>      14 Feb 2019 03:19  Phos  3.0     02-14  Mg     2.1     02-14    < from: Xray Chest 1 View- PORTABLE-Routine (02.13.19 @ 06:45) >  Impression:    Poor inspiratory effort. The heart is normal in size. Bilateral small   pleural effusion. Left lower lobe pneumonia and/or atelectasis. No change   in the appear of the chest when compared to previous study done February 12, 2019.                < end of copied text >

## 2019-02-14 NOTE — PROGRESS NOTE ADULT - SUBJECTIVE AND OBJECTIVE BOX
69 y/o critically ill  female h/o dementia NHL presenting after unwitnessed fall. Pt found on the ground at rehab. is on lovenox after hip fracture. pt reports pain to her right leg. per EMS en route slurring speech and mildly hypotensive requesting trauma.unclear how long pt on the ground. Patient found to have C diff with worsening hypotension requiring pressors on and off and  Patient requiring the SICU for continued cardiopulmonary monitoring. Orthopedic procedure on hold due to  hypotension and pan colitis and need for pressors due to unstable hemodynamics. Diarrhea slowing with IV Flagyl + oral vancomycin. Patient now has  rectal tube removed with no diarrhea..  Stable leukocytosis and improving but still confused. leukocytosis and diarrhea have improved    MEDICATIONS  (STANDING):  buPROPion XL . 300 milliGRAM(s) Oral daily  chlorhexidine 4% Liquid 1 Application(s) Topical <User Schedule>  enoxaparin Injectable 40 milliGRAM(s) SubCutaneous daily  furosemide Solution 20 milliGRAM(s) Oral daily  influenza   Vaccine 0.5 milliLiter(s) IntraMuscular once  levothyroxine 75 MICROGram(s) Oral daily  metoprolol tartrate 12.5 milliGRAM(s) Oral every 12 hours  mirtazapine 15 milliGRAM(s) Oral <User Schedule>  spironolactone Suspension 50 milliGRAM(s) Oral daily  vancomycin    Solution 500 milliGRAM(s) Oral every 6 hours  ziprasidone 40 milliGRAM(s) Oral <User Schedule>    MEDICATIONS  (PRN):  acetaminophen   Tablet .. 650 milliGRAM(s) Oral every 6 hours PRN Mild Pain (1 - 3)  ALBUTerol/ipratropium for Nebulization 3 milliLiter(s) Nebulizer every 6 hours PRN Shortness of Breath and/or Wheezing          VITALS:   T(C): 36.5 (02-14-19 @ 22:05), Max: 36.7 (02-14-19 @ 00:00)  HR: 96 (02-14-19 @ 22:05) (85 - 101)  BP: 97/65 (02-14-19 @ 22:05) (83/50 - 122/53)  RR: 20 (02-14-19 @ 22:05) (17 - 29)  SpO2: 96% (02-14-19 @ 22:05) (94% - 100%)  Wt(kg): --    PHYSICAL EXAM:  GENERAL: NAD, well-groomed, well-developed  HEAD:  Atraumatic, Normocephalic  EYES: EOMI, PERRLA, conjunctiva and sclera clear  ENMT: No tonsillar erythema, exudates, or enlargement; Moist mucous membranes, Good dentition, No lesions  NECK: Supple, No JVD, Normal thyroid  NERVOUS SYSTEM:  Alert & Oriented X3, Good concentration; Motor Strength 5/5 B/L upper and lower extremities; DTRs 2+ intact and symmetric  CHEST/LUNG: Clear to percussion bilaterally; No rales, rhonchi, wheezing, or rubs  HEART: Regular rate and rhythm; No murmurs, rubs, or gallops  ABDOMEN: Soft, Nontender, Nondistended; Bowel sounds present  EXTREMITIES:  2+ Peripheral Pulses, No clubbing, cyanosis, or edema  LYMPH: No lymphadenopathy noted  SKIN: No rashes or lesions      LABS:        CBC Full  -  ( 13 Feb 2019 03:39 )  WBC Count : 10.2 K/uL  Hemoglobin : 11.5 g/dL  Hematocrit : 34.2 %  Platelet Count - Automated : 57 K/uL  Mean Cell Volume : 98.8 fl  Mean Cell Hemoglobin : 33.1 pg  Mean Cell Hemoglobin Concentration : 33.5 gm/dL  Auto Neutrophil # : x  Auto Lymphocyte # : x  Auto Monocyte # : x  Auto Eosinophil # : x  Auto Basophil # : x  Auto Neutrophil % : x  Auto Lymphocyte % : x  Auto Monocyte % : x  Auto Eosinophil % : x  Auto Basophil % : x    02-14    136  |  99  |  16  ----------------------------<  115<H>  4.9   |  25  |  0.49<L>    Ca    7.0<L>      14 Feb 2019 03:19  Phos  3.0     02-14  Mg     2.1     02-14            CAPILLARY BLOOD GLUCOSE          RADIOLOGY & ADDITIONAL TESTS:

## 2019-02-14 NOTE — CHART NOTE - NSCHARTNOTEFT_GEN_A_CORE
Nutrition Follow Up Note    Patient seen for: nutrition follow up on SICU    Source: RN, SICU team colleen. Pt unable to participate in interview.    Chart reviewed, events noted. "68 year old female with bipolar depression, OCD, dementia, HLD, NHL (in remission since 2016), osteoarthritis s/p bilateral TKR s/p fall presenting with a right periprosthetic femoral neck fracture and C. difficile colitis."    Diet (): Regular     PO intake : RN reports pt with poor appetite. Pt requires full feeding assistance and encouragement.     Source for PO intake: RN, personal observations    Last emesis:      Last BM: ,     Daily Weight in k.5 (-), Weight in k (), Weight in k.3 (), Weight in k.8 (), Weight in k.2 (02-10), Weight in k.7 (), Weight in k ()    Drug Dosing Weight  Weight (kg): 61.1 (2019 09:59)  BMI (kg/m2): 25.5 (2019 09:59)      Pertinent Medications: MEDICATIONS  (STANDING):  buPROPion XL . 300 milliGRAM(s) Oral daily  chlorhexidine 4% Liquid 1 Application(s) Topical <User Schedule>  enoxaparin Injectable 40 milliGRAM(s) SubCutaneous daily  furosemide Solution 20 milliGRAM(s) Oral daily  influenza   Vaccine 0.5 milliLiter(s) IntraMuscular once  levothyroxine 75 MICROGram(s) Oral daily  metoprolol tartrate 12.5 milliGRAM(s) Oral every 12 hours  metroNIDAZOLE  IVPB 500 milliGRAM(s) IV Intermittent every 8 hours  mirtazapine 15 milliGRAM(s) Oral <User Schedule>  spironolactone Suspension 50 milliGRAM(s) Oral daily  vancomycin    Solution 500 milliGRAM(s) Oral every 6 hours  ziprasidone 40 milliGRAM(s) Oral <User Schedule>    MEDICATIONS  (PRN):  acetaminophen   Tablet .. 650 milliGRAM(s) Oral every 6 hours PRN Mild Pain (1 - 3)  ALBUTerol/ipratropium for Nebulization 3 milliLiter(s) Nebulizer every 6 hours PRN Shortness of Breath and/or Wheezing      LABS:    @ 03:19: Sodium 136, Potassium 4.9, Chloride 99, Calcium 7.0<L>, Magnesium 2.1, Phosphorus 3.0, BUN 16, Creatinine 0.49<L>, <H>    Skin per nursing documentation: no pressure injuries noted  Edema: 1+ generalized, left/right hand, foot, leg    Estimated Needs:   [ X] no change since previous assessment  [ ] recalculated:     Previous Nutrition Diagnosis: Increased Nutrient Needs  Nutrition Diagnosis is: ongoing    New Nutrition Diagnosis: Inadequate Protein-Energy Intake  Related to:    As evidenced by: inadequate diet order for 8 of 11 days in-house     Interventions:     Recommend  1) Continue Regular diet; provide full feeding assistance and encouragement  2) Mighty Shakes (200 calories, 6 Gm protein) and Magic Cup supplement (290 calories, 9 Gm protein) added to meal trays to encourage greater protein-calorie intake.    Monitoring and Evaluation:     Continue to monitor nutritional intake, tolerance to diet prescription, weights, labs, skin integrity    RD remains available upon request and will follow up per protocol    Marge Farmer MS RD N [facility], Pager # [phone] Nutrition Follow Up Note    Patient seen for: nutrition follow up on SICU    Source: RN, SICU team colleen. Pt unable to participate in interview.    Chart reviewed, events noted. "68 year old female with bipolar depression, OCD, dementia, HLD, NHL (in remission since 2016), osteoarthritis s/p bilateral TKR s/p fall presenting with a right periprosthetic femoral neck fracture and C. difficile colitis."    Pt ordered for NPO or clear liquids on 8 of 11 days in-house, in setting of altered GI function, distended loops of bowel, sepsis, C. diff. Pt now advanced to Regular diet, with poor appetite noted.    Diet (): Regular     PO intake : RN reports pt with poor appetite. Pt requires full feeding assistance and encouragement.     Source for PO intake: RN, personal observations    Last emesis:      Last BM: ,     Daily Weight in k.5 (-), Weight in k (), Weight in k.3 (), Weight in k.8 (), Weight in k.2 (02-10), Weight in k.7 (-), Weight in k ()    Drug Dosing Weight  Weight (kg): 61.1 (2019 09:59)  BMI (kg/m2): 25.5 (2019 09:59)      Pertinent Medications: MEDICATIONS  (STANDING):  buPROPion XL . 300 milliGRAM(s) Oral daily  chlorhexidine 4% Liquid 1 Application(s) Topical <User Schedule>  enoxaparin Injectable 40 milliGRAM(s) SubCutaneous daily  furosemide Solution 20 milliGRAM(s) Oral daily  influenza   Vaccine 0.5 milliLiter(s) IntraMuscular once  levothyroxine 75 MICROGram(s) Oral daily  metoprolol tartrate 12.5 milliGRAM(s) Oral every 12 hours  metroNIDAZOLE  IVPB 500 milliGRAM(s) IV Intermittent every 8 hours  mirtazapine 15 milliGRAM(s) Oral <User Schedule>  spironolactone Suspension 50 milliGRAM(s) Oral daily  vancomycin    Solution 500 milliGRAM(s) Oral every 6 hours  ziprasidone 40 milliGRAM(s) Oral <User Schedule>    MEDICATIONS  (PRN):  acetaminophen   Tablet .. 650 milliGRAM(s) Oral every 6 hours PRN Mild Pain (1 - 3)  ALBUTerol/ipratropium for Nebulization 3 milliLiter(s) Nebulizer every 6 hours PRN Shortness of Breath and/or Wheezing      LABS:    @ 03:19: Sodium 136, Potassium 4.9, Chloride 99, Calcium 7.0<L>, Magnesium 2.1, Phosphorus 3.0, BUN 16, Creatinine 0.49<L>, <H>    Skin per nursing documentation: no pressure injuries noted  Edema: 1+ generalized, left/right hand, foot, leg    Estimated Needs:   [ X] no change since previous assessment  [ ] recalculated:     Previous Nutrition Diagnosis: Increased Nutrient Needs  Nutrition Diagnosis is: ongoing    New Nutrition Diagnosis: Inadequate Protein-Energy Intake  Related to: altered GI function, distended loops of bowel, sepsis, C. diff   As evidenced by: inadequate diet order for 8 of 11 days in-house     Interventions:     Recommend  1) Continue Regular diet; provide full feeding assistance and encouragement  2) Mighty Shakes (200 calories, 6 Gm protein), and Magic Cup supplement (290 calories, 9 Gm protein) added to meal trays to encourage greater protein-calorie intake.    Monitoring and Evaluation:     Continue to monitor nutritional intake, tolerance to diet prescription, weights, labs, skin integrity    RD remains available upon request and will follow up per protocol    Marge Farmer MS RD N [facility], Pager # [phone]

## 2019-02-14 NOTE — PROGRESS NOTE ADULT - ATTENDING COMMENTS
Left periprosthetic femur fracture. awaiting surgical intervention - currently improving and may soon proceed with surgery  Pain meds as needed orally  as tolerated. Continue ATC cardio pulmonary monitoring for  this critically ill Patient in the SICU.  I am a non participating [facility] physician seeing Pt in coverage for Dr Barraza Left periprosthetic femur fracture. awaiting surgical intervention - currently improving and may soon proceed with surgery  Pain meds as needed orally  as tolerated. Continue ATC cardio pulmonary monitoring for  this critically ill Patient in the SICU.  I am a non participating [facility] physician seeing Pt in coverage for Dr Barraza. The above patient examination was reviewed with Dr. Awan and I agree with his evaluation, assessment and treatment plan.  Orlin Barraza M.D. Right periprosthetic femur fracture. awaiting surgical intervention - currently improving and may soon proceed with surgery  Pain meds as needed orally  as tolerated. Continue ATC cardio pulmonary monitoring for  this critically ill Patient in the SICU.  I am a non participating [facility] physician seeing Pt in coverage for Dr Barraza. The above patient examination was reviewed with Dr. Awan and I agree with his evaluation, assessment and treatment plan.  Orlin Barraza M.D.

## 2019-02-14 NOTE — PROGRESS NOTE ADULT - ASSESSMENT
68F p/w R hip fracture and found to have C. diff colitis. Family refused fecal transplant.    PLAN:  - eventual surgery with Orthopedics   - Multimodal pain control  - C.diff positive - vanc and flagyl  - DVT PPx  - Tertiary  - Appreciate care per SICU    [facility]  [id number]

## 2019-02-14 NOTE — PROGRESS NOTE ADULT - ASSESSMENT
68 year old female with bipolar depression, OCD, dementia, HLD, NHL (in remission since 2016), osteoarthritis s/p bilateral TKR s/p fall presenting with a right periprosthetic femoral neck fracture and C. difficile colitis.    PLAN:  Neuro: bipolar depression, OCD, dementia, acute traumatic pain  - Monitor mental status.  - Continue home bupropion, ziprasidone.  - Pain control as needed with Tylenol.    Resp: Bilateral pleural effusions, mild pulmonary vascular congestion, remains on RA  - Deep breathing exercises and incentive spirometry to prevent atelectasis.   - Duonebs q 6hours prn    CV:  HLD  - Will continue with lopressor 12.5 BID for intermittent episodes of tachycardia, SVT    GI: C. difficile colitis, complicated by ileus( Resolving)  - Continue regular diet    Renal CKD stage III  - Continue with diuretic regimen, 20mg lasix PO daily, 50mg Aldactone daily  - Maintain K > 4, in the setting of intermittent SVT x 48hour ago  - Monitor electrolytes and replete as necessary.    Heme: no acute issues  - Lovenox for VTE prophylaxis.    ID: C. difficile colitis  - Monitor WBC, temperature, and procalcitonin.  - PO vancomycin, , and IV Flagyl for severe C. difficile colitis.    Endo: no acute issues  - Monitor glucose on BMP  -c/w home synthroid    SOFA score: 4 points, < 33.3% Mortality   qSOFA: 0 points    Disposition:   - Full code, Stable for transfer to floor, will maintain isolation precautions    James Nava PA-C  Critical Care Physician Assistant  [employer] #[id number]

## 2019-02-14 NOTE — PROGRESS NOTE ADULT - SUBJECTIVE AND OBJECTIVE BOX
Patient seen and examined at bedside. pain is well controlled, resting comfortably in chair, no respiratory distress.  Per nursing, no issues with SVT overnight, C/o SOB overnight but was stable, confused overnight.     Vital Signs Last 24 Hrs  T(C): 36.7 (14 Feb 2019 04:00), Max: 36.8 (13 Feb 2019 07:00)  T(F): 98.1 (14 Feb 2019 04:00), Max: 98.2 (13 Feb 2019 07:00)  HR: 94 (14 Feb 2019 06:00) (86 - 115)  BP: 122/53 (14 Feb 2019 06:00) (87/51 - 122/53)  BP(mean): 77 (14 Feb 2019 06:00) (61 - 91)  RR: 20 (14 Feb 2019 06:00) (19 - 35)  SpO2: 97% (14 Feb 2019 06:00) (90% - 100%)        Exam:  Gen: NAD  RLE:  Staples in place, skin well approximated, no signs of drainage  TTP to thigh, + Quad function with pain in thigh   Motor: 5/5 EHL/FHL/TA/Gastrocnemius  Sensory: SILT DP/SP/S/S/T nerve distributions  Vascular: 2+ Dorsalis Pedis pulse  Calf TTP, hard to assess due to pt confusion

## 2019-02-14 NOTE — PROGRESS NOTE ADULT - ASSESSMENT
A/P: 68 y F w/ R Hemiarthroplasty done on 1/21, s/p MF, with L S1-2 sacral insufficiency fracture and  R periprosthetic Hip Fracture.     - CT R hip demonstrates PP Femur fracture around Stem  - will get Xray pelvis (ordered) to determine if stable and implant has not subsided- spoke with Resident, will try to get this AM with morning CXR  - Likely non operative treatment being considered now  -FU Transfer to floor as pt more stable  -Analgesia  -DVT ppx, per primary team  -Non weight bearing RLE/bedrest  -May weight bear as tolerated on LLE when not on bedrest  -No planned date for ORIF, will continue to monitor   - C dif management per ID- rectal tube removed  - further recs pending xrays

## 2019-02-14 NOTE — PROVIDER CONTACT NOTE (OTHER) - ASSESSMENT
pt threw medication, refusing to take them. Pt did receive metoprolol and lasix. Did not receive potassium chloride, aldactone or vanco.

## 2019-02-14 NOTE — PROGRESS NOTE ADULT - ATTENDING COMMENTS
Dr. Patrick (Attending Physician)  Delirium did not sleep will start mirtazapine  c/w diuresis, bilateral pleural effusions not improving  c. diff c/w vanc/flagyl  Tx floor

## 2019-02-14 NOTE — CHART NOTE - NSCHARTNOTEFT_GEN_A_CORE
Pelvis xray from today, 2/14 demonstrates implant subsidence and increased displacement. Patient will likely require revision arthroplasty likely next week once medically optimized with a platelet goal > 100,000. This was discussed at length with son who understands that without surgery, the patient will not be able to ambulate in future. Additionally risks and benefits of surgery was also discussed. Son agrees and understands.   Patient's activity to be strict bedrest until OR. Pelvis xray from today, 2/14 demonstrates implant subsidence and increased displacement. Patient will likely require revision arthroplasty likely next week once medically optimized with a platelet goal > 100,000. This was discussed at length with son who understands that without surgery, the patient will not be able to ambulate in future. Additionally risks and benefits of surgery was also discussed. Son agrees and understands.   Patient's activity to be strict bedrest until OR.      I agree with the above note and have personally seen and examined this patient. All pertinent films have been reviewed. Please refer to clinical documentation of the history, physical examinations, data summary, and both assessment and plan as documented above and with which I agree.    Conor George MD  Attending Orthopedic Surgeon

## 2019-02-14 NOTE — PROGRESS NOTE ADULT - ASSESSMENT
68 year old female with a PMHx of bipolar depression, Hyperlipidemia, NHL (in remission), OCD, dementia, with recent hospitalization (1/20-1/26) for right hip hemiarthroplasty for right femoral neck fracture after fall. Hospital course complicated by ESBL E. Coli UTI on Ertapenem (1/23-1/29) now presents with likely Hypovolemic/ Distributive shock secondary to sepsis 2/2 Pancolitis, likely secondary to C. Diff Colitis, Age Indeterminate L5 vertebral body fracture, possible acute as tender to palpation, Nondisplaced sacral insufficiency, Right proximal femoral periprosthetic fracture following unwitnessed fall with cdiff, bandemia, fever. Patient  improved and moved out of the ICU today

## 2019-02-14 NOTE — PROGRESS NOTE ADULT - PROBLEM SELECTOR PLAN 1
eventual surgical repair of periprostatic hip fx  pain meds as needed PO as tolerated  standard prophylactic measures

## 2019-02-14 NOTE — PROGRESS NOTE ADULT - PROBLEM SELECTOR PLAN 1
Severe  pancolitis  family refused FMT  clinically stable improved  Continue po vanco-long tapering course  Can Dc flagyl if tolerates po

## 2019-02-14 NOTE — CHART NOTE - NSCHARTNOTEFT_GEN_A_CORE
Patient to be discharged from the [facility] team consult service today.  Please call [phone] if we can be of further assistance.

## 2019-02-14 NOTE — PROGRESS NOTE ADULT - ATTENDING COMMENTS
Will tailor plan for ID issues  per course,results.Will defer to primary team on management of other issues.  case d/w SICU team   Will Follow.  Beeper [id number]/[id number]/No response-[id number]

## 2019-02-14 NOTE — PROGRESS NOTE ADULT - ATTENDING COMMENTS
I agree with the above note and have personally seen and examined this patient. All pertinent films have been reviewed. Please refer to clinical documentation of the history, physical examinations, data summary, and both assessment and plan as documented above and with which I agree.    xr demonstrates further displacement and failure of the fractured femur and implant subsidence  given above now requires full revision hemiarthroplasty with open reduction internal fixation  needs to be medically optimized first with platelet count>100k  needs medical clearance  d/w patient son who understands she is at very high risk for complications from the revision surgery however without the surgery she may not be able to ambulate on the rle  will plan for surgery when more optimized, likely next week    Conor George MD  Attending Orthopedic Surgeon

## 2019-02-15 LAB
ANION GAP SERPL CALC-SCNC: 12 MMOL/L — SIGNIFICANT CHANGE UP (ref 5–17)
BUN SERPL-MCNC: 18 MG/DL — SIGNIFICANT CHANGE UP (ref 7–23)
CA-I BLD-SCNC: 0.97 MMOL/L — LOW (ref 1.12–1.3)
CALCIUM SERPL-MCNC: 7.1 MG/DL — LOW (ref 8.4–10.5)
CHLORIDE SERPL-SCNC: 94 MMOL/L — LOW (ref 96–108)
CO2 SERPL-SCNC: 32 MMOL/L — HIGH (ref 22–31)
CREAT SERPL-MCNC: 0.59 MG/DL — SIGNIFICANT CHANGE UP (ref 0.5–1.3)
GLUCOSE SERPL-MCNC: 164 MG/DL — HIGH (ref 70–99)
HCT VFR BLD CALC: 34 % — LOW (ref 34.5–45)
HGB BLD-MCNC: 11.6 G/DL — SIGNIFICANT CHANGE UP (ref 11.5–15.5)
MAGNESIUM SERPL-MCNC: 2 MG/DL — SIGNIFICANT CHANGE UP (ref 1.6–2.6)
MCHC RBC-ENTMCNC: 34.1 PG — HIGH (ref 27–34)
MCHC RBC-ENTMCNC: 34.2 GM/DL — SIGNIFICANT CHANGE UP (ref 32–36)
MCV RBC AUTO: 99.7 FL — SIGNIFICANT CHANGE UP (ref 80–100)
PHOSPHATE SERPL-MCNC: 2.9 MG/DL — SIGNIFICANT CHANGE UP (ref 2.5–4.5)
PLATELET # BLD AUTO: 103 K/UL — LOW (ref 150–400)
POTASSIUM SERPL-MCNC: 4 MMOL/L — SIGNIFICANT CHANGE UP (ref 3.5–5.3)
POTASSIUM SERPL-SCNC: 4 MMOL/L — SIGNIFICANT CHANGE UP (ref 3.5–5.3)
RBC # BLD: 3.41 M/UL — LOW (ref 3.8–5.2)
RBC # FLD: 16.2 % — HIGH (ref 10.3–14.5)
SODIUM SERPL-SCNC: 138 MMOL/L — SIGNIFICANT CHANGE UP (ref 135–145)
WBC # BLD: 10.8 K/UL — HIGH (ref 3.8–10.5)
WBC # FLD AUTO: 10.8 K/UL — HIGH (ref 3.8–10.5)

## 2019-02-15 PROCEDURE — 71045 X-RAY EXAM CHEST 1 VIEW: CPT | Mod: 26

## 2019-02-15 PROCEDURE — 99232 SBSQ HOSP IP/OBS MODERATE 35: CPT | Mod: GC

## 2019-02-15 PROCEDURE — 74018 RADEX ABDOMEN 1 VIEW: CPT | Mod: 26

## 2019-02-15 PROCEDURE — 99233 SBSQ HOSP IP/OBS HIGH 50: CPT

## 2019-02-15 PROCEDURE — 99232 SBSQ HOSP IP/OBS MODERATE 35: CPT

## 2019-02-15 RX ORDER — DEXTROSE MONOHYDRATE, SODIUM CHLORIDE, AND POTASSIUM CHLORIDE 50; .745; 4.5 G/1000ML; G/1000ML; G/1000ML
1000 INJECTION, SOLUTION INTRAVENOUS
Qty: 0 | Refills: 0 | Status: DISCONTINUED | OUTPATIENT
Start: 2019-02-15 | End: 2019-02-20

## 2019-02-15 RX ADMIN — Medication 500 MILLIGRAM(S): at 13:15

## 2019-02-15 RX ADMIN — Medication 75 MICROGRAM(S): at 06:35

## 2019-02-15 RX ADMIN — CHLORHEXIDINE GLUCONATE 1 APPLICATION(S): 213 SOLUTION TOPICAL at 08:52

## 2019-02-15 RX ADMIN — ZIPRASIDONE HYDROCHLORIDE 40 MILLIGRAM(S): 20 CAPSULE ORAL at 08:47

## 2019-02-15 RX ADMIN — Medication 12.5 MILLIGRAM(S): at 06:34

## 2019-02-15 RX ADMIN — Medication 3 MILLILITER(S): at 13:16

## 2019-02-15 RX ADMIN — Medication 12.5 MILLIGRAM(S): at 17:47

## 2019-02-15 RX ADMIN — Medication 20 MILLIGRAM(S): at 06:35

## 2019-02-15 RX ADMIN — Medication 500 MILLIGRAM(S): at 08:48

## 2019-02-15 RX ADMIN — ENOXAPARIN SODIUM 40 MILLIGRAM(S): 100 INJECTION SUBCUTANEOUS at 12:37

## 2019-02-15 RX ADMIN — Medication 500 MILLIGRAM(S): at 21:23

## 2019-02-15 RX ADMIN — SPIRONOLACTONE 50 MILLIGRAM(S): 25 TABLET, FILM COATED ORAL at 06:35

## 2019-02-15 RX ADMIN — Medication 500 MILLIGRAM(S): at 02:05

## 2019-02-15 RX ADMIN — BUPROPION HYDROCHLORIDE 300 MILLIGRAM(S): 150 TABLET, EXTENDED RELEASE ORAL at 13:15

## 2019-02-15 RX ADMIN — MIRTAZAPINE 15 MILLIGRAM(S): 45 TABLET, ORALLY DISINTEGRATING ORAL at 21:23

## 2019-02-15 NOTE — PROGRESS NOTE ADULT - SUBJECTIVE AND OBJECTIVE BOX
Patient is a 68y old  Female who presents with a chief complaint of Severe C. Diff Colitis (14 Feb 2019 01:12)    Being followed by ID for c diff colitis     Interval history:feels well  denies abd pain  No diarrhea   No other acute events      ROS:  No cough,SOB,CP  No N/V  No urinary complaints  No HA  No joint or limb pain  No other complaints      Antimicrobials:    vancomycin    Solution 500 milliGRAM(s) Oral every 6 hours      other medications reviewed    Vital Signs Last 24 Hrs  T(C): 36.4 (02-15-19 @ 08:35), Max: 36.8 (02-15-19 @ 05:01)  T(F): 97.5 (02-15-19 @ 08:35), Max: 98.2 (02-15-19 @ 05:01)  HR: 92 (02-15-19 @ 08:35) (85 - 101)  BP: 92/57 (02-15-19 @ 08:35) (92/57 - 114/72)  BP(mean): 73 (02-14-19 @ 21:50) (65 - 77)  RR: 18 (02-15-19 @ 08:35) (17 - 25)  SpO2: 92% (02-15-19 @ 08:35) (92% - 100%)    Physical Exam:  HEENT PERRLA EOMI,No pallor or icterus    No oral exudate or erythema    Neck supple no JVD or LN    Chest Good AE,CTA    CVS RRR S1 S2 WNl + ESM no rub or gallop    Abd distention reduced   + BS no tenderness No rebound or guarding  No masses    Ext Hip painful ROM     IV site no erythema tenderness or discharge    Joints no swelling or LOM  Lab Data:                          11.6   10.8  )-----------( 103      ( 15 Feb 2019 09:46 )             34.0       02-15    138  |  94<L>  |  18  ----------------------------<  164<H>  4.0   |  32<H>  |  0.59    Ca    7.1<L>      15 Feb 2019 09:43  Phos  2.9     02-15  Mg     2.0     02-15

## 2019-02-15 NOTE — PROGRESS NOTE ADULT - PROBLEM SELECTOR PLAN 3
Family now leaning towards intervention next week-d/w Ortho  Given negative cultures,improvement in C diff  and clinical stability no ID contraindications to planned procedure,however risks inherent to any invasive procedure/surgery including infection will remain.Risks/benefits of the same should be discussed with the patient and an informed consent should be obtained by the performing physician.

## 2019-02-15 NOTE — PROGRESS NOTE ADULT - ASSESSMENT
68 year old female with a PMHx of bipolar depression, Hyperlipidemia, NHL (in remission), OCD, dementia, with recent hospitalization (1/20-1/26) for right hip hemiarthroplasty for right femoral neck fracture after fall. Hospital course complicated by ESBL E. Coli UTI on Ertapenem (1/23-1/29) now presents with likely Hypovolemic/ Distributive shock secondary to sepsis 2/2 Pancolitis, likely secondary to C. Diff Colitis, Age Indeterminate L5 vertebral body fracture, possible acute as tender to palpation, Nondisplaced sacral insufficiency, Right proximal femoral periprosthetic fracture following unwitnessed fall with cdiff, bandemia, fever. Patient  improved and moved out of the ICU today 68 year old female with a PMHx of bipolar depression, Hyperlipidemia, NHL (in remission), OCD, dementia, with recent hospitalization (1/20-1/26) for right hip hemiarthroplasty for right femoral neck fracture after fall. Hospital course complicated by ESBL E. Coli UTI on Ertapenem (1/23-1/29) now presents with likely Hypovolemic/ Distributive shock secondary to sepsis 2/2 Pancolitis, likely secondary to C. Diff Colitis, Age Indeterminate L5 vertebral body fracture, possible acute as tender to palpation, Nondisplaced sacral insufficiency, Right proximal femoral periprosthetic fracture following unwitnessed fall with cdiff, bandemia, fever. Patient  improved and to be moved out of the ICU today. 68 year old female with a PMHx of bipolar depression, Hyperlipidemia, NHL (in remission), OCD, dementia, with recent hospitalization (1/20-1/26) for right hip hemiarthroplasty for right femoral neck fracture after fall. Hospital course complicated by ESBL E. Coli UTI on Ertapenem (1/23-1/29) now presents with likely Hypovolemic/ Distributive shock secondary to sepsis 2/2 Pancolitis, likely secondary to C. Diff Colitis, Age Indeterminate L5 vertebral body fracture, possible acute as tender to palpation, Nondisplaced sacral insufficiency, Right proximal femoral periprosthetic fracture following unwitnessed fall with cdiff, bandemia, fever. Patient  improved and to betransferred from the ICU later today.

## 2019-02-15 NOTE — PROGRESS NOTE ADULT - SUBJECTIVE AND OBJECTIVE BOX
67 y/o critically ill  female h/o dementia NHL presenting after unwitnessed fall. Pt found on the ground at rehab. is on lovenox after hip fracture. pt reports pain to her right leg. per EMS en route slurring speech and mildly hypotensive requesting trauma.unclear how long pt on the ground. Patient found to have C diff with worsening hypotension requiring pressors on and off and  Patient requiring the SICU for continued cardiopulmonary monitoring. Orthopedic procedure on hold due to  hypotension and pan colitis and need for pressors due to unstable hemodynamics. Diarrhea slowing with IV Flagyl + oral vancomycin. Patient now has  rectal tube removed with no diarrhea..  Stable leukocytosis and improving but still confused. leukocytosis and diarrhea have improved    MEDICATIONS  (STANDING):  buPROPion XL . 300 milliGRAM(s) Oral daily  chlorhexidine 4% Liquid 1 Application(s) Topical <User Schedule>  enoxaparin Injectable 40 milliGRAM(s) SubCutaneous daily  furosemide Solution 20 milliGRAM(s) Oral daily  influenza   Vaccine 0.5 milliLiter(s) IntraMuscular once  levothyroxine 75 MICROGram(s) Oral daily  metoprolol tartrate 12.5 milliGRAM(s) Oral every 12 hours  mirtazapine 15 milliGRAM(s) Oral <User Schedule>  spironolactone Suspension 50 milliGRAM(s) Oral daily  vancomycin    Solution 500 milliGRAM(s) Oral every 6 hours  ziprasidone 40 milliGRAM(s) Oral <User Schedule>    MEDICATIONS  (PRN):  acetaminophen   Tablet .. 650 milliGRAM(s) Oral every 6 hours PRN Mild Pain (1 - 3)  ALBUTerol/ipratropium for Nebulization 3 milliLiter(s) Nebulizer every 6 hours PRN Shortness of Breath and/or Wheezing          VITALS:   T(C): 36.5 (02-14-19 @ 22:05), Max: 36.7 (02-14-19 @ 00:00)  HR: 96 (02-14-19 @ 22:05) (85 - 101)  BP: 97/65 (02-14-19 @ 22:05) (83/50 - 122/53)  RR: 20 (02-14-19 @ 22:05) (17 - 29)  SpO2: 96% (02-14-19 @ 22:05) (94% - 100%)  Wt(kg): --    PHYSICAL EXAM:  GENERAL: NAD, well-groomed, well-developed  HEAD:  Atraumatic, Normocephalic  EYES: EOMI, PERRLA, conjunctiva and sclera clear  ENMT: No tonsillar erythema, exudates, or enlargement; Moist mucous membranes, Good dentition, No lesions  NECK: Supple, No JVD, Normal thyroid  NERVOUS SYSTEM:  Alert & Oriented X3, Good concentration; Motor Strength 5/5 B/L upper and lower extremities; DTRs 2+ intact and symmetric  CHEST/LUNG: Clear to percussion bilaterally; No rales, rhonchi, wheezing, or rubs  HEART: Regular rate and rhythm; No murmurs, rubs, or gallops  ABDOMEN: Soft, Nontender, Nondistended; Bowel sounds present  EXTREMITIES:  2+ Peripheral Pulses, No clubbing, cyanosis, or edema  LYMPH: No lymphadenopathy noted  SKIN: No rashes or lesions      LABS:        CBC Full  -  ( 13 Feb 2019 03:39 )  WBC Count : 10.2 K/uL  Hemoglobin : 11.5 g/dL  Hematocrit : 34.2 %  Platelet Count - Automated : 57 K/uL  Mean Cell Volume : 98.8 fl  Mean Cell Hemoglobin : 33.1 pg  Mean Cell Hemoglobin Concentration : 33.5 gm/dL  Auto Neutrophil # : x  Auto Lymphocyte # : x  Auto Monocyte # : x  Auto Eosinophil # : x  Auto Basophil # : x  Auto Neutrophil % : x  Auto Lymphocyte % : x  Auto Monocyte % : x  Auto Eosinophil % : x  Auto Basophil % : x    02-14    136  |  99  |  16  ----------------------------<  115<H>  4.9   |  25  |  0.49<L>    Ca    7.0<L>      14 Feb 2019 03:19  Phos  3.0     02-14  Mg     2.1     02-14            CAPILLARY BLOOD GLUCOSE          RADIOLOGY & ADDITIONAL TESTS: 67 y/o critically ill  female h/o dementia NHL presenting after unwitnessed fall. Pt found on the ground at rehab. is on lovenox after hip fracture. pt reports pain to her right leg. per EMS en route slurring speech and mildly hypotensive requesting trauma.unclear how long pt on the ground. Patient found to have C diff with worsening hypotension requiring pressors on and off and  Patient requiring the SICU for continued cardiopulmonary monitoring. Orthopedic procedure on hold due to  hypotension and pan colitis and need for pressors due to unstable hemodynamics. Diarrhea slowing with IV Flagyl + oral vancomycin. Patient now has  rectal tube removed with no diarrhea..  Stable leukocytosis and improving but still confused. leukocytosis and diarrhea have improved    MEDICATIONS  (STANDING):  buPROPion XL . 300 milliGRAM(s) Oral daily  chlorhexidine 4% Liquid 1 Application(s) Topical <User Schedule>  dextrose 5% + sodium chloride 0.45% with potassium chloride 20 mEq/L 1000 milliLiter(s) (50 mL/Hr) IV Continuous <Continuous>  enoxaparin Injectable 40 milliGRAM(s) SubCutaneous daily  furosemide Solution 20 milliGRAM(s) Oral daily  influenza   Vaccine 0.5 milliLiter(s) IntraMuscular once  levothyroxine 75 MICROGram(s) Oral daily  metoprolol tartrate 12.5 milliGRAM(s) Oral every 12 hours  mirtazapine 15 milliGRAM(s) Oral <User Schedule>  spironolactone Suspension 50 milliGRAM(s) Oral daily  vancomycin    Solution 500 milliGRAM(s) Oral every 6 hours  ziprasidone 40 milliGRAM(s) Oral <User Schedule>    MEDICATIONS  (PRN):  acetaminophen   Tablet .. 650 milliGRAM(s) Oral every 6 hours PRN Mild Pain (1 - 3)  ALBUTerol/ipratropium for Nebulization 3 milliLiter(s) Nebulizer every 6 hours PRN Shortness of Breath and/or Wheezing    Vital Signs Last 24 Hrs  T(C): 36.4 (15 Feb 2019 08:35), Max: 36.8 (15 Feb 2019 05:01)  T(F): 97.5 (15 Feb 2019 08:35), Max: 98.2 (15 Feb 2019 05:01)  HR: 92 (15 Feb 2019 08:35) (85 - 101)  BP: 92/57 (15 Feb 2019 08:35) (92/57 - 114/72)  BP(mean): 73 (14 Feb 2019 21:50) (65 - 77)  RR: 18 (15 Feb 2019 08:35) (17 - 25)  SpO2: 92% (15 Feb 2019 08:35) (92% - 100%)  PHYSICAL EXAM:  GENERAL: NAD, well-groomed, well-developed  HEAD:  Atraumatic, Normocephalic  EYES: EOMI, PERRLA, conjunctiva and sclera clear  ENMT: No tonsillar erythema, exudates, or enlargement; Moist mucous membranes, Good dentition, No lesions  NECK: Supple, No JVD, Normal thyroid  NERVOUS SYSTEM:  Alert & Oriented X3, Good concentration; Motor Strength 5/5 B/L upper and lower extremities; DTRs 2+ intact and symmetric  CHEST/LUNG: Clear to percussion bilaterally; No rales, rhonchi, wheezing, or rubs  HEART: Regular rate and rhythm; No murmurs, rubs, or gallops  ABDOMEN: Soft, Nontender, Nondistended; Bowel sounds present  EXTREMITIES:  2+ Peripheral Pulses, No clubbing, cyanosis, or edema  LYMPH: No lymphadenopathy noted  SKIN: No rashes or lesions      LABS:          CBC Full  -  ( 15 Feb 2019 09:46 )  WBC Count : 10.8 K/uL  Hemoglobin : 11.6 g/dL  Hematocrit : 34.0 %  Platelet Count - Automated : 103 K/uL  Mean Cell Volume : 99.7 fl  Mean Cell Hemoglobin : 34.1 pg  Mean Cell Hemoglobin Concentration : 34.2 gm/dL  Auto Neutrophil # : x  Auto Lymphocyte # : x  Auto Monocyte # : x  Auto Eosinophil # : x  Auto Basophil # : x  Auto Neutrophil % : x  Auto Lymphocyte % : x  Auto Monocyte % : x  Auto Eosinophil % : x  Auto Basophil % : x    02-15    138  |  94<L>  |  18  ----------------------------<  164<H>  4.0   |  32<H>  |  0.59    Ca    7.1<L>      15 Feb 2019 09:43  Phos  2.9     02-15  Mg     2.0     02-15 67 y/o critically ill  female h/o dementia NHL presenting after unwitnessed fall. Pt found on the ground at rehab. is on lovenox after hip fracture. pt reports pain to her right leg. per EMS en route slurring speech and mildly hypotensive requesting trauma.unclear how long pt on the ground. Patient found to have C diff with worsening hypotension requiring pressors on and off and  Patient requiring the SICU for continued cardiopulmonary monitoring. Orthopedic procedure on hold due to  hypotension and pan colitis and need for pressors due to unstable hemodynamics. Diarrhea slowing with IV Flagyl + oral vancomycin. Patient now has the rectal tube removed.  Stable leukocytosis and improving but still confused. Leukocytosis and diarrhea have improved. The patient is also less agitated and she answering questions with better cognitive  function. Right hip fracture surgery is now scheduled for 02/22/19.    MEDICATIONS  (STANDING):  buPROPion XL . 300 milliGRAM(s) Oral daily  chlorhexidine 4% Liquid 1 Application(s) Topical <User Schedule>  dextrose 5% + sodium chloride 0.45% with potassium chloride 20 mEq/L 1000 milliLiter(s) (50 mL/Hr) IV Continuous <Continuous>  enoxaparin Injectable 40 milliGRAM(s) SubCutaneous daily  furosemide Solution 20 milliGRAM(s) Oral daily  influenza   Vaccine 0.5 milliLiter(s) IntraMuscular once  levothyroxine 75 MICROGram(s) Oral daily  metoprolol tartrate 12.5 milliGRAM(s) Oral every 12 hours  mirtazapine 15 milliGRAM(s) Oral <User Schedule>  spironolactone Suspension 50 milliGRAM(s) Oral daily  vancomycin    Solution 500 milliGRAM(s) Oral every 6 hours  ziprasidone 40 milliGRAM(s) Oral <User Schedule>    MEDICATIONS  (PRN):  acetaminophen   Tablet .. 650 milliGRAM(s) Oral every 6 hours PRN Mild Pain (1 - 3)  ALBUTerol/ipratropium for Nebulization 3 milliLiter(s) Nebulizer every 6 hours PRN Shortness of Breath and/or Wheezing    Vital Signs Last 24 Hrs  T(C): 36.4 (15 Feb 2019 08:35), Max: 36.8 (15 Feb 2019 05:01)  T(F): 97.5 (15 Feb 2019 08:35), Max: 98.2 (15 Feb 2019 05:01)  HR: 92 (15 Feb 2019 08:35) (85 - 101)  BP: 92/57 (15 Feb 2019 08:35) (92/57 - 114/72)  BP(mean): 73 (14 Feb 2019 21:50) (65 - 77)  RR: 18 (15 Feb 2019 08:35) (17 - 25)  SpO2: 92% (15 Feb 2019 08:35) (92% - 100%)  PHYSICAL EXAM:  GENERAL: NAD, well-groomed, well-developed  HEAD:  Atraumatic, Normocephalic  EYES: EOMI, PERRLA, conjunctiva and sclera clear  ENMT: No tonsillar erythema, exudates, or enlargement; Moist mucous membranes, Good dentition, No lesions  NECK: Supple, No JVD, Normal thyroid  NERVOUS SYSTEM:  Alert & Oriented X3, Good concentration; Motor Strength 5/5 B/L upper and lower extremities; DTRs 2+ intact and symmetric  CHEST/LUNG: Clear to percussion bilaterally; No rales, rhonchi, wheezing, or rubs  HEART: Regular rate and rhythm; No murmurs, rubs, or gallops  ABDOMEN: Soft, Nontender, Nondistended; Bowel sounds present  EXTREMITIES:  2+ Peripheral Pulses, No clubbing, cyanosis, or edema  LYMPH: No lymphadenopathy noted  SKIN: No rashes or lesions      LABS:          CBC Full  -  ( 15 Feb 2019 09:46 )  WBC Count : 10.8 K/uL  Hemoglobin : 11.6 g/dL  Hematocrit : 34.0 %  Platelet Count - Automated : 103 K/uL  Mean Cell Volume : 99.7 fl  Mean Cell Hemoglobin : 34.1 pg  Mean Cell Hemoglobin Concentration : 34.2 gm/dL  Auto Neutrophil # : x  Auto Lymphocyte # : x  Auto Monocyte # : x  Auto Eosinophil # : x  Auto Basophil # : x  Auto Neutrophil % : x  Auto Lymphocyte % : x  Auto Monocyte % : x  Auto Eosinophil % : x  Auto Basophil % : x    02-15    138  |  94<L>  |  18  ----------------------------<  164<H>  4.0   |  32<H>  |  0.59    Ca    7.1<L>      15 Feb 2019 09:43  Phos  2.9     02-15  Mg     2.0     02-15 67 y/o critically ill  female h/o dementia NHL presenting after unwitnessed fall. Pt found on the ground at rehab. is on lovenox after hip fracture. pt reports pain to her right leg. per EMS en route slurring speech and mildly hypotensive requesting trauma.unclear how long pt on the ground. Patient found to have C diff with worsening hypotension requiring pressors on and off and  Patient requiring the SICU for continued cardiopulmonary monitoring. Orthopedic procedure on hold due to  hypotension and pan colitis and need for pressors due to unstable hemodynamics. Diarrhea slowing with IV Flagyl + oral vancomycin. Patient now has the rectal tube removed.  Stable dementia and still confused. Leukocytosis and diarrhea have improved. The patient is also less agitated and she answering questions with better cognitive  function. Right hip fracture surgery is now scheduled for 02/22/19.    MEDICATIONS  (STANDING):  buPROPion XL . 300 milliGRAM(s) Oral daily  chlorhexidine 4% Liquid 1 Application(s) Topical <User Schedule>  dextrose 5% + sodium chloride 0.45% with potassium chloride 20 mEq/L 1000 milliLiter(s) (50 mL/Hr) IV Continuous <Continuous>  enoxaparin Injectable 40 milliGRAM(s) SubCutaneous daily  furosemide Solution 20 milliGRAM(s) Oral daily  influenza   Vaccine 0.5 milliLiter(s) IntraMuscular once  levothyroxine 75 MICROGram(s) Oral daily  metoprolol tartrate 12.5 milliGRAM(s) Oral every 12 hours  mirtazapine 15 milliGRAM(s) Oral <User Schedule>  spironolactone Suspension 50 milliGRAM(s) Oral daily  vancomycin    Solution 500 milliGRAM(s) Oral every 6 hours  ziprasidone 40 milliGRAM(s) Oral <User Schedule>    MEDICATIONS  (PRN):  acetaminophen   Tablet .. 650 milliGRAM(s) Oral every 6 hours PRN Mild Pain (1 - 3)  ALBUTerol/ipratropium for Nebulization 3 milliLiter(s) Nebulizer every 6 hours PRN Shortness of Breath and/or Wheezing    Vital Signs Last 24 Hrs  T(C): 36.4 (15 Feb 2019 08:35), Max: 36.8 (15 Feb 2019 05:01)  T(F): 97.5 (15 Feb 2019 08:35), Max: 98.2 (15 Feb 2019 05:01)  HR: 92 (15 Feb 2019 08:35) (85 - 101)  BP: 92/57 (15 Feb 2019 08:35) (92/57 - 114/72)  BP(mean): 73 (14 Feb 2019 21:50) (65 - 77)  RR: 18 (15 Feb 2019 08:35) (17 - 25)  SpO2: 92% (15 Feb 2019 08:35) (92% - 100%)  PHYSICAL EXAM:  GENERAL: NAD, well-groomed, well-developed  HEAD:  Atraumatic, Normocephalic  EYES: EOMI, PERRLA, conjunctiva and sclera clear  ENMT: No tonsillar erythema, exudates, or enlargement; Moist mucous membranes, Good dentition, No lesions  NECK: Supple, No JVD, Normal thyroid  NERVOUS SYSTEM:  Alert & Oriented X3, Good concentration; Motor Strength 5/5 B/L upper and lower extremities; DTRs 2+ intact and symmetric  CHEST/LUNG: Clear to percussion bilaterally; No rales, rhonchi, wheezing, or rubs  HEART: Regular rate and rhythm; No murmurs, rubs, or gallops  ABDOMEN: Soft, Nontender, Nondistended; Bowel sounds present  EXTREMITIES:  2+ Peripheral Pulses, No clubbing, cyanosis, or edema  LYMPH: No lymphadenopathy noted  SKIN: No rashes or lesions      LABS:          CBC Full  -  ( 15 Feb 2019 09:46 )  WBC Count : 10.8 K/uL  Hemoglobin : 11.6 g/dL  Hematocrit : 34.0 %  Platelet Count - Automated : 103 K/uL  Mean Cell Volume : 99.7 fl  Mean Cell Hemoglobin : 34.1 pg  Mean Cell Hemoglobin Concentration : 34.2 gm/dL  Auto Neutrophil # : x  Auto Lymphocyte # : x  Auto Monocyte # : x  Auto Eosinophil # : x  Auto Basophil # : x  Auto Neutrophil % : x  Auto Lymphocyte % : x  Auto Monocyte % : x  Auto Eosinophil % : x  Auto Basophil % : x    02-15    138  |  94<L>  |  18  ----------------------------<  164<H>  4.0   |  32<H>  |  0.59    Ca    7.1<L>      15 Feb 2019 09:43  Phos  2.9     02-15  Mg     2.0     02-15

## 2019-02-15 NOTE — CHART NOTE - NSCHARTNOTEFT_GEN_A_CORE
GENERAL SURGERY DAILY PROGRESS NOTE:       Subjective: Patient examined at bedside. Reports feeling comfortable and resting since transfer from SICU. Having regular BM, states last episode of diarrhea was 2 days ago.          Objective:        Vital Signs Last 24 Hrs  T(C): 36.8 (15 Feb 2019 05:01), Max: 36.8 (15 Feb 2019 05:01)  T(F): 98.2 (15 Feb 2019 05:01), Max: 98.2 (15 Feb 2019 05:01)  HR: 99 (15 Feb 2019 05:01) (85 - 101)  BP: 114/72 (15 Feb 2019 05:01) (83/50 - 122/53)  BP(mean): 73 (14 Feb 2019 21:50) (59 - 77)  RR: 18 (15 Feb 2019 05:01) (17 - 29)  SpO2: 92% (15 Feb 2019 05:01) (92% - 100%)    I&O's Detail    13 Feb 2019 07:01  -  14 Feb 2019 07:00  --------------------------------------------------------  IN:    Solution: 125 mL    Solution: 300 mL    Solution: 500 mL  Total IN: 925 mL    OUT:    Stool: 5 mL    Voided: 900 mL  Total OUT: 905 mL    Total NET: 20 mL      14 Feb 2019 07:01  -  15 Feb 2019 05:05  --------------------------------------------------------  IN:    Oral Fluid: 720 mL    Solution: 100 mL  Total IN: 820 mL    OUT:    Stool: 2 mL    Voided: 100 mL  Total OUT: 102 mL    Total NET: 718 mL            General: NAD, well-nourished, difficulty with verbal expression  HEENT: Atraumatic, EOMI  Resp: Breathing comfortably on RA  CV: Normal sinus rhythm  Abd: soft, NT, ND  Ext: ROMIx4, motor strength intact x 4      LABS:    02-14    136  |  99  |  16  ----------------------------<  115<H>  4.9   |  25  |  0.49<L>    Ca    7.0<L>      14 Feb 2019 03:19  Phos  3.0     02-14  Mg     2.1     02-14            RADIOLOGY & ADDITIONAL STUDIES:    MEDICATIONS  (STANDING):  buPROPion XL . 300 milliGRAM(s) Oral daily  chlorhexidine 4% Liquid 1 Application(s) Topical <User Schedule>  enoxaparin Injectable 40 milliGRAM(s) SubCutaneous daily  furosemide Solution 20 milliGRAM(s) Oral daily  influenza   Vaccine 0.5 milliLiter(s) IntraMuscular once  levothyroxine 75 MICROGram(s) Oral daily  metoprolol tartrate 12.5 milliGRAM(s) Oral every 12 hours  mirtazapine 15 milliGRAM(s) Oral <User Schedule>  spironolactone Suspension 50 milliGRAM(s) Oral daily  vancomycin    Solution 500 milliGRAM(s) Oral every 6 hours  ziprasidone 40 milliGRAM(s) Oral <User Schedule>    MEDICATIONS  (PRN):  acetaminophen   Tablet .. 650 milliGRAM(s) Oral every 6 hours PRN Mild Pain (1 - 3)  ALBUTerol/ipratropium for Nebulization 3 milliLiter(s) Nebulizer every 6 hours PRN Shortness of Breath and/or Wheezing    68F p/w R hip fracture and found to have C. diff colitis. Family refused fecal transplant.    PLAN:  - Strict bedrest - eventual surgery with Orthopedics   - Multimodal pain control  - C.diff positive - c/w vanc  - DVT PPx  - Daily EKG for monitoring on psychiatric medications  - Appreciate care per SICU

## 2019-02-15 NOTE — PROGRESS NOTE ADULT - ASSESSMENT
68F p/w R hip fracture and found to have C. diff colitis. Family refused fecal transplant.    PLAN:  - AXR this AM to evaluate bowel distension, colitis  - eventual surgery with Orthopedics   - Multimodal pain control  - C.diff positive - c/w vanc and flagyl per ID  - DVT PPx    ATP SURGERY  [id number]

## 2019-02-15 NOTE — PROGRESS NOTE ADULT - SUBJECTIVE AND OBJECTIVE BOX
[facility] ATP SURGERY DAILY PROGRESS NOTE    SUBJECTIVE:  -  while on reg diet yesterday, ate minimal food, just sips of juice  -  having moderate pain throughout day  -  awaiting OR procedure with ORTHO when stabilized    OBJECTIVE:    Vital Signs Last 24 Hrs  T(C): 36.8 (15 Feb 2019 05:01), Max: 36.8 (15 Feb 2019 05:01)  T(F): 98.2 (15 Feb 2019 05:01), Max: 98.2 (15 Feb 2019 05:01)  HR: 99 (15 Feb 2019 05:01) (85 - 101)  BP: 114/72 (15 Feb 2019 05:01) (93/49 - 114/72)  BP(mean): 73 (14 Feb 2019 21:50) (65 - 77)  RR: 18 (15 Feb 2019 05:01) (17 - 29)  SpO2: 92% (15 Feb 2019 05:01) (92% - 100%)    I&O's Detail    14 Feb 2019 07:01  -  15 Feb 2019 07:00  --------------------------------------------------------  IN:    Oral Fluid: 720 mL    Solution: 100 mL  Total IN: 820 mL    OUT:    Stool: 2 mL    Voided: 100 mL  Total OUT: 102 mL    Total NET: 718 mL        LABS:    02-14    136  |  99  |  16  ----------------------------<  115<H>  4.9   |  25  |  0.49<L>    Ca    7.0<L>      14 Feb 2019 03:19  Phos  3.0     02-14  Mg     2.1     02-14      EXAM:  NAD, awake and alert, sitting up in bed, conversant  Respirations nonlabored  Abdomen soft, mild ttp, moderately distended  No guarding or rebound tenderness

## 2019-02-15 NOTE — PROGRESS NOTE ADULT - ATTENDING COMMENTS
Patient seen and examined  Doing well  c/o abdominal pain  abd exam - mild diffuse tenderness with no peritoneal signs    - continue tx for c.dif. colitis

## 2019-02-15 NOTE — PROGRESS NOTE ADULT - ASSESSMENT
A/P: 68 y F w/ R Hemiarthroplasty done on 1/21, s/p MF, with L S1-2 sacral insufficiency fracture and  R periprosthetic Hip Fracture. Surgery will be scheduled for next week if platelets are >100 and will now consist of removal of subsided R hemiarthroplasty hardware with placement of new implant.     - CT R hip demonstrates PP Femur fracture around Stem  - Xray pelvis shows implant subsidence   -Analgesia  -DVT ppx, per primary team  -Non weight bearing RLE/bedrest  -May weight bear as tolerated on LLE when not on bedrest  -No planned date for ORIF, will continue to monitor   - C dif management per ID  - further recs pending xrays

## 2019-02-15 NOTE — PROGRESS NOTE ADULT - SUBJECTIVE AND OBJECTIVE BOX
Patient seen and examined at bedside. pain is well controlled, resting comfortably in bed, no respiratory distress. Doing better in SICU so transferred to floor.     Vital Signs Last 24 Hrs  T(C): 36.8 (15 Feb 2019 05:01), Max: 36.8 (15 Feb 2019 05:01)  T(F): 98.2 (15 Feb 2019 05:01), Max: 98.2 (15 Feb 2019 05:01)  HR: 99 (15 Feb 2019 05:01) (85 - 101)  BP: 114/72 (15 Feb 2019 05:01) (83/50 - 114/72)  BP(mean): 73 (14 Feb 2019 21:50) (59 - 77)  RR: 18 (15 Feb 2019 05:01) (17 - 29)  SpO2: 92% (15 Feb 2019 05:01) (92% - 100%)    Exam:  Gen: NAD  RLE:  Staples in place, skin well approximated, no signs of drainage  TTP to thigh, + Quad function with pain in thigh   Motor: 5/5 EHL/FHL/TA/Gastrocnemius  Sensory: SILT DP/SP/S/S/T nerve distributions  Vascular: 2+ Dorsalis Pedis pulse  Calf TTP, hard to assess due to pt confusion

## 2019-02-15 NOTE — PROGRESS NOTE ADULT - ATTENDING COMMENTS
I agree with the above note and have personally seen and examined this patient. All pertinent films have been reviewed. Please refer to clinical documentation of the history, physical examinations, data summary, and both assessment and plan as documented above and with which I agree.    transferred out of sicu  please increase platelet counts >100k in advance of surgery  needs medical clearance  will need r hip hemiarthroplasty revision and open reduction internal fixation    Conor George MD  Attending Orthopedic Surgeon

## 2019-02-15 NOTE — PROGRESS NOTE ADULT - PROBLEM SELECTOR PLAN 1
Clinically better  Tolerating PO  Formed BMs  Continue PO vanco at current dose X 1 more week  If stable can taper-125 mg po q 6 X 2 weeks and the q 12 X 2 more weeks  Monitor clinical status and tailor plan

## 2019-02-15 NOTE — PROGRESS NOTE ADULT - ATTENDING COMMENTS
Case d/w surgery team.  case d/w Ortho team Case d/w surgery team.  case d/w Ortho team  I will  be away till 2/25/29.  My colleagues in the Infectious Diseases service will cover and assume care of ID issues.  Please call [phone] if any issues, concerns or questions.

## 2019-02-15 NOTE — PROGRESS NOTE ADULT - ATTENDING COMMENTS
Left periprosthetic femur fracture. awaiting surgical intervention - currently improving and may soon proceed with surgery  Pain meds as needed orally  as tolerated. Continue ATC cardio pulmonary monitoring for  this critically ill Patient in the SICU.  I am a non participating [facility] physician seeing Pt in coverage for Dr Barraza. The above patient examination was reviewed with Dr. Awan and I agree with his evaluation, assessment and treatment plan.  Orlin Barraza M.D. Right periprosthetic femur fracture. awaiting surgical intervention - currently improving from C. Diff colitis and may soon proceed with surgery  Pain meds as needed orally  as tolerated. Continue ATC cardio pulmonary monitoring for  this critically ill patient, who is now hemodynamically stabilized.

## 2019-02-16 LAB
ANION GAP SERPL CALC-SCNC: 12 MMOL/L — SIGNIFICANT CHANGE UP (ref 5–17)
ANION GAP SERPL CALC-SCNC: 12 MMOL/L — SIGNIFICANT CHANGE UP (ref 5–17)
BUN SERPL-MCNC: 16 MG/DL — SIGNIFICANT CHANGE UP (ref 7–23)
BUN SERPL-MCNC: 18 MG/DL — SIGNIFICANT CHANGE UP (ref 7–23)
CALCIUM SERPL-MCNC: 7.3 MG/DL — LOW (ref 8.4–10.5)
CALCIUM SERPL-MCNC: 7.4 MG/DL — LOW (ref 8.4–10.5)
CHLORIDE SERPL-SCNC: 94 MMOL/L — LOW (ref 96–108)
CHLORIDE SERPL-SCNC: 99 MMOL/L — SIGNIFICANT CHANGE UP (ref 96–108)
CO2 SERPL-SCNC: 27 MMOL/L — SIGNIFICANT CHANGE UP (ref 22–31)
CO2 SERPL-SCNC: 31 MMOL/L — SIGNIFICANT CHANGE UP (ref 22–31)
CREAT SERPL-MCNC: 0.54 MG/DL — SIGNIFICANT CHANGE UP (ref 0.5–1.3)
CREAT SERPL-MCNC: 0.64 MG/DL — SIGNIFICANT CHANGE UP (ref 0.5–1.3)
GLUCOSE SERPL-MCNC: 137 MG/DL — HIGH (ref 70–99)
GLUCOSE SERPL-MCNC: 172 MG/DL — HIGH (ref 70–99)
HCT VFR BLD CALC: 33.6 % — LOW (ref 34.5–45)
HCT VFR BLD CALC: 33.7 % — LOW (ref 34.5–45)
HGB BLD-MCNC: 10.9 G/DL — LOW (ref 11.5–15.5)
HGB BLD-MCNC: 11.5 G/DL — SIGNIFICANT CHANGE UP (ref 11.5–15.5)
MAGNESIUM SERPL-MCNC: 2 MG/DL — SIGNIFICANT CHANGE UP (ref 1.6–2.6)
MCHC RBC-ENTMCNC: 32.4 GM/DL — SIGNIFICANT CHANGE UP (ref 32–36)
MCHC RBC-ENTMCNC: 32.4 PG — SIGNIFICANT CHANGE UP (ref 27–34)
MCHC RBC-ENTMCNC: 34.3 GM/DL — SIGNIFICANT CHANGE UP (ref 32–36)
MCHC RBC-ENTMCNC: 34.3 PG — HIGH (ref 27–34)
MCV RBC AUTO: 100 FL — SIGNIFICANT CHANGE UP (ref 80–100)
MCV RBC AUTO: 100 FL — SIGNIFICANT CHANGE UP (ref 80–100)
PHOSPHATE SERPL-MCNC: 2.6 MG/DL — SIGNIFICANT CHANGE UP (ref 2.5–4.5)
PLATELET # BLD AUTO: 127 K/UL — LOW (ref 150–400)
PLATELET # BLD AUTO: 94 K/UL — LOW (ref 150–400)
POTASSIUM SERPL-MCNC: 4 MMOL/L — SIGNIFICANT CHANGE UP (ref 3.5–5.3)
POTASSIUM SERPL-MCNC: 4.4 MMOL/L — SIGNIFICANT CHANGE UP (ref 3.5–5.3)
POTASSIUM SERPL-SCNC: 4 MMOL/L — SIGNIFICANT CHANGE UP (ref 3.5–5.3)
POTASSIUM SERPL-SCNC: 4.4 MMOL/L — SIGNIFICANT CHANGE UP (ref 3.5–5.3)
RBC # BLD: 3.36 M/UL — LOW (ref 3.8–5.2)
RBC # BLD: 3.36 M/UL — LOW (ref 3.8–5.2)
RBC # FLD: 17.1 % — HIGH (ref 10.3–14.5)
RBC # FLD: 19.1 % — HIGH (ref 10.3–14.5)
SODIUM SERPL-SCNC: 137 MMOL/L — SIGNIFICANT CHANGE UP (ref 135–145)
SODIUM SERPL-SCNC: 138 MMOL/L — SIGNIFICANT CHANGE UP (ref 135–145)
WBC # BLD: 6.7 K/UL — SIGNIFICANT CHANGE UP (ref 3.8–10.5)
WBC # BLD: 7.36 K/UL — SIGNIFICANT CHANGE UP (ref 3.8–10.5)
WBC # FLD AUTO: 6.7 K/UL — SIGNIFICANT CHANGE UP (ref 3.8–10.5)
WBC # FLD AUTO: 7.36 K/UL — SIGNIFICANT CHANGE UP (ref 3.8–10.5)

## 2019-02-16 PROCEDURE — 71045 X-RAY EXAM CHEST 1 VIEW: CPT | Mod: 26

## 2019-02-16 PROCEDURE — 99232 SBSQ HOSP IP/OBS MODERATE 35: CPT

## 2019-02-16 PROCEDURE — 93010 ELECTROCARDIOGRAM REPORT: CPT

## 2019-02-16 PROCEDURE — 99232 SBSQ HOSP IP/OBS MODERATE 35: CPT | Mod: GC

## 2019-02-16 RX ORDER — SODIUM CHLORIDE 9 MG/ML
1000 INJECTION, SOLUTION INTRAVENOUS ONCE
Qty: 0 | Refills: 0 | Status: COMPLETED | OUTPATIENT
Start: 2019-02-16 | End: 2019-02-16

## 2019-02-16 RX ADMIN — ZIPRASIDONE HYDROCHLORIDE 40 MILLIGRAM(S): 20 CAPSULE ORAL at 08:47

## 2019-02-16 RX ADMIN — Medication 12.5 MILLIGRAM(S): at 04:53

## 2019-02-16 RX ADMIN — CHLORHEXIDINE GLUCONATE 1 APPLICATION(S): 213 SOLUTION TOPICAL at 08:46

## 2019-02-16 RX ADMIN — SODIUM CHLORIDE 1000 MILLILITER(S): 9 INJECTION, SOLUTION INTRAVENOUS at 19:37

## 2019-02-16 RX ADMIN — ENOXAPARIN SODIUM 40 MILLIGRAM(S): 100 INJECTION SUBCUTANEOUS at 12:08

## 2019-02-16 RX ADMIN — Medication 20 MILLIGRAM(S): at 04:53

## 2019-02-16 RX ADMIN — Medication 500 MILLIGRAM(S): at 14:14

## 2019-02-16 RX ADMIN — Medication 500 MILLIGRAM(S): at 08:46

## 2019-02-16 RX ADMIN — MIRTAZAPINE 15 MILLIGRAM(S): 45 TABLET, ORALLY DISINTEGRATING ORAL at 22:03

## 2019-02-16 RX ADMIN — Medication 3 MILLILITER(S): at 17:57

## 2019-02-16 RX ADMIN — SPIRONOLACTONE 50 MILLIGRAM(S): 25 TABLET, FILM COATED ORAL at 04:53

## 2019-02-16 RX ADMIN — Medication 62.5 MILLIMOLE(S): at 14:17

## 2019-02-16 RX ADMIN — BUPROPION HYDROCHLORIDE 300 MILLIGRAM(S): 150 TABLET, EXTENDED RELEASE ORAL at 12:08

## 2019-02-16 RX ADMIN — Medication 500 MILLIGRAM(S): at 01:38

## 2019-02-16 RX ADMIN — Medication 75 MICROGRAM(S): at 04:53

## 2019-02-16 RX ADMIN — Medication 500 MILLIGRAM(S): at 22:02

## 2019-02-16 NOTE — PROGRESS NOTE ADULT - ATTENDING COMMENTS
Right periprosthetic femur fracture. awaiting surgical intervention - currently improving from C. Diff colitis and may soon proceed with surgery  Pain meds as needed orally  as tolerated. Continue ATC cardio pulmonary monitoring for  this critically ill patient, who is now hemodynamically stabilized. I am a non participating [facility] physician seeing Pt in coverage for Dr Barraza Right periprosthetic femur fracture. awaiting surgical intervention - currently improving from C. Diff colitis and may soon proceed with surgery  Pain meds as needed orally  as tolerated. Continue ATC cardio pulmonary monitoring for  this critically ill patient, who is now hemodynamically stabilized. I am a non participating [facility] physician seeing Pt in coverage for Dr Barraza. The above patient examination was reviewed with Dr. Awan and I agree with his evaluation, assessment and treatment plan.  Orlin Barraza M.D.

## 2019-02-16 NOTE — PROGRESS NOTE ADULT - ATTENDING COMMENTS
Patient seen and examined  Care discussed with son at bedside  Patient very confused today  Difficult to assess how much she is really eating  abd - nondistended, soft, minimally tender    - will start calorie count to assess nutritional intake  - antibiotics per ID  - discussed with family - son wants to proceed with hip fixation, then hopefully to rehab.  As per son, patient was independent with her ADLs before last stay in rehab.

## 2019-02-16 NOTE — PROGRESS NOTE ADULT - SUBJECTIVE AND OBJECTIVE BOX
[facility] ATP SURGERY DAILY PROGRESS NOTE    SUBJECTIVE:  -  still eating minimal food  -  family thinking of moving toward hospice and non-op hip d/t dementia?  -  ORTHO wants to do hip surgery next Friday  -  d/c'd flagyl    OBJECTIVE:    Vital Signs Last 24 Hrs  T(C): 36.7 (16 Feb 2019 14:36), Max: 36.7 (16 Feb 2019 10:04)  T(F): 98 (16 Feb 2019 14:36), Max: 98.1 (16 Feb 2019 10:04)  HR: 98 (16 Feb 2019 14:36) (89 - 98)  BP: 95/60 (16 Feb 2019 14:36) (95/60 - 115/73)  BP(mean): --  RR: 18 (16 Feb 2019 14:36) (18 - 18)  SpO2: 91% (16 Feb 2019 14:36) (91% - 99%)    I&O's Detail    15 Feb 2019 07:01  -  16 Feb 2019 07:00  --------------------------------------------------------  IN:    dextrose 5% + sodium chloride 0.45% with potassium chloride 20 mEq/L: 1000 mL    Oral Fluid: 560 mL  Total IN: 1560 mL    OUT:    Stool: 5 mL  Total OUT: 5 mL    Total NET: 1555 mL        LABS:                        10.9   7.36  )-----------( 127      ( 16 Feb 2019 09:20 )             33.6     02-16    137  |  94<L>  |  18  ----------------------------<  172<H>  4.0   |  31  |  0.64    Ca    7.3<L>      16 Feb 2019 06:59  Phos  2.6     02-16  Mg     2.0     02-16      EXAM:  NAD, awake and alert, sitting up in bed, conversant  Respirations nonlabored  Abdomen soft, mild ttp, moderately distended  No guarding or rebound tenderness

## 2019-02-16 NOTE — PROGRESS NOTE ADULT - PROBLEM SELECTOR PLAN 1
scheduled for surgical repair 2/22  pain meds as needed PO as tolerated  follow for worsening confusion  standard prophylactic measures

## 2019-02-16 NOTE — PROGRESS NOTE ADULT - ASSESSMENT
68 year old female with a PMHx of bipolar depression, Hyperlipidemia, NHL (in remission), OCD, dementia, with recent hospitalization (1/20-1/26) for right hip hemiarthroplasty for right femoral neck fracture after fall. Hospital course complicated by ESBL E. Coli UTI on Ertapenem (1/23-1/29) now presents with likely Hypovolemic/ Distributive shock secondary to sepsis 2/2 Pancolitis, likely secondary to C. Diff Colitis, Age Indeterminate L5 vertebral body fracture, possible acute as tender to palpation, Nondisplaced sacral insufficiency, Right proximal femoral periprosthetic fracture following unwitnessed fall with cdiff, bandemia, fever. Patient transferred to the floor. Mental status continues to wax and wane

## 2019-02-16 NOTE — CHART NOTE - NSCHARTNOTEFT_GEN_A_CORE
SURGERY EVALUATION    Called to evaluate patient due to concern of hypotension SBP 86, HR 90; and altered mental status. Pt seen to be awake, but lethargic. Responds to questioning.   Mental status appears to be within her normal range--pt is known to wax and wane.    LR bolus had been started; repeat on evaluation at this time , HR 97. O2 sat 97% on 2L NC. Upper airways sound rhonchorous.    Abd exam largely unchanged from her daily exams--soft, distended, mildly tender. She has had 1-2 pasty formed stools today.  UOP incontinent.    Will f/u labs, CXR.  Will continue to follow.    C Davina R5  ACS surgery [id number]

## 2019-02-16 NOTE — PROGRESS NOTE ADULT - ASSESSMENT
A/P: 68 y F w/ R Hemiarthroplasty done on 1/21, s/p MF, with L S1-2 sacral insufficiency fracture and  R periprosthetic Hip Fracture. Surgery will be scheduled for next week if platelets are >100 and will now consist of removal of subsided R hemiarthroplasty hardware with placement of new implant.     - CT R hip demonstrates PP Femur fracture around Stem  - Xray pelvis shows implant subsidence   -Analgesia  -DVT ppx, per primary team  -Non weight bearing RLE/bedrest  -May weight bear as tolerated on LLE when not on bedrest  -No planned date for ORIF, will continue to monitor   - C dif management per ID  - further recs pending xrays A/P: 68 y F w/ R Hemiarthroplasty done on 1/21, s/p MF, with L S1-2 sacral insufficiency fracture and  R periprosthetic Hip Fracture. Surgery will be scheduled for next week if platelets are >100 and will now consist of removal of subsided R hemiarthroplasty hardware with placement of new implant.     - CT R hip demonstrates PP Femur fracture around Stem  - Xray pelvis shows implant subsidence   -Analgesia  -DVT ppx, per primary team  -Non weight bearing RLE/bedrest  -May weight bear as tolerated on LLE when not on bedrest  -R hip revision/ORIF planned for Friday, 2/22 if medically stable/cleared  - C dif management per ID  - further recs pending xrays

## 2019-02-16 NOTE — PROGRESS NOTE ADULT - SUBJECTIVE AND OBJECTIVE BOX
Patient seen and examined at bedside. pain is well controlled, resting comfortably in bed, no respiratory distress.     Vital Signs Last 24 Hrs  T(C): 36.5 (16 Feb 2019 00:30), Max: 36.8 (15 Feb 2019 05:01)  T(F): 97.7 (16 Feb 2019 00:30), Max: 98.2 (15 Feb 2019 05:01)  HR: 98 (16 Feb 2019 00:30) (90 - 99)  BP: 101/58 (16 Feb 2019 00:30) (92/57 - 114/72)  BP(mean): --  RR: 18 (16 Feb 2019 00:30) (18 - 18)  SpO2: 97% (16 Feb 2019 00:30) (92% - 99%)    Exam:  Gen: NAD  RLE:  Staples in place, skin well approximated, no signs of drainage  TTP to thigh, + Quad function with pain in thigh   Motor: 5/5 EHL/FHL/TA/Gastrocnemius  Sensory: SILT DP/SP/S/S/T nerve distributions  Vascular: 2+ Dorsalis Pedis pulse  Calf TTP, hard to assess due to pt confusion                               11.6   10.8  )-----------( 103      ( 15 Feb 2019 09:46 )             34.0   02-15    138  |  94<L>  |  18  ----------------------------<  164<H>  4.0   |  32<H>  |  0.59    Ca    7.1<L>      15 Feb 2019 09:43  Phos  2.9     02-15  Mg     2.0     02-15

## 2019-02-16 NOTE — PROGRESS NOTE ADULT - ATTENDING COMMENTS
I agree with the above note and have personally seen and examined this patient. All pertinent films have been reviewed. Please refer to clinical documentation of the history, physical examinations, data summary, and both assessment and plan as documented above and with which I agree.    transferred out of sicu  please increase platelet counts >100k in advance of surgery  needs medical clearance  will need r hip hemiarthroplasty revision and open reduction internal fixation    Conor George MD  Attending Orthopedic Surgeon I agree with the above note and have personally seen and examined this patient. All pertinent films have been reviewed. Please refer to clinical documentation of the history, physical examinations, data summary, and both assessment and plan as documented above and with which I agree.    needs medical clearance  will need r hip hemiarthroplasty revision and open reduction internal fixation planned 2/22    Conor George MD  Attending Orthopedic Surgeon

## 2019-02-16 NOTE — PROGRESS NOTE ADULT - ASSESSMENT
68F p/w R hip fracture after GLF and found to have C. diff colitis, now resolving.    PLAN:  - f/u family plans regarding hospice, surgery  - Multimodal pain control  - C.diff positive - c/w vanc per ID  - DVT PPx    ATP SURGERY  [id number] 68F p/w R hip fracture after GLF and found to have C. diff colitis, now resolving.    PLAN:  - calorie count d/t poor po intake  - f/u family plans regarding hospice, surgery  - Multimodal pain control  - C.diff positive - c/w vanc per ID  - DVT PPx    ATP SURGERY  [id number]

## 2019-02-16 NOTE — PROGRESS NOTE ADULT - SUBJECTIVE AND OBJECTIVE BOX
67 y/o critically ill  female h/o dementia NHL presenting after unwitnessed fall. Pt found on the ground at rehab. is on lovenox after hip fracture. pt reports pain to her right leg. per EMS en route slurring speech and mildly hypotensive requesting trauma.unclear how long pt on the ground. Patient found to have C diff with worsening hypotension requiring pressors on and off and  Patient requiring the SICU for continued cardiopulmonary monitoring. Orthopedic procedure on hold due to  hypotension and pan colitis and need for pressors due to unstable hemodynamics. Diarrhea slowing with IV Flagyl + oral vancomycin. Patient now has the rectal tube removed.  Stable dementia and still confused. Leukocytosis and diarrhea have improved. The patient is also less agitated and she answering questions with better cognitive  function. Right hip fracture surgery is now scheduled for 02/22/19.      MEDICATIONS  (STANDING):  buPROPion XL . 300 milliGRAM(s) Oral daily  chlorhexidine 4% Liquid 1 Application(s) Topical <User Schedule>  dextrose 5% + sodium chloride 0.45% with potassium chloride 20 mEq/L 1000 milliLiter(s) (50 mL/Hr) IV Continuous <Continuous>  enoxaparin Injectable 40 milliGRAM(s) SubCutaneous daily  furosemide Solution 20 milliGRAM(s) Oral daily  influenza   Vaccine 0.5 milliLiter(s) IntraMuscular once  levothyroxine 75 MICROGram(s) Oral daily  metoprolol tartrate 12.5 milliGRAM(s) Oral every 12 hours  mirtazapine 15 milliGRAM(s) Oral <User Schedule>  spironolactone Suspension 50 milliGRAM(s) Oral daily  vancomycin    Solution 500 milliGRAM(s) Oral every 6 hours  ziprasidone 40 milliGRAM(s) Oral <User Schedule>    MEDICATIONS  (PRN):  acetaminophen   Tablet .. 650 milliGRAM(s) Oral every 6 hours PRN Mild Pain (1 - 3)  ALBUTerol/ipratropium for Nebulization 3 milliLiter(s) Nebulizer every 6 hours PRN Shortness of Breath and/or Wheezing          VITALS:   T(C): 36.6 (02-16-19 @ 17:51), Max: 36.7 (02-16-19 @ 10:04)  HR: 97 (02-16-19 @ 19:44) (89 - 98)  BP: 127/76 (02-16-19 @ 19:44) (95/60 - 127/76)  RR: 20 (02-16-19 @ 17:51) (18 - 20)  SpO2: 97% (02-16-19 @ 17:51) (91% - 99%)  Wt(kg): --      PHYSICAL EXAM:  GENERAL: NAD, well-groomed, well-developed  HEAD:  Atraumatic, Normocephalic  EYES: EOMI, PERRLA, conjunctiva and sclera clear  ENMT: No tonsillar erythema, exudates, or enlargement; Moist mucous membranes, Good dentition, No lesions  NECK: Supple, No JVD, Normal thyroid  NERVOUS SYSTEM:  confused Non focal  CHEST/LUNG: Clear to percussion bilaterally; No rales, rhonchi, wheezing, or rubs  HEART: Regular rate and rhythm; No murmurs, rubs, or gallops  ABDOMEN: Soft, Nontender, Nondistended; Bowel sounds present  EXTREMITIES:  2+ Peripheral Pulses, No clubbing, cyanosis, or edema  LYMPH: No lymphadenopathy noted  SKIN: No rashes or lesions  LABS:        CBC Full  -  ( 16 Feb 2019 09:20 )  WBC Count : 7.36 K/uL  Hemoglobin : 10.9 g/dL  Hematocrit : 33.6 %  Platelet Count - Automated : 127 K/uL  Mean Cell Volume : 100.0 fl  Mean Cell Hemoglobin : 32.4 pg  Mean Cell Hemoglobin Concentration : 32.4 gm/dL  Auto Neutrophil # : x  Auto Lymphocyte # : x  Auto Monocyte # : x  Auto Eosinophil # : x  Auto Basophil # : x  Auto Neutrophil % : x  Auto Lymphocyte % : x  Auto Monocyte % : x  Auto Eosinophil % : x  Auto Basophil % : x    02-16    137  |  94<L>  |  18  ----------------------------<  172<H>  4.0   |  31  |  0.64    Ca    7.3<L>      16 Feb 2019 06:59  Phos  2.6     02-16  Mg     2.0     02-16            CAPILLARY BLOOD GLUCOSE          RADIOLOGY & ADDITIONAL TESTS:

## 2019-02-17 DIAGNOSIS — G93.40 ENCEPHALOPATHY, UNSPECIFIED: ICD-10-CM

## 2019-02-17 LAB
ANION GAP SERPL CALC-SCNC: 13 MMOL/L — SIGNIFICANT CHANGE UP (ref 5–17)
ANION GAP SERPL CALC-SCNC: 14 MMOL/L — SIGNIFICANT CHANGE UP (ref 5–17)
BUN SERPL-MCNC: 14 MG/DL — SIGNIFICANT CHANGE UP (ref 7–23)
BUN SERPL-MCNC: 15 MG/DL — SIGNIFICANT CHANGE UP (ref 7–23)
CALCIUM SERPL-MCNC: 7.6 MG/DL — LOW (ref 8.4–10.5)
CALCIUM SERPL-MCNC: 7.8 MG/DL — LOW (ref 8.4–10.5)
CHLORIDE SERPL-SCNC: 95 MMOL/L — LOW (ref 96–108)
CHLORIDE SERPL-SCNC: 95 MMOL/L — LOW (ref 96–108)
CO2 SERPL-SCNC: 28 MMOL/L — SIGNIFICANT CHANGE UP (ref 22–31)
CO2 SERPL-SCNC: 31 MMOL/L — SIGNIFICANT CHANGE UP (ref 22–31)
CREAT SERPL-MCNC: 0.55 MG/DL — SIGNIFICANT CHANGE UP (ref 0.5–1.3)
CREAT SERPL-MCNC: 0.7 MG/DL — SIGNIFICANT CHANGE UP (ref 0.5–1.3)
GLUCOSE SERPL-MCNC: 137 MG/DL — HIGH (ref 70–99)
GLUCOSE SERPL-MCNC: 156 MG/DL — HIGH (ref 70–99)
HCT VFR BLD CALC: 35.2 % — SIGNIFICANT CHANGE UP (ref 34.5–45)
HGB BLD-MCNC: 11.4 G/DL — LOW (ref 11.5–15.5)
MAGNESIUM SERPL-MCNC: 1.9 MG/DL — SIGNIFICANT CHANGE UP (ref 1.6–2.6)
MCHC RBC-ENTMCNC: 32 PG — SIGNIFICANT CHANGE UP (ref 27–34)
MCHC RBC-ENTMCNC: 32.4 GM/DL — SIGNIFICANT CHANGE UP (ref 32–36)
MCV RBC AUTO: 98.9 FL — SIGNIFICANT CHANGE UP (ref 80–100)
PHOSPHATE SERPL-MCNC: 2.8 MG/DL — SIGNIFICANT CHANGE UP (ref 2.5–4.5)
PLATELET # BLD AUTO: 118 K/UL — LOW (ref 150–400)
POTASSIUM SERPL-MCNC: 3.5 MMOL/L — SIGNIFICANT CHANGE UP (ref 3.5–5.3)
POTASSIUM SERPL-MCNC: 3.7 MMOL/L — SIGNIFICANT CHANGE UP (ref 3.5–5.3)
POTASSIUM SERPL-SCNC: 3.5 MMOL/L — SIGNIFICANT CHANGE UP (ref 3.5–5.3)
POTASSIUM SERPL-SCNC: 3.7 MMOL/L — SIGNIFICANT CHANGE UP (ref 3.5–5.3)
RBC # BLD: 3.56 M/UL — LOW (ref 3.8–5.2)
RBC # FLD: 20.2 % — HIGH (ref 10.3–14.5)
SODIUM SERPL-SCNC: 137 MMOL/L — SIGNIFICANT CHANGE UP (ref 135–145)
SODIUM SERPL-SCNC: 139 MMOL/L — SIGNIFICANT CHANGE UP (ref 135–145)
WBC # BLD: 6.79 K/UL — SIGNIFICANT CHANGE UP (ref 3.8–10.5)
WBC # FLD AUTO: 6.79 K/UL — SIGNIFICANT CHANGE UP (ref 3.8–10.5)

## 2019-02-17 PROCEDURE — 99232 SBSQ HOSP IP/OBS MODERATE 35: CPT

## 2019-02-17 RX ORDER — SODIUM CHLORIDE 9 MG/ML
500 INJECTION, SOLUTION INTRAVENOUS ONCE
Qty: 0 | Refills: 0 | Status: COMPLETED | OUTPATIENT
Start: 2019-02-17 | End: 2019-02-17

## 2019-02-17 RX ORDER — POTASSIUM PHOSPHATE, MONOBASIC POTASSIUM PHOSPHATE, DIBASIC 236; 224 MG/ML; MG/ML
15 INJECTION, SOLUTION INTRAVENOUS ONCE
Qty: 0 | Refills: 0 | Status: COMPLETED | OUTPATIENT
Start: 2019-02-17 | End: 2019-02-17

## 2019-02-17 RX ORDER — MAGNESIUM SULFATE 500 MG/ML
1 VIAL (ML) INJECTION ONCE
Qty: 0 | Refills: 0 | Status: COMPLETED | OUTPATIENT
Start: 2019-02-17 | End: 2019-02-17

## 2019-02-17 RX ORDER — METRONIDAZOLE 500 MG
500 TABLET ORAL EVERY 8 HOURS
Qty: 0 | Refills: 0 | Status: DISCONTINUED | OUTPATIENT
Start: 2019-02-17 | End: 2019-02-19

## 2019-02-17 RX ORDER — METRONIDAZOLE 500 MG
TABLET ORAL
Qty: 0 | Refills: 0 | Status: DISCONTINUED | OUTPATIENT
Start: 2019-02-17 | End: 2019-02-19

## 2019-02-17 RX ORDER — POTASSIUM CHLORIDE 20 MEQ
10 PACKET (EA) ORAL
Qty: 0 | Refills: 0 | Status: COMPLETED | OUTPATIENT
Start: 2019-02-17 | End: 2019-02-17

## 2019-02-17 RX ORDER — METRONIDAZOLE 500 MG
500 TABLET ORAL ONCE
Qty: 0 | Refills: 0 | Status: COMPLETED | OUTPATIENT
Start: 2019-02-17 | End: 2019-02-17

## 2019-02-17 RX ORDER — VANCOMYCIN HCL 1 G
500 VIAL (EA) INTRAVENOUS EVERY 6 HOURS
Qty: 0 | Refills: 0 | Status: DISCONTINUED | OUTPATIENT
Start: 2019-02-17 | End: 2019-02-22

## 2019-02-17 RX ADMIN — Medication 20 MILLIGRAM(S): at 06:14

## 2019-02-17 RX ADMIN — Medication 100 GRAM(S): at 12:44

## 2019-02-17 RX ADMIN — SPIRONOLACTONE 50 MILLIGRAM(S): 25 TABLET, FILM COATED ORAL at 06:14

## 2019-02-17 RX ADMIN — Medication 75 MICROGRAM(S): at 06:14

## 2019-02-17 RX ADMIN — Medication 12.5 MILLIGRAM(S): at 06:14

## 2019-02-17 RX ADMIN — CHLORHEXIDINE GLUCONATE 1 APPLICATION(S): 213 SOLUTION TOPICAL at 08:50

## 2019-02-17 RX ADMIN — Medication 100 MILLIEQUIVALENT(S): at 12:42

## 2019-02-17 RX ADMIN — Medication 500 MILLIGRAM(S): at 12:47

## 2019-02-17 RX ADMIN — Medication 500 MILLIGRAM(S): at 01:54

## 2019-02-17 RX ADMIN — Medication 500 MILLIGRAM(S): at 17:53

## 2019-02-17 RX ADMIN — Medication 100 MILLIGRAM(S): at 12:46

## 2019-02-17 RX ADMIN — POTASSIUM PHOSPHATE, MONOBASIC POTASSIUM PHOSPHATE, DIBASIC 62.5 MILLIMOLE(S): 236; 224 INJECTION, SOLUTION INTRAVENOUS at 12:44

## 2019-02-17 RX ADMIN — BUPROPION HYDROCHLORIDE 300 MILLIGRAM(S): 150 TABLET, EXTENDED RELEASE ORAL at 12:47

## 2019-02-17 RX ADMIN — ENOXAPARIN SODIUM 40 MILLIGRAM(S): 100 INJECTION SUBCUTANEOUS at 12:47

## 2019-02-17 RX ADMIN — DEXTROSE MONOHYDRATE, SODIUM CHLORIDE, AND POTASSIUM CHLORIDE 50 MILLILITER(S): 50; .745; 4.5 INJECTION, SOLUTION INTRAVENOUS at 12:42

## 2019-02-17 RX ADMIN — Medication 3 MILLILITER(S): at 01:54

## 2019-02-17 RX ADMIN — SODIUM CHLORIDE 16.67 MILLILITER(S): 9 INJECTION, SOLUTION INTRAVENOUS at 22:39

## 2019-02-17 RX ADMIN — MIRTAZAPINE 15 MILLIGRAM(S): 45 TABLET, ORALLY DISINTEGRATING ORAL at 22:40

## 2019-02-17 NOTE — PROGRESS NOTE ADULT - ASSESSMENT
68 year old female with a PMHx of bipolar depression, Hyperlipidemia, NHL (in remission), OCD, dementia, with recent hospitalization (1/20-1/26) for right hip hemiarthroplasty for right femoral neck fracture after fall. Hospital course complicated by ESBL E. Coli UTI on Ertapenem (1/23-1/29) now presents with likely Hypovolemic/ Distributive shock secondary to sepsis 2/2 Pancolitis, likely secondary to C. Diff Colitis, Age Indeterminate L5 vertebral body fracture, possible acute as tender to palpation, Nondisplaced sacral insufficiency, Right proximal femoral periprosthetic fracture following unwitnessed fall with cdiff, bandemia, fever. Patient transferred to the floor. Mental status continues to worsen

## 2019-02-17 NOTE — PROGRESS NOTE ADULT - PROBLEM SELECTOR PLAN 1
with worsening mental status would check imaging of brain  check LFTs and consider an ammonia left  follow electrolytes and supplement as needed

## 2019-02-17 NOTE — PROGRESS NOTE ADULT - SUBJECTIVE AND OBJECTIVE BOX
67 y/o critically ill  female h/o dementia NHL presenting after unwitnessed fall. Pt found on the ground at rehab. is on lovenox after hip fracture. pt reports pain to her right leg. per EMS en route slurring speech and mildly hypotensive requesting trauma.unclear how long pt on the ground. Patient found to have C diff with worsening hypotension requiring pressors on and off and  Patient requiring the SICU for continued cardiopulmonary monitoring. Orthopedic procedure on hold due to  hypotension and pan colitis and need for pressors due to unstable hemodynamics. Diarrhea slowing with IV Flagyl + oral vancomycin. Patient now has the rectal tube removed.   Leukocytosis and diarrhea have improved\. Right hip fracture surgery is now scheduled for 02/22/19. Patient with worsening mental status Spoke with trauma resident and suggested CT of brain as well as w/u for worsening encephalopathy     MEDICATIONS  (STANDING):  buPROPion XL . 300 milliGRAM(s) Oral daily  chlorhexidine 4% Liquid 1 Application(s) Topical <User Schedule>  dextrose 5% + sodium chloride 0.45% with potassium chloride 20 mEq/L 1000 milliLiter(s) (50 mL/Hr) IV Continuous <Continuous>  enoxaparin Injectable 40 milliGRAM(s) SubCutaneous daily  furosemide Solution 20 milliGRAM(s) Oral daily  influenza   Vaccine 0.5 milliLiter(s) IntraMuscular once  levothyroxine 75 MICROGram(s) Oral daily  metoprolol tartrate 12.5 milliGRAM(s) Oral every 12 hours  metroNIDAZOLE  IVPB 500 milliGRAM(s) IV Intermittent every 8 hours  metroNIDAZOLE  IVPB      mirtazapine 15 milliGRAM(s) Oral <User Schedule>  potassium chloride  10 mEq/100 mL IVPB 10 milliEquivalent(s) IV Intermittent every 1 hour  spironolactone Suspension 50 milliGRAM(s) Oral daily  vancomycin    Solution 500 milliGRAM(s) Oral every 6 hours  ziprasidone 40 milliGRAM(s) Oral <User Schedule>    MEDICATIONS  (PRN):  acetaminophen   Tablet .. 650 milliGRAM(s) Oral every 6 hours PRN Mild Pain (1 - 3)  ALBUTerol/ipratropium for Nebulization 3 milliLiter(s) Nebulizer every 6 hours PRN Shortness of Breath and/or Wheezing          VITALS:   T(C): 36.7 (02-17-19 @ 17:04), Max: 36.9 (02-16-19 @ 21:13)  HR: 91 (02-17-19 @ 17:04) (91 - 102)  BP: 99/62 (02-17-19 @ 17:04) (99/62 - 137/69)  RR: 20 (02-17-19 @ 17:04) (20 - 20)  SpO2: 96% (02-17-19 @ 17:04) (93% - 100%)  Wt(kg): --    PHYSICAL EXAM:  GENERAL: NAD, well-groomed, well-developed  HEAD:  Atraumatic, Normocephalic  EYES: EOMI, PERRLA, conjunctiva and sclera clear  ENMT: No tonsillar erythema, exudates, or enlargement; Moist mucous membranes, Good dentition, No lesions  NECK: Supple, No JVD, Normal thyroid  NERVOUS SYSTEM:  confused becoming aphasic  CHEST/LUNG: Clear to percussion bilaterally; No rales, rhonchi, wheezing, or rubs  HEART: Regular rate and rhythm; No murmurs, rubs, or gallops  ABDOMEN: Soft, Nontender, Nondistended; Bowel sounds present  EXTREMITIES:  2+ Peripheral Pulses, No clubbing, cyanosis, or edema  LYMPH: No lymphadenopathy noted  SKIN: No rashes or lesions    LABS:        CBC Full  -  ( 17 Feb 2019 12:14 )  WBC Count : 6.79 K/uL  Hemoglobin : 11.4 g/dL  Hematocrit : 35.2 %  Platelet Count - Automated : 118 K/uL  Mean Cell Volume : 98.9 fl  Mean Cell Hemoglobin : 32.0 pg  Mean Cell Hemoglobin Concentration : 32.4 gm/dL  Auto Neutrophil # : x  Auto Lymphocyte # : x  Auto Monocyte # : x  Auto Eosinophil # : x  Auto Basophil # : x  Auto Neutrophil % : x  Auto Lymphocyte % : x  Auto Monocyte % : x  Auto Eosinophil % : x  Auto Basophil % : x    02-17    137  |  95<L>  |  15  ----------------------------<  156<H>  3.7   |  28  |  0.55    Ca    7.6<L>      17 Feb 2019 09:37  Phos  2.8     02-17  Mg     1.9     02-17            CAPILLARY BLOOD GLUCOSE          RADIOLOGY & ADDITIONAL TESTS:

## 2019-02-17 NOTE — PROGRESS NOTE ADULT - ATTENDING COMMENTS
Right periprosthetic femur fracture. awaiting surgical intervention - currently improving from C. Diff colitis and may soon proceed with surgery  Pain meds as needed orally  as tolerated. Continue ATC cardio pulmonary monitoring for  this critically ill patient, who is now hemodynamically stabilized. I am a non participating [facility] physician seeing Pt in coverage for Dr Barraza. The above patient examination was reviewed with Dr. Awan and I agree with his evaluation, assessment and treatment plan.  Orlin Barraza M.D.

## 2019-02-17 NOTE — PROGRESS NOTE ADULT - SUBJECTIVE AND OBJECTIVE BOX
Patient seen and examined at bedside. pain is well controlled, resting comfortably in bed, altered this AM, unable to follow commands or answer questions.     Vital Signs Last 24 Hrs  T(C): 36.4 (17 Feb 2019 01:51), Max: 36.9 (16 Feb 2019 21:13)  T(F): 97.6 (17 Feb 2019 01:51), Max: 98.4 (16 Feb 2019 21:13)  HR: 92 (17 Feb 2019 01:51) (89 - 102)  BP: 101/69 (17 Feb 2019 01:51) (95/60 - 127/76)  RR: 20 (17 Feb 2019 01:51) (18 - 20)  SpO2: 100% (17 Feb 2019 01:51) (91% - 100%)    Exam:  Gen: NAD  RLE:  Staples in place, skin well approximated, no signs of drainage  +2 pitting edema up to mid thigh  TTP to thigh, + Quad function with pain in thigh   + EHL/FHL/TA/Gastrocnemius  Unable to assess sensation 2/2 pt mental status  edematous foot, warm and perfused with brisk cap refill  Calf TTP, hard to assess due to pt confusion

## 2019-02-17 NOTE — PROGRESS NOTE ADULT - ASSESSMENT
68F p/w R hip fracture after GLF and found to have C. diff colitis, now resolving.    PLAN:  - cont calorie count d/t poor po intake  - Multimodal pain control  - C.diff positive - c/w vanc/flagyl  - monitor MS, VS  - DVT PPx  - OR w/ ortho on Friday  - f/u family plans regarding hospice      ATP SURGERY  [id number]

## 2019-02-17 NOTE — PROGRESS NOTE ADULT - SUBJECTIVE AND OBJECTIVE BOX
General Surgery Progress Note    SUBJECTIVE:  The patient was seen and examined. No acute events overnight. Still diarrhea.    OBJECTIVE:     ** VITAL SIGNS / I&O's **    Vital Signs Last 24 Hrs  T(C): 36.7 (17 Feb 2019 10:34), Max: 36.9 (16 Feb 2019 21:13)  T(F): 98.1 (17 Feb 2019 10:34), Max: 98.4 (16 Feb 2019 21:13)  HR: 101 (17 Feb 2019 10:34) (90 - 102)  BP: 109/55 (17 Feb 2019 10:34) (95/60 - 137/69)  BP(mean): --  RR: 20 (17 Feb 2019 10:34) (18 - 20)  SpO2: 96% (17 Feb 2019 10:34) (91% - 100%)      16 Feb 2019 07:01  -  17 Feb 2019 07:00  --------------------------------------------------------  IN:    dextrose 5% + sodium chloride 0.45% with potassium chloride 20 mEq/L: 1200 mL    Solution: 250 mL  Total IN: 1450 mL    OUT:    Stool: 1 mL  Total OUT: 1 mL    Total NET: 1449 mL          ** PHYSICAL EXAM **    -- CONSTITUTIONAL: Alert, NAD  -- PULMONARY: non-labored respirations  -- ABDOMEN: soft, non-distended, non-tender      ** LABS **                          11.4   6.79  )-----------( x        ( 17 Feb 2019 12:14 )             35.2     17 Feb 2019 09:37    137    |  95     |  15     ----------------------------<  156    3.7     |  28     |  0.55     Ca    7.6        17 Feb 2019 09:37  Phos  2.8       17 Feb 2019 09:37  Mg     1.9       17 Feb 2019 09:37        CAPILLARY BLOOD GLUCOSE                    MEDICATIONS  (STANDING):  buPROPion XL . 300 milliGRAM(s) Oral daily  chlorhexidine 4% Liquid 1 Application(s) Topical <User Schedule>  dextrose 5% + sodium chloride 0.45% with potassium chloride 20 mEq/L 1000 milliLiter(s) (50 mL/Hr) IV Continuous <Continuous>  enoxaparin Injectable 40 milliGRAM(s) SubCutaneous daily  furosemide Solution 20 milliGRAM(s) Oral daily  influenza   Vaccine 0.5 milliLiter(s) IntraMuscular once  levothyroxine 75 MICROGram(s) Oral daily  metoprolol tartrate 12.5 milliGRAM(s) Oral every 12 hours  metroNIDAZOLE  IVPB 500 milliGRAM(s) IV Intermittent every 8 hours  metroNIDAZOLE  IVPB      mirtazapine 15 milliGRAM(s) Oral <User Schedule>  potassium chloride  10 mEq/100 mL IVPB 10 milliEquivalent(s) IV Intermittent every 1 hour  spironolactone Suspension 50 milliGRAM(s) Oral daily  vancomycin    Solution 500 milliGRAM(s) Oral every 6 hours  ziprasidone 40 milliGRAM(s) Oral <User Schedule>    MEDICATIONS  (PRN):  acetaminophen   Tablet .. 650 milliGRAM(s) Oral every 6 hours PRN Mild Pain (1 - 3)  ALBUTerol/ipratropium for Nebulization 3 milliLiter(s) Nebulizer every 6 hours PRN Shortness of Breath and/or Wheezing

## 2019-02-17 NOTE — PROGRESS NOTE ADULT - ATTENDING COMMENTS
I agree with the above note and have personally seen and examined this patient. All pertinent films have been reviewed. Please refer to clinical documentation of the history, physical examinations, data summary, and both assessment and plan as documented above and with which I agree.    needs medical clearance  will need r hip hemiarthroplasty revision and open reduction internal fixation planned 2/22    Conor George MD  Attending Orthopedic Surgeon I agree with the above note and have personally seen and examined this patient. All pertinent films have been reviewed. Please refer to clinical documentation of the history, physical examinations, data summary, and both assessment and plan as documented above and with which I agree.    please work on LE edema, consider compression wraps  will need r hip hemiarthroplasty revision and open reduction internal fixation planned 2/22    Conor George MD  Attending Orthopedic Surgeon

## 2019-02-17 NOTE — PROGRESS NOTE ADULT - ASSESSMENT
A/P: 68 y F w/ R Hemiarthroplasty done on 1/21, s/p MF, with L S1-2 sacral insufficiency fracture and  R periprosthetic Hip Fracture. Surgery will be scheduled for next week if platelets are >100 and will now consist of removal of subsided R hemiarthroplasty hardware with placement of new implant.     - CT R hip demonstrates PP Femur fracture around Stem  - Xray pelvis shows implant subsidence   -Analgesia  -DVT ppx, per primary team  -Non weight bearing RLE/bedrest  -May weight bear as tolerated on LLE when not on bedrest  -R hip revision/ORIF planned for Friday, 2/22 if medically stable/cleared  - C dif management per ID  - consider diuresis for BLLE Edema  - will d/w Dr George and advise if plan changes  - staples to be removed at time of sx, or will be removed at bedside if sx is cancelled

## 2019-02-17 NOTE — PROGRESS NOTE ADULT - ATTENDING COMMENTS
Patient seen and examined  Confused  Spitting out pills as per RN  vitals stable  abd - very mild diffuse tenderness, nondistended, soft, no peritoneal signs    WBC=6    - Will add IV flagyl as patient is spitting out oral vancomycin pills  - Thrombocytopenia spontaneously resolving  - WBC=WNL  - I am concerned that patient is not getting enough oral nutrition.  If calorie count is not adequate, will discuss PEG with family

## 2019-02-17 NOTE — CHART NOTE - NSCHARTNOTEFT_GEN_A_CORE
RN called stating patient hypotensive to 78/50.  HR 93, RR 20, SPO2 96% supplemental O2.    Patient seen and examined at bedside.  Patient is lethargic (which RN reports is baseline wax/wane), but responds to simple commands.    General: Resting in bed, NAD  Respiratory: Saturating well on NC  Abdomen: soft, mild diffuse tenderness, nondistended, no peritoneal signs  Extremities: With weeping edema throughout  Psych: Lethargic, confused      A/P  - 500 cc bolus STAT  - CBC, BMP ordered STAT      Agata Singh PA-C RN called stating patient hypotensive to 78/50.  HR 93, RR 20, SPO2 96% supplemental O2.    Patient seen and examined at bedside.  Patient is lethargic (which RN reports is baseline wax/wane), but responds to simple commands.    General: Resting in bed, NAD  Respiratory: Saturating well on NC  Abdomen: soft, mild diffuse tenderness, nondistended, no peritoneal signs  Extremities: With weeping edema throughout  Psych: Lethargic, confused      A/P  - 500 cc bolus STAT  - CBC, BMP ordered STAT      Agata Singh PA-C    Addendum: Patient has 24G IV in R. arm, which is no longer flushing.  Dr. Dawson was able to successfully place a 20G IV into the R. arm via ultrasound guided.  Patients labs were drawn, and bolus to be administered STAT.  Will f/u results and continue to monitor BP.

## 2019-02-18 LAB
ALBUMIN SERPL ELPH-MCNC: 1.8 G/DL — LOW (ref 3.3–5)
ALP SERPL-CCNC: 84 U/L — SIGNIFICANT CHANGE UP (ref 40–120)
ALT FLD-CCNC: 15 U/L — SIGNIFICANT CHANGE UP (ref 10–45)
AMMONIA BLD-MCNC: 11 UMOL/L — SIGNIFICANT CHANGE UP (ref 11–55)
ANION GAP SERPL CALC-SCNC: 10 MMOL/L — SIGNIFICANT CHANGE UP (ref 5–17)
AST SERPL-CCNC: 18 U/L — SIGNIFICANT CHANGE UP (ref 10–40)
BILIRUB SERPL-MCNC: 0.6 MG/DL — SIGNIFICANT CHANGE UP (ref 0.2–1.2)
BUN SERPL-MCNC: 16 MG/DL — SIGNIFICANT CHANGE UP (ref 7–23)
CALCIUM SERPL-MCNC: 7.1 MG/DL — LOW (ref 8.4–10.5)
CHLORIDE SERPL-SCNC: 97 MMOL/L — SIGNIFICANT CHANGE UP (ref 96–108)
CO2 SERPL-SCNC: 32 MMOL/L — HIGH (ref 22–31)
CREAT SERPL-MCNC: 0.71 MG/DL — SIGNIFICANT CHANGE UP (ref 0.5–1.3)
GLUCOSE SERPL-MCNC: 116 MG/DL — HIGH (ref 70–99)
HCT VFR BLD CALC: 29.6 % — LOW (ref 34.5–45)
HGB BLD-MCNC: 9.9 G/DL — LOW (ref 11.5–15.5)
MAGNESIUM SERPL-MCNC: 1.9 MG/DL — SIGNIFICANT CHANGE UP (ref 1.6–2.6)
MCHC RBC-ENTMCNC: 33.2 PG — SIGNIFICANT CHANGE UP (ref 27–34)
MCHC RBC-ENTMCNC: 33.4 GM/DL — SIGNIFICANT CHANGE UP (ref 32–36)
MCV RBC AUTO: 99.3 FL — SIGNIFICANT CHANGE UP (ref 80–100)
PHOSPHATE SERPL-MCNC: 3.1 MG/DL — SIGNIFICANT CHANGE UP (ref 2.5–4.5)
PLATELET # BLD AUTO: 113 K/UL — LOW (ref 150–400)
POTASSIUM SERPL-MCNC: 3.8 MMOL/L — SIGNIFICANT CHANGE UP (ref 3.5–5.3)
POTASSIUM SERPL-SCNC: 3.8 MMOL/L — SIGNIFICANT CHANGE UP (ref 3.5–5.3)
PROT SERPL-MCNC: 4 G/DL — LOW (ref 6–8.3)
RBC # BLD: 2.98 M/UL — LOW (ref 3.8–5.2)
RBC # FLD: 21 % — HIGH (ref 10.3–14.5)
SODIUM SERPL-SCNC: 139 MMOL/L — SIGNIFICANT CHANGE UP (ref 135–145)
WBC # BLD: 6.26 K/UL — SIGNIFICANT CHANGE UP (ref 3.8–10.5)
WBC # FLD AUTO: 6.26 K/UL — SIGNIFICANT CHANGE UP (ref 3.8–10.5)

## 2019-02-18 PROCEDURE — 99232 SBSQ HOSP IP/OBS MODERATE 35: CPT

## 2019-02-18 PROCEDURE — 99232 SBSQ HOSP IP/OBS MODERATE 35: CPT | Mod: GC

## 2019-02-18 RX ORDER — SPIRONOLACTONE 25 MG/1
50 TABLET, FILM COATED ORAL DAILY
Qty: 0 | Refills: 0 | Status: DISCONTINUED | OUTPATIENT
Start: 2019-02-18 | End: 2019-02-22

## 2019-02-18 RX ADMIN — DEXTROSE MONOHYDRATE, SODIUM CHLORIDE, AND POTASSIUM CHLORIDE 50 MILLILITER(S): 50; .745; 4.5 INJECTION, SOLUTION INTRAVENOUS at 22:19

## 2019-02-18 RX ADMIN — Medication 100 MILLIEQUIVALENT(S): at 00:31

## 2019-02-18 RX ADMIN — Medication 500 MILLIGRAM(S): at 11:39

## 2019-02-18 RX ADMIN — MIRTAZAPINE 15 MILLIGRAM(S): 45 TABLET, ORALLY DISINTEGRATING ORAL at 22:19

## 2019-02-18 RX ADMIN — CHLORHEXIDINE GLUCONATE 1 APPLICATION(S): 213 SOLUTION TOPICAL at 07:54

## 2019-02-18 RX ADMIN — ENOXAPARIN SODIUM 40 MILLIGRAM(S): 100 INJECTION SUBCUTANEOUS at 11:38

## 2019-02-18 RX ADMIN — Medication 100 MILLIEQUIVALENT(S): at 06:22

## 2019-02-18 RX ADMIN — Medication 500 MILLIGRAM(S): at 06:23

## 2019-02-18 RX ADMIN — Medication 100 MILLIGRAM(S): at 06:22

## 2019-02-18 RX ADMIN — ZIPRASIDONE HYDROCHLORIDE 40 MILLIGRAM(S): 20 CAPSULE ORAL at 07:53

## 2019-02-18 RX ADMIN — Medication 100 MILLIGRAM(S): at 11:39

## 2019-02-18 RX ADMIN — BUPROPION HYDROCHLORIDE 300 MILLIGRAM(S): 150 TABLET, EXTENDED RELEASE ORAL at 11:37

## 2019-02-18 RX ADMIN — Medication 500 MILLIGRAM(S): at 17:24

## 2019-02-18 RX ADMIN — Medication 75 MICROGRAM(S): at 06:23

## 2019-02-18 RX ADMIN — Medication 20 MILLIGRAM(S): at 06:23

## 2019-02-18 RX ADMIN — Medication 100 MILLIGRAM(S): at 00:32

## 2019-02-18 RX ADMIN — Medication 500 MILLIGRAM(S): at 01:02

## 2019-02-18 RX ADMIN — Medication 100 MILLIGRAM(S): at 22:18

## 2019-02-18 NOTE — PROGRESS NOTE ADULT - ATTENDING COMMENTS
extremely deconditioned.  lethargic on my exam  was previously DNR and DNI.  Presently is DNR   overall goals of care and planned interventions need to be further discussed with family  will attempt to set up further discussions  as noted, taking minimal oral intake despite family at bedside offering nutrition  on low maintenance dextrose containing fluid

## 2019-02-18 NOTE — PROGRESS NOTE ADULT - ATTENDING COMMENTS
Right periprosthetic femur fracture. awaiting surgical intervention - currently improving from C. Diff colitis and may soon proceed with surgery  Pain meds as needed orally  as tolerated. Continue ATC cardio pulmonary monitoring for  this critically ill patient, who is now hemodynamically stabilized. I am a non participating [facility] physician seeing Pt in coverage for Dr Barraza. The above patient examination was reviewed with Dr. Awan and I agree with his evaluation, assessment and treatment plan.  Orlin Barraza M.D.

## 2019-02-18 NOTE — PROGRESS NOTE ADULT - SUBJECTIVE AND OBJECTIVE BOX
69 y/o critically ill  female h/o dementia NHL presenting after unwitnessed fall. Pt found on the ground at rehab. is on lovenox after hip fracture. pt reports pain to her right leg. per EMS en route slurring speech and mildly hypotensive requesting trauma.unclear how long pt on the ground. Patient found to have C diff with worsening hypotension requiring pressors on and off and  Patient requiring the SICU for continued cardiopulmonary monitoring. Orthopedic procedure on hold due to  hypotension and pan colitis and need for pressors due to unstable hemodynamics. Diarrhea slowing with IV Flagyl + oral vancomycin. Patient now has the rectal tube removed.   Leukocytosis and diarrhea have improved Right hip fracture surgery is now scheduled for 02/22/19. Patient with worsening mental status Spoke with trauma resident and suggested CT of brain as well as w/u for worsening encephalopathy       MEDICATIONS  (STANDING):  buPROPion XL . 300 milliGRAM(s) Oral daily  chlorhexidine 4% Liquid 1 Application(s) Topical <User Schedule>  dextrose 5% + sodium chloride 0.45% with potassium chloride 20 mEq/L 1000 milliLiter(s) (50 mL/Hr) IV Continuous <Continuous>  enoxaparin Injectable 40 milliGRAM(s) SubCutaneous daily  furosemide Solution 20 milliGRAM(s) Oral daily  influenza   Vaccine 0.5 milliLiter(s) IntraMuscular once  levothyroxine 75 MICROGram(s) Oral daily  metoprolol tartrate 12.5 milliGRAM(s) Oral every 12 hours  metroNIDAZOLE  IVPB 500 milliGRAM(s) IV Intermittent every 8 hours  metroNIDAZOLE  IVPB      mirtazapine 15 milliGRAM(s) Oral <User Schedule>  spironolactone Suspension 50 milliGRAM(s) Oral daily  vancomycin    Solution 500 milliGRAM(s) Oral every 6 hours  ziprasidone 40 milliGRAM(s) Oral <User Schedule>    MEDICATIONS  (PRN):  acetaminophen   Tablet .. 650 milliGRAM(s) Oral every 6 hours PRN Mild Pain (1 - 3)  ALBUTerol/ipratropium for Nebulization 3 milliLiter(s) Nebulizer every 6 hours PRN Shortness of Breath and/or Wheezing          VITALS:   T(C): 36.8 (02-18-19 @ 21:26), Max: 37 (02-18-19 @ 08:00)  HR: 88 (02-18-19 @ 21:26) (84 - 99)  BP: 108/72 (02-18-19 @ 21:26) (91/57 - 112/77)  RR: 18 (02-18-19 @ 21:26) (18 - 20)  SpO2: 96% (02-18-19 @ 21:26) (94% - 98%)  Wt(kg): --      PHYSICAL EXAM:  GENERAL: NAD, well-groomed, well-developed  HEAD:  Atraumatic, Normocephalic  EYES: EOMI, PERRLA, conjunctiva and sclera clear  ENMT: No tonsillar erythema, exudates, or enlargement; Moist mucous membranes, Good dentition, No lesions  NECK: Supple, No JVD, Normal thyroid  NERVOUS SYSTEM:  confused becoming aphasic  CHEST/LUNG: Clear to percussion bilaterally; No rales, rhonchi, wheezing, or rubs  HEART: Regular rate and rhythm; No murmurs, rubs, or gallops  ABDOMEN: Soft, Nontender, Nondistended; Bowel sounds present  EXTREMITIES:  2+ Peripheral Pulses, No clubbing, cyanosis, or edema  LYMPH: No lymphadenopathy noted  SKIN: No rashes or lesions  LABS:        CBC Full  -  ( 18 Feb 2019 11:18 )  WBC Count : 6.26 K/uL  Hemoglobin : 9.9 g/dL  Hematocrit : 29.6 %  Platelet Count - Automated : 113 K/uL  Mean Cell Volume : 99.3 fl  Mean Cell Hemoglobin : 33.2 pg  Mean Cell Hemoglobin Concentration : 33.4 gm/dL  Auto Neutrophil # : x  Auto Lymphocyte # : x  Auto Monocyte # : x  Auto Eosinophil # : x  Auto Basophil # : x  Auto Neutrophil % : x  Auto Lymphocyte % : x  Auto Monocyte % : x  Auto Eosinophil % : x  Auto Basophil % : x    02-18    139  |  97  |  16  ----------------------------<  116<H>  3.8   |  32<H>  |  0.71    Ca    7.1<L>      18 Feb 2019 09:44  Phos  3.1     02-18  Mg     1.9     02-18    TPro  4.0<L>  /  Alb  1.8<L>  /  TBili  0.6  /  DBili  x   /  AST  18  /  ALT  15  /  AlkPhos  84  02-18    LIVER FUNCTIONS - ( 18 Feb 2019 09:44 )  Alb: 1.8 g/dL / Pro: 4.0 g/dL / ALK PHOS: 84 U/L / ALT: 15 U/L / AST: 18 U/L / GGT: x               CAPILLARY BLOOD GLUCOSE          RADIOLOGY & ADDITIONAL TESTS:

## 2019-02-18 NOTE — PROGRESS NOTE ADULT - PROBLEM SELECTOR PLAN 1
continued worsening mental status would check imaging of brain  check LFTs and consider an ammonia left  follow electrolytes and supplement as needed

## 2019-02-18 NOTE — PROGRESS NOTE ADULT - SUBJECTIVE AND OBJECTIVE BOX
[facility] ATP SURGERY DAILY PROGRESS NOTE    SUBJECTIVE:  -  still eating minimal food  -  ORTHO wants to do hip surgery this Friday    OBJECTIVE:    Vital Signs Last 24 Hrs  T(C): 36.9 (18 Feb 2019 14:30), Max: 37 (18 Feb 2019 08:00)  T(F): 98.5 (18 Feb 2019 14:30), Max: 98.6 (18 Feb 2019 08:00)  HR: 86 (18 Feb 2019 14:30) (84 - 99)  BP: 98/59 (18 Feb 2019 14:30) (78/50 - 112/77)  BP(mean): --  RR: 18 (18 Feb 2019 14:30) (18 - 20)  SpO2: 97% (18 Feb 2019 14:30) (95% - 98%)    I&O's Detail    17 Feb 2019 07:01  -  18 Feb 2019 07:00  --------------------------------------------------------  IN:    dextrose 5% + sodium chloride 0.45% with potassium chloride 20 mEq/L: 450 mL    Solution: 250 mL    Solution: 100 mL    Solution: 300 mL    Solution: 850 mL  Total IN: 1950 mL    OUT:  Total OUT: 0 mL    Total NET: 1950 mL      18 Feb 2019 07:01  -  18 Feb 2019 15:31  --------------------------------------------------------  IN:    dextrose 5% + sodium chloride 0.45% with potassium chloride 20 mEq/L: 400 mL    Solution: 100 mL  Total IN: 500 mL    OUT:    Stool: 1 mL  Total OUT: 1 mL    Total NET: 499 mL        LABS:                        9.9    6.26  )-----------( 113      ( 18 Feb 2019 11:18 )             29.6     02-18    139  |  97  |  16  ----------------------------<  116<H>  3.8   |  32<H>  |  0.71    Ca    7.1<L>      18 Feb 2019 09:44  Phos  3.1     02-18  Mg     1.9     02-18    TPro  4.0<L>  /  Alb  1.8<L>  /  TBili  0.6  /  DBili  x   /  AST  18  /  ALT  15  /  AlkPhos  84  02-18    EXAM:  NAD, awake and alert, sitting up in bed, conversant  Respirations nonlabored  Abdomen soft, mild ttp, moderately distended  No guarding or rebound tenderness

## 2019-02-18 NOTE — PROGRESS NOTE ADULT - SUBJECTIVE AND OBJECTIVE BOX
Orthopedic Surgery Progress Note     S: Patient seen and examined today. No acute events overnight.Pain is well controlled. Patient mildly lethargic this morning which as per nurse has been her baseline. Slow to respond to questions.    MEDICATIONS  (STANDING):  buPROPion XL . 300 milliGRAM(s) Oral daily  chlorhexidine 4% Liquid 1 Application(s) Topical <User Schedule>  dextrose 5% + sodium chloride 0.45% with potassium chloride 20 mEq/L 1000 milliLiter(s) (50 mL/Hr) IV Continuous <Continuous>  enoxaparin Injectable 40 milliGRAM(s) SubCutaneous daily  furosemide Solution 20 milliGRAM(s) Oral daily  influenza   Vaccine 0.5 milliLiter(s) IntraMuscular once  levothyroxine 75 MICROGram(s) Oral daily  metoprolol tartrate 12.5 milliGRAM(s) Oral every 12 hours  metroNIDAZOLE  IVPB 500 milliGRAM(s) IV Intermittent every 8 hours  metroNIDAZOLE  IVPB      mirtazapine 15 milliGRAM(s) Oral <User Schedule>  potassium chloride  10 mEq/100 mL IVPB 10 milliEquivalent(s) IV Intermittent every 1 hour  spironolactone Suspension 50 milliGRAM(s) Oral daily  vancomycin    Solution 500 milliGRAM(s) Oral every 6 hours  ziprasidone 40 milliGRAM(s) Oral <User Schedule>    MEDICATIONS  (PRN):  acetaminophen   Tablet .. 650 milliGRAM(s) Oral every 6 hours PRN Mild Pain (1 - 3)  ALBUTerol/ipratropium for Nebulization 3 milliLiter(s) Nebulizer every 6 hours PRN Shortness of Breath and/or Wheezing      Physical Exam:    Vital Signs Last 24 Hrs  T(C): 36.5 (18 Feb 2019 05:12), Max: 36.8 (17 Feb 2019 21:34)  T(F): 97.7 (18 Feb 2019 05:12), Max: 98.2 (17 Feb 2019 21:34)  HR: 99 (18 Feb 2019 05:12) (91 - 101)  BP: 105/65 (18 Feb 2019 05:12) (78/50 - 112/77)  BP(mean): --  RR: 18 (18 Feb 2019 05:12) (18 - 20)  SpO2: 95% (18 Feb 2019 05:12) (95% - 98%)    02-16-19 @ 07:01  -  02-17-19 @ 07:00  --------------------------------------------------------  IN: 1450 mL / OUT: 1 mL / NET: 1449 mL    02-17-19 @ 07:01  -  02-18-19 @ 06:32  --------------------------------------------------------  IN: 760 mL / OUT: 0 mL / NET: 760 mL        Gen: NAD  Diffuse 2+ pitting edema to extremities  RLE:  Staples in place, skin well approximated, no signs of drainage  +2 pitting edema up to mid thigh  TTP to thigh  + EHL/FHL/TA/Gastrocnemius  Unable to assess sensation 2/2 mental status  [facility]            LABS:                        11.4   6.79  )-----------( 118      ( 17 Feb 2019 12:14 )             35.2     02-17    139  |  95<L>  |  14  ----------------------------<  137<H>  3.5   |  31  |  0.70    Ca    7.8<L>      17 Feb 2019 22:34  Phos  2.8     02-17  Mg     1.9     02-17

## 2019-02-18 NOTE — PROGRESS NOTE ADULT - ASSESSMENT
68F p/w R hip fracture after GLF and found to have C. diff colitis, now resolving.    PLAN:  - may proceed with NGT feeds to bridge to reconditioning, but need to d/w family  - f/u family plans regarding hospice, ORTHO surgery (tentative date Friday 2/22)  - Multimodal pain control  - C.diff positive - c/w po vanc per ID  - DVT PPx    ATP SURGERY  [id number]

## 2019-02-18 NOTE — PROGRESS NOTE ADULT - ASSESSMENT
A/P: 68 y F w/ R Hemiarthroplasty done on 1/21, s/p MF, with L S1-2 sacral insufficiency fracture and  R periprosthetic Hip Fracture. Surgery will be scheduled this Friday if platelets are >100 and will now consist of removal of subsided R hemiarthroplasty hardware with placement of new implant.     - CT R hip demonstrates PP Femur fracture around Stem  - Xray pelvis shows implant subsidence   - Analgesia  - DVT ppx, per primary team  - Non weight bearing RLE/bedrest  - May weight bear as tolerated on LLE when not on bedrest  -R hip revision/ORIF planned for Friday, 2/22 if medically stable/cleared  - C dif management per ID  - consider diuresis  - staples to be removed at time of sx, or will be removed at bedside if sx is cancelled  - FU palliative consult as per primary team  - will d/w Dr George and advise if plan changes

## 2019-02-19 LAB
ALBUMIN SERPL ELPH-MCNC: 1.8 G/DL — LOW (ref 3.3–5)
ALP SERPL-CCNC: 78 U/L — SIGNIFICANT CHANGE UP (ref 40–120)
ALT FLD-CCNC: 14 U/L — SIGNIFICANT CHANGE UP (ref 10–45)
ANION GAP SERPL CALC-SCNC: 8 MMOL/L — SIGNIFICANT CHANGE UP (ref 5–17)
AST SERPL-CCNC: 18 U/L — SIGNIFICANT CHANGE UP (ref 10–40)
BILIRUB SERPL-MCNC: 0.5 MG/DL — SIGNIFICANT CHANGE UP (ref 0.2–1.2)
BUN SERPL-MCNC: 14 MG/DL — SIGNIFICANT CHANGE UP (ref 7–23)
CALCIUM SERPL-MCNC: 7.3 MG/DL — LOW (ref 8.4–10.5)
CHLORIDE SERPL-SCNC: 99 MMOL/L — SIGNIFICANT CHANGE UP (ref 96–108)
CO2 SERPL-SCNC: 28 MMOL/L — SIGNIFICANT CHANGE UP (ref 22–31)
CREAT SERPL-MCNC: 0.53 MG/DL — SIGNIFICANT CHANGE UP (ref 0.5–1.3)
GLUCOSE SERPL-MCNC: 131 MG/DL — HIGH (ref 70–99)
HCT VFR BLD CALC: 30.9 % — LOW (ref 34.5–45)
HGB BLD-MCNC: 10.4 G/DL — LOW (ref 11.5–15.5)
MAGNESIUM SERPL-MCNC: 1.8 MG/DL — SIGNIFICANT CHANGE UP (ref 1.6–2.6)
MCHC RBC-ENTMCNC: 33.6 GM/DL — SIGNIFICANT CHANGE UP (ref 32–36)
MCHC RBC-ENTMCNC: 34.5 PG — HIGH (ref 27–34)
MCV RBC AUTO: 103 FL — HIGH (ref 80–100)
PHOSPHATE SERPL-MCNC: 2.5 MG/DL — SIGNIFICANT CHANGE UP (ref 2.5–4.5)
PLATELET # BLD AUTO: 117 K/UL — LOW (ref 150–400)
POTASSIUM SERPL-MCNC: 3.7 MMOL/L — SIGNIFICANT CHANGE UP (ref 3.5–5.3)
POTASSIUM SERPL-SCNC: 3.7 MMOL/L — SIGNIFICANT CHANGE UP (ref 3.5–5.3)
PROT SERPL-MCNC: 4 G/DL — LOW (ref 6–8.3)
RBC # BLD: 3.01 M/UL — LOW (ref 3.8–5.2)
RBC # FLD: 17.8 % — HIGH (ref 10.3–14.5)
SODIUM SERPL-SCNC: 135 MMOL/L — SIGNIFICANT CHANGE UP (ref 135–145)
WBC # BLD: 5.5 K/UL — SIGNIFICANT CHANGE UP (ref 3.8–10.5)
WBC # FLD AUTO: 5.5 K/UL — SIGNIFICANT CHANGE UP (ref 3.8–10.5)

## 2019-02-19 PROCEDURE — 99232 SBSQ HOSP IP/OBS MODERATE 35: CPT

## 2019-02-19 RX ORDER — FUROSEMIDE 40 MG
20 TABLET ORAL DAILY
Qty: 0 | Refills: 0 | Status: DISCONTINUED | OUTPATIENT
Start: 2019-02-19 | End: 2019-02-20

## 2019-02-19 RX ADMIN — CHLORHEXIDINE GLUCONATE 1 APPLICATION(S): 213 SOLUTION TOPICAL at 08:14

## 2019-02-19 RX ADMIN — Medication 12.5 MILLIGRAM(S): at 17:40

## 2019-02-19 RX ADMIN — Medication 500 MILLIGRAM(S): at 06:46

## 2019-02-19 RX ADMIN — Medication 500 MILLIGRAM(S): at 17:40

## 2019-02-19 RX ADMIN — Medication 500 MILLIGRAM(S): at 11:33

## 2019-02-19 RX ADMIN — Medication 100 MILLIGRAM(S): at 11:33

## 2019-02-19 RX ADMIN — Medication 500 MILLIGRAM(S): at 01:34

## 2019-02-19 RX ADMIN — ENOXAPARIN SODIUM 40 MILLIGRAM(S): 100 INJECTION SUBCUTANEOUS at 11:33

## 2019-02-19 RX ADMIN — Medication 75 MICROGRAM(S): at 06:46

## 2019-02-19 RX ADMIN — ZIPRASIDONE HYDROCHLORIDE 40 MILLIGRAM(S): 20 CAPSULE ORAL at 06:46

## 2019-02-19 RX ADMIN — MIRTAZAPINE 15 MILLIGRAM(S): 45 TABLET, ORALLY DISINTEGRATING ORAL at 22:52

## 2019-02-19 RX ADMIN — Medication 100 MILLIGRAM(S): at 06:46

## 2019-02-19 NOTE — PROGRESS NOTE ADULT - ATTENDING COMMENTS
Right periprosthetic femur fracture. awaiting surgical intervention - currently improving from C. Diff colitis and may soon proceed with surgery  Pain meds as needed orally  as tolerated. Continue ATC cardio pulmonary monitoring for  this critically ill patient, who is now hemodynamically stabilized. I am a non participating [facility] physician seeing Pt in coverage for Dr Barraza. The above patient examination was reviewed with Dr. Awan and I agree with his evaluation, assessment and treatment plan.  Orlin Barraza M.D. Right periprosthetic femur fracture. awaiting surgical intervention - currently improving from C. Diff colitis and may soon proceed with surgery  Pain meds as needed orally  as tolerated. To consider neurology and psychiatry follow up at this time, prior  O.R. as scheduled for 02/22/19

## 2019-02-19 NOTE — PROGRESS NOTE ADULT - SUBJECTIVE AND OBJECTIVE BOX
69 y/o critically ill  female h/o dementia NHL presenting after unwitnessed fall. Pt found on the ground at rehab. is on lovenox after hip fracture. pt reports pain to her right leg. per EMS en route slurring speech and mildly hypotensive requesting trauma.unclear how long pt on the ground. Patient found to have C diff with worsening hypotension requiring pressors on and off and  Patient requiring the SICU for continued cardiopulmonary monitoring. Orthopedic procedure on hold due to  hypotension and pan colitis and need for pressors due to unstable hemodynamics. Diarrhea slowing with IV Flagyl + oral vancomycin. Patient now has the rectal tube removed.   Leukocytosis and diarrhea have improved Right hip fracture surgery is now scheduled for 02/22/19. Patient with worsening mental status Spoke with trauma resident and suggested CT of brain as well as w/u for worsening encephalopathy       MEDICATIONS  (STANDING):  buPROPion XL . 300 milliGRAM(s) Oral daily  chlorhexidine 4% Liquid 1 Application(s) Topical <User Schedule>  dextrose 5% + sodium chloride 0.45% with potassium chloride 20 mEq/L 1000 milliLiter(s) (50 mL/Hr) IV Continuous <Continuous>  enoxaparin Injectable 40 milliGRAM(s) SubCutaneous daily  furosemide Solution 20 milliGRAM(s) Oral daily  influenza   Vaccine 0.5 milliLiter(s) IntraMuscular once  levothyroxine 75 MICROGram(s) Oral daily  metoprolol tartrate 12.5 milliGRAM(s) Oral every 12 hours  metroNIDAZOLE  IVPB 500 milliGRAM(s) IV Intermittent every 8 hours  metroNIDAZOLE  IVPB      mirtazapine 15 milliGRAM(s) Oral <User Schedule>  spironolactone Suspension 50 milliGRAM(s) Oral daily  vancomycin    Solution 500 milliGRAM(s) Oral every 6 hours  ziprasidone 40 milliGRAM(s) Oral <User Schedule>    MEDICATIONS  (PRN):  acetaminophen   Tablet .. 650 milliGRAM(s) Oral every 6 hours PRN Mild Pain (1 - 3)  ALBUTerol/ipratropium for Nebulization 3 milliLiter(s) Nebulizer every 6 hours PRN Shortness of Breath and/or Wheezing          VITALS:   T(C): 36.8 (02-18-19 @ 21:26), Max: 37 (02-18-19 @ 08:00)  HR: 88 (02-18-19 @ 21:26) (84 - 99)  BP: 108/72 (02-18-19 @ 21:26) (91/57 - 112/77)  RR: 18 (02-18-19 @ 21:26) (18 - 20)  SpO2: 96% (02-18-19 @ 21:26) (94% - 98%)  Wt(kg): --      PHYSICAL EXAM:  GENERAL: NAD, well-groomed, well-developed  HEAD:  Atraumatic, Normocephalic  EYES: EOMI, PERRLA, conjunctiva and sclera clear  ENMT: No tonsillar erythema, exudates, or enlargement; Moist mucous membranes, Good dentition, No lesions  NECK: Supple, No JVD, Normal thyroid  NERVOUS SYSTEM:  confused becoming aphasic  CHEST/LUNG: Clear to percussion bilaterally; No rales, rhonchi, wheezing, or rubs  HEART: Regular rate and rhythm; No murmurs, rubs, or gallops  ABDOMEN: Soft, Nontender, Nondistended; Bowel sounds present  EXTREMITIES:  2+ Peripheral Pulses, No clubbing, cyanosis, or edema  LYMPH: No lymphadenopathy noted  SKIN: No rashes or lesions  LABS:        CBC Full  -  ( 18 Feb 2019 11:18 )  WBC Count : 6.26 K/uL  Hemoglobin : 9.9 g/dL  Hematocrit : 29.6 %  Platelet Count - Automated : 113 K/uL  Mean Cell Volume : 99.3 fl  Mean Cell Hemoglobin : 33.2 pg  Mean Cell Hemoglobin Concentration : 33.4 gm/dL  Auto Neutrophil # : x  Auto Lymphocyte # : x  Auto Monocyte # : x  Auto Eosinophil # : x  Auto Basophil # : x  Auto Neutrophil % : x  Auto Lymphocyte % : x  Auto Monocyte % : x  Auto Eosinophil % : x  Auto Basophil % : x    02-18    139  |  97  |  16  ----------------------------<  116<H>  3.8   |  32<H>  |  0.71    Ca    7.1<L>      18 Feb 2019 09:44  Phos  3.1     02-18  Mg     1.9     02-18    TPro  4.0<L>  /  Alb  1.8<L>  /  TBili  0.6  /  DBili  x   /  AST  18  /  ALT  15  /  AlkPhos  84  02-18    LIVER FUNCTIONS - ( 18 Feb 2019 09:44 )  Alb: 1.8 g/dL / Pro: 4.0 g/dL / ALK PHOS: 84 U/L / ALT: 15 U/L / AST: 18 U/L / GGT: x               CAPILLARY BLOOD GLUCOSE          RADIOLOGY & ADDITIONAL TESTS: 67 y/o critically ill  female h/o dementia NHL presenting after unwitnessed fall. Pt found on the ground at rehab. is on lovenox after hip fracture. pt reports pain to her right leg. per EMS en route slurring speech and mildly hypotensive requesting trauma.unclear how long pt on the ground. Patient found to have C diff with worsening hypotension requiring pressors on and off and  Patient requiring the SICU for continued cardiopulmonary monitoring. Orthopedic procedure on hold due to  hypotension and pan colitis and need for pressors due to unstable hemodynamics. Diarrhea slowing with IV Flagyl + oral vancomycin. Patient now has the rectal tube removed.   Leukocytosis and diarrhea have improved Right hip fracture surgery is now scheduled for 02/22/19. Patient with worsening mental status Spoke with trauma resident and suggested CT of brain as well as w/u for worsening encephalopathy     MEDICATIONS  (STANDING):  buPROPion XL . 300 milliGRAM(s) Oral daily  chlorhexidine 4% Liquid 1 Application(s) Topical <User Schedule>  dextrose 5% + sodium chloride 0.45% with potassium chloride 20 mEq/L 1000 milliLiter(s) (50 mL/Hr) IV Continuous <Continuous>  enoxaparin Injectable 40 milliGRAM(s) SubCutaneous daily  furosemide Solution 20 milliGRAM(s) Oral daily  influenza   Vaccine 0.5 milliLiter(s) IntraMuscular once  levothyroxine 75 MICROGram(s) Oral daily  metoprolol tartrate 12.5 milliGRAM(s) Oral every 12 hours  mirtazapine 15 milliGRAM(s) Oral <User Schedule>  spironolactone Suspension 50 milliGRAM(s) Oral daily  vancomycin    Solution 500 milliGRAM(s) Oral every 6 hours  ziprasidone 40 milliGRAM(s) Oral <User Schedule>    MEDICATIONS  (PRN):  acetaminophen   Tablet .. 650 milliGRAM(s) Oral every 6 hours PRN Mild Pain (1 - 3)  ALBUTerol/ipratropium for Nebulization 3 milliLiter(s) Nebulizer every 6 hours PRN Shortness of Breath and/or Wheezing    Vital Signs Last 24 Hrs  T(C): 36.5 (20 Feb 2019 04:45), Max: 37 (19 Feb 2019 21:59)  T(F): 97.7 (20 Feb 2019 04:45), Max: 98.6 (19 Feb 2019 21:59)  HR: 99 (20 Feb 2019 04:45) (92 - 102)  BP: 117/82 (20 Feb 2019 04:45) (93/62 - 122/72)  BP(mean): --  RR: 18 (20 Feb 2019 04:45) (18 - 18)  SpO2: 99% (20 Feb 2019 04:45) (95% - 99%)      PHYSICAL EXAM:  GENERAL: NAD, well-groomed, well-developed  HEAD:  Atraumatic, Normocephalic  EYES: EOMI, PERRLA, conjunctiva and sclera clear  ENMT: No tonsillar erythema, exudates, or enlargement; Moist mucous membranes, Good dentition, No lesions  NECK: Supple, No JVD, Normal thyroid  NERVOUS SYSTEM:  confused becoming aphasic  CHEST/LUNG: Clear to percussion bilaterally; No rales, rhonchi, wheezing, or rubs  HEART: Regular rate and rhythm; No murmurs, rubs, or gallops  ABDOMEN: Soft, Nontender, Nondistended; Bowel sounds present  EXTREMITIES:  2+ Peripheral Pulses, No clubbing, cyanosis, or edema  LYMPH: No lymphadenopathy noted  SKIN: No rashes or lesions  LABS:          CBC Full  -  ( 19 Feb 2019 09:40 )  WBC Count : 5.5 K/uL  Hemoglobin : 10.4 g/dL  Hematocrit : 30.9 %  Platelet Count - Automated : 117 K/uL  Mean Cell Volume : 103.0 fl  Mean Cell Hemoglobin : 34.5 pg  Mean Cell Hemoglobin Concentration : 33.6 gm/dL  Auto Neutrophil # : x  Auto Lymphocyte # : x  Auto Monocyte # : x  Auto Eosinophil # : x  Auto Basophil # : x  Auto Neutrophil % : x  Auto Lymphocyte % : x  Auto Monocyte % : x  Auto Eosinophil % : x  Auto Basophil % : x    02-19    135  |  99  |  14  ----------------------------<  131<H>  3.7   |  28  |  0.53    Ca    7.3<L>      19 Feb 2019 09:31  Phos  2.5     02-19  Mg     1.8     02-19    TPro  4.0<L>  /  Alb  1.8<L>  /  TBili  0.5  /  DBili  x   /  AST  18  /  ALT  14  /  AlkPhos  78  02-19    LIVER FUNCTIONS - ( 19 Feb 2019 09:31 )  Alb: 1.8 g/dL / Pro: 4.0 g/dL / ALK PHOS: 78 U/L / ALT: 14 U/L / AST: 18 U/L / GGT: x 69 y/o critically ill  female h/o dementia NHL presenting after unwitnessed fall. Pt found on the ground at rehab. is on lovenox after hip fracture. pt reports pain to her right leg. per EMS en route slurring speech and mildly hypotensive requesting trauma.unclear how long pt on the ground. Patient found to have C diff with worsening hypotension requiring pressors on and off and  Patient requiring the SICU for continued cardiopulmonary monitoring. Orthopedic procedure on hold due to  hypotension and pan colitis and need for pressors due to unstable hemodynamics. Diarrhea slowing with IV Flagyl + oral vancomycin. Patient now has the rectal tube removed.   Leukocytosis and diarrhea have improved Right hip fracture surgery is now scheduled for 02/22/19. No significant change in status. Has senile dementia with intermittent lucency. No further C.diff colitis and no further hypovolemic hypotension. On high dose bupropian XL and mirtazapine, which may be increasing sedation.    MEDICATIONS  (STANDING):  buPROPion XL . 300 milliGRAM(s) Oral daily  chlorhexidine 4% Liquid 1 Application(s) Topical <User Schedule>  dextrose 5% + sodium chloride 0.45% with potassium chloride 20 mEq/L 1000 milliLiter(s) (50 mL/Hr) IV Continuous <Continuous>  enoxaparin Injectable 40 milliGRAM(s) SubCutaneous daily  furosemide Solution 20 milliGRAM(s) Oral daily  influenza   Vaccine 0.5 milliLiter(s) IntraMuscular once  levothyroxine 75 MICROGram(s) Oral daily  metoprolol tartrate 12.5 milliGRAM(s) Oral every 12 hours  mirtazapine 15 milliGRAM(s) Oral <User Schedule>  spironolactone Suspension 50 milliGRAM(s) Oral daily  vancomycin    Solution 500 milliGRAM(s) Oral every 6 hours  ziprasidone 40 milliGRAM(s) Oral <User Schedule>    MEDICATIONS  (PRN):  acetaminophen   Tablet .. 650 milliGRAM(s) Oral every 6 hours PRN Mild Pain (1 - 3)  ALBUTerol/ipratropium for Nebulization 3 milliLiter(s) Nebulizer every 6 hours PRN Shortness of Breath and/or Wheezing    Vital Signs Last 24 Hrs  T(C): 36.5 (20 Feb 2019 04:45), Max: 37 (19 Feb 2019 21:59)  T(F): 97.7 (20 Feb 2019 04:45), Max: 98.6 (19 Feb 2019 21:59)  HR: 99 (20 Feb 2019 04:45) (92 - 102)  BP: 117/82 (20 Feb 2019 04:45) (93/62 - 122/72)  BP(mean): --  RR: 18 (20 Feb 2019 04:45) (18 - 18)  SpO2: 99% (20 Feb 2019 04:45) (95% - 99%)      PHYSICAL EXAM:  GENERAL: NAD, well-groomed, well-developed  HEAD:  Atraumatic, Normocephalic  EYES: EOMI, PERRLA, conjunctiva and sclera clear  ENMT: No tonsillar erythema, exudates, or enlargement; Moist mucous membranes, Good dentition, No lesions  NECK: Supple, No JVD, Normal thyroid  NERVOUS SYSTEM:  confused becoming aphasic  CHEST/LUNG: Clear to percussion bilaterally; No rales, rhonchi, wheezing, or rubs  HEART: Regular rate and rhythm; No murmurs, rubs, or gallops  ABDOMEN: Soft, Nontender, Nondistended; Bowel sounds present  EXTREMITIES: right leg remains shortened and rotated laterally by 10 degrees.  LYMPH: No lymphadenopathy noted  SKIN: No rashes or lesions  LABS:          CBC Full  -  ( 19 Feb 2019 09:40 )  WBC Count : 5.5 K/uL  Hemoglobin : 10.4 g/dL  Hematocrit : 30.9 %  Platelet Count - Automated : 117 K/uL  Mean Cell Volume : 103.0 fl  Mean Cell Hemoglobin : 34.5 pg  Mean Cell Hemoglobin Concentration : 33.6 gm/dL  Auto Neutrophil # : x  Auto Lymphocyte # : x  Auto Monocyte # : x  Auto Eosinophil # : x  Auto Basophil # : x  Auto Neutrophil % : x  Auto Lymphocyte % : x  Auto Monocyte % : x  Auto Eosinophil % : x  Auto Basophil % : x    02-19    135  |  99  |  14  ----------------------------<  131<H>  3.7   |  28  |  0.53    Ca    7.3<L>      19 Feb 2019 09:31  Phos  2.5     02-19  Mg     1.8     02-19    TPro  4.0<L>  /  Alb  1.8<L>  /  TBili  0.5  /  DBili  x   /  AST  18  /  ALT  14  /  AlkPhos  78  02-19    LIVER FUNCTIONS - ( 19 Feb 2019 09:31 )  Alb: 1.8 g/dL / Pro: 4.0 g/dL / ALK PHOS: 78 U/L / ALT: 14 U/L / AST: 18 U/L / GGT: x

## 2019-02-19 NOTE — PROGRESS NOTE ADULT - SUBJECTIVE AND OBJECTIVE BOX
Orthopedic Surgery Progress Note     S: Patient seen and examined today. No acute events overnight. Pain is well controlled. Patient less lethargic than yesterday. Responds to questions. Denies f/c.    MEDICATIONS  (STANDING):  buPROPion XL . 300 milliGRAM(s) Oral daily  chlorhexidine 4% Liquid 1 Application(s) Topical <User Schedule>  dextrose 5% + sodium chloride 0.45% with potassium chloride 20 mEq/L 1000 milliLiter(s) (50 mL/Hr) IV Continuous <Continuous>  enoxaparin Injectable 40 milliGRAM(s) SubCutaneous daily  furosemide Solution 20 milliGRAM(s) Oral daily  influenza   Vaccine 0.5 milliLiter(s) IntraMuscular once  levothyroxine 75 MICROGram(s) Oral daily  metoprolol tartrate 12.5 milliGRAM(s) Oral every 12 hours  metroNIDAZOLE  IVPB 500 milliGRAM(s) IV Intermittent every 8 hours  metroNIDAZOLE  IVPB      mirtazapine 15 milliGRAM(s) Oral <User Schedule>  spironolactone Suspension 50 milliGRAM(s) Oral daily  vancomycin    Solution 500 milliGRAM(s) Oral every 6 hours  ziprasidone 40 milliGRAM(s) Oral <User Schedule>    MEDICATIONS  (PRN):  acetaminophen   Tablet .. 650 milliGRAM(s) Oral every 6 hours PRN Mild Pain (1 - 3)  ALBUTerol/ipratropium for Nebulization 3 milliLiter(s) Nebulizer every 6 hours PRN Shortness of Breath and/or Wheezing      Physical Exam:    Vital Signs Last 24 Hrs  T(C): 36.7 (19 Feb 2019 06:42), Max: 37 (18 Feb 2019 08:00)  T(F): 98 (19 Feb 2019 06:42), Max: 98.6 (18 Feb 2019 08:00)  HR: 96 (19 Feb 2019 06:42) (84 - 96)  BP: 93/62 (19 Feb 2019 06:42) (91/57 - 111/75)  BP(mean): --  RR: 18 (19 Feb 2019 06:42) (18 - 20)  SpO2: 97% (19 Feb 2019 06:42) (94% - 98%)    02-17-19 @ 07:01  -  02-18-19 @ 07:00  --------------------------------------------------------  IN: 1950 mL / OUT: 0 mL / NET: 1950 mL    02-18-19 @ 07:01  -  02-19-19 @ 06:52  --------------------------------------------------------  IN: 820 mL / OUT: 4 mL / NET: 816 mL        Gen: NAD  RLE:  Staples in place, skin well approximated, no signs of drainage  +2 pitting edema up to mid thigh  TTP to thigh  + EHL/FHL/TA/Gastrocnemius  + sensation to DP/SP/Jordan/Tib/Saph distributions  WWP          LABS:                        9.9    6.26  )-----------( 113      ( 18 Feb 2019 11:18 )             29.6     02-18    139  |  97  |  16  ----------------------------<  116<H>  3.8   |  32<H>  |  0.71    Ca    7.1<L>      18 Feb 2019 09:44  Phos  3.1     02-18  Mg     1.9     02-18    TPro  4.0<L>  /  Alb  1.8<L>  /  TBili  0.6  /  DBili  x   /  AST  18  /  ALT  15  /  AlkPhos  84  02-18

## 2019-02-19 NOTE — PROGRESS NOTE ADULT - ASSESSMENT
68 year old female with a PMHx of bipolar depression, Hyperlipidemia, NHL (in remission), OCD, dementia, with recent hospitalization (1/20-1/26) for right hip hemiarthroplasty for right femoral neck fracture after fall. Hospital course complicated by ESBL E. Coli UTI on Ertapenem (1/23-1/29) now presents with likely Hypovolemic/ Distributive shock secondary to sepsis 2/2 Pancolitis, likely secondary to C. Diff Colitis, Age Indeterminate L5 vertebral body fracture, Right proximal femoral periprosthetic fracture following unwitnessed fall with cdiff, bandemia, fever  The severe Cdiff with pancolitis is improving  Patient's family refused FMT    Antibiotics  Meropenem 2/4 --> 2/17  Flagyl 2/5 -->  Vanco by retention enema 2/7 --> 2/12  PO Vanco 2/4-->      Clostridium difficile colitis.  Plan: Clinically better  Tolerating PO  Formed BMs  Continue PO vanco at current dose X 1 more week  If stable can taper-125 mg po q 6 X 2 weeks and the q 12 X 2 more weeks  Monitor clinical status and tailor plan.

## 2019-02-19 NOTE — PROGRESS NOTE ADULT - ATTENDING COMMENTS
I agree with the above note on this patient. All pertinent films have been reviewed. Please refer to clinical documentation of the history, physical examinations, data summary, and both assessment and plan as documented above and with which I agree.    surgery planned for Friday, family understands patient is very high risk for complications from this surgery including infection and wound not healing  please work on improving peripheral edema in lower extremities in advance of surgery    Conor George MD  Attending Orthopedic Surgeon

## 2019-02-19 NOTE — PROGRESS NOTE ADULT - SUBJECTIVE AND OBJECTIVE BOX
Follow Up:  severe cdiff    Interval History/ROS: marekd fatigue, only modest po intake    Allergies  honeydew melon (Other)  penicillin (Other; Hives)    ANTIMICROBIALS:  vancomycin    Solution 500 every 6 hours    OTHER MEDS:  MEDICATIONS  (STANDING):  acetaminophen   Tablet .. 650 every 6 hours PRN  ALBUTerol/ipratropium for Nebulization 3 every 6 hours PRN  buPROPion XL . 300 daily  enoxaparin Injectable 40 daily  furosemide Solution 20 daily  influenza   Vaccine 0.5 once  levothyroxine 75 daily  metoprolol tartrate 12.5 every 12 hours  mirtazapine 15 <User Schedule>  spironolactone Suspension 50 daily  ziprasidone 40 <User Schedule>    Vital Signs Last 24 Hrs  T(C): 36.7 (19 Feb 2019 17:00), Max: 36.8 (18 Feb 2019 21:26)  T(F): 98 (19 Feb 2019 17:00), Max: 98.2 (18 Feb 2019 21:26)  HR: 98 (19 Feb 2019 17:00) (88 - 102)  BP: 118/70 (19 Feb 2019 17:00) (93/62 - 122/72)  BP(mean): --  RR: 18 (19 Feb 2019 17:00) (18 - 18)  SpO2: 96% (19 Feb 2019 17:00) (95% - 97%)    PHYSICAL EXAM:  General: ill appearing  Neurology: fatigued  Respiratory: no resp distress. Clear to auscultation bilaterally  CV: RRR, S1S2, no murmurs, rubs or gallops  Abdominal: mildly distended, normal bowel sounds  Line Sites: Clear  Skin: No rash                        10.4   5.5   )-----------( 117      ( 19 Feb 2019 09:40 )             30.9   WBC Count: 5.5 (02-19 @ 09:40)  WBC Count: 6.26 (02-18 @ 11:18)  WBC Count: 6.79 (02-17 @ 12:14)  WBC Count: 6.7 (02-16 @ 22:10)  WBC Count: 7.36 (02-16 @ 09:20)  WBC Count: 10.8 (02-15 @ 09:46)      02-19    135  |  99  |  14  ----------------------------<  131<H>  3.7   |  28  |  0.53    Ca    7.3<L>      19 Feb 2019 09:31  Phos  2.5     02-19  Mg     1.8     02-19    TPro  4.0<L>  /  Alb  1.8<L>  /  TBili  0.5  /  DBili  x   /  AST  18  /  ALT  14  /  AlkPhos  78  02-19    MICROBIOLOGY:  .Body Fluid  02-06-19   No growth  --    No polymorphonuclear cells seen per low power field  No organisms seen      .Blood Blood-Peripheral  02-06-19   No growth at 5 days.  --  --      .Blood Blood  01-20-19   No growth at 5 days.  --  --      .Urine Catheterized  01-20-19   >100,000 CFU/ml Escherichia coli ESBL  --  Escherichia coli ESBL      RADIOLOGY:  < from: Xray Chest 1 View- PORTABLE-Urgent (02.16.19 @ 20:06) >  Interval improvement in pulmonary edema changes  Stable bilateral pleural effusions    < end of copied text >      Jimbo Garza MD; Division of Infectious Disease; Pager: [phone]; nights and weekends: [phone]

## 2019-02-19 NOTE — PROGRESS NOTE ADULT - SUBJECTIVE AND OBJECTIVE BOX
SURGERY DAILY PROGRESS NOTE: Trauma       SUBJECTIVE/ROS: Patient seen and evaluated this morning on rounds. Patient  feels well. No acute events overnight. Pain is well controlled.   Denies nausea, vomiting, chest pain, shortness of breath.        OBJECTIVE:    Vital Signs Last 24 Hrs  T(C): 36.8 (19 Feb 2019 13:40), Max: 36.9 (18 Feb 2019 14:30)  T(F): 98.2 (19 Feb 2019 13:40), Max: 98.5 (18 Feb 2019 14:30)  HR: 102 (19 Feb 2019 13:40) (84 - 102)  BP: 97/63 (19 Feb 2019 13:40) (93/62 - 111/75)  BP(mean): --  RR: 18 (19 Feb 2019 13:40) (18 - 18)  SpO2: 95% (19 Feb 2019 13:40) (94% - 97%)  I&O's Detail    18 Feb 2019 07:01  -  19 Feb 2019 07:00  --------------------------------------------------------  IN:    dextrose 5% + sodium chloride 0.45% with potassium chloride 20 mEq/L: 600 mL    Oral Fluid: 120 mL    Solution: 100 mL  Total IN: 820 mL    OUT:    Stool: 4 mL  Total OUT: 4 mL    Total NET: 816 mL        Daily     Daily   MEDICATIONS  (STANDING):  buPROPion XL . 300 milliGRAM(s) Oral daily  chlorhexidine 4% Liquid 1 Application(s) Topical <User Schedule>  dextrose 5% + sodium chloride 0.45% with potassium chloride 20 mEq/L 1000 milliLiter(s) (50 mL/Hr) IV Continuous <Continuous>  enoxaparin Injectable 40 milliGRAM(s) SubCutaneous daily  furosemide Solution 20 milliGRAM(s) Oral daily  influenza   Vaccine 0.5 milliLiter(s) IntraMuscular once  levothyroxine 75 MICROGram(s) Oral daily  metoprolol tartrate 12.5 milliGRAM(s) Oral every 12 hours  metroNIDAZOLE  IVPB 500 milliGRAM(s) IV Intermittent every 8 hours  metroNIDAZOLE  IVPB      mirtazapine 15 milliGRAM(s) Oral <User Schedule>  spironolactone Suspension 50 milliGRAM(s) Oral daily  vancomycin    Solution 500 milliGRAM(s) Oral every 6 hours  ziprasidone 40 milliGRAM(s) Oral <User Schedule>    MEDICATIONS  (PRN):  acetaminophen   Tablet .. 650 milliGRAM(s) Oral every 6 hours PRN Mild Pain (1 - 3)  ALBUTerol/ipratropium for Nebulization 3 milliLiter(s) Nebulizer every 6 hours PRN Shortness of Breath and/or Wheezing      LABS:                        10.4   5.5   )-----------( 117      ( 19 Feb 2019 09:40 )             30.9     02-19    135  |  99  |  14  ----------------------------<  131<H>  3.7   |  28  |  0.53    Ca    7.3<L>      19 Feb 2019 09:31  Phos  2.5     02-19  Mg     1.8     02-19    TPro  4.0<L>  /  Alb  1.8<L>  /  TBili  0.5  /  DBili  x   /  AST  18  /  ALT  14  /  AlkPhos  78  02-19      PHYSICAL EXAM:  Constitutional: NAD, confused at the baseline, awake laying in bed   Respiratory: nonlabored respirations    Gastrointestinal: abdomen soft, mild tenderness to palpation, moderately  No peritonitis

## 2019-02-19 NOTE — PROGRESS NOTE ADULT - ASSESSMENT
68F p/w R hip fracture after GLF and found to have C. diff colitis, now resolving.    PLAN:  - palliative reconsulted for goals of care   - f/u family plans regarding hospice, ORTHO surgery (tentative date Friday 2/22)  - Multimodal pain control  - C.diff positive - c/w po vanc per ID  - DVT PPx    Lore Pedro PA-C   [facility]  [id number]

## 2019-02-19 NOTE — PROGRESS NOTE ADULT - ATTENDING COMMENTS
slightly more alert  taking slightly more intake  will reinforce plans and goals of care especially in view of planned ORIF of femor

## 2019-02-20 LAB
ANION GAP SERPL CALC-SCNC: 10 MMOL/L — SIGNIFICANT CHANGE UP (ref 5–17)
BUN SERPL-MCNC: 15 MG/DL — SIGNIFICANT CHANGE UP (ref 7–23)
CALCIUM SERPL-MCNC: 7.2 MG/DL — LOW (ref 8.4–10.5)
CHLORIDE SERPL-SCNC: 100 MMOL/L — SIGNIFICANT CHANGE UP (ref 96–108)
CO2 SERPL-SCNC: 26 MMOL/L — SIGNIFICANT CHANGE UP (ref 22–31)
CREAT SERPL-MCNC: 0.54 MG/DL — SIGNIFICANT CHANGE UP (ref 0.5–1.3)
CULTURE RESULTS: SIGNIFICANT CHANGE UP
GLUCOSE SERPL-MCNC: 191 MG/DL — HIGH (ref 70–99)
HCT VFR BLD CALC: 29.5 % — LOW (ref 34.5–45)
HGB BLD-MCNC: 9.8 G/DL — LOW (ref 11.5–15.5)
MAGNESIUM SERPL-MCNC: 1.8 MG/DL — SIGNIFICANT CHANGE UP (ref 1.6–2.6)
MCHC RBC-ENTMCNC: 33.2 GM/DL — SIGNIFICANT CHANGE UP (ref 32–36)
MCHC RBC-ENTMCNC: 33.4 PG — SIGNIFICANT CHANGE UP (ref 27–34)
MCV RBC AUTO: 100.7 FL — HIGH (ref 80–100)
PHOSPHATE SERPL-MCNC: 2.4 MG/DL — LOW (ref 2.5–4.5)
PLATELET # BLD AUTO: 129 K/UL — LOW (ref 150–400)
POTASSIUM SERPL-MCNC: 4.6 MMOL/L — SIGNIFICANT CHANGE UP (ref 3.5–5.3)
POTASSIUM SERPL-SCNC: 4.6 MMOL/L — SIGNIFICANT CHANGE UP (ref 3.5–5.3)
RBC # BLD: 2.93 M/UL — LOW (ref 3.8–5.2)
RBC # FLD: 21.1 % — HIGH (ref 10.3–14.5)
SODIUM SERPL-SCNC: 136 MMOL/L — SIGNIFICANT CHANGE UP (ref 135–145)
SPECIMEN SOURCE: SIGNIFICANT CHANGE UP
WBC # BLD: 6.39 K/UL — SIGNIFICANT CHANGE UP (ref 3.8–10.5)
WBC # FLD AUTO: 6.39 K/UL — SIGNIFICANT CHANGE UP (ref 3.8–10.5)

## 2019-02-20 PROCEDURE — 99232 SBSQ HOSP IP/OBS MODERATE 35: CPT

## 2019-02-20 RX ORDER — FUROSEMIDE 40 MG
40 TABLET ORAL
Qty: 0 | Refills: 0 | Status: DISCONTINUED | OUTPATIENT
Start: 2019-02-20 | End: 2019-02-22

## 2019-02-20 RX ORDER — METOPROLOL TARTRATE 50 MG
12.5 TABLET ORAL EVERY 12 HOURS
Qty: 0 | Refills: 0 | Status: DISCONTINUED | OUTPATIENT
Start: 2019-02-20 | End: 2019-02-22

## 2019-02-20 RX ADMIN — Medication 500 MILLIGRAM(S): at 18:55

## 2019-02-20 RX ADMIN — BUPROPION HYDROCHLORIDE 300 MILLIGRAM(S): 150 TABLET, EXTENDED RELEASE ORAL at 12:36

## 2019-02-20 RX ADMIN — Medication 75 MICROGRAM(S): at 06:33

## 2019-02-20 RX ADMIN — Medication 20 MILLIGRAM(S): at 06:32

## 2019-02-20 RX ADMIN — Medication 12.5 MILLIGRAM(S): at 18:58

## 2019-02-20 RX ADMIN — CHLORHEXIDINE GLUCONATE 1 APPLICATION(S): 213 SOLUTION TOPICAL at 12:31

## 2019-02-20 RX ADMIN — SPIRONOLACTONE 50 MILLIGRAM(S): 25 TABLET, FILM COATED ORAL at 06:43

## 2019-02-20 RX ADMIN — Medication 500 MILLIGRAM(S): at 06:33

## 2019-02-20 RX ADMIN — Medication 500 MILLIGRAM(S): at 12:30

## 2019-02-20 RX ADMIN — ZIPRASIDONE HYDROCHLORIDE 40 MILLIGRAM(S): 20 CAPSULE ORAL at 06:33

## 2019-02-20 RX ADMIN — ENOXAPARIN SODIUM 40 MILLIGRAM(S): 100 INJECTION SUBCUTANEOUS at 18:56

## 2019-02-20 RX ADMIN — MIRTAZAPINE 15 MILLIGRAM(S): 45 TABLET, ORALLY DISINTEGRATING ORAL at 21:19

## 2019-02-20 RX ADMIN — Medication 500 MILLIGRAM(S): at 00:48

## 2019-02-20 NOTE — PROGRESS NOTE ADULT - SUBJECTIVE AND OBJECTIVE BOX
S: Patient seen and examined today. No acute events overnight. Pain is well controlled. Patient mental status improved, Vital Signs Last 24 Hrs  T(C): 36.5 (20 Feb 2019 04:45), Max: 37 (19 Feb 2019 21:59)  T(F): 97.7 (20 Feb 2019 04:45), Max: 98.6 (19 Feb 2019 21:59)  HR: 99 (20 Feb 2019 04:45) (92 - 102)  BP: 117/82 (20 Feb 2019 04:45) (93/62 - 122/72)  BP(mean): --  RR: 18 (20 Feb 2019 04:45) (18 - 18)  SpO2: 99% (20 Feb 2019 04:45) (95% - 99%)Responds to questions. Denies f/c/SOB.         Physical Exam:      Gen: NAD, AMS  RLE:  Staples in place, skin well approximated, draining serous fluid from wound  +2 pitting edema up to abdomen  TTP to thigh  + EHL/FHL/TA/Gastrocnemius  + sensation to DP/SP/Jordan/Tib/Saph distributions

## 2019-02-20 NOTE — PROGRESS NOTE ADULT - ASSESSMENT
A/P: 68 y F w/ R Hemiarthroplasty done on 1/21, s/p MF, with L S1-2 sacral insufficiency fracture and  R periprosthetic Hip Fracture. Surgery will be scheduled this Friday if platelets are >100 and will now consist of removal of subsided R hemiarthroplasty hardware with placement of new implant.     - CT R hip demonstrates PP Femur fracture around Stem  - Xray pelvis shows implant subsidence   - Analgesia  - DVT ppx, per primary team  - Non weight bearing RLE/bedrest  - May weight bear as tolerated on LLE when not on bedrest  - R hip revision/ORIF planned for Friday, 2/22 if medically stable/cleared, will need to clear severe LE edema to aid with surgery, consider diuresis  - will d/w Dr brown to determine if we will preop for tomorrow   - need documented medical clearance from primary team   - C dif management per ID  - staples to be removed at time of sx, or will be removed at bedside if sx is cancelled, drainage is from LE edema that is extending up to abdomen   - FU palliative consult as per primary team  - will d/w Dr Brown and advise if plan changes

## 2019-02-20 NOTE — PROGRESS NOTE ADULT - SUBJECTIVE AND OBJECTIVE BOX
General Surgery Progress Note    SUBJECTIVE:  The patient was seen and examined. No acute events overnight. More alert today. Pain well controlled. In NAD. Flagyl d/c'd yesterday, still on vanc.    OBJECTIVE:     ** VITAL SIGNS / I&O's **    Vital Signs Last 24 Hrs  T(C): 36.5 (20 Feb 2019 04:45), Max: 37 (19 Feb 2019 21:59)  T(F): 97.7 (20 Feb 2019 04:45), Max: 98.6 (19 Feb 2019 21:59)  HR: 99 (20 Feb 2019 04:45) (92 - 102)  BP: 117/82 (20 Feb 2019 04:45) (97/63 - 122/72)  BP(mean): --  RR: 18 (20 Feb 2019 04:45) (18 - 18)  SpO2: 99% (20 Feb 2019 04:45) (95% - 99%)      19 Feb 2019 07:01  -  20 Feb 2019 07:00  --------------------------------------------------------  IN:    Oral Fluid: 700 mL    Solution: 100 mL  Total IN: 800 mL    OUT:    Stool: 2 mL  Total OUT: 2 mL    Total NET: 798 mL          ** PHYSICAL EXAM **    -- CONSTITUTIONAL: Alert, NAD  -- PULMONARY: non-labored respirations  -- ABDOMEN: softer, still moderately distended, non-tender      ** LABS **                          10.4   5.5   )-----------( 117      ( 19 Feb 2019 09:40 )             30.9     19 Feb 2019 09:31    135    |  99     |  14     ----------------------------<  131    3.7     |  28     |  0.53     Ca    7.3        19 Feb 2019 09:31  Phos  2.5       19 Feb 2019 09:31  Mg     1.8       19 Feb 2019 09:31    TPro  4.0    /  Alb  1.8    /  TBili  0.5    /  DBili  x      /  AST  18     /  ALT  14     /  AlkPhos  78     19 Feb 2019 09:31      CAPILLARY BLOOD GLUCOSE            LIVER FUNCTIONS - ( 19 Feb 2019 09:31 )  Alb: 1.8 g/dL / Pro: 4.0 g/dL / ALK PHOS: 78 U/L / ALT: 14 U/L / AST: 18 U/L / GGT: x                 MEDICATIONS  (STANDING):  buPROPion XL . 300 milliGRAM(s) Oral daily  chlorhexidine 4% Liquid 1 Application(s) Topical <User Schedule>  dextrose 5% + sodium chloride 0.45% with potassium chloride 20 mEq/L 1000 milliLiter(s) (50 mL/Hr) IV Continuous <Continuous>  enoxaparin Injectable 40 milliGRAM(s) SubCutaneous daily  furosemide Solution 20 milliGRAM(s) Oral daily  influenza   Vaccine 0.5 milliLiter(s) IntraMuscular once  levothyroxine 75 MICROGram(s) Oral daily  metoprolol tartrate 12.5 milliGRAM(s) Oral every 12 hours  mirtazapine 15 milliGRAM(s) Oral <User Schedule>  spironolactone Suspension 50 milliGRAM(s) Oral daily  vancomycin    Solution 500 milliGRAM(s) Oral every 6 hours  ziprasidone 40 milliGRAM(s) Oral <User Schedule>    MEDICATIONS  (PRN):  acetaminophen   Tablet .. 650 milliGRAM(s) Oral every 6 hours PRN Mild Pain (1 - 3)  ALBUTerol/ipratropium for Nebulization 3 milliLiter(s) Nebulizer every 6 hours PRN Shortness of Breath and/or Wheezing

## 2019-02-20 NOTE — PROGRESS NOTE ADULT - SUBJECTIVE AND OBJECTIVE BOX
67 y/o critically ill  female h/o dementia NHL presenting after unwitnessed fall. Pt found on the ground at rehab. is on lovenox after hip fracture. pt reports pain to her right leg. per EMS en route slurring speech and mildly hypotensive requesting trauma.unclear how long pt on the ground. Patient found to have C diff with worsening hypotension requiring pressors on and off and  Patient requiring the SICU for continued cardiopulmonary monitoring. Orthopedic procedure on hold due to  hypotension and pan colitis and need for pressors due to unstable hemodynamics. Diarrhea slowing with IV Flagyl + oral vancomycin. Patient now has the rectal tube removed.   Leukocytosis and diarrhea have improved Right hip fracture surgery is now scheduled for 02/22/19. No significant change in status. Has senile dementia with intermittent lucency. No further C.diff colitis and no further hypovolemic hypotension. On high dose bupropian XL and mirtazapine, which may be increasing sedation.    MEDICATIONS  (STANDING):  buPROPion XL . 300 milliGRAM(s) Oral daily  chlorhexidine 4% Liquid 1 Application(s) Topical <User Schedule>  dextrose 5% + sodium chloride 0.45% with potassium chloride 20 mEq/L 1000 milliLiter(s) (50 mL/Hr) IV Continuous <Continuous>  enoxaparin Injectable 40 milliGRAM(s) SubCutaneous daily  furosemide Solution 20 milliGRAM(s) Oral daily  influenza   Vaccine 0.5 milliLiter(s) IntraMuscular once  levothyroxine 75 MICROGram(s) Oral daily  metoprolol tartrate 12.5 milliGRAM(s) Oral every 12 hours  mirtazapine 15 milliGRAM(s) Oral <User Schedule>  spironolactone Suspension 50 milliGRAM(s) Oral daily  vancomycin    Solution 500 milliGRAM(s) Oral every 6 hours  ziprasidone 40 milliGRAM(s) Oral <User Schedule>    MEDICATIONS  (PRN):  acetaminophen   Tablet .. 650 milliGRAM(s) Oral every 6 hours PRN Mild Pain (1 - 3)  ALBUTerol/ipratropium for Nebulization 3 milliLiter(s) Nebulizer every 6 hours PRN Shortness of Breath and/or Wheezing    Vital Signs Last 24 Hrs  T(C): 36.5 (20 Feb 2019 04:45), Max: 37 (19 Feb 2019 21:59)  T(F): 97.7 (20 Feb 2019 04:45), Max: 98.6 (19 Feb 2019 21:59)  HR: 99 (20 Feb 2019 04:45) (92 - 102)  BP: 117/82 (20 Feb 2019 04:45) (93/62 - 122/72)  BP(mean): --  RR: 18 (20 Feb 2019 04:45) (18 - 18)  SpO2: 99% (20 Feb 2019 04:45) (95% - 99%)      PHYSICAL EXAM:  GENERAL: NAD, well-groomed, well-developed  HEAD:  Atraumatic, Normocephalic  EYES: EOMI, PERRLA, conjunctiva and sclera clear  ENMT: No tonsillar erythema, exudates, or enlargement; Moist mucous membranes, Good dentition, No lesions  NECK: Supple, No JVD, Normal thyroid  NERVOUS SYSTEM:  confused becoming aphasic  CHEST/LUNG: Clear to percussion bilaterally; No rales, rhonchi, wheezing, or rubs  HEART: Regular rate and rhythm; No murmurs, rubs, or gallops  ABDOMEN: Soft, Nontender, Nondistended; Bowel sounds present  EXTREMITIES: right leg remains shortened and rotated laterally by 10 degrees.  LYMPH: No lymphadenopathy noted  SKIN: No rashes or lesions  LABS:          CBC Full  -  ( 19 Feb 2019 09:40 )  WBC Count : 5.5 K/uL  Hemoglobin : 10.4 g/dL  Hematocrit : 30.9 %  Platelet Count - Automated : 117 K/uL  Mean Cell Volume : 103.0 fl  Mean Cell Hemoglobin : 34.5 pg  Mean Cell Hemoglobin Concentration : 33.6 gm/dL  Auto Neutrophil # : x  Auto Lymphocyte # : x  Auto Monocyte # : x  Auto Eosinophil # : x  Auto Basophil # : x  Auto Neutrophil % : x  Auto Lymphocyte % : x  Auto Monocyte % : x  Auto Eosinophil % : x  Auto Basophil % : x    02-19    135  |  99  |  14  ----------------------------<  131<H>  3.7   |  28  |  0.53    Ca    7.3<L>      19 Feb 2019 09:31  Phos  2.5     02-19  Mg     1.8     02-19    TPro  4.0<L>  /  Alb  1.8<L>  /  TBili  0.5  /  DBili  x   /  AST  18  /  ALT  14  /  AlkPhos  78  02-19    LIVER FUNCTIONS - ( 19 Feb 2019 09:31 )  Alb: 1.8 g/dL / Pro: 4.0 g/dL / ALK PHOS: 78 U/L / ALT: 14 U/L / AST: 18 U/L / GGT: x 69 y/o critically ill  female h/o dementia NHL presenting after unwitnessed fall. Pt found on the ground at rehab. is on lovenox after hip fracture. pt reports pain to her right leg. per EMS en route slurring speech and mildly hypotensive requesting trauma.unclear how long pt on the ground. Patient found to have C diff with worsening hypotension requiring pressors on and off and  Patient requiring the SICU for continued cardiopulmonary monitoring. Orthopedic procedure on hold due to  hypotension and pan colitis and need for pressors due to unstable hemodynamics. Diarrhea slowing with IV Flagyl + oral vancomycin. Patient now has the rectal tube removed.   Leukocytosis and diarrhea have improved Right hip fracture surgery is now scheduled for 02/22/19. No significant change in status. Has senile dementia with intermittent lucency. No further C.diff colitis and no further hypovolemic hypotension. On high dose bupropian XL and mirtazapine, which may be increasing sedation.    MEDICATIONS  (STANDING):  buPROPion XL . 300 milliGRAM(s) Oral daily  chlorhexidine 4% Liquid 1 Application(s) Topical <User Schedule>  enoxaparin Injectable 40 milliGRAM(s) SubCutaneous daily  furosemide   Injectable 40 milliGRAM(s) IV Push two times a day  influenza   Vaccine 0.5 milliLiter(s) IntraMuscular once  levothyroxine 75 MICROGram(s) Oral daily  metoprolol tartrate 12.5 milliGRAM(s) Oral every 12 hours  mirtazapine 15 milliGRAM(s) Oral <User Schedule>  spironolactone Suspension 50 milliGRAM(s) Oral daily  vancomycin    Solution 500 milliGRAM(s) Oral every 6 hours  ziprasidone 40 milliGRAM(s) Oral <User Schedule>    MEDICATIONS  (PRN):  acetaminophen   Tablet .. 650 milliGRAM(s) Oral every 6 hours PRN Mild Pain (1 - 3)  ALBUTerol/ipratropium for Nebulization 3 milliLiter(s) Nebulizer every 6 hours PRN Shortness of Breath and/or Wheezing      Vital Signs Last 24 Hrs  T(C): 36.8 (20 Feb 2019 21:09), Max: 37 (19 Feb 2019 21:59)  T(F): 98.3 (20 Feb 2019 21:09), Max: 98.6 (19 Feb 2019 21:59)  HR: 99 (20 Feb 2019 21:09) (99 - 104)  BP: 104/68 (20 Feb 2019 21:09) (91/61 - 122/71)  BP(mean): --  RR: 20 (20 Feb 2019 21:09) (18 - 20)  SpO2: 97% (20 Feb 2019 21:09) (97% - 100%)    PHYSICAL EXAM:  GENERAL: NAD, well-groomed, well-developed  HEAD:  Atraumatic, Normocephalic  EYES: EOMI, PERRLA, conjunctiva and sclera clear  ENMT: No tonsillar erythema, exudates, or enlargement; Moist mucous membranes, Good dentition, No lesions  NECK: Supple, No JVD, Normal thyroid  NERVOUS SYSTEM:  confused becoming aphasic  CHEST/LUNG: Clear to percussion bilaterally; No rales, rhonchi, wheezing, or rubs  HEART: Regular rate and rhythm; No murmurs, rubs, or gallops  ABDOMEN: Soft, Nontender, Nondistended; Bowel sounds present  EXTREMITIES: right leg remains shortened and rotated laterally by 10 degrees.  LYMPH: No lymphadenopathy noted  SKIN: No rashes or lesions  LABS:        CBC Full  -  ( 20 Feb 2019 19:29 )  WBC Count : 6.39 K/uL  Hemoglobin : 9.8 g/dL  Hematocrit : 29.5 %  Platelet Count - Automated : 129 K/uL  Mean Cell Volume : 100.7 fl  Mean Cell Hemoglobin : 33.4 pg  Mean Cell Hemoglobin Concentration : 33.2 gm/dL  Auto Neutrophil # : x  Auto Lymphocyte # : x  Auto Monocyte # : x  Auto Eosinophil # : x  Auto Basophil # : x  Auto Neutrophil % : x  Auto Lymphocyte % : x  Auto Monocyte % : x  Auto Eosinophil % : x  Auto Basophil % : x    02-20    136  |  100  |  15  ----------------------------<  191<H>  4.6   |  26  |  0.54    Ca    7.2<L>      20 Feb 2019 14:42  Phos  2.4     02-20  Mg     1.8     02-20    TPro  4.0<L>  /  Alb  1.8<L>  /  TBili  0.5  /  DBili  x   /  AST  18  /  ALT  14  /  AlkPhos  78  02-19    LIVER FUNCTIONS - ( 19 Feb 2019 09:31 )  Alb: 1.8 g/dL / Pro: 4.0 g/dL / ALK PHOS: 78 U/L / ALT: 14 U/L / AST: 18 U/L / GGT: x 67 y/o critically ill  female h/o dementia NHL presenting after unwitnessed fall. Pt found on the ground at rehab. is on lovenox after hip fracture. pt reports pain to her right leg. per EMS en route slurring speech and mildly hypotensive requesting trauma.unclear how long pt on the ground. Patient found to have C diff with worsening hypotension requiring pressors on and off and  Patient requiring the SICU for continued cardiopulmonary monitoring. Orthopedic procedure on hold due to  hypotension and pan colitis and need for pressors due to unstable hemodynamics. Diarrhea slowing with IV Flagyl + oral vancomycin. Patient now has the rectal tube removed.   Leukocytosis and diarrhea have improved Right hip fracture surgery is now scheduled for 02/22/19. No significant change in status. Has senile dementia with intermittent lucency. No further C.diff colitis and no further hypovolemic hypotension. On high dose bupropian XL, geodan and mirtazapine, which may be increasing sedation.     The patient was examined in conjunction with Dr Mcdaniel, Orthopedics this evening. The geodan was decreased and the patient is less sedated. She was alert and responsive. She was oriented to her age and date of birth. She had short term memory loss about her recent diarrhea and present year. She was aware of her fractured leg which is causing her pain and she  expressed  a desire to have it repaired, to alleviate her pain. She responded appropriately to verbal command by raising her left arm and left leg when asked to do so. Lab and examination was normal, except for bilateral left > right leg edema . IV fluids were stopped and she was started on IV Lasix BID. This was to decreaese edema to optimize her soft tissue, for better wound healing. Orthopedics  resident was to contact Dr George to proceed with ORIF of the periprosthetic fracture as soon as possible. Fixing her leg to alleviate her pain and suffering is ethically appropriate and the patient has no medical contraindication to the procedure as required.    MEDICATIONS  (STANDING):  buPROPion XL . 300 milliGRAM(s) Oral daily  chlorhexidine 4% Liquid 1 Application(s) Topical <User Schedule>  enoxaparin Injectable 40 milliGRAM(s) SubCutaneous daily  furosemide   Injectable 40 milliGRAM(s) IV Push two times a day  influenza   Vaccine 0.5 milliLiter(s) IntraMuscular once  levothyroxine 75 MICROGram(s) Oral daily  metoprolol tartrate 12.5 milliGRAM(s) Oral every 12 hours  mirtazapine 15 milliGRAM(s) Oral <User Schedule>  spironolactone Suspension 50 milliGRAM(s) Oral daily  vancomycin    Solution 500 milliGRAM(s) Oral every 6 hours  ziprasidone 40 milliGRAM(s) Oral <User Schedule>    MEDICATIONS  (PRN):  acetaminophen   Tablet .. 650 milliGRAM(s) Oral every 6 hours PRN Mild Pain (1 - 3)  ALBUTerol/ipratropium for Nebulization 3 milliLiter(s) Nebulizer every 6 hours PRN Shortness of Breath and/or Wheezing      Vital Signs Last 24 Hrs  T(C): 36.8 (20 Feb 2019 21:09), Max: 37 (19 Feb 2019 21:59)  T(F): 98.3 (20 Feb 2019 21:09), Max: 98.6 (19 Feb 2019 21:59)  HR: 99 (20 Feb 2019 21:09) (99 - 104)  BP: 104/68 (20 Feb 2019 21:09) (91/61 - 122/71)  BP(mean): --  RR: 20 (20 Feb 2019 21:09) (18 - 20)  SpO2: 97% (20 Feb 2019 21:09) (97% - 100%)    PHYSICAL EXAM:  GENERAL: NAD, well-groomed, well-developed  HEAD:  Atraumatic, Normocephalic  EYES: EOMI, PERRLA, conjunctiva and sclera clear  ENMT: No tonsillar erythema, exudates, or enlargement; Moist mucous membranes, Good dentition, No lesions  NECK: Supple, No JVD, Normal thyroid  NERVOUS SYSTEM:  confused becoming aphasic  CHEST/LUNG: Clear to percussion bilaterally; No rales, rhonchi, wheezing, or rubs  HEART: Regular rate and rhythm; No murmurs, rubs, or gallops  ABDOMEN: Soft, Nontender, Nondistended; Bowel sounds present  EXTREMITIES: right leg remains shortened and rotated laterally by 10 degrees.  LYMPH: No lymphadenopathy noted  SKIN: No rashes or lesions  LABS:        CBC Full  -  ( 20 Feb 2019 19:29 )  WBC Count : 6.39 K/uL  Hemoglobin : 9.8 g/dL  Hematocrit : 29.5 %  Platelet Count - Automated : 129 K/uL  Mean Cell Volume : 100.7 fl  Mean Cell Hemoglobin : 33.4 pg  Mean Cell Hemoglobin Concentration : 33.2 gm/dL  Auto Neutrophil # : x  Auto Lymphocyte # : x  Auto Monocyte # : x  Auto Eosinophil # : x  Auto Basophil # : x  Auto Neutrophil % : x  Auto Lymphocyte % : x  Auto Monocyte % : x  Auto Eosinophil % : x  Auto Basophil % : x    02-20    136  |  100  |  15  ----------------------------<  191<H>  4.6   |  26  |  0.54    Ca    7.2<L>      20 Feb 2019 14:42  Phos  2.4     02-20  Mg     1.8     02-20    TPro  4.0<L>  /  Alb  1.8<L>  /  TBili  0.5  /  DBili  x   /  AST  18  /  ALT  14  /  AlkPhos  78  02-19    LIVER FUNCTIONS - ( 19 Feb 2019 09:31 )  Alb: 1.8 g/dL / Pro: 4.0 g/dL / ALK PHOS: 78 U/L / ALT: 14 U/L / AST: 18 U/L / GGT: x

## 2019-02-20 NOTE — PROGRESS NOTE ADULT - ATTENDING COMMENTS
I agree with the above note on this patient. All pertinent films have been reviewed. Please refer to clinical documentation of the history, physical examinations, data summary, and both assessment and plan as documented above and with which I agree.    plan to OR Friday for ORIF/revision HA  needs medical clearance    Conor George MD  Attending Orthopedic Surgeon

## 2019-02-20 NOTE — PROGRESS NOTE ADULT - ASSESSMENT
68 year old female with a PMHx of bipolar depression, Hyperlipidemia, NHL (in remission), OCD, dementia, with recent hospitalization (1/20-1/26) for right hip hemiarthroplasty for right femoral neck fracture after fall. Hospital course complicated by ESBL E. Coli UTI on Ertapenem (1/23-1/29) now presents with likely Hypovolemic/ Distributive shock secondary to sepsis 2/2 Pancolitis, likely secondary to C. Diff Colitis, Age Indeterminate L5 vertebral body fracture, possible acute as tender to palpation, Nondisplaced sacral insufficiency, Right proximal femoral periprosthetic fracture following unwitnessed fall with cdiff, bandemia, fever. Patient transferred to the floor. Mental status continues to worsen 68 year old female with a PMHx of bipolar depression, Hyperlipidemia, NHL (in remission), OCD, dementia, with recent hospitalization (1/20-1/26) for right hip hemiarthroplasty for right femoral neck fracture after fall. Hospital course complicated by ESBL E. Coli UTI on Ertapenem (1/23-1/29) now presents with likely Hypovolemic/ Distributive shock secondary to sepsis 2/2 Pancolitis, likely secondary to C. Diff Colitis, Age Indeterminate L5 vertebral body fracture, possible acute as tender to palpation, Nondisplaced sacral insufficiency, Right proximal femoral periprosthetic fracture following unwitnessed fall with cdiff, bandemia, fever. Patient transferred to the floor. Mental status is improved

## 2019-02-20 NOTE — PROGRESS NOTE ADULT - ATTENDING COMMENTS
Right periprosthetic femur fracture. awaiting surgical intervention - currently improving from C. Diff colitis and may soon proceed with surgery  Pain meds as needed orally  as tolerated. To consider neurology and psychiatry follow up at this time, prior  O.R. as scheduled for 02/22/19 Right periprosthetic femur fracture. awaiting surgical intervention - currently improving from C. Diff colitis and may soon proceed with surgery  Pain meds as needed orally  as tolerated.    The patient was examined in conjunction with Dr Mcdaniel, Orthopedics this evening. The geodan was decreased and the patient is less sedated. She was alert and responsive. She was oriented to her age and date of birth. She had short term memory loss about her recent diarrhea and present year. She was aware of her fractured leg which is causing her pain and she  expressed  a desire to have it repaired, to alleviate her pain. She responded appropriately to verbal command by raising her left arm and left leg when asked to do so. Lab and examination was normal, except for bilateral left > right leg edema . IV fluids were stopped and she was started on IV Lasix BID. This was to decrease edema to optimize her soft tissue, for better wound healing. Orthopedics  resident was to contact Dr George to proceed with ORIF of the periprosthetic fracture as soon as possible. Fixing her leg to alleviate her pain and suffering is ethically appropriate and the patient has no medical contraindication to the procedure as required.

## 2019-02-20 NOTE — PROGRESS NOTE ADULT - SUBJECTIVE AND OBJECTIVE BOX
Follow Up:  [facility]    Interval History/ROS: reporst improvement, diarrhea resolving, poor appetite, notes malaise, Right thigh pain minimal when leg is at complete rest    Allergies  honeydew melon (Other)  penicillin (Other; Hives)    ANTIMICROBIALS:  vancomycin    Solution 500 every 6 hours    OTHER MEDS:  MEDICATIONS  (STANDING):  acetaminophen   Tablet .. 650 every 6 hours PRN  ALBUTerol/ipratropium for Nebulization 3 every 6 hours PRN  buPROPion XL . 300 daily  enoxaparin Injectable 40 daily  furosemide Solution 20 daily  influenza   Vaccine 0.5 once  levothyroxine 75 daily  metoprolol tartrate 12.5 every 12 hours  mirtazapine 15 <User Schedule>  spironolactone Suspension 50 daily  ziprasidone 40 <User Schedule>    Vital Signs Last 24 Hrs  T(C): 36.7 (20 Feb 2019 14:28), Max: 37 (19 Feb 2019 21:59)  T(F): 98 (20 Feb 2019 14:28), Max: 98.6 (19 Feb 2019 21:59)  HR: 104 (20 Feb 2019 14:28) (98 - 104)  BP: 100/67 (20 Feb 2019 14:28) (91/61 - 122/72)  BP(mean): --  RR: 20 (20 Feb 2019 14:28) (18 - 20)  SpO2: 100% (20 Feb 2019 14:28) (96% - 100%)    PHYSICAL EXAM:  General: NAD,  Neurology: A&Ox3, nonfocal  Respiratory: Clear to auscultation bilaterally  CV: RRR, S1S2, no murmurs, rubs or gallops  Abdominal: S non-distended  Extremities: No edema,   Line Sites: Clear  Skin: No rash                        10.4   5.5   )-----------( 117      ( 19 Feb 2019 09:40 )             30.9   WBC Count: 5.5 (02-19 @ 09:40)  WBC Count: 6.26 (02-18 @ 11:18)  WBC Count: 6.79 (02-17 @ 12:14)  WBC Count: 6.7 (02-16 @ 22:10)  WBC Count: 7.36 (02-16 @ 09:20)    02-20    136  |  100  |  15  ----------------------------<  191<H>  4.6   |  26  |  0.54    Ca    7.2<L>      20 Feb 2019 14:42  Phos  2.4     02-20  Mg     1.8     02-20    TPro  4.0<L>  /  Alb  1.8<L>  /  TBili  0.5  /  DBili  x   /  AST  18  /  ALT  14  /  AlkPhos  78  02-19      MICROBIOLOGY:  .Body Fluid  02-06-19   No growth  --    No polymorphonuclear cells seen per low power field  No organisms seen      .Blood Blood-Peripheral  02-06-19   No growth at 5 days.  --  --    RADIOLOGY:      Jimbo Garza MD; Division of Infectious Disease; Pager: [phone]; nights and weekends: [phone]

## 2019-02-20 NOTE — PROGRESS NOTE ADULT - ASSESSMENT
68 year old female with a PMHx of bipolar depression, Hyperlipidemia, NHL (in remission), OCD, dementia, with recent hospitalization (1/20-1/26) for right hip hemiarthroplasty for right femoral neck fracture after fall. Hospital course complicated by ESBL E. Coli UTI on Ertapenem (1/23-1/29) now presents with likely Hypovolemic/ Distributive shock secondary to sepsis 2/2 Pancolitis, likely secondary to C. Diff Colitis, Age Indeterminate L5 vertebral body fracture, Right proximal femoral periprosthetic fracture following unwitnessed fall with cdiff, bandemia, fever  The severe Cdiff with pancolitis is improving  Patient's family refused FMT    Antibiotics  Meropenem 2/4 --> 2/17  Flagyl 2/5 -->  Vanco by retention enema 2/7 --> 2/12  PO Vanco 2/4-->      Clostridium difficile colitis.  Plan: Clinically better  Tolerating PO  Formed BMs  Continue PO vanco at current dose through 2/22  If stable can taper-125 mg po q 6 X 2 weeks 2/23 --> 3/9/19  and then q 12 X 2 more weeks 3/10 --> 3/23/19  Possible ORIF Friday 2/22---

## 2019-02-20 NOTE — PROGRESS NOTE ADULT - ASSESSMENT
68F p/w R hip fracture after GLF and found to have C. diff colitis, now resolving. looks better today.    PLAN:  - f/u palliative for goals of care   - f/u family plans regarding hospice, f/u ORTHO re: surgery   - Multimodal pain control  - C.diff positive - c/w po vanc per ID  - lovenox for DVT PPx    [facility]  [id number]

## 2019-02-21 ENCOUNTER — TRANSCRIPTION ENCOUNTER (OUTPATIENT)
Age: 69
End: 2019-02-21

## 2019-02-21 LAB
ANION GAP SERPL CALC-SCNC: 9 MMOL/L — SIGNIFICANT CHANGE UP (ref 5–17)
APTT BLD: 25.5 SEC — LOW (ref 27.5–36.3)
APTT BLD: 26.5 SEC — LOW (ref 27.5–36.3)
BLD GP AB SCN SERPL QL: NEGATIVE — SIGNIFICANT CHANGE UP
BUN SERPL-MCNC: 15 MG/DL — SIGNIFICANT CHANGE UP (ref 7–23)
CALCIUM SERPL-MCNC: 7.4 MG/DL — LOW (ref 8.4–10.5)
CHLORIDE SERPL-SCNC: 98 MMOL/L — SIGNIFICANT CHANGE UP (ref 96–108)
CO2 SERPL-SCNC: 29 MMOL/L — SIGNIFICANT CHANGE UP (ref 22–31)
CREAT SERPL-MCNC: 0.59 MG/DL — SIGNIFICANT CHANGE UP (ref 0.5–1.3)
GLUCOSE SERPL-MCNC: 110 MG/DL — HIGH (ref 70–99)
HCT VFR BLD CALC: 32 % — LOW (ref 34.5–45)
HGB BLD-MCNC: 10.9 G/DL — LOW (ref 11.5–15.5)
INR BLD: 1.6 RATIO — HIGH (ref 0.88–1.16)
MAGNESIUM SERPL-MCNC: 1.7 MG/DL — SIGNIFICANT CHANGE UP (ref 1.6–2.6)
MCHC RBC-ENTMCNC: 34.1 GM/DL — SIGNIFICANT CHANGE UP (ref 32–36)
MCHC RBC-ENTMCNC: 34.8 PG — HIGH (ref 27–34)
MCV RBC AUTO: 102 FL — HIGH (ref 80–100)
PHOSPHATE SERPL-MCNC: 2.6 MG/DL — SIGNIFICANT CHANGE UP (ref 2.5–4.5)
PLATELET # BLD AUTO: 121 K/UL — LOW (ref 150–400)
POTASSIUM SERPL-MCNC: 4 MMOL/L — SIGNIFICANT CHANGE UP (ref 3.5–5.3)
POTASSIUM SERPL-SCNC: 4 MMOL/L — SIGNIFICANT CHANGE UP (ref 3.5–5.3)
PROTHROM AB SERPL-ACNC: 18.5 SEC — HIGH (ref 10–12.9)
RBC # BLD: 3.13 M/UL — LOW (ref 3.8–5.2)
RBC # FLD: 17.6 % — HIGH (ref 10.3–14.5)
RH IG SCN BLD-IMP: POSITIVE — SIGNIFICANT CHANGE UP
SODIUM SERPL-SCNC: 136 MMOL/L — SIGNIFICANT CHANGE UP (ref 135–145)
TSH SERPL-MCNC: 13.1 UIU/ML — HIGH (ref 0.27–4.2)
WBC # BLD: 6.4 K/UL — SIGNIFICANT CHANGE UP (ref 3.8–10.5)
WBC # FLD AUTO: 6.4 K/UL — SIGNIFICANT CHANGE UP (ref 3.8–10.5)

## 2019-02-21 PROCEDURE — 72192 CT PELVIS W/O DYE: CPT | Mod: 26

## 2019-02-21 PROCEDURE — 99232 SBSQ HOSP IP/OBS MODERATE 35: CPT

## 2019-02-21 PROCEDURE — 99232 SBSQ HOSP IP/OBS MODERATE 35: CPT | Mod: GC,57

## 2019-02-21 PROCEDURE — 76377 3D RENDER W/INTRP POSTPROCES: CPT | Mod: 26

## 2019-02-21 RX ORDER — HEPARIN SODIUM 5000 [USP'U]/ML
5000 INJECTION INTRAVENOUS; SUBCUTANEOUS ONCE
Qty: 0 | Refills: 0 | Status: COMPLETED | OUTPATIENT
Start: 2019-02-21 | End: 2019-02-21

## 2019-02-21 RX ORDER — PHYTONADIONE (VIT K1) 5 MG
10 TABLET ORAL ONCE
Qty: 0 | Refills: 0 | Status: COMPLETED | OUTPATIENT
Start: 2019-02-21 | End: 2019-02-21

## 2019-02-21 RX ADMIN — CHLORHEXIDINE GLUCONATE 1 APPLICATION(S): 213 SOLUTION TOPICAL at 09:31

## 2019-02-21 RX ADMIN — Medication 500 MILLIGRAM(S): at 23:22

## 2019-02-21 RX ADMIN — ZIPRASIDONE HYDROCHLORIDE 40 MILLIGRAM(S): 20 CAPSULE ORAL at 06:01

## 2019-02-21 RX ADMIN — Medication 40 MILLIGRAM(S): at 05:44

## 2019-02-21 RX ADMIN — Medication 40 MILLIGRAM(S): at 17:19

## 2019-02-21 RX ADMIN — HEPARIN SODIUM 5000 UNIT(S): 5000 INJECTION INTRAVENOUS; SUBCUTANEOUS at 20:03

## 2019-02-21 RX ADMIN — Medication 500 MILLIGRAM(S): at 05:44

## 2019-02-21 RX ADMIN — Medication 102 MILLIGRAM(S): at 16:09

## 2019-02-21 RX ADMIN — Medication 500 MILLIGRAM(S): at 00:54

## 2019-02-21 RX ADMIN — Medication 12.5 MILLIGRAM(S): at 17:20

## 2019-02-21 RX ADMIN — MIRTAZAPINE 15 MILLIGRAM(S): 45 TABLET, ORALLY DISINTEGRATING ORAL at 20:04

## 2019-02-21 RX ADMIN — Medication 500 MILLIGRAM(S): at 17:20

## 2019-02-21 RX ADMIN — Medication 75 MICROGRAM(S): at 05:44

## 2019-02-21 NOTE — PROGRESS NOTE ADULT - ASSESSMENT
A/P: 68 y F w/ R Hemiarthroplasty done on 1/21, s/p MF, with L S1-2 sacral insufficiency fracture and  R periprosthetic Hip Fracture. Surgery will be scheduled this Friday if platelets are >100 and will now consist of removal of subsided R hemiarthroplasty hardware with placement of new implant.     - CT R hip demonstrates PP Femur fracture around Stem  - Xray pelvis shows implant subsidence   - Analgesia  - DVT ppx, per primary team  - Non weight bearing RLE/bedrest  - May weight bear as tolerated on LLE when not on bedrest   - C dif management per ID  - staples to be removed at time of sx, or will be removed at bedside if sx is cancelled, drainage is from LE edema that is extending up to abdomen   - FU palliative consult as per primary team  - R hip revision/ORIF planned for Friday, 2/22 if medically stable/cleared, will need to clear severe LE edema to aid with surgery, consider diuresis  - need documented medical clearance from primary team   - will d/w Dr George and advise if plan changes

## 2019-02-21 NOTE — PROGRESS NOTE ADULT - ASSESSMENT
68F p/w R hip fracture after GLF and found to have C. diff colitis, now resolving. looks better today.    PLAN:  - Multimodal pain control  - cont to appreciate palliative recs for goals of care   - f/u family plans regarding hospice  - f/u ORTHO re: surgery   - c/w vanc for C diff  - lovenox for DVT PPx    [facility]  [id number]

## 2019-02-21 NOTE — CHART NOTE - NSCHARTNOTEFT_GEN_A_CORE
Pt seen and examined at bedside with Dr. Barraza. Per Dr. Barraza, pt appeared more alert and oriented earlier in the evening. At the time of this evaluation, pt was AAOx2, but only to biographical information. Dr. Silva planning on consulting psych/neuro for encephalopathic state. Pt is also developing anasarca. Per Dr. Silva, plan for Lasix to help with soft tissue deferfescence. Will discuss with Dr. George regarding planned surgical intervention. Pt must be medically optimized for OR Friday in the event that we proceed with surgical intervention.

## 2019-02-21 NOTE — PROGRESS NOTE ADULT - ATTENDING COMMENTS
Right periprosthetic femur fracture. awaiting surgical intervention - currently improving from C. Diff colitis and may soon proceed with surgery  Pain meds as needed orally  as tolerated.    The patient was examined in conjunction with Dr Mcdaniel, Orthopedics this evening. The geodan was decreased and the patient is less sedated. She was alert and responsive. She was oriented to her age and date of birth. She had short term memory loss about her recent diarrhea and present year. She was aware of her fractured leg which is causing her pain and she  expressed  a desire to have it repaired, to alleviate her pain. She responded appropriately to verbal command by raising her left arm and left leg when asked to do so. Lab and examination was normal, except for bilateral left > right leg edema . IV fluids were stopped and she was started on IV Lasix BID. This was to decrease edema to optimize her soft tissue, for better wound healing. Orthopedics  resident was to contact Dr George to proceed with ORIF of the periprosthetic fracture as soon as possible. Fixing her leg to alleviate her pain and suffering is ethically appropriate and the patient has no medical contraindication to the procedure as required.

## 2019-02-21 NOTE — PROGRESS NOTE ADULT - SUBJECTIVE AND OBJECTIVE BOX
Orthopedic Surgery Progress Note     S: Patient seen and examined today. No acute events overnight. Pain is well controlled.     MEDICATIONS  (STANDING):  buPROPion XL . 300 milliGRAM(s) Oral daily  chlorhexidine 4% Liquid 1 Application(s) Topical <User Schedule>  enoxaparin Injectable 40 milliGRAM(s) SubCutaneous daily  furosemide   Injectable 40 milliGRAM(s) IV Push two times a day  influenza   Vaccine 0.5 milliLiter(s) IntraMuscular once  levothyroxine 75 MICROGram(s) Oral daily  metoprolol tartrate 12.5 milliGRAM(s) Oral every 12 hours  mirtazapine 15 milliGRAM(s) Oral <User Schedule>  spironolactone Suspension 50 milliGRAM(s) Oral daily  vancomycin    Solution 500 milliGRAM(s) Oral every 6 hours  ziprasidone 40 milliGRAM(s) Oral <User Schedule>    MEDICATIONS  (PRN):  acetaminophen   Tablet .. 650 milliGRAM(s) Oral every 6 hours PRN Mild Pain (1 - 3)  ALBUTerol/ipratropium for Nebulization 3 milliLiter(s) Nebulizer every 6 hours PRN Shortness of Breath and/or Wheezing      Physical Exam:    Vital Signs Last 24 Hrs  T(C): 36.7 (21 Feb 2019 06:02), Max: 36.9 (21 Feb 2019 00:54)  T(F): 98.1 (21 Feb 2019 06:02), Max: 98.4 (21 Feb 2019 00:54)  HR: 91 (21 Feb 2019 06:02) (91 - 104)  BP: 116/64 (21 Feb 2019 06:02) (91/61 - 116/64)  BP(mean): --  RR: 18 (21 Feb 2019 06:02) (18 - 20)  SpO2: 97% (21 Feb 2019 06:02) (97% - 100%)    02-19-19 @ 07:01  -  02-20-19 @ 07:00  --------------------------------------------------------  IN: 1400 mL / OUT: 2 mL / NET: 1398 mL    02-20-19 @ 07:01  -  02-21-19 @ 06:43  --------------------------------------------------------  IN: 700 mL / OUT: 0 mL / NET: 700 mL        Gen: NAD, responds to questions appropriately  RLE:  Staples in place, skin well approximated, draining serous fluid from wound  +2 pitting edema up to abdomen  TTP to thigh  + EHL/FHL/TA/Gastrocnemius  + sensation to DP/SP/Jordan/Tib/Saph distributions        LABS:                        9.8    6.39  )-----------( 129      ( 20 Feb 2019 19:29 )             29.5     02-20    136  |  100  |  15  ----------------------------<  191<H>  4.6   |  26  |  0.54    Ca    7.2<L>      20 Feb 2019 14:42  Phos  2.4     02-20  Mg     1.8     02-20    TPro  4.0<L>  /  Alb  1.8<L>  /  TBili  0.5  /  DBili  x   /  AST  18  /  ALT  14  /  AlkPhos  78  02-19

## 2019-02-21 NOTE — PROGRESS NOTE ADULT - ASSESSMENT
68 year old female with a PMHx of bipolar depression, Hyperlipidemia, NHL (in remission), OCD, dementia, with recent hospitalization (1/20-1/26) for right hip hemiarthroplasty for right femoral neck fracture after fall. Hospital course complicated by ESBL E. Coli UTI on Ertapenem (1/23-1/29) now presents with likely Hypovolemic/ Distributive shock secondary to sepsis 2/2 Pancolitis, likely secondary to C. Diff Colitis, Age Indeterminate L5 vertebral body fracture, Right proximal femoral periprosthetic fracture following unwitnessed fall with cdiff, bandemia, fever  The severe Cdiff with pancolitis is improving  Patient's family refused FMT  NO ID OBJECTION TO ORTHOPEDIC SURGERY    Antibiotics  Meropenem 2/4 --> 2/17  Flagyl 2/5 -->  Vanco by retention enema 2/7 --> 2/12  PO Vanco 2/4-->      Clostridium difficile colitis. sustained improvement  Tolerating PO  Formed BMs  Continue PO vanco at current dose through 2/22           then taper-125 mg po q 6 X 2 weeks 2/23 --> 3/9/19  and then q 12 X 2 more weeks 3/10 --> 3/23/19  Possible ORIF Friday 2/22---

## 2019-02-21 NOTE — PROGRESS NOTE ADULT - SUBJECTIVE AND OBJECTIVE BOX
69 y/o critically ill  female h/o dementia NHL presenting after unwitnessed fall. Pt found on the ground at rehab. is on lovenox after hip fracture. pt reports pain to her right leg. per EMS en route slurring speech and mildly hypotensive requesting trauma.unclear how long pt on the ground. Patient found to have C diff with worsening hypotension requiring pressors on and off and  Patient requiring the SICU for continued cardiopulmonary monitoring. Orthopedic procedure on hold due to  hypotension and pan colitis and need for pressors due to unstable hemodynamics. Diarrhea slowing off IV Flagyl + on oral vancomycin. Patient now has the rectal tube removed.   Leukocytosis and diarrhea have improved  No significant change in status. Has senile dementia with intermittent lucency. No further C.diff colitis and no further hypovolemic hypotension. On high dose bupropian XL, geodan and mirtazapine, which may be increasing sedation.     The patient was examined in conjunction with Dr Mcdaniel, Orthopedics yesterday evening. The geodan was decreased and the patient is less sedated. She was alert and responsive. She was oriented to her age and date of birth. She had short term memory loss about her recent diarrhea and present year. She was aware of her fractured leg which is causing her pain and she  expressed  a desire to have it repaired, to alleviate her pain. She responded appropriately to verbal command by raising her left arm and left leg when asked to do so. Lab and examination was normal, except for bilateral left > right leg edema . IV fluids were stopped and she was started on IV Lasix BID. This was to decreaese edema to optimize her soft tissue, for better wound healing. Orthopedics  resident was to contact Dr George to proceed with ORIF of the periprosthetic fracture as soon as possible. Fixing her leg to alleviate her pain and suffering is ethically appropriate and the patient has no medical contraindication to the procedure as required.    MEDICATIONS  (STANDING):  buPROPion XL . 300 milliGRAM(s) Oral daily  chlorhexidine 4% Liquid 1 Application(s) Topical <User Schedule>  enoxaparin Injectable 40 milliGRAM(s) SubCutaneous daily  furosemide   Injectable 40 milliGRAM(s) IV Push two times a day  influenza   Vaccine 0.5 milliLiter(s) IntraMuscular once  levothyroxine 75 MICROGram(s) Oral daily  metoprolol tartrate 12.5 milliGRAM(s) Oral every 12 hours  mirtazapine 15 milliGRAM(s) Oral <User Schedule>  spironolactone Suspension 50 milliGRAM(s) Oral daily  vancomycin    Solution 500 milliGRAM(s) Oral every 6 hours  ziprasidone 40 milliGRAM(s) Oral <User Schedule>    MEDICATIONS  (PRN):  acetaminophen   Tablet .. 650 milliGRAM(s) Oral every 6 hours PRN Mild Pain (1 - 3)  ALBUTerol/ipratropium for Nebulization 3 milliLiter(s) Nebulizer every 6 hours PRN Shortness of Breath and/or Wheezing      Vital Signs Last 24 Hrs  T(C): 36.8 (20 Feb 2019 21:09), Max: 37 (19 Feb 2019 21:59)  T(F): 98.3 (20 Feb 2019 21:09), Max: 98.6 (19 Feb 2019 21:59)  HR: 99 (20 Feb 2019 21:09) (99 - 104)  BP: 104/68 (20 Feb 2019 21:09) (91/61 - 122/71)  BP(mean): --  RR: 20 (20 Feb 2019 21:09) (18 - 20)  SpO2: 97% (20 Feb 2019 21:09) (97% - 100%)    PHYSICAL EXAM:  GENERAL: NAD, well-groomed, well-developed  HEAD:  Atraumatic, Normocephalic  EYES: EOMI, PERRLA, conjunctiva and sclera clear  ENMT: No tonsillar erythema, exudates, or enlargement; Moist mucous membranes, Good dentition, No lesions  NECK: Supple, No JVD, Normal thyroid  NERVOUS SYSTEM:  confused becoming aphasic  CHEST/LUNG: Clear to percussion bilaterally; No rales, rhonchi, wheezing, or rubs  HEART: Regular rate and rhythm; No murmurs, rubs, or gallops  ABDOMEN: Soft, Nontender, Nondistended; Bowel sounds present  EXTREMITIES: right leg remains shortened and rotated laterally by 30 degrees. Edema is still present bilaterally, but decreased.  LYMPH: No lymphadenopathy noted  SKIN: No rashes or lesions  LABS:        CBC Full  -  ( 20 Feb 2019 19:29 )  WBC Count : 6.39 K/uL  Hemoglobin : 9.8 g/dL  Hematocrit : 29.5 %  Platelet Count - Automated : 129 K/uL  Mean Cell Volume : 100.7 fl  Mean Cell Hemoglobin : 33.4 pg  Mean Cell Hemoglobin Concentration : 33.2 gm/dL  Auto Neutrophil # : x  Auto Lymphocyte # : x  Auto Monocyte # : x  Auto Eosinophil # : x  Auto Basophil # : x  Auto Neutrophil % : x  Auto Lymphocyte % : x  Auto Monocyte % : x  Auto Eosinophil % : x  Auto Basophil % : x    02-20    136  |  100  |  15  ----------------------------<  191<H>  4.6   |  26  |  0.54    Ca    7.2<L>      20 Feb 2019 14:42  Phos  2.4     02-20  Mg     1.8     02-20    TPro  4.0<L>  /  Alb  1.8<L>  /  TBili  0.5  /  DBili  x   /  AST  18  /  ALT  14  /  AlkPhos  78  02-19    LIVER FUNCTIONS - ( 19 Feb 2019 09:31 )  Alb: 1.8 g/dL / Pro: 4.0 g/dL / ALK PHOS: 78 U/L / ALT: 14 U/L / AST: 18 U/L / GGT: x

## 2019-02-21 NOTE — PROGRESS NOTE ADULT - SUBJECTIVE AND OBJECTIVE BOX
Trauma Surgery Progress Note    SUBJECTIVE:  The patient was seen and examined. No acute events overnight. Pt was more alert and awake yesterday.    OBJECTIVE:     ** VITAL SIGNS / I&O's **    Vital Signs Last 24 Hrs  T(C): 36.9 (21 Feb 2019 00:54), Max: 36.9 (21 Feb 2019 00:54)  T(F): 98.4 (21 Feb 2019 00:54), Max: 98.4 (21 Feb 2019 00:54)  HR: 93 (21 Feb 2019 00:54) (93 - 104)  BP: 95/62 (21 Feb 2019 00:54) (91/61 - 117/82)  BP(mean): --  RR: 20 (21 Feb 2019 00:54) (18 - 20)  SpO2: 97% (21 Feb 2019 00:54) (97% - 100%)      19 Feb 2019 07:01  -  20 Feb 2019 07:00  --------------------------------------------------------  IN:    dextrose 5% + sodium chloride 0.45% with potassium chloride 20 mEq/L: 600 mL    Oral Fluid: 700 mL    Solution: 100 mL  Total IN: 1400 mL    OUT:    Stool: 2 mL  Total OUT: 2 mL    Total NET: 1398 mL      20 Feb 2019 07:01  -  21 Feb 2019 03:34  --------------------------------------------------------  IN:    Oral Fluid: 700 mL  Total IN: 700 mL    OUT:  Total OUT: 0 mL    Total NET: 700 mL          ** PHYSICAL EXAM **    -- CONSTITUTIONAL: Alert, NAD  -- PULMONARY: non-labored respirations  -- ABDOMEN: soft, mod distended, non-tender    ** LABS **                          9.8    6.39  )-----------( 129      ( 20 Feb 2019 19:29 )             29.5     20 Feb 2019 14:42    136    |  100    |  15     ----------------------------<  191    4.6     |  26     |  0.54     Ca    7.2        20 Feb 2019 14:42  Phos  2.4       20 Feb 2019 14:42  Mg     1.8       20 Feb 2019 14:42    TPro  4.0    /  Alb  1.8    /  TBili  0.5    /  DBili  x      /  AST  18     /  ALT  14     /  AlkPhos  78     19 Feb 2019 09:31      CAPILLARY BLOOD GLUCOSE            LIVER FUNCTIONS - ( 19 Feb 2019 09:31 )  Alb: 1.8 g/dL / Pro: 4.0 g/dL / ALK PHOS: 78 U/L / ALT: 14 U/L / AST: 18 U/L / GGT: x                 MEDICATIONS  (STANDING):  buPROPion XL . 300 milliGRAM(s) Oral daily  chlorhexidine 4% Liquid 1 Application(s) Topical <User Schedule>  enoxaparin Injectable 40 milliGRAM(s) SubCutaneous daily  furosemide   Injectable 40 milliGRAM(s) IV Push two times a day  influenza   Vaccine 0.5 milliLiter(s) IntraMuscular once  levothyroxine 75 MICROGram(s) Oral daily  metoprolol tartrate 12.5 milliGRAM(s) Oral every 12 hours  mirtazapine 15 milliGRAM(s) Oral <User Schedule>  spironolactone Suspension 50 milliGRAM(s) Oral daily  vancomycin    Solution 500 milliGRAM(s) Oral every 6 hours  ziprasidone 40 milliGRAM(s) Oral <User Schedule>    MEDICATIONS  (PRN):  acetaminophen   Tablet .. 650 milliGRAM(s) Oral every 6 hours PRN Mild Pain (1 - 3)  ALBUTerol/ipratropium for Nebulization 3 milliLiter(s) Nebulizer every 6 hours PRN Shortness of Breath and/or Wheezing

## 2019-02-21 NOTE — PROGRESS NOTE ADULT - ASSESSMENT
68 year old female with a PMHx of bipolar depression, Hyperlipidemia, NHL (in remission), OCD, dementia, with recent hospitalization (1/20-1/26) for right hip hemiarthroplasty for right femoral neck fracture after fall. Hospital course complicated by ESBL E. Coli UTI on Ertapenem (1/23-1/29) now presents with likely Hypovolemic/ Distributive shock secondary to sepsis 2/2 Pancolitis, likely secondary to C. Diff Colitis, Age Indeterminate L5 vertebral body fracture, possible acute as tender to palpation, Nondisplaced sacral insufficiency, Right proximal femoral periprosthetic fracture following unwitnessed fall with cdiff, bandemia, fever. Patient transferred to the floor. Mental status is improved

## 2019-02-21 NOTE — PROGRESS NOTE ADULT - ATTENDING COMMENTS
I agree with the above note and have personally seen and examined this patient. All pertinent films have been reviewed. Please refer to clinical documentation of the history, physical examinations, data summary, and both assessment and plan as documented above and with which I agree.    plan to OR TODAY 2/21/19 for R periprosthetic femur fracture ORIF, femoral component revision  consent in chart  son is HCP and gives verbal consent via telephone  medical clearance in chart    The patient is an appropriate but elevated risk candidate for consideration of right revision hemiarthroplasty and open reduction internal fixation of the proximal femur fracture. This recommendation is based on the patient’s pain, function, and bone stock. An extensive discussion was conducted of the natural history of this particular problem and the variety of surgical and non-surgical treatment options available to the patient. This discussion was had with the son. A risk/benefit analysis was discussed with the HCP reviewing the advantages and disadvantages of surgical intervention at this time. A full explanation was given of the nature and the purpose of the procedure and anesthesia, its benefits, possible alternative methods of diagnosis or treatment, the risks involved, the possibility of complications, the foreseeable consequences of the procedure and the possible results of the non-treatment. We reviewed options of nonoperative treatment and NWB RLE versus girdlestone resection arthroplasty versus revision as described above.    No guarantee or assurance was made as to the results that may be obtained. Specifically, the risks were identified to include, but are not limited to the following: Infection, phlebitis, pulmonary embolism, death, paralysis, dislocation, pain, stiffness, instability, limp, weakness, breakage, leg-length inequality, uncontrolled bleeding, nerve injury, blood vessel injury, pressure sores, anesthetic risks, delayed healing of wound and bone, and wear and loosening. Further discussion was undertaken with the patient about the details of surgical preparation, treatment, and postoperative rehabilitation including medical clearance, autotransfusion, the hospital course, and the postoperative rehabilitation involved. Reimplantation may require cemented or cementless components, or both, depending upon a variety of factors that must be assessed at the time of surgery. The need for bone graft (either autograft or allograft) to enhance the chance for success of the procedure(s) was discussed. Furthermore, the patient was advised that banked homologous blood transfusion may be required. All in all, I feel that this patient is a good candidate for surgical reconstruction.    The HCP understands that the patient faces a likely 10% risk of periprosthetic joint infection, wound failure to heal, wound drainage, chronic pain, no guarantee for ability to ambulate and the possibility of death. She has been medically sick over the past 1 month and is now optimized per medical team.    Conor George MD  Attending Orthopedic Surgeon

## 2019-02-21 NOTE — PROGRESS NOTE ADULT - SUBJECTIVE AND OBJECTIVE BOX
Follow Up:  Cdiff    Interval History/ROS: denies diarrhea, no leg pain at rest, notes some improvement but has fatigue and malaise    Allergies  honeydew melon (Other)  penicillin (Other; Hives)    ANTIMICROBIALS:  vancomycin    Solution 500 every 6 hours    OTHER MEDS:  MEDICATIONS  (STANDING):  acetaminophen   Tablet .. 650 every 6 hours PRN  ALBUTerol/ipratropium for Nebulization 3 every 6 hours PRN  buPROPion XL . 300 daily  furosemide   Injectable 40 two times a day  heparin  Injectable 5000 once  influenza   Vaccine 0.5 once  levothyroxine 75 daily  metoprolol tartrate 12.5 every 12 hours  mirtazapine 15 <User Schedule>  spironolactone Suspension 50 daily  ziprasidone 40 <User Schedule>    Vital Signs Last 24 Hrs  T(C): 37.1 (21 Feb 2019 16:48), Max: 37.1 (21 Feb 2019 16:48)  T(F): 98.7 (21 Feb 2019 16:48), Max: 98.7 (21 Feb 2019 16:48)  HR: 96 (21 Feb 2019 16:48) (91 - 99)  BP: 107/93 (21 Feb 2019 16:48) (95/62 - 116/64)  BP(mean): --  RR: 18 (21 Feb 2019 16:48) (18 - 20)  SpO2: 97% (21 Feb 2019 16:48) (97% - 99%)    PHYSICAL EXAM:  General: WNAD, Non-toxic  Neurology: A&Ox3,   Respiratory: Clear to auscultation bilaterally  CV: RRR, S1S2, no murmurs, rubs or gallops  Abdominal: Soft, Non-tender, non-distended,   Extremities: No edema,   Line Sites: Clear  Skin: No rash                        10.9   6.4   )-----------( 121      ( 21 Feb 2019 11:21 )             32.0       02-21    136  |  98  |  15  ----------------------------<  110<H>  4.0   |  29  |  0.59    Ca    7.4<L>      21 Feb 2019 11:21  Phos  2.6     02-21  Mg     1.7     02-21    MICROBIOLOGY:  .Body Fluid  02-06-19   No growth at 14 days.  --    No polymorphonuclear cells seen per low power field  No organisms seen      .Blood Blood-Peripheral  02-06-19   No growth at 5 days.  --  --      RADIOLOGY:    Jimbo Garza MD; Division of Infectious Disease; Pager: [phone]; nights and weekends: [phone]

## 2019-02-22 ENCOUNTER — RESULT REVIEW (OUTPATIENT)
Age: 69
End: 2019-02-22

## 2019-02-22 ENCOUNTER — APPOINTMENT (OUTPATIENT)
Dept: ORTHOPEDIC SURGERY | Facility: HOSPITAL | Age: 69
End: 2019-02-22

## 2019-02-22 ENCOUNTER — TRANSCRIPTION ENCOUNTER (OUTPATIENT)
Age: 69
End: 2019-02-22

## 2019-02-22 LAB
ALBUMIN SERPL ELPH-MCNC: 1.9 G/DL — LOW (ref 3.3–5)
ALP SERPL-CCNC: 93 U/L — SIGNIFICANT CHANGE UP (ref 40–120)
ALT FLD-CCNC: 11 U/L — SIGNIFICANT CHANGE UP (ref 10–45)
ANION GAP SERPL CALC-SCNC: 10 MMOL/L — SIGNIFICANT CHANGE UP (ref 5–17)
ANION GAP SERPL CALC-SCNC: 9 MMOL/L — SIGNIFICANT CHANGE UP (ref 5–17)
AST SERPL-CCNC: 14 U/L — SIGNIFICANT CHANGE UP (ref 10–40)
BILIRUB SERPL-MCNC: 0.5 MG/DL — SIGNIFICANT CHANGE UP (ref 0.2–1.2)
BLD GP AB SCN SERPL QL: NEGATIVE — SIGNIFICANT CHANGE UP
BUN SERPL-MCNC: 14 MG/DL — SIGNIFICANT CHANGE UP (ref 7–23)
BUN SERPL-MCNC: 18 MG/DL — SIGNIFICANT CHANGE UP (ref 7–23)
CALCIUM SERPL-MCNC: 7.4 MG/DL — LOW (ref 8.4–10.5)
CALCIUM SERPL-MCNC: 7.6 MG/DL — LOW (ref 8.4–10.5)
CHLORIDE SERPL-SCNC: 97 MMOL/L — SIGNIFICANT CHANGE UP (ref 96–108)
CHLORIDE SERPL-SCNC: 99 MMOL/L — SIGNIFICANT CHANGE UP (ref 96–108)
CO2 SERPL-SCNC: 25 MMOL/L — SIGNIFICANT CHANGE UP (ref 22–31)
CO2 SERPL-SCNC: 31 MMOL/L — SIGNIFICANT CHANGE UP (ref 22–31)
CREAT SERPL-MCNC: 0.54 MG/DL — SIGNIFICANT CHANGE UP (ref 0.5–1.3)
CREAT SERPL-MCNC: 0.65 MG/DL — SIGNIFICANT CHANGE UP (ref 0.5–1.3)
GAS PNL BLDA: SIGNIFICANT CHANGE UP
GAS PNL BLDA: SIGNIFICANT CHANGE UP
GLUCOSE SERPL-MCNC: 125 MG/DL — HIGH (ref 70–99)
GLUCOSE SERPL-MCNC: 149 MG/DL — HIGH (ref 70–99)
HCT VFR BLD CALC: 30.9 % — LOW (ref 34.5–45)
HCT VFR BLD CALC: 31.8 % — LOW (ref 34.5–45)
HGB BLD-MCNC: 10.5 G/DL — LOW (ref 11.5–15.5)
HGB BLD-MCNC: 10.8 G/DL — LOW (ref 11.5–15.5)
INR BLD: 1.23 RATIO — HIGH (ref 0.88–1.16)
INR BLD: 1.31 RATIO — HIGH (ref 0.88–1.16)
MAGNESIUM SERPL-MCNC: 1.6 MG/DL — SIGNIFICANT CHANGE UP (ref 1.6–2.6)
MCHC RBC-ENTMCNC: 32.6 PG — SIGNIFICANT CHANGE UP (ref 27–34)
MCHC RBC-ENTMCNC: 34.1 GM/DL — SIGNIFICANT CHANGE UP (ref 32–36)
MCHC RBC-ENTMCNC: 34.1 GM/DL — SIGNIFICANT CHANGE UP (ref 32–36)
MCHC RBC-ENTMCNC: 35.1 PG — HIGH (ref 27–34)
MCV RBC AUTO: 103 FL — HIGH (ref 80–100)
MCV RBC AUTO: 95.7 FL — SIGNIFICANT CHANGE UP (ref 80–100)
PHOSPHATE SERPL-MCNC: 2.9 MG/DL — SIGNIFICANT CHANGE UP (ref 2.5–4.5)
PLATELET # BLD AUTO: 102 K/UL — LOW (ref 150–400)
PLATELET # BLD AUTO: 124 K/UL — LOW (ref 150–400)
POTASSIUM SERPL-MCNC: 4 MMOL/L — SIGNIFICANT CHANGE UP (ref 3.5–5.3)
POTASSIUM SERPL-MCNC: 4 MMOL/L — SIGNIFICANT CHANGE UP (ref 3.5–5.3)
POTASSIUM SERPL-SCNC: 4 MMOL/L — SIGNIFICANT CHANGE UP (ref 3.5–5.3)
POTASSIUM SERPL-SCNC: 4 MMOL/L — SIGNIFICANT CHANGE UP (ref 3.5–5.3)
PROT SERPL-MCNC: 4.1 G/DL — LOW (ref 6–8.3)
PROTHROM AB SERPL-ACNC: 14.1 SEC — HIGH (ref 10–12.9)
PROTHROM AB SERPL-ACNC: 15.1 SEC — HIGH (ref 10–12.9)
RBC # BLD: 3 M/UL — LOW (ref 3.8–5.2)
RBC # BLD: 3.32 M/UL — LOW (ref 3.8–5.2)
RBC # FLD: 17.8 % — HIGH (ref 10.3–14.5)
RBC # FLD: 20.3 % — HIGH (ref 10.3–14.5)
RH IG SCN BLD-IMP: POSITIVE — SIGNIFICANT CHANGE UP
SODIUM SERPL-SCNC: 134 MMOL/L — LOW (ref 135–145)
SODIUM SERPL-SCNC: 137 MMOL/L — SIGNIFICANT CHANGE UP (ref 135–145)
WBC # BLD: 6.3 K/UL — SIGNIFICANT CHANGE UP (ref 3.8–10.5)
WBC # BLD: 6.8 K/UL — SIGNIFICANT CHANGE UP (ref 3.8–10.5)
WBC # FLD AUTO: 6.3 K/UL — SIGNIFICANT CHANGE UP (ref 3.8–10.5)
WBC # FLD AUTO: 6.8 K/UL — SIGNIFICANT CHANGE UP (ref 3.8–10.5)

## 2019-02-22 PROCEDURE — 72170 X-RAY EXAM OF PELVIS: CPT | Mod: 26

## 2019-02-22 PROCEDURE — 73552 X-RAY EXAM OF FEMUR 2/>: CPT | Mod: 26,RT

## 2019-02-22 PROCEDURE — 27301 DRAIN THIGH/KNEE LESION: CPT | Mod: RT

## 2019-02-22 PROCEDURE — 27138 REVISE HIP JOINT REPLACEMENT: CPT | Mod: RT

## 2019-02-22 PROCEDURE — 88300 SURGICAL PATH GROSS: CPT | Mod: 26

## 2019-02-22 PROCEDURE — 73551 X-RAY EXAM OF FEMUR 1: CPT | Mod: 26,RT

## 2019-02-22 RX ORDER — SPIRONOLACTONE 25 MG/1
50 TABLET, FILM COATED ORAL DAILY
Qty: 0 | Refills: 0 | Status: DISCONTINUED | OUTPATIENT
Start: 2019-02-22 | End: 2019-02-22

## 2019-02-22 RX ORDER — PHENYLEPHRINE HYDROCHLORIDE 10 MG/ML
1.3 INJECTION INTRAVENOUS
Qty: 40 | Refills: 0 | Status: DISCONTINUED | OUTPATIENT
Start: 2019-02-22 | End: 2019-02-23

## 2019-02-22 RX ORDER — MIRTAZAPINE 45 MG/1
15 TABLET, ORALLY DISINTEGRATING ORAL
Qty: 0 | Refills: 0 | Status: DISCONTINUED | OUTPATIENT
Start: 2019-02-22 | End: 2019-02-23

## 2019-02-22 RX ORDER — ZIPRASIDONE HYDROCHLORIDE 20 MG/1
40 CAPSULE ORAL
Qty: 0 | Refills: 0 | Status: DISCONTINUED | OUTPATIENT
Start: 2019-02-22 | End: 2019-02-23

## 2019-02-22 RX ORDER — PANTOPRAZOLE SODIUM 20 MG/1
40 TABLET, DELAYED RELEASE ORAL
Qty: 0 | Refills: 0 | Status: DISCONTINUED | OUTPATIENT
Start: 2019-02-22 | End: 2019-02-22

## 2019-02-22 RX ORDER — LEVOTHYROXINE SODIUM 125 MCG
50 TABLET ORAL AT BEDTIME
Qty: 0 | Refills: 0 | Status: DISCONTINUED | OUTPATIENT
Start: 2019-02-22 | End: 2019-02-23

## 2019-02-22 RX ORDER — OXYCODONE HYDROCHLORIDE 5 MG/1
5 TABLET ORAL EVERY 4 HOURS
Qty: 0 | Refills: 0 | Status: DISCONTINUED | OUTPATIENT
Start: 2019-02-22 | End: 2019-02-22

## 2019-02-22 RX ORDER — VANCOMYCIN HCL 1 G
1000 VIAL (EA) INTRAVENOUS ONCE
Qty: 0 | Refills: 0 | Status: DISCONTINUED | OUTPATIENT
Start: 2019-02-22 | End: 2019-02-22

## 2019-02-22 RX ORDER — ACETAMINOPHEN 500 MG
650 TABLET ORAL EVERY 6 HOURS
Qty: 0 | Refills: 0 | Status: DISCONTINUED | OUTPATIENT
Start: 2019-02-22 | End: 2019-02-22

## 2019-02-22 RX ORDER — POLYETHYLENE GLYCOL 3350 17 G/17G
17 POWDER, FOR SOLUTION ORAL DAILY
Qty: 0 | Refills: 0 | Status: DISCONTINUED | OUTPATIENT
Start: 2019-02-22 | End: 2019-02-23

## 2019-02-22 RX ORDER — PANTOPRAZOLE SODIUM 20 MG/1
40 TABLET, DELAYED RELEASE ORAL DAILY
Qty: 0 | Refills: 0 | Status: DISCONTINUED | OUTPATIENT
Start: 2019-02-22 | End: 2019-02-23

## 2019-02-22 RX ORDER — PROPOFOL 10 MG/ML
35 INJECTION, EMULSION INTRAVENOUS
Qty: 1000 | Refills: 0 | Status: DISCONTINUED | OUTPATIENT
Start: 2019-02-22 | End: 2019-02-23

## 2019-02-22 RX ORDER — BUPROPION HYDROCHLORIDE 150 MG/1
300 TABLET, EXTENDED RELEASE ORAL DAILY
Qty: 0 | Refills: 0 | Status: DISCONTINUED | OUTPATIENT
Start: 2019-02-22 | End: 2019-02-23

## 2019-02-22 RX ORDER — DEXTROSE MONOHYDRATE, SODIUM CHLORIDE, AND POTASSIUM CHLORIDE 50; .745; 4.5 G/1000ML; G/1000ML; G/1000ML
1000 INJECTION, SOLUTION INTRAVENOUS
Qty: 0 | Refills: 0 | Status: DISCONTINUED | OUTPATIENT
Start: 2019-02-22 | End: 2019-02-22

## 2019-02-22 RX ORDER — METOPROLOL TARTRATE 50 MG
12.5 TABLET ORAL EVERY 12 HOURS
Qty: 0 | Refills: 0 | Status: DISCONTINUED | OUTPATIENT
Start: 2019-02-22 | End: 2019-02-22

## 2019-02-22 RX ORDER — SENNA PLUS 8.6 MG/1
2 TABLET ORAL AT BEDTIME
Qty: 0 | Refills: 0 | Status: DISCONTINUED | OUTPATIENT
Start: 2019-02-22 | End: 2019-02-23

## 2019-02-22 RX ORDER — LEVOTHYROXINE SODIUM 125 MCG
100 TABLET ORAL DAILY
Qty: 0 | Refills: 0 | Status: DISCONTINUED | OUTPATIENT
Start: 2019-02-22 | End: 2019-02-22

## 2019-02-22 RX ORDER — OXYCODONE HYDROCHLORIDE 5 MG/1
10 TABLET ORAL EVERY 4 HOURS
Qty: 0 | Refills: 0 | Status: DISCONTINUED | OUTPATIENT
Start: 2019-02-22 | End: 2019-02-22

## 2019-02-22 RX ORDER — FERROUS SULFATE 325(65) MG
325 TABLET ORAL
Qty: 0 | Refills: 0 | Status: DISCONTINUED | OUTPATIENT
Start: 2019-02-22 | End: 2019-02-23

## 2019-02-22 RX ORDER — DOCUSATE SODIUM 100 MG
100 CAPSULE ORAL THREE TIMES A DAY
Qty: 0 | Refills: 0 | Status: DISCONTINUED | OUTPATIENT
Start: 2019-02-22 | End: 2019-02-23

## 2019-02-22 RX ORDER — ENOXAPARIN SODIUM 100 MG/ML
40 INJECTION SUBCUTANEOUS DAILY
Qty: 0 | Refills: 0 | Status: DISCONTINUED | OUTPATIENT
Start: 2019-02-23 | End: 2019-02-28

## 2019-02-22 RX ORDER — HYDROMORPHONE HYDROCHLORIDE 2 MG/ML
0.5 INJECTION INTRAMUSCULAR; INTRAVENOUS; SUBCUTANEOUS
Qty: 0 | Refills: 0 | Status: DISCONTINUED | OUTPATIENT
Start: 2019-02-22 | End: 2019-02-24

## 2019-02-22 RX ORDER — FUROSEMIDE 40 MG
40 TABLET ORAL
Qty: 0 | Refills: 0 | Status: DISCONTINUED | OUTPATIENT
Start: 2019-02-22 | End: 2019-02-22

## 2019-02-22 RX ORDER — VANCOMYCIN HCL 1 G
1000 VIAL (EA) INTRAVENOUS EVERY 12 HOURS
Qty: 0 | Refills: 0 | Status: DISCONTINUED | OUTPATIENT
Start: 2019-02-23 | End: 2019-02-24

## 2019-02-22 RX ORDER — ASCORBIC ACID 60 MG
500 TABLET,CHEWABLE ORAL
Qty: 0 | Refills: 0 | Status: DISCONTINUED | OUTPATIENT
Start: 2019-02-22 | End: 2019-02-23

## 2019-02-22 RX ORDER — SODIUM CHLORIDE 9 MG/ML
1000 INJECTION, SOLUTION INTRAVENOUS
Qty: 0 | Refills: 0 | Status: DISCONTINUED | OUTPATIENT
Start: 2019-02-22 | End: 2019-02-23

## 2019-02-22 RX ORDER — FOLIC ACID 0.8 MG
1 TABLET ORAL DAILY
Qty: 0 | Refills: 0 | Status: DISCONTINUED | OUTPATIENT
Start: 2019-02-22 | End: 2019-02-23

## 2019-02-22 RX ADMIN — DEXTROSE MONOHYDRATE, SODIUM CHLORIDE, AND POTASSIUM CHLORIDE 100 MILLILITER(S): 50; .745; 4.5 INJECTION, SOLUTION INTRAVENOUS at 00:23

## 2019-02-22 RX ADMIN — PHENYLEPHRINE HYDROCHLORIDE 29.79 MICROGRAM(S)/KG/MIN: 10 INJECTION INTRAVENOUS at 22:17

## 2019-02-22 RX ADMIN — Medication 12.5 MILLIGRAM(S): at 05:16

## 2019-02-22 RX ADMIN — Medication 75 MICROGRAM(S): at 05:16

## 2019-02-22 RX ADMIN — SPIRONOLACTONE 50 MILLIGRAM(S): 25 TABLET, FILM COATED ORAL at 05:17

## 2019-02-22 RX ADMIN — ZIPRASIDONE HYDROCHLORIDE 40 MILLIGRAM(S): 20 CAPSULE ORAL at 06:55

## 2019-02-22 RX ADMIN — Medication 500 MILLIGRAM(S): at 05:17

## 2019-02-22 RX ADMIN — Medication 40 MILLIGRAM(S): at 05:15

## 2019-02-22 RX ADMIN — PROPOFOL 12.83 MICROGRAM(S)/KG/MIN: 10 INJECTION, EMULSION INTRAVENOUS at 22:17

## 2019-02-22 RX ADMIN — BUPROPION HYDROCHLORIDE 300 MILLIGRAM(S): 150 TABLET, EXTENDED RELEASE ORAL at 14:33

## 2019-02-22 RX ADMIN — CHLORHEXIDINE GLUCONATE 1 APPLICATION(S): 213 SOLUTION TOPICAL at 14:28

## 2019-02-22 RX ADMIN — SODIUM CHLORIDE 75 MILLILITER(S): 9 INJECTION, SOLUTION INTRAVENOUS at 22:17

## 2019-02-22 RX ADMIN — Medication 500 MILLIGRAM(S): at 14:50

## 2019-02-22 NOTE — PROGRESS NOTE ADULT - PROBLEM SELECTOR PLAN 2
s/p ORIF  periprostheitic fracutre rep  pain meds as needed PO as tolerated  follow for worsening confusion  standard prophylactic measures s/p ORIF  periprosthetic fracture rep  pain meds as needed  follow for worsening confusion  standard prophylactic measures

## 2019-02-22 NOTE — DISCHARGE NOTE ADULT - CARE PROVIDERS DIRECT ADDRESSES
,[email or web address]

## 2019-02-22 NOTE — PROGRESS NOTE ADULT - ASSESSMENT
68F p/w R hip fracture after GLF and found to have C. diff colitis, now resolving. looks better today.    PLAN:  - family wants to proceed with ORTHO surgery for hip  - Multimodal pain control  - cont to appreciate palliative recs for goals of care   - ID:  po vanc taper-125 mg po q 6 X 2 weeks 2/23 --> 3/9/19  and then q 12 X 2 more weeks 3/10 --> 3/23/19  - lovenox for DVT PPx    [facility]  [id number] 68F p/w R hip fracture after GLF and found to have C. diff colitis, now resolving. looks better today.    PLAN:  - family wants to proceed with ORTHO surgery for hip: surgery today for ORIF w/ ortho  - Multimodal pain control  - cont to appreciate palliative recs for goals of care   - ID:  po vanc taper-125 mg po q 6 X 2 weeks 2/23 --> 3/9/19  and then q 12 X 2 more weeks 3/10 --> 3/23/19  - lovenox for DVT PPx    [facility]  [id number]

## 2019-02-22 NOTE — PROGRESS NOTE ADULT - PROBLEM SELECTOR PLAN 1
Much improved mental status Much improved mental status pre-op May worsen if she si hemianosmically unstable

## 2019-02-22 NOTE — PROGRESS NOTE ADULT - ASSESSMENT
68 year old female with a PMHx of bipolar depression, Hyperlipidemia, NHL (in remission), OCD, dementia, with recent hospitalization (1/20-1/26) for right hip hemiarthroplasty for right femoral neck fracture after fall. Hospital course complicated by ESBL E. Coli UTI on Ertapenem (1/23-1/29) now presents with likely Hypovolemic/ Distributive shock secondary to sepsis 2/2 Pancolitis, likely secondary to C. Diff Colitis, Age Indeterminate L5 vertebral body fracture, possible acute as tender to palpation, Nondisplaced sacral insufficiency, Right proximal femoral periprosthetic fracture following unwitnessed fall with cdiff, bandemia, fever. Patient transferred to the floor. Mental status is improved     Post-Op Dx:  Periprosthetic fracture of shaft of femur  02/22/2019  Right  A    Procedure:    Procedure:  Open reduction and internal fixation (ORIF) of periprosthetic fracture of femur  02/22/2019  Right    Hemiarthroplasty of hip  02/22/2019  Revision--Right, posterior approach    Irrigation and debridement, thigh or hip  02/22/2019  Right

## 2019-02-22 NOTE — PROGRESS NOTE ADULT - SUBJECTIVE AND OBJECTIVE BOX
Patient given 10 mg Vitamin K IV X1 after prolonged diarrhea and antibiotics for c. Diff colitis. No active C.diff infection at the present time. O.R. held with INR of 1.6. Repeat lab INR 1.31 , then 1.23. TSH 13.1 and synthroid dosage to be increased to 75 mcg/day. The patient is medically stable, medically optimized and has no medical contraindication to surgery today as required. Exam time 30 minutes. Patient given 10 mg Vitamin K IV X1 after prolonged diarrhea and antibiotics for c. Diff colitis. No active C.diff infection at the present time. O.R. held with INR of 1.6. Repeat lab INR 1.31 , then 1.23. TSH 13.1 and synthroid dosage to be increased to 100 mcg/day. The patient is medically stable, medically optimized and has no medical contraindication to surgery today as required. Exam time 30 minutes.

## 2019-02-22 NOTE — PROGRESS NOTE ADULT - ATTENDING COMMENTS
I agree with the above note and have personally seen and examined this patient. All pertinent films have been reviewed. Please refer to clinical documentation of the history, physical examinations, data summary, and both assessment and plan as documented above and with which I agree.    to OR today for surgery  consent in chart  med cleared  INR now resolved from yesterdays elevated value  platelets resolved to >100k    Conor George MD  Attending Orthopedic Surgeon

## 2019-02-22 NOTE — BRIEF OPERATIVE NOTE - COMMENTS
Pt was on 1.3 mcg of Neosynephrine by the end of case. Pt remained intubated at the end of case and transferred to the PACU intubated. Pt was on 1.3 mcg of Neosynephrine by the end of case. Pt remained intubated at the end of case and transferred to the PACU intubated.  lovenox 40qd  ivac x 5 days  pat until output <100cc/24

## 2019-02-22 NOTE — PRE-ANESTHESIA EVALUATION ADULT - NSANTHOSAYNRD_GEN_A_CORE
No. MARQUITA screening performed.  STOP BANG Legend: 0-2 = LOW Risk; 3-4 = INTERMEDIATE Risk; 5-8 = HIGH Risk
No. MARQUITA screening performed.  STOP BANG Legend: 0-2 = LOW Risk; 3-4 = INTERMEDIATE Risk; 5-8 = HIGH Risk

## 2019-02-22 NOTE — DISCHARGE NOTE ADULT - HOSPITAL COURSE
69yo F presenting as level one trauma activation on 2/4. Pt is recently s/p R hip hemiarthroplasty after mechanical fall resulted in a R femoral neck fracture. She also had a recent UTI. She rolled off her bed onto the floor this morning at SNF and was brought into ED, where she was hypotensive. Pt brought to CT with suspicion of sepsis, then transported to SICU. Two units of PRBC were given in the interim. CT scans significant for sacral fracture, possible periprosthetic R femur fracture (appreciate Ortho recs) and colitis. C.diff was positive. Pt started on antibiotics.     ORTHO recommended holding off on hip fracture repair until after medical stabilization from C. diff. Received po vancomycin and IV flagyl until diarrhea improved. Due to waxing and waning mental status, pt's family asked for palliative consult on 2/7. Goals of care were discussed and it was decided to wait and see regarding the pt's condition. Pt was transferred to floor from SICU 2/15. After resolution of C. diff clinically; pt continued to have poor po intake at that time and underwent a calorie count which was almost negligible. Her mental status declined for a bit, precipitating further conversations regarding goals of care. Pt's mental status picked up 2/21, prompting ORTHO to reconsider hip repair and schedule her for Friday 2/22. 69yo F presenting as level one trauma activation on 2/4. Pt is recently s/p R hip hemiarthroplasty after mechanical fall resulted in a R femoral neck fracture. She also had a recent UTI. She rolled off her bed onto the floor this morning at SNF and was brought into ED, where she was hypotensive. Pt brought to CT with suspicion of sepsis, then transported to SICU. Two units of PRBC were given in the interim. CT scans significant for sacral fracture, possible periprosthetic R femur fracture (appreciate Ortho recs) and colitis. C.diff was positive. Pt started on antibiotics.     ORTHO recommended holding off on hip fracture repair until after medical stabilization from C. diff. Received po vancomycin and IV flagyl until diarrhea improved. Due to waxing and waning mental status, pt's family asked for palliative consult on 2/7. Goals of care were discussed and it was decided to wait and see regarding the pt's condition. Pt was transferred to floor from SICU 2/15. After resolution of C. diff clinically; pt continued to have poor po intake at that time and underwent a calorie count which was almost negligible. Her mental status declined for a bit, precipitating further conversations regarding goals of care. Pt's mental status picked up 2/21, prompting ORTHO to reconsider hip repair and schedule her for Friday 2/22.   s/p Revision hemiarthroplasty to right hip on 2/22/19. Complicated with wound dehiscence requiring VAC placement by plastics. Also s/p PEG placement on 3/13 for severe protein calorie malnutrition.  Tolerating PEG feeds. Discharged to [facility].

## 2019-02-22 NOTE — CHART NOTE - NSCHARTNOTEFT_GEN_A_CORE
POST-OPERATIVE NOTE    Subjective:  Patient is s/p R ORIF of the femur. Patient left the OR intubated still on pressor. Patient remains intubated currently and is on 1.2 of true. PACU has been unable to wean pressor requirements. Ortho has consulted SICU.     Objective:  Vital Signs Last 24 Hrs  T(C): 36.1 (22 Feb 2019 20:15), Max: 37 (22 Feb 2019 10:30)  T(F): 97 (22 Feb 2019 20:15), Max: 98.6 (22 Feb 2019 10:30)  HR: 76 (22 Feb 2019 22:45) (72 - 106)  BP: 97/50 (22 Feb 2019 22:45) (89/53 - 113/55)  BP(mean): 67 (22 Feb 2019 22:45) (65 - 79)  RR: 12 (22 Feb 2019 22:45) (12 - 18)  SpO2: 100% (22 Feb 2019 22:45) (95% - 100%)  I&O's Detail    21 Feb 2019 07:01  -  22 Feb 2019 07:00  --------------------------------------------------------  IN:    dextrose 5% + sodium chloride 0.45% with potassium chloride 20 mEq/L: 600 mL    Oral Fluid: 400 mL    Solution: 50 mL  Total IN: 1050 mL    OUT:    Voided: 2100 mL  Total OUT: 2100 mL    Total NET: -1050 mL      22 Feb 2019 07:01  -  22 Feb 2019 23:24  --------------------------------------------------------  IN:    lactated ringers.: 150 mL    phenylephrine   Infusion: 82.5 mL    propofol Infusion: 38.4 mL  Total IN: 270.9 mL    OUT:    Bulb: 10 mL    Bulb: 20 mL    Indwelling Catheter - Urethral: 70 mL  Total OUT: 100 mL    Total NET: 170.9 mL        furosemide   Injectable 40  metoprolol tartrate 12.5  phenylephrine    Infusion 1.3  spironolactone 50    PAST MEDICAL & SURGICAL HISTORY:  Osteoarthritis of left knee  Lymph[place]: [place] [facility] [place]  Hypercholesteremia  Bipolar depression  OCD (obsessive compulsive disorder)  Depression  Osteoarthritis of right knee  S/P TKR (total knee replacement), bilateral: discharged nov 5th, 2014  S/P cataract surgery  H/O oral surgery: 2014  S/P knee surgery: 1978        Physical Exam:  General: NAD, resting comfortably in bed, intubated  Pulmonary: Nonlabored breathing via ventilator  Abdominal: soft, ND, no guarding  Extremities: RLE wrapped in ace bandage, 2 JPs with SS output, dopplerable PT/DP signals  : yanes in place draining yellow urine      LABS:                        10.8   6.8   )-----------( 102      ( 22 Feb 2019 21:25 )             31.8     02-22    134<L>  |  99  |  14  ----------------------------<  149<H>  4.0   |  25  |  0.54    Ca    7.4<L>      22 Feb 2019 21:25  Phos  2.9     02-22  Mg     1.6     02-22    TPro  4.1<L>  /  Alb  1.9<L>  /  TBili  0.5  /  DBili  x   /  AST  14  /  ALT  11  /  AlkPhos  93  02-22    PT/INR - ( 22 Feb 2019 05:12 )   PT: 14.1 sec;   INR: 1.23 ratio         PTT - ( 21 Feb 2019 23:10 )  PTT:25.5 sec  CAPILLARY BLOOD GLUCOSE          Radiology and Additional Studies:    Assessment:  The patient is a 68y Female who is now several hours post-op from a R ORIF of the femur.     Plan:  - Pain control as needed  - lovenox for DVT ppx  - care per ortho/SICU

## 2019-02-22 NOTE — DISCHARGE NOTE ADULT - ADDITIONAL INSTRUCTIONS
FOLLOW-UP:  Please call [phone]) to schedule a follow-up appointment with your surgeon,  Dr. Vu, if significant concerns regarding your hospitalization that are not able to be answered by phone. FOLLOW-UP:  Please call ([phone]) to schedule a follow-up appointment with your surgeon,  Dr. Vu, if significant concerns regarding your hospitalization that are not able to be answered by phone.    You will need to follow up with your plastic surgeon, Dr. Galan, [phone], within one week of discharge from rehab - please call to make an appointment. Follow-up outpatient with Orthopedic Surgeon Dr. George [phone] within 1 week after discharge from rehab. Call for appointment.    You will need to follow up with your plastic surgeon, Dr. Galan, [phone], within one week of discharge from rehab - please call to make an appointment. Follow-up outpatient with Orthopedic Surgeon Dr. George [phone] within 1 week after discharge from rehab. Call for appointment.    You will need to follow up with your plastic surgeon, Dr. Galan, [phone], within one week of discharge from rehab - please call to make an appointment.  VAC change M/W/F

## 2019-02-22 NOTE — PROGRESS NOTE ADULT - ASSESSMENT
A/P: 68 y F w/ R Hemiarthroplasty done on 1/21, s/p MF, with L S1-2 sacral insufficiency fracture and  R periprosthetic Hip Fracture. Surgery will be scheduled this Friday if platelets are >100 and will now consist of removal of subsided R hemiarthroplasty hardware with placement of new implant.     - CT R hip demonstrates PP Femur fracture around Stem  - Xray pelvis shows implant subsidence   - Analgesia  - DVT ppx, per primary team  - Non weight bearing RLE/bedrest  - May weight bear as tolerated on LLE when not on bedrest   - C dif management per ID  - staples to be removed at time of sx, or will be removed at bedside if sx is cancelled, drainage is from LE edema that is extending up to abdomen   - FU palliative consult as per primary team  - R hip revision/ORIF planned for today, 2/22  - NPO for OR  - FU AM labs  - need documented medical clearance from primary team   - will d/w Dr George and advise if plan changes

## 2019-02-22 NOTE — DISCHARGE NOTE ADULT - CARE PROVIDER_API CALL
Deandre Vu (MD)  Surgery; Surgical Critical Care  [address] [facility], [address] [address]  Phone: [phone]  Fax: [phone]  Follow Up Time: Deandre Vu)  Surgery; Surgical Critical Care  [address] [facility], [address]  [place] [address]  Phone: [phone]  Fax: [phone]  Follow Up Time:     Rich Galan)  Surgery  PlasticReconstruct  [address] [place] [address], [address]  [place] [address]  Phone: [phone]  Fax: [phone]  Follow Up Time: Deandre Vu)  Surgery; Surgical Critical Care  [address] [facility], [address]  [place] [address]  Phone: [phone]  Fax: [phone]  Follow Up Time:     Rich Galan)  Surgery  PlasticReconstruct  [address] [facility] [facility], [address]  [place] [address]  Phone: [phone]  Fax: [phone]  Follow Up Time:     Conor George)  Orthopedics  [address] [place] [address], [address]  [place] [address]  Phone: [phone]  Fax: [phone]  Follow Up Time:

## 2019-02-22 NOTE — PROGRESS NOTE ADULT - SUBJECTIVE AND OBJECTIVE BOX
[facility] ATP SURGERY DAILY PROGRESS NOTE    SUBJECTIVE:  -  mental status much improved  -  some solid food intake    OBJECTIVE:    Vital Signs Last 24 Hrs  T(C): 36.7 (22 Feb 2019 00:42), Max: 37.1 (21 Feb 2019 16:48)  T(F): 98.1 (22 Feb 2019 00:42), Max: 98.7 (21 Feb 2019 16:48)  HR: 106 (22 Feb 2019 00:42) (91 - 106)  BP: 94/57 (22 Feb 2019 00:42) (94/57 - 116/64)  BP(mean): --  RR: 18 (22 Feb 2019 00:42) (18 - 18)  SpO2: 97% (22 Feb 2019 00:42) (97% - 99%)    I&O's Detail    20 Feb 2019 07:01  -  21 Feb 2019 07:00  --------------------------------------------------------  IN:    Oral Fluid: 700 mL  Total IN: 700 mL    OUT:  Total OUT: 0 mL    Total NET: 700 mL      21 Feb 2019 07:01  -  22 Feb 2019 03:04  --------------------------------------------------------  IN:    Oral Fluid: 400 mL    Solution: 50 mL  Total IN: 450 mL    OUT:    Voided: 1700 mL  Total OUT: 1700 mL    Total NET: -1250 mL        LABS:                        10.9   6.4   )-----------( 121      ( 21 Feb 2019 11:21 )             32.0     02-21    136  |  98  |  15  ----------------------------<  110<H>  4.0   |  29  |  0.59    Ca    7.4<L>      21 Feb 2019 11:21  Phos  2.6     02-21  Mg     1.7     02-21      EXAM:  -- CONSTITUTIONAL: Alert, NAD  -- PULMONARY: non-labored respirations  -- ABDOMEN: soft, mod distended, non-tender [facility] ATP SURGERY DAILY PROGRESS NOTE    SUBJECTIVE:  -  mental status much improved, more alert and awake during rounds  -  some solid food intake    OBJECTIVE:    Vital Signs Last 24 Hrs  T(C): 36.7 (22 Feb 2019 00:42), Max: 37.1 (21 Feb 2019 16:48)  T(F): 98.1 (22 Feb 2019 00:42), Max: 98.7 (21 Feb 2019 16:48)  HR: 106 (22 Feb 2019 00:42) (91 - 106)  BP: 94/57 (22 Feb 2019 00:42) (94/57 - 116/64)  BP(mean): --  RR: 18 (22 Feb 2019 00:42) (18 - 18)  SpO2: 97% (22 Feb 2019 00:42) (97% - 99%)    I&O's Detail    20 Feb 2019 07:01  -  21 Feb 2019 07:00  --------------------------------------------------------  IN:    Oral Fluid: 700 mL  Total IN: 700 mL    OUT:  Total OUT: 0 mL    Total NET: 700 mL      21 Feb 2019 07:01  -  22 Feb 2019 03:04  --------------------------------------------------------  IN:    Oral Fluid: 400 mL    Solution: 50 mL  Total IN: 450 mL    OUT:    Voided: 1700 mL  Total OUT: 1700 mL    Total NET: -1250 mL        LABS:                        10.9   6.4   )-----------( 121      ( 21 Feb 2019 11:21 )             32.0     02-21    136  |  98  |  15  ----------------------------<  110<H>  4.0   |  29  |  0.59    Ca    7.4<L>      21 Feb 2019 11:21  Phos  2.6     02-21  Mg     1.7     02-21      EXAM:  -- CONSTITUTIONAL: Alert, NAD  -- PULMONARY: non-labored respirations  -- ABDOMEN: softer, less distended, non-tender

## 2019-02-22 NOTE — DISCHARGE NOTE ADULT - PROVIDER TOKENS
PROVIDER:[TOKEN:[[id number]]] PROVIDER:[TOKEN:[[id number]]],PROVIDER:[TOKEN:[[id number]]] PROVIDER:[TOKEN:[[id number]]],PROVIDER:[TOKEN:[[id number]]],PROVIDER:[TOKEN:[[id number]]]

## 2019-02-22 NOTE — DISCHARGE NOTE ADULT - MEDICATION SUMMARY - MEDICATIONS TO TAKE
I will START or STAY ON the medications listed below when I get home from the hospital:    acetaminophen 325 mg oral tablet  -- 3 tab(s) by mouth every 8 hours, As Needed for mild pain  -- Indication: For mild pain    heparin  -- 5000 unit(s) subcutaneous every 8 hours  -- Indication: For DVT prophylaxis    ziprasidone 40 mg oral capsule  -- 1 tab(s) by mouth once a day (at bedtime)  -- Indication: For Bipolar depression    silver sulfADIAZINE 1% topical cream  -- 1 application on skin once a day to blisters  -- Indication: For Blisters on R foot    vancomycin 250 mg/5 mL oral solution  -- 125 milligram(s) by mouth every 12 hours  Take through 3/23/19 and then discontinue  -- Indication: For Clostridium difficile colitis    midodrine 5 mg oral tablet  -- 1 tab(s) by mouth 3 times a day  -- Indication: For Hypotension    buPROPion 300 mg/24 hours (XL) oral tablet, extended release  -- 1 tab(s) by mouth once a day  -- Indication: For Bipolar depression    levothyroxine 100 mcg (0.1 mg) oral tablet  -- 1 tab(s) by mouth once a day  -- Indication: For Hypothyroid

## 2019-02-22 NOTE — PROGRESS NOTE ADULT - PROBLEM SELECTOR PLAN 4
resolved Post op with large blood loss patient requires pressors and Is still intubated.  May require the ICU if remains unstable

## 2019-02-22 NOTE — PROGRESS NOTE ADULT - SUBJECTIVE AND OBJECTIVE BOX
Orthopedic Surgery Progress Note     S: Patient seen and examined today. No acute events overnight. Pain is well controlled. Patient responding to questions but loses train of thought. Also, patient with brief visual hallucination seeing a "black box"    MEDICATIONS  (STANDING):  buPROPion XL . 300 milliGRAM(s) Oral daily  chlorhexidine 4% Liquid 1 Application(s) Topical <User Schedule>  dextrose 5% + sodium chloride 0.45% with potassium chloride 20 mEq/L 1000 milliLiter(s) (100 mL/Hr) IV Continuous <Continuous>  furosemide   Injectable 40 milliGRAM(s) IV Push two times a day  influenza   Vaccine 0.5 milliLiter(s) IntraMuscular once  levothyroxine 75 MICROGram(s) Oral daily  metoprolol tartrate 12.5 milliGRAM(s) Oral every 12 hours  mirtazapine 15 milliGRAM(s) Oral <User Schedule>  spironolactone Suspension 50 milliGRAM(s) Oral daily  vancomycin    Solution 500 milliGRAM(s) Oral every 6 hours  ziprasidone 40 milliGRAM(s) Oral <User Schedule>    MEDICATIONS  (PRN):  acetaminophen   Tablet .. 650 milliGRAM(s) Oral every 6 hours PRN Mild Pain (1 - 3)  ALBUTerol/ipratropium for Nebulization 3 milliLiter(s) Nebulizer every 6 hours PRN Shortness of Breath and/or Wheezing      Physical Exam:    Vital Signs Last 24 Hrs  T(C): 36.8 (22 Feb 2019 05:23), Max: 37.1 (21 Feb 2019 16:48)  T(F): 98.3 (22 Feb 2019 05:23), Max: 98.7 (21 Feb 2019 16:48)  HR: 100 (22 Feb 2019 05:23) (96 - 106)  BP: 104/67 (22 Feb 2019 05:23) (94/57 - 107/93)  BP(mean): --  RR: 18 (22 Feb 2019 05:23) (18 - 18)  SpO2: 98% (22 Feb 2019 05:23) (97% - 99%)    02-20-19 @ 07:01  -  02-21-19 @ 07:00  --------------------------------------------------------  IN: 700 mL / OUT: 0 mL / NET: 700 mL    02-21-19 @ 07:01  -  02-22-19 @ 06:35  --------------------------------------------------------  IN: 450 mL / OUT: 1700 mL / NET: -1250 mL        Gen: awake, alert. Patient responding to questions but loses train of thought. Also, patient with brief visual hallucination seeing a "black box"  Resp: no increase WOB  RLE:  +2 pitting edema up to abdomen  TTP to thigh  + EHL/FHL/TA/Gastrocnemius  + sensation to DP/SP/Jordan/Tib/Saph distributions            LABS:                        10.5   6.3   )-----------( 124      ( 22 Feb 2019 05:12 )             30.9     02-22    137  |  97  |  18  ----------------------------<  125<H>  4.0   |  31  |  0.65    Ca    7.6<L>      22 Feb 2019 05:16  Phos  2.9     02-22  Mg     1.6     02-22    TPro  4.1<L>  /  Alb  1.9<L>  /  TBili  0.5  /  DBili  x   /  AST  14  /  ALT  11  /  AlkPhos  93  02-22

## 2019-02-22 NOTE — DISCHARGE NOTE ADULT - MEDICATION SUMMARY - MEDICATIONS TO STOP TAKING
I will STOP taking the medications listed below when I get home from the hospital:    senna oral tablet  -- 2 tab(s) by mouth once a day (at bedtime)    docusate sodium 100 mg oral capsule  -- 1 cap(s) by mouth 3 times a day    polyethylene glycol 3350 oral powder for reconstitution  -- 17 gram(s) by mouth once a day, As needed, Constipation    ALPRAZolam 1 mg oral tablet  -- 1 tab(s) by mouth 2 times a day    gabapentin 600 mg oral tablet  -- 1 tab(s) by mouth once a day    gabapentin 600 mg oral tablet  -- 2 tab(s) by mouth once a day (at bedtime)    enoxaparin  -- 40 milligram(s) subcutaneous once a day x 28 days for DVT prophylaxis    Start date 1/26/19

## 2019-02-22 NOTE — DISCHARGE NOTE ADULT - CARE PLAN
Principal Discharge DX:	Periprosthetic fracture of proximal end of femur  Goal:	repair by ORTHOPEDICS  Secondary Diagnosis:	Clostridium difficile colitis  Goal:	relieve diarrhea and stabilize patient  Assessment and plan of treatment:	treat with antibiotics; supportive care, observation in SICU Principal Discharge DX:	Periprosthetic fracture of proximal end of femur  Goal:	repair by ORTHOPEDICS  Assessment and plan of treatment:	You had right hip ORIF on 2/22/19.  Continue with pain control and physical therapy as instructed.  Follow-up outpatient with Orthopedic Surgeon Dr. George within 1 week after discharge from rehab. Call for appointment.  Secondary Diagnosis:	Clostridium difficile colitis  Goal:	relieve diarrhea and stabilize patient  Assessment and plan of treatment:	treat with antibiotics; supportive care, observation in SICU Principal Discharge DX:	Periprosthetic fracture of proximal end of femur  Goal:	repair by ORTHOPEDICS  Assessment and plan of treatment:	You had right hip ORIF on 2/22/19.  Continue with pain control and physical therapy as instructed.  Follow-up outpatient with Orthopedic Surgeon Dr. George within 1 week after discharge from rehab. Call for appointment.  Secondary Diagnosis:	Clostridium difficile colitis  Goal:	relieve diarrhea and stabilize patient  Assessment and plan of treatment:	treat with antibiotics; supportive care, observation in SICU  Secondary Diagnosis:	Wound dehiscence  Goal:	s/p ORIF  Assessment and plan of treatment:	You are being discharge to rehab with a wound VAC.  The dressing will need to be changed three times a week (M/W/F).    You will need to follow up with your plastic surgeon, Dr. Galan, [phone], within one week of discharge from rehab - please call to make an appointment. Principal Discharge DX:	Periprosthetic fracture of proximal end of femur  Goal:	repair by ORTHOPEDICS  Assessment and plan of treatment:	You had right hip ORIF on 2/22/19.  Continue with pain control and physical therapy as instructed.  Follow-up outpatient with Orthopedic Surgeon Dr. George [phone] within 1 week after discharge from rehab. Call for appointment.  Secondary Diagnosis:	Clostridium difficile colitis  Goal:	relieve diarrhea and stabilize patient  Assessment and plan of treatment:	treat with antibiotics; supportive care, observation in SICU  Secondary Diagnosis:	Wound dehiscence  Goal:	s/p ORIF  Assessment and plan of treatment:	You are being discharge to rehab with a wound VAC.  The dressing will need to be changed three times a week (M/W/F).    You will need to follow up with your plastic surgeon, Dr. Galan, [phone], within one week of discharge from rehab - please call to make an appointment. Principal Discharge DX:	Periprosthetic fracture of proximal end of femur  Goal:	repair by ORTHOPEDICS  Assessment and plan of treatment:	You had right hip ORIF on 2/22/19.  Continue with pain control and physical therapy as instructed.  Follow-up outpatient with Orthopedic Surgeon Dr. George [phone] within 1 week after discharge from rehab. Call for appointment.    Pain Control  DVT ppx  RLE PWB for transfers only  PT/OT  Incentive Spirometry  Secondary Diagnosis:	Clostridium difficile colitis  Goal:	relieve diarrhea and stabilize patient  Assessment and plan of treatment:	treat with antibiotics; supportive care, observation in SICU  Secondary Diagnosis:	Wound dehiscence  Goal:	s/p ORIF  Assessment and plan of treatment:	You are being discharge to rehab with a wound VAC.  The dressing will need to be changed three times a week (M/W/F).    You will need to follow up with your plastic surgeon, Dr. Galan, [phone], within one week of discharge from rehab - please call to make an appointment. Principal Discharge DX:	Periprosthetic fracture of proximal end of femur  Goal:	repair by ORTHOPEDICS  Assessment and plan of treatment:	You had right hip ORIF on 2/22/19.  Continue with pain control and physical therapy as instructed.  Follow-up outpatient with Orthopedic Surgeon Dr. George [phone] within 1 week after discharge from rehab. Call for appointment.    Pain Control  DVT ppx  RLE PWB for transfers only  PT/OT  Incentive Spirometry  Secondary Diagnosis:	Clostridium difficile colitis  Goal:	relieve diarrhea and stabilize patient  Assessment and plan of treatment:	treat with antibiotics; supportive care  Vanco oral taper - 125mg oral every 12 hours X 2 more weeks 3/10/19 through 3/23/19, then discontinue  Secondary Diagnosis:	Wound dehiscence  Goal:	s/p ORIF  Assessment and plan of treatment:	You are being discharge to rehab with a wound VAC.  The dressing will need to be changed three times a week (M/W/F).    You will need to follow up with your plastic surgeon, Dr. Galan, [phone], within one week of discharge from rehab - please call to make an appointment. Principal Discharge DX:	Periprosthetic fracture of proximal end of femur  Goal:	repair by ORTHOPEDICS  Assessment and plan of treatment:	You had right hip ORIF on 2/22/19.  Continue with pain control and physical therapy as instructed.  Follow-up outpatient with Orthopedic Surgeon Dr. George [phone] within 1 week after discharge from rehab. Call for appointment.    Pain Control  DVT ppx  RLE PWB for transfers only  PT/OT  Incentive Spirometry  Secondary Diagnosis:	Clostridium difficile colitis  Goal:	relieve diarrhea and stabilize patient  Assessment and plan of treatment:	treat with antibiotics; supportive care  Vanco oral taper - 125mg oral every 12 hours X 2 more weeks 3/10/19 through 3/23/19, then discontinue  Secondary Diagnosis:	Wound dehiscence  Goal:	s/p ORIF  Assessment and plan of treatment:	You are being discharge to rehab with a wound VAC.  The dressing will need to be changed three times a week (M/W/F).    You will need to follow up with your plastic surgeon, Dr. Galan, [phone], within one week of discharge from rehab - please call to make an appointment.  Secondary Diagnosis:	PEG (percutaneous endoscopic gastrostomy) status  Assessment and plan of treatment:	continue PEG feeds Principal Discharge DX:	Periprosthetic fracture of proximal end of femur  Goal:	repair by ORTHOPEDICS  Assessment and plan of treatment:	You had right hip ORIF on 2/22/19.  Continue with pain control and physical therapy as instructed.  Follow-up outpatient with Orthopedic Surgeon Dr. George [phone] within 1 week after discharge from rehab. Call for appointment.    Pain Control  DVT ppx  RLE PWB for transfers only  PT/OT  Incentive Spirometry  Secondary Diagnosis:	Clostridium difficile colitis  Goal:	relieve diarrhea and stabilize patient  Assessment and plan of treatment:	treat with antibiotics; supportive care  Vanco oral taper - 125mg oral every 12 hours X 2 more weeks 3/10/19 through 3/23/19, then discontinue  Secondary Diagnosis:	Wound dehiscence  Goal:	s/p ORIF  Assessment and plan of treatment:	You are being discharge to rehab with a wound VAC.  The dressing will need to be changed three times a week (M/W/F).    You will need to follow up with your plastic surgeon, Dr. Galan, [phone], within one week of discharge from rehab - please call to make an appointment.  Secondary Diagnosis:	PEG (percutaneous endoscopic gastrostomy) status  Assessment and plan of treatment:	continue PEG feeds  Jevity 1.2 Q6 hourly 355 cc with regular food

## 2019-02-22 NOTE — BRIEF OPERATIVE NOTE - PROCEDURE
<<-----Click on this checkbox to enter Procedure Open reduction and internal fixation (ORIF) of periprosthetic fracture of femur  02/22/2019  Right  Active  MARIE  Hemiarthroplasty of hip  02/22/2019  Revision--Right, posterior approach  Active  MARIE  Irrigation and debridement, thigh or hip  02/22/2019  Right  Active  MARIE

## 2019-02-22 NOTE — CHART NOTE - NSCHARTNOTEFT_GEN_A_CORE
Intra-operatively:  -3 OR Cx taken  -Pt received 1750 crystalloids  -1U PRBCs given prophylactically by anesthesia  -500 cc EBL  -On 1.3 mcg Neosynephrine  -Transferred to PACU intubated  -SICU Consulted  -A Line  -Prevena placed  -DION Drain x 2  -Compressive ACE placed  -Concern for vascular compromise initially as RLE was cool to the touch and PT/DP pulses were unable to be palpated. Upon Doppler analysis, Popliteal pulse was triphasic and PT pulse was present. No concern for vascular injury at this time.  -No concern for infection at this time.    Post-Op Details/Instructions:  -DVT PPx: Lovenox 40 qd  -Abx: Vanco 1 gram x 72 hours (until OR Cx negative)--Discuss with ID 2/23 whether frequency should be q12 or q24  -Cudet Yanes--May be removed 2/23 when extubated. Difficult yanes insertion. Consider Urology consult.  -FU OR Cx x 3  -Compressive ACE wrap x 72 hrs  -NWB RLE. OOB to chair is okay. Intra-operatively:  -3 OR Cx taken  -Pt received 1750 crystalloids  -1U PRBCs given prophylactically by anesthesia  -500 cc EBL  -On 1.3 mcg Neosynephrine  -Transferred to PACU intubated  -SICU Consulted  -A Line  -Prevena placed  -DION Drain x 2  -Compressive ACE placed  -Concern for vascular compromise initially as RLE was cool to the touch and PT/DP pulses were unable to be palpated. Upon Doppler analysis, Popliteal pulse was triphasic and PT pulse was present. No concern for vascular injury at this time.    Post-Op Details/Instructions:  -DVT PPx: Lovenox 40 qd  -Abx: Vanco 1 gram x 72 hours (until OR Cx negative)--Discuss with ID 2/23 whether frequency should be q12 or q24  -Cudet Yanes--May be removed 2/23 when extubated. Difficult yanes insertion.   -FU OR Cx x 3  -Compressive ACE wrap x 72 hrs  -NWB RLE. OOB to chair is okay.  -iVac x 5 days  -DION until output <50cc/24    I agree with the above note on this patient. All pertinent films have been reviewed. Please refer to clinical documentation of the history, physical examinations, data summary, and both assessment and plan as documented above and with which I agree.    Conor George MD  Attending Orthopedic Surgeon

## 2019-02-22 NOTE — PRE-ANESTHESIA EVALUATION ADULT - NSANTHADDINFOFT_GEN_ALL_CORE
Case d/w Dr. Barraza, re: elevated TSH and low T3 in setting of AMS, hypotension, and edema. Dr Navarro states pt is clinically euthyroid with no suspicion for myxedema coma. He does not believe any additional testing (free t4, cortisol) is warranted prior to OR.

## 2019-02-22 NOTE — DISCHARGE NOTE ADULT - PLAN OF CARE
repair by ORTHOPEDICS relieve diarrhea and stabilize patient treat with antibiotics; supportive care, observation in SICU You had right hip ORIF on 2/22/19.  Continue with pain control and physical therapy as instructed.  Follow-up outpatient with Orthopedic Surgeon Dr. George within 1 week after discharge from rehab. Call for appointment. s/p ORIF You are being discharge to rehab with a wound VAC.  The dressing will need to be changed three times a week (M/W/F).    You will need to follow up with your plastic surgeon, Dr. Galan, [phone], within one week of discharge from rehab - please call to make an appointment. You had right hip ORIF on 2/22/19.  Continue with pain control and physical therapy as instructed.  Follow-up outpatient with Orthopedic Surgeon Dr. George [phone] within 1 week after discharge from rehab. Call for appointment. You had right hip ORIF on 2/22/19.  Continue with pain control and physical therapy as instructed.  Follow-up outpatient with Orthopedic Surgeon Dr. George [phone] within 1 week after discharge from rehab. Call for appointment.    Pain Control  DVT ppx  RLE PWB for transfers only  PT/OT  Incentive Spirometry treat with antibiotics; supportive care  Vanco oral taper - 125mg oral every 12 hours X 2 more weeks 3/10/19 through 3/23/19, then discontinue continue PEG feeds continue PEG feeds  Jevity 1.2 Q6 hourly 355 cc with regular food

## 2019-02-22 NOTE — PRE-ANESTHESIA EVALUATION ADULT - NSANTHPMHFT_GEN_ALL_CORE
chart/im/id/trauma notes reviewed. pt with dementia at baseline, complicated hospital course following c diff/sepsis. no known cad/chf hx - recent tte nl bi-v fxn, no sign valvular dz. pt remains confused, now d/c'd from icu/off vasopressors but sbp ~'s. c diff improving per ID/ "cleared" for OR.
EKG: NSR  TTE: Conclusions:  1. Normal left ventricular internal dimensions and wall  thicknesses.  2. Endocardium not well visualized; grossly normal left  ventricular systolic function.  3. Left pleural effusion.

## 2019-02-22 NOTE — PROGRESS NOTE ADULT - SUBJECTIVE AND OBJECTIVE BOX
69 y/o critically ill  female h/o dementia NHL presenting after unwitnessed fall. Pt found on the ground at rehab. is on lovenox after hip fracture. pt reports pain to her right leg. per EMS en route slurring speech and mildly hypotensive requesting trauma.unclear how long pt on the ground. Patient found to have C diff with worsening hypotension requiring pressors on and off and  Patient requiring the SICU for continued cardiopulmonary monitoring. Orthopedic procedure on hold due to  hypotension and pan colitis and need for pressors due to unstable hemodynamics. Diarrhea slowing off IV Flagyl + on oral vancomycin. Patient now has the rectal tube removed.   Leukocytosis and diarrhea have improved  No significant change in status. Has senile dementia with intermittent lucency. No further C.diff colitis and no further hypovolemic hypotension. On high dose bupropian XL, geodan and mirtazapine, which may be increasing sedation.     The patient was examined in conjunction with Dr Mcdaniel, Orthopedics yesterday evening. The geodan was decreased and the patient is less sedated. She was alert and responsive. She was oriented to her age and date of birth. She had short term memory loss about her recent diarrhea and present year. She was aware of her fractured leg which is causing her pain and she  expressed  a desire to have it repaired, to alleviate her pain. She responded appropriately to verbal command by raising her left arm and left leg when asked to do so. Lab and examination was normal, except for bilateral left > right leg edema . IV fluids were stopped and she was started on IV Lasix BID. This was to decreaese edema to optimize her soft tissue, for better wound healing. Orthopedics  resident was to contact Dr George to proceed with ORIF of the periprosthetic fracture as soon as possible. Fixing her leg to alleviate her pain and suffering is ethically appropriate and the patient has no medical contraindication to the procedure as required.  Patient tolerated the surgery well and needs incentive spirometry and GI and DVT prophylaxis.       Post-Op Dx:  Periprosthetic fracture of shaft of femur  02/22/2019  Right  .    Procedure:    Procedure:  Open reduction and internal fixation (ORIF) of periprosthetic fracture of femur  02/22/2019  Right    Hemiarthroplasty of hip  02/22/2019  Revision--Right, posterior approach   Irrigation and debridement, thigh or hip  02/22/2019  Right      MEDICATIONS  (STANDING):  buPROPion XL . 300 milliGRAM(s) Oral daily  chlorhexidine 4% Liquid 1 Application(s) Topical <User Schedule>  enoxaparin Injectable 40 milliGRAM(s) SubCutaneous daily  furosemide   Injectable 40 milliGRAM(s) IV Push two times a day  influenza   Vaccine 0.5 milliLiter(s) IntraMuscular once  levothyroxine 75 MICROGram(s) Oral daily  metoprolol tartrate 12.5 milliGRAM(s) Oral every 12 hours  mirtazapine 15 milliGRAM(s) Oral <User Schedule>  spironolactone Suspension 50 milliGRAM(s) Oral daily  vancomycin    Solution 500 milliGRAM(s) Oral every 6 hours  ziprasidone 40 milliGRAM(s) Oral <User Schedule>    MEDICATIONS  (PRN):  acetaminophen   Tablet .. 650 milliGRAM(s) Oral every 6 hours PRN Mild Pain (1 - 3)  ALBUTerol/ipratropium for Nebulization 3 milliLiter(s) Nebulizer every 6 hours PRN Shortness of Breath and/or Wheezing    Vital Signs Last 24 Hrs  T(C): 36.1 (22 Feb 2019 20:15), Max: 37 (22 Feb 2019 10:30)  T(F): 97 (22 Feb 2019 20:15), Max: 98.6 (22 Feb 2019 10:30)  HR: 76 (23 Feb 2019 00:15) (72 - 100)  BP: 99/50 (23 Feb 2019 00:15) (89/53 - 113/55)  BP(mean): 69 (23 Feb 2019 00:15) (65 - 79)  RR: 16 (23 Feb 2019 00:15) (12 - 18)  SpO2: 100% (23 Feb 2019 00:15) (95% - 100%)    PHYSICAL EXAM:  GENERAL: NAD, well-groomed, well-developed  HEAD:  Atraumatic, Normocephalic  EYES: EOMI, PERRLA, conjunctiva and sclera clear  ENMT: No tonsillar erythema, exudates, or enlargement; Moist mucous membranes, Good dentition, No lesions  NECK: Supple, No JVD, Normal thyroid  NERVOUS SYSTEM:  confused becoming aphasic  CHEST/LUNG: Clear to percussion bilaterally; No rales, rhonchi, wheezing, or rubs  HEART: Regular rate and rhythm; No murmurs, rubs, or gallops  ABDOMEN: Soft, Nontender, Nondistended; Bowel sounds present  Periprosthetic right femur fracture repair        LYMPH: No lymphadenopathy noted  SKIN: No rashes or lesions  LABS:        CBC Full  -  ( 20 Feb 2019 19:29 )  WBC Count : 6.39 K/uL  Hemoglobin : 9.8 g/dL  Hematocrit : 29.5 %  Platelet Count - Automated : 129 K/uL  Mean Cell Volume : 100.7 fl  Mean Cell Hemoglobin : 33.4 pg  Mean Cell Hemoglobin Concentration : 33.2 gm/dL  Auto Neutrophil # : x  Auto Lymphocyte # : x  Auto Monocyte # : x  Auto Eosinophil # : x  Auto Basophil # : x  Auto Neutrophil % : x  Auto Lymphocyte % : x  Auto Monocyte % : x  Auto Eosinophil % : x  Auto Basophil % : x    02-20    136  |  100  |  15  ----------------------------<  191<H>  4.6   |  26  |  0.54    Ca    7.2<L>      20 Feb 2019 14:42  Phos  2.4     02-20  Mg     1.8     02-20    TPro  4.0<L>  /  Alb  1.8<L>  /  TBili  0.5  /  DBili  x   /  AST  18  /  ALT  14  /  AlkPhos  78  02-19    LIVER FUNCTIONS - ( 19 Feb 2019 09:31 )  Alb: 1.8 g/dL / Pro: 4.0 g/dL / ALK PHOS: 78 U/L / ALT: 14 U/L / AST: 18 U/L / GGT: x

## 2019-02-22 NOTE — DISCHARGE NOTE ADULT - INSTRUCTIONS
Regular diet with supplemental Jevity via PEG tube 240 ml bolus 4 times daily for  24 hrs, then advance to 355 (1.5 cans) 4 times daily. Regular diet with supplemental Jevity via PEG tube 355 cc  bolus 4 times daily for  24 hrs,  Free water 150 cc q6 via PEG

## 2019-02-22 NOTE — CHART NOTE - NSCHARTNOTEFT_GEN_A_CORE
Seen in PACU intubated / sedated  Received 1 unit intra-op    T(C): 36.4 (02-22-19 @ 16:14), Max: 37 (02-22-19 @ 10:30)  HR: 74 (02-22-19 @ 21:00) (74 - 106)  BP: 113/55 (02-22-19 @ 21:00) (90/51 - 113/55)  RR: 12 (02-22-19 @ 21:00) (12 - 18)  SpO2: 100% (02-22-19 @ 21:00) (95% - 100%)  Wt(kg): --    Exam:  Sedated w/ Assist  ventilations  Pulm: CTA @ Apices  Abdomen soft / benign  (+) generalized edema  [place]  [Y]   EXT   RLE       Compressive ACE Dressing (s) C/D  [x ]          ABD pillow in place       Calves soft       DION x 2       Unable to assess 2/2 acute sedation       3+ edema / foot cool to touch        1+ DP pulses (palpable and marked)    Xray:----  prosthesis w/ [place] cables in place    Assessment:  69 y/o F w/ MULTIPLE medical co-morbidities  s/p Open reduction and internal fixation (ORIF) of periprosthetic fracture of femur    Continue intubation / sedation  check labs  Cont Abx till drain OUT  SICU c/s  f/u OR Cx  Cont Drains  NWB RRLE w/ posterior precautions  Monitor closely      ***See Above  Andrey SWIFT  Orthopedics  B: [phone]  S: [phone]

## 2019-02-22 NOTE — DISCHARGE NOTE ADULT - PATIENT PORTAL LINK FT
You can access the [facility] Patient Portal, offered by [facility], by registering with the following website: [email or web address]

## 2019-02-22 NOTE — DISCHARGE NOTE ADULT - MEDICATION SUMMARY - MEDICATIONS TO CHANGE
I will SWITCH the dose or number of times a day I take the medications listed below when I get home from the hospital:    buPROPion 150 mg/12 hours (SR) oral tablet, extended release  -- 1 tab(s) by mouth once a day (as described in [facility] records)    levothyroxine 75 mcg (0.075 mg)/mL oral solution  -- 1 tab(s) by mouth once a day x 5 days a week (Mon-Fri)

## 2019-02-23 LAB
ANION GAP SERPL CALC-SCNC: 11 MMOL/L — SIGNIFICANT CHANGE UP (ref 5–17)
APTT BLD: 22.7 SEC — LOW (ref 27.5–36.3)
BUN SERPL-MCNC: 17 MG/DL — SIGNIFICANT CHANGE UP (ref 7–23)
CALCIUM SERPL-MCNC: 7.6 MG/DL — LOW (ref 8.4–10.5)
CHLORIDE SERPL-SCNC: 99 MMOL/L — SIGNIFICANT CHANGE UP (ref 96–108)
CK MB BLD-MCNC: 3.2 % — SIGNIFICANT CHANGE UP (ref 0–3.5)
CK MB CFR SERPL CALC: 5.1 NG/ML — HIGH (ref 0–3.8)
CK SERPL-CCNC: 161 U/L — SIGNIFICANT CHANGE UP (ref 25–170)
CO2 SERPL-SCNC: 24 MMOL/L — SIGNIFICANT CHANGE UP (ref 22–31)
CREAT SERPL-MCNC: 0.56 MG/DL — SIGNIFICANT CHANGE UP (ref 0.5–1.3)
GAS PNL BLDA: SIGNIFICANT CHANGE UP
GAS PNL BLDA: SIGNIFICANT CHANGE UP
GLUCOSE SERPL-MCNC: 151 MG/DL — HIGH (ref 70–99)
GRAM STN FLD: SIGNIFICANT CHANGE UP
HCT VFR BLD CALC: 33 % — LOW (ref 34.5–45)
HGB BLD-MCNC: 11.3 G/DL — LOW (ref 11.5–15.5)
INR BLD: 1.13 RATIO — SIGNIFICANT CHANGE UP (ref 0.88–1.16)
MAGNESIUM SERPL-MCNC: 1.5 MG/DL — LOW (ref 1.6–2.6)
MCHC RBC-ENTMCNC: 32.4 PG — SIGNIFICANT CHANGE UP (ref 27–34)
MCHC RBC-ENTMCNC: 34.3 GM/DL — SIGNIFICANT CHANGE UP (ref 32–36)
MCV RBC AUTO: 94.5 FL — SIGNIFICANT CHANGE UP (ref 80–100)
NIGHT BLUE STAIN TISS: SIGNIFICANT CHANGE UP
PHOSPHATE SERPL-MCNC: 3.6 MG/DL — SIGNIFICANT CHANGE UP (ref 2.5–4.5)
PLATELET # BLD AUTO: 97 K/UL — LOW (ref 150–400)
POTASSIUM SERPL-MCNC: 4.3 MMOL/L — SIGNIFICANT CHANGE UP (ref 3.5–5.3)
POTASSIUM SERPL-SCNC: 4.3 MMOL/L — SIGNIFICANT CHANGE UP (ref 3.5–5.3)
PROTHROM AB SERPL-ACNC: 13 SEC — HIGH (ref 10–12.9)
RBC # BLD: 3.49 M/UL — LOW (ref 3.8–5.2)
RBC # FLD: 20.2 % — HIGH (ref 10.3–14.5)
SODIUM SERPL-SCNC: 134 MMOL/L — LOW (ref 135–145)
SPECIMEN SOURCE: SIGNIFICANT CHANGE UP
TROPONIN T, HIGH SENSITIVITY RESULT: 27 NG/L — SIGNIFICANT CHANGE UP (ref 0–51)
WBC # BLD: 10.8 K/UL — HIGH (ref 3.8–10.5)
WBC # FLD AUTO: 10.8 K/UL — HIGH (ref 3.8–10.5)

## 2019-02-23 PROCEDURE — 99232 SBSQ HOSP IP/OBS MODERATE 35: CPT

## 2019-02-23 PROCEDURE — 99291 CRITICAL CARE FIRST HOUR: CPT

## 2019-02-23 PROCEDURE — 99292 CRITICAL CARE ADDL 30 MIN: CPT

## 2019-02-23 PROCEDURE — 76604 US EXAM CHEST: CPT | Mod: 26

## 2019-02-23 PROCEDURE — 71045 X-RAY EXAM CHEST 1 VIEW: CPT | Mod: 26

## 2019-02-23 PROCEDURE — 93010 ELECTROCARDIOGRAM REPORT: CPT

## 2019-02-23 RX ORDER — DOCUSATE SODIUM 100 MG
100 CAPSULE ORAL THREE TIMES A DAY
Qty: 0 | Refills: 0 | Status: DISCONTINUED | OUTPATIENT
Start: 2019-02-23 | End: 2019-03-07

## 2019-02-23 RX ORDER — ZIPRASIDONE HYDROCHLORIDE 20 MG/1
40 CAPSULE ORAL
Qty: 0 | Refills: 0 | Status: DISCONTINUED | OUTPATIENT
Start: 2019-02-23 | End: 2019-03-19

## 2019-02-23 RX ORDER — IPRATROPIUM/ALBUTEROL SULFATE 18-103MCG
3 AEROSOL WITH ADAPTER (GRAM) INHALATION EVERY 6 HOURS
Qty: 0 | Refills: 0 | Status: DISCONTINUED | OUTPATIENT
Start: 2019-02-23 | End: 2019-02-23

## 2019-02-23 RX ORDER — CHLORHEXIDINE GLUCONATE 213 G/1000ML
1 SOLUTION TOPICAL
Qty: 0 | Refills: 0 | Status: DISCONTINUED | OUTPATIENT
Start: 2019-02-23 | End: 2019-03-19

## 2019-02-23 RX ORDER — LEVOTHYROXINE SODIUM 125 MCG
100 TABLET ORAL DAILY
Qty: 0 | Refills: 0 | Status: DISCONTINUED | OUTPATIENT
Start: 2019-02-23 | End: 2019-03-19

## 2019-02-23 RX ORDER — OXYCODONE HYDROCHLORIDE 5 MG/1
5 TABLET ORAL EVERY 4 HOURS
Qty: 0 | Refills: 0 | Status: DISCONTINUED | OUTPATIENT
Start: 2019-02-23 | End: 2019-03-02

## 2019-02-23 RX ORDER — MAGNESIUM SULFATE 500 MG/ML
2 VIAL (ML) INJECTION
Qty: 0 | Refills: 0 | Status: COMPLETED | OUTPATIENT
Start: 2019-02-23 | End: 2019-02-23

## 2019-02-23 RX ORDER — SENNA PLUS 8.6 MG/1
2 TABLET ORAL AT BEDTIME
Qty: 0 | Refills: 0 | Status: DISCONTINUED | OUTPATIENT
Start: 2019-02-23 | End: 2019-03-07

## 2019-02-23 RX ORDER — BUPROPION HYDROCHLORIDE 150 MG/1
300 TABLET, EXTENDED RELEASE ORAL DAILY
Qty: 0 | Refills: 0 | Status: DISCONTINUED | OUTPATIENT
Start: 2019-02-23 | End: 2019-03-19

## 2019-02-23 RX ORDER — IPRATROPIUM/ALBUTEROL SULFATE 18-103MCG
3 AEROSOL WITH ADAPTER (GRAM) INHALATION EVERY 6 HOURS
Qty: 0 | Refills: 0 | Status: DISCONTINUED | OUTPATIENT
Start: 2019-02-23 | End: 2019-02-28

## 2019-02-23 RX ORDER — SODIUM CHLORIDE 9 MG/ML
1000 INJECTION, SOLUTION INTRAVENOUS
Qty: 0 | Refills: 0 | Status: DISCONTINUED | OUTPATIENT
Start: 2019-02-23 | End: 2019-02-23

## 2019-02-23 RX ORDER — CALCIUM GLUCONATE 100 MG/ML
1 VIAL (ML) INTRAVENOUS ONCE
Qty: 0 | Refills: 0 | Status: COMPLETED | OUTPATIENT
Start: 2019-02-23 | End: 2019-02-23

## 2019-02-23 RX ORDER — ACETAMINOPHEN 500 MG
975 TABLET ORAL EVERY 6 HOURS
Qty: 0 | Refills: 0 | Status: DISCONTINUED | OUTPATIENT
Start: 2019-02-23 | End: 2019-03-19

## 2019-02-23 RX ORDER — PHENYLEPHRINE HYDROCHLORIDE 10 MG/ML
0.4 INJECTION INTRAVENOUS
Qty: 40 | Refills: 0 | Status: DISCONTINUED | OUTPATIENT
Start: 2019-02-23 | End: 2019-02-27

## 2019-02-23 RX ORDER — DEXMEDETOMIDINE HYDROCHLORIDE IN 0.9% SODIUM CHLORIDE 4 UG/ML
0.2 INJECTION INTRAVENOUS
Qty: 200 | Refills: 0 | Status: DISCONTINUED | OUTPATIENT
Start: 2019-02-23 | End: 2019-02-23

## 2019-02-23 RX ORDER — FUROSEMIDE 40 MG
40 TABLET ORAL ONCE
Qty: 0 | Refills: 0 | Status: COMPLETED | OUTPATIENT
Start: 2019-02-23 | End: 2019-02-23

## 2019-02-23 RX ORDER — CHLORHEXIDINE GLUCONATE 213 G/1000ML
15 SOLUTION TOPICAL
Qty: 0 | Refills: 0 | Status: DISCONTINUED | OUTPATIENT
Start: 2019-02-23 | End: 2019-02-23

## 2019-02-23 RX ORDER — SODIUM CHLORIDE 9 MG/ML
500 INJECTION, SOLUTION INTRAVENOUS ONCE
Qty: 0 | Refills: 0 | Status: DISCONTINUED | OUTPATIENT
Start: 2019-02-23 | End: 2019-02-23

## 2019-02-23 RX ADMIN — CHLORHEXIDINE GLUCONATE 1 APPLICATION(S): 213 SOLUTION TOPICAL at 05:46

## 2019-02-23 RX ADMIN — PANTOPRAZOLE SODIUM 40 MILLIGRAM(S): 20 TABLET, DELAYED RELEASE ORAL at 12:09

## 2019-02-23 RX ADMIN — PHENYLEPHRINE HYDROCHLORIDE 29.79 MICROGRAM(S)/KG/MIN: 10 INJECTION INTRAVENOUS at 03:11

## 2019-02-23 RX ADMIN — SODIUM CHLORIDE 100 MILLILITER(S): 9 INJECTION, SOLUTION INTRAVENOUS at 12:10

## 2019-02-23 RX ADMIN — SODIUM CHLORIDE 100 MILLILITER(S): 9 INJECTION, SOLUTION INTRAVENOUS at 03:11

## 2019-02-23 RX ADMIN — DEXMEDETOMIDINE HYDROCHLORIDE IN 0.9% SODIUM CHLORIDE 3.06 MICROGRAM(S)/KG/HR: 4 INJECTION INTRAVENOUS at 03:10

## 2019-02-23 RX ADMIN — SODIUM CHLORIDE 100 MILLILITER(S): 9 INJECTION, SOLUTION INTRAVENOUS at 19:39

## 2019-02-23 RX ADMIN — SENNA PLUS 2 TABLET(S): 8.6 TABLET ORAL at 21:56

## 2019-02-23 RX ADMIN — BUPROPION HYDROCHLORIDE 300 MILLIGRAM(S): 150 TABLET, EXTENDED RELEASE ORAL at 15:10

## 2019-02-23 RX ADMIN — Medication 50 GRAM(S): at 04:45

## 2019-02-23 RX ADMIN — PHENYLEPHRINE HYDROCHLORIDE 9.16 MICROGRAM(S)/KG/MIN: 10 INJECTION INTRAVENOUS at 19:59

## 2019-02-23 RX ADMIN — ENOXAPARIN SODIUM 40 MILLIGRAM(S): 100 INJECTION SUBCUTANEOUS at 12:10

## 2019-02-23 RX ADMIN — PHENYLEPHRINE HYDROCHLORIDE 29.79 MICROGRAM(S)/KG/MIN: 10 INJECTION INTRAVENOUS at 06:55

## 2019-02-23 RX ADMIN — CHLORHEXIDINE GLUCONATE 15 MILLILITER(S): 213 SOLUTION TOPICAL at 05:46

## 2019-02-23 RX ADMIN — Medication 400 GRAM(S): at 08:01

## 2019-02-23 RX ADMIN — Medication 40 MILLIGRAM(S): at 22:15

## 2019-02-23 RX ADMIN — Medication 50 GRAM(S): at 05:47

## 2019-02-23 RX ADMIN — Medication 3 MILLILITER(S): at 11:10

## 2019-02-23 RX ADMIN — Medication 250 MILLIGRAM(S): at 04:17

## 2019-02-23 RX ADMIN — Medication 250 MILLIGRAM(S): at 16:00

## 2019-02-23 RX ADMIN — Medication 100 MILLIGRAM(S): at 15:10

## 2019-02-23 RX ADMIN — PHENYLEPHRINE HYDROCHLORIDE 9.16 MICROGRAM(S)/KG/MIN: 10 INJECTION INTRAVENOUS at 23:30

## 2019-02-23 RX ADMIN — Medication 3 MILLILITER(S): at 05:31

## 2019-02-23 RX ADMIN — Medication 100 MILLIGRAM(S): at 21:56

## 2019-02-23 NOTE — CHART NOTE - NSCHARTNOTEFT_GEN_A_CORE
Patient successfully extubated this morning, doing well. Awake, alert, very pleasant. I spoke with patient's son, who agrees to reinstatement of DNR today. On small dose of neosynephrine, likely postoperative vasoplegia. Prior to extubation, SVV was 4. Oliguric, will monitor closely. If continues, will perform POC CC ultrasound to reassess volume status.

## 2019-02-23 NOTE — PHYSICAL THERAPY INITIAL EVALUATION ADULT - GAIT TRAINING, PT EVAL
GOAL: Pt will ambulate 75 feet with RW and CGA in 4 weeks
GOALS: Pt will ambulate 75ft with RW & CGA in 4wks

## 2019-02-23 NOTE — PROGRESS NOTE ADULT - SUBJECTIVE AND OBJECTIVE BOX
67 y/o critically ill  female h/o dementia NHL presenting after unwitnessed fall. Pt found on the ground at rehab. is on lovenox after hip fracture. pt reports pain to her right leg. per EMS en route slurring speech and mildly hypotensive requesting trauma.unclear how long pt on the ground. Patient found to have C diff with worsening hypotension requiring pressors on and off and  Patient requiring the SICU for continued cardiopulmonary monitoring. Orthopedic procedure on hold due to  hypotension and pan colitis and need for pressors due to unstable hemodynamics. Diarrhea slowing off IV Flagyl + on oral vancomycin. Patient now has the rectal tube removed.   Leukocytosis and diarrhea have improved  No significant change in status. Has senile dementia with intermittent lucency. No further C.diff colitis and no further hypovolemic hypotension. On high dose bupropian XL, geodan and mirtazapine, which may be increasing sedation.     The patient was examined in conjunction with Dr Mcdaniel, Orthopedics yesterday evening. The geodan was decreased and the patient is less sedated. She was alert and responsive. She was oriented to her age and date of birth. She had short term memory loss about her recent diarrhea and present year. She was aware of her fractured leg which is causing her pain and she  expressed  a desire to have it repaired, to alleviate her pain. She responded appropriately to verbal command by raising her left arm and left leg when asked to do so. Lab and examination was normal, except for bilateral left > right leg edema . IV fluids were stopped and she was started on IV Lasix BID. This was to decreaese edema to optimize her soft tissue, for better wound healing. Orthopedics  resident was to contact Dr George to proceed with ORIF of the periprosthetic fracture as soon as possible. Fixing her leg to alleviate her pain and suffering is ethically appropriate and the patient has no medical contraindication to the procedure as required.  Patient tolerated the surgery well and needs incentive spirometry and GI and DVT prophylaxis.       Post-Op Dx:  Periprosthetic fracture of shaft of femur  02/22/2019  Right  .    Procedure:    Procedure:  Open reduction and internal fixation (ORIF) of periprosthetic fracture of femur  02/22/2019  Right    Hemiarthroplasty of hip  02/22/2019  Revision--Right, posterior approach   Irrigation and debridement, thigh or hip  02/22/2019  Right      Patinet ws hypotensive with significant blood loss with hypotension and Hypovolemia due to blood loss.  Patient remained intubated and on pressor until this afternoon when she ws successfully extubated and the pressors are being slowly tapered.  She has post blod loo anemia and may require more transfusions.  Patine tis alert and communicative.    MEDICATIONS  (STANDING):  buPROPion XL . 300 milliGRAM(s) Oral daily  chlorhexidine 4% Liquid 1 Application(s) Topical <User Schedule>  enoxaparin Injectable 40 milliGRAM(s) SubCutaneous daily  furosemide   Injectable 40 milliGRAM(s) IV Push two times a day  influenza   Vaccine 0.5 milliLiter(s) IntraMuscular once  levothyroxine 75 MICROGram(s) Oral daily  metoprolol tartrate 12.5 milliGRAM(s) Oral every 12 hours  mirtazapine 15 milliGRAM(s) Oral <User Schedule>  spironolactone Suspension 50 milliGRAM(s) Oral daily  vancomycin    Solution 500 milliGRAM(s) Oral every 6 hours  ziprasidone 40 milliGRAM(s) Oral <User Schedule>    MEDICATIONS  (PRN):  acetaminophen   Tablet .. 650 milliGRAM(s) Oral every 6 hours PRN Mild Pain (1 - 3)  ALBUTerol/ipratropium for Nebulization 3 milliLiter(s) Nebulizer every 6 hours PRN Shortness of Breath and/or Wheezing    ICU Vital Signs Last 24 Hrs  T(C): 36.4 (23 Feb 2019 19:00), Max: 36.6 (23 Feb 2019 15:00)  T(F): 97.6 (23 Feb 2019 19:00), Max: 97.9 (23 Feb 2019 15:00)  HR: 91 (23 Feb 2019 22:45) (58 - 98)  BP: 98/55 (23 Feb 2019 22:00) (86/44 - 121/56)  BP(mean): 74 (23 Feb 2019 22:00) (61 - 84)  ABP: 105/51 (23 Feb 2019 22:45) (66/65 - 131/61)  ABP(mean): 70 (23 Feb 2019 22:45) (61 - 90)  RR: 13 (23 Feb 2019 22:45) (12 - 37)  SpO2: 99% (23 Feb 2019 22:45) (93% - 100%)      PHYSICAL EXAM:  GENERAL: NAD, well-groomed, well-developed  HEAD:  Atraumatic, Normocephalic  EYES: EOMI, PERRLA, conjunctiva and sclera clear  ENMT: No tonsillar erythema, exudates, or enlargement; Moist mucous membranes, Good dentition, No lesions  NECK: Supple, No JVD, Normal thyroid  NERVOUS SYSTEM:  confused becoming aphasic  CHEST/LUNG: Clear to percussion bilaterally; No rales, rhonchi, wheezing, or rubs  HEART: Regular rate and rhythm; No murmurs, rubs, or gallops  ABDOMEN: Soft, Nontender, Nondistended; Bowel sounds present  Periprosthetic right femur fracture repair with localized swelling       LYMPH: No lymphadenopathy noted  SKIN: No rashes or lesions  LABS:        ABG - ( 23 Feb 2019 08:38 )  pH, Arterial: 7.47  pH, Blood: x     /  pCO2: 38    /  pO2: 126   / HCO3: 27    / Base Excess: 3.7   /  SaO2: 99                CARDIAC MARKERS ( 23 Feb 2019 03:21 )  x     / x     / 161 U/L / x     / 5.1 ng/mL      CBC Full  -  ( 23 Feb 2019 03:21 )  WBC Count : 10.8 K/uL  Hemoglobin : 11.3 g/dL  Hematocrit : 33.0 %  Platelet Count - Automated : 97 K/uL  Mean Cell Volume : 94.5 fl  Mean Cell Hemoglobin : 32.4 pg  Mean Cell Hemoglobin Concentration : 34.3 gm/dL  Auto Neutrophil # : x  Auto Lymphocyte # : x  Auto Monocyte # : x  Auto Eosinophil # : x  Auto Basophil # : x  Auto Neutrophil % : x  Auto Lymphocyte % : x  Auto Monocyte % : x  Auto Eosinophil % : x  Auto Basophil % : x    02-23    134<L>  |  99  |  17  ----------------------------<  151<H>  4.3   |  24  |  0.56    Ca    7.6<L>      23 Feb 2019 03:21  Phos  3.6     02-23  Mg     1.5     02-23    TPro  4.1<L>  /  Alb  1.9<L>  /  TBili  0.5  /  DBili  x   /  AST  14  /  ALT  11  /  AlkPhos  93  02-22    LIVER FUNCTIONS - ( 22 Feb 2019 05:16 )  Alb: 1.9 g/dL / Pro: 4.1 g/dL / ALK PHOS: 93 U/L / ALT: 11 U/L / AST: 14 U/L / GGT: x           PT/INR - ( 23 Feb 2019 03:21 )   PT: 13.0 sec;   INR: 1.13 ratio         PTT - ( 23 Feb 2019 03:21 )  PTT:22.7 sec

## 2019-02-23 NOTE — PHYSICAL THERAPY INITIAL EVALUATION ADULT - TRANSFER TRAINING, PT EVAL
GOAL: Pt will transfer sit to/from stand with RW and CGA in 4 weeks
GOALS: Pt will perform all transfers with RW & CGA in 4wks

## 2019-02-23 NOTE — PROGRESS NOTE ADULT - SUBJECTIVE AND OBJECTIVE BOX
HISTORY  68 year old female with a past medical history of bipolar depression, OCD, dementia, HLD, NHL (in remission since 2016), osteoarthritis s/p bilateral TKR, and recent right YONATAN (1/20-1/26)  who presented on 2/4 as a level two trauma after a fall. Patient was found down in rehab after likely rolling out of bed. Patient was upgraded to a level 1 due to hypotension w/ SBP in the 80s despite fluid resuscitation and transfusions. Imaging revealed pancolitis and a right periprosthetic femoral neck fracture. Patient tested for C. difficile and was positive. She was admitted to SICU for hemodynamic monitoring in the setting of sepsis. Pt eventually transferred to floor on 2/14/19.    Pt taken to OR with orthopedics on 2/22/19 for ORIF of right periprosthetic femur fracture and revision of right hemiarthroplasty. EBL 500ml, IVF 1750ml, 1u PRBC. Pt required phenylephrine throughout the case and was 1.3 mcg/kg/min at the end of the case. For this reason she was kept intubated and transferred to PACU. SICU consulted for management of ventilator and pressors.      SUBJECTIVE/ROS:  [ ] A ten-point review of systems was otherwise negative except as noted.  [x ] Due to altered mental status/intubation, subjective information were not able to be obtained from the patient. History was obtained, to the extent possible, from review of the chart and collateral sources of information.      NEURO  RASS:  -1     Exam: arousable, follows commands  Meds: buPROPion XL . 300 milliGRAM(s) Oral daily  HYDROmorphone  Injectable 0.5 milliGRAM(s) IV Push every 3 hours PRN Severe Pain (7 - 10)  mirtazapine 15 milliGRAM(s) Oral <User Schedule>  propofol Infusion 35 MICROgram(s)/kG/Min IV Continuous <Continuous>  ziprasidone 40 milliGRAM(s) Oral <User Schedule>    [x] Adequacy of sedation and pain control has been assessed and adjusted      RESPIRATORY  RR: 16 (02-23-19 @ 01:30) (12 - 18)  SpO2: 98% (02-23-19 @ 01:30) (95% - 100%)  Exam:  clear to auscultation bilaterally  Mechanical Ventilation: Mode: AC/ CMV (Assist Control/ Continuous Mandatory Ventilation), RR (machine): 12, RR (patient): 18, TV (machine): 500, FiO2: 50, PEEP: 5, ITime: 1, MAP: 10, PIP: 31  ABG - ( 22 Feb 2019 18:11 )  pH: 7.50  /  pCO2: 40    /  pO2: 272   / HCO3: 31    / Base Excess: 7.3   /  SaO2: 100     Blood Gas Arterial, Lactate: 1.6 mmol/L (02.22.19 @ 18:11)    [x ] Extubation Readiness Assessed  Meds: x      CARDIOVASCULAR  HR: 78 (02-23-19 @ 01:30) (72 - 100)  BP: 94/50 (02-23-19 @ 01:15) (89/53 - 113/55)  BP(mean): 71 (02-23-19 @ 01:15) (65 - 79)  ABP: 108/56 (02-23-19 @ 01:30) (90/49 - 136/62)  ABP(mean): 72 (02-23-19 @ 01:15) (60 - 93)    Exam: regular rate and rhythm  Cardiac Rhythm: sinus  Perfusion     [x ]Adequate   [ ]Inadequate  Mentation   [ x]Normal       [ ]Reduced  Extremities  [ x]Warm         [ ]Cool  Volume Status [ ]Hypervolemic [x ]Euvolemic [ ]Hypovolemic  Meds: phenylephrine    Infusion 1.3 MICROgram(s)/kG/Min IV Continuous <Continuous>        GI/NUTRITION  Exam: softly distended  Diet: NPO  Meds: aluminum hydroxide/magnesium hydroxide/simethicone Suspension 30 milliLiter(s) Oral four times a day PRN Indigestion  docusate sodium 100 milliGRAM(s) Oral three times a day  pantoprazole  Injectable 40 milliGRAM(s) IV Push daily  polyethylene glycol 3350 17 Gram(s) Oral daily  senna 2 Tablet(s) Oral at bedtime PRN Constipation      GENITOURINARY  I&O's Detail    02-21 @ 07:01  -  02-22 @ 07:00  --------------------------------------------------------  IN:    dextrose 5% + sodium chloride 0.45% with potassium chloride 20 mEq/L: 600 mL    Oral Fluid: 400 mL    Solution: 50 mL  Total IN: 1050 mL    OUT:    Voided: 2100 mL  Total OUT: 2100 mL    Total NET: -1050 mL      02-22 @ 07:01  -  02-23 @ 01:49  --------------------------------------------------------  IN:    lactated ringers.: 300 mL    phenylephrine   Infusion: 137.5 mL    propofol Infusion: 64 mL  Total IN: 501.5 mL    OUT:    Bulb: 30 mL    Bulb: 15 mL    Indwelling Catheter - Urethral: 100 mL  Total OUT: 145 mL    Total NET: 356.5 mL        Weight (kg): 61.1 (02-22 @ 16:14)  02-22    134<L>  |  99  |  14  ----------------------------<  149<H>  4.0   |  25  |  0.54    Ca    7.4<L>      22 Feb 2019 21:25  Phos  2.9     02-22  Mg     1.6     02-22    TPro  4.1<L>  /  Alb  1.9<L>  /  TBili  0.5  /  DBili  x   /  AST  14  /  ALT  11  /  AlkPhos  93  02-22    [x ] Munguia catheter, indication: critically ill  Meds: ascorbic acid 500 milliGRAM(s) Oral two times a day  ferrous    sulfate 325 milliGRAM(s) Oral three times a day with meals  folic acid 1 milliGRAM(s) Oral daily  lactated ringers. 1000 milliLiter(s) IV Continuous <Continuous>  multivitamin 1 Tablet(s) Oral daily      HEMATOLOGIC  Meds: enoxaparin Injectable 40 milliGRAM(s) SubCutaneous daily    [x] VTE Prophylaxis                        10.8   6.8   )-----------( 102      ( 22 Feb 2019 21:25 )             31.8     PT/INR - ( 22 Feb 2019 05:12 )   PT: 14.1 sec;   INR: 1.23 ratio         PTT - ( 21 Feb 2019 23:10 )  PTT:25.5 sec  Transfusion     [ 1] PRBC   [ ] Platelets   [ ] FFP   [ ] Cryoprecipitate      INFECTIOUS DISEASES  T(C): 36.3 (02-23-19 @ 01:30), Max: 37 (02-22-19 @ 10:30)  WBC Count: 6.8 K/uL (02-22 @ 21:25)  WBC Count: 6.3 K/uL (02-22 @ 05:12)    Recent Cultures: x    Meds: influenza   Vaccine 0.5 milliLiter(s) IntraMuscular once  vancomycin  IVPB 1000 milliGRAM(s) IV Intermittent every 12 hours        ENDOCRINE  Meds: levothyroxine Injectable 50 MICROGram(s) IV Push at bedtime        ACCESS DEVICES:  [ x] Peripheral IV  [ ] Central Venous Line	[ ] R	[ ] L	[ ] IJ	[ ] Fem	[ ] SC	Placed:   [x ] Arterial Line		[x ] R	[ ] L	[ ] Fem	[x ] Rad	[ ] Ax	Placed: 2/22  [ ] PICC:					[ ] Mediport  [ x] Urinary Catheter, Date Placed: 2/22  [x ] Necessity of urinary, arterial, and venous catheters discussed    OTHER MEDICATIONS: x      CODE STATUS: full code      IMAGING: x

## 2019-02-23 NOTE — OCCUPATIONAL THERAPY INITIAL EVALUATION ADULT - PERTINENT HX OF CURRENT PROBLEM, REHAB EVAL
67 y/o F with recent R hip hemiarthroplasty for R femoral neck fracture after fall (1/2019). Pt found down at rehab, reported as possibly rolling out of bed. Pt hypotensive, SBP 80s, 2 large bore PIV placed, pneumothorax.

## 2019-02-23 NOTE — PHYSICAL THERAPY INITIAL EVALUATION ADULT - ACTIVE RANGE OF MOTION EXAMINATION, REHAB EVAL
bilateral upper extremity Active ROM was WFL (within functional limits)/bilateral  lower extremity Active ROM was WFL (within functional limits)
Left LE Active ROM was WFL (within functional limits)/bilateral upper extremity Active ROM was WFL (within functional limits)

## 2019-02-23 NOTE — OCCUPATIONAL THERAPY INITIAL EVALUATION ADULT - LIVES WITH, PROFILE
Patient admitted from [facility] s/p fall; Prior to hospitalization & rehab stay; pt reports living in private home alone with 3 steps to enter with handrail, ambulatory with rollator walker & had HHA 5days/6hours.

## 2019-02-23 NOTE — PHYSICAL THERAPY INITIAL EVALUATION ADULT - IMPAIRMENTS CONTRIBUTING IMPAIRED BED MOBILITY, REHAB EVAL
impaired balance/pain/decreased strength
impaired postural control/decreased strength/impaired balance

## 2019-02-23 NOTE — PHYSICAL THERAPY INITIAL EVALUATION ADULT - BED MOBILITY TRAINING, PT EVAL
GOAL: Pt will perform all bed mobility with CGA in 4 weeks
GOALS: Pt will perform bed mobility with CGA in 4wks

## 2019-02-23 NOTE — OCCUPATIONAL THERAPY INITIAL EVALUATION ADULT - ADL RETRAINING, OT EVAL
GOAL: Pt will perform lower body dressing with min A within 2 weeks to improve ADL/IADL performance.

## 2019-02-23 NOTE — PROGRESS NOTE ADULT - ATTENDING COMMENTS
I agree with the above note and have personally seen and examined this patient. All pertinent films have been reviewed. Please refer to clinical documentation of the history, physical examinations, data summary, and both assessment and plan as documented above and with which I agree.    1u prbc in OR yesterday for acute blood loss anemia  doing well in SICU  intubated    -DVT PPx: Lovenox 40 qd  -Abx: Vanco 1 gram q12h x 72 hours (until OR Cx negative) - defer to ID on duration and frequency  -Cudet Yanes--May be removed 2/23 when extubated. Difficult yanes insertion.   -FU OR Cx x 3, ngtd  -Compressive ACE wrap x 72 hrs  -NWB RLE. OOB to chair is okay.  -Posterior hip precautions  -iVac x 5 days  -DION until output <50cc/24, maintain self suction  -nutrition consult when extubated, need to maximize nutrition, add ensure to diet  -wean to extubate  -po pain control, minimize narcs  -daily cbc until stable    Conor George MD  Attending Orthopedic Surgeon

## 2019-02-23 NOTE — PROGRESS NOTE ADULT - ATTENDING COMMENTS
Dr. Patrick (Attending Physician)  N- start precedex for sedation  P - acute respiratory failure - with persistent bilateral pleural effusions, will not diurese as she is on pressors post-op  C - Postoperative hypotension likely multifactorial - sedation, anesthesia, blood loss will place on FloTrac  GI - npo for sbt possible extubation, will start tube feeds if failing   - monitor uo in setting of hypotension  H - trend h/h post-op, dvt ppx with lovenox  ID - vancomycin x72 hours for ppx  E - hypothyroid c/w home synthroid, glucose control for 120-180    This patient was critically ill with one or more vital organ systems at a high probability of imminent or life threatening deterioration. Complex decision making was required to assess and treat single or multiple vital organ system failure and/or prevent further life threatening deterioration of the patient’s condition.  All recent labwork and imaging was reviewed.  Total Critical Care Time 40 min

## 2019-02-23 NOTE — PROGRESS NOTE ADULT - SUBJECTIVE AND OBJECTIVE BOX
Orthopaedic Surgery Progress Note    Subjective:   Patient seen and examined  No acute events overnight  Patient remains intubated, not sedated, responding to commands    Objective:  T(C): 36.3 (02-23-19 @ 03:00), Max: 37 (02-22-19 @ 10:30)  HR: 62 (02-23-19 @ 06:45) (59 - 93)  BP: 87/50 (02-23-19 @ 03:45) (87/50 - 121/56)  RR: 18 (02-23-19 @ 06:45) (12 - 37)  SpO2: 100% (02-23-19 @ 06:45) (93% - 100%)  Wt(kg): --    02-21 @ 07:01  -  02-22 @ 07:00  --------------------------------------------------------  IN: 1050 mL / OUT: 2100 mL / NET: -1050 mL    02-22 @ 07:01  -  02-23 @ 06:55  --------------------------------------------------------  IN: 1657.6 mL / OUT: 320 mL / NET: 1337.6 mL        PE    NAD  RLE:   compressive dressing C/D/I  JPs intact with SS output 35/35, 60/60  motor intact GS/TA/EHL  SILT S/S/SP/DP  WWP, dopplerable DP                          11.3   10.8  )-----------( 97       ( 23 Feb 2019 03:21 )             33.0     02-23    134<L>  |  99  |  17  ----------------------------<  151<H>  4.3   |  24  |  0.56    Ca    7.6<L>      23 Feb 2019 03:21  Phos  3.6     02-23  Mg     1.5     02-23    TPro  4.1<L>  /  Alb  1.9<L>  /  TBili  0.5  /  DBili  x   /  AST  14  /  ALT  11  /  AlkPhos  93  02-22    PT/INR - ( 23 Feb 2019 03:21 )   PT: 13.0 sec;   INR: 1.13 ratio         PTT - ( 23 Feb 2019 03:21 )  PTT:22.7 sec      68y Female s/p revision R hip hemiarthroplasty  - Pain control  - Compressive ace x72 hours  - IV vanco x72 hours until OR Cx negative  - FU ID recs  - Munguia until extubated  - Prevena vac  - WBAT  - Posterior dislocation precautions  - PT/OT/OOB  - DVT ppx  - care per SICU Orthopaedic Surgery Progress Note    Subjective:   Patient seen and examined  No acute events overnight  Patient remains intubated, not sedated, responding to commands    Objective:  T(C): 36.3 (02-23-19 @ 03:00), Max: 37 (02-22-19 @ 10:30)  HR: 62 (02-23-19 @ 06:45) (59 - 93)  BP: 87/50 (02-23-19 @ 03:45) (87/50 - 121/56)  RR: 18 (02-23-19 @ 06:45) (12 - 37)  SpO2: 100% (02-23-19 @ 06:45) (93% - 100%)  Wt(kg): --    02-21 @ 07:01  -  02-22 @ 07:00  --------------------------------------------------------  IN: 1050 mL / OUT: 2100 mL / NET: -1050 mL    02-22 @ 07:01  -  02-23 @ 06:55  --------------------------------------------------------  IN: 1657.6 mL / OUT: 320 mL / NET: 1337.6 mL        PE    NAD  RLE:   compressive dressing C/D/I  JPs intact with SS output 35/35, 60/60  motor intact GS/TA/EHL  SILT S/S/SP/DP  WWP, dopplerable DP                          11.3   10.8  )-----------( 97       ( 23 Feb 2019 03:21 )             33.0     02-23    134<L>  |  99  |  17  ----------------------------<  151<H>  4.3   |  24  |  0.56    Ca    7.6<L>      23 Feb 2019 03:21  Phos  3.6     02-23  Mg     1.5     02-23    TPro  4.1<L>  /  Alb  1.9<L>  /  TBili  0.5  /  DBili  x   /  AST  14  /  ALT  11  /  AlkPhos  93  02-22    PT/INR - ( 23 Feb 2019 03:21 )   PT: 13.0 sec;   INR: 1.13 ratio         PTT - ( 23 Feb 2019 03:21 )  PTT:22.7 sec      68y Female s/p revision R hip hemiarthroplasty  - Pain control  - Compressive ace x72 hours  - IV vanco x72 hours until OR Cx negative  - FU ID recs  - Munguia until extubated  - Prevena vac  - NWB RLE  - Posterior dislocation precautions  - PT/OT/OOB  - DVT ppx  - care per SICU

## 2019-02-23 NOTE — PROGRESS NOTE ADULT - ASSESSMENT
68 year old female with a PMHx of bipolar depression, Hyperlipidemia, NHL (in remission), OCD, dementia, with recent hospitalization (1/20-1/26) for right hip hemiarthroplasty for right femoral neck fracture after fall. Hospital course complicated by ESBL E. Coli UTI on Ertapenem (1/23-1/29) now presents with likely Hypovolemic/ Distributive shock secondary to sepsis 2/2 Pancolitis, likely secondary to C. Diff Colitis, Age Indeterminate L5 vertebral body fracture, Right proximal femoral periprosthetic fracture following unwitnessed fall with cdiff, bandemia, fever    Antibiotics  Meropenem 2/4 --> 2/17  Flagyl 2/5 -->  Vanco by retention enema 2/7 --> 2/12  PO Vanco 2/4-->    s/p Open reduction and internal fixation (ORIF) of periprosthetic fracture of femur  02/22/2019  Right    Hemiarthroplasty of hip  02/22/2019  Revision--Right, posterior approach    Irrigation and debridement, thigh or hip  02/22/2019  Right  Cdiff improved      Suggest  Resume po Vnaco 125 mg 4 times daily   then taper-125 mg po q 6 X 2 weeks 2/23 --> 3/9/19  and then q 12 X 2 more weeks 3/10 --> 3/23/19

## 2019-02-23 NOTE — OCCUPATIONAL THERAPY INITIAL EVALUATION ADULT - COGNITIVE, VISUAL PERCEPTUAL, OT EVAL
GOAL: Pt will recall situation and weight bearing precautions within only 1 verbal cue to improve safety during ADL/IADL performance.

## 2019-02-23 NOTE — PHYSICAL THERAPY INITIAL EVALUATION ADULT - BED MOBILITY LIMITATIONS, REHAB EVAL
impaired ability to control trunk for mobility/decreased ability to use legs for bridging/pushing/decreased ability to use arms for pushing/pulling
decreased ability to use legs for bridging/pushing

## 2019-02-23 NOTE — PHYSICAL THERAPY INITIAL EVALUATION ADULT - TRANSFER SAFETY CONCERNS NOTED: SIT/STAND, REHAB EVAL
decreased sequencing ability/inability to maintain weight-bearing restrictions w/o assist
decreased step length/PT in front/inability to maintain weight-bearing restrictions w/o assist/decreased weight-shifting ability/decreased sequencing ability

## 2019-02-23 NOTE — PROGRESS NOTE ADULT - SUBJECTIVE AND OBJECTIVE BOX
Follow Up:  post -op ORIF    Interval History/ROS: extubated this am - states she feels hungry!  denies pain    Allergies  honeydew melon (Other)  penicillin (Other; Hives)    ANTIMICROBIALS:  vancomycin  IVPB 1000 every 12 hours    OTHER MEDS:  MEDICATIONS  (STANDING):  ALBUTerol/ipratropium for Nebulization 3 every 6 hours  enoxaparin Injectable 40 daily  HYDROmorphone  Injectable 0.5 every 3 hours PRN  influenza   Vaccine 0.5 once  levothyroxine Injectable 50 at bedtime  pantoprazole  Injectable 40 daily  phenylephrine    Infusion 1.3 <Continuous>      Vital Signs Last 24 Hrs  T(C): 36.5 (23 Feb 2019 11:00), Max: 36.5 (22 Feb 2019 15:11)  T(F): 97.7 (23 Feb 2019 11:00), Max: 97.7 (22 Feb 2019 15:11)  HR: 81 (23 Feb 2019 12:15) (58 - 93)  BP: 100/56 (23 Feb 2019 07:15) (87/50 - 121/56)  BP(mean): 78 (23 Feb 2019 07:15) (63 - 84)  RR: 27 (23 Feb 2019 12:15) (12 - 37)  SpO2: 100% (23 Feb 2019 12:15) (93% - 100%)    PHYSICAL EXAM:  General: WN/WD NAD, Non-toxic  Neurology: A&Ox3, nonfocal  Respiratory: Clear to auscultation bilaterally  CV: RRR, S1S2, no murmurs, rubs or gallops  Abdominal: Soft, Non-tender, non-distended, normal bowel sounds  Extremities: + edema, feet cool and symmetric  Line Sites: Clear  Skin: No rash                        11.3   10.8  )-----------( 97       ( 23 Feb 2019 03:21 )             33.0       02-23    134<L>  |  99  |  17  ----------------------------<  151<H>  4.3   |  24  |  0.56    Ca    7.6<L>      23 Feb 2019 03:21  Phos  3.6     02-23  Mg     1.5     02-23    TPro  4.1<L>  /  Alb  1.9<L>  /  TBili  0.5  /  DBili  x   /  AST  14  /  ALT  11  /  AlkPhos  93  02-22      MICROBIOLOGY:  .Body Fluid Synovial Fluid  02-23-19 --  --    No polymorphonuclear cells seen  No organisms seen  by cytocentrifuge      .Tissue Other, right hip synovium  02-23-19 --  --    Rare polymorphonuclear leukocytes seen per low power field  No organisms seen per oil power field      .Tissue Other, right femur  02-23-19 --  --    Rare polymorphonuclear leukocytes seen per low power field  No organisms seen per oil power field      .Body Fluid  02-06-19   No growth at 14 days.  --    No polymorphonuclear cells seen per low power field  No organisms seen      .Blood Blood-Peripheral  02-06-19   No growth at 5 days.  --  --        .Body Fluid Synovial Fluid  .Tissue Other, right hip synovium  .Tissue Other, right femur    v          RADIOLOGY:    Jimbo Garza MD; Division of Infectious Disease; Pager: [phone]; nights and weekends: [phone]

## 2019-02-23 NOTE — PHYSICAL THERAPY INITIAL EVALUATION ADULT - MANUAL MUSCLE TESTING RESULTS, REHAB EVAL
grossly assessed due to/grossly at least 3+/5 t/o; RLE not formally assessed
grossly assessed due to/BUE and LLE at least 3/5; RLE appearing at least 2/5

## 2019-02-23 NOTE — OCCUPATIONAL THERAPY INITIAL EVALUATION ADULT - ADDITIONAL COMMENTS
CT head: volume loss and microvascular disease with areas of remote and age-indeterminate lacunar infarction. CT Cspine (-), CT Tspine (-), CT L spine: nondisplaced sacral insufficienty fracture extending from the L sacroiliac joint to the ipsilateral S1-S2 sacral foramen. CT chest: pancolitis. CT chest: Age-indeterminate moderate L5 compression fracture. CT pelvis: mildly displaced R proximal femoral periprosthetic fracture. Xray pelvis: s/p R hip hemiarthroplasty, nondisplaced periprosthetic frature of the anterior cortex of the R proximal femur. 2/22: Open reduction and internal fixation (ORIF) of periprosthetic fracture of R femur, revision of R hemiarthroplasty, irrigation and debridement. Pt remains intubated postoperatively. CT head: volume loss and microvascular disease with areas of remote and age-indeterminate lacunar infarction. CT Cspine (-), CT Tspine (-), CT L spine: nondisplaced sacral insufficienty fracture extending from the L sacroiliac joint to the ipsilateral S1-S2 sacral foramen. CT chest: pancolitis. CT chest: Age-indeterminate moderate L5 compression fracture. CT pelvis: mildly displaced R proximal femoral periprosthetic fracture. Xray pelvis: s/p R hip hemiarthroplasty, nondisplaced periprosthetic frature of the anterior cortex of the R proximal femur. 2/22: Open reduction and internal fixation (ORIF) of periprosthetic fracture of R femur, revision of R hemiarthroplasty, irrigation and debridement. Pt remains intubated postoperatively.      **pt is a poor historian, pt comes from rehab center

## 2019-02-23 NOTE — OCCUPATIONAL THERAPY INITIAL EVALUATION ADULT - GENERAL OBSERVATIONS, REHAB EVAL
Pt received seated in chair, +BP cuff, +TELE, +IV, pulse ox, +venti mask Pt received seated in chair, +BP cuff, +TELE, +IV, pulse ox, +2 DION drains, +wound care

## 2019-02-23 NOTE — PROCEDURE NOTE - ADDITIONAL PROCEDURE DETAILS
Findings:  Mild mitral regurg  Suspicion for aortic regurg  Normal left ventricular systolic function  Mild right ventricular overload based on septal flattening during diastole  B/L moderate to large pleural effusions Findings:  Mild mitral regurg  Suspicion for aortic regurg  Normal left ventricular systolic function  Mild right ventricular overload based on septal flattening during diastole  bilateral moderate to large pleural effusions

## 2019-02-23 NOTE — PROGRESS NOTE ADULT - PROBLEM SELECTOR PLAN 2
s/p ORIF  periprosthetic fracture rep  pain meds as needed  follow for worsening confusion  Prevention of constipation  standard prophylactic measures

## 2019-02-23 NOTE — PROGRESS NOTE ADULT - PROBLEM SELECTOR PLAN 4
Post op with large blood loss patient requires pressors and Is  extubated.  Still unstable and requires the ICU if remains so

## 2019-02-23 NOTE — OCCUPATIONAL THERAPY INITIAL EVALUATION ADULT - PLANNED THERAPY INTERVENTIONS, OT EVAL
cognitive, visual perceptual/ADL retraining/transfer training bed mobility training/ADL retraining/balance training/transfer training/cognitive, visual perceptual/strengthening

## 2019-02-23 NOTE — PROGRESS NOTE ADULT - ATTENDING COMMENTS
SICU Night Attending    s/p revision hemiarthroplasty and cerclages for right periprosthetic femur fracture on 2/22/2019    off vasopressors this afternoon, but hypotensive again this evening and now on phenylephrine @ 0.6 mcg/min    no hypoxia  LLE pedal edema extending above knee    bedside heart / lung U/S suggests volume overload (flattening of intraventricular septum during diastole, RV size almost equal to LV size, bilateral pleural effusions)  also suspicious for aortic regurgitation as cause of low diastolic pressures  -diuresis with Lasix  -D/C IV fluids      Critical Care Time - 45 minutes

## 2019-02-23 NOTE — PROGRESS NOTE ADULT - ASSESSMENT
68 year old female with bipolar depression, OCD, dementia, HLD, NHL (in remission since 2016), osteoarthritis s/p bilateral TKR s/p fall presenting with a right periprosthetic femoral neck fracture and C. difficile colitis, now s/p ORIF of R periprosthetic femur fracture and revision of right hip hemiarthroplasty on 2/22, transferred to SICU intubated, on pressors.    PLAN:  Neuro: bipolar depression, OCD, dementia, acute traumatic pain  - Change propofol for Precedex  - Dilaudid PRN for pain control  - Continue home bupropion, ziprasidone.  - Pain control as needed with Tylenol.    Resp: acute post-operative respiratory failure on mechanical ventilation  - SBT, wean to extubate as tolerated  - Duonebs q 6hours    CV: hypotension  - Vivek Trac   - Assess intravascular volume status using ultrasound  - Fluid resuscitate as necessary  - Wean phenylephrine as tolerated to maintain MAP >65    GI: C. difficile colitis, complicated by ileus, resolved  - NPO  - Advance diet once extubated; if unable to extubate, would place NGT and start tube feeds  - Protonix for stress ulcer prophylaxis    Renal CKD stage III  - Hold home diuretics in setting of hypotension  - LR @ 75 ml/hr  - Munguia, monitor I&Os    Heme: no acute issues  - Lovenox for VTE prophylaxis.    ID: C. difficile colitis, resolved  - Monitor WBC, temperature, and procalcitonin.  - IV vancomycin for postoperative prophylaxis as per ortho    Endo: hypothyroidism  - Monitor glucose on BMP  - Continue home synthroid    Disposition: Full code, SICU transfer.    D/W Dr. Darnell Lennon, PA-C  [id number]

## 2019-02-23 NOTE — OCCUPATIONAL THERAPY INITIAL EVALUATION ADULT - DIAGNOSIS, OT EVAL
Pt presents with decreased strength, transfers, endurance, confusion which affects ADL/IADL performance.

## 2019-02-24 LAB
ALBUMIN SERPL ELPH-MCNC: 1.7 G/DL — LOW (ref 3.3–5)
ALP SERPL-CCNC: 111 U/L — SIGNIFICANT CHANGE UP (ref 40–120)
ALT FLD-CCNC: 14 U/L — SIGNIFICANT CHANGE UP (ref 10–45)
ANION GAP SERPL CALC-SCNC: 10 MMOL/L — SIGNIFICANT CHANGE UP (ref 5–17)
AST SERPL-CCNC: 25 U/L — SIGNIFICANT CHANGE UP (ref 10–40)
BILIRUB SERPL-MCNC: 0.5 MG/DL — SIGNIFICANT CHANGE UP (ref 0.2–1.2)
BUN SERPL-MCNC: 18 MG/DL — SIGNIFICANT CHANGE UP (ref 7–23)
CA-I BLD-SCNC: 1.06 MMOL/L — LOW (ref 1.12–1.3)
CALCIUM SERPL-MCNC: 7.6 MG/DL — LOW (ref 8.4–10.5)
CHLORIDE SERPL-SCNC: 99 MMOL/L — SIGNIFICANT CHANGE UP (ref 96–108)
CO2 SERPL-SCNC: 24 MMOL/L — SIGNIFICANT CHANGE UP (ref 22–31)
CREAT SERPL-MCNC: 0.63 MG/DL — SIGNIFICANT CHANGE UP (ref 0.5–1.3)
GLUCOSE SERPL-MCNC: 125 MG/DL — HIGH (ref 70–99)
HCT VFR BLD CALC: 29.8 % — LOW (ref 34.5–45)
HGB BLD-MCNC: 10.2 G/DL — LOW (ref 11.5–15.5)
MAGNESIUM SERPL-MCNC: 2 MG/DL — SIGNIFICANT CHANGE UP (ref 1.6–2.6)
MCHC RBC-ENTMCNC: 32.7 PG — SIGNIFICANT CHANGE UP (ref 27–34)
MCHC RBC-ENTMCNC: 34.1 GM/DL — SIGNIFICANT CHANGE UP (ref 32–36)
MCV RBC AUTO: 95.9 FL — SIGNIFICANT CHANGE UP (ref 80–100)
PHOSPHATE SERPL-MCNC: 3.4 MG/DL — SIGNIFICANT CHANGE UP (ref 2.5–4.5)
PLATELET # BLD AUTO: 70 K/UL — LOW (ref 150–400)
POTASSIUM SERPL-MCNC: 3.9 MMOL/L — SIGNIFICANT CHANGE UP (ref 3.5–5.3)
POTASSIUM SERPL-SCNC: 3.9 MMOL/L — SIGNIFICANT CHANGE UP (ref 3.5–5.3)
PROT SERPL-MCNC: 3.8 G/DL — LOW (ref 6–8.3)
RBC # BLD: 3.11 M/UL — LOW (ref 3.8–5.2)
RBC # FLD: 20.2 % — HIGH (ref 10.3–14.5)
SODIUM SERPL-SCNC: 133 MMOL/L — LOW (ref 135–145)
VANCOMYCIN TROUGH SERPL-MCNC: 26.4 UG/ML — CRITICAL HIGH (ref 10–20)
WBC # BLD: 10.5 K/UL — SIGNIFICANT CHANGE UP (ref 3.8–10.5)
WBC # FLD AUTO: 10.5 K/UL — SIGNIFICANT CHANGE UP (ref 3.8–10.5)

## 2019-02-24 PROCEDURE — 93010 ELECTROCARDIOGRAM REPORT: CPT | Mod: 76

## 2019-02-24 PROCEDURE — 71045 X-RAY EXAM CHEST 1 VIEW: CPT | Mod: 26

## 2019-02-24 RX ORDER — ALBUMIN HUMAN 25 %
250 VIAL (ML) INTRAVENOUS ONCE
Qty: 0 | Refills: 0 | Status: COMPLETED | OUTPATIENT
Start: 2019-02-24 | End: 2019-02-24

## 2019-02-24 RX ORDER — POTASSIUM CHLORIDE 20 MEQ
20 PACKET (EA) ORAL ONCE
Qty: 0 | Refills: 0 | Status: COMPLETED | OUTPATIENT
Start: 2019-02-24 | End: 2019-02-24

## 2019-02-24 RX ORDER — SODIUM CHLORIDE 9 MG/ML
1000 INJECTION INTRAMUSCULAR; INTRAVENOUS; SUBCUTANEOUS
Qty: 0 | Refills: 0 | Status: DISCONTINUED | OUTPATIENT
Start: 2019-02-24 | End: 2019-02-27

## 2019-02-24 RX ORDER — VANCOMYCIN HCL 1 G
125 VIAL (EA) INTRAVENOUS EVERY 6 HOURS
Qty: 0 | Refills: 0 | Status: COMPLETED | OUTPATIENT
Start: 2019-02-24 | End: 2019-03-09

## 2019-02-24 RX ORDER — CALCIUM GLUCONATE 100 MG/ML
2 VIAL (ML) INTRAVENOUS ONCE
Qty: 0 | Refills: 0 | Status: COMPLETED | OUTPATIENT
Start: 2019-02-24 | End: 2019-02-24

## 2019-02-24 RX ADMIN — Medication 200 GRAM(S): at 04:38

## 2019-02-24 RX ADMIN — Medication 500 MILLILITER(S): at 16:55

## 2019-02-24 RX ADMIN — SODIUM CHLORIDE 50 MILLILITER(S): 9 INJECTION INTRAMUSCULAR; INTRAVENOUS; SUBCUTANEOUS at 12:50

## 2019-02-24 RX ADMIN — Medication 125 MILLIGRAM(S): at 15:48

## 2019-02-24 RX ADMIN — BUPROPION HYDROCHLORIDE 300 MILLIGRAM(S): 150 TABLET, EXTENDED RELEASE ORAL at 12:49

## 2019-02-24 RX ADMIN — Medication 125 MILLIGRAM(S): at 23:29

## 2019-02-24 RX ADMIN — ZIPRASIDONE HYDROCHLORIDE 40 MILLIGRAM(S): 20 CAPSULE ORAL at 05:24

## 2019-02-24 RX ADMIN — Medication 20 MILLIEQUIVALENT(S): at 05:23

## 2019-02-24 RX ADMIN — Medication 100 MILLIGRAM(S): at 05:23

## 2019-02-24 RX ADMIN — Medication 125 MILLILITER(S): at 23:28

## 2019-02-24 RX ADMIN — PHENYLEPHRINE HYDROCHLORIDE 9.16 MICROGRAM(S)/KG/MIN: 10 INJECTION INTRAVENOUS at 23:29

## 2019-02-24 RX ADMIN — Medication 250 MILLIGRAM(S): at 15:48

## 2019-02-24 RX ADMIN — Medication 500 MILLILITER(S): at 12:49

## 2019-02-24 RX ADMIN — CHLORHEXIDINE GLUCONATE 1 APPLICATION(S): 213 SOLUTION TOPICAL at 05:31

## 2019-02-24 RX ADMIN — SENNA PLUS 2 TABLET(S): 8.6 TABLET ORAL at 23:29

## 2019-02-24 RX ADMIN — Medication 100 MILLIGRAM(S): at 23:28

## 2019-02-24 RX ADMIN — Medication 100 MICROGRAM(S): at 05:23

## 2019-02-24 RX ADMIN — ENOXAPARIN SODIUM 40 MILLIGRAM(S): 100 INJECTION SUBCUTANEOUS at 12:49

## 2019-02-24 RX ADMIN — Medication 100 MILLIGRAM(S): at 13:08

## 2019-02-24 RX ADMIN — SODIUM CHLORIDE 50 MILLILITER(S): 9 INJECTION INTRAMUSCULAR; INTRAVENOUS; SUBCUTANEOUS at 23:29

## 2019-02-24 RX ADMIN — Medication 250 MILLIGRAM(S): at 03:12

## 2019-02-24 NOTE — PROGRESS NOTE ADULT - SUBJECTIVE AND OBJECTIVE BOX
67 y/o critically ill  female h/o dementia NHL presenting after unwitnessed fall. Pt found on the ground at rehab. is on lovenox after hip fracture. pt reports pain to her right leg. per EMS en route slurring speech and mildly hypotensive requesting trauma.unclear how long pt on the ground. Patient found to have C diff with worsening hypotension requiring pressors on and off and  Patient requiring the SICU for continued cardiopulmonary monitoring. Orthopedic procedure on hold due to  hypotension and pan colitis and need for pressors due to unstable hemodynamics. Diarrhea slowing off IV Flagyl + on oral vancomycin. Patient now has the rectal tube removed.   Leukocytosis and diarrhea have improved  No significant change in status. Has senile dementia with intermittent lucency. No further C.diff colitis and no further hypovolemic hypotension. On high dose bupropian XL, geodan and mirtazapine, which may be increasing sedation.     The patient was examined in conjunction with Dr Mcdaniel, Orthopedics yesterday evening. The geodan was decreased and the patient is less sedated. She was alert and responsive. She was oriented to her age and date of birth. She had short term memory loss about her recent diarrhea and present year. She was aware of her fractured leg which is causing her pain and she  expressed  a desire to have it repaired, to alleviate her pain. She responded appropriately to verbal command by raising her left arm and left leg when asked to do so. Lab and examination was normal, except for bilateral left > right leg edema . IV fluids were stopped and she was started on IV Lasix BID. This was to decreaese edema to optimize her soft tissue, for better wound healing. Orthopedics  resident was to contact Dr George to proceed with ORIF of the periprosthetic fracture as soon as possible. Fixing her leg to alleviate her pain and suffering is ethically appropriate and the patient has no medical contraindication to the procedure as required.  Patient tolerated the surgery well and needs incentive spirometry and GI and DVT prophylaxis. Doing better , intubated cardiopulmonary stable. To start taking PO nutritionlaly at reisr      Post-Op Dx:  Periprosthetic fracture of shaft of femur  02/22/2019  Right  .    Procedure:    Procedure:  Open reduction and internal fixation (ORIF) of periprosthetic fracture of femur  02/22/2019  Right    Hemiarthroplasty of hip  02/22/2019  Revision--Right, posterior approach   Irrigation and debridement, thigh or hip  02/22/2019  Right      Patinet ws hypotensive with significant blood loss with hypotension and Hypovolemia due to blood loss.  Patient remained intubated and on pressor until this afternoon when she ws successfully extubated and the pressors are being slowly tapered.  She has post blod loo anemia and may require more transfusions.  Patine tis alert and communicative.    MEDICATIONS  (STANDING):  buPROPion XL . 300 milliGRAM(s) Oral daily  chlorhexidine 4% Liquid 1 Application(s) Topical <User Schedule>  enoxaparin Injectable 40 milliGRAM(s) SubCutaneous daily  furosemide   Injectable 40 milliGRAM(s) IV Push two times a day  influenza   Vaccine 0.5 milliLiter(s) IntraMuscular once  levothyroxine 75 MICROGram(s) Oral daily  metoprolol tartrate 12.5 milliGRAM(s) Oral every 12 hours  mirtazapine 15 milliGRAM(s) Oral <User Schedule>  spironolactone Suspension 50 milliGRAM(s) Oral daily  vancomycin    Solution 500 milliGRAM(s) Oral every 6 hours  ziprasidone 40 milliGRAM(s) Oral <User Schedule>    MEDICATIONS  (PRN):  acetaminophen   Tablet .. 650 milliGRAM(s) Oral every 6 hours PRN Mild Pain (1 - 3)  ALBUTerol/ipratropium for Nebulization 3 milliLiter(s) Nebulizer every 6 hours PRN Shortness of Breath and/or Wheezing    RR: 14 (02-24-19 @ 03:00) (12 - 27)  SpO2: 98% (02-24-19 @ 03:00) (96% - 100%)  HR: 90 (02-24-19 @ 03:00) (58 - 98)  BP: 103/55 (02-24-19 @ 03:00) (86/44 - 103/55)  BP(mean): 75 (02-24-19 @ 03:00) (61 - 78)  ABP: 90/48 (02-24-19 @ 02:15) (83/51 - 131/61)  ABP(mean): 67 (02-24-19 @ 02:15) (61 - 88)  Wt(kg): --      PHYSICAL EXAM:  GENERAL: NAD, well-groomed, well-developed  HEAD:  Atraumatic, Normocephalic  EYES: EOMI, PERRLA, conjunctiva and sclera clear  ENMT: No tonsillar erythema, exudates, or enlargement; Moist mucous membranes, Good dentition, No lesions  NECK: Supple, No JVD, Normal thyroid  NERVOUS SYSTEM:  confused becoming aphasic  CHEST/LUNG: Clear to percussion bilaterally; No rales, rhonchi, wheezing, or rubs  HEART: Regular rate and rhythm; No murmurs, rubs, or gallops  ABDOMEN: Soft, Nontender, Nondistended; Bowel sounds present  Periprosthetic right femur fracture repair with localized swelling       LYMPH: No lymphadenopathy noted  SKIN: No rashes or lesions        LABS:        134<L>  |  99  |  17  ----------------------------<  151<H>  4.3   |  24  |  0.56    Ca    7.6<L>      23 Feb 2019 03:21  Phos  3.6     02-23  Mg     1.5     02-23    TPro  4.1<L>  /  Alb  1.9<L>  /  TBili  0.5  /  DBili  x   /  AST  14  /  ALT  11  /  AlkPhos  93  02-22    [ ] Munguia catheter, indication:   Meds:     HEMATOLOGIC  Meds: enoxaparin Injectable 40 milliGRAM(s) SubCutaneous daily    [x] VTE Prophylaxis                        11.3   10.8  )-----------( 97       ( 23 Feb 2019 03:21 )             33.0     PT/INR - ( 23 Feb 2019 03:21 )   PT: 13.0 sec;   INR: 1.13 ratio      PTT - ( 23 Feb 2019 03:21 )  PTT:22.7 sec  Transfusion     [ ] PRBC   [ ] Platelets   [ ] FFP   [ ] Cryoprecipitate    INFECTIOUS DISEASES  T(C): 36.4 (02-23-19 @ 23:00), Max: 36.6 (02-23-19 @ 15:00)  Wt(kg): --  WBC Count: 10.8 K/uL (02-23 @ 03:21)    Recent Cultures:  Specimen Source: .Body Fluid Synovial Fluid, 02-23 @ 00:31; Results   No growth; Gram Stain:   No polymorphonuclear cells seen  No organisms seen  by cytocentrifuge; Organism: --  Specimen Source: .Tissue Other, right hip synovium, 02-23 @ 00:29; Results   No growth; Gram Stain:   Rare polymorphonuclear leukocytes seen per low power field  No organisms seen per oil power field; Organism: --  Specimen Source: .Tissue Other, right femur, 02-23 @ 00:28; Results   No growth; Gram Stain:   Rare polymorphonuclear leukocytes seen per low power field  No organisms seen per oil power field; Organism: --    Meds: influenza   Vaccine 0.5 milliLiter(s) IntraMuscular once  vancomycin  IVPB 1000 milliGRAM(s) IV Intermittent every 12 hours

## 2019-02-24 NOTE — PROVIDER CONTACT NOTE (OTHER) - ACTION/TREATMENT ORDERED:
awaiting for MD for intervention
Increase IV fluids to 100ml/hr
250ml albumin 5% STAT
80mg lasix and continue to montior
Continue to monitor.
Dr Louis stated the resident will come assess later. possible ultrasound order
JENIFFER Chakraborty aware
JENIFFER Dexter to come to bedside and evaluate patient.
JENIFFER mcintyre came to bedside to assess pt
New IV placed, 500 mL LR bolus given. BP went up to 102 Systolic.

## 2019-02-24 NOTE — PROGRESS NOTE ADULT - ATTENDING COMMENTS
Pt seen and examined on 2/24, agree with above.    1. Postoperative hypotension on neosynephrine gtt, with oliguria, likely hypovolemic:  - Responded to albumin  - No signs of bleeding, but will continue to monitor CBC

## 2019-02-24 NOTE — PROVIDER CONTACT NOTE (OTHER) - RECOMMENDATIONS
Increase IV fluids? Bolus?
Come assess at bedside. STAT dose of Albumin
Come see patient.
MD Singh aware. Pt. has been having problems with PIVs because of weeping edema, possible central line should be considered.
Please come assess PT at bedside
Transfuse?
come assess
come assess pt
n/a

## 2019-02-24 NOTE — PROGRESS NOTE ADULT - ATTENDING COMMENTS
I agree with the above note and have personally seen and examined this patient. All pertinent films have been reviewed. Please refer to clinical documentation of the history, physical examinations, data summary, and both assessment and plan as documented above and with which I agree.    1u prbc in OR yesterday for acute blood loss anemia  doing well in SICU  intubated    -DVT PPx: Lovenox 40 qd  -Abx: Vanco 1 gram q12h x 72 hours (until OR Cx negative) - defer to ID on duration and frequency  -Cudet Yanes--May be removed 2/23 when extubated. Difficult yanes insertion.   -FU OR Cx x 3, ngtd  -Compressive ACE wrap x 72 hrs  -NWB RLE. OOB to chair is okay.  -Posterior hip precautions  -iVac x 5 days  -DION until output <50cc/24, maintain self suction  -nutrition consult when extubated, need to maximize nutrition, add ensure to diet  -wean to extubate  -po pain control, minimize narcs  -daily cbc until stable    Conor George MD  Attending Orthopedic Surgeon I agree with the above note and have personally seen and examined this patient. All pertinent films have been reviewed. Please refer to clinical documentation of the history, physical examinations, data summary, and both assessment and plan as documented above and with which I agree.    extubated  still in SICU  pain controlled    -DVT PPx: Lovenox 40 qd  -Abx: Vanco 1 gram q12h x 72 hours (until OR Cx negative) - defer to ID on duration and frequency  -Cudet Munguia--please remove today given recent hx urosepsis   -FU OR Cx x 3, ngtd  -Compressive ACE wrap x 72 hrs  -NWB RLE. OOB to chair is okay.  -Posterior hip precautions  -iVac x 5 days  -DION until output <50cc/24, maintain self suction  -nutrition consult, need to maximize nutrition, add ensure to diet  -wean to extubate  -po pain control, minimize narcs  -daily cbc until stable    Conor George MD  Attending Orthopedic Surgeon

## 2019-02-24 NOTE — PROGRESS NOTE ADULT - SUBJECTIVE AND OBJECTIVE BOX
HISTORY  68 year old female with a past medical history of bipolar depression, OCD, dementia, HLD, NHL (in remission since 2016), osteoarthritis s/p bilateral TKR, and recent right YONATAN (1/20-1/26)  who presented on 2/4 as a level two trauma after a fall. Patient was found down in rehab after likely rolling out of bed. Patient was upgraded to a level 1 due to hypotension w/ SBP in the 80s despite fluid resuscitation and transfusions. Imaging revealed pancolitis and a right periprosthetic femoral neck fracture. Patient tested for C. difficile and was positive. She was admitted to SICU for hemodynamic monitoring in the setting of sepsis. Pt eventually transferred to floor on 2/14/19.    Pt taken to OR with orthopedics on 2/22/19 for ORIF of right periprosthetic femur fracture and revision of right hemiarthroplasty. EBL 500ml, IVF 1750ml, 1u PRBC. Pt required phenylephrine throughout the case and was 1.3 mcg/kg/min at the end of the case. For this reason she was kept intubated and transferred to PACU. SICU consulted for management of ventilator and pressors.    24 HOUR EVENTS:  - Patient extubated to face mask, weaned to RA  - PO pain control  - Restarted home anti-psychotics  - Patient hemodynamically unstable requiring pressors, bedside U/S demonstrates aortic valve regurgitation, no signs of hypovolemia  - DNR reinstated post operative    SUBJECTIVE/ROS:  [ ] A ten-point review of systems was otherwise negative except as noted.  [x] Due to altered mental status/intubation, subjective information were not able to be obtained from the patient. History was obtained, to the extent possible, from review of the chart and collateral sources of information.    NEURO  Exam: awake, follows commands  Meds: acetaminophen   Tablet .. 975 milliGRAM(s) Oral every 6 hours PRN Mild Pain (1 - 3)  buPROPion XL . 300 milliGRAM(s) Oral daily  HYDROmorphone  Injectable 0.5 milliGRAM(s) IV Push every 3 hours PRN Severe Pain (7 - 10)  oxyCODONE    IR 5 milliGRAM(s) Oral every 4 hours PRN Moderate Pain (4 - 6)  ziprasidone 40 milliGRAM(s) Oral <User Schedule>    [x] Adequacy of sedation and pain control has been assessed and adjusted    RESPIRATORY  RR: 14 (02-24-19 @ 03:00) (12 - 27)  SpO2: 98% (02-24-19 @ 03:00) (96% - 100%)  Wt(kg): --  Exam: unlabored breathing on NC  Mechanical Ventilation: Mode: AC/ CMV (Assist Control/ Continuous Mandatory Ventilation), RR (machine): 12, RR (patient): 15, TV (machine): 400, FiO2: 40, PEEP: 5, ITime: 1, MAP: 7, PIP: 17  ABG - ( 23 Feb 2019 08:38 )  pH: 7.47  /  pCO2: 38    /  pO2: 126   / HCO3: 27    / Base Excess: 3.7   /  SaO2: 99      Lactate: x      [ ] Extubation Readiness Assessed  Meds: ALBUTerol/ipratropium for Nebulization 3 milliLiter(s) Nebulizer every 6 hours PRN Shortness of Breath and/or Wheezing    CARDIOVASCULAR  HR: 90 (02-24-19 @ 03:00) (58 - 98)  BP: 103/55 (02-24-19 @ 03:00) (86/44 - 103/55)  BP(mean): 75 (02-24-19 @ 03:00) (61 - 78)  ABP: 90/48 (02-24-19 @ 02:15) (83/51 - 131/61)  ABP(mean): 67 (02-24-19 @ 02:15) (61 - 88)  Wt(kg): --  CVP(cm H2O): --    Exam: hypotensive requiring true synephrine  Cardiac Rhythm: rrr  Meds: phenylephrine    Infusion 0.4 MICROgram(s)/kG/Min IV Continuous <Continuous>    GI/NUTRITION  Exam: softly distended  Diet: general  Meds: docusate sodium 100 milliGRAM(s) Oral three times a day  senna 2 Tablet(s) Oral at bedtime    GENITOURINARY  I&O's Detail    02-22 @ 07:01  -  02-23 @ 07:00  --------------------------------------------------------  IN:    dexmedetomidine Infusion: 59.4 mL    lactated ringers.: 500 mL    lactated ringers.: 375 mL    phenylephrine   Infusion: 309.2 mL    propofol Infusion: 64 mL    Solution: 250 mL    Solution: 100 mL  Total IN: 1657.6 mL    OUT:    Bulb: 60 mL    Bulb: 35 mL    Indwelling Catheter - Urethral: 225 mL  Total OUT: 320 mL    Total NET: 1337.6 mL      02-23 @ 07:01  -  02-24 @ 03:16  --------------------------------------------------------  IN:    lactated ringers.: 1500 mL    Oral Fluid: 540 mL    phenylephrine   Infusion: 153.8 mL    phenylephrine   Infusion: 104.9 mL    Solution: 250 mL    Solution: 100 mL  Total IN: 2648.7 mL    OUT:    Bulb: 60 mL    Bulb: 10 mL    Indwelling Catheter - Urethral: 953 mL  Total OUT: 1023 mL    Total NET: 1625.7 mL    02-23    134<L>  |  99  |  17  ----------------------------<  151<H>  4.3   |  24  |  0.56    Ca    7.6<L>      23 Feb 2019 03:21  Phos  3.6     02-23  Mg     1.5     02-23    TPro  4.1<L>  /  Alb  1.9<L>  /  TBili  0.5  /  DBili  x   /  AST  14  /  ALT  11  /  AlkPhos  93  02-22    [ ] Munguia catheter, indication:   Meds:     HEMATOLOGIC  Meds: enoxaparin Injectable 40 milliGRAM(s) SubCutaneous daily    [x] VTE Prophylaxis                        11.3   10.8  )-----------( 97       ( 23 Feb 2019 03:21 )             33.0     PT/INR - ( 23 Feb 2019 03:21 )   PT: 13.0 sec;   INR: 1.13 ratio      PTT - ( 23 Feb 2019 03:21 )  PTT:22.7 sec  Transfusion     [ ] PRBC   [ ] Platelets   [ ] FFP   [ ] Cryoprecipitate    INFECTIOUS DISEASES  T(C): 36.4 (02-23-19 @ 23:00), Max: 36.6 (02-23-19 @ 15:00)  Wt(kg): --  WBC Count: 10.8 K/uL (02-23 @ 03:21)    Recent Cultures:  Specimen Source: .Body Fluid Synovial Fluid, 02-23 @ 00:31; Results   No growth; Gram Stain:   No polymorphonuclear cells seen  No organisms seen  by cytocentrifuge; Organism: --  Specimen Source: .Tissue Other, right hip synovium, 02-23 @ 00:29; Results   No growth; Gram Stain:   Rare polymorphonuclear leukocytes seen per low power field  No organisms seen per oil power field; Organism: --  Specimen Source: .Tissue Other, right femur, 02-23 @ 00:28; Results   No growth; Gram Stain:   Rare polymorphonuclear leukocytes seen per low power field  No organisms seen per oil power field; Organism: --    Meds: influenza   Vaccine 0.5 milliLiter(s) IntraMuscular once  vancomycin  IVPB 1000 milliGRAM(s) IV Intermittent every 12 hours    ENDOCRINE  Capillary Blood Glucose    Meds: levothyroxine 100 MICROGram(s) Oral daily    ACCESS DEVICES:  [ x] Peripheral IV  [ ] Central Venous Line	[ ] R	[ ] L	[ ] IJ	[ ] Fem	[ ] SC	Placed:   [ ] Arterial Line		[ ] R	[ ] L	[ ] Fem	[ ] Rad	[ ] Ax	Placed: 2/22  [ ] PICC:					[ ] Mediport  [ x] Urinary Catheter, Date Placed: 2/22  [x ] Necessity of urinary, arterial, and venous catheters discussed    OTHER MEDICATIONS:  chlorhexidine 4% Liquid 1 Application(s) Topical <User Schedule>    CODE STATUS: Yes  Yes  Yes  Yes    IMAGING:

## 2019-02-24 NOTE — PROGRESS NOTE ADULT - ASSESSMENT
68 year old female with bipolar depression, OCD, dementia, HLD, NHL (in remission since 2016), osteoarthritis s/p bilateral TKR s/p fall presenting with a right periprosthetic femoral neck fracture and C. difficile colitis, now s/p ORIF of R periprosthetic femur fracture and revision of right hip hemiarthroplasty on 2/22, transferred to SICU intubated, on pressors.    PLAN:  Neuro: bipolar depression, OCD, dementia, acute traumatic pain  - Tylenol, oxycodone  - Dilaudid PRN for pain control  - Continue home bupropion, ziprasidone.    Resp: acute post-operative respiratory failure on mechanical ventilation, b/l pleural effusions  - Extubated 2/23 to face mask, weaned to NC  - Duonebs q 6hours    CV: hypotension  - Intermittently requiring pressors, true @1.2  - Bedside U/S w/o evidence of hypovolemia  - Fluid resuscitate as necessary  - Wean phenylephrine as tolerated to maintain MAP >65  - Arterial line malfunctioning, d/c    GI: C. difficile colitis, complicated by ileus, resolved  - Passed bedside swallow, started on general diet    Renal CKD stage III  - Lasix 40 given PM  - HLIV  - Ezra, monitor I&Os    Heme: no acute issues  - Lovenox for VTE prophylaxis.    ID: C. difficile colitis, resolved  - Monitor WBC, temperature, and procalcitonin.  - IV vancomycin for postoperative prophylaxis as per ortho    Endo: hypothyroidism  - Monitor glucose on BMP  - Continue home synthroid    Disposition: Full code, SICU transfer.

## 2019-02-24 NOTE — PROVIDER CONTACT NOTE (OTHER) - ASSESSMENT
Patient resting in bed, no other acute changes noted. Hemodynamically stable on true, slowly weaning down. Drain outputs as documented.

## 2019-02-24 NOTE — PROGRESS NOTE ADULT - SUBJECTIVE AND OBJECTIVE BOX
Orthopaedic Surgery Progress Note    Subjective:   Patient seen and examined  No acute events overnight  extubated yesterday doing well        PE    NAD  RLE:   compressive dressing C/D/I  JPs intact  motor intact GS/TA/EHL  SILT S/S/SP/DP  WWP          68y Female s/p revision R hip hemiarthroplasty POD 2    - Pain control  - Compressive ace x72 hours  - IV vanco x72 hours until OR Cx negative  - FU ID recs  - DC yanes today  - Prevena vac  - NWB RLE OOBTC  - Posterior dislocation precautions  - PT/OT  - DVT ppx  - care per SICU

## 2019-02-25 LAB
ANION GAP SERPL CALC-SCNC: 10 MMOL/L — SIGNIFICANT CHANGE UP (ref 5–17)
APTT BLD: 23.8 SEC — LOW (ref 27.5–36.3)
BLD GP AB SCN SERPL QL: NEGATIVE — SIGNIFICANT CHANGE UP
BUN SERPL-MCNC: 14 MG/DL — SIGNIFICANT CHANGE UP (ref 7–23)
CALCIUM SERPL-MCNC: 7.5 MG/DL — LOW (ref 8.4–10.5)
CHLORIDE SERPL-SCNC: 98 MMOL/L — SIGNIFICANT CHANGE UP (ref 96–108)
CO2 SERPL-SCNC: 25 MMOL/L — SIGNIFICANT CHANGE UP (ref 22–31)
CREAT SERPL-MCNC: 0.58 MG/DL — SIGNIFICANT CHANGE UP (ref 0.5–1.3)
GLUCOSE SERPL-MCNC: 100 MG/DL — HIGH (ref 70–99)
HCT VFR BLD CALC: 23.4 % — LOW (ref 34.5–45)
HCT VFR BLD CALC: 24.4 % — LOW (ref 34.5–45)
HCT VFR BLD CALC: 26.1 % — LOW (ref 34.5–45)
HGB BLD-MCNC: 8.2 G/DL — LOW (ref 11.5–15.5)
HGB BLD-MCNC: 8.4 G/DL — LOW (ref 11.5–15.5)
HGB BLD-MCNC: 8.8 G/DL — LOW (ref 11.5–15.5)
INR BLD: 1.09 RATIO — SIGNIFICANT CHANGE UP (ref 0.88–1.16)
MAGNESIUM SERPL-MCNC: 1.7 MG/DL — SIGNIFICANT CHANGE UP (ref 1.6–2.6)
MCHC RBC-ENTMCNC: 32.1 PG — SIGNIFICANT CHANGE UP (ref 27–34)
MCHC RBC-ENTMCNC: 32.9 PG — SIGNIFICANT CHANGE UP (ref 27–34)
MCHC RBC-ENTMCNC: 33.5 GM/DL — SIGNIFICANT CHANGE UP (ref 32–36)
MCHC RBC-ENTMCNC: 33.6 PG — SIGNIFICANT CHANGE UP (ref 27–34)
MCHC RBC-ENTMCNC: 34.2 GM/DL — SIGNIFICANT CHANGE UP (ref 32–36)
MCHC RBC-ENTMCNC: 35.1 GM/DL — SIGNIFICANT CHANGE UP (ref 32–36)
MCV RBC AUTO: 95.7 FL — SIGNIFICANT CHANGE UP (ref 80–100)
MCV RBC AUTO: 95.8 FL — SIGNIFICANT CHANGE UP (ref 80–100)
MCV RBC AUTO: 96.1 FL — SIGNIFICANT CHANGE UP (ref 80–100)
PHOSPHATE SERPL-MCNC: 2.7 MG/DL — SIGNIFICANT CHANGE UP (ref 2.5–4.5)
PLATELET # BLD AUTO: 76 K/UL — LOW (ref 150–400)
PLATELET # BLD AUTO: 77 K/UL — LOW (ref 150–400)
PLATELET # BLD AUTO: 88 K/UL — LOW (ref 150–400)
POTASSIUM SERPL-MCNC: 3.4 MMOL/L — LOW (ref 3.5–5.3)
POTASSIUM SERPL-SCNC: 3.4 MMOL/L — LOW (ref 3.5–5.3)
PROTHROM AB SERPL-ACNC: 12.5 SEC — SIGNIFICANT CHANGE UP (ref 10–12.9)
RBC # BLD: 2.45 M/UL — LOW (ref 3.8–5.2)
RBC # BLD: 2.54 M/UL — LOW (ref 3.8–5.2)
RBC # BLD: 2.73 M/UL — LOW (ref 3.8–5.2)
RBC # FLD: 19 % — HIGH (ref 10.3–14.5)
RBC # FLD: 19.2 % — HIGH (ref 10.3–14.5)
RBC # FLD: 19.4 % — HIGH (ref 10.3–14.5)
RH IG SCN BLD-IMP: POSITIVE — SIGNIFICANT CHANGE UP
SODIUM SERPL-SCNC: 133 MMOL/L — LOW (ref 135–145)
VANCOMYCIN FLD-MCNC: 27.4 UG/ML
WBC # BLD: 7.2 K/UL — SIGNIFICANT CHANGE UP (ref 3.8–10.5)
WBC # BLD: 7.8 K/UL — SIGNIFICANT CHANGE UP (ref 3.8–10.5)
WBC # BLD: 8 K/UL — SIGNIFICANT CHANGE UP (ref 3.8–10.5)
WBC # FLD AUTO: 7.2 K/UL — SIGNIFICANT CHANGE UP (ref 3.8–10.5)
WBC # FLD AUTO: 7.8 K/UL — SIGNIFICANT CHANGE UP (ref 3.8–10.5)
WBC # FLD AUTO: 8 K/UL — SIGNIFICANT CHANGE UP (ref 3.8–10.5)

## 2019-02-25 PROCEDURE — 99232 SBSQ HOSP IP/OBS MODERATE 35: CPT

## 2019-02-25 PROCEDURE — 99291 CRITICAL CARE FIRST HOUR: CPT

## 2019-02-25 PROCEDURE — 93010 ELECTROCARDIOGRAM REPORT: CPT

## 2019-02-25 PROCEDURE — 71045 X-RAY EXAM CHEST 1 VIEW: CPT | Mod: 26

## 2019-02-25 RX ORDER — POTASSIUM PHOSPHATE, MONOBASIC POTASSIUM PHOSPHATE, DIBASIC 236; 224 MG/ML; MG/ML
15 INJECTION, SOLUTION INTRAVENOUS ONCE
Qty: 0 | Refills: 0 | Status: COMPLETED | OUTPATIENT
Start: 2019-02-25 | End: 2019-02-25

## 2019-02-25 RX ORDER — MAGNESIUM SULFATE 500 MG/ML
2 VIAL (ML) INJECTION ONCE
Qty: 0 | Refills: 0 | Status: COMPLETED | OUTPATIENT
Start: 2019-02-25 | End: 2019-02-25

## 2019-02-25 RX ORDER — POTASSIUM CHLORIDE 20 MEQ
10 PACKET (EA) ORAL
Qty: 0 | Refills: 0 | Status: COMPLETED | OUTPATIENT
Start: 2019-02-25 | End: 2019-02-25

## 2019-02-25 RX ADMIN — OXYCODONE HYDROCHLORIDE 5 MILLIGRAM(S): 5 TABLET ORAL at 17:30

## 2019-02-25 RX ADMIN — OXYCODONE HYDROCHLORIDE 5 MILLIGRAM(S): 5 TABLET ORAL at 22:02

## 2019-02-25 RX ADMIN — OXYCODONE HYDROCHLORIDE 5 MILLIGRAM(S): 5 TABLET ORAL at 10:23

## 2019-02-25 RX ADMIN — Medication 100 MILLIGRAM(S): at 05:23

## 2019-02-25 RX ADMIN — Medication 100 MILLIEQUIVALENT(S): at 06:52

## 2019-02-25 RX ADMIN — PHENYLEPHRINE HYDROCHLORIDE 9.16 MICROGRAM(S)/KG/MIN: 10 INJECTION INTRAVENOUS at 21:31

## 2019-02-25 RX ADMIN — Medication 100 MILLIGRAM(S): at 21:31

## 2019-02-25 RX ADMIN — Medication 100 MILLIEQUIVALENT(S): at 08:14

## 2019-02-25 RX ADMIN — ZIPRASIDONE HYDROCHLORIDE 40 MILLIGRAM(S): 20 CAPSULE ORAL at 06:55

## 2019-02-25 RX ADMIN — Medication 100 MICROGRAM(S): at 05:25

## 2019-02-25 RX ADMIN — Medication 100 MILLIEQUIVALENT(S): at 04:28

## 2019-02-25 RX ADMIN — ENOXAPARIN SODIUM 40 MILLIGRAM(S): 100 INJECTION SUBCUTANEOUS at 12:02

## 2019-02-25 RX ADMIN — Medication 125 MILLIGRAM(S): at 15:08

## 2019-02-25 RX ADMIN — BUPROPION HYDROCHLORIDE 300 MILLIGRAM(S): 150 TABLET, EXTENDED RELEASE ORAL at 12:02

## 2019-02-25 RX ADMIN — OXYCODONE HYDROCHLORIDE 5 MILLIGRAM(S): 5 TABLET ORAL at 21:32

## 2019-02-25 RX ADMIN — Medication 125 MILLIGRAM(S): at 10:23

## 2019-02-25 RX ADMIN — OXYCODONE HYDROCHLORIDE 5 MILLIGRAM(S): 5 TABLET ORAL at 08:14

## 2019-02-25 RX ADMIN — Medication 125 MILLIGRAM(S): at 23:18

## 2019-02-25 RX ADMIN — POTASSIUM PHOSPHATE, MONOBASIC POTASSIUM PHOSPHATE, DIBASIC 62.5 MILLIMOLE(S): 236; 224 INJECTION, SOLUTION INTRAVENOUS at 10:00

## 2019-02-25 RX ADMIN — Medication 50 GRAM(S): at 04:26

## 2019-02-25 RX ADMIN — OXYCODONE HYDROCHLORIDE 5 MILLIGRAM(S): 5 TABLET ORAL at 17:02

## 2019-02-25 RX ADMIN — CHLORHEXIDINE GLUCONATE 1 APPLICATION(S): 213 SOLUTION TOPICAL at 05:24

## 2019-02-25 RX ADMIN — Medication 100 MILLIGRAM(S): at 13:05

## 2019-02-25 RX ADMIN — Medication 125 MILLIGRAM(S): at 04:25

## 2019-02-25 RX ADMIN — SENNA PLUS 2 TABLET(S): 8.6 TABLET ORAL at 21:31

## 2019-02-25 NOTE — PROGRESS NOTE ADULT - SUBJECTIVE AND OBJECTIVE BOX
HISTORY  68y Female with a past medical history of bipolar depression, OCD, dementia, HLD, NHL (in remission since 2016), osteoarthritis s/p bilateral TKR, and recent right YONATAN (1/20-1/26)  who presented on 2/4 as a level two trauma after a fall. Patient was found down in rehab after likely rolling out of bed. Patient was upgraded to a level 1 due to hypotension w/ SBP in the 80s despite fluid resuscitation and transfusions. Imaging revealed pancolitis and a right periprosthetic femoral neck fracture. Patient tested for C. difficile and was positive. She was admitted to SICU for hemodynamic monitoring in the setting of sepsis. Pt eventually transferred to floor on 2/14/19.    Pt taken to OR with orthopedics on 2/22/19 for ORIF of right periprosthetic femur fracture and revision of right hemiarthroplasty. EBL 500ml, IVF 1750ml, 1u PRBC. Pt required phenylephrine throughout the case and was 1.3 mcg/kg/min at the end of the case. For this reason she was kept intubated and transferred to PACU. SICU consulted for management of ventilator and pressors.        24 HOUR EVENTS: Patient received 2 boluses of 5 % albumin during the day after bedside echocardiogram demonstrated evidence of hypovolemia. Devon requirement improved after boluses as well as urine output. An additional 250 ml of 5% albumin given in the evening for a total of 750ml of 5% albumin. Hct drop noted on am labs from 29 to 23, otherwise lactate WNL and devon requirements decreased to 0.7. Will continue to trend CBC.     SUBJECTIVE/ROS:  [ ] A ten-point review of systems was otherwise negative except as noted.  [ ] Due to altered mental status/intubation, subjective information were not able to be obtained from the patient. History was obtained, to the extent possible, from review of the chart and collateral sources of information.      NEURO  Exam: awake, alert, orientedx2  Meds: acetaminophen   Tablet .. 975 milliGRAM(s) Oral every 6 hours PRN Mild Pain (1 - 3)  buPROPion XL . 300 milliGRAM(s) Oral daily  oxyCODONE    IR 5 milliGRAM(s) Oral every 4 hours PRN Moderate Pain (4 - 6)  ziprasidone 40 milliGRAM(s) Oral <User Schedule>    [x] Adequacy of sedation and pain control has been assessed and adjusted      RESPIRATORY  RR: 18 (02-25-19 @ 02:30) (13 - 34)  SpO2: 95% (02-25-19 @ 02:30) (82% - 100%)  Exam: unlabored, clear to auscultation bilaterally  Mechanical Ventilation: none  ABG - ( 23 Feb 2019 08:38 )  pH: 7.47  /  pCO2: 38    /  pO2: 126   / HCO3: 27    / Base Excess: 3.7   /  SaO2: 99      Lactate: x      [N/A] Extubation Readiness Assessed  Meds: ALBUTerol/ipratropium for Nebulization 3 milliLiter(s) Nebulizer every 6 hours PRN Shortness of Breath and/or Wheezing        CARDIOVASCULAR  HR: 104 (02-25-19 @ 02:30) (90 - 105)  BP: 95/54 (02-25-19 @ 02:15) (82/47 - 123/54)  BP(mean): 71 (02-25-19 @ 02:15) (59 - 78)  Exam: regular rate and rhythm  Cardiac Rhythm: sinus  Perfusion     [x]Adequate   [ ]Inadequate  Mentation   [x]Normal       [ ]Reduced  Extremities  [x]Warm         [ ]Cool  Volume Status [ ]Hypervolemic [x]Euvolemic [ ]Hypovolemic  Meds: phenylephrine    Infusion 0.4 MICROgram(s)/kG/Min IV Continuous <Continuous>        GI/NUTRITION  Exam: soft, nontender, nondistended  Diet: Regular diet   Meds: docusate sodium 100 milliGRAM(s) Oral three times a day  senna 2 Tablet(s) Oral at bedtime      GENITOURINARY  I&O's Detail    02-23 @ 07:01  -  02-24 @ 07:00  --------------------------------------------------------  IN:    lactated ringers.: 1500 mL    Oral Fluid: 780 mL    phenylephrine   Infusion: 104.9 mL    phenylephrine   Infusion: 238.5 mL    Solution: 100 mL    Solution: 600 mL  Total IN: 3323.4 mL    OUT:    Bulb: 15 mL    Bulb: 120 mL    Indwelling Catheter - Urethral: 1218 mL  Total OUT: 1353 mL    Total NET: 1970.4 mL      02-24 @ 07:01  -  02-25 @ 04:11  --------------------------------------------------------  IN:    Oral Fluid: 850 mL    phenylephrine   Infusion: 531.7 mL    sodium chloride 0.9%.: 600 mL    Solution: 750 mL  Total IN: 2731.7 mL    OUT:    Bulb: 40 mL    Bulb: 5 mL    Indwelling Catheter - Urethral: 670 mL  Total OUT: 715 mL    Total NET: 2016.7 mL          02-25    133<L>  |  98  |  14  ----------------------------<  100<H>  3.4<L>   |  25  |  0.58    Ca    7.5<L>      25 Feb 2019 03:19  Phos  2.7     02-25  Mg     1.7     02-25    TPro  3.8<L>  /  Alb  1.7<L>  /  TBili  0.5  /  DBili  x   /  AST  25  /  ALT  14  /  AlkPhos  111  02-24    [x] Munguia catheter, indication: urine output monitoring in critically ill   Meds: magnesium sulfate  IVPB 2 Gram(s) IV Intermittent once  potassium chloride  10 mEq/100 mL IVPB 10 milliEquivalent(s) IV Intermittent every 1 hour  potassium phosphate IVPB 15 milliMole(s) IV Intermittent once  sodium chloride 0.9%. 1000 milliLiter(s) IV Continuous <Continuous>        HEMATOLOGIC  Meds: enoxaparin Injectable 40 milliGRAM(s) SubCutaneous daily    [x] VTE Prophylaxis                        8.2    7.2   )-----------( 76       ( 25 Feb 2019 03:19 )             23.4       Transfusion     [ ] PRBC   [ ] Platelets   [ ] FFP   [ ] Cryoprecipitate      INFECTIOUS DISEASES  WBC Count: 7.2 K/uL (02-25 @ 03:19)    RECENT CULTURES:  Specimen Source: .Body Fluid Synovial Fluid  Date/Time: 02-23 @ 00:31  Culture Results:   No growth  Gram Stain:   No polymorphonuclear cells seen  No organisms seen  by cytocentrifuge  Organism: --  Specimen Source: .Tissue Other, right hip synovium  Date/Time: 02-23 @ 00:29  Culture Results:   No growth  Gram Stain:   Rare polymorphonuclear leukocytes seen per low power field  No organisms seen per oil power field  Organism: --  Specimen Source: .Tissue Other, right femur  Date/Time: 02-23 @ 00:28  Culture Results:   No growth  Gram Stain:   Rare polymorphonuclear leukocytes seen per low power field  No organisms seen per oil power field  Organism: --    Meds: influenza   Vaccine 0.5 milliLiter(s) IntraMuscular once  vancomycin    Solution 125 milliGRAM(s) Oral every 6 hours        ENDOCRINE  CAPILLARY BLOOD GLUCOSE        Meds: levothyroxine 100 MICROGram(s) Oral daily        ACCESS DEVICES:  [x] Peripheral IV  [ ] Central Venous Line	[ ] R	[ ] L	[ ] IJ	[ ] Fem	[ ] SC	Placed:   [ ] Arterial Line		[ ] R	[ ] L	[ ] Fem	[ ] Rad	[ ] Ax	Placed:   [ ] PICC:					[ ] Mediport  [ ] Urinary Catheter, Date Placed:   [x] Necessity of urinary, arterial, and venous catheters discussed    OTHER MEDICATIONS:  chlorhexidine 4% Liquid 1 Application(s) Topical <User Schedule>      CODE STATUS: DNR     IMAGING: < from: CT 3D Reconstruct w/ Workstation (02.21.19 @ 18:11) >  Impression:    Status post right hip hemiarthroplasty with an acute periprosthetic   proximal femoral fracture. The degree of displacement of fracture   fragments is progressed since the prior CT and involvement of the   posterior medial cortex is new since the prior CT.    Findings consistent with colitis, as seen on the prior examination.            < end of copied text >

## 2019-02-25 NOTE — CHART NOTE - NSCHARTNOTEFT_GEN_A_CORE
parasternal long axis view showed LV full did not appear hyperdynamic. Apical 4 chamber difficult to assess LV function, but overall did not appear hypovolemic. Unable to obtain subxiphoid view.

## 2019-02-25 NOTE — PROGRESS NOTE ADULT - SUBJECTIVE AND OBJECTIVE BOX
Patient is a 68y old  Female who presents with a chief complaint of Fractured hip (24 Feb 2019 08:31)    Being followed by ID for C diff    Interval history:extubated  sitting OOB in chair  Some R leg pain but controlled  No acute events      ROS:  No cough,SOB,CP  No N/V/D./abd pain  No other complaints      Antimicrobials:    vancomycin    Solution 125 milliGRAM(s) Oral every 6 hours    Other medications reviewed    Vital Signs Last 24 Hrs  T(C): 36.8 (02-25-19 @ 07:00), Max: 37.1 (02-25-19 @ 03:00)  T(F): 98.2 (02-25-19 @ 07:00), Max: 98.7 (02-25-19 @ 03:00)  HR: 103 (02-25-19 @ 11:45) (93 - 106)  BP: 84/47 (02-25-19 @ 11:45) (79/41 - 123/54)  BP(mean): 62 (02-25-19 @ 11:45) (57 - 78)  RR: 22 (02-25-19 @ 11:45) (13 - 41)  SpO2: 95% (02-25-19 @ 11:45) (86% - 100%)    Physical Exam:        HEENT PERRLA EOMI    No oral exudate or erythema    Chest Good AE,CTA    CVS RRR S1 S2 WNl No murmur or rub or gallop    Abd soft BS normal No tenderness no masses      R leg dressing with DION  serosanguinous fluid in DION     IV site no erythema tenderness or discharge    CNS AAO X 3 no focal    Lab Data:                          8.8    8.0   )-----------( 88       ( 25 Feb 2019 06:52 )             26.1       02-25    133<L>  |  98  |  14  ----------------------------<  100<H>  3.4<L>   |  25  |  0.58    Ca    7.5<L>      25 Feb 2019 03:19  Phos  2.7     02-25  Mg     1.7     02-25    TPro  3.8<L>  /  Alb  1.7<L>  /  TBili  0.5  /  DBili  x   /  AST  25  /  ALT  14  /  AlkPhos  111  02-24        Culture - Body Fluid with Gram Stain (collected 23 Feb 2019 00:31)  Source: .Body Fluid Synovial Fluid  Gram Stain (23 Feb 2019 04:35):    No polymorphonuclear cells seen    No organisms seen    by cytocentrifuge  Preliminary Report (23 Feb 2019 18:19):    No growth    Culture - Fungal, Body Fluid (collected 23 Feb 2019 00:31)  Source: .Body Fluid right hip synovial fluid  Preliminary Report (25 Feb 2019 10:52):    Testing in progress    Culture - Acid Fast - Body Fluid w/Smear (collected 23 Feb 2019 00:31)  Source: .Body Fluid Synovial Fluid    Culture - Tissue with Gram Stain (collected 23 Feb 2019 00:29)  Source: .Tissue Other, right hip synovium  Gram Stain (23 Feb 2019 03:48):    Rare polymorphonuclear leukocytes seen per low power field    No organisms seen per oil power field  Preliminary Report (23 Feb 2019 20:56):    No growth    Culture - Fungal, Tissue (collected 23 Feb 2019 00:29)  Source: .Tissue right hip synovium  Preliminary Report (25 Feb 2019 10:55):    Testing in progress    Culture - Acid Fast - Tissue w/Smear (collected 23 Feb 2019 00:29)  Source: .Tissue Other, right hip synovium    Culture - Acid Fast - Tissue w/Smear (collected 23 Feb 2019 00:28)  Source: .Tissue Other, right femur    Culture - Fungal, Tissue (collected 23 Feb 2019 00:28)  Source: .Tissue right femur  Preliminary Report (25 Feb 2019 10:55):    Testing in progress    Culture - Tissue with Gram Stain (collected 23 Feb 2019 00:28)  Source: .Tissue Other, right femur  Gram Stain (23 Feb 2019 04:00):    Rare polymorphonuclear leukocytes seen per low power field    No organisms seen per oil power field  Preliminary Report (23 Feb 2019 22:29):    No growth            Vancomycin Level, Random: 27.4 ug/mL (02-25-19 @ 03:19)  Vancomycin Level, Trough: 26.4 ug/mL (02-24-19 @ 16:06)          < from: Xray Chest 1 View- PORTABLE-Routine (02.25.19 @ 06:56) >    Impression:    The heart is normal in size. Bilateral pleural effusion. This remain   unchanged when compared to previous study done February 24, 2019.                  < end of copied text >

## 2019-02-25 NOTE — PROGRESS NOTE ADULT - SUBJECTIVE AND OBJECTIVE BOX
Orthopedic Surgery Progress Note     S: Patient seen and examined today. No acute events overnight. Pain is well controlled. Remains on low dose pressors    MEDICATIONS  (STANDING):  buPROPion XL . 300 milliGRAM(s) Oral daily  chlorhexidine 4% Liquid 1 Application(s) Topical <User Schedule>  docusate sodium 100 milliGRAM(s) Oral three times a day  enoxaparin Injectable 40 milliGRAM(s) SubCutaneous daily  influenza   Vaccine 0.5 milliLiter(s) IntraMuscular once  levothyroxine 100 MICROGram(s) Oral daily  phenylephrine    Infusion 0.4 MICROgram(s)/kG/Min (9.165 mL/Hr) IV Continuous <Continuous>  potassium chloride  10 mEq/100 mL IVPB 10 milliEquivalent(s) IV Intermittent every 1 hour  potassium phosphate IVPB 15 milliMole(s) IV Intermittent once  senna 2 Tablet(s) Oral at bedtime  sodium chloride 0.9%. 1000 milliLiter(s) (50 mL/Hr) IV Continuous <Continuous>  vancomycin    Solution 125 milliGRAM(s) Oral every 6 hours  ziprasidone 40 milliGRAM(s) Oral <User Schedule>    MEDICATIONS  (PRN):  acetaminophen   Tablet .. 975 milliGRAM(s) Oral every 6 hours PRN Mild Pain (1 - 3)  ALBUTerol/ipratropium for Nebulization 3 milliLiter(s) Nebulizer every 6 hours PRN Shortness of Breath and/or Wheezing  oxyCODONE    IR 5 milliGRAM(s) Oral every 4 hours PRN Moderate Pain (4 - 6)      Physical Exam:    Vital Signs Last 24 Hrs  T(C): 37.1 (25 Feb 2019 03:00), Max: 37.1 (25 Feb 2019 03:00)  T(F): 98.7 (25 Feb 2019 03:00), Max: 98.7 (25 Feb 2019 03:00)  HR: 105 (25 Feb 2019 05:00) (90 - 106)  BP: 91/47 (25 Feb 2019 05:00) (82/47 - 123/54)  BP(mean): 72 (25 Feb 2019 04:45) (59 - 78)  RR: 41 (25 Feb 2019 05:00) (13 - 41)  SpO2: 89% (25 Feb 2019 05:00) (82% - 100%)    02-23-19 @ 07:01  -  02-24-19 @ 07:00  --------------------------------------------------------  IN: 3323.4 mL / OUT: 1353 mL / NET: 1970.4 mL    02-24-19 @ 07:01  -  02-25-19 @ 06:22  --------------------------------------------------------  IN: 2986.6 mL / OUT: 966 mL / NET: 2020.6 mL      Gen: awake, alert, NAD  Resp: no increase work of breathing  RLE  compressive dressing C/D/I  edema to RLE  JPs intact  motor intact GS/TA/EHL  SILT S/S/SP/DP  + dopplerable DP        LABS:                        8.2    7.2   )-----------( 76       ( 25 Feb 2019 03:19 )             23.4     02-25    133<L>  |  98  |  14  ----------------------------<  100<H>  3.4<L>   |  25  |  0.58    Ca    7.5<L>      25 Feb 2019 03:19  Phos  2.7     02-25  Mg     1.7     02-25    TPro  3.8<L>  /  Alb  1.7<L>  /  TBili  0.5  /  DBili  x   /  AST  25  /  ALT  14  /  AlkPhos  111  02-24

## 2019-02-25 NOTE — PROGRESS NOTE ADULT - ATTENDING COMMENTS
Right periprosthetic femur fracture. s/p surgical intervention - currently improving from C. Diff colitis and may soon proceed with surgery  Pain meds as needed orally  as tolerated. I am a non participating [employer] physician seeing Pt in coverage for Dr Barraza Right periprosthetic femur fracture. s/p surgical intervention - currently improving from C. Diff colitis and may soon proceed with surgery  Pain meds as needed orally  as tolerated. I am a non participating [facility] physician seeing Pt in coverage for Dr Barraza. The above patient examination was reviewed with Dr. Awan and I agree with his evaluation, assessment and treatment plan.  Orlin Barraza M.D. Right periprosthetic femur fracture. s/p surgical intervention - currently improving from C. Diff colitis Pain meds as needed orally  as tolerated. I am a non participating [facility] physician seeing Pt in coverage for Dr Barraza. The above patient examination was reviewed with Dr. Awan and I agree with his evaluation, assessment and treatment plan.  Orlin Barraza M.D.

## 2019-02-25 NOTE — CHART NOTE - NSCHARTNOTEFT_GEN_A_CORE
Upon Nutritional Assessment by the Registered Dietitian your patient was determined to meet criteria / has evidence of the following diagnosis/diagnoses:          [ ]  Mild Protein Calorie Malnutrition        [X ]  Moderate Protein Calorie Malnutrition        [ ] Severe Protein Calorie Malnutrition        [ ] Unspecified Protein Calorie Malnutrition        [ ] Underweight / BMI <19        [ ] Morbid Obesity / BMI > 40      Findings as based on:  [X ] Comprehensive nutrition assessment   [ ] Nutrition Focused Physical Exam  [X ] Other: prolonged NPO status followed by inadequate PO intake >7 days, 3+ generalized edema and mild temporal wasting        Nutrition Plan/Recommendations:    1) Continue regular diet + Mighty Shakes (200 calories, 6 Gm protein), and Magic Cup supplement (290 calories, 9 Gm protein)   2) Add 2 Ensure Enlive (provides 350cal, 20Gm protein per 8oz serving) daily      PROVIDER Section:    By signing this assessment you are acknowledging and agree with the diagnosis/diagnoses assigned by the Registered Dietitian    Comments:

## 2019-02-25 NOTE — PROGRESS NOTE ADULT - ATTENDING COMMENTS
Pt seen and examined on 2/25, agree with above.  s/p ORIF for right femur fracture    1. Postoperative hypotension on neosynephrine gtt- will continue to monitor  and give 1 unit PRBC  -keep MAP goal > 65 mmHg and wean as tolerated    2. Postoperative blood loss anemia- Hct 29--> 23--> 26  -will continue to monitor closely     3) Vancomycin trough - 27- will hold vanc    CC time: 45 min

## 2019-02-25 NOTE — CHART NOTE - NSCHARTNOTEFT_GEN_A_CORE
Nutrition Follow Up Note    Patient seen for Nutrition Follow Up. 68 year old female with bipolar depression, OCD, dementia, HLD, NHL (in remission since 2016), osteoarthritis s/p bilateral TKR s/p fall presenting with a right periprosthetic femoral neck fracture and C. difficile colitis, now s/p ORIF of R periprosthetic femur fracture and revision of right hip hemiarthroplasty on , transferred to SICU intubated, on pressors. Now extubated     Source: EMR, RN, PCA, Past RD Notes, patient     Diet : Regular    Patient reports:     PO intake :     Source for PO intake:        Daily Weight in k.6 (), Weight in k (-), Weight in k.4 ()  40 lb (29%) Weight gain since admission wt of 135 lbs, likely due to fluid shifts and edema    Drug Dosing Weight  Weight (kg): 61.1 (2019 09:59)  BMI (kg/m2): 25.5 (2019 09:59)    Pertinent Medications: MEDICATIONS  (STANDING):  buPROPion XL . 300 milliGRAM(s) Oral daily  chlorhexidine 4% Liquid 1 Application(s) Topical <User Schedule>  docusate sodium 100 milliGRAM(s) Oral three times a day  enoxaparin Injectable 40 milliGRAM(s) SubCutaneous daily  influenza   Vaccine 0.5 milliLiter(s) IntraMuscular once  levothyroxine 100 MICROGram(s) Oral daily  phenylephrine    Infusion 0.4 MICROgram(s)/kG/Min (9.165 mL/Hr) IV Continuous <Continuous>  senna 2 Tablet(s) Oral at bedtime  sodium chloride 0.9%. 1000 milliLiter(s) (50 mL/Hr) IV Continuous <Continuous>  vancomycin    Solution 125 milliGRAM(s) Oral every 6 hours  ziprasidone 40 milliGRAM(s) Oral <User Schedule>    MEDICATIONS  (PRN):  acetaminophen   Tablet .. 975 milliGRAM(s) Oral every 6 hours PRN Mild Pain (1 - 3)  ALBUTerol/ipratropium for Nebulization 3 milliLiter(s) Nebulizer every 6 hours PRN Shortness of Breath and/or Wheezing  oxyCODONE    IR 5 milliGRAM(s) Oral every 4 hours PRN Moderate Pain (4 - 6)    Pertinent Labs:  @ 03:19: Na 133<L>, BUN 14, Cr 0.58, <H>, K+ 3.4<L>, Phos 2.7, Mg 1.7, Alk Phos --, ALT/SGPT --, AST/SGOT --, HbA1c --      Skin per nursing documentation: intact   Edema: +3 generalized edema, +4 edema in right foot    Estimated Needs:   [ X] no change since previous assessment  [ ] recalculated:     Previous Nutrition Diagnosis: Inadequate Protein-Energy Intake  Nutrition Diagnosis is: ongoing, being addressed with advanced diet and supplements      Interventions:     Recommend  1) Continue regular diet as tolerated  2) Continue Mighty shakes and magic cup   3) Encourage PO intake      Monitoring and Evaluation:     Continue to monitor Nutritional intake, Tolerance to diet prescription, weights, labs, skin integrity    RD remains available upon request and will follow up per protocol Nutrition Follow Up Note    Patient seen for Nutrition Follow Up. 68 year old female with bipolar depression, OCD, dementia, HLD, NHL (in remission since 2016), osteoarthritis s/p bilateral TKR s/p fall presenting with a right periprosthetic femoral neck fracture and C. difficile colitis, now s/p ORIF of R periprosthetic femur fracture and revision of right hip hemiarthroplasty on , transferred to SICU intubated, on pressors. Now extubated     Source: EMR, RN, PCA, Past RD Notes, patient     Diet : Regular    Patient still with poor appetite. As per PCA, pt taking a few bites of her meals      PO intake :     Source for PO intake:        Daily Weight in k.6 (-), Weight in k (-), Weight in k.4 (-23)  40 lb (29%) Weight gain since admission wt of 135 lbs, likely due to fluid shifts and edema    Drug Dosing Weight  Weight (kg): 61.1 (2019 09:59)  BMI (kg/m2): 25.5 (2019 09:59)    Pertinent Medications: MEDICATIONS  (STANDING):  buPROPion XL . 300 milliGRAM(s) Oral daily  chlorhexidine 4% Liquid 1 Application(s) Topical <User Schedule>  docusate sodium 100 milliGRAM(s) Oral three times a day  enoxaparin Injectable 40 milliGRAM(s) SubCutaneous daily  influenza   Vaccine 0.5 milliLiter(s) IntraMuscular once  levothyroxine 100 MICROGram(s) Oral daily  phenylephrine    Infusion 0.4 MICROgram(s)/kG/Min (9.165 mL/Hr) IV Continuous <Continuous>  senna 2 Tablet(s) Oral at bedtime  sodium chloride 0.9%. 1000 milliLiter(s) (50 mL/Hr) IV Continuous <Continuous>  vancomycin    Solution 125 milliGRAM(s) Oral every 6 hours  ziprasidone 40 milliGRAM(s) Oral <User Schedule>    MEDICATIONS  (PRN):  acetaminophen   Tablet .. 975 milliGRAM(s) Oral every 6 hours PRN Mild Pain (1 - 3)  ALBUTerol/ipratropium for Nebulization 3 milliLiter(s) Nebulizer every 6 hours PRN Shortness of Breath and/or Wheezing  oxyCODONE    IR 5 milliGRAM(s) Oral every 4 hours PRN Moderate Pain (4 - 6)    Pertinent Labs:  @ 03:19: Na 133<L>, BUN 14, Cr 0.58, <H>, K+ 3.4<L>, Phos 2.7, Mg 1.7, Alk Phos --, ALT/SGPT --, AST/SGOT --, HbA1c --      Skin per nursing documentation: intact   Edema: +3 generalized edema, +4 edema in right foot    Estimated Needs:   [ X] no change since previous assessment  [ ] recalculated:     Previous Nutrition Diagnosis: Inadequate Protein-Energy Intake  Nutrition Diagnosis is: ongoing, being addressed with advanced diet and supplements      Interventions:     Recommend  1) Continue regular diet as tolerated  2) Continue Mighty shakes and magic cup   3) Encourage PO intake      Monitoring and Evaluation:     Continue to monitor Nutritional intake, Tolerance to diet prescription, weights, labs, skin integrity    RD remains available upon request and will follow up per protocol Nutrition Follow Up Note    Patient seen for Nutrition Follow Up. 68 year old female with bipolar depression, OCD, dementia, HLD, NHL (in remission since 2016), osteoarthritis s/p bilateral TKR s/p fall presenting with a right periprosthetic femoral neck fracture and C. difficile colitis, now s/p ORIF of R periprosthetic femur fracture and revision of right hip hemiarthroplasty on , transferred to SICU intubated, on pressors. Now extubated     Source: EMR, RN, PCA, Past RD Notes, patient     Diet : Regular    Pt noted as confused, however able to recall she has not been eating well. RN and PCA confirms pt still with poor appetite. As per PCA, pt taking a few bites of her meals. Pt states she likes the magic cup and mighty shakes. Pt has no c/o nausea/ vomiting or constipation or diarrhea. Last BM yesterday.      PO intake : >50%     Source for PO intake: RN, PCA      Daily Weight in k.6 (-), Weight in k (-), Weight in k.4 (-23)  40 lb (29%) Weight gain since admission wt of 135 lbs, likely due to fluid shifts and edema    Drug Dosing Weight  Weight (kg): 61.1 (2019 09:59)  BMI (kg/m2): 25.5 (2019 09:59)    Pertinent Medications: MEDICATIONS  (STANDING):  buPROPion XL . 300 milliGRAM(s) Oral daily  chlorhexidine 4% Liquid 1 Application(s) Topical <User Schedule>  docusate sodium 100 milliGRAM(s) Oral three times a day  enoxaparin Injectable 40 milliGRAM(s) SubCutaneous daily  influenza   Vaccine 0.5 milliLiter(s) IntraMuscular once  levothyroxine 100 MICROGram(s) Oral daily  phenylephrine    Infusion 0.4 MICROgram(s)/kG/Min (9.165 mL/Hr) IV Continuous <Continuous>  senna 2 Tablet(s) Oral at bedtime  sodium chloride 0.9%. 1000 milliLiter(s) (50 mL/Hr) IV Continuous <Continuous>  vancomycin    Solution 125 milliGRAM(s) Oral every 6 hours  ziprasidone 40 milliGRAM(s) Oral <User Schedule>    MEDICATIONS  (PRN):  acetaminophen   Tablet .. 975 milliGRAM(s) Oral every 6 hours PRN Mild Pain (1 - 3)  ALBUTerol/ipratropium for Nebulization 3 milliLiter(s) Nebulizer every 6 hours PRN Shortness of Breath and/or Wheezing  oxyCODONE    IR 5 milliGRAM(s) Oral every 4 hours PRN Moderate Pain (4 - 6)    Pertinent Labs:  @ 03:19: Na 133<L>, BUN 14, Cr 0.58, <H>, K+ 3.4<L>, Phos 2.7, Mg 1.7, Alk Phos --, ALT/SGPT --, AST/SGOT --, HbA1c --      Skin per nursing documentation: intact   Edema: +3 generalized edema, +4 edema in right foot    Estimated Needs:   [ X] no change since previous assessment  [ ] recalculated:     Previous Nutrition Diagnosis: Inadequate Protein-Energy Intake  Nutrition Diagnosis is: ongoing, being addressed with advanced diet and supplements      Interventions:     Recommend  1) Continue regular diet as tolerated  2) Continue Mighty Shakes (200 calories, 6 Gm protein), and Magic Cup supplement (290 calories, 9 Gm protein) added to meal trays to encourage greater protein-calorie intake.  3) Encourage PO intake, provide total feeding assistance       Monitoring and Evaluation:     Continue to monitor Nutritional intake, Tolerance to diet prescription, weights, labs, skin integrity    RD remains available upon request and will follow up per protocol Nutrition Follow Up Note    Patient seen for Nutrition Follow Up. 68 year old female with bipolar depression, OCD, dementia, HLD, NHL (in remission since 2016), osteoarthritis s/p bilateral TKR s/p fall presenting with a right periprosthetic femoral neck fracture and C. difficile colitis, now s/p ORIF of R periprosthetic femur fracture and revision of right hip hemiarthroplasty on , transferred to SICU intubated, on pressors. Now extubated     Source: EMR, RN, PCA, Past RD Notes, patient     Diet : Regular    Pt noted as confused, however able to recall she has not been eating well. RN and PCA confirms pt still with poor appetite. As per PCA, pt taking a few bites of her meals. Pt states she likes the magic cup and mighty shakes. Pt has no c/o nausea/ vomiting or constipation or diarrhea. Last BM yesterday.      PO intake : <50%     Source for PO intake: RN, PCA, patient     Daily Weight in k.6 (-), Weight in k (-), Weight in k.4 (-23)  40 lb (29%) Weight gain since admission wt of 135 lbs, likely due to fluid shifts and edema    Drug Dosing Weight  Weight (kg): 61.1 (2019 09:59)  BMI (kg/m2): 25.5 (2019 09:59)    Pertinent Medications: MEDICATIONS  (STANDING):  buPROPion XL . 300 milliGRAM(s) Oral daily  chlorhexidine 4% Liquid 1 Application(s) Topical <User Schedule>  docusate sodium 100 milliGRAM(s) Oral three times a day  enoxaparin Injectable 40 milliGRAM(s) SubCutaneous daily  influenza   Vaccine 0.5 milliLiter(s) IntraMuscular once  levothyroxine 100 MICROGram(s) Oral daily  phenylephrine    Infusion 0.4 MICROgram(s)/kG/Min (9.165 mL/Hr) IV Continuous <Continuous>  senna 2 Tablet(s) Oral at bedtime  sodium chloride 0.9%. 1000 milliLiter(s) (50 mL/Hr) IV Continuous <Continuous>  vancomycin    Solution 125 milliGRAM(s) Oral every 6 hours  ziprasidone 40 milliGRAM(s) Oral <User Schedule>    MEDICATIONS  (PRN):  acetaminophen   Tablet .. 975 milliGRAM(s) Oral every 6 hours PRN Mild Pain (1 - 3)  ALBUTerol/ipratropium for Nebulization 3 milliLiter(s) Nebulizer every 6 hours PRN Shortness of Breath and/or Wheezing  oxyCODONE    IR 5 milliGRAM(s) Oral every 4 hours PRN Moderate Pain (4 - 6)    Pertinent Labs:  @ 03:19: Na 133<L>, BUN 14, Cr 0.58, <H>, K+ 3.4<L>, Phos 2.7, Mg 1.7, Alk Phos --, ALT/SGPT --, AST/SGOT --, HbA1c --      Skin per nursing documentation: no pressure injuries noted  Edema: +3 generalized edema, +4 edema in right foot    Estimated Needs:   [ X] no change since previous assessment  [ ] recalculated:     Previous Nutrition Diagnosis: Inadequate Protein-Energy Intake  Nutrition Diagnosis is: ongoing, being addressed with advanced diet and supplements      Interventions:     Recommend  1) Continue regular diet as tolerated  2) Continue Mighty Shakes (200 calories, 6 Gm protein), and Magic Cup supplement (290 calories, 9 Gm protein) added to meal trays to encourage greater protein-calorie intake.  3) Encourage PO intake, provide total feeding assistance       Monitoring and Evaluation:     Continue to monitor Nutritional intake, Tolerance to diet prescription, weights, labs, skin integrity    RD remains available upon request and will follow up per protocol Nutrition Follow Up Note    Patient seen for Nutrition Follow Up. 68 year old female with bipolar depression, OCD, dementia, HLD, NHL (in remission since 2016), osteoarthritis s/p bilateral TKR s/p fall presenting with a right periprosthetic femoral neck fracture and C. difficile colitis, now s/p ORIF of R periprosthetic femur fracture and revision of right hip hemiarthroplasty on , transferred to SICU intubated, on pressors. Now extubated. Patient previously NPO over 7 days, followed by prolonged poor PO intake, moderate malnutrition diagnosis placed in medical record.    Source: EMR, RN, PCA, Past RD Notes, patient     Diet : Regular    Pt noted as confused, however able to recall she has not been eating well. RN and PCA confirm pt still with poor appetite. As per PCA, pt taking a few bites of her meals. Pt states she likes the magic cup and mighty shakes. Pt has no c/o nausea/ vomiting or constipation or diarrhea. Last BM yesterday.      PO intake : <50%     Source for PO intake: RN, PCA, patient     Daily Weight in k.6 (-), Weight in k (-24), Weight in k.4 (-23)  40 lb (29%) Weight gain since admission wt of 135 lbs, likely due to fluid shifts and edema    Drug Dosing Weight  Weight (kg): 61.1 (2019 09:59)  BMI (kg/m2): 25.5 (2019 09:59)    Pertinent Medications: MEDICATIONS  (STANDING):  buPROPion XL . 300 milliGRAM(s) Oral daily  chlorhexidine 4% Liquid 1 Application(s) Topical <User Schedule>  docusate sodium 100 milliGRAM(s) Oral three times a day  enoxaparin Injectable 40 milliGRAM(s) SubCutaneous daily  influenza   Vaccine 0.5 milliLiter(s) IntraMuscular once  levothyroxine 100 MICROGram(s) Oral daily  phenylephrine    Infusion 0.4 MICROgram(s)/kG/Min (9.165 mL/Hr) IV Continuous <Continuous>  senna 2 Tablet(s) Oral at bedtime  sodium chloride 0.9%. 1000 milliLiter(s) (50 mL/Hr) IV Continuous <Continuous>  vancomycin    Solution 125 milliGRAM(s) Oral every 6 hours  ziprasidone 40 milliGRAM(s) Oral <User Schedule>    MEDICATIONS  (PRN):  acetaminophen   Tablet .. 975 milliGRAM(s) Oral every 6 hours PRN Mild Pain (1 - 3)  ALBUTerol/ipratropium for Nebulization 3 milliLiter(s) Nebulizer every 6 hours PRN Shortness of Breath and/or Wheezing  oxyCODONE    IR 5 milliGRAM(s) Oral every 4 hours PRN Moderate Pain (4 - 6)    Pertinent Labs:  @ 03:19: Na 133<L>, BUN 14, Cr 0.58, <H>, K+ 3.4<L>, Phos 2.7, Mg 1.7,       Skin per nursing documentation: no pressure injuries noted  Edema: +3 generalized edema, +4 edema in right foot    Estimated Needs:   [ X] no change since previous assessment  [ ] recalculated:     Previous Nutrition Diagnosis: Inadequate Protein-Energy Intake  Nutrition Diagnosis is: ongoing, being addressed with advanced diet and supplements     New Nutrition Diagnosis: Moderate Malnutrition in context of acute illness/injury  Related to: inability to meet nutrition needs in setting of medical course and poor appetite  As evidence by: prolonged NPO status followed by inadequate PO intake >7 days, 3+ generalized edema and mild temporal wasting     Interventions:     Recommend  1) Continue regular diet as tolerated  2) Continue Mighty Shakes (200 calories, 6 Gm protein), and Magic Cup supplement (290 calories, 9 Gm protein) added to meal trays to encourage greater protein-calorie intake.  3) Encourage PO intake, provide total feeding assistance       Monitoring and Evaluation:     Continue to monitor Nutritional intake, Tolerance to diet prescription, weights, labs, skin integrity    RD remains available upon request and will follow up per protocol

## 2019-02-25 NOTE — PROGRESS NOTE ADULT - ASSESSMENT
68 year old female with a PMHx of bipolar depression, Hyperlipidemia, NHL (in remission), OCD, dementia, with recent hospitalization (1/20-1/26) for right hip hemiarthroplasty for right femoral neck fracture after fall. Hospital course complicated by ESBL E. Coli UTI on Ertapenem (1/23-1/29)    s/p  Hypovolemic/ Distributive shock secondary to sepsis 2/2 Pancolitis, likely secondary to C. Diff Colitis,-now resolved  Age Indeterminate L5 vertebral body fracture, Right proximal femoral periprosthetic fracture following unwitnessed fall with cdiff, bandemia, fever  s/p Open reduction and internal fixation (ORIF) of periprosthetic fracture of femur  02/22/2019  Right    Hemiarthroplasty of hip  02/22/2019  Revision--Right, posterior approach    Irrigation and debridement, thigh or hip  02/22/2019  Right  Cdiff improved and stable  No s/s any other infectious focus  Stable  Would continue Vanco po in a tapering fashion    then taper-125 mg po q 6 X 2 weeks 2/23 --> 3/9/19  and then q 12 X 2 more weeks 3/10 --> 3/23/19  Will tailor plan for ID issues  per course,results.Will defer to primary team on management of other issues.  Case d/w SICU team  Will Follow.  Beeper [id number]/No response-[id number]

## 2019-02-25 NOTE — PROGRESS NOTE ADULT - SUBJECTIVE AND OBJECTIVE BOX
67 y/o critically ill  female h/o dementia NHL presenting after unwitnessed fall. Pt found on the ground at rehab. is on lovenox after hip fracture. pt reports pain to her right leg. per EMS en route slurring speech and mildly hypotensive requesting trauma.unclear how long pt on the ground. Patient found to have C diff with worsening hypotension requiring pressors on and off and  Patient requiring the SICU for continued cardiopulmonary monitoring. Orthopedic procedure on hold due to  hypotension and pan colitis and need for pressors due to unstable hemodynamics. Diarrhea slowing off IV Flagyl + on oral vancomycin. Patient now has the rectal tube removed.   Leukocytosis and diarrhea have improved  No significant change in status. Has senile dementia with intermittent lucency. No further C.diff colitis and no further hypovolemic hypotension. Patient s/p Open reduction and internal fixation of periprosthetic hip fx Mental status has greatly improved    MEDICATIONS  (STANDING):  buPROPion XL . 300 milliGRAM(s) Oral daily  chlorhexidine 4% Liquid 1 Application(s) Topical <User Schedule>  docusate sodium 100 milliGRAM(s) Oral three times a day  enoxaparin Injectable 40 milliGRAM(s) SubCutaneous daily  influenza   Vaccine 0.5 milliLiter(s) IntraMuscular once  levothyroxine 100 MICROGram(s) Oral daily  phenylephrine    Infusion 0.4 MICROgram(s)/kG/Min (9.165 mL/Hr) IV Continuous <Continuous>  senna 2 Tablet(s) Oral at bedtime  sodium chloride 0.9%. 1000 milliLiter(s) (50 mL/Hr) IV Continuous <Continuous>  vancomycin    Solution 125 milliGRAM(s) Oral every 6 hours  ziprasidone 40 milliGRAM(s) Oral <User Schedule>    MEDICATIONS  (PRN):  acetaminophen   Tablet .. 975 milliGRAM(s) Oral every 6 hours PRN Mild Pain (1 - 3)  ALBUTerol/ipratropium for Nebulization 3 milliLiter(s) Nebulizer every 6 hours PRN Shortness of Breath and/or Wheezing  oxyCODONE    IR 5 milliGRAM(s) Oral every 4 hours PRN Moderate Pain (4 - 6)          VITALS:   T(C): 36.7 (02-25-19 @ 19:00), Max: 37.1 (02-25-19 @ 03:00)  HR: 106 (02-25-19 @ 21:30) (93 - 123)  BP: 94/52 (02-25-19 @ 21:30) (79/41 - 114/54)  RR: 20 (02-25-19 @ 21:30) (15 - 41)  SpO2: 96% (02-25-19 @ 21:30) (86% - 99%)  Wt(kg): --      PHYSICAL EXAM:  GENERAL: NAD, well-groomed, well-developed  HEAD:  Atraumatic, Normocephalic  EYES: EOMI, PERRLA, conjunctiva and sclera clear  ENMT: No tonsillar erythema, exudates, or enlargement; Moist mucous membranes, Good dentition, No lesions  NECK: Supple, No JVD, Normal thyroid  NERVOUS SYSTEM:  confused becoming aphasic  CHEST/LUNG: Clear to percussion bilaterally; No rales, rhonchi, wheezing, or rubs  HEART: Regular rate and rhythm; No murmurs, rubs, or gallops  ABDOMEN: Soft, Nontender, Nondistended; Bowel sounds present  Periprosthetic right femur fracture repair with localized swelling, B/L LE edema  LYMPH: No lymphadenopathy noted  SKIN: No rashes or lesions    LABS:        CBC Full  -  ( 25 Feb 2019 12:06 )  WBC Count : 7.8 K/uL  Hemoglobin : 8.4 g/dL  Hematocrit : 24.4 %  Platelet Count - Automated : 77 K/uL  Mean Cell Volume : 96.1 fl  Mean Cell Hemoglobin : 32.9 pg  Mean Cell Hemoglobin Concentration : 34.2 gm/dL  Auto Neutrophil # : x  Auto Lymphocyte # : x  Auto Monocyte # : x  Auto Eosinophil # : x  Auto Basophil # : x  Auto Neutrophil % : x  Auto Lymphocyte % : x  Auto Monocyte % : x  Auto Eosinophil % : x  Auto Basophil % : x    02-25    133<L>  |  98  |  14  ----------------------------<  100<H>  3.4<L>   |  25  |  0.58    Ca    7.5<L>      25 Feb 2019 03:19  Phos  2.7     02-25  Mg     1.7     02-25    TPro  3.8<L>  /  Alb  1.7<L>  /  TBili  0.5  /  DBili  x   /  AST  25  /  ALT  14  /  AlkPhos  111  02-24    LIVER FUNCTIONS - ( 24 Feb 2019 03:07 )  Alb: 1.7 g/dL / Pro: 3.8 g/dL / ALK PHOS: 111 U/L / ALT: 14 U/L / AST: 25 U/L / GGT: x           PT/INR - ( 25 Feb 2019 11:33 )   PT: 12.5 sec;   INR: 1.09 ratio         PTT - ( 25 Feb 2019 11:33 )  PTT:23.8 sec    CAPILLARY BLOOD GLUCOSE          RADIOLOGY & ADDITIONAL TESTS: 69 y/o critically ill  female h/o dementia NHL presenting after unwitnessed fall. Pt found on the ground at rehab. is on lovenox after hip fracture. pt reports pain to her right leg. per EMS en route slurring speech and mildly hypotensive requesting trauma.unclear how long pt on the ground. Patient found to have C diff with worsening hypotension requiring pressors on and off and  Patient requiring the SICU for continued cardiopulmonary monitoring. Orthopedic procedure was  on hold due to  hypotension and pan colitis and need for pressors due to unstable hemodynamics. Diarrhea slowed off IV Flagyl + on oral vancomycin. Patient now has the rectal tube removed.   Leukocytosis and diarrhea have improved  No significant change in status. Has senile dementia with intermittent lucency. No further C.diff colitis and no further hypovolemic hypotension. Patient s/p Open reduction and internal fixation of periprosthetic hip fx Mental status has greatly improved    MEDICATIONS  (STANDING):  buPROPion XL . 300 milliGRAM(s) Oral daily  chlorhexidine 4% Liquid 1 Application(s) Topical <User Schedule>  docusate sodium 100 milliGRAM(s) Oral three times a day  enoxaparin Injectable 40 milliGRAM(s) SubCutaneous daily  influenza   Vaccine 0.5 milliLiter(s) IntraMuscular once  levothyroxine 100 MICROGram(s) Oral daily  phenylephrine    Infusion 0.4 MICROgram(s)/kG/Min (9.165 mL/Hr) IV Continuous <Continuous>  senna 2 Tablet(s) Oral at bedtime  sodium chloride 0.9%. 1000 milliLiter(s) (50 mL/Hr) IV Continuous <Continuous>  vancomycin    Solution 125 milliGRAM(s) Oral every 6 hours  ziprasidone 40 milliGRAM(s) Oral <User Schedule>    MEDICATIONS  (PRN):  acetaminophen   Tablet .. 975 milliGRAM(s) Oral every 6 hours PRN Mild Pain (1 - 3)  ALBUTerol/ipratropium for Nebulization 3 milliLiter(s) Nebulizer every 6 hours PRN Shortness of Breath and/or Wheezing  oxyCODONE    IR 5 milliGRAM(s) Oral every 4 hours PRN Moderate Pain (4 - 6)          VITALS:   T(C): 36.7 (02-25-19 @ 19:00), Max: 37.1 (02-25-19 @ 03:00)  HR: 106 (02-25-19 @ 21:30) (93 - 123)  BP: 94/52 (02-25-19 @ 21:30) (79/41 - 114/54)  RR: 20 (02-25-19 @ 21:30) (15 - 41)  SpO2: 96% (02-25-19 @ 21:30) (86% - 99%)  Wt(kg): --      PHYSICAL EXAM:  GENERAL: NAD, well-groomed, well-developed  HEAD:  Atraumatic, Normocephalic  EYES: EOMI, PERRLA, conjunctiva and sclera clear  ENMT: No tonsillar erythema, exudates, or enlargement; Moist mucous membranes, Good dentition, No lesions  NECK: Supple, No JVD, Normal thyroid  NERVOUS SYSTEM:  confused becoming aphasic  CHEST/LUNG: Clear to percussion bilaterally; No rales, rhonchi, wheezing, or rubs  HEART: Regular rate and rhythm; No murmurs, rubs, or gallops  ABDOMEN: Soft, Nontender, Nondistended; Bowel sounds present  Periprosthetic right femur fracture repair with localized swelling, B/L LE edema  LYMPH: No lymphadenopathy noted  SKIN: No rashes or lesions    LABS:        CBC Full  -  ( 25 Feb 2019 12:06 )  WBC Count : 7.8 K/uL  Hemoglobin : 8.4 g/dL  Hematocrit : 24.4 %  Platelet Count - Automated : 77 K/uL  Mean Cell Volume : 96.1 fl  Mean Cell Hemoglobin : 32.9 pg  Mean Cell Hemoglobin Concentration : 34.2 gm/dL  Auto Neutrophil # : x  Auto Lymphocyte # : x  Auto Monocyte # : x  Auto Eosinophil # : x  Auto Basophil # : x  Auto Neutrophil % : x  Auto Lymphocyte % : x  Auto Monocyte % : x  Auto Eosinophil % : x  Auto Basophil % : x    02-25    133<L>  |  98  |  14  ----------------------------<  100<H>  3.4<L>   |  25  |  0.58    Ca    7.5<L>      25 Feb 2019 03:19  Phos  2.7     02-25  Mg     1.7     02-25    TPro  3.8<L>  /  Alb  1.7<L>  /  TBili  0.5  /  DBili  x   /  AST  25  /  ALT  14  /  AlkPhos  111  02-24    LIVER FUNCTIONS - ( 24 Feb 2019 03:07 )  Alb: 1.7 g/dL / Pro: 3.8 g/dL / ALK PHOS: 111 U/L / ALT: 14 U/L / AST: 25 U/L / GGT: x           PT/INR - ( 25 Feb 2019 11:33 )   PT: 12.5 sec;   INR: 1.09 ratio         PTT - ( 25 Feb 2019 11:33 )  PTT:23.8 sec    CAPILLARY BLOOD GLUCOSE          RADIOLOGY & ADDITIONAL TESTS:

## 2019-02-25 NOTE — PROGRESS NOTE ADULT - ASSESSMENT
68 year old female with a PMHx of bipolar depression, Hyperlipidemia, NHL (in remission), OCD, dementia, with recent hospitalization (1/20-1/26) for right hip hemiarthroplasty for right femoral neck fracture after fall. Hospital course complicated by ESBL E. Coli UTI on Ertapenem (1/23-1/29) now presents with likely Hypovolemic/ Distributive shock secondary to sepsis 2/2 Pancolitis, likely secondary to C. Diff Colitis, Age Indeterminate L5 vertebral body fracture, possible acute as tender to palpation, Nondisplaced sacral insufficiency, Right proximal femoral periprosthetic fracture following unwitnessed fall with cdiff, bandemia, fever. Patient seen in SICU.

## 2019-02-25 NOTE — PROGRESS NOTE ADULT - ASSESSMENT
68 year old female with bipolar depression, OCD, dementia, HLD, NHL (in remission since 2016), osteoarthritis s/p bilateral TKR s/p fall presenting with a right periprosthetic femoral neck fracture and C. difficile colitis, now s/p ORIF of R periprosthetic femur fracture and revision of right hip hemiarthroplasty on 2/22, transferred to SICU intubated, on pressors. Now extubated     PLAN:  Neuro: bipolar depression, OCD, dementia, acute traumatic pain  - Tylenol, oxycodone  - Dilaudid PRN for pain control  - Continue home bupropion, ziprasidone.    Resp: acute post-operative respiratory failure weaned off ventilator, b/l pleural effusions  - Extubated 2/23 to face mask, weaned to NC  - Duonebs q 6hours    CV: hypotension on true   - Wean true as tolerated   - Fluid resuscitate as necessary    GI: C. difficile colitis, complicated by ileus, resolved  - Passed bedside swallow, started on general diet    Renal CKD stage III  - Ezra, monitor I&Os    Heme: no acute issues  - Lovenox for VTE prophylaxis.    ID: C. difficile colitis, resolved  - Monitor WBC, temperature, and procalcitonin.  - PO vancomycin for C. Diff     Endo: hypothyroidism  - Monitor glucose on BMP  - Continue home synthroid    Disposition: DNR, SICU    - Sawyer Coleman PA-C

## 2019-02-25 NOTE — PROGRESS NOTE ADULT - ATTENDING COMMENTS
I agree with the above note on this patient. All pertinent films have been reviewed. Please refer to clinical documentation of the history, physical examinations, data summary, and both assessment and plan as documented above and with which I agree.    -DVT PPx: Lovenox 40 qd  -stop IV abx   -Cud[place]--please remove when possible given recent hx urosepsis   -FU OR Cx x 3, ngtd  -Compressive ACE wrap to be discontinued by ortho team Tuesday  -NWB RLE. OOB to chair is okay  -Posterior hip precautions  -iVac until Thursday AM, then place aquacel  -DION until output <50cc/24, maintain self suction  -nutrition consult, need to maximize nutrition, add ensure to diet  -po pain control, minimize narcs  -daily cbc until stable, may need transfusion today given drop in h/h, likely 2/2 dilutional effects from fluid boluses she has been receiving    Conor George MD  Attending Orthopedic Surgeon

## 2019-02-26 LAB
ANION GAP SERPL CALC-SCNC: 10 MMOL/L — SIGNIFICANT CHANGE UP (ref 5–17)
APTT BLD: 26.7 SEC — LOW (ref 27.5–36.3)
BUN SERPL-MCNC: 12 MG/DL — SIGNIFICANT CHANGE UP (ref 7–23)
CALCIUM SERPL-MCNC: 7.5 MG/DL — LOW (ref 8.4–10.5)
CHLORIDE SERPL-SCNC: 99 MMOL/L — SIGNIFICANT CHANGE UP (ref 96–108)
CO2 SERPL-SCNC: 25 MMOL/L — SIGNIFICANT CHANGE UP (ref 22–31)
CREAT SERPL-MCNC: 0.49 MG/DL — LOW (ref 0.5–1.3)
GLUCOSE SERPL-MCNC: 103 MG/DL — HIGH (ref 70–99)
HCT VFR BLD CALC: 25 % — LOW (ref 34.5–45)
HGB BLD-MCNC: 8.6 G/DL — LOW (ref 11.5–15.5)
INR BLD: 1.14 RATIO — SIGNIFICANT CHANGE UP (ref 0.88–1.16)
MAGNESIUM SERPL-MCNC: 1.9 MG/DL — SIGNIFICANT CHANGE UP (ref 1.6–2.6)
MCHC RBC-ENTMCNC: 33.3 PG — SIGNIFICANT CHANGE UP (ref 27–34)
MCHC RBC-ENTMCNC: 34.5 GM/DL — SIGNIFICANT CHANGE UP (ref 32–36)
MCV RBC AUTO: 96.4 FL — SIGNIFICANT CHANGE UP (ref 80–100)
PHOSPHATE SERPL-MCNC: 2.7 MG/DL — SIGNIFICANT CHANGE UP (ref 2.5–4.5)
PLATELET # BLD AUTO: 91 K/UL — LOW (ref 150–400)
POTASSIUM SERPL-MCNC: 3.7 MMOL/L — SIGNIFICANT CHANGE UP (ref 3.5–5.3)
POTASSIUM SERPL-SCNC: 3.7 MMOL/L — SIGNIFICANT CHANGE UP (ref 3.5–5.3)
PROTHROM AB SERPL-ACNC: 13 SEC — HIGH (ref 10–12.9)
RBC # BLD: 2.59 M/UL — LOW (ref 3.8–5.2)
RBC # FLD: 19.3 % — HIGH (ref 10.3–14.5)
SODIUM SERPL-SCNC: 134 MMOL/L — LOW (ref 135–145)
VANCOMYCIN FLD-MCNC: 12.1 UG/ML — SIGNIFICANT CHANGE UP
WBC # BLD: 7.2 K/UL — SIGNIFICANT CHANGE UP (ref 3.8–10.5)
WBC # FLD AUTO: 7.2 K/UL — SIGNIFICANT CHANGE UP (ref 3.8–10.5)

## 2019-02-26 PROCEDURE — 99232 SBSQ HOSP IP/OBS MODERATE 35: CPT

## 2019-02-26 PROCEDURE — 71045 X-RAY EXAM CHEST 1 VIEW: CPT | Mod: 26

## 2019-02-26 PROCEDURE — 99291 CRITICAL CARE FIRST HOUR: CPT

## 2019-02-26 RX ORDER — DRONABINOL 2.5 MG
2.5 CAPSULE ORAL DAILY
Qty: 0 | Refills: 0 | Status: DISCONTINUED | OUTPATIENT
Start: 2019-02-26 | End: 2019-03-05

## 2019-02-26 RX ORDER — VANCOMYCIN HCL 1 G
125 VIAL (EA) INTRAVENOUS EVERY 12 HOURS
Qty: 0 | Refills: 0 | Status: DISCONTINUED | OUTPATIENT
Start: 2019-03-10 | End: 2019-03-19

## 2019-02-26 RX ORDER — MAGNESIUM SULFATE 500 MG/ML
2 VIAL (ML) INJECTION ONCE
Qty: 0 | Refills: 0 | Status: COMPLETED | OUTPATIENT
Start: 2019-02-26 | End: 2019-02-26

## 2019-02-26 RX ORDER — ALBUMIN HUMAN 25 %
250 VIAL (ML) INTRAVENOUS ONCE
Qty: 0 | Refills: 0 | Status: COMPLETED | OUTPATIENT
Start: 2019-02-26 | End: 2019-02-26

## 2019-02-26 RX ORDER — MIDODRINE HYDROCHLORIDE 2.5 MG/1
5 TABLET ORAL EVERY 8 HOURS
Qty: 0 | Refills: 0 | Status: DISCONTINUED | OUTPATIENT
Start: 2019-02-26 | End: 2019-02-27

## 2019-02-26 RX ADMIN — MIDODRINE HYDROCHLORIDE 5 MILLIGRAM(S): 2.5 TABLET ORAL at 06:29

## 2019-02-26 RX ADMIN — Medication 100 MILLIGRAM(S): at 13:12

## 2019-02-26 RX ADMIN — Medication 125 MILLILITER(S): at 15:49

## 2019-02-26 RX ADMIN — ENOXAPARIN SODIUM 40 MILLIGRAM(S): 100 INJECTION SUBCUTANEOUS at 11:34

## 2019-02-26 RX ADMIN — Medication 125 MILLIGRAM(S): at 03:44

## 2019-02-26 RX ADMIN — Medication 125 MILLIGRAM(S): at 17:30

## 2019-02-26 RX ADMIN — OXYCODONE HYDROCHLORIDE 5 MILLIGRAM(S): 5 TABLET ORAL at 04:16

## 2019-02-26 RX ADMIN — Medication 100 MILLIGRAM(S): at 06:29

## 2019-02-26 RX ADMIN — CHLORHEXIDINE GLUCONATE 1 APPLICATION(S): 213 SOLUTION TOPICAL at 06:30

## 2019-02-26 RX ADMIN — SENNA PLUS 2 TABLET(S): 8.6 TABLET ORAL at 21:48

## 2019-02-26 RX ADMIN — OXYCODONE HYDROCHLORIDE 5 MILLIGRAM(S): 5 TABLET ORAL at 03:44

## 2019-02-26 RX ADMIN — Medication 100 MICROGRAM(S): at 06:29

## 2019-02-26 RX ADMIN — BUPROPION HYDROCHLORIDE 300 MILLIGRAM(S): 150 TABLET, EXTENDED RELEASE ORAL at 11:35

## 2019-02-26 RX ADMIN — Medication 2.5 MILLIGRAM(S): at 17:30

## 2019-02-26 RX ADMIN — MIDODRINE HYDROCHLORIDE 5 MILLIGRAM(S): 2.5 TABLET ORAL at 21:48

## 2019-02-26 RX ADMIN — MIDODRINE HYDROCHLORIDE 5 MILLIGRAM(S): 2.5 TABLET ORAL at 13:12

## 2019-02-26 RX ADMIN — Medication 125 MILLIGRAM(S): at 21:48

## 2019-02-26 RX ADMIN — PHENYLEPHRINE HYDROCHLORIDE 9.16 MICROGRAM(S)/KG/MIN: 10 INJECTION INTRAVENOUS at 07:36

## 2019-02-26 RX ADMIN — SODIUM CHLORIDE 50 MILLILITER(S): 9 INJECTION INTRAMUSCULAR; INTRAVENOUS; SUBCUTANEOUS at 17:34

## 2019-02-26 RX ADMIN — Medication 125 MILLIGRAM(S): at 11:35

## 2019-02-26 RX ADMIN — Medication 50 GRAM(S): at 06:30

## 2019-02-26 RX ADMIN — Medication 100 MILLIGRAM(S): at 21:48

## 2019-02-26 RX ADMIN — ZIPRASIDONE HYDROCHLORIDE 40 MILLIGRAM(S): 20 CAPSULE ORAL at 06:29

## 2019-02-26 NOTE — PROGRESS NOTE ADULT - ATTENDING COMMENTS
Patient seen and examined and agree with resident note.  Patient continues to require phenylephrine for hypotension. She had an ultrasound consistent with hypovolemia.   Given albumin and plan for one unit PRBC.  -will wean as tolerated to MAP goal > 65mmHg  -mentation is appropriate     It is unclear the amount the patient is eating and a calorie count was initiated today. She did eat a larger quantity compared to yesterday but will continue on IVF at this time until calorie count completed.    C diff colitis- will continue treatment with PO vanco at this time    CC time: 42 minutes

## 2019-02-26 NOTE — PROGRESS NOTE ADULT - SUBJECTIVE AND OBJECTIVE BOX
Orthopedic Surgery Progress Note     S: Patient seen and examined today. No acute events overnight. Pain is well controlled. Now off pressors.    MEDICATIONS  (STANDING):  buPROPion XL . 300 milliGRAM(s) Oral daily  chlorhexidine 4% Liquid 1 Application(s) Topical <User Schedule>  docusate sodium 100 milliGRAM(s) Oral three times a day  enoxaparin Injectable 40 milliGRAM(s) SubCutaneous daily  influenza   Vaccine 0.5 milliLiter(s) IntraMuscular once  levothyroxine 100 MICROGram(s) Oral daily  phenylephrine    Infusion 0.4 MICROgram(s)/kG/Min (9.165 mL/Hr) IV Continuous <Continuous>  potassium chloride  10 mEq/100 mL IVPB 10 milliEquivalent(s) IV Intermittent every 1 hour  potassium phosphate IVPB 15 milliMole(s) IV Intermittent once  senna 2 Tablet(s) Oral at bedtime  sodium chloride 0.9%. 1000 milliLiter(s) (50 mL/Hr) IV Continuous <Continuous>  vancomycin    Solution 125 milliGRAM(s) Oral every 6 hours  ziprasidone 40 milliGRAM(s) Oral <User Schedule>    MEDICATIONS  (PRN):  acetaminophen   Tablet .. 975 milliGRAM(s) Oral every 6 hours PRN Mild Pain (1 - 3)  ALBUTerol/ipratropium for Nebulization 3 milliLiter(s) Nebulizer every 6 hours PRN Shortness of Breath and/or Wheezing  oxyCODONE    IR 5 milliGRAM(s) Oral every 4 hours PRN Moderate Pain (4 - 6)      Physical Exam:  Vital Signs Last 24 Hrs  T(C): 36.7 (26 Feb 2019 03:00), Max: 36.8 (25 Feb 2019 23:00)  T(F): 98.1 (26 Feb 2019 03:00), Max: 98.3 (25 Feb 2019 23:00)  HR: 93 (26 Feb 2019 06:59) (90 - 123)  BP: 88/53 (26 Feb 2019 06:59) (75/40 - 123/56)  BP(mean): 68 (26 Feb 2019 06:59) (55 - 84)  RR: 16 (26 Feb 2019 06:59) (15 - 36)  SpO2: 96% (26 Feb 2019 06:59) (65% - 98%)      Gen: awake, alert, NAD  Resp: no increase work of breathing  RLE  Incisional vac maintaining suction, c/d/i  edema to RLE  JPs intact with SS drainage   motor intact GS/TA/EHL  SILT S/S/SP/DP  + dopplerable PT        LABS:                                        8.6    7.2   )-----------( 91       ( 26 Feb 2019 03:30 )             25.0   02-26    134<L>  |  99  |  12  ----------------------------<  103<H>  3.7   |  25  |  0.49<L>    Ca    7.5<L>      26 Feb 2019 03:30  Phos  2.7     02-26  Mg     1.9     02-26

## 2019-02-26 NOTE — PROGRESS NOTE ADULT - SUBJECTIVE AND OBJECTIVE BOX
SICU PROGRESS NOTE  ================================================  HPI:  68y Female with a past medical history of bipolar depression, OCD, dementia, HLD, NHL (in remission since 2016), osteoarthritis s/p bilateral TKR, and recent right YONATAN (1/20-1/26)  who presented on 2/4 as a level two trauma after a fall. Patient was found down in rehab after likely rolling out of bed. Patient was upgraded to a level 1 due to hypotension w/ SBP in the 80s despite fluid resuscitation and transfusions. Imaging revealed pancolitis and a right periprosthetic femoral neck fracture. Patient tested for C. difficile and was positive. She was admitted to SICU for hemodynamic monitoring in the setting of sepsis. Pt eventually transferred to floor on 2/14/19.    Pt taken to OR with orthopedics on 2/22/19 for ORIF of right periprosthetic femur fracture and revision of right hemiarthroplasty. EBL 500ml, IVF 1750ml, 1u PRBC. Pt required phenylephrine throughout the case and was 1.3 mcg/kg/min at the end of the case. For this reason she was kept intubated and transferred to PACU. SICU consulted for management of ventilator and pressors.    24 HOUR EVENTS  - Infectious disease recommendations regarding vancomycin appreciated: begin vanco taper until 3/23/19  - Patient advanced to a regular diet  - Bedside point of care ultrasound demonstrated that the patient did not appear hypovolemic      Objective:  Vital Signs  ICU Vital Signs Last 24 Hrs  T(C): 36.8 (25 Feb 2019 23:00), Max: 37.1 (25 Feb 2019 03:00)  T(F): 98.3 (25 Feb 2019 23:00), Max: 98.7 (25 Feb 2019 03:00)  HR: 105 (25 Feb 2019 23:00) (93 - 123)  BP: 123/56 (25 Feb 2019 23:00) (79/41 - 123/56)  BP(mean): 80 (25 Feb 2019 23:00) (57 - 80)  ABP: --  ABP(mean): --  RR: 20 (25 Feb 2019 23:00) (15 - 41)  SpO2: 65% (25 Feb 2019 23:00) (65% - 98%)    I&O's Detail    24 Feb 2019 07:01  -  25 Feb 2019 07:00  --------------------------------------------------------  IN:    Oral Fluid: 850 mL    phenylephrine   Infusion: 632.4 mL    sodium chloride 0.9%.: 900 mL    Solution: 750 mL  Total IN: 3132.4 mL    OUT:    Bulb: 50 mL    Bulb: 25 mL    Indwelling Catheter - Urethral: 950 mL    Stool: 1 mL  Total OUT: 1026 mL    Total NET: 2106.4 mL      25 Feb 2019 07:01  -  26 Feb 2019 00:09  --------------------------------------------------------  IN:    Oral Fluid: 450 mL    phenylephrine   Infusion: 303.8 mL    sodium chloride 0.9%.: 800 mL    Solution: 350 mL  Total IN: 1903.8 mL    OUT:    Bulb: 45 mL    Bulb: 15 mL    Indwelling Catheter - Urethral: 475 mL  Total OUT: 535 mL    Total NET: 1368.8 mL          Diet: Diet, Regular:   Supplement Feeding Modality:  Oral  Ensure Enlive Cans or Servings Per Day:  2       Frequency:  Daily (02-25-19 @ 15:22)      MEDICATIONS  (STANDING):  buPROPion XL . 300 milliGRAM(s) Oral daily  chlorhexidine 4% Liquid 1 Application(s) Topical <User Schedule>  docusate sodium 100 milliGRAM(s) Oral three times a day  enoxaparin Injectable 40 milliGRAM(s) SubCutaneous daily  influenza   Vaccine 0.5 milliLiter(s) IntraMuscular once  levothyroxine 100 MICROGram(s) Oral daily  phenylephrine    Infusion 0.4 MICROgram(s)/kG/Min (9.165 mL/Hr) IV Continuous <Continuous>  senna 2 Tablet(s) Oral at bedtime  sodium chloride 0.9%. 1000 milliLiter(s) (50 mL/Hr) IV Continuous <Continuous>  vancomycin    Solution 125 milliGRAM(s) Oral every 6 hours  ziprasidone 40 milliGRAM(s) Oral <User Schedule>    MEDICATIONS  (PRN):  acetaminophen   Tablet .. 975 milliGRAM(s) Oral every 6 hours PRN Mild Pain (1 - 3)  ALBUTerol/ipratropium for Nebulization 3 milliLiter(s) Nebulizer every 6 hours PRN Shortness of Breath and/or Wheezing  oxyCODONE    IR 5 milliGRAM(s) Oral every 4 hours PRN Moderate Pain (4 - 6)      PHYSICAL EXAM:  GEN: NAD, resting quietly  NEURO: CN II-XII grossly intact, no focal deficits  PULM: symmetric chest rise bilaterally, no increased WOB  CV: regular rate, peripheral pulses intact  ABD: soft, NTND  EXTR: bilat LE edema R>L    LABS  CBC (02-25 @ 12:06)                              8.4<L>                         7.8     )----------------(  77<L>      --    % Neutrophils, --    % Lymphocytes, ANC: --                                  24.4<L>              CBC (02-25 @ 06:52)                              8.8<L>                         8.0     )----------------(  88<L>      --    % Neutrophils, --    % Lymphocytes, ANC: --                                  26.1<L>                BMP (02-25 @ 03:19)             133<L>  |  98      |  14    		Ca++ --      Ca 7.5<L>             ---------------------------------( 100<H>		Mg 1.7                3.4<L>  |  25      |  0.58  			Ph 2.7         Coags (02-25 @ 11:33)  aPTT 23.8<L> / INR 1.09 / PT 12.5        -> .Body Fluid Synovial Fluid Culture (02-23 @ 00:31)       No polymorphonuclear cells seen  No organisms seen  by cytocentrifuge    NG    Testing in progress    -> .Tissue Other, right hip synovium Culture (02-23 @ 00:29)       Rare polymorphonuclear leukocytes seen per low power field  No organisms seen per oil power field    NG    Testing in progress    -> .Tissue Other, right femur Culture (02-23 @ 00:28)       Rare polymorphonuclear leukocytes seen per low power field  No organisms seen per oil power field    NG    No growth        CULTURES  .Body Fluid Synovial Fluid  02-23 @ 00:31   Testing in progress  --    No polymorphonuclear cells seen  No organisms seen  by cytocentrifuge      .Tissue Other, right hip synovium  02-23 @ 00:29   Testing in progress  --    Rare polymorphonuclear leukocytes seen per low power field  No organisms seen per oil power field      .Tissue Other, right femur  02-23 @ 00:28   No growth  --    Rare polymorphonuclear leukocytes seen per low power field  No organisms seen per oil power field      .Body Fluid  02-06 @ 18:59   No growth at 14 days.  --    No polymorphonuclear cells seen per low power field  No organisms seen      .Blood Blood-Peripheral  02-06 @ 16:55   No growth at 5 days.  --  --      .Blood Blood  01-20 @ 20:35   No growth at 5 days.  --  --      .Urine Catheterized  01-20 @ 19:42   >100,000 CFU/ml Escherichia coli ESBL  --  Escherichia coli ESBL        IMAGING:   < from: Xray Chest 1 View- PORTABLE-Routine (02.25.19 @ 06:56) >  Impression:    The heart is normal in size. Bilateral pleural effusion. This remain   unchanged when compared to previous study done February 24, 2019.    < end of copied text >      Home Medications:  acetaminophen 500 mg oral tablet: 2 tab(s) orally every 8 hours (23 Jan 2019 10:21)  ALPRAZolam 1 mg oral tablet: orally 2 times a day (20 Jan 2019 15:19)  buPROPion 300 mg/24 hours (XL) oral tablet, extended release: 1 tab(s) orally once a day (20 Jan 2019 15:19)  Claritin 10 mg oral tablet: 1 tab(s) orally once a day (20 Jan 2019 15:19)  docusate sodium 100 mg oral capsule: 1 cap(s) orally 3 times a day (23 Jan 2019 10:21)  enoxaparin: 40 milligram(s) subcutaneously once a day for 14 days total post-operatively (23 Jan 2019 10:21)  gabapentin 600 mg oral tablet: orally once a day in AM (20 Jan 2019 15:19)  gabapentin 600 mg oral tablet: 2 cap(s) orally once a day (at bedtime) (20 Jan 2019 15:19)  HYDROmorphone 2 mg oral tablet: 1 tab(s) orally every 3 hours, As needed, Mild Pain (1 - 3) (25 Jan 2019 13:55)  HYDROmorphone 4 mg oral tablet: 1 tab(s) orally every 3 hours, As needed, Moderate Pain (4 - 6) (25 Jan 2019 13:55)  INVanz 1 g injection: 1 gram(s) injectable once a day (25 Jan 2019 13:55)  levothyroxine 75 mcg (0.075 mg)/mL oral solution: Takes Mon - Fri  (20 Jan 2019 15:19)  magnesium hydroxide 8% oral suspension: 30 milliliter(s) orally once a day, As needed, Constipation (23 Jan 2019 10:21)  polyethylene glycol 3350 oral powder for reconstitution: 17 gram(s) orally once a day, As needed, Constipation (23 Jan 2019 10:21)  senna oral tablet: 2 tab(s) orally once a day (at bedtime) (23 Jan 2019 10:21)  Xanax 1 mg oral tablet: orally once a day, As Needed (20 Jan 2019 15:19)  ziprasidone 40 mg oral capsule: orally once a day (20 Jan 2019 15:19)

## 2019-02-26 NOTE — PROGRESS NOTE ADULT - ASSESSMENT
68y Female s/p revision R hip hemiarthroplasty POD 4    - Pain control  - FU ID  - IV vanco x72 hours until OR Cx negative  - FU ID recs  - DC yanes when off pressors  - Prevena vac  - NWB RLE OOBTC  - Posterior dislocation precautions  - PT/OT  - DVT ppx  - care per SICU  - FU nutrition consult

## 2019-02-26 NOTE — PROGRESS NOTE ADULT - SUBJECTIVE AND OBJECTIVE BOX
Patient is a 68y old  Female who presents with a chief complaint of Fractured hip (24 Feb 2019 08:31)    Being followed by ID for C diff    Interval history:feels well  denies pain  still on phenylephrine   No acute events      ROS:  No cough,SOB,CP  No N/V/D./abd pain  No other complaints      Antimicrobials:    vancomycin    Solution 125 milliGRAM(s) Oral every 6 hours    Other medications reviewed    Vital Signs Last 24 Hrs  T(C): 36.8 (02-26-19 @ 11:00), Max: 36.8 (02-25-19 @ 23:00)  T(F): 98.3 (02-26-19 @ 11:00), Max: 98.3 (02-25-19 @ 23:00)  HR: 102 (02-26-19 @ 11:15) (90 - 123)  BP: 90/49 (02-26-19 @ 11:15) (75/40 - 123/56)  BP(mean): 63 (02-26-19 @ 11:15) (55 - 84)  RR: 22 (02-26-19 @ 11:15) (15 - 36)  SpO2: 98% (02-26-19 @ 11:15) (65% - 98%)    Physical Exam:        HEENT PERRLA EOMI    No oral exudate or erythema    Chest Good AE,CTA    CVS RRR S1 S2 WNl No murmur or rub or gallop    Abd soft BS normal No tenderness no masses      R leg dressing with DION  serosanguinous fluid in DION     IV site no erythema tenderness or discharge    CNS AAO X 3 no focalLab Data:                          8.6    7.2   )-----------( 91       ( 26 Feb 2019 03:30 )             25.0       02-26    134<L>  |  99  |  12  ----------------------------<  103<H>  3.7   |  25  |  0.49<L>    Ca    7.5<L>      26 Feb 2019 03:30  Phos  2.7     02-26  Mg     1.9     02-26          Culture - Body Fluid with Gram Stain (collected 23 Feb 2019 00:31)  Source: .Body Fluid Synovial Fluid  Gram Stain (23 Feb 2019 04:35):    No polymorphonuclear cells seen    No organisms seen    by cytocentrifuge  Preliminary Report (23 Feb 2019 18:19):    No growth    Culture - Fungal, Body Fluid (collected 23 Feb 2019 00:31)  Source: .Body Fluid right hip synovial fluid  Preliminary Report (25 Feb 2019 10:52):    Testing in progress    Culture - Acid Fast - Body Fluid w/Smear (collected 23 Feb 2019 00:31)  Source: .Body Fluid Synovial Fluid    Culture - Tissue with Gram Stain (collected 23 Feb 2019 00:29)  Source: .Tissue Other, right hip synovium  Gram Stain (23 Feb 2019 03:48):    Rare polymorphonuclear leukocytes seen per low power field    No organisms seen per oil power field  Preliminary Report (23 Feb 2019 20:56):    No growth    Culture - Fungal, Tissue (collected 23 Feb 2019 00:29)  Source: .Tissue right hip synovium  Preliminary Report (25 Feb 2019 10:55):    Testing in progress    Culture - Acid Fast - Tissue w/Smear (collected 23 Feb 2019 00:29)  Source: .Tissue Other, right hip synovium    Culture - Acid Fast - Tissue w/Smear (collected 23 Feb 2019 00:28)  Source: .Tissue Other, right femur    Culture - Fungal, Tissue (collected 23 Feb 2019 00:28)  Source: .Tissue right femur  Preliminary Report (25 Feb 2019 10:55):    Testing in progress    Culture - Tissue with Gram Stain (collected 23 Feb 2019 00:28)  Source: .Tissue Other, right femur  Gram Stain (23 Feb 2019 04:00):    Rare polymorphonuclear leukocytes seen per low power field    No organisms seen per oil power field  Preliminary Report (23 Feb 2019 22:29):    No growth            Vancomycin Level, Random: 12.1 ug/mL (02-26-19 @ 03:30)  Vancomycin Level, Random: 27.4 ug/mL (02-25-19 @ 03:19)  Vancomycin Level, Trough: 26.4 ug/mL (02-24-19 @ 16:06)

## 2019-02-26 NOTE — PROGRESS NOTE ADULT - ASSESSMENT
68 year old female with a PMHx of bipolar depression, Hyperlipidemia, NHL (in remission), OCD, dementia, with recent hospitalization (1/20-1/26) for right hip hemiarthroplasty for right femoral neck fracture after fall. Hospital course complicated by ESBL E. Coli UTI on Ertapenem (1/23-1/29)    s/p  Hypovolemic/ Distributive shock secondary to sepsis 2/2 Pancolitis, likely secondary to C. Diff Colitis,-now resolved  Age Indeterminate L5 vertebral body fracture, Right proximal femoral periprosthetic fracture following unwitnessed fall with cdiff, bandemia, fever  s/p Open reduction and internal fixation (ORIF) of periprosthetic fracture of femur  02/22/2019  Right    Hemiarthroplasty of hip  02/22/2019  Revision--Right, posterior approach    Irrigation and debridement, thigh or hip  02/22/2019  Right  Cdiff improved and stable  No s/s any other infectious focus  OR Cx negative   Stable  Would continue Vanco po in a tapering fashion    then taper-125 mg po q 6 X 2 weeks 2/23 --> 3/9/19  and then q 12 X 2 more weeks 3/10 --> 3/23/19  Doubt need for any other antimicrobials  Will tailor plan for ID issues  per course,results.Will defer to primary team on management of other issues.  Case d/w SICU team  Will Follow.  Beeper [id number]-[id number]/afterhours/No response-[id number]

## 2019-02-26 NOTE — PROGRESS NOTE ADULT - ATTENDING COMMENTS
I agree with the above note on this patient. All pertinent films have been reviewed. Please refer to clinical documentation of the history, physical examinations, data summary, and both assessment and plan as documented above and with which I agree.    -DVT PPx: Lovenox 40 qd  -[place]--please remove when possible given recent hx urosepsis   -FU OR Cx x 3, ngtd  -NWB RLE. OOB to chair is okay  -Posterior hip precautions  -iVac until Thursday AM, then place aquacel  -DINO until output <50cc/24, maintain self suction  -appreciate nutrition consult  -po pain control, minimize narcs  -daily cbc until stable, may need transfusion today given drop in h/h, likely 2/2 dilutional effects from fluid boluses she has been receiving    Conor George MD  Attending Orthopedic Surgeon

## 2019-02-26 NOTE — PROGRESS NOTE ADULT - ASSESSMENT
68 year old female with a PMHx of bipolar depression, Hyperlipidemia, NHL (in remission), OCD, dementia, with recent hospitalization (1/20-1/26) for right hip hemiarthroplasty for right femoral neck fracture after fall. Hospital course complicated by ESBL E. Coli UTI on Ertapenem (1/23-1/29) now presents with likely Hypovolemic/ Distributive shock secondary to sepsis 2/2 Pancolitis, likely secondary to C. Diff Colitis, Age Indeterminate L5 vertebral body fracture, possible acute as tender to palpation, Nondisplaced sacral insufficiency, Right proximal femoral periprosthetic fracture following unwitnessed fall with cdiff, bandemia, fever. Patient seen in SICU. 68 year old female with a PMHx of bipolar depression, Hyperlipidemia, NHL (in remission), OCD, dementia, with recent hospitalization (1/20-1/26) for right hip hemiarthroplasty for right femoral neck fracture after fall. Hospital course complicated by ESBL E. Coli UTI on Ertapenem (1/23-1/29) now presents with likely Hypovolemic/ Distributive shock secondary to sepsis 2/2 Pancolitis, likely secondary to C. Diff Colitis, Age Indeterminate L5 vertebral body fracture, possible acute as tender to palpation, Nondisplaced sacral insufficiency, Right proximal femoral periprosthetic fracture following unwitnessed fall with cdiff, bandemia, fever. Patient seen in SICU.  Out of bed to chair, but still on low dose phenylephrine, for BP support. Cognitive function today is excellent.

## 2019-02-26 NOTE — PROGRESS NOTE ADULT - ATTENDING COMMENTS
Right periprosthetic femur fracture. s/p surgical intervention - currently improving from C. Diff colitis and may soon proceed with surgery  Pain meds as needed orally  as tolerated. I am a non participating [employer] physician seeing Pt in coverage for Dr Barraza Out of bed to chair, but still on low dose phenylephrine, for BP support. Cognitive function today is excellent. The patient continues to require around the clock cardiopulmonary and neurologic monitoring for critical illness, as cited above.

## 2019-02-26 NOTE — PROGRESS NOTE ADULT - ASSESSMENT
68 year old female with bipolar depression, OCD, dementia, HLD, NHL (in remission since 2016), osteoarthritis s/p bilateral TKR s/p fall presenting with a right periprosthetic femoral neck fracture and C. difficile colitis, now s/p ORIF of R periprosthetic femur fracture and revision of right hip hemiarthroplasty on 2/22, transferred to SICU intubated, on pressors. Now extubated     PLAN:  Neuro: bipolar depression, OCD, dementia, acute traumatic pain  - Tylenol, oxycodone PO PRN pain  - Dilaudid PRN for severe pain  - Continue home bupropion, ziprasidone.    Resp: acute post-operative respiratory failure weaned off ventilator, b/l pleural effusions  - Extubated 2/23 to face mask, weaned to NC  - Duonebs q 6hours    CV: hypotension on true   - Wean true as tolerated   - Fluid resuscitate as necessary  - official TTE pending    GI: C. difficile colitis, complicated by ileus, resolved  - Passed bedside swallow, started on regular diet    Renal CKD stage III  - Ezra, monitor I&Os  - NS @ 50 cc/hr    Heme: no acute issues  - Lovenox for VTE prophylaxis.    ID: C. difficile colitis, resolved  - Monitor WBC, temperature, and procalcitonin.  - PO vancomycin for C. Diff, begin taper until 3/23/19    Endo: hypothyroidism  - Monitor glucose on BMP  - Continue home synthroid    Disposition: DNR, SICU

## 2019-02-26 NOTE — PROGRESS NOTE ADULT - SUBJECTIVE AND OBJECTIVE BOX
67 y/o critically ill  female h/o dementia NHL presenting after unwitnessed fall. Pt found on the ground at rehab. is on lovenox after hip fracture. pt reports pain to her right leg. per EMS en route slurring speech and mildly hypotensive requesting trauma.unclear how long pt on the ground. Patient found to have C diff with worsening hypotension requiring pressors on and off and  Patient requiring the SICU for continued cardiopulmonary monitoring. Orthopedic procedure on hold due to  hypotension and pan colitis and need for pressors due to unstable hemodynamics. Diarrhea slowing off IV Flagyl + on oral vancomycin. Patient now has the rectal tube removed.   Leukocytosis and diarrhea have improved  No significant change in status. Has senile dementia with intermittent lucency. No further C.diff colitis and no further hypovolemic hypotension. Patient s/p Open reduction and internal fixation of periprosthetic hip fx Mental status has greatly improved    MEDICATIONS  (STANDING):  buPROPion XL . 300 milliGRAM(s) Oral daily  chlorhexidine 4% Liquid 1 Application(s) Topical <User Schedule>  docusate sodium 100 milliGRAM(s) Oral three times a day  enoxaparin Injectable 40 milliGRAM(s) SubCutaneous daily  influenza   Vaccine 0.5 milliLiter(s) IntraMuscular once  levothyroxine 100 MICROGram(s) Oral daily  phenylephrine    Infusion 0.4 MICROgram(s)/kG/Min (9.165 mL/Hr) IV Continuous <Continuous>  senna 2 Tablet(s) Oral at bedtime  sodium chloride 0.9%. 1000 milliLiter(s) (50 mL/Hr) IV Continuous <Continuous>  vancomycin    Solution 125 milliGRAM(s) Oral every 6 hours  ziprasidone 40 milliGRAM(s) Oral <User Schedule>    MEDICATIONS  (PRN):  acetaminophen   Tablet .. 975 milliGRAM(s) Oral every 6 hours PRN Mild Pain (1 - 3)  ALBUTerol/ipratropium for Nebulization 3 milliLiter(s) Nebulizer every 6 hours PRN Shortness of Breath and/or Wheezing  oxyCODONE    IR 5 milliGRAM(s) Oral every 4 hours PRN Moderate Pain (4 - 6)          VITALS:   T(C): 36.7 (02-25-19 @ 19:00), Max: 37.1 (02-25-19 @ 03:00)  HR: 106 (02-25-19 @ 21:30) (93 - 123)  BP: 94/52 (02-25-19 @ 21:30) (79/41 - 114/54)  RR: 20 (02-25-19 @ 21:30) (15 - 41)  SpO2: 96% (02-25-19 @ 21:30) (86% - 99%)  Wt(kg): --      PHYSICAL EXAM:  GENERAL: NAD, well-groomed, well-developed  HEAD:  Atraumatic, Normocephalic  EYES: EOMI, PERRLA, conjunctiva and sclera clear  ENMT: No tonsillar erythema, exudates, or enlargement; Moist mucous membranes, Good dentition, No lesions  NECK: Supple, No JVD, Normal thyroid  NERVOUS SYSTEM:  confused becoming aphasic  CHEST/LUNG: Clear to percussion bilaterally; No rales, rhonchi, wheezing, or rubs  HEART: Regular rate and rhythm; No murmurs, rubs, or gallops  ABDOMEN: Soft, Nontender, Nondistended; Bowel sounds present  Periprosthetic right femur fracture repair with localized swelling, B/L LE edema  LYMPH: No lymphadenopathy noted  SKIN: No rashes or lesions    LABS:        CBC Full  -  ( 25 Feb 2019 12:06 )  WBC Count : 7.8 K/uL  Hemoglobin : 8.4 g/dL  Hematocrit : 24.4 %  Platelet Count - Automated : 77 K/uL  Mean Cell Volume : 96.1 fl  Mean Cell Hemoglobin : 32.9 pg  Mean Cell Hemoglobin Concentration : 34.2 gm/dL  Auto Neutrophil # : x  Auto Lymphocyte # : x  Auto Monocyte # : x  Auto Eosinophil # : x  Auto Basophil # : x  Auto Neutrophil % : x  Auto Lymphocyte % : x  Auto Monocyte % : x  Auto Eosinophil % : x  Auto Basophil % : x    02-25    133<L>  |  98  |  14  ----------------------------<  100<H>  3.4<L>   |  25  |  0.58    Ca    7.5<L>      25 Feb 2019 03:19  Phos  2.7     02-25  Mg     1.7     02-25    TPro  3.8<L>  /  Alb  1.7<L>  /  TBili  0.5  /  DBili  x   /  AST  25  /  ALT  14  /  AlkPhos  111  02-24    LIVER FUNCTIONS - ( 24 Feb 2019 03:07 )  Alb: 1.7 g/dL / Pro: 3.8 g/dL / ALK PHOS: 111 U/L / ALT: 14 U/L / AST: 25 U/L / GGT: x           PT/INR - ( 25 Feb 2019 11:33 )   PT: 12.5 sec;   INR: 1.09 ratio         PTT - ( 25 Feb 2019 11:33 )  PTT:23.8 sec    CAPILLARY BLOOD GLUCOSE          RADIOLOGY & ADDITIONAL TESTS: 69 y/o critically ill  female h/o dementia NHL presenting after unwitnessed fall. Pt found on the ground at rehab. is on lovenox after hip fracture. pt reports pain to her right leg. per EMS en route slurring speech and mildly hypotensive requesting trauma.unclear how long pt on the ground. Patient found to have C diff with worsening hypotension requiring pressors on and off and  Patient requiring the SICU for continued cardiopulmonary monitoring. Orthopedic procedure on hold due to  hypotension and pan colitis and need for pressors due to unstable hemodynamics. Diarrhea slowing off IV Flagyl + on oral vancomycin. Patient now has the rectal tube removed.   Leukocytosis and diarrhea have improved  No significant change in status. Has senile dementia with intermittent lucency. No further C.diff colitis and no further hypovolemic hypotension. Patient s/p Open reduction and internal fixation of periprosthetic hip fx Mental status has greatly improved    MEDICATIONS  (STANDING):  albumin human  5% IVPB 250 milliLiter(s) IV Intermittent once  buPROPion XL . 300 milliGRAM(s) Oral daily  chlorhexidine 4% Liquid 1 Application(s) Topical <User Schedule>  docusate sodium 100 milliGRAM(s) Oral three times a day  dronabinol 2.5 milliGRAM(s) Oral daily  enoxaparin Injectable 40 milliGRAM(s) SubCutaneous daily  influenza   Vaccine 0.5 milliLiter(s) IntraMuscular once  levothyroxine 100 MICROGram(s) Oral daily  midodrine 5 milliGRAM(s) Oral every 8 hours  phenylephrine    Infusion 0.4 MICROgram(s)/kG/Min (9.165 mL/Hr) IV Continuous <Continuous>  senna 2 Tablet(s) Oral at bedtime  sodium chloride 0.9%. 1000 milliLiter(s) (50 mL/Hr) IV Continuous <Continuous>  vancomycin    Solution 125 milliGRAM(s) Oral every 6 hours  ziprasidone 40 milliGRAM(s) Oral <User Schedule>    MEDICATIONS  (PRN):  acetaminophen   Tablet .. 975 milliGRAM(s) Oral every 6 hours PRN Mild Pain (1 - 3)  ALBUTerol/ipratropium for Nebulization 3 milliLiter(s) Nebulizer every 6 hours PRN Shortness of Breath and/or Wheezing  oxyCODONE    IR 5 milliGRAM(s) Oral every 4 hours PRN Moderate Pain (4 - 6)    ICU Vital Signs Last 24 Hrs  T(C): 36.8 (26 Feb 2019 15:00), Max: 36.8 (25 Feb 2019 23:00)  T(F): 98.3 (26 Feb 2019 15:00), Max: 98.3 (25 Feb 2019 23:00)  HR: 109 (26 Feb 2019 15:30) (90 - 123)  BP: 111/47 (26 Feb 2019 15:30) (75/40 - 123/56)  BP(mean): 68 (26 Feb 2019 15:30) (55 - 84)  ABP: --  ABP(mean): --  RR: 21 (26 Feb 2019 15:30) (15 - 35)  SpO2: 94% (26 Feb 2019 15:30) (65% - 98%)            PHYSICAL EXAM:  GENERAL: NAD, well-groomed, well-developed  HEAD:  Atraumatic, Normocephalic  EYES: EOMI, PERRLA, conjunctiva and sclera clear  ENMT: No tonsillar erythema, exudates, or enlargement; Moist mucous membranes, Good dentition, No lesions  NECK: Supple, No JVD, Normal thyroid  NERVOUS SYSTEM:  confused becoming aphasic  CHEST/LUNG: Clear to percussion bilaterally; No rales, rhonchi, wheezing, or rubs  HEART: Regular rate and rhythm; No murmurs, rubs, or gallops  ABDOMEN: Soft, Nontender, Nondistended; Bowel sounds present  Periprosthetic right femur fracture repair with localized swelling, B/L LE edema  LYMPH: No lymphadenopathy noted  SKIN: No rashes or lesions    LABS:          CBC Full  -  ( 26 Feb 2019 03:30 )  WBC Count : 7.2 K/uL  Hemoglobin : 8.6 g/dL  Hematocrit : 25.0 %  Platelet Count - Automated : 91 K/uL  Mean Cell Volume : 96.4 fl  Mean Cell Hemoglobin : 33.3 pg  Mean Cell Hemoglobin Concentration : 34.5 gm/dL  Auto Neutrophil # : x  Auto Lymphocyte # : x  Auto Monocyte # : x  Auto Eosinophil # : x  Auto Basophil # : x  Auto Neutrophil % : x  Auto Lymphocyte % : x  Auto Monocyte % : x  Auto Eosinophil % : x  Auto Basophil % : x    02-26    134<L>  |  99  |  12  ----------------------------<  103<H>  3.7   |  25  |  0.49<L>    Ca    7.5<L>      26 Feb 2019 03:30  Phos  2.7     02-26  Mg     1.9     02-26        PT/INR - ( 26 Feb 2019 03:30 )   PT: 13.0 sec;   INR: 1.14 ratio         PTT - ( 26 Feb 2019 03:30 )  PTT:26.7 sec 69 y/o critically ill  female h/o dementia NHL presenting after unwitnessed fall. Pt found on the ground at rehab. is on lovenox after hip fracture. pt reports pain to her right leg. per EMS en route slurring speech and mildly hypotensive requesting trauma.unclear how long pt on the ground. Patient found to have C diff with worsening hypotension requiring pressors on and off and  Patient requiring the SICU for continued cardiopulmonary monitoring. Orthopedic procedure on hold due to  hypotension and pan colitis and need for pressors due to unstable hemodynamics. Diarrhea slowing off IV Flagyl + on oral vancomycin. Patient now has the rectal tube removed.   Leukocytosis and diarrhea have improved  No significant change in status. Has senile dementia with intermittent lucency. No further C.diff colitis and no further hypovolemic hypotension. Patient s/p Open reduction and internal fixation of periprosthetic hip fx Mental status has greatly improved. Out of bed to chair, but still on low dose phenylephrine, for BP support. Cognitive function today is excellent.    MEDICATIONS  (STANDING):  albumin human  5% IVPB 250 milliLiter(s) IV Intermittent once  buPROPion XL . 300 milliGRAM(s) Oral daily  chlorhexidine 4% Liquid 1 Application(s) Topical <User Schedule>  docusate sodium 100 milliGRAM(s) Oral three times a day  dronabinol 2.5 milliGRAM(s) Oral daily  enoxaparin Injectable 40 milliGRAM(s) SubCutaneous daily  influenza   Vaccine 0.5 milliLiter(s) IntraMuscular once  levothyroxine 100 MICROGram(s) Oral daily  midodrine 5 milliGRAM(s) Oral every 8 hours  phenylephrine    Infusion 0.4 MICROgram(s)/kG/Min (9.165 mL/Hr) IV Continuous <Continuous>  senna 2 Tablet(s) Oral at bedtime  sodium chloride 0.9%. 1000 milliLiter(s) (50 mL/Hr) IV Continuous <Continuous>  vancomycin    Solution 125 milliGRAM(s) Oral every 6 hours  ziprasidone 40 milliGRAM(s) Oral <User Schedule>    MEDICATIONS  (PRN):  acetaminophen   Tablet .. 975 milliGRAM(s) Oral every 6 hours PRN Mild Pain (1 - 3)  ALBUTerol/ipratropium for Nebulization 3 milliLiter(s) Nebulizer every 6 hours PRN Shortness of Breath and/or Wheezing  oxyCODONE    IR 5 milliGRAM(s) Oral every 4 hours PRN Moderate Pain (4 - 6)    ICU Vital Signs Last 24 Hrs  T(C): 36.8 (26 Feb 2019 15:00), Max: 36.8 (25 Feb 2019 23:00)  T(F): 98.3 (26 Feb 2019 15:00), Max: 98.3 (25 Feb 2019 23:00)  HR: 109 (26 Feb 2019 15:30) (90 - 123)  BP: 111/47 (26 Feb 2019 15:30) (75/40 - 123/56)  BP(mean): 68 (26 Feb 2019 15:30) (55 - 84)  ABP: --  ABP(mean): --  RR: 21 (26 Feb 2019 15:30) (15 - 35)  SpO2: 94% (26 Feb 2019 15:30) (65% - 98%)            PHYSICAL EXAM:  GENERAL: NAD, well-groomed, well-developed  HEAD:  Atraumatic, Normocephalic  EYES: EOMI, PERRLA, conjunctiva and sclera clear  ENMT: No tonsillar erythema, exudates, or enlargement; Moist mucous membranes, Good dentition, No lesions  NECK: Supple, No JVD, Normal thyroid  NERVOUS SYSTEM:  confused becoming aphasic  CHEST/LUNG: Clear to percussion bilaterally; No rales, rhonchi, wheezing, or rubs  HEART: Regular rate and rhythm; No murmurs, rubs, or gallops  ABDOMEN: Soft, Nontender, Nondistended; Bowel sounds present  Periprosthetic right femur fracture repair with localized swelling, B/L LE edema  LYMPH: No lymphadenopathy noted  SKIN: No rashes or lesions    LABS:          CBC Full  -  ( 26 Feb 2019 03:30 )  WBC Count : 7.2 K/uL  Hemoglobin : 8.6 g/dL  Hematocrit : 25.0 %  Platelet Count - Automated : 91 K/uL  Mean Cell Volume : 96.4 fl  Mean Cell Hemoglobin : 33.3 pg  Mean Cell Hemoglobin Concentration : 34.5 gm/dL  Auto Neutrophil # : x  Auto Lymphocyte # : x  Auto Monocyte # : x  Auto Eosinophil # : x  Auto Basophil # : x  Auto Neutrophil % : x  Auto Lymphocyte % : x  Auto Monocyte % : x  Auto Eosinophil % : x  Auto Basophil % : x    02-26    134<L>  |  99  |  12  ----------------------------<  103<H>  3.7   |  25  |  0.49<L>    Ca    7.5<L>      26 Feb 2019 03:30  Phos  2.7     02-26  Mg     1.9     02-26        PT/INR - ( 26 Feb 2019 03:30 )   PT: 13.0 sec;   INR: 1.14 ratio         PTT - ( 26 Feb 2019 03:30 )  PTT:26.7 sec

## 2019-02-27 DIAGNOSIS — R60.9 EDEMA, UNSPECIFIED: ICD-10-CM

## 2019-02-27 LAB
ANION GAP SERPL CALC-SCNC: 8 MMOL/L — SIGNIFICANT CHANGE UP (ref 5–17)
BUN SERPL-MCNC: 11 MG/DL — SIGNIFICANT CHANGE UP (ref 7–23)
CALCIUM SERPL-MCNC: 7.4 MG/DL — LOW (ref 8.4–10.5)
CHLORIDE SERPL-SCNC: 102 MMOL/L — SIGNIFICANT CHANGE UP (ref 96–108)
CO2 SERPL-SCNC: 26 MMOL/L — SIGNIFICANT CHANGE UP (ref 22–31)
CORTIS AM PEAK SERPL-MCNC: 20.9 UG/DL — HIGH (ref 6–18.4)
CREAT SERPL-MCNC: 0.52 MG/DL — SIGNIFICANT CHANGE UP (ref 0.5–1.3)
CULTURE RESULTS: SIGNIFICANT CHANGE UP
CULTURE RESULTS: SIGNIFICANT CHANGE UP
GAS PNL BLDA: SIGNIFICANT CHANGE UP
GLUCOSE SERPL-MCNC: 115 MG/DL — HIGH (ref 70–99)
HCT VFR BLD CALC: 29.6 % — LOW (ref 34.5–45)
HGB BLD-MCNC: 10.6 G/DL — LOW (ref 11.5–15.5)
MAGNESIUM SERPL-MCNC: 1.9 MG/DL — SIGNIFICANT CHANGE UP (ref 1.6–2.6)
MCHC RBC-ENTMCNC: 33 PG — SIGNIFICANT CHANGE UP (ref 27–34)
MCHC RBC-ENTMCNC: 35.7 GM/DL — SIGNIFICANT CHANGE UP (ref 32–36)
MCV RBC AUTO: 92.3 FL — SIGNIFICANT CHANGE UP (ref 80–100)
PHOSPHATE SERPL-MCNC: 2.8 MG/DL — SIGNIFICANT CHANGE UP (ref 2.5–4.5)
PLATELET # BLD AUTO: 93 K/UL — LOW (ref 150–400)
POTASSIUM SERPL-MCNC: 3.4 MMOL/L — LOW (ref 3.5–5.3)
POTASSIUM SERPL-SCNC: 3.4 MMOL/L — LOW (ref 3.5–5.3)
RBC # BLD: 3.21 M/UL — LOW (ref 3.8–5.2)
RBC # FLD: 19.2 % — HIGH (ref 10.3–14.5)
SODIUM SERPL-SCNC: 136 MMOL/L — SIGNIFICANT CHANGE UP (ref 135–145)
SPECIMEN SOURCE: SIGNIFICANT CHANGE UP
SPECIMEN SOURCE: SIGNIFICANT CHANGE UP
WBC # BLD: 8.7 K/UL — SIGNIFICANT CHANGE UP (ref 3.8–10.5)
WBC # FLD AUTO: 8.7 K/UL — SIGNIFICANT CHANGE UP (ref 3.8–10.5)

## 2019-02-27 PROCEDURE — 93308 TTE F-UP OR LMTD: CPT | Mod: 26

## 2019-02-27 PROCEDURE — 93321 DOPPLER ECHO F-UP/LMTD STD: CPT | Mod: 26

## 2019-02-27 PROCEDURE — 99232 SBSQ HOSP IP/OBS MODERATE 35: CPT

## 2019-02-27 PROCEDURE — 99291 CRITICAL CARE FIRST HOUR: CPT

## 2019-02-27 PROCEDURE — 71045 X-RAY EXAM CHEST 1 VIEW: CPT | Mod: 26,76

## 2019-02-27 RX ORDER — POTASSIUM PHOSPHATE, MONOBASIC POTASSIUM PHOSPHATE, DIBASIC 236; 224 MG/ML; MG/ML
15 INJECTION, SOLUTION INTRAVENOUS ONCE
Qty: 0 | Refills: 0 | Status: COMPLETED | OUTPATIENT
Start: 2019-02-27 | End: 2019-02-27

## 2019-02-27 RX ORDER — POTASSIUM CHLORIDE 20 MEQ
20 PACKET (EA) ORAL
Qty: 0 | Refills: 0 | Status: COMPLETED | OUTPATIENT
Start: 2019-02-27 | End: 2019-02-27

## 2019-02-27 RX ORDER — MAGNESIUM SULFATE 500 MG/ML
2 VIAL (ML) INJECTION ONCE
Qty: 0 | Refills: 0 | Status: COMPLETED | OUTPATIENT
Start: 2019-02-27 | End: 2019-02-27

## 2019-02-27 RX ORDER — FUROSEMIDE 40 MG
20 TABLET ORAL EVERY 8 HOURS
Qty: 0 | Refills: 0 | Status: COMPLETED | OUTPATIENT
Start: 2019-02-27 | End: 2019-02-28

## 2019-02-27 RX ORDER — MIDODRINE HYDROCHLORIDE 2.5 MG/1
10 TABLET ORAL THREE TIMES A DAY
Qty: 0 | Refills: 0 | Status: DISCONTINUED | OUTPATIENT
Start: 2019-02-27 | End: 2019-02-28

## 2019-02-27 RX ORDER — MIDODRINE HYDROCHLORIDE 2.5 MG/1
5 TABLET ORAL ONCE
Qty: 0 | Refills: 0 | Status: COMPLETED | OUTPATIENT
Start: 2019-02-27 | End: 2019-02-27

## 2019-02-27 RX ADMIN — Medication 100 MICROGRAM(S): at 06:57

## 2019-02-27 RX ADMIN — ZIPRASIDONE HYDROCHLORIDE 40 MILLIGRAM(S): 20 CAPSULE ORAL at 06:57

## 2019-02-27 RX ADMIN — ENOXAPARIN SODIUM 40 MILLIGRAM(S): 100 INJECTION SUBCUTANEOUS at 11:36

## 2019-02-27 RX ADMIN — BUPROPION HYDROCHLORIDE 300 MILLIGRAM(S): 150 TABLET, EXTENDED RELEASE ORAL at 11:36

## 2019-02-27 RX ADMIN — MIDODRINE HYDROCHLORIDE 10 MILLIGRAM(S): 2.5 TABLET ORAL at 14:31

## 2019-02-27 RX ADMIN — CHLORHEXIDINE GLUCONATE 1 APPLICATION(S): 213 SOLUTION TOPICAL at 06:57

## 2019-02-27 RX ADMIN — MIDODRINE HYDROCHLORIDE 5 MILLIGRAM(S): 2.5 TABLET ORAL at 12:33

## 2019-02-27 RX ADMIN — OXYCODONE HYDROCHLORIDE 5 MILLIGRAM(S): 5 TABLET ORAL at 12:07

## 2019-02-27 RX ADMIN — Medication 125 MILLIGRAM(S): at 03:29

## 2019-02-27 RX ADMIN — Medication 50 GRAM(S): at 08:16

## 2019-02-27 RX ADMIN — OXYCODONE HYDROCHLORIDE 5 MILLIGRAM(S): 5 TABLET ORAL at 11:37

## 2019-02-27 RX ADMIN — Medication 2.5 MILLIGRAM(S): at 11:36

## 2019-02-27 RX ADMIN — MIDODRINE HYDROCHLORIDE 5 MILLIGRAM(S): 2.5 TABLET ORAL at 06:57

## 2019-02-27 RX ADMIN — Medication 20 MILLIEQUIVALENT(S): at 08:16

## 2019-02-27 RX ADMIN — Medication 20 MILLIGRAM(S): at 22:21

## 2019-02-27 RX ADMIN — Medication 125 MILLIGRAM(S): at 10:08

## 2019-02-27 RX ADMIN — Medication 125 MILLIGRAM(S): at 16:38

## 2019-02-27 RX ADMIN — Medication 20 MILLIEQUIVALENT(S): at 06:57

## 2019-02-27 RX ADMIN — Medication 100 MILLIGRAM(S): at 06:57

## 2019-02-27 RX ADMIN — MIDODRINE HYDROCHLORIDE 10 MILLIGRAM(S): 2.5 TABLET ORAL at 21:16

## 2019-02-27 RX ADMIN — POTASSIUM PHOSPHATE, MONOBASIC POTASSIUM PHOSPHATE, DIBASIC 62.5 MILLIMOLE(S): 236; 224 INJECTION, SOLUTION INTRAVENOUS at 10:08

## 2019-02-27 RX ADMIN — Medication 20 MILLIEQUIVALENT(S): at 10:08

## 2019-02-27 RX ADMIN — PHENYLEPHRINE HYDROCHLORIDE 9.16 MICROGRAM(S)/KG/MIN: 10 INJECTION INTRAVENOUS at 06:57

## 2019-02-27 RX ADMIN — Medication 125 MILLIGRAM(S): at 21:16

## 2019-02-27 NOTE — PROGRESS NOTE ADULT - ASSESSMENT
68 year old female with a PMHx of bipolar depression, Hyperlipidemia, NHL (in remission), OCD, dementia, with recent hospitalization (1/20-1/26) for right hip hemiarthroplasty for right femoral neck fracture after fall. Hospital course complicated by ESBL E. Coli UTI on Ertapenem (1/23-1/29)    s/p  Hypovolemic/ Distributive shock secondary to sepsis 2/2 Pancolitis, likely secondary to C. Diff Colitis,-now resolved  Age Indeterminate L5 vertebral body fracture, Right proximal femoral periprosthetic fracture following unwitnessed fall with cdiff, bandemia, fever  s/p Open reduction and internal fixation (ORIF) of periprosthetic fracture of femur  02/22/2019  Right    Hemiarthroplasty of hip  02/22/2019  Revision--Right, posterior approach    Irrigation and debridement, thigh or hip  02/22/2019  Right  Cdiff improved and stable  No s/s any other infectious focus  OR Cx negative   Stable  Would continue Vanco po in a tapering fashion    then taper-125 mg po q 6 X 2 weeks 2/23 --> 3/9/19  and then q 12 X 2 more weeks 3/10 --> 3/23/19    Will tailor plan for ID issues  per course,results.Will defer to primary team on management of other issues.    Will Follow.  Beeper [id number]/[id number]/No response-[id number]

## 2019-02-27 NOTE — PROGRESS NOTE ADULT - ASSESSMENT
68 year old female with bipolar depression, OCD, dementia, HLD, NHL (in remission since 2016), osteoarthritis s/p bilateral TKR s/p fall presenting with a right periprosthetic femoral neck fracture and C. difficile colitis, now s/p ORIF of R periprosthetic femur fracture and revision of right hip hemiarthroplasty on 2/22, transferred to SICU intubated, on pressors. Now extubated     PLAN:  Neuro: bipolar depression, OCD, dementia, acute traumatic pain  - Tylenol, oxycodone PO PRN pain  - Dilaudid PRN for severe pain  - Continue home bupropion, ziprasidone.    Resp: acute post-operative respiratory failure weaned off ventilator, b/l pleural effusions  - Extubated 2/23 to face mask, weaned to NC  - Duonebs q 6hours    CV: hypotension on true   - Wean neosynephrine  - Midodrine  - Fluid resuscitate as necessary, + 250 cc 5% albumin, +1u pRBC  - official TTE pending    GI: C. difficile colitis, complicated by ileus, resolved  - Passed bedside swallow, started on regular diet    Renal CKD stage III  - Ezra, monitor I&Os  - Continue to monitor volume status  - NS @ 50 cc/hr    Heme: no acute issues  - Lovenox for VTE prophylaxis.  - +1u pRBC for hypotension    ID: C. difficile colitis, resolved  - Monitor WBC, temperature, and procalcitonin.  - PO vancomycin for C. Diff, begin taper until 3/23/19    Endo: hypothyroidism  - Monitor glucose on BMP  - Continue home synthroid    Disposition: DNR, SICU    Katya Becker PGY-2  SICU [id number]

## 2019-02-27 NOTE — PROGRESS NOTE ADULT - SUBJECTIVE AND OBJECTIVE BOX
Patient is a 68y old  Female who presents with a chief complaint of Fractured hip (24 Feb 2019 08:31)    Being followed by ID for C diff    Interval history:still on phenylephrine   No acute events      ROS:  No cough,SOB,CP  No N/V/D./abd pain  No other complaints      Antimicrobials:    vancomycin    Solution 125 milliGRAM(s) Oral every 6 hours    Other medications reviewed    Vital Signs Last 24 Hrs  T(C): 36.9 (02-27-19 @ 07:00), Max: 37.2 (02-27-19 @ 00:45)  T(F): 98.5 (02-27-19 @ 07:00), Max: 99 (02-27-19 @ 00:45)  HR: 100 (02-27-19 @ 11:15) (89 - 114)  BP: 110/55 (02-27-19 @ 11:00) (75/38 - 167/58)  BP(mean): 79 (02-27-19 @ 11:00) (50 - 84)  RR: 31 (02-27-19 @ 11:15) (14 - 35)  SpO2: 96% (02-27-19 @ 11:15) (87% - 100%)    Physical Exam:      HEENT PERRLA EOMI    No oral exudate or erythema    Chest Good AE,CTA    CVS RRR S1 S2 WNl No murmur or rub or gallop    Abd soft BS normal No tenderness no masses      R leg dressing with DION  serosanguinous fluid in DION     IV site no erythema tenderness or discharge    + + LE edema  bilateral -R foot blisters    CNS AAO X 3 no focalLab Data:Lab Data:                          10.6   8.7   )-----------( 93       ( 27 Feb 2019 02:46 )             29.6       02-27    136  |  102  |  11  ----------------------------<  115<H>  3.4<L>   |  26  |  0.52    Ca    7.4<L>      27 Feb 2019 02:46  Phos  2.8     02-27  Mg     1.9     02-27          Culture - Body Fluid with Gram Stain (collected 23 Feb 2019 00:31)  Source: .Body Fluid Synovial Fluid  Gram Stain (23 Feb 2019 04:35):    No polymorphonuclear cells seen    No organisms seen    by cytocentrifuge  Preliminary Report (23 Feb 2019 18:19):    No growth    Culture - Fungal, Body Fluid (collected 23 Feb 2019 00:31)  Source: .Body Fluid right hip synovial fluid  Preliminary Report (25 Feb 2019 10:52):    Testing in progress    Culture - Acid Fast - Body Fluid w/Smear (collected 23 Feb 2019 00:31)  Source: .Body Fluid Synovial Fluid    Culture - Tissue with Gram Stain (collected 23 Feb 2019 00:29)  Source: .Tissue Other, right hip synovium  Gram Stain (23 Feb 2019 03:48):    Rare polymorphonuclear leukocytes seen per low power field    No organisms seen per oil power field  Preliminary Report (23 Feb 2019 20:56):    No growth    Culture - Fungal, Tissue (collected 23 Feb 2019 00:29)  Source: .Tissue right hip synovium  Preliminary Report (25 Feb 2019 10:55):    Testing in progress    Culture - Acid Fast - Tissue w/Smear (collected 23 Feb 2019 00:29)  Source: .Tissue Other, right hip synovium    Culture - Acid Fast - Tissue w/Smear (collected 23 Feb 2019 00:28)  Source: .Tissue Other, right femur    Culture - Fungal, Tissue (collected 23 Feb 2019 00:28)  Source: .Tissue right femur  Preliminary Report (25 Feb 2019 10:55):    Testing in progress    Culture - Tissue with Gram Stain (collected 23 Feb 2019 00:28)  Source: .Tissue Other, right femur  Gram Stain (23 Feb 2019 04:00):    Rare polymorphonuclear leukocytes seen per low power field    No organisms seen per oil power field  Preliminary Report (23 Feb 2019 22:29):    No growth            Vancomycin Level, Random: 12.1 ug/mL (02-26-19 @ 03:30)

## 2019-02-27 NOTE — PROGRESS NOTE ADULT - ASSESSMENT
69 yo woman with a hx of dementia present after recent Hip surgery present after a fall with a femur fx. Patient was found to be cdiff positive. s/p revision of hip. seen now in SICU

## 2019-02-27 NOTE — PROGRESS NOTE ADULT - ATTENDING COMMENTS
Patient seen and examined and agree with resident note.  Patient had increasing requirements of phenylephrine for hypotension. She had an ultrasound consistent with hypovolemia.   Given 1 unit PRBC    Hypotension - patient on phenylephrine  Will continue to wean pressors as tolerated to MAP goal > 65 mmHg  -will perform CC US bedside to assess for hypovolemia  - will place arterial line   Unclear etiology of hypotension. Initially thought to be hypovolemia but pressor requirement has unchanged.  Continue calorie count and encourage eating. Will place haim tube to assist with nutrition.     C diff colitis- will continue treatment with PO vanco at this time    Hypokalemia- repleted and will recheck  -no signs of arrhythmia at this time    CC time: 43minutes

## 2019-02-27 NOTE — PROGRESS NOTE ADULT - ATTENDING COMMENTS
I agree with the above note on this patient. All pertinent films have been reviewed. Please refer to clinical documentation of the history, physical examinations, data summary, and both assessment and plan as documented above and with which I agree.    Conor George MD  Attending Orthopedic Surgeon

## 2019-02-27 NOTE — PROGRESS NOTE ADULT - ASSESSMENT
68y Female s/p revision R hip hemiarthroplasty POD 5    - Pain control  - FU ID  - IV vanco x72 hours until OR Cx negative  - FU ID recs  - DC yanes when off pressors  - Prevena vac  - NWB RLE OOBTC  - Posterior dislocation precautions  - PT/OT  - DVT ppx  - care per SICU  - FU nutrition consult

## 2019-02-27 NOTE — PROGRESS NOTE ADULT - SUBJECTIVE AND OBJECTIVE BOX
67 y/o critically ill  female h/o dementia NHL presenting after unwitnessed fall. Pt found on the ground at rehab. is on lovenox after hip fracture. pt reports pain to her right leg. per EMS en route slurring speech and mildly hypotensive requesting trauma.unclear how long pt on the ground. Patient found to have C diff with worsening hypotension requiring pressors on and off and  Patient requiring the SICU for continued cardiopulmonary monitoring. Orthopedic procedure on hold due to  hypotension and pan colitis and need for pressors due to unstable hemodynamics. Diarrhea slowing off IV Flagyl + on oral vancomycin. Patient now has the rectal tube removed.   Leukocytosis and diarrhea have improved  No significant change in status. Has senile dementia with intermittent lucency. No further C.diff colitis and no further hypovolemic hypotension. Patient s/p Open reduction and internal fixation of periprosthetic hip fx Mental status has greatly improved. Out of bed to chair, but still on low dose phenylephrine and midodrine, for BP support. Cognitive function today is excellent. NGT placed with poor PO intake and low albumin    MEDICATIONS  (STANDING):  buPROPion XL . 300 milliGRAM(s) Oral daily  chlorhexidine 4% Liquid 1 Application(s) Topical <User Schedule>  docusate sodium 100 milliGRAM(s) Oral three times a day  dronabinol 2.5 milliGRAM(s) Oral daily  enoxaparin Injectable 40 milliGRAM(s) SubCutaneous daily  furosemide   Injectable 20 milliGRAM(s) IV Push every 8 hours  influenza   Vaccine 0.5 milliLiter(s) IntraMuscular once  levothyroxine 100 MICROGram(s) Oral daily  midodrine 10 milliGRAM(s) Oral three times a day  senna 2 Tablet(s) Oral at bedtime  vancomycin    Solution 125 milliGRAM(s) Oral every 6 hours  ziprasidone 40 milliGRAM(s) Oral <User Schedule>    MEDICATIONS  (PRN):  acetaminophen   Tablet .. 975 milliGRAM(s) Oral every 6 hours PRN Mild Pain (1 - 3)  ALBUTerol/ipratropium for Nebulization 3 milliLiter(s) Nebulizer every 6 hours PRN Shortness of Breath and/or Wheezing  oxyCODONE    IR 5 milliGRAM(s) Oral every 4 hours PRN Moderate Pain (4 - 6)          VITALS:   T(C): 36.9 (02-27-19 @ 19:00), Max: 37.2 (02-27-19 @ 00:45)  HR: 109 (02-27-19 @ 23:00) (89 - 109)  BP: 110/60 (02-27-19 @ 21:00) (80/47 - 123/54)  RR: 21 (02-27-19 @ 23:00) (14 - 48)  SpO2: 97% (02-27-19 @ 23:00) (90% - 100%)  Wt(kg): --    PHYSICAL EXAM:  GENERAL: NAD, well-groomed, well-developed  HEAD:  Atraumatic, Normocephalic  EYES: EOMI, PERRLA, conjunctiva and sclera clear  ENMT: No tonsillar erythema, exudates, or enlargement; Moist mucous membranes, Good dentition, No lesions  NECK: Supple, No JVD, Normal thyroid  NERVOUS SYSTEM:  confused becoming aphasic  CHEST/LUNG: Clear to percussion bilaterally; No rales, rhonchi, wheezing, or rubs  HEART: Regular rate and rhythm; No murmurs, rubs, or gallops  ABDOMEN: Soft, Nontender, Nondistended; Bowel sounds present  Periprosthetic right femur fracture repair with localized swelling, B/L LE edema  LYMPH: No lymphadenopathy noted  SKIN: No rashes or lesions    LABS:  ABG - ( 27 Feb 2019 16:21 )  pH, Arterial: 7.47  pH, Blood: x     /  pCO2: 36    /  pO2: 118   / HCO3: 26    / Base Excess: 2.7   /  SaO2: 99                    CBC Full  -  ( 27 Feb 2019 02:46 )  WBC Count : 8.7 K/uL  Hemoglobin : 10.6 g/dL  Hematocrit : 29.6 %  Platelet Count - Automated : 93 K/uL  Mean Cell Volume : 92.3 fl  Mean Cell Hemoglobin : 33.0 pg  Mean Cell Hemoglobin Concentration : 35.7 gm/dL  Auto Neutrophil # : x  Auto Lymphocyte # : x  Auto Monocyte # : x  Auto Eosinophil # : x  Auto Basophil # : x  Auto Neutrophil % : x  Auto Lymphocyte % : x  Auto Monocyte % : x  Auto Eosinophil % : x  Auto Basophil % : x    02-27    136  |  102  |  11  ----------------------------<  115<H>  3.4<L>   |  26  |  0.52    Ca    7.4<L>      27 Feb 2019 02:46  Phos  2.8     02-27  Mg     1.9     02-27        PT/INR - ( 26 Feb 2019 03:30 )   PT: 13.0 sec;   INR: 1.14 ratio         PTT - ( 26 Feb 2019 03:30 )  PTT:26.7 sec    CAPILLARY BLOOD GLUCOSE          RADIOLOGY & ADDITIONAL TESTS:

## 2019-02-27 NOTE — PROGRESS NOTE ADULT - SUBJECTIVE AND OBJECTIVE BOX
HISTORY  68y Female with a past medical history of bipolar depression, OCD, dementia, HLD, NHL (in remission since 2016), osteoarthritis s/p bilateral TKR, and recent right YONATAN (1/20-1/26)  who presented on 2/4 as a level two trauma after a fall. Patient was found down in rehab after likely rolling out of bed. Patient was upgraded to a level 1 due to hypotension w/ SBP in the 80s despite fluid resuscitation and transfusions. Imaging revealed pancolitis and a right periprosthetic femoral neck fracture. Patient tested for C. difficile and was positive. She was admitted to SICU for hemodynamic monitoring in the setting of sepsis. Pt eventually transferred to floor on 2/14/19.    Pt taken to OR with orthopedics on 2/22/19 for ORIF of right periprosthetic femur fracture and revision of right hemiarthroplasty. EBL 500ml, IVF 1750ml, 1u PRBC. Pt required phenylephrine throughout the case and was 1.3 mcg/kg/min at the end of the case. For this reason she was kept intubated and transferred to PACU. SICU consulted for management of ventilator and pressors.    24 HOUR EVENTS:  - Patient continued to require neosynephrine, max 2.0, downtrending  - Bedside echo demonstrates hypovolemia, moderate b/l pleural effusion  - Calorie count initiated marinol started   - +250 cc 5% albumin, +1u pRBC given    SUBJECTIVE/ROS:  [ ] A ten-point review of systems was otherwise negative except as noted.  [x] Due to altered mental status/intubation, subjective information were not able to be obtained from the patient. History was obtained, to the extent possible, from review of the chart and collateral sources of information.    NEURO  Exam: confused, alert, NAD  Meds: acetaminophen   Tablet .. 975 milliGRAM(s) Oral every 6 hours PRN Mild Pain (1 - 3)  buPROPion XL . 300 milliGRAM(s) Oral daily  dronabinol 2.5 milliGRAM(s) Oral daily  oxyCODONE    IR 5 milliGRAM(s) Oral every 4 hours PRN Moderate Pain (4 - 6)  ziprasidone 40 milliGRAM(s) Oral <User Schedule>    [x] Adequacy of sedation and pain control has been assessed and adjusted    RESPIRATORY  RR: 20 (02-27-19 @ 00:30) (15 - 35)  SpO2: 94% (02-27-19 @ 00:30) (81% - 100%)  Wt(kg): --  Exam: unlabored breathing on NC  Meds: ALBUTerol/ipratropium for Nebulization 3 milliLiter(s) Nebulizer every 6 hours PRN Shortness of Breath and/or Wheezing    CARDIOVASCULAR  HR: 104 (02-27-19 @ 00:30) (90 - 114)  BP: 106/55 (02-27-19 @ 00:30) (75/38 - 167/58)  BP(mean): 74 (02-27-19 @ 00:30) (50 - 84)  ABP: --  ABP(mean): --  Wt(kg): --  CVP(cm H2O): --    Exam: hypotensive requiring pressors,   Meds: midodrine 5 milliGRAM(s) Oral every 8 hours  phenylephrine    Infusion 0.4 MICROgram(s)/kG/Min IV Continuous <Continuous>    GI/NUTRITION  Exam: soft, nt, nd  Diet: general  Meds: docusate sodium 100 milliGRAM(s) Oral three times a day  senna 2 Tablet(s) Oral at bedtime    GENITOURINARY  I&O's Detail    02-25 @ 07:01  -  02-26 @ 07:00  --------------------------------------------------------  IN:    Oral Fluid: 450 mL    phenylephrine   Infusion: 464 mL    sodium chloride 0.9%.: 1200 mL    Solution: 350 mL  Total IN: 2464 mL    OUT:    Bulb: 35 mL    Bulb: 75 mL    Indwelling Catheter - Urethral: 740 mL    Stool: 1 mL  Total OUT: 851 mL    Total NET: 1613 mL    02-26 @ 07:01  -  02-27 @ 00:40  --------------------------------------------------------  IN:    Albumin 5%  - 250 mL: 250 mL    Packed Red Blood Cells: 250 mL    phenylephrine   Infusion: 488.7 mL    sodium chloride 0.9%.: 850 mL    Solution: 250 mL  Total IN: 2088.7 mL    OUT:    Bulb: 65 mL    Bulb: 75 mL    Indwelling Catheter - Urethral: 525 mL  Total OUT: 665 mL    Total NET: 1423.7 mL    02-26    134<L>  |  99  |  12  ----------------------------<  103<H>  3.7   |  25  |  0.49<L>    Ca    7.5<L>      26 Feb 2019 03:30  Phos  2.7     02-26  Mg     1.9     02-26    Meds: sodium chloride 0.9%. 1000 milliLiter(s) IV Continuous <Continuous>    HEMATOLOGIC  Meds: enoxaparin Injectable 40 milliGRAM(s) SubCutaneous daily    [x] VTE Prophylaxis                        8.6    7.2   )-----------( 91       ( 26 Feb 2019 03:30 )             25.0     PT/INR - ( 26 Feb 2019 03:30 )   PT: 13.0 sec;   INR: 1.14 ratio       PTT - ( 26 Feb 2019 03:30 )  PTT:26.7 sec  Transfusion     [1] PRBC   [ ] Platelets   [ ] FFP   [ ] Cryoprecipitate    INFECTIOUS DISEASES  T(C): 37 (02-27-19 @ 00:15), Max: 37 (02-27-19 @ 00:15)  Wt(kg): --  WBC Count: 7.2 K/uL (02-26 @ 03:30)    Recent Cultures:  Specimen Source: .Body Fluid Synovial Fluid, 02-23 @ 00:31; Results   Testing in progress; Gram Stain:   No polymorphonuclear cells seen  No organisms seen  by cytocentrifuge; Organism: --  Specimen Source: .Tissue Other, right hip synovium, 02-23 @ 00:29; Results   Testing in progress; Gram Stain:   Rare polymorphonuclear leukocytes seen per low power field  No organisms seen per oil power field; Organism: --  Specimen Source: .Tissue Other, right femur, 02-23 @ 00:28; Results   No growth; Gram Stain:   Rare polymorphonuclear leukocytes seen per low power field  No organisms seen per oil power field; Organism: --    Meds: influenza   Vaccine 0.5 milliLiter(s) IntraMuscular once  vancomycin    Solution 125 milliGRAM(s) Oral every 6 hours    ENDOCRINE  Capillary Blood Glucose    Meds: levothyroxine 100 MICROGram(s) Oral daily    OTHER MEDICATIONS:  chlorhexidine 4% Liquid 1 Application(s) Topical <User Schedule>    CODE STATUS: Yes  Yes  Yes  Yes      IMAGING:

## 2019-02-27 NOTE — PROGRESS NOTE ADULT - SUBJECTIVE AND OBJECTIVE BOX
Orthopedic Surgery Progress Note     S: Patient seen and examined today. No acute events overnight. Pain is well controlled. On max dose of Devon-Synephrine, received 1u PRBC and 250cc of albumin in SICU yesterday.      MEDICATIONS  (STANDING):  buPROPion XL . 300 milliGRAM(s) Oral daily  chlorhexidine 4% Liquid 1 Application(s) Topical <User Schedule>  docusate sodium 100 milliGRAM(s) Oral three times a day  enoxaparin Injectable 40 milliGRAM(s) SubCutaneous daily  influenza   Vaccine 0.5 milliLiter(s) IntraMuscular once  levothyroxine 100 MICROGram(s) Oral daily  phenylephrine    Infusion 0.4 MICROgram(s)/kG/Min (9.165 mL/Hr) IV Continuous <Continuous>  potassium chloride  10 mEq/100 mL IVPB 10 milliEquivalent(s) IV Intermittent every 1 hour  potassium phosphate IVPB 15 milliMole(s) IV Intermittent once  senna 2 Tablet(s) Oral at bedtime  sodium chloride 0.9%. 1000 milliLiter(s) (50 mL/Hr) IV Continuous <Continuous>  vancomycin    Solution 125 milliGRAM(s) Oral every 6 hours  ziprasidone 40 milliGRAM(s) Oral <User Schedule>    MEDICATIONS  (PRN):  acetaminophen   Tablet .. 975 milliGRAM(s) Oral every 6 hours PRN Mild Pain (1 - 3)  ALBUTerol/ipratropium for Nebulization 3 milliLiter(s) Nebulizer every 6 hours PRN Shortness of Breath and/or Wheezing  oxyCODONE    IR 5 milliGRAM(s) Oral every 4 hours PRN Moderate Pain (4 - 6)      Vital Signs Last 24 Hrs  T(C): 36.7 (27 Feb 2019 03:00), Max: 37.2 (27 Feb 2019 00:45)  T(F): 98 (27 Feb 2019 03:00), Max: 99 (27 Feb 2019 00:45)  HR: 92 (27 Feb 2019 05:45) (89 - 114)  BP: 100/52 (27 Feb 2019 05:45) (75/38 - 167/58)  BP(mean): 71 (27 Feb 2019 05:45) (50 - 84)  RR: 21 (27 Feb 2019 05:45) (14 - 29)  SpO2: 100% (27 Feb 2019 05:45) (87% - 100%)    Gen: awake, alert, NAD  Resp: no increase work of breathing  RLE  Incisional vac maintaining suction, c/d/i  edema to RLE  JPs intact with SS drainage   motor intact GS/TA/EHL  SILT S/S/SP/DP  + dopplerable PT        LABS:                                                           10.6   8.7   )-----------( 93       ( 27 Feb 2019 02:46 )             29.6     02-27    136  |  102  |  11  ----------------------------<  115<H>  3.4<L>   |  26  |  0.52    Ca    7.4<L>      27 Feb 2019 02:46  Phos  2.8     02-27  Mg     1.9     02-27

## 2019-02-27 NOTE — PROGRESS NOTE ADULT - ATTENDING COMMENTS
continue ATC cardio pulmonary monitoring in this critically ill Patient  I am a non participating [facility] physician seeing Pt in coverage for Dr Barraza. The above patient examination was reviewed with Dr. Awan and I agree with his evaluation, assessment and treatment plan.  Orlin Barraza M.D. continue ATC cardio pulmonary monitoring in this critically ill Patient  I am a non participating [facility] physician seeing Pt in coverage for Dr Barraza. The above patient examination was reviewed with Dr. Awan and I agree with his evaluation, assessment and treatment plan.  Orlin Barraza M.D.

## 2019-02-27 NOTE — PROCEDURE NOTE - NSPROCDETAILS_GEN_ALL_CORE
sutured in place/all materials/supplies accounted for at end of procedure/positive blood return obtained via catheter/Seldinger technique/ultrasound guidance/connected to a pressurized flush line/hemostasis with direct pressure, dressing applied/location identified, draped/prepped, sterile technique used, needle inserted/introduced

## 2019-02-28 LAB
ANION GAP SERPL CALC-SCNC: 11 MMOL/L — SIGNIFICANT CHANGE UP (ref 5–17)
ANION GAP SERPL CALC-SCNC: 11 MMOL/L — SIGNIFICANT CHANGE UP (ref 5–17)
ANION GAP SERPL CALC-SCNC: 12 MMOL/L — SIGNIFICANT CHANGE UP (ref 5–17)
BUN SERPL-MCNC: 12 MG/DL — SIGNIFICANT CHANGE UP (ref 7–23)
BUN SERPL-MCNC: 13 MG/DL — SIGNIFICANT CHANGE UP (ref 7–23)
BUN SERPL-MCNC: 13 MG/DL — SIGNIFICANT CHANGE UP (ref 7–23)
CA-I BLD-SCNC: 1.06 MMOL/L — LOW (ref 1.12–1.3)
CALCIUM SERPL-MCNC: 7.6 MG/DL — LOW (ref 8.4–10.5)
CALCIUM SERPL-MCNC: 7.7 MG/DL — LOW (ref 8.4–10.5)
CALCIUM SERPL-MCNC: 7.9 MG/DL — LOW (ref 8.4–10.5)
CHLORIDE SERPL-SCNC: 94 MMOL/L — LOW (ref 96–108)
CHLORIDE SERPL-SCNC: 96 MMOL/L — SIGNIFICANT CHANGE UP (ref 96–108)
CHLORIDE SERPL-SCNC: 99 MMOL/L — SIGNIFICANT CHANGE UP (ref 96–108)
CO2 SERPL-SCNC: 25 MMOL/L — SIGNIFICANT CHANGE UP (ref 22–31)
CO2 SERPL-SCNC: 25 MMOL/L — SIGNIFICANT CHANGE UP (ref 22–31)
CO2 SERPL-SCNC: 30 MMOL/L — SIGNIFICANT CHANGE UP (ref 22–31)
CREAT SERPL-MCNC: 0.47 MG/DL — LOW (ref 0.5–1.3)
CREAT SERPL-MCNC: 0.51 MG/DL — SIGNIFICANT CHANGE UP (ref 0.5–1.3)
CREAT SERPL-MCNC: 0.56 MG/DL — SIGNIFICANT CHANGE UP (ref 0.5–1.3)
GAS PNL BLDV: SIGNIFICANT CHANGE UP
GAS PNL BLDV: SIGNIFICANT CHANGE UP
GLUCOSE SERPL-MCNC: 144 MG/DL — HIGH (ref 70–99)
GLUCOSE SERPL-MCNC: 169 MG/DL — HIGH (ref 70–99)
GLUCOSE SERPL-MCNC: 206 MG/DL — HIGH (ref 70–99)
HCT VFR BLD CALC: 31.5 % — LOW (ref 34.5–45)
HGB BLD-MCNC: 10.5 G/DL — LOW (ref 11.5–15.5)
MAGNESIUM SERPL-MCNC: 1.8 MG/DL — SIGNIFICANT CHANGE UP (ref 1.6–2.6)
MAGNESIUM SERPL-MCNC: 1.8 MG/DL — SIGNIFICANT CHANGE UP (ref 1.6–2.6)
MAGNESIUM SERPL-MCNC: 1.9 MG/DL — SIGNIFICANT CHANGE UP (ref 1.6–2.6)
MCHC RBC-ENTMCNC: 31.2 PG — SIGNIFICANT CHANGE UP (ref 27–34)
MCHC RBC-ENTMCNC: 33.4 GM/DL — SIGNIFICANT CHANGE UP (ref 32–36)
MCV RBC AUTO: 93.4 FL — SIGNIFICANT CHANGE UP (ref 80–100)
PHOSPHATE SERPL-MCNC: 2.6 MG/DL — SIGNIFICANT CHANGE UP (ref 2.5–4.5)
PHOSPHATE SERPL-MCNC: 3 MG/DL — SIGNIFICANT CHANGE UP (ref 2.5–4.5)
PHOSPHATE SERPL-MCNC: 3.7 MG/DL — SIGNIFICANT CHANGE UP (ref 2.5–4.5)
PLATELET # BLD AUTO: 91 K/UL — LOW (ref 150–400)
POTASSIUM SERPL-MCNC: 3.8 MMOL/L — SIGNIFICANT CHANGE UP (ref 3.5–5.3)
POTASSIUM SERPL-MCNC: 4 MMOL/L — SIGNIFICANT CHANGE UP (ref 3.5–5.3)
POTASSIUM SERPL-MCNC: 7.1 MMOL/L — CRITICAL HIGH (ref 3.5–5.3)
POTASSIUM SERPL-SCNC: 3.8 MMOL/L — SIGNIFICANT CHANGE UP (ref 3.5–5.3)
POTASSIUM SERPL-SCNC: 4 MMOL/L — SIGNIFICANT CHANGE UP (ref 3.5–5.3)
POTASSIUM SERPL-SCNC: 7.1 MMOL/L — CRITICAL HIGH (ref 3.5–5.3)
RBC # BLD: 3.37 M/UL — LOW (ref 3.8–5.2)
RBC # FLD: 19.2 % — HIGH (ref 10.3–14.5)
SODIUM SERPL-SCNC: 132 MMOL/L — LOW (ref 135–145)
SODIUM SERPL-SCNC: 135 MMOL/L — SIGNIFICANT CHANGE UP (ref 135–145)
SODIUM SERPL-SCNC: 136 MMOL/L — SIGNIFICANT CHANGE UP (ref 135–145)
T3FREE SERPL-MCNC: 5.46 PG/ML — HIGH (ref 1.8–4.6)
T4 FREE SERPL-MCNC: 0.9 NG/DL — SIGNIFICANT CHANGE UP (ref 0.9–1.8)
TSH SERPL-MCNC: 13.9 UIU/ML — HIGH (ref 0.27–4.2)
WBC # BLD: 9 K/UL — SIGNIFICANT CHANGE UP (ref 3.8–10.5)
WBC # FLD AUTO: 9 K/UL — SIGNIFICANT CHANGE UP (ref 3.8–10.5)

## 2019-02-28 PROCEDURE — 99291 CRITICAL CARE FIRST HOUR: CPT

## 2019-02-28 PROCEDURE — 71045 X-RAY EXAM CHEST 1 VIEW: CPT | Mod: 26

## 2019-02-28 PROCEDURE — 99232 SBSQ HOSP IP/OBS MODERATE 35: CPT

## 2019-02-28 RX ORDER — MAGNESIUM SULFATE 500 MG/ML
2 VIAL (ML) INJECTION ONCE
Qty: 0 | Refills: 0 | Status: COMPLETED | OUTPATIENT
Start: 2019-02-28 | End: 2019-02-28

## 2019-02-28 RX ORDER — MIDODRINE HYDROCHLORIDE 2.5 MG/1
5 TABLET ORAL THREE TIMES A DAY
Qty: 0 | Refills: 0 | Status: DISCONTINUED | OUTPATIENT
Start: 2019-02-28 | End: 2019-03-19

## 2019-02-28 RX ORDER — HEPARIN SODIUM 5000 [USP'U]/ML
5000 INJECTION INTRAVENOUS; SUBCUTANEOUS EVERY 8 HOURS
Qty: 0 | Refills: 0 | Status: DISCONTINUED | OUTPATIENT
Start: 2019-02-28 | End: 2019-03-19

## 2019-02-28 RX ORDER — CALCIUM GLUCONATE 100 MG/ML
2 VIAL (ML) INTRAVENOUS ONCE
Qty: 0 | Refills: 0 | Status: COMPLETED | OUTPATIENT
Start: 2019-02-28 | End: 2019-02-28

## 2019-02-28 RX ORDER — SODIUM,POTASSIUM PHOSPHATES 278-250MG
1 POWDER IN PACKET (EA) ORAL
Qty: 0 | Refills: 0 | Status: COMPLETED | OUTPATIENT
Start: 2019-02-28 | End: 2019-02-28

## 2019-02-28 RX ORDER — FUROSEMIDE 40 MG
20 TABLET ORAL ONCE
Qty: 0 | Refills: 0 | Status: COMPLETED | OUTPATIENT
Start: 2019-02-28 | End: 2019-02-28

## 2019-02-28 RX ORDER — POTASSIUM CHLORIDE 20 MEQ
40 PACKET (EA) ORAL ONCE
Qty: 0 | Refills: 0 | Status: COMPLETED | OUTPATIENT
Start: 2019-02-28 | End: 2019-02-28

## 2019-02-28 RX ADMIN — Medication 20 MILLIGRAM(S): at 05:11

## 2019-02-28 RX ADMIN — MIDODRINE HYDROCHLORIDE 5 MILLIGRAM(S): 2.5 TABLET ORAL at 22:36

## 2019-02-28 RX ADMIN — BUPROPION HYDROCHLORIDE 300 MILLIGRAM(S): 150 TABLET, EXTENDED RELEASE ORAL at 11:06

## 2019-02-28 RX ADMIN — ENOXAPARIN SODIUM 40 MILLIGRAM(S): 100 INJECTION SUBCUTANEOUS at 11:08

## 2019-02-28 RX ADMIN — Medication 1 TABLET(S): at 07:59

## 2019-02-28 RX ADMIN — CHLORHEXIDINE GLUCONATE 1 APPLICATION(S): 213 SOLUTION TOPICAL at 05:12

## 2019-02-28 RX ADMIN — Medication 2.5 MILLIGRAM(S): at 11:06

## 2019-02-28 RX ADMIN — Medication 125 MILLIGRAM(S): at 22:35

## 2019-02-28 RX ADMIN — HEPARIN SODIUM 5000 UNIT(S): 5000 INJECTION INTRAVENOUS; SUBCUTANEOUS at 22:35

## 2019-02-28 RX ADMIN — Medication 250 MILLIMOLE(S): at 06:40

## 2019-02-28 RX ADMIN — Medication 50 GRAM(S): at 03:44

## 2019-02-28 RX ADMIN — Medication 20 MILLIGRAM(S): at 22:35

## 2019-02-28 RX ADMIN — Medication 20 MILLIGRAM(S): at 13:50

## 2019-02-28 RX ADMIN — Medication 125 MILLIGRAM(S): at 09:55

## 2019-02-28 RX ADMIN — Medication 125 MILLIGRAM(S): at 03:44

## 2019-02-28 RX ADMIN — Medication 100 MICROGRAM(S): at 05:12

## 2019-02-28 RX ADMIN — MIDODRINE HYDROCHLORIDE 5 MILLIGRAM(S): 2.5 TABLET ORAL at 13:49

## 2019-02-28 RX ADMIN — Medication 40 MILLIEQUIVALENT(S): at 06:40

## 2019-02-28 RX ADMIN — Medication 125 MILLIGRAM(S): at 16:36

## 2019-02-28 RX ADMIN — ZIPRASIDONE HYDROCHLORIDE 40 MILLIGRAM(S): 20 CAPSULE ORAL at 05:12

## 2019-02-28 RX ADMIN — MIDODRINE HYDROCHLORIDE 10 MILLIGRAM(S): 2.5 TABLET ORAL at 05:12

## 2019-02-28 RX ADMIN — Medication 50 GRAM(S): at 19:09

## 2019-02-28 RX ADMIN — Medication 200 GRAM(S): at 11:00

## 2019-02-28 RX ADMIN — Medication 20 MILLIGRAM(S): at 01:28

## 2019-02-28 NOTE — PROGRESS NOTE ADULT - ATTENDING COMMENTS
Patient seen and examined and agree with resident note.  Patient was able to be weaned off phenylephrine.  She had diuresis as she is hypervolemic.   There is a 30 mmHg difference between the arterial line (higher) and the cuff pressure secondary to edema.   Continue lasix for diuresis with goal negative 1-2 liters  Will titrate the midodrine.     Hypocalcemia- repleted and will recheck    Malnutrition- placed Brian tube feed  Continue jevity at 50 cc/ hour    C diff colitis- will continue treatment with PO vanco at this time    Will continue to monitor closely.

## 2019-02-28 NOTE — PROGRESS NOTE ADULT - SUBJECTIVE AND OBJECTIVE BOX
Orthopedic Surgery Progress Note     S: Patient seen and examined today. No acute events overnight. Pain is well controlled. On midodrine. A line placed overnight which provided accurate BP reading.     MEDICATIONS  (STANDING):  buPROPion XL . 300 milliGRAM(s) Oral daily  chlorhexidine 4% Liquid 1 Application(s) Topical <User Schedule>  docusate sodium 100 milliGRAM(s) Oral three times a day  enoxaparin Injectable 40 milliGRAM(s) SubCutaneous daily  influenza   Vaccine 0.5 milliLiter(s) IntraMuscular once  levothyroxine 100 MICROGram(s) Oral daily  phenylephrine    Infusion 0.4 MICROgram(s)/kG/Min (9.165 mL/Hr) IV Continuous <Continuous>  potassium chloride  10 mEq/100 mL IVPB 10 milliEquivalent(s) IV Intermittent every 1 hour  potassium phosphate IVPB 15 milliMole(s) IV Intermittent once  senna 2 Tablet(s) Oral at bedtime  sodium chloride 0.9%. 1000 milliLiter(s) (50 mL/Hr) IV Continuous <Continuous>  vancomycin    Solution 125 milliGRAM(s) Oral every 6 hours  ziprasidone 40 milliGRAM(s) Oral <User Schedule>    MEDICATIONS  (PRN):  acetaminophen   Tablet .. 975 milliGRAM(s) Oral every 6 hours PRN Mild Pain (1 - 3)  ALBUTerol/ipratropium for Nebulization 3 milliLiter(s) Nebulizer every 6 hours PRN Shortness of Breath and/or Wheezing  oxyCODONE    IR 5 milliGRAM(s) Oral every 4 hours PRN Moderate Pain (4 - 6)    Vital Signs Last 24 Hrs  T(C): 37.2 (28 Feb 2019 03:00), Max: 37.2 (27 Feb 2019 23:00)  T(F): 99 (28 Feb 2019 03:00), Max: 99 (28 Feb 2019 03:00)  HR: 112 (28 Feb 2019 07:00) (93 - 112)  BP: 99/56 (28 Feb 2019 07:00) (80/47 - 123/54)  BP(mean): 63 (28 Feb 2019 07:00) (59 - 80)  RR: 36 (28 Feb 2019 07:00) (17 - 48)  SpO2: 92% (28 Feb 2019 07:00) (89% - 100%)    Gen: awake, alert, NAD  Resp: no increase work of breathing  RLE  Incisional vac maintaining suction, c/d/i  edema to RLE  JPs intact with SS drainage   motor intact GS/TA/EHL  SILT S/S/SP/DP  + dopplerable PT        LABS:                                                        10.5   9.0   )-----------( 91       ( 28 Feb 2019 02:45 )             31.5         02-28    135  |  99  |  12  ----------------------------<  206<H>  3.8   |  25  |  0.51    Ca    7.7<L>      28 Feb 2019 02:45  Phos  2.6     02-28  Mg     1.8     02-28

## 2019-02-28 NOTE — PROGRESS NOTE ADULT - ASSESSMENT
68y Female s/p revision R hip hemiarthroplasty POD 6    - Pain control  - FU ID  - IV vanco x72 hours until OR Cx negative  - FU ID recs  - DC yanes when off pressors  - Prevena vac  - NWB RLE OOBTC  - Posterior dislocation precautions  - PT/OT  - DVT ppx  - care per SICU  - FU nutrition consult 68y Female s/p revision R hip hemiarthroplasty POD 6    - Pain control  - FU ID  - FU ID recs  - DC farzana when off pressors  - Prevena vac to [facility] today  - NWB RLE OOBTC  - Posterior dislocation precautions  - PT/OT  - DVT ppx  - care per SICU  - FU nutrition consult

## 2019-02-28 NOTE — PROGRESS NOTE ADULT - SUBJECTIVE AND OBJECTIVE BOX
Patient is a 68y old  Female who presents with a chief complaint of Fractured hip (24 Feb 2019 08:31)    Being followed by ID for C diff    Interval history:feels well but had NGT placed for feeding  No acute events      ROS:  No cough,SOB,CP  No N/V/D./abd pain  No other complaints      Antimicrobials:    vancomycin    Solution 125 milliGRAM(s) Oral every 6 hours    Other medications reviewed    Vital Signs Last 24 Hrs  T(C): 36.9 (02-28-19 @ 11:00), Max: 37.2 (02-27-19 @ 23:00)  T(F): 98.5 (02-28-19 @ 11:00), Max: 99 (02-28-19 @ 03:00)  HR: 110 (02-28-19 @ 14:00) (93 - 112)  BP: 93/52 (02-28-19 @ 14:00) (80/47 - 110/60)  BP(mean): 70 (02-28-19 @ 14:00) (59 - 80)  RR: 25 (02-28-19 @ 14:00) (17 - 37)  SpO2: 93% (02-28-19 @ 14:00) (89% - 100%)    Physical Exam:    HEENT PERRLA EOMI    No oral exudate or erythema  NGT    Chest Good AE,CTA    CVS RRR S1 S2 WNl No murmur or rub or gallop    Abd soft BS normal No tenderness no masses      R leg dressing with DION  serosanguinous fluid in DION     IV site no erythema tenderness or discharge    + + LE edema  bilateral -R foot blisters    CNS AAO X 3 no focal  Lab Data:                          10.5   9.0   )-----------( 91       ( 28 Feb 2019 02:45 )             31.5       02-28    132<L>  |  96  |  13  ----------------------------<  169<H>  7.1<HH>   |  25  |  0.47<L>    Ca    7.6<L>      28 Feb 2019 13:56  Phos  3.7     02-28  Mg     1.9     02-28

## 2019-02-28 NOTE — CHART NOTE - NSCHARTNOTEFT_GEN_A_CORE
Nutrition Follow Up Note    Patient seen for malnutrition follow up.  68 year old female with bipolar depression, OCD, dementia, HLD, NHL (in remission since 2016), osteoarthritis s/p bilateral TKR s/p fall presenting with a right periprosthetic femoral neck fracture and C. difficile colitis, now s/p ORIF of R periprosthetic femur fracture and revision of right hip hemiarthroplasty on , transferred to SICU intubated, on pressors. Now extubated. After speaking with provider for malnutrition sticker, KO tube was placed  and pt now receiving Jevity 1.2 at 50 mls/hr for 24 hours to supplement PO diet. Pt also started on Marinol. Calorie Count initiated on     Source: Patient, EMR, RN, Past RD notes    Diet : Regular with Ensure Enlive- 2 per day (provides additional 700cal, 40gm protein)     Patient reports her appetite is improving and she is able to sip on Ensure drinks. Pt states she is drinking two full bottles a day however as per calorie count, pt only consuming 50% of one bottle daily. Pt c/o nausea, RN aware. 2 BMs noted this morning. Took pt food preferences, she reports she likes eggs, hamburgers, coke and ice cream.      PO intake : <50%     Source for PO intake: Patient, RN, Nursing flow sheet     Enteral /Parenteral Nutrition: Pt tolerating Jevity 1.2 at total volume for 24 hours: 1200 mls at goal rate of 50 mls/hr for 24 hours. This provides 1440 calories and 66g of protein, meeting 23.6 cals/kg and 1 g/kg of protein using dosing wt of 61.1 kg.      Daily Weight in k.2 (), Weight in k.4 (), Weight in k.6 (), Weight in k (-), Weight in k.4 ()  Large weight fluctuations noted, decrease in wt consistent with increase of lasix dosage    Pertinent Medications: MEDICATIONS  (STANDING):  buPROPion XL . 300 milliGRAM(s) Oral daily  calcium gluconate IVPB 2 Gram(s) IV Intermittent once  chlorhexidine 4% Liquid 1 Application(s) Topical <User Schedule>  docusate sodium 100 milliGRAM(s) Oral three times a day  dronabinol 2.5 milliGRAM(s) Oral daily  enoxaparin Injectable 40 milliGRAM(s) SubCutaneous daily  furosemide   Injectable 20 milliGRAM(s) IV Push every 8 hours  influenza   Vaccine 0.5 milliLiter(s) IntraMuscular once  levothyroxine 100 MICROGram(s) Oral daily  midodrine 10 milliGRAM(s) Oral three times a day  senna 2 Tablet(s) Oral at bedtime  vancomycin    Solution 125 milliGRAM(s) Oral every 6 hours  ziprasidone 40 milliGRAM(s) Oral <User Schedule>    MEDICATIONS  (PRN):  acetaminophen   Tablet .. 975 milliGRAM(s) Oral every 6 hours PRN Mild Pain (1 - 3)  ALBUTerol/ipratropium for Nebulization 3 milliLiter(s) Nebulizer every 6 hours PRN Shortness of Breath and/or Wheezing  oxyCODONE    IR 5 milliGRAM(s) Oral every 4 hours PRN Moderate Pain (4 - 6)    Pertinent Labs:  @ 02:45: Na 135, BUN 12, Cr 0.51, <H>, K+ 3.8, Phos 2.6, Mg 1.8,       Skin per nursing documentation: no pressure injuries  Edema: +4 generalized edema    Estimated Needs:   [X ] no change since previous assessment  [ ] recalculated:     Previous Nutrition Diagnosis: Moderate Malnutrition  Nutrition Diagnosis is: ongoing, being addressed with supplemental tube feedings and ensure enlive supplements    Interventions:     Recommend  1) Continue Jevity 1.2 @ 50 mls/hr for 24 hours, monitor tolerance and appetite, may adjust order depending on PO intake  2) Continue regular diet with Ensure Enlive- 2 per day (provides additional 700cal, 40gm protein)   3) Assist with feeding and menu selections as needed  4) Encourage PO intake    Monitoring and Evaluation:     Continue to monitor Nutritional intake, Tolerance to diet prescription, weights, labs, skin integrity    RD remains available upon request and will follow up per protocol Nutrition Follow Up Note    Patient seen for malnutrition follow up.  68 year old female with bipolar depression, OCD, dementia, HLD, NHL (in remission since 2016), osteoarthritis s/p bilateral TKR s/p fall presenting with a right periprosthetic femoral neck fracture and C. difficile colitis, now s/p ORIF of R periprosthetic femur fracture and revision of right hip hemiarthroplasty on , transferred to SICU intubated, on pressors. Now extubated. After speaking with provider for malnutrition sticker, KO tube was placed  and pt now receiving Jevity 1.2 at 50 mls/hr for 24 hours to supplement PO diet. Pt also started on Marinol. Calorie Count initiated on     Source: Patient, EMR, RN, Past RD notes    Diet : Regular with Ensure Enlive- 2 per day (provides additional 700cal, 40gm protein)     Patient reports her appetite is improving and she is able to sip on Ensure drinks. Pt states she is drinking two full bottles a day however as per calorie count, pt only consuming 50% of one bottle daily. Pt c/o nausea, RN aware. 2 BMs noted this morning. Took pt food preferences, she reports she likes eggs, hamburgers, coke and ice cream.      PO intake : <50%     Source for PO intake: Patient, RN, Nursing flow sheet     Enteral /Parenteral Nutrition: Pt tolerating Jevity 1.2 at total volume for 24 hours: 1200 mls at goal rate of 50 mls/hr for 24 hours. This provides 1440 calories and 66g of protein, meeting 23.6 cals/kg and 1 g/kg of protein using dosing wt of 61.1 kg. Received 550 mls in past 24 hours.      Daily Weight in k.2 (), Weight in k.4 (), Weight in k.6 (), Weight in k (-), Weight in k.4 ()  Large weight fluctuations noted, decrease in wt consistent with increase of lasix dosage    Pertinent Medications: MEDICATIONS  (STANDING):  buPROPion XL . 300 milliGRAM(s) Oral daily  calcium gluconate IVPB 2 Gram(s) IV Intermittent once  chlorhexidine 4% Liquid 1 Application(s) Topical <User Schedule>  docusate sodium 100 milliGRAM(s) Oral three times a day  dronabinol 2.5 milliGRAM(s) Oral daily  enoxaparin Injectable 40 milliGRAM(s) SubCutaneous daily  furosemide   Injectable 20 milliGRAM(s) IV Push every 8 hours  influenza   Vaccine 0.5 milliLiter(s) IntraMuscular once  levothyroxine 100 MICROGram(s) Oral daily  midodrine 10 milliGRAM(s) Oral three times a day  senna 2 Tablet(s) Oral at bedtime  vancomycin    Solution 125 milliGRAM(s) Oral every 6 hours  ziprasidone 40 milliGRAM(s) Oral <User Schedule>    MEDICATIONS  (PRN):  acetaminophen   Tablet .. 975 milliGRAM(s) Oral every 6 hours PRN Mild Pain (1 - 3)  ALBUTerol/ipratropium for Nebulization 3 milliLiter(s) Nebulizer every 6 hours PRN Shortness of Breath and/or Wheezing  oxyCODONE    IR 5 milliGRAM(s) Oral every 4 hours PRN Moderate Pain (4 - 6)    Pertinent Labs:  @ 02:45: Na 135, BUN 12, Cr 0.51, <H>, K+ 3.8, Phos 2.6, Mg 1.8,       Skin per nursing documentation: no pressure injuries  Edema: +4 generalized edema    Estimated Needs:   [X ] no change since previous assessment  [ ] recalculated:     Previous Nutrition Diagnosis: Moderate Malnutrition  Nutrition Diagnosis is: ongoing, being addressed with supplemental tube feedings and ensure enlive supplements    Interventions:     Recommend  1) Continue Jevity 1.2 @ 50 mls/hr for 24 hours, monitor tolerance and appetite, may adjust order depending on PO intake  2) Continue regular diet with Ensure Enlive- 2 per day (provides additional 700cal, 40gm protein)   3) Assist with feeding and menu selections as needed  4) Encourage PO intake    Monitoring and Evaluation:     Continue to monitor Nutritional intake, Tolerance to diet prescription, weights, labs, skin integrity    RD remains available upon request and will follow up per protocol Nutrition Follow Up Note    Patient seen for malnutrition follow up.  68 year old female with bipolar depression, OCD, dementia, HLD, NHL (in remission since 2016), osteoarthritis s/p bilateral TKR s/p fall presenting with a right periprosthetic femoral neck fracture and C. difficile colitis, now s/p ORIF of R periprosthetic femur fracture and revision of right hip hemiarthroplasty on , transferred to SICU intubated, on pressors. Now extubated. KO feeding tube placed for supplemental nutrition  and also started on Marinol. Calorie Count initiated on     Source: Patient, EMR, RN, Past RD notes    Diet : Regular with Ensure Enlive- 2 per day (provides additional 700cal, 40gm protein)     Patient reports her appetite is improving and she is able to sip on Ensure drinks. Pt states she is drinking two full bottles a day however as per calorie count, pt only consuming 50% of one bottle daily. Pt c/o nausea, RN aware. 2 BMs noted this morning. Took pt food preferences, she reports she likes eggs, hamburgers, coke and ice cream.      PO intake : <50%     Source for PO intake: Patient, RN, Nursing flow sheet     Enteral /Parenteral Nutrition: Pt tolerating Jevity 1.2 at total volume for 24 hours: 1200 mls at goal rate of 50 mls/hr for 24 hours. This provides 1440 calories and 66g of protein, meeting 23.6 cals/kg and 1 g/kg of protein using dosing wt of 61.1 kg. Received 550 mls in past 24 hours.      Daily Weight in k.2 (), Weight in k.4 (), Weight in k.6 (), Weight in k (), Weight in k.4 ()  Large weight fluctuations noted, decrease in wt consistent with increase of lasix dosage    Pertinent Medications: MEDICATIONS  (STANDING):  buPROPion XL . 300 milliGRAM(s) Oral daily  calcium gluconate IVPB 2 Gram(s) IV Intermittent once  chlorhexidine 4% Liquid 1 Application(s) Topical <User Schedule>  docusate sodium 100 milliGRAM(s) Oral three times a day  dronabinol 2.5 milliGRAM(s) Oral daily  enoxaparin Injectable 40 milliGRAM(s) SubCutaneous daily  furosemide   Injectable 20 milliGRAM(s) IV Push every 8 hours  influenza   Vaccine 0.5 milliLiter(s) IntraMuscular once  levothyroxine 100 MICROGram(s) Oral daily  midodrine 10 milliGRAM(s) Oral three times a day  senna 2 Tablet(s) Oral at bedtime  vancomycin    Solution 125 milliGRAM(s) Oral every 6 hours  ziprasidone 40 milliGRAM(s) Oral <User Schedule>    MEDICATIONS  (PRN):  acetaminophen   Tablet .. 975 milliGRAM(s) Oral every 6 hours PRN Mild Pain (1 - 3)  ALBUTerol/ipratropium for Nebulization 3 milliLiter(s) Nebulizer every 6 hours PRN Shortness of Breath and/or Wheezing  oxyCODONE    IR 5 milliGRAM(s) Oral every 4 hours PRN Moderate Pain (4 - 6)    Pertinent Labs:  @ 02:45: Na 135, BUN 12, Cr 0.51, <H>, K+ 3.8, Phos 2.6, Mg 1.8,       Skin per nursing documentation: no pressure injuries  Edema: +4 generalized edema    Estimated Needs:   [X ] no change since previous assessment  [ ] recalculated:     Previous Nutrition Diagnosis: Moderate Malnutrition  Nutrition Diagnosis is: ongoing, being addressed with supplemental tube feedings and ensure enlive supplements    Interventions:     Recommend  1) Continue Jevity 1.2 @ 50 mls/hr for 24 hours, monitor tolerance and appetite, may adjust order depending on PO intake  2) Continue regular diet with Ensure Enlive- 2 per day (provides additional 700cal, 40gm protein)   3) Assist with feeding and menu selections as needed  4) Encourage PO intake    Monitoring and Evaluation:     Continue to monitor Nutritional intake, Tolerance to diet prescription, weights, labs, skin integrity    RD remains available upon request and will follow up per protocol Nutrition Follow Up Note    Patient seen for malnutrition follow up.  68 year old female with bipolar depression, OCD, dementia, HLD, NHL (in remission since 2016), osteoarthritis s/p bilateral TKR s/p fall presenting with a right periprosthetic femoral neck fracture and C. difficile colitis, now s/p ORIF of R periprosthetic femur fracture and revision of right hip hemiarthroplasty on , transferred to SICU intubated, on pressors. Now extubated. KO feeding tube placed for supplemental nutrition  and also started on Marinol. Calorie Count initiated on     Source: Patient, EMR, RN, Past RD notes    Diet : Regular with Ensure Enlive- 2 per day (provides additional 700cal, 40gm protein)     Patient reports her appetite is improving and she is able to sip on Ensure drinks. Pt states she is drinking two full bottles a day however as per calorie count, pt only consuming 50% of one bottle daily. Pt c/o nausea, RN aware. 2 BMs noted this morning. Took pt food preferences, she reports she likes eggs, hamburgers, coke and ice cream.     PO intake: <50%, observed breakfast tray at bedside untouched     Source for PO intake: Patient, RN, Nursing flow sheet, Personal Observation     Enteral /Parenteral Nutrition: Pt tolerating Jevity 1.2 at total volume for 24 hours: 1200 mls at goal rate of 50 mls/hr for 24 hours. This provides 1440 calories and 66g of protein, meeting 23.6 cals/kg and 1 g/kg of protein using dosing wt of 61.1 kg. Received 550 mls in past 24 hours.      Daily Weight in k.2 (), Weight in k.4 (), Weight in k.6 (), Weight in k (-), Weight in k.4 ()  Large weight fluctuations noted, decrease in wt consistent with increase of lasix dosage    Pertinent Medications: MEDICATIONS  (STANDING):  buPROPion XL . 300 milliGRAM(s) Oral daily  calcium gluconate IVPB 2 Gram(s) IV Intermittent once  chlorhexidine 4% Liquid 1 Application(s) Topical <User Schedule>  docusate sodium 100 milliGRAM(s) Oral three times a day  dronabinol 2.5 milliGRAM(s) Oral daily  enoxaparin Injectable 40 milliGRAM(s) SubCutaneous daily  furosemide   Injectable 20 milliGRAM(s) IV Push every 8 hours  influenza   Vaccine 0.5 milliLiter(s) IntraMuscular once  levothyroxine 100 MICROGram(s) Oral daily  midodrine 10 milliGRAM(s) Oral three times a day  senna 2 Tablet(s) Oral at bedtime  vancomycin    Solution 125 milliGRAM(s) Oral every 6 hours  ziprasidone 40 milliGRAM(s) Oral <User Schedule>    MEDICATIONS  (PRN):  acetaminophen   Tablet .. 975 milliGRAM(s) Oral every 6 hours PRN Mild Pain (1 - 3)  ALBUTerol/ipratropium for Nebulization 3 milliLiter(s) Nebulizer every 6 hours PRN Shortness of Breath and/or Wheezing  oxyCODONE    IR 5 milliGRAM(s) Oral every 4 hours PRN Moderate Pain (4 - 6)    Pertinent Labs:  @ 02:45: Na 135, BUN 12, Cr 0.51, <H>, K+ 3.8, Phos 2.6, Mg 1.8,       Skin per nursing documentation: no pressure injuries  Edema: +4 generalized edema    Estimated Needs:   [X ] no change since previous assessment  [ ] recalculated:     Previous Nutrition Diagnosis: Moderate Malnutrition  Nutrition Diagnosis is: ongoing, being addressed with supplemental tube feedings and ensure enlive supplements    Interventions:     Recommend  1) Continue Jevity 1.2 @ 50 mls/hr for 24 hours, monitor tolerance and appetite, may adjust order depending on PO intake  2) Continue regular diet with Ensure Enlive- 2 per day (provides additional 700cal, 40gm protein)   3) Assist with feeding and menu selections as needed  4) Encourage PO intake    Monitoring and Evaluation:     Continue to monitor Nutritional intake, Tolerance to diet prescription, weights, labs, skin integrity    RD remains available upon request and will follow up per protocol Nutrition Follow Up Note    Patient seen for malnutrition follow up.  68 year old female with bipolar depression, OCD, dementia, HLD, NHL (in remission since 2016), osteoarthritis s/p bilateral TKR s/p fall presenting with a right periprosthetic femoral neck fracture and C. difficile colitis, now s/p ORIF of R periprosthetic femur fracture and revision of right hip hemiarthroplasty on , transferred to SICU intubated, on pressors. Now extubated. KO feeding tube placed for supplemental nutrition  and also started on Marinol. Calorie Count initiated on , incompletely filled out, inappropriate for analysis.    Source: Patient, EMR, RN, Past RD notes    Diet : Regular with Ensure Enlive- 2 per day (provides additional 700cal, 40gm protein)     Patient reports her appetite is improving and she is able to sip on Ensure drinks. Pt states she is drinking two full bottles a day however as per calorie count, pt only consuming 50% of one bottle daily. Pt c/o nausea, RN aware. 2 BMs noted this morning. Took pt food preferences, she reports she likes eggs, hamburgers, coke and ice cream.     PO intake: <50%, observed breakfast tray at bedside untouched     Source for PO intake: Patient, RN, Nursing flow sheet, Personal Observation     Enteral /Parenteral Nutrition: Pt tolerating Jevity 1.2 at total volume for 24 hours: 1200 mls at goal rate of 50 mls/hr for 24 hours. This provides 1440 calories and 66g of protein, meeting 23.6 cals/kg and 1 g/kg of protein using dosing wt of 61.1 kg. Received 550 mls in past 24 hours.      Daily Weight in k.2 (), Weight in k.4 (), Weight in k.6 (), Weight in k (-), Weight in k.4 ()  Large weight fluctuations noted, decrease in wt consistent with increase of lasix dosage    Pertinent Medications: MEDICATIONS  (STANDING):  buPROPion XL . 300 milliGRAM(s) Oral daily  calcium gluconate IVPB 2 Gram(s) IV Intermittent once  chlorhexidine 4% Liquid 1 Application(s) Topical <User Schedule>  docusate sodium 100 milliGRAM(s) Oral three times a day  dronabinol 2.5 milliGRAM(s) Oral daily  enoxaparin Injectable 40 milliGRAM(s) SubCutaneous daily  furosemide   Injectable 20 milliGRAM(s) IV Push every 8 hours  influenza   Vaccine 0.5 milliLiter(s) IntraMuscular once  levothyroxine 100 MICROGram(s) Oral daily  midodrine 10 milliGRAM(s) Oral three times a day  senna 2 Tablet(s) Oral at bedtime  vancomycin    Solution 125 milliGRAM(s) Oral every 6 hours  ziprasidone 40 milliGRAM(s) Oral <User Schedule>    MEDICATIONS  (PRN):  acetaminophen   Tablet .. 975 milliGRAM(s) Oral every 6 hours PRN Mild Pain (1 - 3)  ALBUTerol/ipratropium for Nebulization 3 milliLiter(s) Nebulizer every 6 hours PRN Shortness of Breath and/or Wheezing  oxyCODONE    IR 5 milliGRAM(s) Oral every 4 hours PRN Moderate Pain (4 - 6)    Pertinent Labs:  @ 02:45: Na 135, BUN 12, Cr 0.51, <H>, K+ 3.8, Phos 2.6, Mg 1.8,       Skin per nursing documentation: no pressure injuries  Edema: +4 generalized edema    Estimated Needs:   [X ] no change since previous assessment  [ ] recalculated:     Previous Nutrition Diagnosis: Moderate Malnutrition  Nutrition Diagnosis is: ongoing, being addressed with supplemental tube feedings and ensure enlive supplements    Interventions:     Recommend  1) Continue Jevity 1.2 @ 50 mls/hr for 24 hours, monitor tolerance and appetite, may adjust order depending on PO intake  2) Continue regular diet with Ensure Enlive- 2 per day (provides additional 700cal, 40gm protein)   3) Assist with feeding and menu selections as needed  4) Encourage PO intake    Monitoring and Evaluation:     Continue to monitor Nutritional intake, Tolerance to diet prescription, weights, labs, skin integrity    RD remains available upon request and will follow up per protocol Nutrition Follow Up Note    Patient seen for malnutrition follow up.  68 year old female with bipolar depression, OCD, dementia, HLD, NHL (in remission since 2016), osteoarthritis s/p bilateral TKR s/p fall presenting with a right periprosthetic femoral neck fracture and C. difficile colitis, now s/p ORIF of R periprosthetic femur fracture and revision of right hip hemiarthroplasty on , transferred to SICU intubated, on pressors. Now extubated. Patient previously NPO over 7 days, followed by prolonged poor PO intake, moderate malnutrition diagnosis placed in medical record. KO feeding tube placed for supplemental nutrition  and also started on Marinol. Calorie Count initiated on , incompletely filled out, inappropriate for analysis, however pt not meeting 50% of nutrition needs with PO intake, recommend continue EN.    Source: Patient, EMR, RN, Past RD notes    Diet : Regular with Ensure Enlive- 2 per day (provides additional 700cal, 40gm protein)     Patient reports her appetite is improving and she is able to sip on Ensure drinks. Pt states she is drinking two full bottles a day however as per calorie count, pt only consuming 50% of one bottle daily. Pt c/o nausea, RN aware. 2 BMs noted this morning. Took pt food preferences, she reports she likes eggs, hamburgers, coke and ice cream.     PO intake: <50%, observed breakfast tray at bedside untouched     Source for PO intake: Patient, RN, Nursing flow sheet, Personal Observation     Enteral /Parenteral Nutrition: Pt tolerating Jevity 1.2 at total volume for 24 hours: 1200 mls at goal rate of 50 mls/hr for 24 hours. This provides 1440 calories and 66g of protein, meeting 23.6 cals/kg and 1 g/kg of protein using dosing wt of 61.1 kg. Received 550 mls in past 24 hours.      Daily Weight in k.2 (), Weight in k.4 (), Weight in k.6 (), Weight in k (-), Weight in k.4 ()  Large weight fluctuations noted, decrease in wt consistent with increase of lasix dosage    Pertinent Medications: MEDICATIONS  (STANDING):  buPROPion XL . 300 milliGRAM(s) Oral daily  calcium gluconate IVPB 2 Gram(s) IV Intermittent once  chlorhexidine 4% Liquid 1 Application(s) Topical <User Schedule>  docusate sodium 100 milliGRAM(s) Oral three times a day  dronabinol 2.5 milliGRAM(s) Oral daily  enoxaparin Injectable 40 milliGRAM(s) SubCutaneous daily  furosemide   Injectable 20 milliGRAM(s) IV Push every 8 hours  influenza   Vaccine 0.5 milliLiter(s) IntraMuscular once  levothyroxine 100 MICROGram(s) Oral daily  midodrine 10 milliGRAM(s) Oral three times a day  senna 2 Tablet(s) Oral at bedtime  vancomycin    Solution 125 milliGRAM(s) Oral every 6 hours  ziprasidone 40 milliGRAM(s) Oral <User Schedule>    MEDICATIONS  (PRN):  acetaminophen   Tablet .. 975 milliGRAM(s) Oral every 6 hours PRN Mild Pain (1 - 3)  ALBUTerol/ipratropium for Nebulization 3 milliLiter(s) Nebulizer every 6 hours PRN Shortness of Breath and/or Wheezing  oxyCODONE    IR 5 milliGRAM(s) Oral every 4 hours PRN Moderate Pain (4 - 6)    Pertinent Labs:  @ 02:45: Na 135, BUN 12, Cr 0.51, <H>, K+ 3.8, Phos 2.6, Mg 1.8,       Skin per nursing documentation: no pressure injuries  Edema: +4 generalized edema    Estimated Needs:   [X ] no change since previous assessment  [ ] recalculated:     Previous Nutrition Diagnosis: Moderate Malnutrition  Nutrition Diagnosis is: ongoing, being addressed with supplemental tube feedings and ensure enlive supplements    Interventions:     Recommend  1) Continue Jevity 1.2 @ 50 mls/hr for 24 hours, monitor tolerance and appetite, may adjust order depending on PO intake  2) Continue regular diet with Ensure Enlive- 2 per day (provides additional 700cal, 40gm protein)   3) Assist with feeding and menu selections as needed  4) Encourage PO intake    Monitoring and Evaluation:     Continue to monitor Nutritional intake, Tolerance to diet prescription, weights, labs, skin integrity    RD remains available upon request and will follow up per protocol

## 2019-02-28 NOTE — PROGRESS NOTE ADULT - ATTENDING COMMENTS
continue ATC cardio pulmonary monitoring in this critically ill Patient  I am a non participating [facility] physician seeing Pt in coverage for Dr Barraza. The above patient examination was reviewed with Dr. Awan and I agree with his evaluation, assessment and treatment plan.  Orlin Barraza M.D.

## 2019-02-28 NOTE — PROGRESS NOTE ADULT - SUBJECTIVE AND OBJECTIVE BOX
69 y/o critically ill  female h/o dementia NHL presenting after unwitnessed fall. Pt found on the ground at rehab. is on lovenox after hip fracture. pt reports pain to her right leg. per EMS en route slurring speech and mildly hypotensive requesting trauma.unclear how long pt on the ground. Patient found to have C diff with worsening hypotension requiring pressors on and off and  Patient requiring the SICU for continued cardiopulmonary monitoring. Orthopedic procedure on hold due to  hypotension and pan colitis and need for pressors due to unstable hemodynamics. Diarrhea slowing off IV Flagyl + on oral vancomycin. Patient now has the rectal tube removed.   Leukocytosis and diarrhea have improved  No significant change in status. Has senile dementia with intermittent lucency. No further C.diff colitis and no further hypovolemic hypotension. Patient s/p Open reduction and internal fixation of periprosthetic hip fx Mental status has greatly improved. Patient off pressors today. continue midodrine      MEDICATIONS  (STANDING):  buPROPion XL . 300 milliGRAM(s) Oral daily  chlorhexidine 4% Liquid 1 Application(s) Topical <User Schedule>  docusate sodium 100 milliGRAM(s) Oral three times a day  dronabinol 2.5 milliGRAM(s) Oral daily  enoxaparin Injectable 40 milliGRAM(s) SubCutaneous daily  furosemide   Injectable 20 milliGRAM(s) IV Push every 8 hours  influenza   Vaccine 0.5 milliLiter(s) IntraMuscular once  levothyroxine 100 MICROGram(s) Oral daily  midodrine 5 milliGRAM(s) Oral three times a day  senna 2 Tablet(s) Oral at bedtime  vancomycin    Solution 125 milliGRAM(s) Oral every 6 hours  ziprasidone 40 milliGRAM(s) Oral <User Schedule>    MEDICATIONS  (PRN):  acetaminophen   Tablet .. 975 milliGRAM(s) Oral every 6 hours PRN Mild Pain (1 - 3)  oxyCODONE    IR 5 milliGRAM(s) Oral every 4 hours PRN Moderate Pain (4 - 6)          VITALS:   T(C): 36.9 (02-28-19 @ 11:00), Max: 37.2 (02-27-19 @ 23:00)  HR: 101 (02-28-19 @ 11:00) (93 - 112)  BP: 92/47 (02-28-19 @ 11:00) (80/47 - 113/52)  RR: 28 (02-28-19 @ 11:00) (17 - 48)  SpO2: 98% (02-28-19 @ 11:00) (89% - 100%)  Wt(kg): --        PHYSICAL EXAM:  GENERAL: NAD, well-groomed, well-developed  HEAD:  Atraumatic, Normocephalic  EYES: EOMI, PERRLA, conjunctiva and sclera clear  ENMT: No tonsillar erythema, exudates, or enlargement; Moist mucous membranes, NGT in place  NECK: Supple, No JVD, Normal thyroid  NERVOUS SYSTEM:  confused becoming aphasic  CHEST/LUNG: Clear to percussion bilaterally; No rales, rhonchi, wheezing, or rubs  HEART: Regular rate and rhythm; No murmurs, rubs, or gallops  ABDOMEN: Soft, Nontender, Nondistended; Bowel sounds present  Periprosthetic right femur fracture repair with localized swelling, B/L LE edema  LYMPH: No lymphadenopathy noted  SKIN: No rashes or lesions    LABS:  ABG - ( 27 Feb 2019 16:21 )  pH, Arterial: 7.47  pH, Blood: x     /  pCO2: 36    /  pO2: 118   / HCO3: 26    / Base Excess: 2.7   /  SaO2: 99                    CBC Full  -  ( 28 Feb 2019 02:45 )  WBC Count : 9.0 K/uL  Hemoglobin : 10.5 g/dL  Hematocrit : 31.5 %  Platelet Count - Automated : 91 K/uL  Mean Cell Volume : 93.4 fl  Mean Cell Hemoglobin : 31.2 pg  Mean Cell Hemoglobin Concentration : 33.4 gm/dL  Auto Neutrophil # : x  Auto Lymphocyte # : x  Auto Monocyte # : x  Auto Eosinophil # : x  Auto Basophil # : x  Auto Neutrophil % : x  Auto Lymphocyte % : x  Auto Monocyte % : x  Auto Eosinophil % : x  Auto Basophil % : x    02-28    135  |  99  |  12  ----------------------------<  206<H>  3.8   |  25  |  0.51    Ca    7.7<L>      28 Feb 2019 02:45  Phos  2.6     02-28  Mg     1.8     02-28            CAPILLARY BLOOD GLUCOSE          RADIOLOGY & ADDITIONAL TESTS:

## 2019-02-28 NOTE — PROGRESS NOTE ADULT - SUBJECTIVE AND OBJECTIVE BOX
HISTORY  68y Female past medical history of bipolar depression, OCD, dementia, HLD, NHL (in remission since 2016), osteoarthritis s/p bilateral TKR, and recent right YONATAN (1/20-1/26)  who presented on 2/4 as a level two trauma after a fall. Patient was found down in rehab after likely rolling out of bed. Patient was upgraded to a level 1 due to hypotension w/ SBP in the 80s despite fluid resuscitation and transfusions. Imaging revealed pancolitis and a right periprosthetic femoral neck fracture. Patient tested for C. difficile and was positive. She was admitted to SICU for hemodynamic monitoring in the setting of sepsis. Pt eventually transferred to floor on 2/14/19.    Pt taken to OR with orthopedics on 2/22/19 for ORIF of right periprosthetic femur fracture and revision of right hemiarthroplasty. EBL 500ml, IVF 1750ml, 1u PRBC. Pt required phenylephrine throughout the case and was 1.3 mcg/kg/min at the end of the case. For this reason she was kept intubated and transferred to PACU. SICU consulted for management of ventilator and pressors.        24 HOUR EVENTS: KO feeding tube placed for suppplemental nutrition. Diuresed with 20mg. of IV lasix x3 doses.     SUBJECTIVE/ROS:  [ ] A ten-point review of systems was otherwise negative except as noted.  [ ] Due to altered mental status/intubation, subjective information were not able to be obtained from the patient. History was obtained, to the extent possible, from review of the chart and collateral sources of information.      NEURO  Exam: awake, alert, oriented  Meds: acetaminophen   Tablet .. 975 milliGRAM(s) Oral every 6 hours PRN Mild Pain (1 - 3)  buPROPion XL . 300 milliGRAM(s) Oral daily  dronabinol 2.5 milliGRAM(s) Oral daily  oxyCODONE    IR 5 milliGRAM(s) Oral every 4 hours PRN Moderate Pain (4 - 6)  ziprasidone 40 milliGRAM(s) Oral <User Schedule>    [x] Adequacy of sedation and pain control has been assessed and adjusted      RESPIRATORY  RR: 19 (02-28-19 @ 01:00) (14 - 48)  SpO2: 99% (02-28-19 @ 01:00) (90% - 100%)  Exam: unlabored, clear to auscultation bilaterally  Mechanical Ventilation: none  ABG - ( 27 Feb 2019 16:21 )  pH: 7.47  /  pCO2: 36    /  pO2: 118   / HCO3: 26    / Base Excess: 2.7   /  SaO2: 99      Lactate: x      [N/A] Extubation Readiness Assessed  Meds: ALBUTerol/ipratropium for Nebulization 3 milliLiter(s) Nebulizer every 6 hours PRN Shortness of Breath and/or Wheezing        CARDIOVASCULAR  HR: 105 (02-28-19 @ 01:00) (89 - 109)  BP: 110/60 (02-27-19 @ 21:00) (80/47 - 123/54)  BP(mean): 80 (02-27-19 @ 21:00) (59 - 82)  ABP: 70/57 (02-28-19 @ 01:00) (70/57 - 135/54)  ABP(mean): 65 (02-28-19 @ 01:00) (65 - 79)  Exam: regular rate and rhythm  Cardiac Rhythm: sinus  Perfusion     [x]Adequate   [ ]Inadequate  Mentation   [x]Normal       [ ]Reduced  Extremities  [x]Warm         [ ]Cool  Volume Status [ ]Hypervolemic [x]Euvolemic [ ]Hypovolemic  Meds: furosemide   Injectable 20 milliGRAM(s) IV Push every 8 hours  midodrine 10 milliGRAM(s) Oral three times a day        GI/NUTRITION  Exam: soft, nontender, nondistended, incision C/D/I  Diet: NPO with tube feeds @ 50ml./hr   Meds: docusate sodium 100 milliGRAM(s) Oral three times a day  senna 2 Tablet(s) Oral at bedtime      GENITOURINARY  I&O's Detail    02-26 @ 07:01  -  02-27 @ 07:00  --------------------------------------------------------  IN:    Albumin 5%  - 250 mL: 250 mL    Packed Red Blood Cells: 250 mL    phenylephrine   Infusion: 749.9 mL    sodium chloride 0.9%: 1200 mL    Solution: 250 mL  Total IN: 2699.9 mL    OUT:    Bulb: 155 mL    Bulb: 125 mL    Indwelling Catheter - Urethral: 980 mL  Total OUT: 1260 mL    Total NET: 1439.9 mL      02-27 @ 07:01  -  02-28 @ 01:49  --------------------------------------------------------  IN:    ns in tub fed  [id number]: 210 mL    phenylephrine   Infusion: 315.9 mL    sodium chloride 0.9%: 200 mL    Solution: 300 mL  Total IN: 1025.9 mL    OUT:    Bulb: 310 mL    Bulb: 45 mL    Drain: 25 mL    Indwelling Catheter - Urethral: 1180 mL  Total OUT: 1560 mL    Total NET: -534.1 mL          02-27    136  |  102  |  11  ----------------------------<  115<H>  3.4<L>   |  26  |  0.52    Ca    7.4<L>      27 Feb 2019 02:46  Phos  2.8     02-27  Mg     1.9     02-27      [x] Munguia catheter, indication: perioperative   Meds: none       HEMATOLOGIC  Meds: enoxaparin Injectable 40 milliGRAM(s) SubCutaneous daily    [x] VTE Prophylaxis                        10.6   8.7   )-----------( 93       ( 27 Feb 2019 02:46 )             29.6     PT/INR - ( 26 Feb 2019 03:30 )   PT: 13.0 sec;   INR: 1.14 ratio         PTT - ( 26 Feb 2019 03:30 )  PTT:26.7 sec  Transfusion     [ ] PRBC   [ ] Platelets   [ ] FFP   [ ] Cryoprecipitate      INFECTIOUS DISEASES  WBC Count: 8.7 K/uL (02-27 @ 02:46)    RECENT CULTURES:    Meds: influenza   Vaccine 0.5 milliLiter(s) IntraMuscular once  vancomycin    Solution 125 milliGRAM(s) Oral every 6 hours        ENDOCRINE  CAPILLARY BLOOD GLUCOSE        Meds: levothyroxine 100 MICROGram(s) Oral daily        ACCESS DEVICES:  [x] Peripheral IV  [ ] Central Venous Line	[ ] R	[ ] L	[ ] IJ	[ ] Fem	[ ] SC	Placed:   [ ] Arterial Line		[ ] R	[ ] L	[ ] Fem	[ ] Rad	[ ] Ax	Placed:   [ ] PICC:					[ ] Mediport  [ ] Urinary Catheter, Date Placed:   [x] Necessity of urinary, arterial, and venous catheters discussed    OTHER MEDICATIONS:  chlorhexidine 4% Liquid 1 Application(s) Topical <User Schedule>      CODE STATUS: DNR     IMAGING: < from: CT 3D Reconstruct w/ Workstation (02.21.19 @ 18:11) >  Findings:    Studies limited by osseous demineralization. Patient is again noted to be   status post right hip hemiarthroplasty. There is redemonstration of an   acute periprosthetic fracture of the right proximal femur which extends   obliquely oriented fashion from the greater trochanter to the medial   cortex of the subtrochanteric femur involving both the anterolateral and   posterior medial cortices. The degree of displacement is increased since   the prior CT and the involvement of the posterior medial cortex is new   since the prior CT. This results in separation of the lesser trochanter   with the lesser trochanteric fragment displaced anteriorly by 1.4 cm and   medially by approximately 0.5 cm. Thereis no dislocation.    There is mild left hip arthrosis. There is moderate pubic symphysis   arthrosis. There is lower lumbar spondylosis with moderate to severe   bilateral facet arthrosis. There is a chronic Schmorl's node along the   superior endplate of L5. Vacuum disc phenomenon is noted at L4/L5.    Cutaneous surgical staples are noted along the lateral aspect of the   right hip. There is subcutaneous edema noted throughout the pelvis, most   prominent about the right hip within the subcutaneous fat and surrounding   intramuscular myofascial planes. Mild edema is also seen within the   superficial myofascia along the left vastus lateralis muscle which is   nonspecific.    Atherosclerotic disease is noted. There is diffuse wall thickeningof the   colon and rectum consistent with colitis.    Impression:    Status post right hip hemiarthroplasty with an acute periprosthetic   proximal femoral fracture. The degree of displacement of fracture   fragments is progressed since the prior CT and involvement of the   posterior medial cortex is new since the prior CT.    Findings consistent with colitis, as seen on the prior examination.            < end of copied text > HISTORY  68y Female past medical history of bipolar depression, OCD, dementia, HLD, NHL (in remission since 2016), osteoarthritis s/p bilateral TKR, and recent right YONATAN (1/20-1/26)  who presented on 2/4 as a level two trauma after a fall. Patient was found down in rehab after likely rolling out of bed. Patient was upgraded to a level 1 due to hypotension w/ SBP in the 80s despite fluid resuscitation and transfusions. Imaging revealed pancolitis and a right periprosthetic femoral neck fracture. Patient tested for C. difficile and was positive. She was admitted to SICU for hemodynamic monitoring in the setting of sepsis. Pt eventually transferred to floor on 2/14/19.    Pt taken to OR with orthopedics on 2/22/19 for ORIF of right periprosthetic femur fracture and revision of right hemiarthroplasty. EBL 500ml, IVF 1750ml, 1u PRBC. Pt required phenylephrine throughout the case and was 1.3 mcg/kg/min at the end of the case. For this reason she was kept intubated and transferred to PACU. SICU consulted for management of ventilator and pressors.        24 HOUR EVENTS: KO feeding tube placed for suppplemental nutrition. Diuresed with 20mg. of IV lasix x3 doses.     SUBJECTIVE/ROS:  [ ] A ten-point review of systems was otherwise negative except as noted.  [ ] Due to altered mental status/intubation, subjective information were not able to be obtained from the patient. History was obtained, to the extent possible, from review of the chart and collateral sources of information.      NEURO  Exam: awake, alert, oriented  Meds: acetaminophen   Tablet .. 975 milliGRAM(s) Oral every 6 hours PRN Mild Pain (1 - 3)  buPROPion XL . 300 milliGRAM(s) Oral daily  dronabinol 2.5 milliGRAM(s) Oral daily  oxyCODONE    IR 5 milliGRAM(s) Oral every 4 hours PRN Moderate Pain (4 - 6)  ziprasidone 40 milliGRAM(s) Oral <User Schedule>    [x] Adequacy of sedation and pain control has been assessed and adjusted      RESPIRATORY  RR: 19 (02-28-19 @ 01:00) (14 - 48)  SpO2: 99% (02-28-19 @ 01:00) (90% - 100%)  Exam: unlabored, clear to auscultation bilaterally  Mechanical Ventilation: none  ABG - ( 27 Feb 2019 16:21 )  pH: 7.47  /  pCO2: 36    /  pO2: 118   / HCO3: 26    / Base Excess: 2.7   /  SaO2: 99      Lactate: x      [N/A] Extubation Readiness Assessed  Meds: ALBUTerol/ipratropium for Nebulization 3 milliLiter(s) Nebulizer every 6 hours PRN Shortness of Breath and/or Wheezing        CARDIOVASCULAR  HR: 105 (02-28-19 @ 01:00) (89 - 109)  BP: 110/60 (02-27-19 @ 21:00) (80/47 - 123/54)  BP(mean): 80 (02-27-19 @ 21:00) (59 - 82)  ABP: 70/57 (02-28-19 @ 01:00) (70/57 - 135/54)  ABP(mean): 65 (02-28-19 @ 01:00) (65 - 79)  Exam: regular rate and rhythm  Cardiac Rhythm: sinus  Perfusion     [x]Adequate   [ ]Inadequate  Mentation   [x]Normal       [ ]Reduced  Extremities  [x]Warm         [ ]Cool  Volume Status [ ]Hypervolemic [x]Euvolemic [ ]Hypovolemic  Meds: furosemide   Injectable 20 milliGRAM(s) IV Push every 8 hours  midodrine 10 milliGRAM(s) Oral three times a day        GI/NUTRITION  Exam: soft, nontender, nondistended, incision C/D/I  Diet: NPO with tube feeds @ 50ml./hr   Meds: docusate sodium 100 milliGRAM(s) Oral three times a day  senna 2 Tablet(s) Oral at bedtime      GENITOURINARY  I&O's Detail    02-26 @ 07:01  -  02-27 @ 07:00  --------------------------------------------------------  IN:    Albumin 5%  - 250 mL: 250 mL    Packed Red Blood Cells: 250 mL    phenylephrine   Infusion: 749.9 mL    sodium chloride 0.9%: 1200 mL    Solution: 250 mL  Total IN: 2699.9 mL    OUT:    Bulb: 155 mL    Bulb: 125 mL    Indwelling Catheter - Urethral: 980 mL  Total OUT: 1260 mL    Total NET: 1439.9 mL      02-27 @ 07:01  -  02-28 @ 01:49  --------------------------------------------------------  IN:    ns in tub fed  [id number]: 210 mL    phenylephrine   Infusion: 315.9 mL    sodium chloride 0.9%: 200 mL    Solution: 300 mL  Total IN: 1025.9 mL    OUT:    Bulb: 310 mL    Bulb: 45 mL    Drain: 25 mL    Indwelling Catheter - Urethral: 1180 mL  Total OUT: 1560 mL    Total NET: -534.1 mL                      10.5                 135  | 25   | 12           9.0   >-----------< 91      ------------------------< 206                   31.5                 3.8  | 99   | 0.51                                         Ca 7.7   Mg 1.8   Ph 2.6        [x] Munguia catheter, indication: perioperative   Meds: none       HEMATOLOGIC  Meds: enoxaparin Injectable 40 milliGRAM(s) SubCutaneous daily    [x] VTE Prophylaxis               Transfusion     [ ] PRBC   [ ] Platelets   [ ] FFP   [ ] Cryoprecipitate      INFECTIOUS DISEASES  WBC Count: 8.7 K/uL (02-27 @ 02:46)    RECENT CULTURES:    Meds: influenza   Vaccine 0.5 milliLiter(s) IntraMuscular once  vancomycin    Solution 125 milliGRAM(s) Oral every 6 hours        ENDOCRINE  CAPILLARY BLOOD GLUCOSE        Meds: levothyroxine 100 MICROGram(s) Oral daily        ACCESS DEVICES:  [x] Peripheral IV  [ ] Central Venous Line	[ ] R	[ ] L	[ ] IJ	[ ] Fem	[ ] SC	Placed:   [ ] Arterial Line		[ ] R	[ ] L	[ ] Fem	[ ] Rad	[ ] Ax	Placed:   [ ] PICC:					[ ] Mediport  [ ] Urinary Catheter, Date Placed:   [x] Necessity of urinary, arterial, and venous catheters discussed    OTHER MEDICATIONS:  chlorhexidine 4% Liquid 1 Application(s) Topical <User Schedule>      CODE STATUS: DNR     IMAGING: < from: CT 3D Reconstruct w/ Workstation (02.21.19 @ 18:11) >  Findings:    Studies limited by osseous demineralization. Patient is again noted to be   status post right hip hemiarthroplasty. There is redemonstration of an   acute periprosthetic fracture of the right proximal femur which extends   obliquely oriented fashion from the greater trochanter to the medial   cortex of the subtrochanteric femur involving both the anterolateral and   posterior medial cortices. The degree of displacement is increased since   the prior CT and the involvement of the posterior medial cortex is new   since the prior CT. This results in separation of the lesser trochanter   with the lesser trochanteric fragment displaced anteriorly by 1.4 cm and   medially by approximately 0.5 cm. Thereis no dislocation.    There is mild left hip arthrosis. There is moderate pubic symphysis   arthrosis. There is lower lumbar spondylosis with moderate to severe   bilateral facet arthrosis. There is a chronic Schmorl's node along the   superior endplate of L5. Vacuum disc phenomenon is noted at L4/L5.    Cutaneous surgical staples are noted along the lateral aspect of the   right hip. There is subcutaneous edema noted throughout the pelvis, most   prominent about the right hip within the subcutaneous fat and surrounding   intramuscular myofascial planes. Mild edema is also seen within the   superficial myofascia along the left vastus lateralis muscle which is   nonspecific.    Atherosclerotic disease is noted. There is diffuse wall thickeningof the   colon and rectum consistent with colitis.    Impression:    Status post right hip hemiarthroplasty with an acute periprosthetic   proximal femoral fracture. The degree of displacement of fracture   fragments is progressed since the prior CT and involvement of the   posterior medial cortex is new since the prior CT.    Findings consistent with colitis, as seen on the prior examination.            < end of copied text >

## 2019-02-28 NOTE — PROGRESS NOTE ADULT - ATTENDING COMMENTS
I agree with the above note and have personally seen and examined this patient. All pertinent films have been reviewed. Please refer to clinical documentation of the history, physical examinations, data summary, and both assessment and plan as documented above and with which I agree.    now off pressors    -txr to medicine service when out of sicu  -DVT PPx: Lovenox 40 qd --> please change to alternative, rec sqh q8, recognized increased of dvt however lovenox has risk of seroma which patient has at the moment with increased drainage from drains  -Cudet Munguia--please remove when possible given recent hx urosepsis  -FU OR Cx x 3, ngtd  -NWB RLE. OOB to chair is okay  -Posterior hip precautions  -aquacel now placed, to be changed q5d by ortho (next change is tues)  -DION until output <50cc/24, maintain self suction  -appreciate nutrition consult  -suggest silvadene cream to foot blister site and wrap with rony  -po pain control, minimize narcs  -daily cbc until stable, may need transfusion today given drop in h/h, likely 2/2 dilutional effects from fluid boluses she has been receiving    Conor George MD  Attending Orthopedic Surgeon

## 2019-02-28 NOTE — PROGRESS NOTE ADULT - ASSESSMENT
68 year old female with a PMHx of bipolar depression, Hyperlipidemia, NHL (in remission), OCD, dementia, with recent hospitalization (1/20-1/26) for right hip hemiarthroplasty for right femoral neck fracture after fall. Hospital course complicated by ESBL E. Coli UTI on Ertapenem (1/23-1/29)    s/p  Hypovolemic/ Distributive shock secondary to sepsis 2/2 Pancolitis, likely secondary to C. Diff Colitis,-now resolved  Age Indeterminate L5 vertebral body fracture, Right proximal femoral periprosthetic fracture following unwitnessed fall with cdiff, bandemia, fever  s/p Open reduction and internal fixation (ORIF) of periprosthetic fracture of femur  02/22/2019  Right    Hemiarthroplasty of hip  02/22/2019  Revision--Right, posterior approach    Irrigation and debridement, thigh or hip  02/22/2019  Right  Cdiff improved and stable  No s/s any other infectious focus  OR Cx negative   Stable  Would continue Vanco po in a tapering fashion    then taper-125 mg po q 6 X 2 weeks 2/23 --> 3/9/19  and then q 12 X 2 more weeks 3/10 --> 3/23/19    Will tailor plan for ID issues  per course,results.Will defer to primary team on management of other issues.  Case d/w SICU team    I will be at [facility] for next 2 months starting tomorrow.  My colleagues in ID service will cover ,follow and assume care of ID issues.  Please call [phone] if issues.

## 2019-02-28 NOTE — PROGRESS NOTE ADULT - ASSESSMENT
68 year old female with bipolar depression, OCD, dementia, HLD, NHL (in remission since 2016), osteoarthritis s/p bilateral TKR s/p fall presenting with a right periprosthetic femoral neck fracture and C. difficile colitis, now s/p ORIF of R periprosthetic femur fracture and revision of right hip hemiarthroplasty on 2/22, transferred to SICU intubated, on pressors. Now extubated     PLAN:  Neuro: bipolar depression, OCD, dementia, acute traumatic pain  - Tylenol, oxycodone PO PRN pain  - Dilaudid PRN for severe pain  - Continue home bupropion, ziprasidone.    Resp: acute post-operative respiratory failure weaned off ventilator, b/l pleural effusions  - Extubated 2/23 to face mask, weaned to NC  - Duonebs q 6hours    CV: hypotension on true, resolved   - Continue midodrine     GI: C. difficile colitis, complicated by ileus, resolved  - Passed bedside swallow, started on regular diet    Renal CKD stage III  - Ezra, monitor I&Os  - Continue to monitor volume status  - Continue lasix 20mg. IV x8 hours    Heme: no acute issues  - Lovenox for VTE prophylaxis.  - +1u pRBC for hypotension    ID: C. difficile colitis, resolved  - Monitor WBC, temperature, and procalcitonin.  - PO vancomycin for C. Diff, begin taper until 3/23/19    Endo: hypothyroidism  - Monitor glucose on BMP  - Continue home synthroid    Disposition: DNR, SICU    - Sawyer Coleman PA-C

## 2019-03-01 LAB
ALBUMIN SERPL ELPH-MCNC: 2 G/DL — LOW (ref 3.3–5)
ALP SERPL-CCNC: 141 U/L — HIGH (ref 40–120)
ALT FLD-CCNC: 32 U/L — SIGNIFICANT CHANGE UP (ref 10–45)
ANION GAP SERPL CALC-SCNC: 12 MMOL/L — SIGNIFICANT CHANGE UP (ref 5–17)
AST SERPL-CCNC: 56 U/L — HIGH (ref 10–40)
BILIRUB DIRECT SERPL-MCNC: 0.2 MG/DL — SIGNIFICANT CHANGE UP (ref 0–0.2)
BILIRUB INDIRECT FLD-MCNC: 0.3 MG/DL — SIGNIFICANT CHANGE UP (ref 0.2–1)
BILIRUB SERPL-MCNC: 0.5 MG/DL — SIGNIFICANT CHANGE UP (ref 0.2–1.2)
BUN SERPL-MCNC: 15 MG/DL — SIGNIFICANT CHANGE UP (ref 7–23)
CALCIUM SERPL-MCNC: 7.5 MG/DL — LOW (ref 8.4–10.5)
CHLORIDE SERPL-SCNC: 94 MMOL/L — LOW (ref 96–108)
CO2 SERPL-SCNC: 30 MMOL/L — SIGNIFICANT CHANGE UP (ref 22–31)
CREAT SERPL-MCNC: 0.57 MG/DL — SIGNIFICANT CHANGE UP (ref 0.5–1.3)
GAS PNL BLDV: SIGNIFICANT CHANGE UP
GLUCOSE SERPL-MCNC: 150 MG/DL — HIGH (ref 70–99)
HCT VFR BLD CALC: 27.2 % — LOW (ref 34.5–45)
HGB BLD-MCNC: 9.4 G/DL — LOW (ref 11.5–15.5)
MAGNESIUM SERPL-MCNC: 1.8 MG/DL — SIGNIFICANT CHANGE UP (ref 1.6–2.6)
MCHC RBC-ENTMCNC: 32.7 PG — SIGNIFICANT CHANGE UP (ref 27–34)
MCHC RBC-ENTMCNC: 34.4 GM/DL — SIGNIFICANT CHANGE UP (ref 32–36)
MCV RBC AUTO: 94.8 FL — SIGNIFICANT CHANGE UP (ref 80–100)
PHOSPHATE SERPL-MCNC: 3.3 MG/DL — SIGNIFICANT CHANGE UP (ref 2.5–4.5)
PLATELET # BLD AUTO: 74 K/UL — LOW (ref 150–400)
POTASSIUM SERPL-MCNC: 3.6 MMOL/L — SIGNIFICANT CHANGE UP (ref 3.5–5.3)
POTASSIUM SERPL-SCNC: 3.6 MMOL/L — SIGNIFICANT CHANGE UP (ref 3.5–5.3)
PROT SERPL-MCNC: 4.1 G/DL — LOW (ref 6–8.3)
RBC # BLD: 2.87 M/UL — LOW (ref 3.8–5.2)
RBC # FLD: 18.9 % — HIGH (ref 10.3–14.5)
SODIUM SERPL-SCNC: 136 MMOL/L — SIGNIFICANT CHANGE UP (ref 135–145)
WBC # BLD: 8.7 K/UL — SIGNIFICANT CHANGE UP (ref 3.8–10.5)
WBC # FLD AUTO: 8.7 K/UL — SIGNIFICANT CHANGE UP (ref 3.8–10.5)

## 2019-03-01 PROCEDURE — 99232 SBSQ HOSP IP/OBS MODERATE 35: CPT

## 2019-03-01 PROCEDURE — 71045 X-RAY EXAM CHEST 1 VIEW: CPT | Mod: 26

## 2019-03-01 RX ORDER — CALCIUM GLUCONATE 100 MG/ML
2 VIAL (ML) INTRAVENOUS ONCE
Qty: 0 | Refills: 0 | Status: COMPLETED | OUTPATIENT
Start: 2019-03-01 | End: 2019-03-01

## 2019-03-01 RX ORDER — POTASSIUM CHLORIDE 20 MEQ
60 PACKET (EA) ORAL ONCE
Qty: 0 | Refills: 0 | Status: DISCONTINUED | OUTPATIENT
Start: 2019-03-01 | End: 2019-03-01

## 2019-03-01 RX ORDER — CALCIUM GLUCONATE 100 MG/ML
2 VIAL (ML) INTRAVENOUS ONCE
Qty: 0 | Refills: 0 | Status: DISCONTINUED | OUTPATIENT
Start: 2019-03-01 | End: 2019-03-01

## 2019-03-01 RX ORDER — FUROSEMIDE 40 MG
20 TABLET ORAL ONCE
Qty: 0 | Refills: 0 | Status: COMPLETED | OUTPATIENT
Start: 2019-03-01 | End: 2019-03-01

## 2019-03-01 RX ORDER — GABAPENTIN 400 MG/1
0 CAPSULE ORAL
Qty: 0 | Refills: 0 | COMMUNITY

## 2019-03-01 RX ORDER — POTASSIUM CHLORIDE 20 MEQ
20 PACKET (EA) ORAL
Qty: 0 | Refills: 0 | Status: DISCONTINUED | OUTPATIENT
Start: 2019-03-01 | End: 2019-03-01

## 2019-03-01 RX ORDER — MAGNESIUM SULFATE 500 MG/ML
2 VIAL (ML) INJECTION ONCE
Qty: 0 | Refills: 0 | Status: COMPLETED | OUTPATIENT
Start: 2019-03-01 | End: 2019-03-01

## 2019-03-01 RX ORDER — ALPRAZOLAM 0.25 MG
0 TABLET ORAL
Qty: 0 | Refills: 0 | COMMUNITY

## 2019-03-01 RX ORDER — ZIPRASIDONE HYDROCHLORIDE 20 MG/1
0 CAPSULE ORAL
Qty: 0 | Refills: 0 | COMMUNITY

## 2019-03-01 RX ORDER — POTASSIUM CHLORIDE 20 MEQ
20 PACKET (EA) ORAL ONCE
Qty: 0 | Refills: 0 | Status: COMPLETED | OUTPATIENT
Start: 2019-03-01 | End: 2019-03-01

## 2019-03-01 RX ORDER — LORATADINE 10 MG/1
1 TABLET ORAL
Qty: 0 | Refills: 0 | COMMUNITY

## 2019-03-01 RX ORDER — LEVOTHYROXINE SODIUM 125 MCG
0 TABLET ORAL
Qty: 0 | Refills: 0 | COMMUNITY

## 2019-03-01 RX ORDER — POTASSIUM CHLORIDE 20 MEQ
40 PACKET (EA) ORAL ONCE
Qty: 0 | Refills: 0 | Status: COMPLETED | OUTPATIENT
Start: 2019-03-01 | End: 2019-03-01

## 2019-03-01 RX ORDER — GABAPENTIN 400 MG/1
2 CAPSULE ORAL
Qty: 0 | Refills: 0 | COMMUNITY

## 2019-03-01 RX ADMIN — Medication 20 MILLIEQUIVALENT(S): at 10:24

## 2019-03-01 RX ADMIN — Medication 125 MILLIGRAM(S): at 05:00

## 2019-03-01 RX ADMIN — Medication 125 MILLIGRAM(S): at 22:08

## 2019-03-01 RX ADMIN — MIDODRINE HYDROCHLORIDE 5 MILLIGRAM(S): 2.5 TABLET ORAL at 05:01

## 2019-03-01 RX ADMIN — MIDODRINE HYDROCHLORIDE 5 MILLIGRAM(S): 2.5 TABLET ORAL at 13:57

## 2019-03-01 RX ADMIN — Medication 20 MILLIEQUIVALENT(S): at 05:00

## 2019-03-01 RX ADMIN — MIDODRINE HYDROCHLORIDE 5 MILLIGRAM(S): 2.5 TABLET ORAL at 22:14

## 2019-03-01 RX ADMIN — Medication 40 MILLIEQUIVALENT(S): at 08:43

## 2019-03-01 RX ADMIN — Medication 975 MILLIGRAM(S): at 05:01

## 2019-03-01 RX ADMIN — Medication 100 MICROGRAM(S): at 05:00

## 2019-03-01 RX ADMIN — HEPARIN SODIUM 5000 UNIT(S): 5000 INJECTION INTRAVENOUS; SUBCUTANEOUS at 05:03

## 2019-03-01 RX ADMIN — Medication 1 APPLICATION(S): at 11:14

## 2019-03-01 RX ADMIN — CHLORHEXIDINE GLUCONATE 1 APPLICATION(S): 213 SOLUTION TOPICAL at 05:01

## 2019-03-01 RX ADMIN — Medication 50 GRAM(S): at 05:03

## 2019-03-01 RX ADMIN — Medication 975 MILLIGRAM(S): at 05:30

## 2019-03-01 RX ADMIN — HEPARIN SODIUM 5000 UNIT(S): 5000 INJECTION INTRAVENOUS; SUBCUTANEOUS at 13:57

## 2019-03-01 RX ADMIN — Medication 200 GRAM(S): at 10:24

## 2019-03-01 RX ADMIN — ZIPRASIDONE HYDROCHLORIDE 40 MILLIGRAM(S): 20 CAPSULE ORAL at 10:23

## 2019-03-01 RX ADMIN — HEPARIN SODIUM 5000 UNIT(S): 5000 INJECTION INTRAVENOUS; SUBCUTANEOUS at 22:08

## 2019-03-01 RX ADMIN — Medication 125 MILLIGRAM(S): at 17:20

## 2019-03-01 RX ADMIN — Medication 125 MILLIGRAM(S): at 10:23

## 2019-03-01 RX ADMIN — Medication 2.5 MILLIGRAM(S): at 11:13

## 2019-03-01 RX ADMIN — BUPROPION HYDROCHLORIDE 300 MILLIGRAM(S): 150 TABLET, EXTENDED RELEASE ORAL at 11:14

## 2019-03-01 RX ADMIN — Medication 20 MILLIGRAM(S): at 11:18

## 2019-03-01 NOTE — PROGRESS NOTE ADULT - ATTENDING COMMENTS
I agree with the above note and have personally seen and examined this patient. All pertinent films have been reviewed. Please refer to clinical documentation of the history, physical examinations, data summary, and both assessment and plan as documented above and with which I agree.    now off pressors    -txr to medicine service when out of sicu  -DVT PPx: Lovenox 40 qd --> please change to alternative, rec sqh q8, recognized increased of dvt however lovenox has risk of seroma which patient has at the moment with increased drainage from drains  -[place]--please remove when possible given recent hx urosepsis  -FU OR Cx x 3, ngtd  -NWB RLE. OOB to chair is okay  -Posterior hip precautions  -aquacel now placed, to be changed q5d by ortho (next change is tues)  -DION until output <50cc/24, maintain self suction  -appreciate nutrition consult  -suggest silvadene cream to foot blister site and wrap with rony  -po pain control, minimize narcs  -daily cbc until stable, may need transfusion today given drop in h/h, likely 2/2 dilutional effects from fluid boluses she has been receiving    Conor George MD  Attending Orthopedic Surgeon I agree with the above note on this patient. All pertinent films have been reviewed. Please refer to clinical documentation of the history, physical examinations, data summary, and both assessment and plan as documented above and with which I agree.    no new events  possible txr to floor soon    -txr to medicine service when out of sicu  -DVT PPx: sqh q8, recognized increased of dvt however lovenox has risk of seroma which patient has at the moment with increased drainage from drains  -[place]--please remove when possible given recent hx urosepsis  -NWB RLE. OOB to chair is okay, posterior hip precautions  -aquacel in place, to be changed q5d by ortho (next change is tues)  -DION until output <50cc/24, maintain self suction  -appreciate nutrition consult  -silvadene cream to foot blister site and wrap with rony  -po pain control, minimize narcs    Conor George MD  Attending Orthopedic Surgeon

## 2019-03-01 NOTE — PROGRESS NOTE ADULT - SUBJECTIVE AND OBJECTIVE BOX
WILI PARKER 68y [id number]    Patient is a 68y old  Female who presents with a chief complaint of Fractured hip (24 Feb 2019 08:31)      Follow Up/CC:  ID following for cdiff    Interval History/ROS: in sicu, no fever    Allergies    honeydew melon (Other)  penicillin (Other; Hives)    Intolerances        ANTIMICROBIALS:  vancomycin    Solution 125 every 6 hours      MEDICATIONS  (STANDING):  buPROPion XL . 300 milliGRAM(s) Oral daily  chlorhexidine 4% Liquid 1 Application(s) Topical <User Schedule>  docusate sodium 100 milliGRAM(s) Oral three times a day  dronabinol 2.5 milliGRAM(s) Oral daily  heparin  Injectable 5000 Unit(s) SubCutaneous every 8 hours  influenza   Vaccine 0.5 milliLiter(s) IntraMuscular once  levothyroxine 100 MICROGram(s) Oral daily  midodrine 5 milliGRAM(s) Oral three times a day  senna 2 Tablet(s) Oral at bedtime  silver sulfADIAZINE 1% Cream 1 Application(s) Topical daily  vancomycin    Solution 125 milliGRAM(s) Oral every 6 hours  ziprasidone 40 milliGRAM(s) Oral <User Schedule>    MEDICATIONS  (PRN):  acetaminophen   Tablet .. 975 milliGRAM(s) Oral every 6 hours PRN Mild Pain (1 - 3)  oxyCODONE    IR 5 milliGRAM(s) Oral every 4 hours PRN Moderate Pain (4 - 6)        Vital Signs Last 24 Hrs  T(C): 36.5 (01 Mar 2019 11:00), Max: 36.9 (28 Feb 2019 19:00)  T(F): 97.7 (01 Mar 2019 11:00), Max: 98.4 (28 Feb 2019 19:00)  HR: 103 (01 Mar 2019 14:00) (91 - 108)  BP: 82/58 (01 Mar 2019 14:00) (81/45 - 133/69)  BP(mean): 66 (01 Mar 2019 14:00) (55 - 90)  RR: 25 (01 Mar 2019 14:00) (17 - 44)  SpO2: 100% (01 Mar 2019 14:00) (92% - 100%)    CBC Full  -  ( 01 Mar 2019 03:45 )  WBC Count : 8.7 K/uL  Hemoglobin : 9.4 g/dL  Hematocrit : 27.2 %  Platelet Count - Automated : 74 K/uL  Mean Cell Volume : 94.8 fl  Mean Cell Hemoglobin : 32.7 pg  Mean Cell Hemoglobin Concentration : 34.4 gm/dL  Auto Neutrophil # : x  Auto Lymphocyte # : x  Auto Monocyte # : x  Auto Eosinophil # : x  Auto Basophil # : x  Auto Neutrophil % : x  Auto Lymphocyte % : x  Auto Monocyte % : x  Auto Eosinophil % : x  Auto Basophil % : x    03-01    136  |  94<L>  |  15  ----------------------------<  150<H>  3.6   |  30  |  0.57    Ca    7.5<L>      01 Mar 2019 03:45  Phos  3.3     03-01  Mg     1.8     03-01    TPro  4.1<L>  /  Alb  2.0<L>  /  TBili  0.5  /  DBili  0.2  /  AST  56<H>  /  ALT  32  /  AlkPhos  141<H>  03-01    LIVER FUNCTIONS - ( 01 Mar 2019 03:45 )  Alb: 2.0 g/dL / Pro: 4.1 g/dL / ALK PHOS: 141 U/L / ALT: 32 U/L / AST: 56 U/L / GGT: x               MICROBIOLOGY:  .Body Fluid Synovial Fluid  02-23-19   Culture is being performed.  --    No polymorphonuclear cells seen  No organisms seen  by cytocentrifuge      .Tissue Other, right hip synovium  02-23-19   Culture is being performed.  --    Rare polymorphonuclear leukocytes seen per low power field  No organisms seen per oil power field      .Tissue Other, right femur  02-23-19   No growth at 5 days  --    Rare polymorphonuclear leukocytes seen per low power field  No organisms seen per oil power field      .Body Fluid  02-06-19   No growth at 14 days.  --    No polymorphonuclear cells seen per low power field  No organisms seen      .Blood Blood-Peripheral  02-06-19   No growth at 5 days.  --  --      RADIOLOGY    Xray Chest 1 View- PORTABLE-Routine (03.01.19 @ 07:08) >  The heart is normal in size. Bilateral pleural effusion. Left lower lobe   pneumonia and/or atelectasis. NG tube is in the stomach however its tip   is not seen on the current study. No change in the appearance of the   chest when compared to previous study done February 28, 2019.

## 2019-03-01 NOTE — PROGRESS NOTE ADULT - SUBJECTIVE AND OBJECTIVE BOX
S: Patient seen and examined today. No acute events overnight. Pain is well controlled. On midodrine. vac DCd yesterday dressing changed.        Vital Signs Last 24 Hrs  T(C): 36.9 (03-01-19 @ 07:00), Max: 36.9 (02-28-19 @ 11:00)  T(F): 98.4 (03-01-19 @ 07:00), Max: 98.5 (02-28-19 @ 11:00)  HR: 91 (03-01-19 @ 07:00) (91 - 110)  BP: 104/51 (03-01-19 @ 07:00) (81/45 - 133/69)  BP(mean): 71 (03-01-19 @ 07:00) (55 - 90)  RR: 23 (03-01-19 @ 07:00) (18 - 44)  SpO2: 100% (03-01-19 @ 07:00) (90% - 100%)      Gen: awake, alert, NAD  Resp: no increase work of breathing  RLE  dressings slightly saturated  edema to RLE  blister dorsum right foot  JPs intact with SS drainage   motor intact GS/TA/EHL  SILT S/S/SP/DP  foot warm

## 2019-03-01 NOTE — PROGRESS NOTE ADULT - ASSESSMENT
68 year old female with bipolar depression, OCD, dementia, HLD, NHL (in remission since 2016), osteoarthritis s/p bilateral TKR s/p fall presenting with a right periprosthetic femoral neck fracture and C. difficile colitis, now s/p ORIF of R periprosthetic femur fracture and revision of right hip hemiarthroplasty on 2/22, transferred to SICU intubated, on pressors. Now extubated     PLAN:  Neuro: bipolar depression, OCD, dementia, acute traumatic pain  - Tylenol, oxycodone PO PRN pain  - Dilaudid PRN for severe pain  - Continue home bupropion, ziprasidone.  Home medications: ziprasidone, xanax, dilaudid PO, gabapentin, bupropion, alprazolam, tylenol    Resp: acute post-operative respiratory failure weaned off ventilator, b/l pleural effusions  - Extubated 2/23 to face mask, weaned to NC    CV: hypotension on true, resolved. Unreliable non-invasive BPs due to marked edema.  - Titrate midodrine to off    GI: C. difficile colitis, complicated by ileus, resolved  - Passed bedside swallow, started on regular diet with poor PO intake  - TFs jevity @ 50 cc/hr  - dronabinol for appetite stimulation  - bowel regimen  Home medications: senna, miralax, magnesium hydroxide    Renal CKD stage III  - monitor I&Os  - cont spontaneous void  - Continue to monitor volume status  - Continue lasix 20mg. IV x8 hours    Heme: no acute issues  - SQH for VTE ppx    ID: C. difficile colitis, resolved  - Monitor WBC, temperature, and procalcitonin.  - PO vancomycin for C. Diff, begin taper until 3/23/19  - silver sulfadiazine to foot wound    Endo: hypothyroidism  - Monitor glucose on BMP  - Continue home synthroid  Home medications: levothyroxine 75 mcg daily    Disposition: DNR, SICU.

## 2019-03-01 NOTE — PROGRESS NOTE ADULT - SUBJECTIVE AND OBJECTIVE BOX
SICU PROGRESS NOTE  ================================================  HPI:  68y Female past medical history of bipolar depression, OCD, dementia, HLD, NHL (in remission since 2016), osteoarthritis s/p bilateral TKR, and recent right YONATAN (1/20-1/26)  who presented on 2/4 as a level two trauma after a fall. Patient was found down in rehab after likely rolling out of bed. Patient was upgraded to a level 1 due to hypotension w/ SBP in the 80s despite fluid resuscitation and transfusions. Imaging revealed pancolitis and a right periprosthetic femoral neck fracture. Patient tested for C. difficile and was positive. She was admitted to SICU for hemodynamic monitoring in the setting of sepsis. Pt eventually transferred to floor on 2/14/19.    Pt taken to OR with orthopedics on 2/22/19 for ORIF of right periprosthetic femur fracture and revision of right hemiarthroplasty. EBL 500ml, IVF 1750ml, 1u PRBC. Pt required phenylephrine throughout the case and was 1.3 mcg/kg/min at the end of the case. For this reason she was kept intubated and transferred to PACU. SICU consulted for management of ventilator and pressors.    - Arterial line placed, with 30 mmHg discrepancy with cuff. Based on a-line pressures, patient normotensive with SBPs in the 110s.  - arterial line dysfunctioning and removed  - patient again noted to be hypotensive based on cuff with SBPs in the 90s. Smaller cuff applied to forearm with SBP readings in the 110s.  - Diuresis continued with 20 mg IV lasix every 8 hours, with 2.2L UOP  - Lactate uptrending, 2 ? 2.5 ? 2.9. BPs stable with consistent urine output and normal mental status  - Duonebs discontinued  - Midodrine taper begun  - lovenox discontinued, subcutaneous heparin begun at orthopedic surgery’s request due to oozing  - silvadene and kerlix dressing to right foot wound  - yanes discontinued, follow up trial of void      Objective:  Vital Signs  ICU Vital Signs Last 24 Hrs  T(C): 36.7 (28 Feb 2019 23:00), Max: 37.2 (28 Feb 2019 03:00)  T(F): 98 (28 Feb 2019 23:00), Max: 99 (28 Feb 2019 03:00)  HR: 103 (28 Feb 2019 23:00) (93 - 112)  BP: 102/54 (28 Feb 2019 23:00) (81/45 - 108/68)  BP(mean): 65 (28 Feb 2019 23:00) (55 - 79)  ABP: 111/61 (28 Feb 2019 04:00) (70/57 - 126/56)  ABP(mean): 78 (28 Feb 2019 04:00) (63 - 78)  RR: 22 (28 Feb 2019 23:00) (17 - 44)  SpO2: 93% (28 Feb 2019 23:00) (89% - 100%)    I&O's Detail    27 Feb 2019 07:01  -  28 Feb 2019 07:00  --------------------------------------------------------  IN:    ns in tub fed  [id number]: 550 mL    phenylephrine   Infusion: 315.9 mL    sodium chloride 0.9%: 200 mL    Solution: 400 mL  Total IN: 1465.9 mL    OUT:    Bulb: 65 mL    Bulb: 360 mL    Drain: 85 mL    Indwelling Catheter - Urethral: 3230 mL  Total OUT: 3740 mL    Total NET: -2274.1 mL      28 Feb 2019 07:01  -  01 Mar 2019 00:15  --------------------------------------------------------  IN:    ns in tub fed  [id number]: 800 mL    Oral Fluid: 360 mL    Solution: 287.5 mL  Total IN: 1447.5 mL    OUT:    Bulb: 20 mL    Bulb: 30 mL    Drain: 100 mL    Indwelling Catheter - Urethral: 2350 mL  Total OUT: 2500 mL    Total NET: -1052.5 mL          Diet: Diet, Regular:   Tube Feeding Modality: Nasogastric  Jevity 1.2 Aneudy (JEVITY1.2RTH)  Total Volume for 24 Hours (mL): 1200  Continuous  Starting Tube Feed Rate mL per Hour: 10  Increase Tube Feed Rate by (mL): 10     Every 2 hours  Until Goal Tube Feed Rate (mL per Hour): 50  Tube Feed Duration (in Hours): 24  Tube Feed Start Time: 14:00  Supplement Feeding Modality:  Oral  Ensure Enlive Cans or Servings Per Day:  2       Frequency:  Daily (02-27-19 @ 15:46)      MEDICATIONS  (STANDING):  buPROPion XL . 300 milliGRAM(s) Oral daily  chlorhexidine 4% Liquid 1 Application(s) Topical <User Schedule>  docusate sodium 100 milliGRAM(s) Oral three times a day  dronabinol 2.5 milliGRAM(s) Oral daily  heparin  Injectable 5000 Unit(s) SubCutaneous every 8 hours  influenza   Vaccine 0.5 milliLiter(s) IntraMuscular once  levothyroxine 100 MICROGram(s) Oral daily  midodrine 5 milliGRAM(s) Oral three times a day  senna 2 Tablet(s) Oral at bedtime  silver sulfADIAZINE 1% Cream 1 Application(s) Topical daily  vancomycin    Solution 125 milliGRAM(s) Oral every 6 hours  ziprasidone 40 milliGRAM(s) Oral <User Schedule>    MEDICATIONS  (PRN):  acetaminophen   Tablet .. 975 milliGRAM(s) Oral every 6 hours PRN Mild Pain (1 - 3)  oxyCODONE    IR 5 milliGRAM(s) Oral every 4 hours PRN Moderate Pain (4 - 6)      PHYSICAL EXAM:  GEN: NAD, resting quietly  NEURO: awake, alert, oriented, no focal deficits  PULM: symmetric chest rise bilaterally, no increased WOB  CV: regular rate, peripheral pulses intact  ABD: soft, nontender, nondistended, incision C/D/I  EXTR: marked peripheral edema in upper and lower extremities    LABS  CBC (02-28 @ 02:45)                              10.5<L>                         9.0     )----------------(  91<L>      --    % Neutrophils, --    % Lymphocytes, ANC: --                                  31.5<L>                BMP (02-28 @ 17:16)             136     |  94<L>   |  13    		Ca++ --      Ca 7.9<L>             ---------------------------------( 144<H>		Mg 1.8                4.0     |  30      |  0.56  			Ph 3.0     BMP (02-28 @ 13:56)             132<L>  |  96      |  13    		Ca++ --      Ca 7.6<L>             ---------------------------------( 169<H>		Mg 1.9                7.1<HH>  |  25      |  0.47<L>			Ph 3.7           ABG (02-28 @ 17:13)      /  /  /  /  / %     Lactate:   2.9<H>  ABG (02-28 @ 02:41)      /  /  /  /  / %     Lactate:   2.5<H>    VBG (02-28 @ 17:13)     7.46<H> / 49 / 25 / 34<H> / 9.5<H> / 39<L>%  VBG (02-28 @ 02:41)     7.50<H> / 36 / 99<H> / 28 / 5.1<H> / 98<H>%    -> .Body Fluid Synovial Fluid Culture (02-23 @ 00:31)       No polymorphonuclear cells seen  No organisms seen  by cytocentrifuge    NG    Culture is being performed.    -> .Tissue Other, right hip synovium Culture (02-23 @ 00:29)       Rare polymorphonuclear leukocytes seen per low power field  No organisms seen per oil power field    NG    Culture is being performed.    -> .Tissue Other, right femur Culture (02-23 @ 00:28)       Rare polymorphonuclear leukocytes seen per low power field  No organisms seen per oil power field    NG    No growth at 5 days        CULTURES  .Body Fluid Synovial Fluid  02-23 @ 00:31   Culture is being performed.  --    No polymorphonuclear cells seen  No organisms seen  by cytocentrifuge      .Tissue Other, right hip synovium  02-23 @ 00:29   Culture is being performed.  --    Rare polymorphonuclear leukocytes seen per low power field  No organisms seen per oil power field      .Tissue Other, right femur  02-23 @ 00:28   No growth at 5 days  --    Rare polymorphonuclear leukocytes seen per low power field  No organisms seen per oil power field      .Body Fluid  02-06 @ 18:59   No growth at 14 days.  --    No polymorphonuclear cells seen per low power field  No organisms seen      .Blood Blood-Peripheral  02-06 @ 16:55   No growth at 5 days.  --  --      .Blood Blood  01-20 @ 20:35   No growth at 5 days.  --  --      .Urine Catheterized  01-20 @ 19:42   >100,000 CFU/ml Escherichia coli ESBL  --  Escherichia coli ESBL      IMAGING:   < from: Xray Chest 1 View- PORTABLE-Routine (02.28.19 @ 06:59) >  Impression:    The heart is normal insize. Bilateral pleural effusion. Left lower lobe   pneumonia and/or atelectasis. NG tube is in the stomach. No change in   appearance the chest when compared to previous study done February 27, 2019. No acute fractures could be identified. No pneumothorax.    < end of copied text >

## 2019-03-01 NOTE — PROGRESS NOTE ADULT - ATTENDING COMMENTS
Pierre Lomeli  Attending Physician   Division of Infectious Disease  Pager #[phone]  After 5pm/weekend or no response, call #[phone]    Please call the ID service [phone] with questions or concerns over the weekend.

## 2019-03-01 NOTE — PROGRESS NOTE ADULT - ASSESSMENT
69 yo woman with a hx of dementia present after recent Hip surgery present after a fall with a femur fx. Patient was found to be cdiff positive. s/p revision of hip. seen now in SICU Stable for  transfer t;o a regular medicine floor.  This was discussed with patient and son.

## 2019-03-01 NOTE — PROGRESS NOTE ADULT - ASSESSMENT
68 year old female with a PMHx of bipolar depression, Hyperlipidemia, NHL (in remission), OCD, dementia, with recent hospitalization (1/20-1/26) for right hip hemiarthroplasty for right femoral neck fracture after fall. Hospital course complicated by ESBL E. Coli UTI on Ertapenem (1/23-1/29)    s/p  Hypovolemic/ Distributive shock secondary to sepsis 2/2 Pancolitis, likely secondary to C. Diff Colitis,-now resolved  Age Indeterminate L5 vertebral body fracture, Right proximal femoral periprosthetic fracture following unwitnessed fall with cdiff, bandemia, fever  s/p Open reduction and internal fixation (ORIF) of periprosthetic fracture of femur  02/22/2019  Right    Hemiarthroplasty of hip  02/22/2019  Revision--Right, posterior approach    Irrigation and debridement, thigh or hip  02/22/2019  Right  Cdiff improved and stable  No s/s any other infectious focus  OR Cx negative   Stable  Would continue Vanco po in a tapering fashion    then taper-125 mg po q 6 X 2 weeks 2/23 --> 3/9/19  and then q 12 X 2 more weeks 3/10 --> 3/23/19

## 2019-03-01 NOTE — PROGRESS NOTE ADULT - SUBJECTIVE AND OBJECTIVE BOX
67 y/o critically ill  female h/o dementia NHL presenting after unwitnessed fall. Pt found on the ground at rehab. is on lovenox after hip fracture. pt reports pain to her right leg. per EMS en route slurring speech and mildly hypotensive requesting trauma.unclear how long pt on the ground. Patient found to have C diff with worsening hypotension requiring pressors on and off and  Patient requiring the SICU for continued cardiopulmonary monitoring. Orthopedic procedure on hold due to  hypotension and pan colitis and need for pressors due to unstable hemodynamics. Diarrhea slowing off IV Flagyl + on oral vancomycin. Patient now has the rectal tube removed.   Leukocytosis and diarrhea have improved  No significant change in status. Has senile dementia with intermittent lucency. No further C.diff colitis and no further hypovolemic hypotension. Patient s/p Open reduction and internal fixation of periprosthetic hip fx Mental status has greatly improved. Patient off pressors today. continue midodrine.  Patient seen in SICU prior to transfer to Medical service and regular floor  Patient  alert awake and able to speak but confused .  Patient less agitated than yesterday .    Medically stable to proceed to transfer to regular floor.     MEDICATIONS  (STANDING):  buPROPion XL . 300 milliGRAM(s) Oral daily  chlorhexidine 4% Liquid 1 Application(s) Topical <User Schedule>  docusate sodium 100 milliGRAM(s) Oral three times a day  dronabinol 2.5 milliGRAM(s) Oral daily  enoxaparin Injectable 40 milliGRAM(s) SubCutaneous daily  furosemide   Injectable 20 milliGRAM(s) IV Push every 8 hours  influenza   Vaccine 0.5 milliLiter(s) IntraMuscular once  levothyroxine 100 MICROGram(s) Oral daily  midodrine 5 milliGRAM(s) Oral three times a day  senna 2 Tablet(s) Oral at bedtime  vancomycin    Solution 125 milliGRAM(s) Oral every 6 hours  ziprasidone 40 milliGRAM(s) Oral <User Schedule>    MEDICATIONS  (PRN):  acetaminophen   Tablet .. 975 milliGRAM(s) Oral every 6 hours PRN Mild Pain (1 - 3)  oxyCODONE    IR 5 milliGRAM(s) Oral every 4 hours PRN Moderate Pain (4 - 6)      ICU Vital Signs Last 24 Hrs  T(C): 36.6 (01 Mar 2019 16:47), Max: 36.9 (28 Feb 2019 19:00)  T(F): 97.8 (01 Mar 2019 16:47), Max: 98.4 (28 Feb 2019 19:00)  HR: 103 (01 Mar 2019 16:47) (91 - 107)  BP: 102/50 (01 Mar 2019 16:47) (81/45 - 133/69)  BP(mean): 66 (01 Mar 2019 14:00) (61 - 90)  ABP: --  ABP(mean): --  RR: 20 (01 Mar 2019 16:47) (17 - 44)  SpO2: 100% (01 Mar 2019 16:47) (92% - 100%)        PHYSICAL EXAM:  GENERAL: NAD, well-groomed, well-developed  HEAD:  Atraumatic, Normocephalic  EYES: EOMI, PERRLA, conjunctiva and sclera clear  ENMT: No tonsillar erythema, exudates, or enlargement; Moist mucous membranes, NGT in place  NECK: Supple, No JVD, Normal thyroid  NERVOUS SYSTEM:  confused becoming aphasic  CHEST/LUNG: Clear to percussion bilaterally; No rales, rhonchi, wheezing, or rubs  HEART: Regular rate and rhythm; No murmurs, rubs, or gallops  ABDOMEN: Soft, Nontender, Nondistended; Bowel sounds present  Periprosthetic right femur fracture repair with localized swelling, B/L LE edema  LYMPH: No lymphadenopathy noted  SKIN: No rashes or lesions    LABS:  ABG - ( 27 Feb 2019 16:21 )  pH, Arterial: 7.47  pH, Blood: x     /  pCO2: 36    /  pO2: 118   / HCO3: 26    / Base Excess: 2.7   /  SaO2: 99                CBC Full  -  ( 01 Mar 2019 03:45 )  WBC Count : 8.7 K/uL  Hemoglobin : 9.4 g/dL  Hematocrit : 27.2 %  Platelet Count - Automated : 74 K/uL  Mean Cell Volume : 94.8 fl  Mean Cell Hemoglobin : 32.7 pg  Mean Cell Hemoglobin Concentration : 34.4 gm/dL  Auto Neutrophil # : x  Auto Lymphocyte # : x  Auto Monocyte # : x  Auto Eosinophil # : x  Auto Basophil # : x  Auto Neutrophil % : x  Auto Lymphocyte % : x  Auto Monocyte % : x  Auto Eosinophil % : x  Auto Basophil % : x    03-01    136  |  94<L>  |  15  ----------------------------<  150<H>  3.6   |  30  |  0.57    Ca    7.5<L>      01 Mar 2019 03:45  Phos  3.3     03-01  Mg     1.8     03-01    TPro  4.1<L>  /  Alb  2.0<L>  /  TBili  0.5  /  DBili  0.2  /  AST  56<H>  /  ALT  32  /  AlkPhos  141<H>  03-01    LIVER FUNCTIONS - ( 01 Mar 2019 03:45 )  Alb: 2.0 g/dL / Pro: 4.1 g/dL / ALK PHOS: 141 U/L / ALT: 32 U/L / AST: 56 U/L / GGT: x            x  Auto Basophil # : x        RADIOLOGY & ADDITIONAL TESTS:

## 2019-03-01 NOTE — PROGRESS NOTE ADULT - ATTENDING COMMENTS
Patient seen and examined and agree with resident note.  Hemodynamically appropriate.    Malnutrition-Continue jevity at 50 cc/ hour    C diff colitis- will continue treatment with PO vanco at this time    Patient is appropriate clinically for transfer to floor..

## 2019-03-01 NOTE — PROGRESS NOTE ADULT - ASSESSMENT
68y Female s/p revision R hip hemiarthroplasty POD 7    -will change dressings today  - Pain control  - NWB RLE OOBTC  - Posterior dislocation precautions  - PT/OT  - DVT ppx  - FU nutrition consult, need nutrition optimized albumin >2.5 as this contributing to fluid retention  - care per SICU appreciated  - when transferred from floor pt will be admitted to medical serivce

## 2019-03-02 LAB
ALBUMIN SERPL ELPH-MCNC: 1.8 G/DL — LOW (ref 3.3–5)
ALP SERPL-CCNC: 152 U/L — HIGH (ref 40–120)
ALT FLD-CCNC: 39 U/L — SIGNIFICANT CHANGE UP (ref 10–45)
ANION GAP SERPL CALC-SCNC: 7 MMOL/L — SIGNIFICANT CHANGE UP (ref 5–17)
AST SERPL-CCNC: 56 U/L — HIGH (ref 10–40)
BILIRUB DIRECT SERPL-MCNC: 0.2 MG/DL — SIGNIFICANT CHANGE UP (ref 0–0.2)
BILIRUB INDIRECT FLD-MCNC: 0.2 MG/DL — SIGNIFICANT CHANGE UP (ref 0.2–1)
BILIRUB SERPL-MCNC: 0.4 MG/DL — SIGNIFICANT CHANGE UP (ref 0.2–1.2)
BUN SERPL-MCNC: 18 MG/DL — SIGNIFICANT CHANGE UP (ref 7–23)
CA-I BLD-SCNC: 1.07 MMOL/L — LOW (ref 1.12–1.3)
CALCIUM SERPL-MCNC: 7.7 MG/DL — LOW (ref 8.4–10.5)
CHLORIDE SERPL-SCNC: 95 MMOL/L — LOW (ref 96–108)
CO2 SERPL-SCNC: 33 MMOL/L — HIGH (ref 22–31)
CREAT SERPL-MCNC: 0.53 MG/DL — SIGNIFICANT CHANGE UP (ref 0.5–1.3)
GAS PNL BLDV: SIGNIFICANT CHANGE UP
GLUCOSE SERPL-MCNC: 141 MG/DL — HIGH (ref 70–99)
HCT VFR BLD CALC: 27.9 % — LOW (ref 34.5–45)
HGB BLD-MCNC: 9 G/DL — LOW (ref 11.5–15.5)
MAGNESIUM SERPL-MCNC: 1.7 MG/DL — SIGNIFICANT CHANGE UP (ref 1.6–2.6)
MCHC RBC-ENTMCNC: 31.5 PG — SIGNIFICANT CHANGE UP (ref 27–34)
MCHC RBC-ENTMCNC: 32.3 GM/DL — SIGNIFICANT CHANGE UP (ref 32–36)
MCV RBC AUTO: 97.6 FL — SIGNIFICANT CHANGE UP (ref 80–100)
PHOSPHATE SERPL-MCNC: 3.5 MG/DL — SIGNIFICANT CHANGE UP (ref 2.5–4.5)
PLATELET # BLD AUTO: 73 K/UL — LOW (ref 150–400)
POTASSIUM SERPL-MCNC: 4.6 MMOL/L — SIGNIFICANT CHANGE UP (ref 3.5–5.3)
POTASSIUM SERPL-SCNC: 4.6 MMOL/L — SIGNIFICANT CHANGE UP (ref 3.5–5.3)
PROT SERPL-MCNC: 4.1 G/DL — LOW (ref 6–8.3)
RBC # BLD: 2.86 M/UL — LOW (ref 3.8–5.2)
RBC # FLD: 20.3 % — HIGH (ref 10.3–14.5)
SODIUM SERPL-SCNC: 135 MMOL/L — SIGNIFICANT CHANGE UP (ref 135–145)
WBC # BLD: 7.07 K/UL — SIGNIFICANT CHANGE UP (ref 3.8–10.5)
WBC # FLD AUTO: 7.07 K/UL — SIGNIFICANT CHANGE UP (ref 3.8–10.5)

## 2019-03-02 PROCEDURE — 71045 X-RAY EXAM CHEST 1 VIEW: CPT | Mod: 26

## 2019-03-02 RX ADMIN — Medication 100 MICROGRAM(S): at 05:37

## 2019-03-02 RX ADMIN — MIDODRINE HYDROCHLORIDE 5 MILLIGRAM(S): 2.5 TABLET ORAL at 21:19

## 2019-03-02 RX ADMIN — CHLORHEXIDINE GLUCONATE 1 APPLICATION(S): 213 SOLUTION TOPICAL at 07:38

## 2019-03-02 RX ADMIN — Medication 125 MILLIGRAM(S): at 17:40

## 2019-03-02 RX ADMIN — HEPARIN SODIUM 5000 UNIT(S): 5000 INJECTION INTRAVENOUS; SUBCUTANEOUS at 12:10

## 2019-03-02 RX ADMIN — MIDODRINE HYDROCHLORIDE 5 MILLIGRAM(S): 2.5 TABLET ORAL at 12:10

## 2019-03-02 RX ADMIN — Medication 1 APPLICATION(S): at 12:10

## 2019-03-02 RX ADMIN — Medication 125 MILLIGRAM(S): at 03:47

## 2019-03-02 RX ADMIN — HEPARIN SODIUM 5000 UNIT(S): 5000 INJECTION INTRAVENOUS; SUBCUTANEOUS at 05:32

## 2019-03-02 RX ADMIN — ZIPRASIDONE HYDROCHLORIDE 40 MILLIGRAM(S): 20 CAPSULE ORAL at 07:38

## 2019-03-02 RX ADMIN — Medication 2.5 MILLIGRAM(S): at 12:09

## 2019-03-02 RX ADMIN — MIDODRINE HYDROCHLORIDE 5 MILLIGRAM(S): 2.5 TABLET ORAL at 06:00

## 2019-03-02 RX ADMIN — BUPROPION HYDROCHLORIDE 300 MILLIGRAM(S): 150 TABLET, EXTENDED RELEASE ORAL at 12:09

## 2019-03-02 RX ADMIN — HEPARIN SODIUM 5000 UNIT(S): 5000 INJECTION INTRAVENOUS; SUBCUTANEOUS at 21:19

## 2019-03-02 RX ADMIN — Medication 125 MILLIGRAM(S): at 12:09

## 2019-03-02 NOTE — PROGRESS NOTE ADULT - SUBJECTIVE AND OBJECTIVE BOX
67 y/o critically ill  female h/o dementia NHL presenting after unwitnessed fall. Pt found on the ground at rehab. is on lovenox after hip fracture. pt reports pain to her right leg. per EMS en route slurring speech and mildly hypotensive requesting trauma.unclear how long pt on the ground. Patient found to have C diff with worsening hypotension requiring pressors on and off and  Patient requiring the SICU for continued cardiopulmonary monitoring. Orthopedic procedure on hold due to  hypotension and pan colitis and need for pressors due to unstable hemodynamics. Diarrhea slowing off IV Flagyl + on oral vancomycin. Patient now has the rectal tube removed.   Leukocytosis and diarrhea have improved  No significant change in status. Has senile dementia with intermittent lucency. No further C.diff colitis and no further hypovolemic hypotension. Patient s/p Open reduction and internal fixation of periprosthetic hip fx Mental status has greatly improved. Patient off pressors today. continue midodrine.  Patient seen in SICU prior to transfer to Medical service and regular floor  Patient  alert awake and able to speak but confused .  Patient less agitated than yesterday .    Medically stable to proceed to transfer to regular floor.     MEDICATIONS  (STANDING):  buPROPion XL . 300 milliGRAM(s) Oral daily  chlorhexidine 4% Liquid 1 Application(s) Topical <User Schedule>  docusate sodium 100 milliGRAM(s) Oral three times a day  dronabinol 2.5 milliGRAM(s) Oral daily  heparin  Injectable 5000 Unit(s) SubCutaneous every 8 hours  influenza   Vaccine 0.5 milliLiter(s) IntraMuscular once  levothyroxine 100 MICROGram(s) Oral daily  midodrine 5 milliGRAM(s) Oral three times a day  senna 2 Tablet(s) Oral at bedtime  silver sulfADIAZINE 1% Cream 1 Application(s) Topical daily  vancomycin    Solution 125 milliGRAM(s) Oral every 6 hours  ziprasidone 40 milliGRAM(s) Oral <User Schedule>    MEDICATIONS  (PRN):  acetaminophen   Tablet .. 975 milliGRAM(s) Oral every 6 hours PRN Mild Pain (1 - 3)  oxyCODONE    IR 5 milliGRAM(s) Oral every 4 hours PRN Moderate Pain (4 - 6)    Vital Signs Last 24 Hrs  T(C): 36.8 (02 Mar 2019 12:07), Max: 36.9 (02 Mar 2019 03:45)  T(F): 98.2 (02 Mar 2019 12:07), Max: 98.4 (02 Mar 2019 03:45)  HR: 94 (02 Mar 2019 12:07) (94 - 102)  BP: 100/65 (02 Mar 2019 12:07) (95/60 - 100/65)  BP(mean): --  RR: 17 (02 Mar 2019 12:07) (17 - 18)  SpO2: 99% (02 Mar 2019 12:07) (98% - 99%)      PHYSICAL EXAM:  GENERAL: NAD, well-groomed, well-developed  HEAD:  Atraumatic, Normocephalic  EYES: EOMI, PERRLA, conjunctiva and sclera clear  ENMT: No tonsillar erythema, exudates, or enlargement; Moist mucous membranes, NGT in place  NECK: Supple, No JVD, Normal thyroid  NERVOUS SYSTEM:  confused becoming aphasic  CHEST/LUNG: Clear to percussion bilaterally; No rales, rhonchi, wheezing, or rubs  HEART: Regular rate and rhythm; No murmurs, rubs, or gallops  ABDOMEN: Soft, Nontender, Nondistended; Bowel sounds present  Periprosthetic right femur fracture repair with localized swelling, B/L LE edema  LYMPH: No lymphadenopathy noted  SKIN: No rashes or lesions          CBC Full  -  ( 02 Mar 2019 10:13 )  WBC Count : 7.07 K/uL  Hemoglobin : 9.0 g/dL  Hematocrit : 27.9 %  Platelet Count - Automated : 73 K/uL  Mean Cell Volume : 97.6 fl  Mean Cell Hemoglobin : 31.5 pg  Mean Cell Hemoglobin Concentration : 32.3 gm/dL  Auto Neutrophil # : x  Auto Lymphocyte # : x  Auto Monocyte # : x  Auto Eosinophil # : x  Auto Basophil # : x  Auto Neutrophil % : x  Auto Lymphocyte % : x  Auto Monocyte % : x  Auto Eosinophil % : x  Auto Basophil % : x    03-02    135  |  95<L>  |  18  ----------------------------<  141<H>  4.6   |  33<H>  |  0.53    Ca    7.7<L>      02 Mar 2019 05:27  Phos  3.5     03-02  Mg     1.7     03-02    TPro  4.1<L>  /  Alb  1.8<L>  /  TBili  0.4  /  DBili  0.2  /  AST  56<H>  /  ALT  39  /  AlkPhos  152<H>  03-02    LIVER FUNCTIONS - ( 02 Mar 2019 05:27 )  Alb: 1.8 g/dL / Pro: 4.1 g/dL / ALK PHOS: 152 U/L / ALT: 39 U/L / AST: 56 U/L / GGT: x 67 y/o critically ill  female h/o dementia NHL presenting after unwitnessed fall. Pt found on the ground at rehab. is on lovenox after hip fracture. pt reports pain to her right leg. per EMS en route slurring speech and mildly hypotensive requesting trauma.unclear how long pt on the ground. Patient found to have C diff with worsening hypotension requiring pressors on and off and  Patient requiring the SICU for continued cardiopulmonary monitoring. Orthopedic procedure on hold due to  hypotension and pan colitis and need for pressors due to unstable hemodynamics. Diarrhea slowing off IV Flagyl + on oral vancomycin. Patient now has the rectal tube removed.   Leukocytosis and diarrhea have improved  No significant change in status. Has senile dementia with intermittent lucency. No further C.diff colitis and no further hypovolemic hypotension. Patient s/p Open reduction and internal fixation of periprosthetic hip fx Mental status has greatly improved. Patient off pressors today. continue midodrine.  Patient seen in SICU prior to transfer to Medical service and regular floor  Patient  alert awake and able to speak but confused . Patient less agitated than yesterday .  Medically stable to proceed to transfer to regular floor. On N/G alimentation for hypoproteinemic edema. Blood pressure being supported with midodrine. Patient feels better after moving her bowels. Not agitated today and cooerative.    MEDICATIONS  (STANDING):  buPROPion XL . 300 milliGRAM(s) Oral daily  chlorhexidine 4% Liquid 1 Application(s) Topical <User Schedule>  docusate sodium 100 milliGRAM(s) Oral three times a day  dronabinol 2.5 milliGRAM(s) Oral daily  heparin  Injectable 5000 Unit(s) SubCutaneous every 8 hours  influenza   Vaccine 0.5 milliLiter(s) IntraMuscular once  levothyroxine 100 MICROGram(s) Oral daily  midodrine 5 milliGRAM(s) Oral three times a day  senna 2 Tablet(s) Oral at bedtime  silver sulfADIAZINE 1% Cream 1 Application(s) Topical daily  vancomycin    Solution 125 milliGRAM(s) Oral every 6 hours  ziprasidone 40 milliGRAM(s) Oral <User Schedule>    MEDICATIONS  (PRN):  acetaminophen   Tablet .. 975 milliGRAM(s) Oral every 6 hours PRN Mild Pain (1 - 3)  oxyCODONE    IR 5 milliGRAM(s) Oral every 4 hours PRN Moderate Pain (4 - 6)    Vital Signs Last 24 Hrs  T(C): 36.8 (02 Mar 2019 12:07), Max: 36.9 (02 Mar 2019 03:45)  T(F): 98.2 (02 Mar 2019 12:07), Max: 98.4 (02 Mar 2019 03:45)  HR: 94 (02 Mar 2019 12:07) (94 - 102)  BP: 100/65 (02 Mar 2019 12:07) (95/60 - 100/65)  BP(mean): --  RR: 17 (02 Mar 2019 12:07) (17 - 18)  SpO2: 99% (02 Mar 2019 12:07) (98% - 99%)      PHYSICAL EXAM:  GENERAL: NAD, well-groomed, well-developed  HEAD:  Atraumatic, Normocephalic  EYES: EOMI, PERRLA, conjunctiva and sclera clear  ENMT: No tonsillar erythema, exudates, or enlargement; Moist mucous membranes, NGT in place  NECK: Supple, No JVD, Normal thyroid  NERVOUS SYSTEM:  confused becoming aphasic  CHEST/LUNG: Clear to percussion bilaterally; No rales, rhonchi, wheezing, or rubs  HEART: Regular rate and rhythm; No murmurs, rubs, or gallops  ABDOMEN: Soft, Nontender, Nondistended; Bowel sounds present  Periprosthetic right femur fracture repair with localized swelling, B/L LE edema  LYMPH: No lymphadenopathy noted  SKIN: No rashes or lesions          CBC Full  -  ( 02 Mar 2019 10:13 )  WBC Count : 7.07 K/uL  Hemoglobin : 9.0 g/dL  Hematocrit : 27.9 %  Platelet Count - Automated : 73 K/uL  Mean Cell Volume : 97.6 fl  Mean Cell Hemoglobin : 31.5 pg  Mean Cell Hemoglobin Concentration : 32.3 gm/dL  Auto Neutrophil # : x  Auto Lymphocyte # : x  Auto Monocyte # : x  Auto Eosinophil # : x  Auto Basophil # : x  Auto Neutrophil % : x  Auto Lymphocyte % : x  Auto Monocyte % : x  Auto Eosinophil % : x  Auto Basophil % : x    03-02    135  |  95<L>  |  18  ----------------------------<  141<H>  4.6   |  33<H>  |  0.53    Ca    7.7<L>      02 Mar 2019 05:27  Phos  3.5     03-02  Mg     1.7     03-02    TPro  4.1<L>  /  Alb  1.8<L>  /  TBili  0.4  /  DBili  0.2  /  AST  56<H>  /  ALT  39  /  AlkPhos  152<H>  03-02    LIVER FUNCTIONS - ( 02 Mar 2019 05:27 )  Alb: 1.8 g/dL / Pro: 4.1 g/dL / ALK PHOS: 152 U/L / ALT: 39 U/L / AST: 56 U/L / GGT: x

## 2019-03-02 NOTE — PROGRESS NOTE ADULT - ATTENDING COMMENTS
Medically stable to transfer to floor. 67 y/o critically ill  female h/o dementia NHL presenting after unwitnessed fall. Pt found on the ground at rehab. is on lovenox after hip fracture. pt reports pain to her right leg. per EMS en route slurring speech and mildly hypotensive requesting trauma.unclear how long pt on the ground. Patient found to have C diff with worsening hypotension requiring pressors on and off and  Patient requiring the SICU for continued cardiopulmonary monitoring. Orthopedic procedure on hold due to  hypotension and pan colitis and need for pressors due to unstable hemodynamics. Diarrhea slowing off IV Flagyl + on oral vancomycin. Patient now has the rectal tube removed.   Leukocytosis and diarrhea have improved  No significant change in status. Has senile dementia with intermittent lucency. No further C.diff colitis and no further hypovolemic hypotension. Patient s/p Open reduction and internal fixation of periprosthetic hip fx Mental status has greatly improved. Patient off pressors today. continue midodrine.  Patient seen in SICU prior to transfer to Medical service and regular floor  Patient  alert awake and able to speak but confused . Patient less agitated than yesterday .  Medically stable to proceed to transfer to regular floor. On N/G alimentation for hypoproteinemic edema. Blood pressure being supported with midodrine. Patient feels better after moving her bowels. Not agitated today and cooerative.

## 2019-03-02 NOTE — PROGRESS NOTE ADULT - ASSESSMENT
69 yo woman with a hx of dementia present after recent Hip surgery present after a fall with a femur fx. Patient was found to be cdiff positive. s/p revision of hip. seen now in SICU Stable for  transfer t;o a regular medicine floor.  This was discussed with patient and son. 67 yo woman with a hx of dementia present after recent Hip surgery present after a fall with a femur fx. Patient was found to be cdiff positive. s/p revision of hip. seen now in SICU Stable for  transfer t;o a regular medicine floor.  This was discussed with patient and son. On N/G alimentation for hypoproteinemic edema. Blood pressure being supported with midodrine. Patient feels better after moving her bowels. Not agitated today and cooerative.

## 2019-03-03 LAB
ALBUMIN SERPL ELPH-MCNC: 1.7 G/DL — LOW (ref 3.3–5)
ALP SERPL-CCNC: 161 U/L — HIGH (ref 40–120)
ALT FLD-CCNC: 40 U/L — SIGNIFICANT CHANGE UP (ref 10–45)
ANION GAP SERPL CALC-SCNC: 9 MMOL/L — SIGNIFICANT CHANGE UP (ref 5–17)
AST SERPL-CCNC: 49 U/L — HIGH (ref 10–40)
BILIRUB SERPL-MCNC: 0.3 MG/DL — SIGNIFICANT CHANGE UP (ref 0.2–1.2)
BUN SERPL-MCNC: 16 MG/DL — SIGNIFICANT CHANGE UP (ref 7–23)
CALCIUM SERPL-MCNC: 7.5 MG/DL — LOW (ref 8.4–10.5)
CHLORIDE SERPL-SCNC: 95 MMOL/L — LOW (ref 96–108)
CO2 SERPL-SCNC: 29 MMOL/L — SIGNIFICANT CHANGE UP (ref 22–31)
CREAT SERPL-MCNC: 0.45 MG/DL — LOW (ref 0.5–1.3)
GLUCOSE SERPL-MCNC: 132 MG/DL — HIGH (ref 70–99)
HCT VFR BLD CALC: 27 % — LOW (ref 34.5–45)
HGB BLD-MCNC: 8.6 G/DL — LOW (ref 11.5–15.5)
MCHC RBC-ENTMCNC: 31.3 PG — SIGNIFICANT CHANGE UP (ref 27–34)
MCHC RBC-ENTMCNC: 31.9 GM/DL — LOW (ref 32–36)
MCV RBC AUTO: 98.2 FL — SIGNIFICANT CHANGE UP (ref 80–100)
PLATELET # BLD AUTO: 103 K/UL — LOW (ref 150–400)
POTASSIUM SERPL-MCNC: 4.5 MMOL/L — SIGNIFICANT CHANGE UP (ref 3.5–5.3)
POTASSIUM SERPL-SCNC: 4.5 MMOL/L — SIGNIFICANT CHANGE UP (ref 3.5–5.3)
PROT SERPL-MCNC: 4 G/DL — LOW (ref 6–8.3)
RBC # BLD: 2.75 M/UL — LOW (ref 3.8–5.2)
RBC # FLD: 20.1 % — HIGH (ref 10.3–14.5)
SODIUM SERPL-SCNC: 133 MMOL/L — LOW (ref 135–145)
WBC # BLD: 8.1 K/UL — SIGNIFICANT CHANGE UP (ref 3.8–10.5)
WBC # FLD AUTO: 8.1 K/UL — SIGNIFICANT CHANGE UP (ref 3.8–10.5)

## 2019-03-03 RX ORDER — OXYCODONE HYDROCHLORIDE 5 MG/1
5 TABLET ORAL EVERY 4 HOURS
Qty: 0 | Refills: 0 | Status: DISCONTINUED | OUTPATIENT
Start: 2019-03-03 | End: 2019-03-03

## 2019-03-03 RX ADMIN — Medication 125 MILLIGRAM(S): at 05:46

## 2019-03-03 RX ADMIN — OXYCODONE HYDROCHLORIDE 5 MILLIGRAM(S): 5 TABLET ORAL at 02:00

## 2019-03-03 RX ADMIN — OXYCODONE HYDROCHLORIDE 5 MILLIGRAM(S): 5 TABLET ORAL at 16:09

## 2019-03-03 RX ADMIN — CHLORHEXIDINE GLUCONATE 1 APPLICATION(S): 213 SOLUTION TOPICAL at 07:32

## 2019-03-03 RX ADMIN — HEPARIN SODIUM 5000 UNIT(S): 5000 INJECTION INTRAVENOUS; SUBCUTANEOUS at 11:30

## 2019-03-03 RX ADMIN — Medication 125 MILLIGRAM(S): at 00:10

## 2019-03-03 RX ADMIN — Medication 125 MILLIGRAM(S): at 16:33

## 2019-03-03 RX ADMIN — Medication 100 MICROGRAM(S): at 05:47

## 2019-03-03 RX ADMIN — Medication 100 MILLIGRAM(S): at 05:46

## 2019-03-03 RX ADMIN — OXYCODONE HYDROCHLORIDE 5 MILLIGRAM(S): 5 TABLET ORAL at 11:29

## 2019-03-03 RX ADMIN — MIDODRINE HYDROCHLORIDE 5 MILLIGRAM(S): 2.5 TABLET ORAL at 11:30

## 2019-03-03 RX ADMIN — Medication 125 MILLIGRAM(S): at 11:29

## 2019-03-03 RX ADMIN — OXYCODONE HYDROCHLORIDE 5 MILLIGRAM(S): 5 TABLET ORAL at 01:24

## 2019-03-03 RX ADMIN — Medication 2.5 MILLIGRAM(S): at 11:29

## 2019-03-03 RX ADMIN — Medication 100 MILLIGRAM(S): at 11:30

## 2019-03-03 RX ADMIN — ZIPRASIDONE HYDROCHLORIDE 40 MILLIGRAM(S): 20 CAPSULE ORAL at 07:32

## 2019-03-03 RX ADMIN — Medication 1 APPLICATION(S): at 11:30

## 2019-03-03 RX ADMIN — BUPROPION HYDROCHLORIDE 300 MILLIGRAM(S): 150 TABLET, EXTENDED RELEASE ORAL at 11:29

## 2019-03-03 RX ADMIN — HEPARIN SODIUM 5000 UNIT(S): 5000 INJECTION INTRAVENOUS; SUBCUTANEOUS at 05:46

## 2019-03-03 RX ADMIN — MIDODRINE HYDROCHLORIDE 5 MILLIGRAM(S): 2.5 TABLET ORAL at 21:50

## 2019-03-03 RX ADMIN — MIDODRINE HYDROCHLORIDE 5 MILLIGRAM(S): 2.5 TABLET ORAL at 05:46

## 2019-03-03 RX ADMIN — HEPARIN SODIUM 5000 UNIT(S): 5000 INJECTION INTRAVENOUS; SUBCUTANEOUS at 21:50

## 2019-03-03 NOTE — PROGRESS NOTE ADULT - ATTENDING COMMENTS
I agree with the above note on this patient. All pertinent films have been reviewed. Please refer to clinical documentation of the history, physical examinations, data summary, and both assessment and plan as documented above and with which I agree.    drains removed yesterday  continue to work on peripheral edema  need to maximize nutrition as low albumin and peripheral edema compromising wound healing    Conor George MD  Attending Orthopedic Surgeon

## 2019-03-03 NOTE — PROGRESS NOTE ADULT - ASSESSMENT
67 yo woman with a hx of dementia present after recent Hip surgery present after a fall with a femur fx. Patient was found to be cdiff positive. s/p revision of hip. seen now in SICU Stable for  transfer t;o a regular medicine floor.  This was discussed with patient and son. On N/G alimentation for hypoproteinemic edema. Blood pressure being supported with midodrine. Patient feels better after moving her bowels. Not agitated today and cooerative.

## 2019-03-03 NOTE — PROGRESS NOTE ADULT - ATTENDING COMMENTS
69 y/o critically ill  female h/o dementia NHL presenting after unwitnessed fall. Pt found on the ground at rehab. is on lovenox after hip fracture. pt reports pain to her right leg. per EMS en route slurring speech and mildly hypotensive requesting trauma.unclear how long pt on the ground. Patient found to have C diff with worsening hypotension requiring pressors on and off and  Patient requiring the SICU for continued cardiopulmonary monitoring. Orthopedic procedure on hold due to  hypotension and pan colitis and need for pressors due to unstable hemodynamics. Diarrhea slowing off IV Flagyl + on oral vancomycin. Patient now has the rectal tube removed.   Leukocytosis and diarrhea have improved  No significant change in status. Has senile dementia with intermittent lucency. No further C.diff colitis and no further hypovolemic hypotension. Patient s/p Open reduction and internal fixation of periprosthetic hip fx Mental status has greatly improved. Patient off pressors today. continue midodrine.  Patient seen in SICU prior to transfer to Medical service and regular floor  Patient  alert awake and able to speak but confused . Patient less agitated than yesterday .  Medically stable to proceed to transfer to regular floor. On N/G alimentation for hypoproteinemic edema. Blood pressure being supported with midodrine. Patient feels better after moving her bowels. Not agitated today and cooerative. 69 y/o critically ill  female h/o dementia NHL presenting after unwitnessed fall. Pt found on the ground at rehab. is on lovenox after hip fracture. pt reports pain to her right leg. per EMS en route slurring speech and mildly hypotensive requesting trauma.unclear how long pt on the ground. Patient found to have C diff with worsening hypotension requiring pressors on and off and  Patient requiring the SICU for continued cardiopulmonary monitoring. Orthopedic procedure on hold due to  hypotension and pan colitis and need for pressors due to unstable hemodynamics. Diarrhea slowing off IV Flagyl + on oral vancomycin. Patient now has the rectal tube removed.   Leukocytosis and diarrhea have improved  No significant change in status. Has senile dementia with intermittent lucency. No further C.diff colitis and no further hypovolemic hypotension. Patient s/p Open reduction and internal fixation of periprosthetic hip fx Mental status has greatly improved. Patient off pressors today. continue midodrine.  Patient seen in SICU prior to transfer to Medical service and regular floor  Patient  alert awake and able to speak but confused.  Patient  alert awake and able to speak but confused . Patient less agitated than yesterday .  Medically stable to proceed to transfer to regular floor. On N/G alimentation for hypoproteinemic edema. Blood pressure being supported with midodrine and no diuretics are being given. Patient feels better after moving her bowels. Not agitated today and cooperative on Geodan and wellbutrin.  The patient requires speech and swallow evaluation for conversion from N/G alimentation to oral, prior to transfer to rehabilitation.  Should her hematocrit continued to drift lower, she will receive 1 unit of packed cells with IV Lasix to correct hypovolemic, hypoproteinemic edema with hypotension on Midodrine.  The patient is to be out of bed to chair and resume physical therapy, after right periprosthetic fracture ORIF on February 22, 2019.

## 2019-03-03 NOTE — PROGRESS NOTE ADULT - SUBJECTIVE AND OBJECTIVE BOX
67 y/o critically ill  female h/o dementia NHL presenting after unwitnessed fall. Pt found on the ground at rehab. is on lovenox after hip fracture. pt reports pain to her right leg. per EMS en route slurring speech and mildly hypotensive requesting trauma.unclear how long pt on the ground. Patient found to have C diff with worsening hypotension requiring pressors on and off and  Patient requiring the SICU for continued cardiopulmonary monitoring. Orthopedic procedure on hold due to  hypotension and pan colitis and need for pressors due to unstable hemodynamics. Diarrhea slowing off IV Flagyl + on oral vancomycin. Patient now has the rectal tube removed.   Leukocytosis and diarrhea have improved  No significant change in status. Has senile dementia with intermittent lucency. No further C.diff colitis and no further hypovolemic hypotension. Patient s/p Open reduction and internal fixation of periprosthetic hip fx Mental status has greatly improved. Patient off pressors today. continue midodrine.  Patient seen in SICU prior to transfer to Medical service and regular floor  Patient  alert awake and able to speak but confused . Patient less agitated than yesterday .  Medically stable to proceed to transfer to regular floor. On N/G alimentation for hypoproteinemic edema. Blood pressure being supported with midodrine. Patient feels better after moving her bowels. Not agitated today and cooerative.    MEDICATIONS  (STANDING):  buPROPion XL . 300 milliGRAM(s) Oral daily  chlorhexidine 4% Liquid 1 Application(s) Topical <User Schedule>  docusate sodium 100 milliGRAM(s) Oral three times a day  dronabinol 2.5 milliGRAM(s) Oral daily  heparin  Injectable 5000 Unit(s) SubCutaneous every 8 hours  influenza   Vaccine 0.5 milliLiter(s) IntraMuscular once  levothyroxine 100 MICROGram(s) Oral daily  midodrine 5 milliGRAM(s) Oral three times a day  senna 2 Tablet(s) Oral at bedtime  silver sulfADIAZINE 1% Cream 1 Application(s) Topical daily  vancomycin    Solution 125 milliGRAM(s) Oral every 6 hours  ziprasidone 40 milliGRAM(s) Oral <User Schedule>    MEDICATIONS  (PRN):  acetaminophen   Tablet .. 975 milliGRAM(s) Oral every 6 hours PRN Mild Pain (1 - 3)  oxyCODONE    IR 5 milliGRAM(s) Oral every 4 hours PRN Moderate Pain (4 - 6)    Vital Signs Last 24 Hrs  T(C): 36.8 (02 Mar 2019 12:07), Max: 36.9 (02 Mar 2019 03:45)  T(F): 98.2 (02 Mar 2019 12:07), Max: 98.4 (02 Mar 2019 03:45)  HR: 94 (02 Mar 2019 12:07) (94 - 102)  BP: 100/65 (02 Mar 2019 12:07) (95/60 - 100/65)  BP(mean): --  RR: 17 (02 Mar 2019 12:07) (17 - 18)  SpO2: 99% (02 Mar 2019 12:07) (98% - 99%)      PHYSICAL EXAM:  GENERAL: NAD, well-groomed, well-developed  HEAD:  Atraumatic, Normocephalic  EYES: EOMI, PERRLA, conjunctiva and sclera clear  ENMT: No tonsillar erythema, exudates, or enlargement; Moist mucous membranes, NGT in place  NECK: Supple, No JVD, Normal thyroid  NERVOUS SYSTEM:  confused becoming aphasic  CHEST/LUNG: Clear to percussion bilaterally; No rales, rhonchi, wheezing, or rubs  HEART: Regular rate and rhythm; No murmurs, rubs, or gallops  ABDOMEN: Soft, Nontender, Nondistended; Bowel sounds present  Periprosthetic right femur fracture repair with localized swelling, B/L LE edema  LYMPH: No lymphadenopathy noted  SKIN: No rashes or lesions          CBC Full  -  ( 02 Mar 2019 10:13 )  WBC Count : 7.07 K/uL  Hemoglobin : 9.0 g/dL  Hematocrit : 27.9 %  Platelet Count - Automated : 73 K/uL  Mean Cell Volume : 97.6 fl  Mean Cell Hemoglobin : 31.5 pg  Mean Cell Hemoglobin Concentration : 32.3 gm/dL  Auto Neutrophil # : x  Auto Lymphocyte # : x  Auto Monocyte # : x  Auto Eosinophil # : x  Auto Basophil # : x  Auto Neutrophil % : x  Auto Lymphocyte % : x  Auto Monocyte % : x  Auto Eosinophil % : x  Auto Basophil % : x    03-02    135  |  95<L>  |  18  ----------------------------<  141<H>  4.6   |  33<H>  |  0.53    Ca    7.7<L>      02 Mar 2019 05:27  Phos  3.5     03-02  Mg     1.7     03-02    TPro  4.1<L>  /  Alb  1.8<L>  /  TBili  0.4  /  DBili  0.2  /  AST  56<H>  /  ALT  39  /  AlkPhos  152<H>  03-02    LIVER FUNCTIONS - ( 02 Mar 2019 05:27 )  Alb: 1.8 g/dL / Pro: 4.1 g/dL / ALK PHOS: 152 U/L / ALT: 39 U/L / AST: 56 U/L / GGT: x 67 y/o critically ill  female h/o dementia NHL presenting after unwitnessed fall. Pt found on the ground at rehab. is on lovenox after hip fracture. pt reports pain to her right leg. per EMS en route slurring speech and mildly hypotensive requesting trauma.unclear how long pt on the ground. Patient found to have C diff with worsening hypotension requiring pressors on and off and  Patient requiring the SICU for continued cardiopulmonary monitoring. Orthopedic procedure on hold due to  hypotension and pan colitis and need for pressors due to unstable hemodynamics. Diarrhea slowing off IV Flagyl + on oral vancomycin. Patient now has the rectal tube removed.   Leukocytosis and diarrhea have improved  No significant change in status. Has senile dementia with intermittent lucency. No further C.diff colitis and no further hypovolemic hypotension. Patient s/p Open reduction and internal fixation of periprosthetic hip fx Mental status has greatly improved. Patient off pressors today. continue midodrine.  Patient seen in SICU prior to transfer to Medical service and regular floor  Patient  alert awake and able to speak but confused . Patient less agitated than yesterday .  Medically stable to proceed to transfer to regular floor. On N/G alimentation for hypoproteinemic edema. Blood pressure being supported with midodrine. Patient feels better after moving her bowels. Not agitated today and cooerative.      MEDICATIONS  (STANDING):  buPROPion XL . 300 milliGRAM(s) Oral daily  chlorhexidine 4% Liquid 1 Application(s) Topical <User Schedule>  docusate sodium 100 milliGRAM(s) Oral three times a day  dronabinol 2.5 milliGRAM(s) Oral daily  heparin  Injectable 5000 Unit(s) SubCutaneous every 8 hours  influenza   Vaccine 0.5 milliLiter(s) IntraMuscular once  levothyroxine 100 MICROGram(s) Oral daily  midodrine 5 milliGRAM(s) Oral three times a day  senna 2 Tablet(s) Oral at bedtime  silver sulfADIAZINE 1% Cream 1 Application(s) Topical daily  vancomycin    Solution 125 milliGRAM(s) Oral every 6 hours  ziprasidone 40 milliGRAM(s) Oral <User Schedule>    MEDICATIONS  (PRN):  acetaminophen   Tablet .. 975 milliGRAM(s) Oral every 6 hours PRN Mild Pain (1 - 3)  oxyCODONE    IR 5 milliGRAM(s) Oral every 4 hours PRN Moderate Pain (4 - 6)      Vital Signs Last 24 Hrs  T(C): 36.6 (03 Mar 2019 19:39), Max: 36.8 (03 Mar 2019 04:51)  T(F): 97.9 (03 Mar 2019 19:39), Max: 98.2 (03 Mar 2019 04:51)  HR: 90 (03 Mar 2019 19:39) (90 - 101)  BP: 101/69 (03 Mar 2019 19:39) (101/69 - 113/71)  BP(mean): --  RR: 18 (03 Mar 2019 19:39) (18 - 18)  SpO2: 99% (03 Mar 2019 19:39) (97% - 100%)    PHYSICAL EXAM:  GENERAL: NAD, well-groomed, well-developed  HEAD:  Atraumatic, Normocephalic  EYES: EOMI, PERRLA, conjunctiva and sclera clear  ENMT: No tonsillar erythema, exudates, or enlargement; Moist mucous membranes, NGT in place  NECK: Supple, No JVD, Normal thyroid  NERVOUS SYSTEM:  confused becoming aphasic  CHEST/LUNG: Clear to percussion bilaterally; No rales, rhonchi, wheezing, or rubs  HEART: Regular rate and rhythm; No murmurs, rubs, or gallops  ABDOMEN: Soft, Nontender, Nondistended; Bowel sounds present  Periprosthetic right femur fracture repair with localized swelling, B/L LE edema  LYMPH: No lymphadenopathy noted  SKIN: No rashes or lesions          CBC Full  -  ( 03 Mar 2019 10:33 )  WBC Count : 8.10 K/uL  Hemoglobin : 8.6 g/dL  Hematocrit : 27.0 %  Platelet Count - Automated : 103 K/uL  Mean Cell Volume : 98.2 fl  Mean Cell Hemoglobin : 31.3 pg  Mean Cell Hemoglobin Concentration : 31.9 gm/dL  Auto Neutrophil # : x  Auto Lymphocyte # : x  Auto Monocyte # : x  Auto Eosinophil # : x  Auto Basophil # : x  Auto Neutrophil % : x  Auto Lymphocyte % : x  Auto Monocyte % : x  Auto Eosinophil % : x  Auto Basophil % : x    03-03    133<L>  |  95<L>  |  16  ----------------------------<  132<H>  4.5   |  29  |  0.45<L>    Ca    7.5<L>      03 Mar 2019 07:06  Phos  3.5     03-02  Mg     1.7     03-02    TPro  4.0<L>  /  Alb  1.7<L>  /  TBili  0.3  /  DBili  x   /  AST  49<H>  /  ALT  40  /  AlkPhos  161<H>  03-03    LIVER FUNCTIONS - ( 03 Mar 2019 07:06 )  Alb: 1.7 g/dL / Pro: 4.0 g/dL / ALK PHOS: 161 U/L / ALT: 40 U/L / AST: 49 U/L / GGT: x 67 y/o critically ill  female h/o dementia NHL presenting after unwitnessed fall. Pt found on the ground at rehab. is on lovenox after hip fracture. pt reports pain to her right leg. per EMS en route slurring speech and mildly hypotensive requesting trauma.unclear how long pt on the ground. Patient found to have C diff with worsening hypotension requiring pressors on and off and  Patient requiring the SICU for continued cardiopulmonary monitoring. Orthopedic procedure on hold due to  hypotension and pan colitis and need for pressors due to unstable hemodynamics. Diarrhea slowing off IV Flagyl + on oral vancomycin. Patient now has the rectal tube removed.   Leukocytosis and diarrhea have improved  No significant change in status. Has senile dementia with intermittent lucency. No further C.diff colitis and no further hypovolemic hypotension. Patient s/p Open reduction and internal fixation of periprosthetic hip fx Mental status has greatly improved. Patient off pressors today. continue midodrine.  Patient seen in SICU prior to transfer to Medical service and regular floor  Patient  alert awake and able to speak but confused . Patient less agitated than yesterday .  Medically stable to proceed to transfer to regular floor. On N/G alimentation for hypoproteinemic edema. Blood pressure being supported with midodrine and no diuretics are being given. Patient feels better after moving her bowels. Not agitated today and cooperative on Geodan and wellbutrin.  The patient requires speech and swallow evaluation for conversion from N/G alimentation to oral, prior to transfer to rehabilitation.  Should her hematocrit continued to drift lower, she will receive 1 unit of packed cells with IV Lasix to correct hypovolemic, hypoproteinemic edema with hypotension on Midodrine.  The patient is to be out of bed to chair and resume physical therapy, after right periprosthetic fracture ORIF on February 22, 2019.      MEDICATIONS  (STANDING):  buPROPion XL . 300 milliGRAM(s) Oral daily  chlorhexidine 4% Liquid 1 Application(s) Topical <User Schedule>  docusate sodium 100 milliGRAM(s) Oral three times a day  dronabinol 2.5 milliGRAM(s) Oral daily  heparin  Injectable 5000 Unit(s) SubCutaneous every 8 hours  influenza   Vaccine 0.5 milliLiter(s) IntraMuscular once  levothyroxine 100 MICROGram(s) Oral daily  midodrine 5 milliGRAM(s) Oral three times a day  senna 2 Tablet(s) Oral at bedtime  silver sulfADIAZINE 1% Cream 1 Application(s) Topical daily  vancomycin    Solution 125 milliGRAM(s) Oral every 6 hours  ziprasidone 40 milliGRAM(s) Oral <User Schedule>    MEDICATIONS  (PRN):  acetaminophen   Tablet .. 975 milliGRAM(s) Oral every 6 hours PRN Mild Pain (1 - 3)  oxyCODONE    IR 5 milliGRAM(s) Oral every 4 hours PRN Moderate Pain (4 - 6)      Vital Signs Last 24 Hrs  T(C): 36.6 (03 Mar 2019 19:39), Max: 36.8 (03 Mar 2019 04:51)  T(F): 97.9 (03 Mar 2019 19:39), Max: 98.2 (03 Mar 2019 04:51)  HR: 90 (03 Mar 2019 19:39) (90 - 101)  BP: 101/69 (03 Mar 2019 19:39) (101/69 - 113/71)  BP(mean): --  RR: 18 (03 Mar 2019 19:39) (18 - 18)  SpO2: 99% (03 Mar 2019 19:39) (97% - 100%)    PHYSICAL EXAM:  GENERAL: NAD, well-groomed, well-developed  HEAD:  Atraumatic, Normocephalic  EYES: EOMI, PERRLA, conjunctiva and sclera clear  ENMT: No tonsillar erythema, exudates, or enlargement; Moist mucous membranes, NGT in place  NECK: Supple, No JVD, Normal thyroid  NERVOUS SYSTEM:  confused becoming aphasic  CHEST/LUNG: Clear to percussion bilaterally; No rales, rhonchi, wheezing, or rubs  HEART: Regular rate and rhythm; No murmurs, rubs, or gallops  ABDOMEN: Soft, Nontender, Nondistended; Bowel sounds present  Periprosthetic right femur fracture repair with localized swelling, B/L LE edema  LYMPH: No lymphadenopathy noted  SKIN: No rashes or lesions          CBC Full  -  ( 03 Mar 2019 10:33 )  WBC Count : 8.10 K/uL  Hemoglobin : 8.6 g/dL  Hematocrit : 27.0 %  Platelet Count - Automated : 103 K/uL  Mean Cell Volume : 98.2 fl  Mean Cell Hemoglobin : 31.3 pg  Mean Cell Hemoglobin Concentration : 31.9 gm/dL  Auto Neutrophil # : x  Auto Lymphocyte # : x  Auto Monocyte # : x  Auto Eosinophil # : x  Auto Basophil # : x  Auto Neutrophil % : x  Auto Lymphocyte % : x  Auto Monocyte % : x  Auto Eosinophil % : x  Auto Basophil % : x    03-03    133<L>  |  95<L>  |  16  ----------------------------<  132<H>  4.5   |  29  |  0.45<L>    Ca    7.5<L>      03 Mar 2019 07:06  Phos  3.5     03-02  Mg     1.7     03-02    TPro  4.0<L>  /  Alb  1.7<L>  /  TBili  0.3  /  DBili  x   /  AST  49<H>  /  ALT  40  /  AlkPhos  161<H>  03-03    LIVER FUNCTIONS - ( 03 Mar 2019 07:06 )  Alb: 1.7 g/dL / Pro: 4.0 g/dL / ALK PHOS: 161 U/L / ALT: 40 U/L / AST: 49 U/L / GGT: x

## 2019-03-03 NOTE — PROGRESS NOTE ADULT - SUBJECTIVE AND OBJECTIVE BOX
Patient seen and examined.  No acute events overnight.    ICU Vital Signs Last 24 Hrs  T(C): 36.7 (03 Mar 2019 00:05), Max: 36.9 (02 Mar 2019 03:45)  T(F): 98 (03 Mar 2019 00:05), Max: 98.4 (02 Mar 2019 03:45)  HR: 101 (03 Mar 2019 00:05) (94 - 102)  BP: 113/71 (03 Mar 2019 00:05) (95/60 - 113/71)  BP(mean): --  ABP: --  ABP(mean): --  RR: 18 (03 Mar 2019 00:05) (17 - 18)  SpO2: 97% (03 Mar 2019 00:05) (97% - 99%)                          9.0    7.07  )-----------( 73       ( 02 Mar 2019 10:13 )             27.9     03-02    135  |  95<L>  |  18  ----------------------------<  141<H>  4.6   |  33<H>  |  0.53    Ca    7.7<L>      02 Mar 2019 05:27  Phos  3.5     03-02  Mg     1.7     03-02    TPro  4.1<L>  /  Alb  1.8<L>  /  TBili  0.4  /  DBili  0.2  /  AST  56<H>  /  ALT  39  /  AlkPhos  152<H>  03-02    Exam:  Gen: NAD  RLE:  Wound c/d/i, dressing changed  Motor: 5/5 EHL/FHL/TA/Gastrocnemius  Sensory: SILT DP/SP/S/S/T nerve distributions  Vascular: 2+ Dorsalis Pedis pulse    Assessment/Plan:  68y Female s/p revision R hip hemiarthroplasty POD 9  - Pain control  - NWB RLE OOBTC  - Posterior dislocation precautions  - PT/OT  - DVT ppx  - FU nutrition consult, need nutrition optimized albumin >2.5 as this contributing to fluid retention

## 2019-03-04 LAB
ALBUMIN SERPL ELPH-MCNC: 1.9 G/DL — LOW (ref 3.3–5)
ALP SERPL-CCNC: 175 U/L — HIGH (ref 40–120)
ALT FLD-CCNC: 38 U/L — SIGNIFICANT CHANGE UP (ref 10–45)
ANION GAP SERPL CALC-SCNC: 10 MMOL/L — SIGNIFICANT CHANGE UP (ref 5–17)
AST SERPL-CCNC: 36 U/L — SIGNIFICANT CHANGE UP (ref 10–40)
BILIRUB SERPL-MCNC: 0.3 MG/DL — SIGNIFICANT CHANGE UP (ref 0.2–1.2)
BUN SERPL-MCNC: 15 MG/DL — SIGNIFICANT CHANGE UP (ref 7–23)
CALCIUM SERPL-MCNC: 8 MG/DL — LOW (ref 8.4–10.5)
CHLORIDE SERPL-SCNC: 95 MMOL/L — LOW (ref 96–108)
CO2 SERPL-SCNC: 28 MMOL/L — SIGNIFICANT CHANGE UP (ref 22–31)
CREAT SERPL-MCNC: 0.48 MG/DL — LOW (ref 0.5–1.3)
GLUCOSE SERPL-MCNC: 140 MG/DL — HIGH (ref 70–99)
HCT VFR BLD CALC: 26.3 % — LOW (ref 34.5–45)
HGB BLD-MCNC: 8.3 G/DL — LOW (ref 11.5–15.5)
MCHC RBC-ENTMCNC: 31.1 PG — SIGNIFICANT CHANGE UP (ref 27–34)
MCHC RBC-ENTMCNC: 31.6 GM/DL — LOW (ref 32–36)
MCV RBC AUTO: 98.5 FL — SIGNIFICANT CHANGE UP (ref 80–100)
PLATELET # BLD AUTO: 115 K/UL — LOW (ref 150–400)
POTASSIUM SERPL-MCNC: 4.6 MMOL/L — SIGNIFICANT CHANGE UP (ref 3.5–5.3)
POTASSIUM SERPL-SCNC: 4.6 MMOL/L — SIGNIFICANT CHANGE UP (ref 3.5–5.3)
PROT SERPL-MCNC: 4 G/DL — LOW (ref 6–8.3)
RBC # BLD: 2.67 M/UL — LOW (ref 3.8–5.2)
RBC # FLD: 20.1 % — HIGH (ref 10.3–14.5)
SODIUM SERPL-SCNC: 133 MMOL/L — LOW (ref 135–145)
WBC # BLD: 8.76 K/UL — SIGNIFICANT CHANGE UP (ref 3.8–10.5)
WBC # FLD AUTO: 8.76 K/UL — SIGNIFICANT CHANGE UP (ref 3.8–10.5)

## 2019-03-04 PROCEDURE — 99232 SBSQ HOSP IP/OBS MODERATE 35: CPT | Mod: GC

## 2019-03-04 RX ADMIN — Medication 1 APPLICATION(S): at 12:09

## 2019-03-04 RX ADMIN — HEPARIN SODIUM 5000 UNIT(S): 5000 INJECTION INTRAVENOUS; SUBCUTANEOUS at 05:05

## 2019-03-04 RX ADMIN — CHLORHEXIDINE GLUCONATE 1 APPLICATION(S): 213 SOLUTION TOPICAL at 08:26

## 2019-03-04 RX ADMIN — Medication 125 MILLIGRAM(S): at 05:05

## 2019-03-04 RX ADMIN — MIDODRINE HYDROCHLORIDE 5 MILLIGRAM(S): 2.5 TABLET ORAL at 21:52

## 2019-03-04 RX ADMIN — Medication 100 MILLIGRAM(S): at 21:52

## 2019-03-04 RX ADMIN — Medication 100 MICROGRAM(S): at 05:05

## 2019-03-04 RX ADMIN — HEPARIN SODIUM 5000 UNIT(S): 5000 INJECTION INTRAVENOUS; SUBCUTANEOUS at 21:52

## 2019-03-04 RX ADMIN — Medication 125 MILLIGRAM(S): at 12:08

## 2019-03-04 RX ADMIN — MIDODRINE HYDROCHLORIDE 5 MILLIGRAM(S): 2.5 TABLET ORAL at 12:08

## 2019-03-04 RX ADMIN — Medication 125 MILLIGRAM(S): at 00:07

## 2019-03-04 RX ADMIN — SENNA PLUS 2 TABLET(S): 8.6 TABLET ORAL at 21:52

## 2019-03-04 RX ADMIN — BUPROPION HYDROCHLORIDE 300 MILLIGRAM(S): 150 TABLET, EXTENDED RELEASE ORAL at 12:08

## 2019-03-04 RX ADMIN — Medication 2.5 MILLIGRAM(S): at 12:08

## 2019-03-04 RX ADMIN — HEPARIN SODIUM 5000 UNIT(S): 5000 INJECTION INTRAVENOUS; SUBCUTANEOUS at 12:09

## 2019-03-04 RX ADMIN — Medication 100 MILLIGRAM(S): at 12:08

## 2019-03-04 RX ADMIN — MIDODRINE HYDROCHLORIDE 5 MILLIGRAM(S): 2.5 TABLET ORAL at 05:05

## 2019-03-04 RX ADMIN — Medication 125 MILLIGRAM(S): at 17:15

## 2019-03-04 RX ADMIN — ZIPRASIDONE HYDROCHLORIDE 40 MILLIGRAM(S): 20 CAPSULE ORAL at 08:26

## 2019-03-04 NOTE — PROGRESS NOTE ADULT - ATTENDING COMMENTS
Cont kiki Lomeli  Attending Physician   Division of Infectious Disease  Pager #[phone]  After 5pm/weekend or no response, call #[phone]

## 2019-03-04 NOTE — PROGRESS NOTE ADULT - ATTENDING COMMENTS
I agree with the above note and have personally seen and examined this patient. All pertinent films have been reviewed. Please refer to clinical documentation of the history, physical examinations, data summary, and both assessment and plan as documented above and with which I agree.    continue to maximize nutrition  slow wound healing  continue dressing changes    Conor George MD  Attending Orthopedic Surgeon I agree with the above note and have personally seen and examined this patient. All pertinent films have been reviewed. Please refer to clinical documentation of the history, physical examinations, data summary, and both assessment and plan as documented above and with which I agree.    continue to maximize nutrition  slow wound healing  continue dressing changes  now that she is no longer postop delerium she can advance weightbearing to partial (50%) weightbearing for transfers only. otherwise nonweightbearing RLE. continue posterior hip precautions.    discussed above with son.    Conor George MD  Attending Orthopedic Surgeon

## 2019-03-04 NOTE — PROGRESS NOTE ADULT - ASSESSMENT
68F with recent right femoral neck fracture after a fall s/p hemiarthroplasty in Jan, admitted 2/4/19 after another fall, found to be in septic shock due to C diff colitis, now resolved and on a prolonged course of PO Vancomycin.   Sustained a periprosthetic fracture from the fall, now s/p ORIF 2/22/19 with irrigation and debridement (cultures negative).     Recommend  -continue PO Vancomycin taper - 125 mg po q 6 X 2 weeks 2/23-3/9/19 and then q 12 X 2 more weeks 3/10-3/23/19

## 2019-03-04 NOTE — CHART NOTE - NSCHARTNOTEFT_GEN_A_CORE
Nutrition follow up     Pt seen for malnutrition follow up.     Hospital course as per chart: Pt 69 y/o F with PMH: bipolar depression, OCD, dementia, HLD, NHL (in remission since 2016), osteoarthritis S/P bilateral TKR S/P fall, admitted with a right periprosthetic femoral neck fracture and C. difficile colitis, S/P ORIF of right periprosthetic femur fracture and revision of right hip hemiarthroplasty on (02/22), transferred to SICU intubate, extubated, NGT tube feeding placed for supplemental nutrition on (02/26) due to poor PO intake and started on Marinol.       Source: Patient [ ]    Family [ ]     other [x]; Medical record    Diet:     Enteral /Parenteral Nutrition: n/a    Current Weight:  % Weight Change: n/a    Pertinent Medications: MEDICATIONS  (STANDING):  buPROPion XL . 300 milliGRAM(s) Oral daily  chlorhexidine 4% Liquid 1 Application(s) Topical <User Schedule>  docusate sodium 100 milliGRAM(s) Oral three times a day  dronabinol 2.5 milliGRAM(s) Oral daily  heparin  Injectable 5000 Unit(s) SubCutaneous every 8 hours  influenza   Vaccine 0.5 milliLiter(s) IntraMuscular once  levothyroxine 100 MICROGram(s) Oral daily  midodrine 5 milliGRAM(s) Oral three times a day  senna 2 Tablet(s) Oral at bedtime  silver sulfADIAZINE 1% Cream 1 Application(s) Topical daily  vancomycin    Solution 125 milliGRAM(s) Oral every 6 hours  ziprasidone 40 milliGRAM(s) Oral <User Schedule>    MEDICATIONS  (PRN):  acetaminophen   Tablet .. 975 milliGRAM(s) Oral every 6 hours PRN Mild Pain (1 - 3)  oxyCODONE    IR 5 milliGRAM(s) Oral every 4 hours PRN Moderate Pain (4 - 6)      Pertinent Labs:  03-04 Na133 mmol/L<L> Glu 140 mg/dL<H> K+ 4.6 mmol/L Cr  0.48 mg/dL<L> BUN 15 mg/dL 03-02 Phos 3.5 mg/dL 03-04 Alb 1.9 g/dL<L>        Skin:     Estimated Needs:   [ ] no change since previous assessment  [ ] recalculated:     Previous Nutrition Diagnosis: [ ]     Nutrition Diagnosis is [ ] ongoing  [ ] resolved [ ] not applicable     New Nutrition Diagnosis: [ ] not applicable    Interventions:     1. Recommend    Monitoring and Evaluation:     [x] PO intake [x] Tolerance to diet prescription [x] weights [x] follow up per protocol    [ ] other:    RD remains available.  Shanika Santiago MS, RDN, #[phone] Nutrition follow up     Pt seen for malnutrition follow up and nutrition consult received for assessment.      Hospital course as per chart: Pt 69 y/o F with PMH: bipolar depression, OCD, dementia, HLD, NHL (in remission since 2016), osteoarthritis S/P bilateral TKR S/P fall, admitted with a right periprosthetic femoral neck fracture and C. difficile colitis, S/P ORIF of right periprosthetic femur fracture and revision of right hip hemiarthroplasty on (02/22), transferred to SICU intubate, extubated, NGT tube feeding placed for supplemental nutrition on (02/26) due to poor PO intake, pt on Marinol.     Source: Patient [x]    Family [ ]     other [x]; Medical record; RN; NP     Pt confused as per flow sheets, confirmed information with RN. Pt reports fair appetite, still consuming less than 50% of meals but receiving tube feedings at goal of 50 ml/hr. Reports drinking Ensure Enlive 1xday. Denies difficulty chewing/swallowing. Reports tolerating tube feedings. Pt denies nausea, vomiting, diarrhea, or constipation, reports last BM today (03/04). Attempted to obtain food preferences - pt does not have any specific at this time. Offered nutritional supplementation available in hospital - pt agreed and seems happy with Health Shake (chocolate) 1xday. Discussed with NP, will start decreasing rate of tube feedings and encouraging PO intake to discontinue EN once pt is consuming >75% of estimated needs via PO through regular diet, Ensure Enlive, and Health Shake.     Diet: Regular + Ensure Enlive 2x daily (700 eva and 40 gm protein) + tube feedings     Enteral Nutrition: Jevity 1.2 through continuous feedings via NGT at 50 ml/hr x 24 hours provides 1200 ml, 1440 kcal, 67 g protein, and 972 ml water (24 kcal/kg and 1.1 g/kg protein based on dosing weight 61.1 kg).     Current Weight: (02/04) 134.7 pounds -> (02/14) 159.8 pounds -> (02/24) 171.9 pounds -> (03/04) 156.7 pounds -?accuracy of weight fluctuations likely due to fluid shifts, will continue to monitor.   % Weight Change: n/a    Pertinent Medications: MEDICATIONS  (STANDING):  buPROPion XL . 300 milliGRAM(s) Oral daily  chlorhexidine 4% Liquid 1 Application(s) Topical <User Schedule>  docusate sodium 100 milliGRAM(s) Oral three times a day  dronabinol 2.5 milliGRAM(s) Oral daily  heparin  Injectable 5000 Unit(s) SubCutaneous every 8 hours  influenza   Vaccine 0.5 milliLiter(s) IntraMuscular once  levothyroxine 100 MICROGram(s) Oral daily  midodrine 5 milliGRAM(s) Oral three times a day  senna 2 Tablet(s) Oral at bedtime  silver sulfADIAZINE 1% Cream 1 Application(s) Topical daily  vancomycin    Solution 125 milliGRAM(s) Oral every 6 hours  ziprasidone 40 milliGRAM(s) Oral <User Schedule>    MEDICATIONS  (PRN):  acetaminophen   Tablet .. 975 milliGRAM(s) Oral every 6 hours PRN Mild Pain (1 - 3)  oxyCODONE    IR 5 milliGRAM(s) Oral every 4 hours PRN Moderate Pain (4 - 6)    Pertinent Labs: (03/04) Na133 mmol/L<L> Glu 140 mg/dL<H> Cr  0.48 mg/dL<L>   (01/21) HbA1c 5.4%     Skin: no noted pressure injuries as per documentation.   Noted +4 generalized edema as per flow sheets.     Estimated Needs:   [x] no change since previous assessment: based on dosing weight: 61.1 kg   [ ] recalculated   1527 -1833 kcal/day (25 - 30 kcal/kg)  85-97 g protein (1.4 - 1.6 g/kg)    Previous Nutrition Diagnosis: [x] Increased nutrient needs (specify); protein     Nutrition Diagnosis is [x] ongoing - being addressed with PO intake encouragement, supplementation, and tube feedings as above.   Pt at goal of: Pt to meet >80% estimated nutrition needs due to EN and supplementation.     New Nutrition Diagnosis: [x] not applicable    Interventions:     1. Recommend continue regular diet.   2. Recommend Ensure Enlive 1x daily (350 eva and 20 gm protein) and Health Shake 1xday (500 kcal and 20-25 g protein) to optimize PO intake and decrease EN rate.   3. Decrease Jevity 1.2 to 50 ml/hr x 18 hours provides 900 ml, 1080 kcal, 50 g protein, and 729 ml water (18 kcal/kg and 0.8 g/kg protein based on dosing weight 61.1 kg), will continue to monitor PO intake to decrease EN rate as appropriate. Discussed feeding plans with NP and RN.     4. Encourage PO intake, obtain food preferences, provide feeding assistance as needed.   5. Continue to obtain weights to identify changes if any.     Monitoring and Evaluation:     [x] PO intake [x] Tolerance to diet prescription [x] weights [x] follow up per protocol    [x] other: EN provision/tolerance     RD remains available.  Shanika Santiago MS, RDN, #[phone]

## 2019-03-04 NOTE — PROGRESS NOTE ADULT - ATTENDING COMMENTS
67 y/o critically ill  female h/o dementia NHL presenting after unwitnessed fall. Pt found on the ground at rehab. is on lovenox after hip fracture. pt reports pain to her right leg. per EMS en route slurring speech and mildly hypotensive requesting trauma.unclear how long pt on the ground. Patient found to have C diff with worsening hypotension requiring pressors on and off and  Patient requiring the SICU for continued cardiopulmonary monitoring. Orthopedic procedure on hold due to  hypotension and pan colitis and need for pressors due to unstable hemodynamics. Diarrhea slowing off IV Flagyl + on oral vancomycin. Patient now has the rectal tube removed.   Leukocytosis and diarrhea have improved  No significant change in status. Has senile dementia with intermittent lucency. No further C.diff colitis and no further hypovolemic hypotension. Patient s/p Open reduction and internal fixation of periprosthetic hip fx Mental status has greatly improved. Patient off pressors today. continue midodrine.  Patient seen in SICU prior to transfer to Medical service and regular floor  Patient  alert awake and able to speak but confused.  Patient  alert awake and able to speak but confused . Patient less agitated than yesterday .  Medically stable to proceed to transfer to regular floor. On N/G alimentation for hypoproteinemic edema. Blood pressure being supported with midodrine and no diuretics are being given. Patient feels better after moving her bowels. Not agitated today and cooperative on Geodan and wellbutrin.  The patient requires speech and swallow evaluation for conversion from N/G alimentation to oral, prior to transfer to rehabilitation.  Should her hematocrit continued to drift lower, she will receive 1 unit of packed cells with IV Lasix to correct hypovolemic, hypoproteinemic edema with hypotension on Midodrine.  The patient is to be out of bed to chair and resume physical therapy, after right periprosthetic fracture ORIF on February 22, 2019. 69 y/o critically ill  female h/o dementia NHL presenting after unwitnessed fall. Pt found on the ground at rehab. is on lovenox after hip fracture. pt reports pain to her right leg. per EMS en route slurring speech and mildly hypotensive requesting trauma.unclear how long pt on the ground. Patient found to have C diff with worsening hypotension requiring pressors on and off and  Patient requiring the SICU for continued cardiopulmonary monitoring. Orthopedic procedure on hold due to  hypotension and pan colitis and need for pressors due to unstable hemodynamics. Diarrhea slowing off IV Flagyl + on oral vancomycin. Patient now has the rectal tube removed.   Leukocytosis and diarrhea have improved  No significant change in status. Has senile dementia with intermittent lucency. No further C.diff colitis and no further hypovolemic hypotension. Patient s/p Open reduction and internal fixation of periprosthetic hip fx Mental status has greatly improved. Patient off pressors today. continue midodrine.  Patient seen in SICU prior to transfer to Medical service and regular floor  Patient  alert awake and able to speak but confused.  Patient  alert awake and able to speak but confused . Patient less agitated than yesterday .  Medically stable to proceed to transfer to regular floor. On N/G alimentation for hypoproteinemic edema. Blood pressure being supported with midodrine and no diuretics are being given. Patient feels better after moving her bowels. Not agitated today and cooperative on Geodan and wellbutrin.  The patient requires speech and swallow evaluation for conversion from N/G alimentation to oral, prior to transfer to rehabilitation.  Should her hematocrit continued to drift lower, she will receive 1 unit of packed cells with IV Lasix to correct hypovolemic, hypoproteinemic edema with hypotension on Midodrine. No transfusion presently required with rise in Hct. The patient is to be out of bed to chair and resume physical therapy, after right periprosthetic fracture ORIF on February 22, 2019.

## 2019-03-04 NOTE — PROGRESS NOTE ADULT - ASSESSMENT
Assessment/Plan:  68y Female s/p revision R hip hemiarthroplasty POD 10  - Pain control  - NWB RLE OOBTC  - Posterior dislocation precautions  - PT/OT  - DVT ppx  - FU nutrition consult, need nutrition optimized albumin >2.5 as this contributing to fluid retention

## 2019-03-04 NOTE — PROGRESS NOTE ADULT - SUBJECTIVE AND OBJECTIVE BOX
Follow Up:  C diff    Interval History/ROS: She feels fine. No abdominal pain, no fevers, no chills. No diarrhea. She is having about one bowel movement daily and they are formed.     Allergies  honeydew melon (Other)  penicillin (Other; Hives)    ANTIMICROBIALS:  vancomycin    Solution 125 every 6 hours      OTHER MEDS:  acetaminophen   Tablet .. 975 milliGRAM(s) Oral every 6 hours PRN  buPROPion XL . 300 milliGRAM(s) Oral daily  chlorhexidine 4% Liquid 1 Application(s) Topical <User Schedule>  docusate sodium 100 milliGRAM(s) Oral three times a day  dronabinol 2.5 milliGRAM(s) Oral daily  heparin  Injectable 5000 Unit(s) SubCutaneous every 8 hours  influenza   Vaccine 0.5 milliLiter(s) IntraMuscular once  levothyroxine 100 MICROGram(s) Oral daily  midodrine 5 milliGRAM(s) Oral three times a day  oxyCODONE    IR 5 milliGRAM(s) Oral every 4 hours PRN  senna 2 Tablet(s) Oral at bedtime  silver sulfADIAZINE 1% Cream 1 Application(s) Topical daily  ziprasidone 40 milliGRAM(s) Oral <User Schedule>      Vital Signs Last 24 Hrs  T(C): 37 (04 Mar 2019 12:05), Max: 37 (04 Mar 2019 12:05)  T(F): 98.6 (04 Mar 2019 12:05), Max: 98.6 (04 Mar 2019 12:05)  HR: 98 (04 Mar 2019 12:05) (90 - 98)  BP: 116/75 (04 Mar 2019 12:05) (100/64 - 116/75)  BP(mean): --  RR: 18 (04 Mar 2019 12:05) (18 - 18)  SpO2: 98% (04 Mar 2019 12:05) (96% - 99%)    Physical Exam:  General: NAD,  non toxic, A&O   HEENT:  NG tube in place. no LAD, neck supple  Cardiovascular: regular rate and rhythm   Respiratory:  nonlabored, clear bilaterally, no wheezing  abd:  soft, bowel sounds present, nontender   :  no suprapubic tenderness  Neurologic:     no focal deficit  Psych: normal affect                           8.3    8.76  )-----------( 115      ( 04 Mar 2019 08:43 )             26.3       03-04    133<L>  |  95<L>  |  15  ----------------------------<  140<H>  4.6   |  28  |  0.48<L>    Ca    8.0<L>      04 Mar 2019 06:50    TPro  4.0<L>  /  Alb  1.9<L>  /  TBili  0.3  /  DBili  x   /  AST  36  /  ALT  38  /  AlkPhos  175<H>  03-04          MICROBIOLOGY:  v  .Body Fluid Synovial Fluid  02-23-19   No growth at 1 week.  --    No polymorphonuclear cells seen  No organisms seen  by cytocentrifuge      .Tissue Other, right hip synovium  02-23-19   No growth at 1 week.  --    Rare polymorphonuclear leukocytes seen per low power field  No organisms seen per oil power field      .Tissue Other, right femur  02-23-19   No growth at 5 days  --    Rare polymorphonuclear leukocytes seen per low power field  No organisms seen per oil power field      .Body Fluid  02-06-19   No growth at 14 days.  --    No polymorphonuclear cells seen per low power field  No organisms seen      .Blood Blood-Peripheral  02-06-19   No growth at 5 days.  --  --    RADIOLOGY:  Xray Chest 1 View- PORTABLE-Routine (03.02.19 @ 10:14)   There are small bilateral pleural effusions which may have   improved since the prior study likely associated with left lung base   atelectasis. There is no pneumothorax.

## 2019-03-04 NOTE — PROGRESS NOTE ADULT - SUBJECTIVE AND OBJECTIVE BOX
Pt S/E at bedside, no acute events overnight, pain controlled    Vital Signs Last 24 Hrs  T(C): 36.8 (04 Mar 2019 04:56), Max: 36.8 (04 Mar 2019 00:06)  T(F): 98.2 (04 Mar 2019 04:56), Max: 98.3 (04 Mar 2019 00:06)  HR: 91 (04 Mar 2019 04:56) (90 - 92)  BP: 104/68 (04 Mar 2019 04:56) (100/64 - 104/68)  BP(mean): --  RR: 18 (04 Mar 2019 04:56) (18 - 18)  SpO2: 98% (04 Mar 2019 04:56) (96% - 99%)    Gen: NAD,    Right Lower Extremity:  Dressing removed, incision well appearing, small amount of dry bloody drainage, no active drainage, new dressing applied  +EHL/FHL/TA/GS  SILT L3-S1  +DP/PT Pulses  Compartments soft  No calf TTP B/L

## 2019-03-04 NOTE — PROGRESS NOTE ADULT - ASSESSMENT
69 yo woman with a hx of dementia present after recent Hip surgery present after a fall with a femur fx. Patient was found to be cdiff positive. s/p revision of hip. seen now in SICU Stable for  transfer t;o a regular medicine floor.  This was discussed with patient and son. On N/G alimentation for hypoproteinemic edema. Blood pressure being supported with midodrine. Patient feels better after moving her bowels. Not agitated today and cooerative. 69 yo woman with a hx of dementia present after recent Hip surgery present after a fall with a femur fx. Patient was found to be cdiff positive. s/p revision of hip. seen now in SICU Stable for  transfer t;o a regular medicine floor.  This was discussed with patient and son. On N/G alimentation for hypoproteinemic edema. To proceed with nutrition as instructed. Blood pressure being supported with midodrine. Patient feels better after moving her bowels. Not agitated today and cooperative.

## 2019-03-04 NOTE — PROGRESS NOTE ADULT - SUBJECTIVE AND OBJECTIVE BOX
69 y/o critically ill  female h/o dementia NHL presenting after unwitnessed fall. Pt found on the ground at rehab. is on lovenox after hip fracture. pt reports pain to her right leg. per EMS en route slurring speech and mildly hypotensive requesting trauma.unclear how long pt on the ground. Patient found to have C diff with worsening hypotension requiring pressors on and off and  Patient requiring the SICU for continued cardiopulmonary monitoring. Orthopedic procedure on hold due to  hypotension and pan colitis and need for pressors due to unstable hemodynamics. Diarrhea slowing off IV Flagyl + on oral vancomycin. Patient now has the rectal tube removed.   Leukocytosis and diarrhea have improved  No significant change in status. Has senile dementia with intermittent lucency. No further C.diff colitis and no further hypovolemic hypotension. Patient s/p Open reduction and internal fixation of periprosthetic hip fx Mental status has greatly improved. Patient off pressors today. continue midodrine.  Patient seen in SICU prior to transfer to Medical service and regular floor  Patient  alert awake and able to speak but confused . Patient less agitated than yesterday .  Medically stable to proceed to transfer to regular floor. On N/G alimentation for hypoproteinemic edema. Blood pressure being supported with midodrine and no diuretics are being given. Patient feels better after moving her bowels. Not agitated today and cooperative on Geodan and wellbutrin.  The patient requires speech and swallow evaluation for conversion from N/G alimentation to oral, prior to transfer to rehabilitation.  Should her hematocrit continued to drift lower, she will receive 1 unit of packed cells with IV Lasix to correct hypovolemic, hypoproteinemic edema with hypotension on Midodrine.  The patient is to be out of bed to chair and resume physical therapy, after right periprosthetic fracture ORIF on February 22, 2019.      MEDICATIONS  (STANDING):  buPROPion XL . 300 milliGRAM(s) Oral daily  chlorhexidine 4% Liquid 1 Application(s) Topical <User Schedule>  docusate sodium 100 milliGRAM(s) Oral three times a day  dronabinol 2.5 milliGRAM(s) Oral daily  heparin  Injectable 5000 Unit(s) SubCutaneous every 8 hours  influenza   Vaccine 0.5 milliLiter(s) IntraMuscular once  levothyroxine 100 MICROGram(s) Oral daily  midodrine 5 milliGRAM(s) Oral three times a day  senna 2 Tablet(s) Oral at bedtime  silver sulfADIAZINE 1% Cream 1 Application(s) Topical daily  vancomycin    Solution 125 milliGRAM(s) Oral every 6 hours  ziprasidone 40 milliGRAM(s) Oral <User Schedule>    MEDICATIONS  (PRN):  acetaminophen   Tablet .. 975 milliGRAM(s) Oral every 6 hours PRN Mild Pain (1 - 3)  oxyCODONE    IR 5 milliGRAM(s) Oral every 4 hours PRN Moderate Pain (4 - 6)      Vital Signs Last 24 Hrs  T(C): 36.6 (03 Mar 2019 19:39), Max: 36.8 (03 Mar 2019 04:51)  T(F): 97.9 (03 Mar 2019 19:39), Max: 98.2 (03 Mar 2019 04:51)  HR: 90 (03 Mar 2019 19:39) (90 - 101)  BP: 101/69 (03 Mar 2019 19:39) (101/69 - 113/71)  BP(mean): --  RR: 18 (03 Mar 2019 19:39) (18 - 18)  SpO2: 99% (03 Mar 2019 19:39) (97% - 100%)    PHYSICAL EXAM:  GENERAL: NAD, well-groomed, well-developed  HEAD:  Atraumatic, Normocephalic  EYES: EOMI, PERRLA, conjunctiva and sclera clear  ENMT: No tonsillar erythema, exudates, or enlargement; Moist mucous membranes, NGT in place  NECK: Supple, No JVD, Normal thyroid  NERVOUS SYSTEM:  confused becoming aphasic  CHEST/LUNG: Clear to percussion bilaterally; No rales, rhonchi, wheezing, or rubs  HEART: Regular rate and rhythm; No murmurs, rubs, or gallops  ABDOMEN: Soft, Nontender, Nondistended; Bowel sounds present  Periprosthetic right femur fracture repair with localized swelling, B/L LE edema  LYMPH: No lymphadenopathy noted  SKIN: No rashes or lesions          CBC Full  -  ( 03 Mar 2019 10:33 )  WBC Count : 8.10 K/uL  Hemoglobin : 8.6 g/dL  Hematocrit : 27.0 %  Platelet Count - Automated : 103 K/uL  Mean Cell Volume : 98.2 fl  Mean Cell Hemoglobin : 31.3 pg  Mean Cell Hemoglobin Concentration : 31.9 gm/dL  Auto Neutrophil # : x  Auto Lymphocyte # : x  Auto Monocyte # : x  Auto Eosinophil # : x  Auto Basophil # : x  Auto Neutrophil % : x  Auto Lymphocyte % : x  Auto Monocyte % : x  Auto Eosinophil % : x  Auto Basophil % : x    03-03    133<L>  |  95<L>  |  16  ----------------------------<  132<H>  4.5   |  29  |  0.45<L>    Ca    7.5<L>      03 Mar 2019 07:06  Phos  3.5     03-02  Mg     1.7     03-02    TPro  4.0<L>  /  Alb  1.7<L>  /  TBili  0.3  /  DBili  x   /  AST  49<H>  /  ALT  40  /  AlkPhos  161<H>  03-03    LIVER FUNCTIONS - ( 03 Mar 2019 07:06 )  Alb: 1.7 g/dL / Pro: 4.0 g/dL / ALK PHOS: 161 U/L / ALT: 40 U/L / AST: 49 U/L / GGT: x 69 y/o critically ill  female h/o dementia NHL presenting after unwitnessed fall. Pt found on the ground at rehab. is on lovenox after hip fracture. pt reports pain to her right leg. per EMS en route slurring speech and mildly hypotensive requesting trauma.unclear how long pt on the ground. Patient found to have C diff with worsening hypotension requiring pressors on and off and  Patient requiring the SICU for continued cardiopulmonary monitoring. Orthopedic procedure on hold due to  hypotension and pan colitis and need for pressors due to unstable hemodynamics. Diarrhea slowing off IV Flagyl + on oral vancomycin. Patient now has the rectal tube removed.   Leukocytosis and diarrhea have improved  No significant change in status. Has senile dementia with intermittent lucency. No further C.diff colitis and no further hypovolemic hypotension. Patient s/p Open reduction and internal fixation of periprosthetic hip fx Mental status has greatly improved. Patient off pressors today. continue midodrine.  Patient seen in SICU prior to transfer to Medical service and regular floor  Patient  alert awake and able to speak but confused . Patient less agitated than yesterday .  Medically stable to proceed to transfer to regular floor. On N/G alimentation for hypoproteinemic edema. Blood pressure being supported with midodrine and no diuretics are being given. Patient feels better after moving her bowels. Not agitated today and cooperative on Geodan and wellbutrin.  The patient was seen by nutrition and their plan is to be followed. Should her hematocrit continued to drift lower, she will receive 1 unit of packed cells with IV Lasix to correct hypovolemic, hypoproteinemic edema with hypotension on Midodrine. No transfusion presently required with rise in Hct.  The patient is to be out of bed to chair and resume physical therapy, as per orthopedics, after right periprosthetic fracture ORIF on February 22, 2019.    MEDICATIONS  (STANDING):  buPROPion XL . 300 milliGRAM(s) Oral daily  chlorhexidine 4% Liquid 1 Application(s) Topical <User Schedule>  docusate sodium 100 milliGRAM(s) Oral three times a day  dronabinol 2.5 milliGRAM(s) Oral daily  heparin  Injectable 5000 Unit(s) SubCutaneous every 8 hours  influenza   Vaccine 0.5 milliLiter(s) IntraMuscular once  levothyroxine 100 MICROGram(s) Oral daily  midodrine 5 milliGRAM(s) Oral three times a day  senna 2 Tablet(s) Oral at bedtime  silver sulfADIAZINE 1% Cream 1 Application(s) Topical daily  vancomycin    Solution 125 milliGRAM(s) Oral every 6 hours  ziprasidone 40 milliGRAM(s) Oral <User Schedule>    MEDICATIONS  (PRN):  acetaminophen   Tablet .. 975 milliGRAM(s) Oral every 6 hours PRN Mild Pain (1 - 3)  oxyCODONE    IR 5 milliGRAM(s) Oral every 4 hours PRN Moderate Pain (4 - 6)        Vital Signs Last 24 Hrs  T(C): 36.6 (03 Mar 2019 19:39), Max: 36.8 (03 Mar 2019 04:51)  T(F): 97.9 (03 Mar 2019 19:39), Max: 98.2 (03 Mar 2019 04:51)  HR: 90 (03 Mar 2019 19:39) (90 - 101)  BP: 101/69 (03 Mar 2019 19:39) (101/69 - 113/71)  BP(mean): --  RR: 18 (03 Mar 2019 19:39) (18 - 18)  SpO2: 99% (03 Mar 2019 19:39) (97% - 100%)    PHYSICAL EXAM:  GENERAL: NAD, well-groomed, well-developed  HEAD:  Atraumatic, Normocephalic  EYES: EOMI, PERRLA, conjunctiva and sclera clear  ENMT: No tonsillar erythema, exudates, or enlargement; Moist mucous membranes, NGT in place  NECK: Supple, No JVD, Normal thyroid  NERVOUS SYSTEM:  confused becoming aphasic  CHEST/LUNG: Clear to percussion bilaterally; No rales, rhonchi, wheezing, or rubs  HEART: Regular rate and rhythm; No murmurs, rubs, or gallops  ABDOMEN: Soft, Nontender, Nondistended; Bowel sounds present  Periprosthetic right femur fracture repair with localized swelling, B/L LE edema  LYMPH: No lymphadenopathy noted  SKIN: No rashes or lesions      LABS      CBC Full  -  ( 04 Mar 2019 08:43 )  WBC Count : 8.76 K/uL  Hemoglobin : 8.3 g/dL  Hematocrit : 26.3 %  Platelet Count - Automated : 115 K/uL  Mean Cell Volume : 98.5 fl  Mean Cell Hemoglobin : 31.1 pg  Mean Cell Hemoglobin Concentration : 31.6 gm/dL  Auto Neutrophil # : x  Auto Lymphocyte # : x  Auto Monocyte # : x  Auto Eosinophil # : x  Auto Basophil # : x  Auto Neutrophil % : x  Auto Lymphocyte % : x  Auto Monocyte % : x  Auto Eosinophil % : x  Auto Basophil % : x    03-05    133<L>  |  95<L>  |  15  ----------------------------<  115<H>  4.6   |  27  |  0.47<L>    Ca    8.1<L>      05 Mar 2019 06:09    TPro  4.5<L>  /  Alb  2.1<L>  /  TBili  0.3  /  DBili  x   /  AST  41<H>  /  ALT  37  /  AlkPhos  196<H>  03-05    LIVER FUNCTIONS - ( 05 Mar 2019 06:09 )  Alb: 2.1 g/dL / Pro: 4.5 g/dL / ALK PHOS: 196 U/L / ALT: 37 U/L / AST: 41 U/L / GGT: x             No transfusion presently required with rise in HCt.

## 2019-03-05 LAB
ALBUMIN SERPL ELPH-MCNC: 2.1 G/DL — LOW (ref 3.3–5)
ALBUMIN SERPL ELPH-MCNC: 2.2 G/DL — LOW (ref 3.3–5)
ALP SERPL-CCNC: 196 U/L — HIGH (ref 40–120)
ALP SERPL-CCNC: 202 U/L — HIGH (ref 40–120)
ALT FLD-CCNC: 37 U/L — SIGNIFICANT CHANGE UP (ref 10–45)
ALT FLD-CCNC: 42 U/L — SIGNIFICANT CHANGE UP (ref 10–45)
ANION GAP SERPL CALC-SCNC: 10 MMOL/L — SIGNIFICANT CHANGE UP (ref 5–17)
ANION GAP SERPL CALC-SCNC: 11 MMOL/L — SIGNIFICANT CHANGE UP (ref 5–17)
AST SERPL-CCNC: 41 U/L — HIGH (ref 10–40)
AST SERPL-CCNC: 49 U/L — HIGH (ref 10–40)
BILIRUB SERPL-MCNC: 0.3 MG/DL — SIGNIFICANT CHANGE UP (ref 0.2–1.2)
BILIRUB SERPL-MCNC: 0.6 MG/DL — SIGNIFICANT CHANGE UP (ref 0.2–1.2)
BUN SERPL-MCNC: 15 MG/DL — SIGNIFICANT CHANGE UP (ref 7–23)
BUN SERPL-MCNC: 17 MG/DL — SIGNIFICANT CHANGE UP (ref 7–23)
CALCIUM SERPL-MCNC: 8.1 MG/DL — LOW (ref 8.4–10.5)
CALCIUM SERPL-MCNC: 8.4 MG/DL — SIGNIFICANT CHANGE UP (ref 8.4–10.5)
CHLORIDE SERPL-SCNC: 95 MMOL/L — LOW (ref 96–108)
CHLORIDE SERPL-SCNC: 95 MMOL/L — LOW (ref 96–108)
CO2 SERPL-SCNC: 26 MMOL/L — SIGNIFICANT CHANGE UP (ref 22–31)
CO2 SERPL-SCNC: 27 MMOL/L — SIGNIFICANT CHANGE UP (ref 22–31)
CREAT SERPL-MCNC: 0.47 MG/DL — LOW (ref 0.5–1.3)
CREAT SERPL-MCNC: 0.5 MG/DL — SIGNIFICANT CHANGE UP (ref 0.5–1.3)
GLUCOSE SERPL-MCNC: 115 MG/DL — HIGH (ref 70–99)
GLUCOSE SERPL-MCNC: 121 MG/DL — HIGH (ref 70–99)
HCT VFR BLD CALC: 28 % — LOW (ref 34.5–45)
HGB BLD-MCNC: 8.9 G/DL — LOW (ref 11.5–15.5)
MCHC RBC-ENTMCNC: 31.8 GM/DL — LOW (ref 32–36)
MCHC RBC-ENTMCNC: 31.8 PG — SIGNIFICANT CHANGE UP (ref 27–34)
MCV RBC AUTO: 100 FL — SIGNIFICANT CHANGE UP (ref 80–100)
PLATELET # BLD AUTO: 139 K/UL — LOW (ref 150–400)
POTASSIUM SERPL-MCNC: 4.6 MMOL/L — SIGNIFICANT CHANGE UP (ref 3.5–5.3)
POTASSIUM SERPL-MCNC: 4.9 MMOL/L — SIGNIFICANT CHANGE UP (ref 3.5–5.3)
POTASSIUM SERPL-SCNC: 4.6 MMOL/L — SIGNIFICANT CHANGE UP (ref 3.5–5.3)
POTASSIUM SERPL-SCNC: 4.9 MMOL/L — SIGNIFICANT CHANGE UP (ref 3.5–5.3)
PROT SERPL-MCNC: 4.5 G/DL — LOW (ref 6–8.3)
PROT SERPL-MCNC: 4.8 G/DL — LOW (ref 6–8.3)
RBC # BLD: 2.8 M/UL — LOW (ref 3.8–5.2)
RBC # FLD: 19.9 % — HIGH (ref 10.3–14.5)
SODIUM SERPL-SCNC: 131 MMOL/L — LOW (ref 135–145)
SODIUM SERPL-SCNC: 133 MMOL/L — LOW (ref 135–145)
WBC # BLD: 9.49 K/UL — SIGNIFICANT CHANGE UP (ref 3.8–10.5)
WBC # FLD AUTO: 9.49 K/UL — SIGNIFICANT CHANGE UP (ref 3.8–10.5)

## 2019-03-05 PROCEDURE — 99232 SBSQ HOSP IP/OBS MODERATE 35: CPT

## 2019-03-05 RX ORDER — DRONABINOL 2.5 MG
2.5 CAPSULE ORAL DAILY
Qty: 0 | Refills: 0 | Status: DISCONTINUED | OUTPATIENT
Start: 2019-03-06 | End: 2019-03-13

## 2019-03-05 RX ADMIN — MIDODRINE HYDROCHLORIDE 5 MILLIGRAM(S): 2.5 TABLET ORAL at 22:48

## 2019-03-05 RX ADMIN — Medication 125 MILLIGRAM(S): at 17:19

## 2019-03-05 RX ADMIN — MIDODRINE HYDROCHLORIDE 5 MILLIGRAM(S): 2.5 TABLET ORAL at 05:10

## 2019-03-05 RX ADMIN — Medication 100 MICROGRAM(S): at 05:10

## 2019-03-05 RX ADMIN — MIDODRINE HYDROCHLORIDE 5 MILLIGRAM(S): 2.5 TABLET ORAL at 13:34

## 2019-03-05 RX ADMIN — Medication 125 MILLIGRAM(S): at 13:34

## 2019-03-05 RX ADMIN — Medication 125 MILLIGRAM(S): at 05:10

## 2019-03-05 RX ADMIN — CHLORHEXIDINE GLUCONATE 1 APPLICATION(S): 213 SOLUTION TOPICAL at 09:02

## 2019-03-05 RX ADMIN — Medication 1 APPLICATION(S): at 13:30

## 2019-03-05 RX ADMIN — Medication 2.5 MILLIGRAM(S): at 13:39

## 2019-03-05 RX ADMIN — Medication 100 MILLIGRAM(S): at 05:10

## 2019-03-05 RX ADMIN — HEPARIN SODIUM 5000 UNIT(S): 5000 INJECTION INTRAVENOUS; SUBCUTANEOUS at 22:48

## 2019-03-05 RX ADMIN — SENNA PLUS 2 TABLET(S): 8.6 TABLET ORAL at 22:48

## 2019-03-05 RX ADMIN — BUPROPION HYDROCHLORIDE 300 MILLIGRAM(S): 150 TABLET, EXTENDED RELEASE ORAL at 13:34

## 2019-03-05 RX ADMIN — ZIPRASIDONE HYDROCHLORIDE 40 MILLIGRAM(S): 20 CAPSULE ORAL at 09:02

## 2019-03-05 RX ADMIN — Medication 100 MILLIGRAM(S): at 13:39

## 2019-03-05 RX ADMIN — Medication 125 MILLIGRAM(S): at 00:17

## 2019-03-05 RX ADMIN — HEPARIN SODIUM 5000 UNIT(S): 5000 INJECTION INTRAVENOUS; SUBCUTANEOUS at 05:10

## 2019-03-05 RX ADMIN — HEPARIN SODIUM 5000 UNIT(S): 5000 INJECTION INTRAVENOUS; SUBCUTANEOUS at 13:34

## 2019-03-05 NOTE — PROGRESS NOTE ADULT - ASSESSMENT
69 yo woman with a hx of dementia present after recent Hip surgery present after a fall with a femur fx. Patient was found to be cdiff positive. s/p revision of hip. seen now in SICU Stable for  transfer t;o a regular medicine floor.  This was discussed with patient and son. On N/G alimentation for hypoproteinemic edema. To proceed with nutrition as instructed. Blood pressure being supported with midodrine. Patient feels better after moving her bowels. Not agitated today and cooperative.    Seen by plastics - possible VAC in am  ID:  po [email or web address] taper  Aggressive nutritional treatment 69 yo woman with a hx of dementia present after recent Hip surgery present after a fall with a femur fx. Patient was found to be cdiff positive. s/p revision of hip. seen now in SICU Stable for  transfer t;o a regular medicine floor.  This was discussed with patient and son. On N/G alimentation for hypoproteinemic edema. To proceed with nutrition as instructed. Blood pressure being supported with midodrine. Patient feels better after moving her bowels. Not agitated today and cooperative.    Seen by plastics - possible VAC in am  ID:  po vanco taper for c.diff  Aggressive nutritional treatment

## 2019-03-05 NOTE — PHARMACOTHERAPY INTERVENTION NOTE - COMMENTS
Patient previously on dronabinol daily however was auto-discontinued after today's dose. Recommendation made to resume tomorrow.    Derek Boston, PharmD  [phone]

## 2019-03-05 NOTE — PROGRESS NOTE ADULT - ATTENDING COMMENTS
69 y/o critically ill  female h/o dementia NHL presenting after unwitnessed fall. Pt found on the ground at rehab. is on lovenox after hip fracture. pt reports pain to her right leg. per EMS en route slurring speech and mildly hypotensive requesting trauma.unclear how long pt on the ground. Patient found to have C diff with worsening hypotension requiring pressors on and off and  Patient requiring the SICU for continued cardiopulmonary monitoring. Orthopedic procedure on hold due to  hypotension and pan colitis and need for pressors due to unstable hemodynamics. Diarrhea slowing off IV Flagyl + on oral vancomycin. Patient now has the rectal tube removed.   Leukocytosis and diarrhea have improved  No significant change in status. Has senile dementia with intermittent lucency. No further C.diff colitis and no further hypovolemic hypotension. Patient s/p Open reduction and internal fixation of periprosthetic hip fx Mental status has greatly improved. Patient off pressors today. continue midodrine.  Patient seen in SICU prior to transfer to Medical service and regular floor  Patient  alert awake and able to speak but confused.  Patient  alert awake and able to speak but confused . Patient less agitated than yesterday .  Medically stable to proceed to transfer to regular floor. On N/G alimentation for hypoproteinemic edema. Blood pressure being supported with midodrine and no diuretics are being given. Patient feels better after moving her bowels. Not agitated today and cooperative on Geodan and wellbutrin.  The patient requires speech and swallow evaluation for conversion from N/G alimentation to oral, prior to transfer to rehabilitation.  Should her hematocrit continued to drift lower, she will receive 1 unit of packed cells with IV Lasix to correct hypovolemic, hypoproteinemic edema with hypotension on Midodrine. No transfusion presently required with rise in Hct. The patient is to be out of bed to chair and resume physical therapy, after right periprosthetic fracture ORIF on February 22, 2019.  Wound dehiscence for possible vac .  For C. diff - coninue tapering doses of  po Vanco.   Aggressive nutritional therapy. 69 y/o critically ill  female h/o dementia NHL presenting after unwitnessed fall. Pt found on the ground at rehab. is on lovenox after hip fracture. pt reports pain to her right leg. per EMS en route slurring speech and mildly hypotensive requesting trauma.unclear how long pt on the ground. Patient found to have C diff with worsening hypotension requiring pressors on and off and  Patient requiring the SICU for continued cardiopulmonary monitoring. Orthopedic procedure on hold due to  hypotension and pan colitis and need for pressors due to unstable hemodynamics. Diarrhea slowing off IV Flagyl + on oral vancomycin. Patient now has the rectal tube removed.   Leukocytosis and diarrhea have improved  No significant change in status. Has senile dementia with intermittent lucency. No further C.diff colitis and no further hypovolemic hypotension. Patient s/p Open reduction and internal fixation of periprosthetic hip fx Mental status has greatly improved. Patient off pressors today. continue midodrine.  Patient seen in SICU prior to transfer to Medical service and regular floor  Patient  alert awake and able to speak but confused.  Patient  alert awake and able to speak but confused . Patient less agitated than yesterday .  Medically stable to proceed to transfer to regular floor. On N/G alimentation for hypoproteinemic edema. Blood pressure being supported with midodrine and no diuretics are being given. Patient feels better after moving her bowels. Not agitated today and cooperative on Geodon and Wellbutrin.  The patient requires speech and swallow evaluation for conversion from N/G alimentation to oral, prior to transfer to rehabilitation.  Should her hematocrit continued to drift lower, she will receive 1 unit of packed cells with IV Lasix to correct hypovolemic, hypoproteinemic edema with hypotension on Midodrine. No transfusion presently required with rise in Hct. The patient is to be out of bed to chair and resume physical therapy, after right periprosthetic fracture ORIF on February 22, 2019.  Wound dehiscence for possible vac .  For C. diff - continue tapering doses of  po Vanco.   Aggressive nutritional therapy.  Hyponatremia - check urien lytes and osms ,

## 2019-03-05 NOTE — PROGRESS NOTE ADULT - ATTENDING COMMENTS
I agree with the above note on this patient. All pertinent films have been reviewed. Please refer to clinical documentation of the history, physical examinations, data summary, and both assessment and plan as documented above and with which I agree.    continue to maximize nutrition, on tube feeds  slow wound healing, fibrinous exudate proximally with some wound dehiscence  plastics consult for guidance  continue dressing changes betadine soaked gauze and covered w abd pad and secured with papertape unless different recs from plastics  now that she is no longer postop delerium she can advance weightbearing to partial (50%) weightbearing for transfers only. otherwise nonweightbearing RLE. continue posterior hip precautions.    Conor George MD  Attending Orthopedic Surgeon

## 2019-03-05 NOTE — PROGRESS NOTE ADULT - SUBJECTIVE AND OBJECTIVE BOX
WILI PARKER 68y [id number]    Patient is a 68y old  Female who presents with a chief complaint of C diff (04 Mar 2019 16:03)      Follow Up/CC:  ID following for cdiff    Interval History/ROS: no fever, denies pain currently    Allergies    honeydew melon (Other)  penicillin (Other; Hives)    Intolerances        ANTIMICROBIALS:  vancomycin    Solution 125 every 6 hours      MEDICATIONS  (STANDING):  buPROPion XL . 300 milliGRAM(s) Oral daily  chlorhexidine 4% Liquid 1 Application(s) Topical <User Schedule>  docusate sodium 100 milliGRAM(s) Oral three times a day  heparin  Injectable 5000 Unit(s) SubCutaneous every 8 hours  influenza   Vaccine 0.5 milliLiter(s) IntraMuscular once  levothyroxine 100 MICROGram(s) Oral daily  midodrine 5 milliGRAM(s) Oral three times a day  senna 2 Tablet(s) Oral at bedtime  silver sulfADIAZINE 1% Cream 1 Application(s) Topical daily  vancomycin    Solution 125 milliGRAM(s) Oral every 6 hours  ziprasidone 40 milliGRAM(s) Oral <User Schedule>    MEDICATIONS  (PRN):  acetaminophen   Tablet .. 975 milliGRAM(s) Oral every 6 hours PRN Mild Pain (1 - 3)  oxyCODONE    IR 5 milliGRAM(s) Oral every 4 hours PRN Moderate Pain (4 - 6)        Vital Signs Last 24 Hrs  T(C): 36.6 (05 Mar 2019 12:20), Max: 36.8 (04 Mar 2019 20:43)  T(F): 97.8 (05 Mar 2019 12:20), Max: 98.2 (04 Mar 2019 20:43)  HR: 96 (05 Mar 2019 12:20) (94 - 99)  BP: 116/70 (05 Mar 2019 12:20) (99/63 - 116/70)  BP(mean): --  RR: 18 (05 Mar 2019 12:20) (18 - 18)  SpO2: 97% (05 Mar 2019 12:20) (97% - 98%)    CBC Full  -  ( 05 Mar 2019 08:13 )  WBC Count : 9.49 K/uL  Hemoglobin : 8.9 g/dL  Hematocrit : 28.0 %  Platelet Count - Automated : 139 K/uL  Mean Cell Volume : 100.0 fl  Mean Cell Hemoglobin : 31.8 pg  Mean Cell Hemoglobin Concentration : 31.8 gm/dL  Auto Neutrophil # : x  Auto Lymphocyte # : x  Auto Monocyte # : x  Auto Eosinophil # : x  Auto Basophil # : x  Auto Neutrophil % : x  Auto Lymphocyte % : x  Auto Monocyte % : x  Auto Eosinophil % : x  Auto Basophil % : x    03-05    131<L>  |  95<L>  |  17  ----------------------------<  121<H>  4.9   |  26  |  0.50    Ca    8.4      05 Mar 2019 14:21    TPro  4.8<L>  /  Alb  2.2<L>  /  TBili  0.6  /  DBili  x   /  AST  49<H>  /  ALT  42  /  AlkPhos  202<H>  03-05    LIVER FUNCTIONS - ( 05 Mar 2019 14:21 )  Alb: 2.2 g/dL / Pro: 4.8 g/dL / ALK PHOS: 202 U/L / ALT: 42 U/L / AST: 49 U/L / GGT: x               MICROBIOLOGY:  .Body Fluid Synovial Fluid  02-23-19   No growth at 1 week.  --    No polymorphonuclear cells seen  No organisms seen  by cytocentrifuge      .Tissue Other, right hip synovium  02-23-19   No growth at 1 week.  --    Rare polymorphonuclear leukocytes seen per low power field  No organisms seen per oil power field      .Tissue Other, right femur  02-23-19   No growth at 5 days  --    Rare polymorphonuclear leukocytes seen per low power field  No organisms seen per oil power field      .Body Fluid  02-06-19   No growth at 14 days.  --    No polymorphonuclear cells seen per low power field  No organisms seen      .Blood Blood-Peripheral  02-06-19   No growth at 5 days.  --  --      RADIOLOGY    < from: Xray Chest 1 View- PORTABLE-Routine (03.02.19 @ 10:14) >  There are small bilateral pleural effusions which may have   improved since the prior study likely associated with left lung base   atelectasis. There is no pneumothorax.

## 2019-03-05 NOTE — PROGRESS NOTE ADULT - SUBJECTIVE AND OBJECTIVE BOX
69 y/o critically ill  female h/o dementia NHL presenting after unwitnessed fall. Pt found on the ground at rehab. is on lovenox after hip fracture. pt reports pain to her right leg. per EMS en route slurring speech and mildly hypotensive requesting trauma.unclear how long pt on the ground. Patient found to have C diff with worsening hypotension requiring pressors on and off and  Patient requiring the SICU for continued cardiopulmonary monitoring. Orthopedic procedure on hold due to  hypotension and pan colitis and need for pressors due to unstable hemodynamics. Diarrhea slowing off IV Flagyl + on oral vancomycin. Patient now has the rectal tube removed.   Leukocytosis and diarrhea have improved  No significant change in status. Has senile dementia with intermittent lucency. No further C.diff colitis and no further hypovolemic hypotension. Patient s/p Open reduction and internal fixation of periprosthetic hip fx Mental status has greatly improved. Patient off pressors today. continue midodrine.  Patient seen in SICU prior to transfer to Medical service and regular floor  Patient  alert awake and able to speak but confused . Patient less agitated than yesterday .  Medically stable to proceed to transfer to regular floor. On N/G alimentation for hypoproteinemic edema. Blood pressure being supported with midodrine and no diuretics are being given. Patient feels better after moving her bowels. Not agitated today and cooperative on Geodan and wellbutrin.  The patient was seen by nutrition and their plan is to be followed. Should her hematocrit continued to drift lower, she will receive 1 unit of packed cells with IV Lasix to correct hypovolemic, hypoproteinemic edema with hypotension on Midodrine. No transfusion presently required with rise in Hct.  The patient is to be out of bed to chair and resume physical therapy, as per orthopedics, after right periprosthetic fracture ORIF on February 22, 2019.  Patient with wound dehiscence seen by plastics for possible VAC placement.     Would continue Vanco po in a tapering fashion s per ID:   then taper-125 mg po q 6 X 2 weeks 2/23 --> 3/9/19  and then q 12 X 2 more weeks 3/10 -->    MEDICATIONS  (STANDING):  buPROPion XL . 300 milliGRAM(s) Oral daily  chlorhexidine 4% Liquid 1 Application(s) Topical <User Schedule>  docusate sodium 100 milliGRAM(s) Oral three times a day  dronabinol 2.5 milliGRAM(s) Oral daily  heparin  Injectable 5000 Unit(s) SubCutaneous every 8 hours  influenza   Vaccine 0.5 milliLiter(s) IntraMuscular once  levothyroxine 100 MICROGram(s) Oral daily  midodrine 5 milliGRAM(s) Oral three times a day  senna 2 Tablet(s) Oral at bedtime  silver sulfADIAZINE 1% Cream 1 Application(s) Topical daily  vancomycin    Solution 125 milliGRAM(s) Oral every 6 hours  ziprasidone 40 milliGRAM(s) Oral <User Schedule>    MEDICATIONS  (PRN):  acetaminophen   Tablet .. 975 milliGRAM(s) Oral every 6 hours PRN Mild Pain (1 - 3)  oxyCODONE    IR 5 milliGRAM(s) Oral every 4 hours PRN Moderate Pain (4 - 6)        Vital Signs Last 24 Hrs  Vital Signs Last 24 Hrs  T(C): 36.3 (05 Mar 2019 21:03), Max: 36.6 (05 Mar 2019 12:20)  T(F): 97.4 (05 Mar 2019 21:03), Max: 97.8 (05 Mar 2019 12:20)  HR: 95 (05 Mar 2019 21:03) (95 - 96)  BP: 120/68 (05 Mar 2019 21:03) (116/70 - 120/68)  BP(mean): --  RR: 17 (05 Mar 2019 21:03) (17 - 18)  SpO2: 98% (05 Mar 2019 21:03) (97% - 98%)    PHYSICAL EXAM:  GENERAL: NAD, well-groomed, well-developed  HEAD:  Atraumatic, Normocephalic  EYES: EOMI, PERRLA, conjunctiva and sclera clear  ENMT: No tonsillar erythema, exudates, or enlargement; Moist mucous membranes, NGT in place  NECK: Supple, No JVD, Normal thyroid  NERVOUS SYSTEM:  confused becoming aphasic  CHEST/LUNG: Clear to percussion bilaterally; No rales, rhonchi, wheezing, or rubs  HEART: Regular rate and rhythm; No murmurs, rubs, or gallops  ABDOMEN: Soft, Nontender, Nondistended; Bowel sounds present  Periprosthetic right femur fracture repair with localized swelling, and wound dehiscences  B/L LE edema  LYMPH: No lymphadenopathy noted  SKIN: No rashes or lesions      LABS        CBC Full  -  ( 05 Mar 2019 08:13 )  WBC Count : 9.49 K/uL  Hemoglobin : 8.9 g/dL  Hematocrit : 28.0 %  Platelet Count - Automated : 139 K/uL  Mean Cell Volume : 100.0 fl  Mean Cell Hemoglobin : 31.8 pg  Mean Cell Hemoglobin Concentration : 31.8 gm/dL  Auto Neutrophil # : x  Auto Lymphocyte # : x  Auto Monocyte # : x  Auto Eosinophil # : x  Auto Basophil # : x  Auto Neutrophil % : x  Auto Lymphocyte % : x  Auto Monocyte % : x  Auto Eosinophil % : x  Auto Basophil % : x    03-05    131<L>  |  95<L>  |  17  ----------------------------<  121<H>  4.9   |  26  |  0.50    Ca    8.4      05 Mar 2019 14:21    TPro  4.8<L>  /  Alb  2.2<L>  /  TBili  0.6  /  DBili  x   /  AST  49<H>  /  ALT  42  /  AlkPhos  202<H>  03-05    LIVER FUNCTIONS - ( 05 Mar 2019 14:21 )  Alb: 2.2 g/dL / Pro: 4.8 g/dL / ALK PHOS: 202 U/L / ALT: 42 U/L / AST: 49 U/L / GGT: x                 CBC Full  -  ( 04 Mar 2019 08:43 )  WBC Count : 8.76 K/uL  Hemoglobin : 8.3 g/dL  Hematocrit : 26.3 %  Platelet Count - Automated : 115 K/uL  Mean Cell Volume : 98.5 fl  Mean Cell Hemoglobin : 31.1 pg  Mean Cell Hemoglobin Concentration : 31.6 gm/dL  Auto Neutrophil # : x  Auto Lymphocyte # : x  Auto Monocyte # : x  Auto Eosinophil # : x  Auto Basophil # : x  Auto Neutrophil % : x  Auto Lymphocyte % : x  Auto Monocyte % : x  Auto Eosinophil % : x  Auto Basophil % : x    03-05    133<L>  |  95<L>  |  15  ----------------------------<  115<H>  4.6   |  27  |  0.47<L>    Ca    8.1<L>      05 Mar 2019 06:09    TPro  4.5<L>  /  Alb  2.1<L>  /  TBili  0.3  /  DBili  x   /  AST  41<H>  /  ALT  37  /  AlkPhos  196<H>  03-05    LIVER FUNCTIONS - ( 05 Mar 2019 06:09 )  Alb: 2.1 g/dL / Pro: 4.5 g/dL / ALK PHOS: 196 U/L / ALT: 37 U/L / AST: 41 U/L / GGT: x             No transfusion presently required with rise in HCt. 67 y/o critically ill  female h/o dementia NHL presenting after unwitnessed fall. Pt found on the ground at rehab. is on lovenox after hip fracture. pt reports pain to her right leg. per EMS en route slurring speech and mildly hypotensive requesting trauma.unclear how long pt on the ground. Patient found to have C diff with worsening hypotension requiring pressors on and off and  Patient requiring the SICU for continued cardiopulmonary monitoring. Orthopedic procedure on hold due to  hypotension and pan colitis and need for pressors due to unstable hemodynamics. Diarrhea slowing off IV Flagyl + on oral vancomycin. Patient now has the rectal tube removed.   Leukocytosis and diarrhea have improved  No significant change in status. Has senile dementia with intermittent lucency. No further C.diff colitis and no further hypovolemic hypotension. Patient s/p Open reduction and internal fixation of periprosthetic hip fx Mental status has greatly improved. Patient off pressors today. continue midodrine.  Patient seen in SICU prior to transfer to Medical service and regular floor  Patient  alert awake and able to speak but confused . Patient less agitated than yesterday .  Medically stable to proceed to transfer to regular floor. On N/G alimentation for hypoproteinemic edema. Blood pressure being supported with midodrine and no diuretics are being given. Patient feels better after moving her bowels. Not agitated today and cooperative on Geodan and wellbutrin.  The patient was seen by nutrition and their plan is to be followed. Should her hematocrit continued to drift lower, she will receive 1 unit of packed cells with IV Lasix to correct hypovolemic, hypoproteinemic edema with hypotension on Midodrine. No transfusion presently required with rise in Hct.  The patient is to be out of bed to chair and resume physical therapy, as per orthopedics, after right periprosthetic fracture ORIF on February 22, 2019.  Patient with wound dehiscence seen by plastics for possible VAC placement.     Would continue Vanco po in a tapering fashion s per ID treatment of C diff   then taper-125 mg po q 6 X 2 weeks 2/23 --> 3/9/19  and then q 12 X 2 more weeks 3/10 -->    MEDICATIONS  (STANDING):  buPROPion XL . 300 milliGRAM(s) Oral daily  chlorhexidine 4% Liquid 1 Application(s) Topical <User Schedule>  docusate sodium 100 milliGRAM(s) Oral three times a day  dronabinol 2.5 milliGRAM(s) Oral daily  heparin  Injectable 5000 Unit(s) SubCutaneous every 8 hours  influenza   Vaccine 0.5 milliLiter(s) IntraMuscular once  levothyroxine 100 MICROGram(s) Oral daily  midodrine 5 milliGRAM(s) Oral three times a day  senna 2 Tablet(s) Oral at bedtime  silver sulfADIAZINE 1% Cream 1 Application(s) Topical daily  vancomycin    Solution 125 milliGRAM(s) Oral every 6 hours  ziprasidone 40 milliGRAM(s) Oral <User Schedule>    MEDICATIONS  (PRN):  acetaminophen   Tablet .. 975 milliGRAM(s) Oral every 6 hours PRN Mild Pain (1 - 3)  oxyCODONE    IR 5 milliGRAM(s) Oral every 4 hours PRN Moderate Pain (4 - 6)        Vital Signs Last 24 Hrs  Vital Signs Last 24 Hrs  T(C): 36.3 (05 Mar 2019 21:03), Max: 36.6 (05 Mar 2019 12:20)  T(F): 97.4 (05 Mar 2019 21:03), Max: 97.8 (05 Mar 2019 12:20)  HR: 95 (05 Mar 2019 21:03) (95 - 96)  BP: 120/68 (05 Mar 2019 21:03) (116/70 - 120/68)  BP(mean): --  RR: 17 (05 Mar 2019 21:03) (17 - 18)  SpO2: 98% (05 Mar 2019 21:03) (97% - 98%)    PHYSICAL EXAM:  GENERAL: NAD, well-groomed, well-developed  HEAD:  Atraumatic, Normocephalic  EYES: EOMI, PERRLA, conjunctiva and sclera clear  ENMT: No tonsillar erythema, exudates, or enlargement; Moist mucous membranes, NGT in place  NECK: Supple, No JVD, Normal thyroid  NERVOUS SYSTEM:  confused becoming aphasic  CHEST/LUNG: Clear to percussion bilaterally; No rales, rhonchi, wheezing, or rubs  HEART: Regular rate and rhythm; No murmurs, rubs, or gallops  ABDOMEN: Soft, Nontender, Nondistended; Bowel sounds present  Periprosthetic right femur fracture repair with localized swelling, and wound dehiscences  B/L LE edema  LYMPH: No lymphadenopathy noted  SKIN: No rashes or lesions      LABS        CBC Full  -  ( 05 Mar 2019 08:13 )  WBC Count : 9.49 K/uL  Hemoglobin : 8.9 g/dL  Hematocrit : 28.0 %  Platelet Count - Automated : 139 K/uL  Mean Cell Volume : 100.0 fl  Mean Cell Hemoglobin : 31.8 pg  Mean Cell Hemoglobin Concentration : 31.8 gm/dL  Auto Neutrophil # : x  Auto Lymphocyte # : x  Auto Monocyte # : x  Auto Eosinophil # : x  Auto Basophil # : x  Auto Neutrophil % : x  Auto Lymphocyte % : x  Auto Monocyte % : x  Auto Eosinophil % : x  Auto Basophil % : x    03-05    131<L>  |  95<L>  |  17  ----------------------------<  121<H>  4.9   |  26  |  0.50    Ca    8.4      05 Mar 2019 14:21    TPro  4.8<L>  /  Alb  2.2<L>  /  TBili  0.6  /  DBili  x   /  AST  49<H>  /  ALT  42  /  AlkPhos  202<H>  03-05    LIVER FUNCTIONS - ( 05 Mar 2019 14:21 )  Alb: 2.2 g/dL / Pro: 4.8 g/dL / ALK PHOS: 202 U/L / ALT: 42 U/L / AST: 49 U/L / GGT: x                 CBC Full  -  ( 04 Mar 2019 08:43 )  WBC Count : 8.76 K/uL  Hemoglobin : 8.3 g/dL  Hematocrit : 26.3 %  Platelet Count - Automated : 115 K/uL  Mean Cell Volume : 98.5 fl  Mean Cell Hemoglobin : 31.1 pg  Mean Cell Hemoglobin Concentration : 31.6 gm/dL  Auto Neutrophil # : x  Auto Lymphocyte # : x  Auto Monocyte # : x  Auto Eosinophil # : x  Auto Basophil # : x  Auto Neutrophil % : x  Auto Lymphocyte % : x  Auto Monocyte % : x  Auto Eosinophil % : x  Auto Basophil % : x    03-05    133<L>  |  95<L>  |  15  ----------------------------<  115<H>  4.6   |  27  |  0.47<L>    Ca    8.1<L>      05 Mar 2019 06:09    TPro  4.5<L>  /  Alb  2.1<L>  /  TBili  0.3  /  DBili  x   /  AST  41<H>  /  ALT  37  /  AlkPhos  196<H>  03-05    LIVER FUNCTIONS - ( 05 Mar 2019 06:09 )  Alb: 2.1 g/dL / Pro: 4.5 g/dL / ALK PHOS: 196 U/L / ALT: 37 U/L / AST: 41 U/L / GGT: x             No transfusion presently required with rise in HCt.

## 2019-03-05 NOTE — PROGRESS NOTE ADULT - ATTENDING COMMENTS
Pierre Lomeli  Attending Physician   Division of Infectious Disease  Pager #[phone]  After 5pm/weekend or no response, call #[phone]

## 2019-03-05 NOTE — CONSULT NOTE ADULT - SUBJECTIVE AND OBJECTIVE BOX
Plastic Surgery Consult Note  (pg [facility]: [id number], [facility]: [phone])    HPI:  68 year old female with a PMHx of bipolar depression, Hyperlipidemia, NHL (in remission), OCD, dementia, with recent hospitalization (1/20-1/26) for right hip hemiarthroplasty for right femoral neck fracture after fall. Hospital course complicated by ESBL E. Coli UTI on Ertapenem (1/23-1/29). Patient found down in rehab, reported as possibly rolling out of bed. On arrival to [facility] ED, Level 2 trauma activated was upgraded to a Level 1 for worsening hypotension during secondary exam despite fluid resuscitation. SBP 80s, 2 large-bore pIV placed, e-FAST done twice, with no fluid in the intraperitoneal space or pericardial space, no pneumothorax, suggestion of a very small pleural effusion on the left. (04 Feb 2019 10:32)    Patient underwent     PAST MEDICAL & SURGICAL HISTORY:  Osteoarthritis of left knee  Lymphoma: NHL, treated with chemo @ [facility] [place], in remission since 2016  Hypercholesteremia  Bipolar depression  OCD (obsessive compulsive disorder)  Depression  Osteoarthritis of right knee  S/P TKR (total knee replacement), bilateral: discharged nov 5th, 2014  S/P cataract surgery  H/O oral surgery: 2014  S/P knee surgery: 1978    Allergies    honeydew melon (Other)  penicillin (Other; Hives)    Intolerances      Home Medications:  acetaminophen 325 mg oral tablet: 3 tab(s) orally every 8 hours, As Needed for mild pain (01 Mar 2019 16:16)  ALPRAZolam 1 mg oral tablet: 1 tab(s) orally 2 times a day (01 Mar 2019 16:16)  buPROPion 150 mg/12 hours (SR) oral tablet, extended release: 1 tab(s) orally once a day (as described in [facility] records) (01 Mar 2019 16:16)  docusate sodium 100 mg oral capsule: 1 cap(s) orally 3 times a day (01 Mar 2019 16:16)  enoxaparin: 40 milligram(s) subcutaneous once a day x 28 days for DVT prophylaxis (01 Mar 2019 16:16)  gabapentin 600 mg oral tablet: 1 tab(s) orally once a day (01 Mar 2019 16:16)  gabapentin 600 mg oral tablet: 2 tab(s) orally once a day (at bedtime) (01 Mar 2019 16:16)  HYDROmorphone 2 mg oral tablet: 1 tab(s) orally every 4 hours, As Needed (01 Mar 2019 16:16)  levothyroxine 75 mcg (0.075 mg)/mL oral solution: 1 tab(s) orally once a day x 5 days a week (Mon-Fri)  (01 Mar 2019 16:16)  polyethylene glycol 3350 oral powder for reconstitution: 17 gram(s) orally once a day, As needed, Constipation (01 Mar 2019 16:16)  senna oral tablet: 2 tab(s) orally once a day (at bedtime) (01 Mar 2019 16:16)  ziprasidone 40 mg oral capsule: 1 tab(s) orally once a day (at bedtime) (01 Mar 2019 16:16)    MEDICATIONS  (STANDING):  buPROPion XL . 300 milliGRAM(s) Oral daily  chlorhexidine 4% Liquid 1 Application(s) Topical <User Schedule>  docusate sodium 100 milliGRAM(s) Oral three times a day  dronabinol 2.5 milliGRAM(s) Oral daily  heparin  Injectable 5000 Unit(s) SubCutaneous every 8 hours  influenza   Vaccine 0.5 milliLiter(s) IntraMuscular once  levothyroxine 100 MICROGram(s) Oral daily  midodrine 5 milliGRAM(s) Oral three times a day  senna 2 Tablet(s) Oral at bedtime  silver sulfADIAZINE 1% Cream 1 Application(s) Topical daily  vancomycin    Solution 125 milliGRAM(s) Oral every 6 hours  ziprasidone 40 milliGRAM(s) Oral <User Schedule>      SOCIAL HISTORY:  FAMILY HISTORY:  Family history of diabetes mellitus  Family history of prostate cancer (Grandparent)  Family history of stomach cancer (Grandparent)  Family history of breast cancer  Family history of COPD (chronic obstructive pulmonary disease)      ___________________________________________  OBJECTIVE:  Vital Signs Last 24 Hrs  T(C): 36.7 (05 Mar 2019 03:54), Max: 37 (04 Mar 2019 12:05)  T(F): 98.1 (05 Mar 2019 03:54), Max: 98.6 (04 Mar 2019 12:05)  HR: 94 (05 Mar 2019 03:54) (94 - 99)  BP: 99/63 (05 Mar 2019 03:54) (99/63 - 116/75)  BP(mean): --  RR: 18 (05 Mar 2019 03:54) (18 - 18)  SpO2: 97% (05 Mar 2019 03:54) (97% - 98%)CAPILLARY BLOOD GLUCOSE        I&O's Detail    04 Mar 2019 07:01  -  05 Mar 2019 07:00  --------------------------------------------------------  IN:    Oral Fluid: 360 mL  Total IN: 360 mL    OUT:  Total OUT: 0 mL    Total NET: 360 mL        General: well developed, well nourished, NAD  Neuro: alert and oriented, no focal deficits, moves all extremities spontaneously  HEENT: NCAT, EOMI, anicteric, mucosa moist  Respiratory: airway patent, respirations unlabored  CVS: regular rate and rhythm  Abdomen: soft, nontender, nondistended  Extremities: no edema, sensation and movement grossly intact  Skin: warm, dry, appropriate color  ____________________________________________  LABS:  CBC Full  -  ( 05 Mar 2019 08:13 )  WBC Count : 9.49 K/uL  Hemoglobin : 8.9 g/dL  Hematocrit : 28.0 %  Platelet Count - Automated : 139 K/uL  Mean Cell Volume : 100.0 fl  Mean Cell Hemoglobin : 31.8 pg  Mean Cell Hemoglobin Concentration : 31.8 gm/dL  Auto Neutrophil # : x  Auto Lymphocyte # : x  Auto Monocyte # : x  Auto Eosinophil # : x  Auto Basophil # : x  Auto Neutrophil % : x  Auto Lymphocyte % : x  Auto Monocyte % : x  Auto Eosinophil % : x  Auto Basophil % : x    03-05    133<L>  |  95<L>  |  15  ----------------------------<  115<H>  4.6   |  27  |  0.47<L>    Ca    8.1<L>      05 Mar 2019 06:09    TPro  4.5<L>  /  Alb  2.1<L>  /  TBili  0.3  /  DBili  x   /  AST  41<H>  /  ALT  37  /  AlkPhos  196<H>  03-05    LIVER FUNCTIONS - ( 05 Mar 2019 06:09 )  Alb: 2.1 g/dL / Pro: 4.5 g/dL / ALK PHOS: 196 U/L / ALT: 37 U/L / AST: 41 U/L / GGT: x                     ____________________________________________  MICRO:  RECENT CULTURES:    ____________________________________________  RADIOLOGY: Plastic Surgery Consult Note  (pg [facility]: [id number], [facility]: [phone])    HPI:  68 year old female with a PMHx of bipolar depression, Hyperlipidemia, NHL (in remission), OCD, dementia, with recent hospitalization (1/20-1/26) for right hip hemiarthroplasty for right femoral neck fracture after fall. Hospital course complicated by ESBL E. Coli UTI on Ertapenem (1/23-1/29). Patient found down in rehab, reported as possibly rolling out of bed. Found to have right hip fracture, repaired by ortho but patient had a prolonged SICU course and currently is malnourished. Consulted for dehisence of right hip incision.     Patient underwent     PAST MEDICAL & SURGICAL HISTORY:  Osteoarthritis of left knee  Lymphoma: NHL, treated with chemo @ [facility] [place], in remission since 2016  Hypercholesteremia  Bipolar depression  OCD (obsessive compulsive disorder)  Depression  Osteoarthritis of right knee  S/P TKR (total knee replacement), bilateral: discharged nov 5th, 2014  S/P cataract surgery  H/O oral surgery: 2014  S/P knee surgery: 1978    Allergies    honeydew melon (Other)  penicillin (Other; Hives)    Intolerances      Home Medications:  acetaminophen 325 mg oral tablet: 3 tab(s) orally every 8 hours, As Needed for mild pain (01 Mar 2019 16:16)  ALPRAZolam 1 mg oral tablet: 1 tab(s) orally 2 times a day (01 Mar 2019 16:16)  buPROPion 150 mg/12 hours (SR) oral tablet, extended release: 1 tab(s) orally once a day (as described in [facility]) (01 Mar 2019 16:16)  docusate sodium 100 mg oral capsule: 1 cap(s) orally 3 times a day (01 Mar 2019 16:16)  enoxaparin: 40 milligram(s) subcutaneous once a day x 28 days for DVT prophylaxis (01 Mar 2019 16:16)  gabapentin 600 mg oral tablet: 1 tab(s) orally once a day (01 Mar 2019 16:16)  gabapentin 600 mg oral tablet: 2 tab(s) orally once a day (at bedtime) (01 Mar 2019 16:16)  HYDROmorphone 2 mg oral tablet: 1 tab(s) orally every 4 hours, As Needed (01 Mar 2019 16:16)  levothyroxine 75 mcg (0.075 mg)/mL oral solution: 1 tab(s) orally once a day x 5 days a week (Mon-Fri)  (01 Mar 2019 16:16)  polyethylene glycol 3350 oral powder for reconstitution: 17 gram(s) orally once a day, As needed, Constipation (01 Mar 2019 16:16)  senna oral tablet: 2 tab(s) orally once a day (at bedtime) (01 Mar 2019 16:16)  ziprasidone 40 mg oral capsule: 1 tab(s) orally once a day (at bedtime) (01 Mar 2019 16:16)    MEDICATIONS  (STANDING):  buPROPion XL . 300 milliGRAM(s) Oral daily  chlorhexidine 4% Liquid 1 Application(s) Topical <User Schedule>  docusate sodium 100 milliGRAM(s) Oral three times a day  dronabinol 2.5 milliGRAM(s) Oral daily  heparin  Injectable 5000 Unit(s) SubCutaneous every 8 hours  influenza   Vaccine 0.5 milliLiter(s) IntraMuscular once  levothyroxine 100 MICROGram(s) Oral daily  midodrine 5 milliGRAM(s) Oral three times a day  senna 2 Tablet(s) Oral at bedtime  silver sulfADIAZINE 1% Cream 1 Application(s) Topical daily  vancomycin    Solution 125 milliGRAM(s) Oral every 6 hours  ziprasidone 40 milliGRAM(s) Oral <User Schedule>      SOCIAL HISTORY:  FAMILY HISTORY:  Family history of diabetes mellitus  Family history of prostate cancer (Grandparent)  Family history of stomach cancer (Grandparent)  Family history of breast cancer  Family history of COPD (chronic obstructive pulmonary disease)      ___________________________________________  OBJECTIVE:  Vital Signs Last 24 Hrs  T(C): 36.7 (05 Mar 2019 03:54), Max: 37 (04 Mar 2019 12:05)  T(F): 98.1 (05 Mar 2019 03:54), Max: 98.6 (04 Mar 2019 12:05)  HR: 94 (05 Mar 2019 03:54) (94 - 99)  BP: 99/63 (05 Mar 2019 03:54) (99/63 - 116/75)  BP(mean): --  RR: 18 (05 Mar 2019 03:54) (18 - 18)  SpO2: 97% (05 Mar 2019 03:54) (97% - 98%)CAPILLARY BLOOD GLUCOSE        I&O's Detail    04 Mar 2019 07:01  -  05 Mar 2019 07:00  --------------------------------------------------------  IN:    Oral Fluid: 360 mL  Total IN: 360 mL    OUT:  Total OUT: 0 mL    Total NET: 360 mL        General: NAD, up in chair  Neuro: alert and oriented  Extremities: Right hip incision with nylon sutures, edematous with wound dehiscence at proximal portion, no purulent drainage     ____________________________________________  LABS:  CBC Full  -  ( 05 Mar 2019 08:13 )  WBC Count : 9.49 K/uL  Hemoglobin : 8.9 g/dL  Hematocrit : 28.0 %  Platelet Count - Automated : 139 K/uL  Mean Cell Volume : 100.0 fl  Mean Cell Hemoglobin : 31.8 pg  Mean Cell Hemoglobin Concentration : 31.8 gm/dL  Auto Neutrophil # : x  Auto Lymphocyte # : x  Auto Monocyte # : x  Auto Eosinophil # : x  Auto Basophil # : x  Auto Neutrophil % : x  Auto Lymphocyte % : x  Auto Monocyte % : x  Auto Eosinophil % : x  Auto Basophil % : x    03-05    133<L>  |  95<L>  |  15  ----------------------------<  115<H>  4.6   |  27  |  0.47<L>    Ca    8.1<L>      05 Mar 2019 06:09    TPro  4.5<L>  /  Alb  2.1<L>  /  TBili  0.3  /  DBili  x   /  AST  41<H>  /  ALT  37  /  AlkPhos  196<H>  03-05    LIVER FUNCTIONS - ( 05 Mar 2019 06:09 )  Alb: 2.1 g/dL / Pro: 4.5 g/dL / ALK PHOS: 196 U/L / ALT: 37 U/L / AST: 41 U/L / GGT: x                     ____________________________________________  MICRO:  RECENT CULTURES:    ____________________________________________  RADIOLOGY: Plastic Surgery Consult Note  (pg [facility]: [id number], [facility]: [phone])    HPI:  68 year old female with a PMHx of bipolar depression, Hyperlipidemia, NHL (in remission), OCD, dementia, with recent hospitalization (1/20-1/26) for right hip hemiarthroplasty for right femoral neck fracture after fall. Hospital course complicated by ESBL E. Coli UTI on Ertapenem (1/23-1/29). Patient found down in rehab, reported as possibly rolling out of bed. Found to have right hip fracture, repaired by ortho but patient had a prolonged SICU course and currently is malnourished. Consulted for dehisence of right hip incision.     Patient underwent     PAST MEDICAL & SURGICAL HISTORY:  Osteoarthritis of left knee  Lymphoma: NHL, treated with chemo @ [facility] [place], in remission since 2016  Hypercholesteremia  Bipolar depression  OCD (obsessive compulsive disorder)  Depression  Osteoarthritis of right knee  S/P TKR (total knee replacement), bilateral: discharged nov 5th, 2014  S/P cataract surgery  H/O oral surgery: 2014  S/P knee surgery: 1978    Allergies    honeydew melon (Other)  penicillin (Other; Hives)    Intolerances      Home Medications:  acetaminophen 325 mg oral tablet: 3 tab(s) orally every 8 hours, As Needed for mild pain (01 Mar 2019 16:16)  ALPRAZolam 1 mg oral tablet: 1 tab(s) orally 2 times a day (01 Mar 2019 16:16)  buPROPion 150 mg/12 hours (SR) oral tablet, extended release: 1 tab(s) orally once a day (as described in [facility]) (01 Mar 2019 16:16)  docusate sodium 100 mg oral capsule: 1 cap(s) orally 3 times a day (01 Mar 2019 16:16)  enoxaparin: 40 milligram(s) subcutaneous once a day x 28 days for DVT prophylaxis (01 Mar 2019 16:16)  gabapentin 600 mg oral tablet: 1 tab(s) orally once a day (01 Mar 2019 16:16)  gabapentin 600 mg oral tablet: 2 tab(s) orally once a day (at bedtime) (01 Mar 2019 16:16)  HYDROmorphone 2 mg oral tablet: 1 tab(s) orally every 4 hours, As Needed (01 Mar 2019 16:16)  levothyroxine 75 mcg (0.075 mg)/mL oral solution: 1 tab(s) orally once a day x 5 days a week (Mon-Fri)  (01 Mar 2019 16:16)  polyethylene glycol 3350 oral powder for reconstitution: 17 gram(s) orally once a day, As needed, Constipation (01 Mar 2019 16:16)  senna oral tablet: 2 tab(s) orally once a day (at bedtime) (01 Mar 2019 16:16)  ziprasidone 40 mg oral capsule: 1 tab(s) orally once a day (at bedtime) (01 Mar 2019 16:16)    MEDICATIONS  (STANDING):  buPROPion XL . 300 milliGRAM(s) Oral daily  chlorhexidine 4% Liquid 1 Application(s) Topical <User Schedule>  docusate sodium 100 milliGRAM(s) Oral three times a day  dronabinol 2.5 milliGRAM(s) Oral daily  heparin  Injectable 5000 Unit(s) SubCutaneous every 8 hours  influenza   Vaccine 0.5 milliLiter(s) IntraMuscular once  levothyroxine 100 MICROGram(s) Oral daily  midodrine 5 milliGRAM(s) Oral three times a day  senna 2 Tablet(s) Oral at bedtime  silver sulfADIAZINE 1% Cream 1 Application(s) Topical daily  vancomycin    Solution 125 milliGRAM(s) Oral every 6 hours  ziprasidone 40 milliGRAM(s) Oral <User Schedule>      SOCIAL HISTORY:  FAMILY HISTORY:  Family history of diabetes mellitus  Family history of prostate cancer (Grandparent)  Family history of stomach cancer (Grandparent)  Family history of breast cancer  Family history of COPD (chronic obstructive pulmonary disease)      ___________________________________________  OBJECTIVE:  Vital Signs Last 24 Hrs  T(C): 36.7 (05 Mar 2019 03:54), Max: 37 (04 Mar 2019 12:05)  T(F): 98.1 (05 Mar 2019 03:54), Max: 98.6 (04 Mar 2019 12:05)  HR: 94 (05 Mar 2019 03:54) (94 - 99)  BP: 99/63 (05 Mar 2019 03:54) (99/63 - 116/75)  BP(mean): --  RR: 18 (05 Mar 2019 03:54) (18 - 18)  SpO2: 97% (05 Mar 2019 03:54) (97% - 98%)CAPILLARY BLOOD GLUCOSE        I&O's Detail    04 Mar 2019 07:01  -  05 Mar 2019 07:00  --------------------------------------------------------  IN:    Oral Fluid: 360 mL  Total IN: 360 mL    OUT:  Total OUT: 0 mL    Total NET: 360 mL        General: NAD, up in chair  Neuro: alert and oriented  Extremities: Right hip incision with nylon sutures, edematous with wound dehiscence at proximal portion, no purulent drainage, fibrinous exudate and serous drainage from wound    ____________________________________________  LABS:  CBC Full  -  ( 05 Mar 2019 08:13 )  WBC Count : 9.49 K/uL  Hemoglobin : 8.9 g/dL  Hematocrit : 28.0 %  Platelet Count - Automated : 139 K/uL  Mean Cell Volume : 100.0 fl  Mean Cell Hemoglobin : 31.8 pg  Mean Cell Hemoglobin Concentration : 31.8 gm/dL  Auto Neutrophil # : x  Auto Lymphocyte # : x  Auto Monocyte # : x  Auto Eosinophil # : x  Auto Basophil # : x  Auto Neutrophil % : x  Auto Lymphocyte % : x  Auto Monocyte % : x  Auto Eosinophil % : x  Auto Basophil % : x    03-05    133<L>  |  95<L>  |  15  ----------------------------<  115<H>  4.6   |  27  |  0.47<L>    Ca    8.1<L>      05 Mar 2019 06:09    TPro  4.5<L>  /  Alb  2.1<L>  /  TBili  0.3  /  DBili  x   /  AST  41<H>  /  ALT  37  /  AlkPhos  196<H>  03-05    LIVER FUNCTIONS - ( 05 Mar 2019 06:09 )  Alb: 2.1 g/dL / Pro: 4.5 g/dL / ALK PHOS: 196 U/L / ALT: 37 U/L / AST: 41 U/L / GGT: x                     ____________________________________________  MICRO:  RECENT CULTURES:    ____________________________________________  RADIOLOGY:

## 2019-03-05 NOTE — PROGRESS NOTE ADULT - ASSESSMENT
Assessment/Plan:  68y Female s/p revision R hip hemiarthroplasty POD 11  - Pain control  - NWB RLE OOBTC, PWB for transfers only  - Posterior dislocation precautions  - PT/OT  - DVT ppx  - FU nutrition consult, need nutrition optimized albumin >2.5 as this contributing to fluid retention Assessment/Plan:  68y Female s/p revision R hip hemiarthroplasty POD 11  - Pain control  - NWB RLE OOBTC, PWB for transfers only  - Posterior dislocation precautions  - PT/OT  - DVT ppx  - FU nutrition consult, need nutrition optimized albumin >2.5 as this contributing to fluid retention  - will consult plastic surgery for wound evaluation and recommendations

## 2019-03-05 NOTE — CONSULT NOTE ADULT - ASSESSMENT
ASSESSMENT: 68F s/p fall and right hip fx s/p revision R hip hemiarthroplasty POD 11 c/b wound dehiscence     PLAN:  - Agree w/ plan to optimize nutrition  -- f/u pre-albumin and albumin with AM labs  - c/w Aquacel surgical dressing over incision today    - Care and activity per primary     Plastic Surgery (pg [facility]: [id number], NS: [phone]) ASSESSMENT: 68F s/p fall and right hip fx s/p revision R hip hemiarthroplasty POD 11 c/b wound dehiscence     PLAN:  - Agree w/ plan to optimize nutrition  -- f/u pre-albumin and albumin with AM labs  - c/w Aquacel surgical dressing over incision today  -- Will re-evaluate with attending Dr. Galan tomorrow for possible vac placement   - Care and activity per primary     Plastic Surgery (pg [facility]: [id number], NS: [phone])

## 2019-03-05 NOTE — PROGRESS NOTE ADULT - SUBJECTIVE AND OBJECTIVE BOX
Pt S/E at bedside, no acute events overnight, pain controlled    ICU Vital Signs Last 24 Hrs  T(C): 36.7 (05 Mar 2019 03:54), Max: 37 (04 Mar 2019 12:05)  T(F): 98.1 (05 Mar 2019 03:54), Max: 98.6 (04 Mar 2019 12:05)  HR: 94 (05 Mar 2019 03:54) (94 - 99)  BP: 99/63 (05 Mar 2019 03:54) (99/63 - 116/75)  BP(mean): --  ABP: --  ABP(mean): --  RR: 18 (05 Mar 2019 03:54) (18 - 18)  SpO2: 97% (05 Mar 2019 03:54) (97% - 98%)    Gen: NAD,  Right Lower Extremity:  Dressing removed, incision healing, proximal end of the incision with fibrinous exudate, small amount of dry bloody drainage, no active drainage, new dressing applied  +EHL/FHL/TA/GS  SILT L3-S1  +DP/PT Pulses  Compartments soft  No calf TTP B/L Pt S/E at bedside, no acute events overnight, pain controlled    ICU Vital Signs Last 24 Hrs  T(C): 36.7 (05 Mar 2019 03:54), Max: 37 (04 Mar 2019 12:05)  T(F): 98.1 (05 Mar 2019 03:54), Max: 98.6 (04 Mar 2019 12:05)  HR: 94 (05 Mar 2019 03:54) (94 - 99)  BP: 99/63 (05 Mar 2019 03:54) (99/63 - 116/75)  BP(mean): --  ABP: --  ABP(mean): --  RR: 18 (05 Mar 2019 03:54) (18 - 18)  SpO2: 97% (05 Mar 2019 03:54) (97% - 98%)    Gen: NAD,  Right Lower Extremity:  Dressing removed, distal incision well healing, proximal end of the incision with fibrinous exudate and delayed wound healing, small amount of dry bloody drainage, no active drainage, new dressing applied  +EHL/FHL/TA/GS  SILT L3-S1  +DP/PT Pulses  Compartments soft  No calf TTP B/L

## 2019-03-06 DIAGNOSIS — E87.1 HYPO-OSMOLALITY AND HYPONATREMIA: ICD-10-CM

## 2019-03-06 DIAGNOSIS — T81.30XA DISRUPTION OF WOUND, UNSPECIFIED, INITIAL ENCOUNTER: ICD-10-CM

## 2019-03-06 LAB
CREAT ?TM UR-MCNC: 28 MG/DL — SIGNIFICANT CHANGE UP
HCT VFR BLD CALC: 26.1 % — LOW (ref 34.5–45)
HGB BLD-MCNC: 8 G/DL — LOW (ref 11.5–15.5)
MCHC RBC-ENTMCNC: 30.7 GM/DL — LOW (ref 32–36)
MCHC RBC-ENTMCNC: 31 PG — SIGNIFICANT CHANGE UP (ref 27–34)
MCV RBC AUTO: 101.2 FL — HIGH (ref 80–100)
OSMOLALITY UR: 405 MOS/KG — SIGNIFICANT CHANGE UP (ref 50–1200)
PLATELET # BLD AUTO: 151 K/UL — SIGNIFICANT CHANGE UP (ref 150–400)
POTASSIUM UR-SCNC: 36 MMOL/L — SIGNIFICANT CHANGE UP
PREALB SERPL-MCNC: 9 MG/DL — LOW (ref 20–40)
PROT ?TM UR-MCNC: 24 MG/DL — HIGH (ref 0–12)
RBC # BLD: 2.58 M/UL — LOW (ref 3.8–5.2)
RBC # FLD: 20.7 % — HIGH (ref 10.3–14.5)
SODIUM UR-SCNC: 87 MMOL/L — SIGNIFICANT CHANGE UP
URATE UR-MCNC: 34.2 MG/DL — SIGNIFICANT CHANGE UP
UUN UR-MCNC: 470 MG/DL — SIGNIFICANT CHANGE UP
WBC # BLD: 9.02 K/UL — SIGNIFICANT CHANGE UP (ref 3.8–10.5)
WBC # FLD AUTO: 9.02 K/UL — SIGNIFICANT CHANGE UP (ref 3.8–10.5)

## 2019-03-06 PROCEDURE — 99232 SBSQ HOSP IP/OBS MODERATE 35: CPT

## 2019-03-06 RX ADMIN — Medication 100 MICROGRAM(S): at 05:42

## 2019-03-06 RX ADMIN — ZIPRASIDONE HYDROCHLORIDE 40 MILLIGRAM(S): 20 CAPSULE ORAL at 08:52

## 2019-03-06 RX ADMIN — HEPARIN SODIUM 5000 UNIT(S): 5000 INJECTION INTRAVENOUS; SUBCUTANEOUS at 21:43

## 2019-03-06 RX ADMIN — MIDODRINE HYDROCHLORIDE 5 MILLIGRAM(S): 2.5 TABLET ORAL at 05:42

## 2019-03-06 RX ADMIN — MIDODRINE HYDROCHLORIDE 5 MILLIGRAM(S): 2.5 TABLET ORAL at 13:09

## 2019-03-06 RX ADMIN — Medication 2.5 MILLIGRAM(S): at 13:10

## 2019-03-06 RX ADMIN — Medication 125 MILLIGRAM(S): at 23:44

## 2019-03-06 RX ADMIN — Medication 125 MILLIGRAM(S): at 00:50

## 2019-03-06 RX ADMIN — CHLORHEXIDINE GLUCONATE 1 APPLICATION(S): 213 SOLUTION TOPICAL at 08:53

## 2019-03-06 RX ADMIN — Medication 125 MILLIGRAM(S): at 17:52

## 2019-03-06 RX ADMIN — Medication 125 MILLIGRAM(S): at 05:41

## 2019-03-06 RX ADMIN — Medication 1 APPLICATION(S): at 13:10

## 2019-03-06 RX ADMIN — HEPARIN SODIUM 5000 UNIT(S): 5000 INJECTION INTRAVENOUS; SUBCUTANEOUS at 05:42

## 2019-03-06 RX ADMIN — MIDODRINE HYDROCHLORIDE 5 MILLIGRAM(S): 2.5 TABLET ORAL at 21:42

## 2019-03-06 RX ADMIN — Medication 125 MILLIGRAM(S): at 13:10

## 2019-03-06 RX ADMIN — BUPROPION HYDROCHLORIDE 300 MILLIGRAM(S): 150 TABLET, EXTENDED RELEASE ORAL at 13:09

## 2019-03-06 RX ADMIN — HEPARIN SODIUM 5000 UNIT(S): 5000 INJECTION INTRAVENOUS; SUBCUTANEOUS at 13:10

## 2019-03-06 NOTE — PROGRESS NOTE ADULT - SUBJECTIVE AND OBJECTIVE BOX
69 y/o critically ill  female h/o dementia NHL presenting after unwitnessed fall. Pt found on the ground at rehab. is on lovenox after hip fracture. pt reports pain to her right leg. per EMS en route slurring speech and mildly hypotensive requesting trauma.unclear how long pt on the ground. Patient found to have C diff with worsening hypotension requiring pressors on and off and  Patient requiring the SICU for continued cardiopulmonary monitoring. Orthopedic procedure held due to  hypotension and pan colitis and need for pressors due to unstable hemodynamics. Diarrhea slowing off IV Flagyl + on oral vancomycin. No further C.diff colitis and no further hypovolemic hypotension. Patient s/p Open reduction and internal fixation of periprosthetic hip fx Mental status has greatly improved.  Patient seen in SICU prior to transfer to Medical service and regular floor  Patient  alert awake and able to speak but confused . Patient less agitated than yesterday .  On N/G alimentation for hypoproteinemic edema. Blood pressure being supported with midodrine and no diuretics are being given. Patient feels better after moving her bowels. Not agitated and cooperative on Geodan and wellbutrin.  The patient was seen by nutrition and their plan is to be followed. Should her hematocrit continued to drift lower, she will receive 1 unit of packed cells with IV Lasix to correct hypovolemic, hypoproteinemic edema with hypotension on Midodrine. No transfusion presently required with rise in Hct.  The patient is to be out of bed to chair and resume physical therapy, as per orthopedics, after right periprosthetic fracture ORIF on February 22, 2019.  Patient with wound dehiscence seen by plastics for possible VAC placement.      MEDICATIONS  (STANDING):  buPROPion XL . 300 milliGRAM(s) Oral daily  chlorhexidine 4% Liquid 1 Application(s) Topical <User Schedule>  docusate sodium 100 milliGRAM(s) Oral three times a day  dronabinol 2.5 milliGRAM(s) Oral daily  heparin  Injectable 5000 Unit(s) SubCutaneous every 8 hours  influenza   Vaccine 0.5 milliLiter(s) IntraMuscular once  levothyroxine 100 MICROGram(s) Oral daily  midodrine 5 milliGRAM(s) Oral three times a day  senna 2 Tablet(s) Oral at bedtime  silver sulfADIAZINE 1% Cream 1 Application(s) Topical daily  vancomycin    Solution 125 milliGRAM(s) Oral every 6 hours  ziprasidone 40 milliGRAM(s) Oral <User Schedule>    MEDICATIONS  (PRN):  acetaminophen   Tablet .. 975 milliGRAM(s) Oral every 6 hours PRN Mild Pain (1 - 3)  oxyCODONE    IR 5 milliGRAM(s) Oral every 4 hours PRN Moderate Pain (4 - 6)          VITALS:   T(C): 36.9 (03-06-19 @ 19:20), Max: 36.9 (03-06-19 @ 19:20)  HR: 96 (03-06-19 @ 19:20) (95 - 97)  BP: 104/67 (03-06-19 @ 19:20) (96/57 - 105/69)  RR: 18 (03-06-19 @ 19:20) (18 - 18)  SpO2: 100% (03-06-19 @ 19:20) (100% - 100%)  Wt(kg): --    PHYSICAL EXAM:  GENERAL: NAD, well-groomed, well-developed  HEAD:  Atraumatic, Normocephalic  EYES: EOMI, PERRLA, conjunctiva and sclera clear  ENMT: No tonsillar erythema, exudates, or enlargement; Moist mucous membranes, NGT in place  NECK: Supple, No JVD, Normal thyroid  NERVOUS SYSTEM:  confused becoming aphasic  CHEST/LUNG: Clear to percussion bilaterally; No rales, rhonchi, wheezing, or rubs  HEART: Regular rate and rhythm; No murmurs, rubs, or gallops  ABDOMEN: Soft, Nontender, Nondistended; Bowel sounds present  Periprosthetic right femur fracture repair with localized swelling, and wound dehiscences  B/L LE edema improving   LYMPH: No lymphadenopathy noted  SKIN: No rashes or lesions    LABS:        CBC Full  -  ( 06 Mar 2019 10:06 )  WBC Count : 9.02 K/uL  Hemoglobin : 8.0 g/dL  Hematocrit : 26.1 %  Platelet Count - Automated : 151 K/uL  Mean Cell Volume : 101.2 fl  Mean Cell Hemoglobin : 31.0 pg  Mean Cell Hemoglobin Concentration : 30.7 gm/dL  Auto Neutrophil # : x  Auto Lymphocyte # : x  Auto Monocyte # : x  Auto Eosinophil # : x  Auto Basophil # : x  Auto Neutrophil % : x  Auto Lymphocyte % : x  Auto Monocyte % : x  Auto Eosinophil % : x  Auto Basophil % : x    03-05    131<L>  |  95<L>  |  17  ----------------------------<  121<H>  4.9   |  26  |  0.50    Ca    8.4      05 Mar 2019 14:21    TPro  4.8<L>  /  Alb  2.2<L>  /  TBili  0.6  /  DBili  x   /  AST  49<H>  /  ALT  42  /  AlkPhos  202<H>  03-05    LIVER FUNCTIONS - ( 05 Mar 2019 14:21 )  Alb: 2.2 g/dL / Pro: 4.8 g/dL / ALK PHOS: 202 U/L / ALT: 42 U/L / AST: 49 U/L / GGT: x               CAPILLARY BLOOD GLUCOSE          RADIOLOGY & ADDITIONAL TESTS:

## 2019-03-06 NOTE — PROGRESS NOTE ADULT - SUBJECTIVE AND OBJECTIVE BOX
WILI PARKER 68y [id number]    Patient is a 68y old  Female who presents with a chief complaint of C diff (04 Mar 2019 16:03)      Follow Up/CC:  ID following for cdiff    Interval History/ROS: no fever, denies diarrhea    Allergies    honeydew melon (Other)  penicillin (Other; Hives)    Intolerances        ANTIMICROBIALS:  vancomycin    Solution 125 every 6 hours      MEDICATIONS  (STANDING):  buPROPion XL . 300 milliGRAM(s) Oral daily  chlorhexidine 4% Liquid 1 Application(s) Topical <User Schedule>  docusate sodium 100 milliGRAM(s) Oral three times a day  dronabinol 2.5 milliGRAM(s) Oral daily  heparin  Injectable 5000 Unit(s) SubCutaneous every 8 hours  influenza   Vaccine 0.5 milliLiter(s) IntraMuscular once  levothyroxine 100 MICROGram(s) Oral daily  midodrine 5 milliGRAM(s) Oral three times a day  senna 2 Tablet(s) Oral at bedtime  silver sulfADIAZINE 1% Cream 1 Application(s) Topical daily  vancomycin    Solution 125 milliGRAM(s) Oral every 6 hours  ziprasidone 40 milliGRAM(s) Oral <User Schedule>    MEDICATIONS  (PRN):  acetaminophen   Tablet .. 975 milliGRAM(s) Oral every 6 hours PRN Mild Pain (1 - 3)  oxyCODONE    IR 5 milliGRAM(s) Oral every 4 hours PRN Moderate Pain (4 - 6)        Vital Signs Last 24 Hrs  T(C): 36.6 (06 Mar 2019 12:12), Max: 36.6 (06 Mar 2019 05:06)  T(F): 97.8 (06 Mar 2019 12:12), Max: 97.8 (06 Mar 2019 05:06)  HR: 95 (06 Mar 2019 12:12) (95 - 97)  BP: 105/69 (06 Mar 2019 12:12) (96/57 - 120/68)  BP(mean): --  RR: 18 (06 Mar 2019 12:12) (17 - 18)  SpO2: 100% (06 Mar 2019 12:12) (98% - 100%)    CBC Full  -  ( 06 Mar 2019 10:06 )  WBC Count : 9.02 K/uL  Hemoglobin : 8.0 g/dL  Hematocrit : 26.1 %  Platelet Count - Automated : 151 K/uL  Mean Cell Volume : 101.2 fl  Mean Cell Hemoglobin : 31.0 pg  Mean Cell Hemoglobin Concentration : 30.7 gm/dL  Auto Neutrophil # : x  Auto Lymphocyte # : x  Auto Monocyte # : x  Auto Eosinophil # : x  Auto Basophil # : x  Auto Neutrophil % : x  Auto Lymphocyte % : x  Auto Monocyte % : x  Auto Eosinophil % : x  Auto Basophil % : x    03-05    131<L>  |  95<L>  |  17  ----------------------------<  121<H>  4.9   |  26  |  0.50    Ca    8.4      05 Mar 2019 14:21    TPro  4.8<L>  /  Alb  2.2<L>  /  TBili  0.6  /  DBili  x   /  AST  49<H>  /  ALT  42  /  AlkPhos  202<H>  03-05    LIVER FUNCTIONS - ( 05 Mar 2019 14:21 )  Alb: 2.2 g/dL / Pro: 4.8 g/dL / ALK PHOS: 202 U/L / ALT: 42 U/L / AST: 49 U/L / GGT: x               MICROBIOLOGY:  .Body Fluid Synovial Fluid  02-23-19   No growth at 1 week.  --    No polymorphonuclear cells seen  No organisms seen  by cytocentrifuge      .Tissue Other, right hip synovium  02-23-19   No growth at 1 week.  --    Rare polymorphonuclear leukocytes seen per low power field  No organisms seen per oil power field      .Tissue Other, right femur  02-23-19   No growth at 5 days  --    Rare polymorphonuclear leukocytes seen per low power field  No organisms seen per oil power field      .Body Fluid  02-06-19   No growth at 14 days.  --    No polymorphonuclear cells seen per low power field  No organisms seen      .Blood Blood-Peripheral  02-06-19   No growth at 5 days.  --  --              v            RADIOLOGY

## 2019-03-06 NOTE — PROGRESS NOTE ADULT - ATTENDING COMMENTS
Pierre Lomeli  Attending Physician   Division of Infectious Disease  Pager #[phone]  After 5pm/weekend or no response, call #[phone]

## 2019-03-06 NOTE — PROGRESS NOTE ADULT - ASSESSMENT
67 yo woman with a hx of dementia present after recent Hip surgery present after a fall with a femur fx. Patient was found to be cdiff positive. s/p revision of hip. seen now in SICU Stable for  transfer t;o a regular medicine floor.  This was discussed with patient and son. On N/G alimentation for hypoproteinemic edema. To proceed with nutrition as instructed. Blood pressure being supported with midodrine. Patient feels better after moving her bowels. Not agitated today and cooperative.

## 2019-03-06 NOTE — PROGRESS NOTE ADULT - SUBJECTIVE AND OBJECTIVE BOX
Pt S/E at bedside, no acute events overnight, pain controlled    ICU Vital Signs Last 24 Hrs  T(C): 36.6 (06 Mar 2019 05:06), Max: 36.6 (05 Mar 2019 12:20)  T(F): 97.8 (06 Mar 2019 05:06), Max: 97.8 (05 Mar 2019 12:20)  HR: 97 (06 Mar 2019 05:06) (95 - 97)  BP: 96/57 (06 Mar 2019 05:06) (96/57 - 120/68)  BP(mean): --  ABP: --  ABP(mean): --  RR: 18 (06 Mar 2019 05:06) (17 - 18)  SpO2: 100% (06 Mar 2019 05:06) (97% - 100%)      Gen: NAD  Right Lower Extremity:  aquacel dressing in place  +EHL/FHL/TA/GS  SILT L3-S1  +DP/PT Pulses  Compartments soft  No calf TTP B/L

## 2019-03-06 NOTE — PROGRESS NOTE ADULT - ATTENDING COMMENTS
I am a non participating [employer] physician seeing Pt in coverage for Dr Barraza I am a non participating [facility] physician seeing Pt in coverage for Dr Barraza. The above patient examination was reviewed with Dr. Awan and I agree with his evaluation, assessment and treatment plan.  Orlin Barraza M.D.

## 2019-03-06 NOTE — PROGRESS NOTE ADULT - PROBLEM SELECTOR PLAN 5
continue NGT feed to improve hypoalbuminemia   edema improving  will eventually need to restart oral feeding or place a PEG to facilatate transfer to rehab

## 2019-03-06 NOTE — PROGRESS NOTE ADULT - ASSESSMENT
A/P: 68F s/p revision R hip hemiarthroplasty POD 12 now w/ significant wound breakdown/incisional dehiscence     - Will plan for bedside debridement today with attending and VAC placement   - optimize nutrition, FU prealbumin and albumin this am  - Pain control  - WB per Ortho  - FU nutrition consult, need nutrition optimized albumin >2.5 as this contributing to fluid retention    PRS: [phone]

## 2019-03-06 NOTE — PROGRESS NOTE ADULT - ATTENDING COMMENTS
I agree with the above note on this patient. All pertinent films have been reviewed. Please refer to clinical documentation of the history, physical examinations, data summary, and both assessment and plan as documented above and with which I agree.    appreciative for plastics assistance with local wound debridement and vac placement  pwb for txr to bedside chair    Conor George MD  Attending Orthopedic Surgeon

## 2019-03-06 NOTE — PROGRESS NOTE ADULT - SUBJECTIVE AND OBJECTIVE BOX
Plastic Surgery Progress Note (pg [facility]: [id number], [facility]: [phone])    SUBJECTIVE:  Doing well. No overnight events.     OBJECTIVE:     ** VITAL SIGNS / I&O's **    Vital Signs Last 24 Hrs  T(C): 36.6 (06 Mar 2019 05:06), Max: 36.6 (05 Mar 2019 12:20)  T(F): 97.8 (06 Mar 2019 05:06), Max: 97.8 (05 Mar 2019 12:20)  HR: 97 (06 Mar 2019 05:06) (95 - 97)  BP: 96/57 (06 Mar 2019 05:06) (96/57 - 120/68)  BP(mean): --  RR: 18 (06 Mar 2019 05:06) (17 - 18)  SpO2: 100% (06 Mar 2019 05:06) (97% - 100%)      05 Mar 2019 07:01  -  06 Mar 2019 07:00  --------------------------------------------------------  IN:    Oral Fluid: 240 mL  Total IN: 240 mL    OUT:  Total OUT: 0 mL    Total NET: 240 mL          ** PHYSICAL EXAM **    -- CONSTITUTIONAL: AOx3. NAD.   -- HEENT:  -- NECK:   -- CHEST:  -- ABDOMEN:   -- EXTREMITIES:       **MEDS**  acetaminophen   Tablet .. 975 milliGRAM(s) Oral every 6 hours PRN  buPROPion XL . 300 milliGRAM(s) Oral daily  chlorhexidine 4% Liquid 1 Application(s) Topical <User Schedule>  docusate sodium 100 milliGRAM(s) Oral three times a day  dronabinol 2.5 milliGRAM(s) Oral daily  heparin  Injectable 5000 Unit(s) SubCutaneous every 8 hours  influenza   Vaccine 0.5 milliLiter(s) IntraMuscular once  levothyroxine 100 MICROGram(s) Oral daily  midodrine 5 milliGRAM(s) Oral three times a day  oxyCODONE    IR 5 milliGRAM(s) Oral every 4 hours PRN  senna 2 Tablet(s) Oral at bedtime  silver sulfADIAZINE 1% Cream 1 Application(s) Topical daily  vancomycin    Solution 125 milliGRAM(s) Oral every 6 hours  ziprasidone 40 milliGRAM(s) Oral <User Schedule>      ** LABS **                          8.9    9.49  )-----------( 139      ( 05 Mar 2019 08:13 )             28.0     05 Mar 2019 14:21    131    |  95     |  17     ----------------------------<  121    4.9     |  26     |  0.50     Ca    8.4        05 Mar 2019 14:21    TPro  4.8    /  Alb  2.2    /  TBili  0.6    /  DBili  x      /  AST  49     /  ALT  42     /  AlkPhos  202    05 Mar 2019 14:21      CAPILLARY BLOOD GLUCOSE

## 2019-03-07 LAB
ALBUMIN SERPL ELPH-MCNC: 2 G/DL — LOW (ref 3.3–5)
ALP SERPL-CCNC: 170 U/L — HIGH (ref 40–120)
ALT FLD-CCNC: 36 U/L — SIGNIFICANT CHANGE UP (ref 10–45)
ANION GAP SERPL CALC-SCNC: 10 MMOL/L — SIGNIFICANT CHANGE UP (ref 5–17)
AST SERPL-CCNC: 34 U/L — SIGNIFICANT CHANGE UP (ref 10–40)
BILIRUB SERPL-MCNC: 0.4 MG/DL — SIGNIFICANT CHANGE UP (ref 0.2–1.2)
BUN SERPL-MCNC: 15 MG/DL — SIGNIFICANT CHANGE UP (ref 7–23)
CALCIUM SERPL-MCNC: 8.3 MG/DL — LOW (ref 8.4–10.5)
CHLORIDE SERPL-SCNC: 95 MMOL/L — LOW (ref 96–108)
CO2 SERPL-SCNC: 28 MMOL/L — SIGNIFICANT CHANGE UP (ref 22–31)
CREAT SERPL-MCNC: 0.48 MG/DL — LOW (ref 0.5–1.3)
GLUCOSE SERPL-MCNC: 99 MG/DL — SIGNIFICANT CHANGE UP (ref 70–99)
HCT VFR BLD CALC: 26.4 % — LOW (ref 34.5–45)
HGB BLD-MCNC: 8.4 G/DL — LOW (ref 11.5–15.5)
MCHC RBC-ENTMCNC: 31.8 GM/DL — LOW (ref 32–36)
MCHC RBC-ENTMCNC: 32.4 PG — SIGNIFICANT CHANGE UP (ref 27–34)
MCV RBC AUTO: 101.9 FL — HIGH (ref 80–100)
PLATELET # BLD AUTO: 169 K/UL — SIGNIFICANT CHANGE UP (ref 150–400)
POTASSIUM SERPL-MCNC: 4.1 MMOL/L — SIGNIFICANT CHANGE UP (ref 3.5–5.3)
POTASSIUM SERPL-SCNC: 4.1 MMOL/L — SIGNIFICANT CHANGE UP (ref 3.5–5.3)
PROT SERPL-MCNC: 4.2 G/DL — LOW (ref 6–8.3)
RBC # BLD: 2.59 M/UL — LOW (ref 3.8–5.2)
RBC # FLD: 20.8 % — HIGH (ref 10.3–14.5)
SODIUM SERPL-SCNC: 133 MMOL/L — LOW (ref 135–145)
WBC # BLD: 8.44 K/UL — SIGNIFICANT CHANGE UP (ref 3.8–10.5)
WBC # FLD AUTO: 8.44 K/UL — SIGNIFICANT CHANGE UP (ref 3.8–10.5)

## 2019-03-07 PROCEDURE — 99232 SBSQ HOSP IP/OBS MODERATE 35: CPT

## 2019-03-07 RX ADMIN — Medication 100 MICROGRAM(S): at 05:32

## 2019-03-07 RX ADMIN — Medication 1 APPLICATION(S): at 13:46

## 2019-03-07 RX ADMIN — Medication 125 MILLIGRAM(S): at 13:46

## 2019-03-07 RX ADMIN — MIDODRINE HYDROCHLORIDE 5 MILLIGRAM(S): 2.5 TABLET ORAL at 05:32

## 2019-03-07 RX ADMIN — Medication 125 MILLIGRAM(S): at 05:32

## 2019-03-07 RX ADMIN — MIDODRINE HYDROCHLORIDE 5 MILLIGRAM(S): 2.5 TABLET ORAL at 21:39

## 2019-03-07 RX ADMIN — Medication 125 MILLIGRAM(S): at 17:36

## 2019-03-07 RX ADMIN — BUPROPION HYDROCHLORIDE 300 MILLIGRAM(S): 150 TABLET, EXTENDED RELEASE ORAL at 13:47

## 2019-03-07 RX ADMIN — HEPARIN SODIUM 5000 UNIT(S): 5000 INJECTION INTRAVENOUS; SUBCUTANEOUS at 13:47

## 2019-03-07 RX ADMIN — HEPARIN SODIUM 5000 UNIT(S): 5000 INJECTION INTRAVENOUS; SUBCUTANEOUS at 21:46

## 2019-03-07 RX ADMIN — HEPARIN SODIUM 5000 UNIT(S): 5000 INJECTION INTRAVENOUS; SUBCUTANEOUS at 05:32

## 2019-03-07 RX ADMIN — Medication 2.5 MILLIGRAM(S): at 13:46

## 2019-03-07 RX ADMIN — ZIPRASIDONE HYDROCHLORIDE 40 MILLIGRAM(S): 20 CAPSULE ORAL at 09:23

## 2019-03-07 RX ADMIN — CHLORHEXIDINE GLUCONATE 1 APPLICATION(S): 213 SOLUTION TOPICAL at 09:24

## 2019-03-07 RX ADMIN — MIDODRINE HYDROCHLORIDE 5 MILLIGRAM(S): 2.5 TABLET ORAL at 13:47

## 2019-03-07 NOTE — PROGRESS NOTE ADULT - SUBJECTIVE AND OBJECTIVE BOX
67 y/o critically ill  female h/o dementia NHL presenting after unwitnessed fall. Pt found on the ground at rehab. is on lovenox after hip fracture. pt reports pain to her right leg. per EMS en route slurring speech and mildly hypotensive requesting trauma.unclear how long pt on the ground. Patient found to have C diff with worsening hypotension requiring pressors on and off and  Patient requiring the SICU for continued cardiopulmonary monitoring. Orthopedic procedure held due to  hypotension and pan colitis and need for pressors due to unstable hemodynamics. Diarrhea slowing off IV Flagyl + on oral vancomycin. No further C.diff colitis and no further hypovolemic hypotension. Patient s/p Open reduction and internal fixation of periprosthetic hip fx Mental status has greatly improved.  Patient seen in SICU prior to transfer to Medical service and regular floor  Patient  alert awake and able to speak but confused . Patient less agitated than yesterday .  On N/G alimentation for hypoproteinemic edema. Blood pressure being supported with midodrine and no diuretics are being given. Patient feels better after moving her bowels. Not agitated and cooperative on Geodan and wellbutrin.  The patient was seen by nutrition and their plan is to be followed. Should her hematocrit continued to drift lower, she will receive 1 unit of packed cells with IV Lasix to correct hypovolemic, hypoproteinemic edema with hypotension on Midodrine. No transfusion presently required with rise in Hct.  The patient is to be out of bed to chair and resume physical therapy, as per orthopedics, after right periprosthetic fracture ORIF on February 22, 2019.  Patient with wound dehiscence seen by plastics and wound vaccuum placed      MEDICATIONS  (STANDING):  buPROPion XL . 300 milliGRAM(s) Oral daily  chlorhexidine 4% Liquid 1 Application(s) Topical <User Schedule>  dronabinol 2.5 milliGRAM(s) Oral daily  heparin  Injectable 5000 Unit(s) SubCutaneous every 8 hours  influenza   Vaccine 0.5 milliLiter(s) IntraMuscular once  levothyroxine 100 MICROGram(s) Oral daily  midodrine 5 milliGRAM(s) Oral three times a day  silver sulfADIAZINE 1% Cream 1 Application(s) Topical daily  vancomycin    Solution 125 milliGRAM(s) Oral every 6 hours  ziprasidone 40 milliGRAM(s) Oral <User Schedule>    MEDICATIONS  (PRN):  acetaminophen   Tablet .. 975 milliGRAM(s) Oral every 6 hours PRN Mild Pain (1 - 3)  oxyCODONE    IR 5 milliGRAM(s) Oral every 4 hours PRN Moderate Pain (4 - 6)          VITALS:   T(C): 36.5 (03-07-19 @ 12:19), Max: 36.7 (03-07-19 @ 04:21)  HR: 98 (03-07-19 @ 12:19) (92 - 98)  BP: 96/62 (03-07-19 @ 12:19) (96/62 - 101/58)  RR: 18 (03-07-19 @ 12:19) (18 - 18)  SpO2: 100% (03-07-19 @ 12:19) (100% - 100%)  Wt(kg): --    PHYSICAL EXAM:  GENERAL: NAD, well-groomed, well-developed  HEAD:  Atraumatic, Normocephalic  EYES: EOMI, PERRLA, conjunctiva and sclera clear  ENMT: No tonsillar erythema, exudates, or enlargement; Moist mucous membranes, NGT in place  NECK: Supple, No JVD, Normal thyroid  NERVOUS SYSTEM:  confused becoming aphasic  CHEST/LUNG: Clear to percussion bilaterally; No rales, rhonchi, wheezing, or rubs  HEART: Regular rate and rhythm; No murmurs, rubs, or gallops  ABDOMEN: Soft, Nontender, Nondistended; Bowel sounds present  Periprosthetic right femur fracture repair with localized swelling, and wound dehiscences  B/L LE edema improving   LYMPH: No lymphadenopathy noted  SKIN: wound vaccuum on right hip    LABS:        CBC Full  -  ( 07 Mar 2019 08:29 )  WBC Count : 8.44 K/uL  Hemoglobin : 8.4 g/dL  Hematocrit : 26.4 %  Platelet Count - Automated : 169 K/uL  Mean Cell Volume : 101.9 fl  Mean Cell Hemoglobin : 32.4 pg  Mean Cell Hemoglobin Concentration : 31.8 gm/dL  Auto Neutrophil # : x  Auto Lymphocyte # : x  Auto Monocyte # : x  Auto Eosinophil # : x  Auto Basophil # : x  Auto Neutrophil % : x  Auto Lymphocyte % : x  Auto Monocyte % : x  Auto Eosinophil % : x  Auto Basophil % : x    03-07    133<L>  |  95<L>  |  15  ----------------------------<  99  4.1   |  28  |  0.48<L>    Ca    8.3<L>      07 Mar 2019 05:55    TPro  4.2<L>  /  Alb  2.0<L>  /  TBili  0.4  /  DBili  x   /  AST  34  /  ALT  36  /  AlkPhos  170<H>  03-07    LIVER FUNCTIONS - ( 07 Mar 2019 05:55 )  Alb: 2.0 g/dL / Pro: 4.2 g/dL / ALK PHOS: 170 U/L / ALT: 36 U/L / AST: 34 U/L / GGT: x               CAPILLARY BLOOD GLUCOSE          RADIOLOGY & ADDITIONAL TESTS:

## 2019-03-07 NOTE — PROGRESS NOTE ADULT - SUBJECTIVE AND OBJECTIVE BOX
Plastic Surgery    SUBJECTIVE:   Patient comfortable in bed, LISETH o/n    VITALS  T(C): 36.7 (03-07-19 @ 04:21), Max: 36.9 (03-06-19 @ 19:20)  HR: 92 (03-07-19 @ 04:21) (92 - 96)  BP: 98/59 (03-07-19 @ 04:21) (98/59 - 105/69)  RR: 18 (03-07-19 @ 04:21) (18 - 18)  SpO2: 100% (03-07-19 @ 04:21) (100% - 100%)  CAPILLARY BLOOD GLUCOSE          Is/Os    03-06 @ 07:01  -  03-07 @ 07:00  --------------------------------------------------------  IN:    Oral Fluid: 560 mL  Total IN: 560 mL    OUT:  Total OUT: 0 mL    Total NET: 560 mL          PHYSICAL EXAM:   General: NAD, Lying in bed comfortably  Extrem: VAC in place holding suction    MEDICATIONS (STANDING): buPROPion XL . 300 milliGRAM(s) Oral daily  docusate sodium 100 milliGRAM(s) Oral three times a day  dronabinol 2.5 milliGRAM(s) Oral daily  heparin  Injectable 5000 Unit(s) SubCutaneous every 8 hours  influenza   Vaccine 0.5 milliLiter(s) IntraMuscular once  levothyroxine 100 MICROGram(s) Oral daily  midodrine 5 milliGRAM(s) Oral three times a day  senna 2 Tablet(s) Oral at bedtime  vancomycin    Solution 125 milliGRAM(s) Oral every 6 hours  ziprasidone 40 milliGRAM(s) Oral <User Schedule>    MEDICATIONS (PRN):acetaminophen   Tablet .. 975 milliGRAM(s) Oral every 6 hours PRN Mild Pain (1 - 3)  oxyCODONE    IR 5 milliGRAM(s) Oral every 4 hours PRN Moderate Pain (4 - 6)      LABS  CBC (03-06 @ 10:06)                              8.0<L>                         9.02    )----------------(  151        --    % Neutrophils, --    % Lymphocytes, ANC: --                                  26.1<L>    BMP (03-07 @ 05:55)             133<L>  |  95<L>   |  15    		Ca++ --      Ca 8.3<L>             ---------------------------------( 99    		Mg --                 4.1     |  28      |  0.48<L>			Ph --        LFTs (03-07 @ 05:55)      TPro 4.2<L> / Alb 2.0<L> / TBili 0.4 / DBili -- / AST 34 / ALT 36 / AlkPhos 170<H>              IMAGING STUDIES

## 2019-03-07 NOTE — PROGRESS NOTE ADULT - PROBLEM SELECTOR PLAN 7
Sodium dropped from 135 to 131  Check urine lytes and osm
sodium 133 will continue to follow
Sodium dropped from 135 to 131  Check urine lytes and osm

## 2019-03-07 NOTE — PROGRESS NOTE ADULT - ASSESSMENT
A/P: 68F s/p Revision hemiarthroplasty to right hip  Pain Control  DVT ppx  WBAT  PT/OT  Incentive Spirometry  Medical management appreciated  DC planning

## 2019-03-07 NOTE — PROGRESS NOTE ADULT - ASSESSMENT
A/P 68F s/p hip fracture repair with wound dehiscence now s/p VAC placement  - VAC to be changed on Friday by VAC team  - Care per primary  - Nutritional optimization    Radha Beasley PGY3

## 2019-03-07 NOTE — PROGRESS NOTE ADULT - ATTENDING COMMENTS
I am a non participating [employer] physician seeing Pt in coverage for Dr Barraza I am a non participating [facility] physician seeing Pt in coverage for Dr Barraza. The above patient examination was reviewed with Dr. Awan and I agree with his evaluation, assessment and treatment plan.  Orlin Barraza M.D.

## 2019-03-07 NOTE — PROGRESS NOTE ADULT - SUBJECTIVE AND OBJECTIVE BOX
WILI PARKER 68y [id number]    Patient is a 68y old  Female who presents with a chief complaint of C diff (04 Mar 2019 16:03)      Follow Up/CC:  ID following for cdiff    Interval History/ROS: no fever, no acute issues    Allergies    honeydew melon (Other)  penicillin (Other; Hives)    Intolerances        ANTIMICROBIALS:  vancomycin    Solution 125 every 6 hours      MEDICATIONS  (STANDING):  buPROPion XL . 300 milliGRAM(s) Oral daily  chlorhexidine 4% Liquid 1 Application(s) Topical <User Schedule>  dronabinol 2.5 milliGRAM(s) Oral daily  heparin  Injectable 5000 Unit(s) SubCutaneous every 8 hours  influenza   Vaccine 0.5 milliLiter(s) IntraMuscular once  levothyroxine 100 MICROGram(s) Oral daily  midodrine 5 milliGRAM(s) Oral three times a day  silver sulfADIAZINE 1% Cream 1 Application(s) Topical daily  vancomycin    Solution 125 milliGRAM(s) Oral every 6 hours  ziprasidone 40 milliGRAM(s) Oral <User Schedule>    MEDICATIONS  (PRN):  acetaminophen   Tablet .. 975 milliGRAM(s) Oral every 6 hours PRN Mild Pain (1 - 3)  oxyCODONE    IR 5 milliGRAM(s) Oral every 4 hours PRN Moderate Pain (4 - 6)        Vital Signs Last 24 Hrs  T(C): 36.5 (07 Mar 2019 12:19), Max: 36.9 (06 Mar 2019 19:20)  T(F): 97.7 (07 Mar 2019 12:19), Max: 98.4 (06 Mar 2019 19:20)  HR: 98 (07 Mar 2019 12:19) (92 - 98)  BP: 96/62 (07 Mar 2019 12:19) (96/62 - 104/67)  BP(mean): --  RR: 18 (07 Mar 2019 12:19) (18 - 18)  SpO2: 100% (07 Mar 2019 12:19) (100% - 100%)    CBC Full  -  ( 07 Mar 2019 08:29 )  WBC Count : 8.44 K/uL  Hemoglobin : 8.4 g/dL  Hematocrit : 26.4 %  Platelet Count - Automated : 169 K/uL  Mean Cell Volume : 101.9 fl  Mean Cell Hemoglobin : 32.4 pg  Mean Cell Hemoglobin Concentration : 31.8 gm/dL  Auto Neutrophil # : x  Auto Lymphocyte # : x  Auto Monocyte # : x  Auto Eosinophil # : x  Auto Basophil # : x  Auto Neutrophil % : x  Auto Lymphocyte % : x  Auto Monocyte % : x  Auto Eosinophil % : x  Auto Basophil % : x    03-07    133<L>  |  95<L>  |  15  ----------------------------<  99  4.1   |  28  |  0.48<L>    Ca    8.3<L>      07 Mar 2019 05:55    TPro  4.2<L>  /  Alb  2.0<L>  /  TBili  0.4  /  DBili  x   /  AST  34  /  ALT  36  /  AlkPhos  170<H>  03-07    LIVER FUNCTIONS - ( 07 Mar 2019 05:55 )  Alb: 2.0 g/dL / Pro: 4.2 g/dL / ALK PHOS: 170 U/L / ALT: 36 U/L / AST: 34 U/L / GGT: x               MICROBIOLOGY:  .Body Fluid Synovial Fluid  02-23-19   No growth at 1 week.  --    No polymorphonuclear cells seen  No organisms seen  by cytocentrifuge      .Tissue Other, right hip synovium  02-23-19   No growth at 1 week.  --    Rare polymorphonuclear leukocytes seen per low power field  No organisms seen per oil power field      .Tissue Other, right femur  02-23-19   No growth at 5 days  --    Rare polymorphonuclear leukocytes seen per low power field  No organisms seen per oil power field      .Body Fluid  02-06-19   No growth at 14 days.  --    No polymorphonuclear cells seen per low power field  No organisms seen      .Blood Blood-Peripheral  02-06-19   No growth at 5 days.  --  --      RADIOLOGY    Xray Chest 1 View- PORTABLE-Routine (03.02.19 @ 10:14) >  There are small bilateral pleural effusions which may have   improved since the prior study likely associated with left lung base   atelectasis. There is no pneumothorax.

## 2019-03-07 NOTE — PROGRESS NOTE ADULT - PROBLEM SELECTOR PLAN 1
wound vaccuum placed for wound dehiscence   Physical therapy as tolerated  pain meds as needed   follow for oversedation

## 2019-03-07 NOTE — PROGRESS NOTE ADULT - ATTENDING COMMENTS
Pierre Lomeli  Attending Physician   Division of Infectious Disease  Pager #[phone]  After 5pm/weekend or no response, call #[phone]

## 2019-03-08 DIAGNOSIS — E43 UNSPECIFIED SEVERE PROTEIN-CALORIE MALNUTRITION: ICD-10-CM

## 2019-03-08 LAB
CULTURE RESULTS: SIGNIFICANT CHANGE UP
SPECIMEN SOURCE: SIGNIFICANT CHANGE UP

## 2019-03-08 PROCEDURE — 99232 SBSQ HOSP IP/OBS MODERATE 35: CPT

## 2019-03-08 RX ADMIN — MIDODRINE HYDROCHLORIDE 5 MILLIGRAM(S): 2.5 TABLET ORAL at 21:17

## 2019-03-08 RX ADMIN — HEPARIN SODIUM 5000 UNIT(S): 5000 INJECTION INTRAVENOUS; SUBCUTANEOUS at 13:32

## 2019-03-08 RX ADMIN — ZIPRASIDONE HYDROCHLORIDE 40 MILLIGRAM(S): 20 CAPSULE ORAL at 10:38

## 2019-03-08 RX ADMIN — CHLORHEXIDINE GLUCONATE 1 APPLICATION(S): 213 SOLUTION TOPICAL at 06:17

## 2019-03-08 RX ADMIN — Medication 1 APPLICATION(S): at 13:32

## 2019-03-08 RX ADMIN — Medication 2.5 MILLIGRAM(S): at 13:35

## 2019-03-08 RX ADMIN — Medication 125 MILLIGRAM(S): at 00:42

## 2019-03-08 RX ADMIN — Medication 125 MILLIGRAM(S): at 18:02

## 2019-03-08 RX ADMIN — Medication 125 MILLIGRAM(S): at 13:32

## 2019-03-08 RX ADMIN — HEPARIN SODIUM 5000 UNIT(S): 5000 INJECTION INTRAVENOUS; SUBCUTANEOUS at 06:17

## 2019-03-08 RX ADMIN — Medication 100 MICROGRAM(S): at 06:17

## 2019-03-08 RX ADMIN — Medication 125 MILLIGRAM(S): at 06:16

## 2019-03-08 RX ADMIN — HEPARIN SODIUM 5000 UNIT(S): 5000 INJECTION INTRAVENOUS; SUBCUTANEOUS at 21:17

## 2019-03-08 RX ADMIN — Medication 125 MILLIGRAM(S): at 21:18

## 2019-03-08 RX ADMIN — MIDODRINE HYDROCHLORIDE 5 MILLIGRAM(S): 2.5 TABLET ORAL at 13:32

## 2019-03-08 RX ADMIN — BUPROPION HYDROCHLORIDE 300 MILLIGRAM(S): 150 TABLET, EXTENDED RELEASE ORAL at 13:32

## 2019-03-08 RX ADMIN — MIDODRINE HYDROCHLORIDE 5 MILLIGRAM(S): 2.5 TABLET ORAL at 06:17

## 2019-03-08 NOTE — PHYSICAL THERAPY INITIAL EVALUATION ADULT - NS ASR WT BEARING DETAIL RLE
nonweight-bearing
nonweight-bearing
nonweight-bearing/NWB RLE OOBTC, PWB for transfers only, posterior precautions

## 2019-03-08 NOTE — PHYSICAL THERAPY INITIAL EVALUATION ADULT - IMPAIRMENTS FOUND, PT EVAL
integumentary integrity
gait, locomotion, and balance/muscle strength
muscle strength/aerobic capacity/endurance/gait, locomotion, and balance

## 2019-03-08 NOTE — PHYSICAL THERAPY INITIAL EVALUATION ADULT - DIAGNOSIS, PT EVAL
impaired balance, strength, endurance for functional mobility
Impaired functional mobility
Impaired functional mobility

## 2019-03-08 NOTE — PHYSICAL THERAPY INITIAL EVALUATION ADULT - PERTINENT HX OF CURRENT PROBLEM, REHAB EVAL
Pt is 68F admitted 2/4/19 PMHx bipolar depression, Hyperlipidemia, NHL, OCD, dementia, recent hospitalization (1/20-1/26) for right hip hemiarthroplasty for right femoral neck fracture s/p fall, c/b ESBL E.Coli UTI on Ertapenem (1/23-1/29). Patient found down in rehab, reported as possibly rolling out of bed.
67 y/o F with PMHx bipolar depression, NHL, OCD, dementia, recent hospitalization (1/20-1/26) for R hip hemiarthroplasty for R femoral neck fx s/p fall, c/b ESBL E. Coli UTI. Pt found down in rehab, reported as possibly rolling out of bed. CT Femur: minimally displaced R proximal femoral periprosthetic fx.
67 yo F PMHx of bipolar depression, NHL (in remission), OCD, dementia, recent hospitalization (1/20-1/26) for right hip hemiarthroplasty for right femoral neck fracture after fall. Hospital course complicated by ESBL E. Coli UTI on Ertapenem (1/23-1/29). Patient found down in rehab, reported as possibly rolling out of bed. Found to have right hip fracture, s/p revision R hip hemiarthroplasty 2/22/19, post-op course c/b prolonged SICU course, malnourishment, and dehisence of right hip incision.

## 2019-03-08 NOTE — PHYSICAL THERAPY INITIAL EVALUATION ADULT - PRECAUTIONS/LIMITATIONS, REHAB EVAL
fall precautions/CT Femur: Minimally displaced right proximal femoral periprosthetic fracture, as above.Pancolitis.Nonspecific fluid collection in the right trochanteric bursa. Head CT: . Volume loss and microvascular disease with areas of remote and age-indeterminate lacunar infarction, MR is more sensitive for new ischemic change. There is no  hemorrhage, mass effect or displaced calvarial fractureCervical spine CT: No acute fracture or traumatic malalignment.Thoracic spine CT: No acute fracture or traumatic malalignment.Lumbar spine CT: Nondisplaced sacral insufficiency fracture extending from the left sacroiliac joint to the ipsilateral S1-S2 sacral foramen. Age-indeterminate mild superior endplate compression of L5.
right hip precautions/HISTORY AND RESULTS CONTINUED: Lumar spine CT: nondisplaced sacral insuffiency fx extending from L sacroiliac joint to ipsilateral S1-S2 sacral foramen. Age-indeterminate mild superior endplate compression of L5. Pt now s/p revision R hip hemiarthroplasty, transferred to SICU for management of ventilator and pressors. Pt s/p extubation./fall precautions
right hip precautions/fall precautions

## 2019-03-08 NOTE — PROGRESS NOTE ADULT - SUBJECTIVE AND OBJECTIVE BOX
WILI PARKER 68y [id number]    Patient is a 68y old  Female who presents with a chief complaint of C diff (04 Mar 2019 16:03)      Follow Up/CC:  ID following for cdiff    Interval History/ROS: no fevers, no acute issues    Allergies    honeydew melon (Other)  penicillin (Other; Hives)    Intolerances        ANTIMICROBIALS:  vancomycin    Solution 125 every 6 hours      MEDICATIONS  (STANDING):  buPROPion XL . 300 milliGRAM(s) Oral daily  chlorhexidine 4% Liquid 1 Application(s) Topical <User Schedule>  dronabinol 2.5 milliGRAM(s) Oral daily  heparin  Injectable 5000 Unit(s) SubCutaneous every 8 hours  influenza   Vaccine 0.5 milliLiter(s) IntraMuscular once  levothyroxine 100 MICROGram(s) Oral daily  midodrine 5 milliGRAM(s) Oral three times a day  silver sulfADIAZINE 1% Cream 1 Application(s) Topical daily  vancomycin    Solution 125 milliGRAM(s) Oral every 6 hours  ziprasidone 40 milliGRAM(s) Oral <User Schedule>    MEDICATIONS  (PRN):  acetaminophen   Tablet .. 975 milliGRAM(s) Oral every 6 hours PRN Mild Pain (1 - 3)  oxyCODONE    IR 5 milliGRAM(s) Oral every 4 hours PRN Moderate Pain (4 - 6)        Vital Signs Last 24 Hrs  T(C): 36.6 (08 Mar 2019 12:14), Max: 36.9 (08 Mar 2019 04:25)  T(F): 97.9 (08 Mar 2019 12:14), Max: 98.4 (08 Mar 2019 04:25)  HR: 94 (08 Mar 2019 12:14) (92 - 99)  BP: 94/65 (08 Mar 2019 12:14) (94/65 - 105/60)  BP(mean): --  RR: 18 (08 Mar 2019 12:14) (18 - 18)  SpO2: 100% (08 Mar 2019 12:14) (100% - 100%)    CBC Full  -  ( 07 Mar 2019 08:29 )  WBC Count : 8.44 K/uL  Hemoglobin : 8.4 g/dL  Hematocrit : 26.4 %  Platelet Count - Automated : 169 K/uL  Mean Cell Volume : 101.9 fl  Mean Cell Hemoglobin : 32.4 pg  Mean Cell Hemoglobin Concentration : 31.8 gm/dL  Auto Neutrophil # : x  Auto Lymphocyte # : x  Auto Monocyte # : x  Auto Eosinophil # : x  Auto Basophil # : x  Auto Neutrophil % : x  Auto Lymphocyte % : x  Auto Monocyte % : x  Auto Eosinophil % : x  Auto Basophil % : x    03-07    133<L>  |  95<L>  |  15  ----------------------------<  99  4.1   |  28  |  0.48<L>    Ca    8.3<L>      07 Mar 2019 05:55    TPro  4.2<L>  /  Alb  2.0<L>  /  TBili  0.4  /  DBili  x   /  AST  34  /  ALT  36  /  AlkPhos  170<H>  03-07    LIVER FUNCTIONS - ( 07 Mar 2019 05:55 )  Alb: 2.0 g/dL / Pro: 4.2 g/dL / ALK PHOS: 170 U/L / ALT: 36 U/L / AST: 34 U/L / GGT: x               MICROBIOLOGY:  .Body Fluid Synovial Fluid  02-23-19   No growth at 1 week.  --    No polymorphonuclear cells seen  No organisms seen  by cytocentrifuge      .Tissue Other, right hip synovium  02-23-19   No growth at 1 week.  --    Rare polymorphonuclear leukocytes seen per low power field  No organisms seen per oil power field      .Tissue Other, right femur  02-23-19   No growth at 5 days  --    Rare polymorphonuclear leukocytes seen per low power field  No organisms seen per oil power field      .Body Fluid  02-06-19   No growth at 14 days.  --    No polymorphonuclear cells seen per low power field  No organisms seen      .Blood Blood-Peripheral  02-06-19   No growth at 5 days.  --  --      RADIOLOGY    Xray Chest 1 View- PORTABLE-Routine (03.02.19 @ 10:14) >  There are small bilateral pleural effusions which may have   improved since the prior study likely associated with left lung base   atelectasis. There is no pneumothorax.

## 2019-03-08 NOTE — PHYSICAL THERAPY INITIAL EVALUATION ADULT - MODALITIES TREATMENT COMMENTS
30 cm surgical incision on R hip with two open wounds  by sutures, inferior wound with 4cm tunnel under sutures, +exposed sutures at base of wound, superior wound well granulated. No purulence, no erythema, no malodor.

## 2019-03-08 NOTE — PHYSICAL THERAPY INITIAL EVALUATION ADULT - GENERAL OBSERVATIONS, REHAB EVAL
Pt gio 30 min re-eval fair. Pt s/p ORIF of femur and revision of R hip hemiarthroplasty. Confirmed with ortho MD Sajan, NWB RLE, posterior precautions. Pt s/p extubation earlier today, on venti mask. Pt rec'd in bed, arterial line, venti mask, 2 DION drains, wound vac, son at bedside, and NAD. RN Heavenly and PCA Evita present throughout session to assist.
Received in bed in NAD, +NG tube, +O2 via NC, +VAC to R hip.
Pt received semisupine in bed, A&OX3, following commands, lethargic requiring cues to maintain eyes open, willing to participate, +yanes, +rectal tube, +ICU monitoring, +contact precautions, CDIFF

## 2019-03-08 NOTE — PROGRESS NOTE ADULT - ASSESSMENT
The patient is to be out of bed to chair and resume physical therapy, as per orthopedics, after right periprosthetic fracture ORIF on February 22, 2019.  Patient with wound dehiscence due to protein calorie malnutrition seen by plastics and wound vaccuum placed.  She has protein calorie malnutrition due to poor po intake.  NGT placed to augment po intake Will order calorie count to see  if she takes in enough calories and protein in order to D/C   the NGT feeds, NGT may be problematic if she is to go to rehab.

## 2019-03-08 NOTE — PROGRESS NOTE ADULT - SUBJECTIVE AND OBJECTIVE BOX
Plastic Surgery    SUBJECTIVE:   Patient comfortable in bed    VITALS  Vital Signs Last 24 Hrs  T(C): 36.9 (08 Mar 2019 04:25), Max: 36.9 (08 Mar 2019 04:25)  T(F): 98.4 (08 Mar 2019 04:25), Max: 98.4 (08 Mar 2019 04:25)  HR: 92 (08 Mar 2019 04:25) (92 - 99)  BP: 103/65 (08 Mar 2019 04:25) (96/62 - 105/60)  BP(mean): --  RR: 18 (08 Mar 2019 04:25) (18 - 18)  SpO2: 100% (08 Mar 2019 04:25) (100% - 100%)    PHYSICAL EXAM:   General: NAD, Lying in bed comfortably  Extrem: VAC in place holding suction

## 2019-03-08 NOTE — PROGRESS NOTE ADULT - ASSESSMENT
A/P 68F s/p hip fracture repair with wound dehiscence now s/p VAC placement    - VAC changed today  -- c/w MWF changes  - Care per primary  - Nutritional optimization

## 2019-03-08 NOTE — PROGRESS NOTE ADULT - ASSESSMENT
A/P 68F s/p hip fracture repair with wound dehiscence now s/p VAC placement  - VAC to be changed on today by VAC team; then MWF  - Care per primary  - Nutritional optimization

## 2019-03-08 NOTE — PROGRESS NOTE ADULT - ATTENDING COMMENTS
Pierre Lomeli  Attending Physician   Division of Infectious Disease  Pager #[phone]  After 5pm/weekend or no response, call #[phone]    Please call the ID service [phone] with questions or concerns over the weekend.

## 2019-03-08 NOTE — PHYSICAL THERAPY INITIAL EVALUATION ADULT - PLANNED THERAPY INTERVENTIONS, PT EVAL
Negative Pressure Wound Therapy
transfer training/bed mobility training/gait training/strengthening
gait training/bed mobility training/transfer training

## 2019-03-08 NOTE — PHYSICAL THERAPY INITIAL EVALUATION ADULT - CRITERIA FOR SKILLED THERAPEUTIC INTERVENTIONS
impairments found
impairments found/functional limitations in following categories
anticipated discharge recommendation/impairments found/functional limitations in following categories/rehab potential

## 2019-03-08 NOTE — PROGRESS NOTE ADULT - ASSESSMENT
A/P: 68F s/p Revision hemiarthroplasty to right hip  Pain Control  DVT ppx  WBAT  PT/OT  Incentive Spirometry  Vac change today  Medical management appreciated  DC planning

## 2019-03-08 NOTE — PROGRESS NOTE ADULT - SUBJECTIVE AND OBJECTIVE BOX
Pt S/E at bedside, no acute events overnight, pain controlled, plastics placed VAC yesterday    Vital Signs Last 24 Hrs  T(C): 36.9 (08 Mar 2019 04:25), Max: 36.9 (08 Mar 2019 04:25)  T(F): 98.4 (08 Mar 2019 04:25), Max: 98.4 (08 Mar 2019 04:25)  HR: 92 (08 Mar 2019 04:25) (92 - 99)  BP: 103/65 (08 Mar 2019 04:25) (96/62 - 105/60)  BP(mean): --  RR: 18 (08 Mar 2019 04:25) (18 - 18)  SpO2: 100% (08 Mar 2019 04:25) (100% - 100%)    Gen: NAD,    Right Lower Extremity:  vac intact  +EHL/FHL/TA/GS  SILT L3-S1  +DP/PT Pulses  Compartments soft  No calf TTP B/L Pt S/E at bedside, no acute events overnight, pain controlled    Vital Signs Last 24 Hrs  T(C): 36.9 (08 Mar 2019 04:25), Max: 36.9 (08 Mar 2019 04:25)  T(F): 98.4 (08 Mar 2019 04:25), Max: 98.4 (08 Mar 2019 04:25)  HR: 92 (08 Mar 2019 04:25) (92 - 99)  BP: 103/65 (08 Mar 2019 04:25) (96/62 - 105/60)  BP(mean): --  RR: 18 (08 Mar 2019 04:25) (18 - 18)  SpO2: 100% (08 Mar 2019 04:25) (100% - 100%)    Gen: NAD,    Right Lower Extremity:  vac intact  +EHL/FHL/TA/GS  SILT L3-S1  +DP/PT Pulses  Compartments soft  No calf TTP B/L

## 2019-03-08 NOTE — PROGRESS NOTE ADULT - PROBLEM SELECTOR PLAN 6
increase protein and calories to improve renal clearance.  Needs to get in enough calories by mouth in order to d/c the NGT. increase protein and calories to improve renal clearance.  Needs to get in enough calories by mouth in order to d/c the NGT. NP to order  2 day calorie count.

## 2019-03-08 NOTE — PROGRESS NOTE ADULT - SUBJECTIVE AND OBJECTIVE BOX
Plastic Surgery Progress Note (pg [facility]: [id number], [facility]: [phone])    SUBJECTIVE:  Patient seen and examined during vac change. No acute events. TF started. AF/VSS    OBJECTIVE:     ** VITAL SIGNS / I&O's **    Vital Signs Last 24 Hrs  T(C): 36.6 (08 Mar 2019 12:14), Max: 36.9 (08 Mar 2019 04:25)  T(F): 97.9 (08 Mar 2019 12:14), Max: 98.4 (08 Mar 2019 04:25)  HR: 94 (08 Mar 2019 12:14) (92 - 99)  BP: 94/65 (08 Mar 2019 12:14) (94/65 - 105/60)  BP(mean): --  RR: 18 (08 Mar 2019 12:14) (18 - 18)  SpO2: 100% (08 Mar 2019 12:14) (100% - 100%)      07 Mar 2019 07:01  -  08 Mar 2019 07:00  --------------------------------------------------------  IN:    Oral Fluid: 240 mL  Total IN: 240 mL    OUT:  Total OUT: 0 mL    Total NET: 240 mL      08 Mar 2019 07:01  -  08 Mar 2019 17:01  --------------------------------------------------------  IN:    Oral Fluid: 120 mL  Total IN: 120 mL    OUT:    Voided: 700 mL  Total OUT: 700 mL    Total NET: -580 mL          ** PHYSICAL EXAM **    -- CONSTITUTIONAL: NAD.   -- EXTREMITIES: Wound with granulating tissue, no drainage or signs of infection, wound vac replaced       **MEDS**  acetaminophen   Tablet .. 975 milliGRAM(s) Oral every 6 hours PRN  buPROPion XL . 300 milliGRAM(s) Oral daily  chlorhexidine 4% Liquid 1 Application(s) Topical <User Schedule>  dronabinol 2.5 milliGRAM(s) Oral daily  heparin  Injectable 5000 Unit(s) SubCutaneous every 8 hours  influenza   Vaccine 0.5 milliLiter(s) IntraMuscular once  levothyroxine 100 MICROGram(s) Oral daily  midodrine 5 milliGRAM(s) Oral three times a day  oxyCODONE    IR 5 milliGRAM(s) Oral every 4 hours PRN  silver sulfADIAZINE 1% Cream 1 Application(s) Topical daily  vancomycin    Solution 125 milliGRAM(s) Oral every 6 hours  ziprasidone 40 milliGRAM(s) Oral <User Schedule>      ** LABS **                          8.4    8.44  )-----------( 169      ( 07 Mar 2019 08:29 )             26.4     07 Mar 2019 05:55    133    |  95     |  15     ----------------------------<  99     4.1     |  28     |  0.48     Ca    8.3        07 Mar 2019 05:55    TPro  4.2    /  Alb  2.0    /  TBili  0.4    /  DBili  x      /  AST  34     /  ALT  36     /  AlkPhos  170    07 Mar 2019 05:55      CAPILLARY BLOOD GLUCOSE

## 2019-03-08 NOTE — PROGRESS NOTE ADULT - SUBJECTIVE AND OBJECTIVE BOX
Patient is a 68y old  Female who presents with a chief complaint of C diff (04 Mar 2019 16:03)      HPI:    MEDICATIONS  (STANDING):  buPROPion XL . 300 milliGRAM(s) Oral daily  chlorhexidine 4% Liquid 1 Application(s) Topical <User Schedule>  dronabinol 2.5 milliGRAM(s) Oral daily  heparin  Injectable 5000 Unit(s) SubCutaneous every 8 hours  influenza   Vaccine 0.5 milliLiter(s) IntraMuscular once  levothyroxine 100 MICROGram(s) Oral daily  midodrine 5 milliGRAM(s) Oral three times a day  silver sulfADIAZINE 1% Cream 1 Application(s) Topical daily  vancomycin    Solution 125 milliGRAM(s) Oral every 6 hours  ziprasidone 40 milliGRAM(s) Oral <User Schedule>    MEDICATIONS  (PRN):  acetaminophen   Tablet .. 975 milliGRAM(s) Oral every 6 hours PRN Mild Pain (1 - 3)  oxyCODONE    IR 5 milliGRAM(s) Oral every 4 hours PRN Moderate Pain (4 - 6)      Allergies    honeydew melon (Other)  penicillin (Other; Hives)    Intolerances        REVIEW OF SYSTEM:  CONSTITUTIONAL: No fever, No change in weight, No fatigue  HEAD: No headache, No dizziness, No recent trauma  EYES: No eye pain, No visual disturbances, No discharge  ENT:  No difficulty hearing, No tinnitus, No vertigo, No sinus pain, No throat pain  NECK: No pain, No stiffness  BREASTS: No pain, No masses, No nipple discharge  RESPIRATORY: No cough, No wheezing, No chills, No hemoptysis, No shortness of breath at rest or exertional shortness of breath  CARDIOVASCULAR: No chest pain, No palpitations, No dizziness, No CHF, No arrhythmia, No cardiomegaly, No leg swelling  GASTROINTESTINAL: No abdominal, No epigastric pain. No nausea, No vomiting, No hematemesis, No diarrhea, No constipation. No melena, No hematochezia. No GERD  GENITOURINARY: No dysuria, No frequency, No hematuria, No incontinence, No nocturia, No hesitancy,  SKIN: No itching, No burning, No rashes, No lesions   LYMPH NODES: No history of enlarged glands  ENDOCRINE: No heat or cold intolerance, No hair loss. No osteoporosis, No thyroid disease  MUSCULOSKELETAL: No joint pain or swelling, No muscle, back, or extremity pain  PSYCHIATRIC: No depression, No anxiety, No mood swings, No difficulty sleeping  HEME/LYMPH: No easy bruising, No anticoagulants, No bleeding disorder, No bleeding gums  ALLERGY AND IMMUNOLOGIC: No hives, No eczema  NEUROLOGICAL: No memory loss, No loss of strength, No numbness, No tremors        VITALS:   T(C): 36.6 (03-08-19 @ 12:14), Max: 36.9 (03-08-19 @ 04:25)  HR: 94 (03-08-19 @ 12:14) (92 - 99)  BP: 94/65 (03-08-19 @ 12:14) (94/65 - 105/60)  RR: 18 (03-08-19 @ 12:14) (18 - 18)  SpO2: 100% (03-08-19 @ 12:14) (100% - 100%)  Wt(kg): --    03-07 @ 07:01  -  03-08 @ 07:00  --------------------------------------------------------  IN: 240 mL / OUT: 0 mL / NET: 240 mL    03-08 @ 07:01  -  03-08 @ 17:45  --------------------------------------------------------  IN: 120 mL / OUT: 700 mL / NET: -580 mL        PHYSICAL EXAM:  GENERAL: NAD, well nourished and conversant  HEAD:  Atraumatic  EYES: EOM, PERRLA, conjunctiva pink and sclera white  ENT: No tonsillar erythema, exudates, or enlargement, moist mucous membranes, good dentition, no lesions  NECK: Supple, No JVD, normal thyroid, carotids with normal upstrokes and no bruits  CHEST/LUNG: Clear to auscultation bilaterally, No rales, rhonchi, wheezing, or rubs  HEART: Regular rate and rhythm, No murmurs, rubs, or gallops  ABDOMEN: Soft, nondistended, no masses, guarding, tenderness or rebound, bowel sounds present  EXTREMITIES:  2+ Peripheral Pulses, No clubbing, cyanosis, or edema. No arthritis of shoulders, elbows, hands, hips, knees, ankles, or feet. No DJD C spine, T spine, or L/S spine  LYMPH: No lymphadenopathy noted  SKIN: No rashes or lesions  NERVOUS SYSTEM:  Alert & Oriented X3, normal cognitive function. Motor Strength 5/5 right upper and right lower.  5/5 left upper and left lower extremities, DTRs 2+ intact and symmetric    LABS:                          8.4    8.44  )-----------( 169      ( 07 Mar 2019 08:29 )             26.4     03-07    133<L>  |  95<L>  |  15  ----------------------------<  99  4.1   |  28  |  0.48<L>    Ca    8.3<L>      07 Mar 2019 05:55    TPro  4.2<L>  /  Alb  2.0<L>  /  TBili  0.4  /  DBili  x   /  AST  34  /  ALT  36  /  AlkPhos  170<H>  03-07    CAPILLARY BLOOD GLUCOSE          RADIOLOGY & ADDITIONAL TESTS:      Consultant(s):    Care Discussed with Consultants/Other Providers [ ] YES  [ ] NO Patient is a 68y old  Female who presents with a chief complaint of C diff (04 Mar 2019 16:03)      HPI:     69 y/o critically ill  female h/o dementia NHL presenting after unwitnessed fall. Pt found on the ground at rehab. is on lovenox after hip fracture. pt reports pain to her right leg. per EMS en route slurring speech and mildly hypotensive requesting trauma.unclear how long pt on the ground. Patient found to have C diff with worsening hypotension requiring pressors on and off and  Patient requiring the SICU for continued cardiopulmonary monitoring. Orthopedic procedure held due to  hypotension and pan colitis and need for pressors due to unstable hemodynamics. Diarrhea slowing off IV Flagyl + on oral vancomycin. No further C.diff colitis and no further hypovolemic hypotension. Patient s/p Open reduction and internal fixation of periprosthetic hip fx Mental status has greatly improved.  Patient seen in SICU prior to transfer to Medical service and regular floor  Patient  alert awake and able to speak but confused . Patient less agitated than yesterday .  On N/G alimentation for hypoproteinemic edema. Blood pressure being supported with midodrine and no diuretics are being given. Patient feels better after moving her bowels. Not agitated and cooperative on Geodan and wellbutrin.  The patient was seen by nutrition and their plan is to be followed. Should her hematocrit continued to drift lower, she will receive 1 unit of packed cells with IV Lasix to correct hypovolemic, hypoproteinemic edema with hypotension on Midodrine. No transfusion presently required with rise in Hct.  The patient is to be out of bed to chair and resume physical therapy, as per orthopedics, after right periprosthetic fracture ORIF on February 22, 2019.  Patient with wound dehiscence seen by plastics and wound vaccuum placed.  She has protein calorie malnutrition due to poor po intake  Will order calorie count to see fi she takes in enough calories and protein in order to D/C   the NGT feeds, NGT may be problematic if she is to go to rehab.                MEDICATIONS  (STANDING):  buPROPion XL . 300 milliGRAM(s) Oral daily  chlorhexidine 4% Liquid 1 Application(s) Topical <User Schedule>  dronabinol 2.5 milliGRAM(s) Oral daily  heparin  Injectable 5000 Unit(s) SubCutaneous every 8 hours  influenza   Vaccine 0.5 milliLiter(s) IntraMuscular once  levothyroxine 100 MICROGram(s) Oral daily  midodrine 5 milliGRAM(s) Oral three times a day  silver sulfADIAZINE 1% Cream 1 Application(s) Topical daily  vancomycin    Solution 125 milliGRAM(s) Oral every 6 hours  ziprasidone 40 milliGRAM(s) Oral <User Schedule>    MEDICATIONS  (PRN):  acetaminophen   Tablet .. 975 milliGRAM(s) Oral every 6 hours PRN Mild Pain (1 - 3)  oxyCODONE    IR 5 milliGRAM(s) Oral every 4 hours PRN Moderate Pain (4 - 6)      Allergies    honeydew melon (Other)  penicillin (Other; Hives)    Intolerances              VITALS:   T(C): 36.6 (03-08-19 @ 12:14), Max: 36.9 (03-08-19 @ 04:25)  HR: 94 (03-08-19 @ 12:14) (92 - 99)  BP: 94/65 (03-08-19 @ 12:14) (94/65 - 105/60)  RR: 18 (03-08-19 @ 12:14) (18 - 18)  SpO2: 100% (03-08-19 @ 12:14) (100% - 100%)  Wt(kg): --    03-07 @ 07:01  -  03-08 @ 07:00  --------------------------------------------------------  IN: 240 mL / OUT: 0 mL / NET: 240 mL    03-08 @ 07:01  -  03-08 @ 17:45  --------------------------------------------------------  IN: 120 mL / OUT: 700 mL / NET: -580 mL        PHYSICAL EXAM:  GENERAL: NAD, A+O x 3  HEAD:  Atraumatic  EYES: EOM, PERRLA, conjunctiva pink and sclera white  ENT: No tonsillar erythema, exudates, or enlargement, moist mucous membranes, good dentition, no lesions  NECK: Supple, No JVD, normal thyroid, carotids with normal upstrokes and no bruits  CHEST/LUNG: Clear to auscultation bilaterally, No rales, rhonchi, wheezing, or rubs  HEART: Regular rate and rhythm, No murmurs, rubs, or gallops  ABDOMEN: Soft, nondistended, no masses, guarding, tenderness or rebound, bowel sounds present NGT in place  EXTREMITIES:  2+ Peripheral Pulses, No clubbing, cyanosis, or edema. No arthritis of shoulders, elbows, hands, hips, knees, ankles, or feet. No DJD C spine, T spine, or L/S spine  LYMPH: No lymphadenopathy noted  SKIN: No rashes or lesions  NERVOUS SYSTEM:  Alert & Oriented X3, normal cognitive function. Motor Strength 5/5 right upper and right lower.  5/5 left upper and left lower extremities, DTRs 2+ intact and symmetric    LABS:               No labs 3/8      CBC Full  -  ( 07 Mar 2019 08:29 )  WBC Count : 8.44 K/uL  Hemoglobin : 8.4 g/dL  Hematocrit : 26.4 %  Platelet Count - Automated : 169 K/uL  Mean Cell Volume : 101.9 fl  Mean Cell Hemoglobin : 32.4 pg  Mean Cell Hemoglobin Concentration : 31.8 gm/dL  Auto Neutrophil # : x  Auto Lymphocyte # : x  Auto Monocyte # : x  Auto Eosinophil # : x  Auto Basophil # : x  Auto Neutrophil % : x  Auto Lymphocyte % : x  Auto Monocyte % : x  Auto Eosinophil % : x  Auto Basophil % : x    03-07    133<L>  |  95<L>  |  15  ----------------------------<  99  4.1   |  28  |  0.48<L>    Ca    8.3<L>      07 Mar 2019 05:55    TPro  4.2<L>  /  Alb  2.0<L>  /  TBili  0.4  /  DBili  x   /  AST  34  /  ALT  36  /  AlkPhos  170<H>  03-07    LIVER FUNCTIONS - ( 07 Mar 2019 05:55 )  Alb: 2.0 g/dL / Pro: 4.2 g/dL / ALK PHOS: 170 U/L / ALT: 36 U/L / AST: 34 U/L / GGT: x             	          RADIOLOGY & ADDITIONAL TESTS:      Consultant(s):    Care Discussed with Consultants/Other Providers [ ] YES  [ ] NO

## 2019-03-09 RX ADMIN — Medication 100 MICROGRAM(S): at 05:08

## 2019-03-09 RX ADMIN — Medication 125 MILLIGRAM(S): at 05:09

## 2019-03-09 RX ADMIN — HEPARIN SODIUM 5000 UNIT(S): 5000 INJECTION INTRAVENOUS; SUBCUTANEOUS at 21:27

## 2019-03-09 RX ADMIN — HEPARIN SODIUM 5000 UNIT(S): 5000 INJECTION INTRAVENOUS; SUBCUTANEOUS at 05:08

## 2019-03-09 RX ADMIN — Medication 1 APPLICATION(S): at 13:33

## 2019-03-09 RX ADMIN — CHLORHEXIDINE GLUCONATE 1 APPLICATION(S): 213 SOLUTION TOPICAL at 05:08

## 2019-03-09 RX ADMIN — BUPROPION HYDROCHLORIDE 300 MILLIGRAM(S): 150 TABLET, EXTENDED RELEASE ORAL at 13:32

## 2019-03-09 RX ADMIN — HEPARIN SODIUM 5000 UNIT(S): 5000 INJECTION INTRAVENOUS; SUBCUTANEOUS at 13:34

## 2019-03-09 RX ADMIN — MIDODRINE HYDROCHLORIDE 5 MILLIGRAM(S): 2.5 TABLET ORAL at 13:34

## 2019-03-09 RX ADMIN — Medication 2.5 MILLIGRAM(S): at 13:44

## 2019-03-09 RX ADMIN — MIDODRINE HYDROCHLORIDE 5 MILLIGRAM(S): 2.5 TABLET ORAL at 05:08

## 2019-03-09 RX ADMIN — Medication 125 MILLIGRAM(S): at 13:33

## 2019-03-09 RX ADMIN — ZIPRASIDONE HYDROCHLORIDE 40 MILLIGRAM(S): 20 CAPSULE ORAL at 08:55

## 2019-03-09 RX ADMIN — MIDODRINE HYDROCHLORIDE 5 MILLIGRAM(S): 2.5 TABLET ORAL at 21:26

## 2019-03-09 RX ADMIN — Medication 125 MILLIGRAM(S): at 19:25

## 2019-03-09 NOTE — PROGRESS NOTE ADULT - ASSESSMENT
A/P: 68F s/p Revision hemiarthroplasty to right hip  Pain Control  DVT ppx  PWB for transfers only  PT/OT  Incentive Spirometry  Vac changed yesterday per plastics  abx per ID  Medical management appreciated  DC planning

## 2019-03-09 NOTE — PROGRESS NOTE ADULT - SUBJECTIVE AND OBJECTIVE BOX
67 y/o critically ill  female h/o dementia NHL presenting after unwitnessed fall. Pt found on the ground at rehab. is on lovenox after hip fracture. pt reports pain to her right leg. per EMS en route slurring speech and mildly hypotensive requesting trauma.unclear how long pt on the ground. Patient found to have C diff with worsening hypotension requiring pressors on and off and  Patient requiring the SICU for continued cardiopulmonary monitoring. Orthopedic procedure held due to  hypotension and pan colitis and need for pressors due to unstable hemodynamics. Diarrhea slowing off IV Flagyl + on oral vancomycin. No further C.diff colitis and no further hypovolemic hypotension. Patient s/p Open reduction and internal fixation of periprosthetic hip fx Mental status has greatly improved.  Patient seen in SICU prior to transfer to Medical service and regular floor  Patient  alert awake and able to speak but confused . Patient less agitated than yesterday .  On N/G alimentation for hypoproteinemic edema. Blood pressure being supported with midodrine and no diuretics are being given. Patient feels better after moving her bowels. Not agitated and cooperative on Geodan and wellbutrin.  The patient was seen by nutrition and their plan is to be followed. Should her hematocrit continued to drift lower, she will receive 1 unit of packed cells with IV Lasix to correct hypovolemic, hypoproteinemic edema with hypotension on Midodrine. No transfusion presently required with rise in Hct.  The patient is to be out of bed to chair and resume physical therapy, as per orthopedics, after right periprosthetic fracture ORIF on February 22, 2019.  Patient with wound dehiscence seen by plastics and wound vaccuum placed.  She has protein calorie malnutrition due to poor po intake  Will order calorie count to see fi she takes in enough calories and protein in order to D/C   the NGT feeds, NGT may be problematic if she is to go to rehab.              MEDICATIONS  (STANDING):  buPROPion XL . 300 milliGRAM(s) Oral daily  chlorhexidine 4% Liquid 1 Application(s) Topical <User Schedule>  dronabinol 2.5 milliGRAM(s) Oral daily  heparin  Injectable 5000 Unit(s) SubCutaneous every 8 hours  influenza   Vaccine 0.5 milliLiter(s) IntraMuscular once  levothyroxine 100 MICROGram(s) Oral daily  midodrine 5 milliGRAM(s) Oral three times a day  silver sulfADIAZINE 1% Cream 1 Application(s) Topical daily  ziprasidone 40 milliGRAM(s) Oral <User Schedule>    MEDICATIONS  (PRN):  acetaminophen   Tablet .. 975 milliGRAM(s) Oral every 6 hours PRN Mild Pain (1 - 3)  oxyCODONE    IR 5 milliGRAM(s) Oral every 4 hours PRN Moderate Pain (4 - 6)          VITALS:   T(C): 36.7 (03-09-19 @ 20:54), Max: 37.1 (03-09-19 @ 00:06)  HR: 96 (03-09-19 @ 20:54) (92 - 98)  BP: 95/51 (03-09-19 @ 20:54) (95/51 - 110/67)  RR: 18 (03-09-19 @ 20:54) (18 - 18)  SpO2: 100% (03-09-19 @ 20:54) (99% - 100%)  Wt(kg): --    PHYSICAL EXAM:  GENERAL: NAD, A+O x 3  HEAD:  Atraumatic  EYES: EOM, PERRLA, conjunctiva pink and sclera white  ENT: No tonsillar erythema, exudates, or enlargement, moist mucous membranes, good dentition, no lesions  NECK: Supple, No JVD, normal thyroid, carotids with normal upstrokes and no bruits  CHEST/LUNG: Clear to auscultation bilaterally, No rales, rhonchi, wheezing, or rubs  HEART: Regular rate and rhythm, No murmurs, rubs, or gallops  ABDOMEN: Soft, nondistended, no masses, guarding, tenderness or rebound, bowel sounds present NGT in place  EXTREMITIES:  2+ Peripheral Pulses, No clubbing, cyanosis, or edema. No arthritis of shoulders, elbows, hands, hips, knees, ankles, or feet. No DJD C spine, T spine, or L/S spine  LYMPH: No lymphadenopathy noted  SKIN: No rashes or lesions  NERVOUS SYSTEM:  Alert & Oriented X3, normal cognitive function. Motor Strength 5/5 right upper and right lower.  5/5 left upper and left lower extremities, DTRs 2+ intact and symmetric    LABS:                      CAPILLARY BLOOD GLUCOSE          RADIOLOGY & ADDITIONAL TESTS:

## 2019-03-09 NOTE — PROGRESS NOTE ADULT - SUBJECTIVE AND OBJECTIVE BOX
Pt S/E at bedside, no acute events overnight, pain controlled. wound vac changed by PRS yesterday    ICU Vital Signs Last 24 Hrs  T(C): 36.9 (09 Mar 2019 04:26), Max: 37.1 (09 Mar 2019 00:06)  T(F): 98.5 (09 Mar 2019 04:26), Max: 98.7 (09 Mar 2019 00:06)  HR: 92 (09 Mar 2019 04:26) (92 - 95)  BP: 102/53 (09 Mar 2019 04:26) (94/65 - 110/67)  BP(mean): --  ABP: --  ABP(mean): --  RR: 18 (09 Mar 2019 04:26) (18 - 18)  SpO2: 100% (09 Mar 2019 04:26) (99% - 100%)                            8.4    8.44  )-----------( 169      ( 07 Mar 2019 08:29 )             26.4       Gen: NAD,  Right Lower Extremity:  vac intact  +EHL/FHL/TA/GS  SILT L3-S1  +DP/PT Pulses  Compartments soft  No calf TTP B/L

## 2019-03-09 NOTE — PROGRESS NOTE ADULT - PROBLEM SELECTOR PLAN 6
increase protein and calories to improve renal clearance.  Needs to get in enough calories by mouth in order to d/c the NGT. NP to order  2 day calorie count.  discussed possible peg placement with the Patient and her sister

## 2019-03-10 LAB
ALBUMIN SERPL ELPH-MCNC: 2.2 G/DL — LOW (ref 3.3–5)
ALP SERPL-CCNC: 193 U/L — HIGH (ref 40–120)
ALT FLD-CCNC: 44 U/L — SIGNIFICANT CHANGE UP (ref 10–45)
ANION GAP SERPL CALC-SCNC: 11 MMOL/L — SIGNIFICANT CHANGE UP (ref 5–17)
AST SERPL-CCNC: 35 U/L — SIGNIFICANT CHANGE UP (ref 10–40)
BILIRUB SERPL-MCNC: 0.3 MG/DL — SIGNIFICANT CHANGE UP (ref 0.2–1.2)
BUN SERPL-MCNC: 15 MG/DL — SIGNIFICANT CHANGE UP (ref 7–23)
CALCIUM SERPL-MCNC: 8.3 MG/DL — LOW (ref 8.4–10.5)
CHLORIDE SERPL-SCNC: 98 MMOL/L — SIGNIFICANT CHANGE UP (ref 96–108)
CO2 SERPL-SCNC: 28 MMOL/L — SIGNIFICANT CHANGE UP (ref 22–31)
CREAT SERPL-MCNC: 0.48 MG/DL — LOW (ref 0.5–1.3)
GLUCOSE SERPL-MCNC: 99 MG/DL — SIGNIFICANT CHANGE UP (ref 70–99)
HCT VFR BLD CALC: 26.3 % — LOW (ref 34.5–45)
HGB BLD-MCNC: 8.3 G/DL — LOW (ref 11.5–15.5)
MCHC RBC-ENTMCNC: 31.6 GM/DL — LOW (ref 32–36)
MCHC RBC-ENTMCNC: 32.4 PG — SIGNIFICANT CHANGE UP (ref 27–34)
MCV RBC AUTO: 102.7 FL — HIGH (ref 80–100)
PLATELET # BLD AUTO: 208 K/UL — SIGNIFICANT CHANGE UP (ref 150–400)
POTASSIUM SERPL-MCNC: 4.4 MMOL/L — SIGNIFICANT CHANGE UP (ref 3.5–5.3)
POTASSIUM SERPL-SCNC: 4.4 MMOL/L — SIGNIFICANT CHANGE UP (ref 3.5–5.3)
PROT SERPL-MCNC: 4.7 G/DL — LOW (ref 6–8.3)
RBC # BLD: 2.56 M/UL — LOW (ref 3.8–5.2)
RBC # FLD: 20.9 % — HIGH (ref 10.3–14.5)
SODIUM SERPL-SCNC: 137 MMOL/L — SIGNIFICANT CHANGE UP (ref 135–145)
WBC # BLD: 9.72 K/UL — SIGNIFICANT CHANGE UP (ref 3.8–10.5)
WBC # FLD AUTO: 9.72 K/UL — SIGNIFICANT CHANGE UP (ref 3.8–10.5)

## 2019-03-10 RX ADMIN — Medication 1 APPLICATION(S): at 13:37

## 2019-03-10 RX ADMIN — CHLORHEXIDINE GLUCONATE 1 APPLICATION(S): 213 SOLUTION TOPICAL at 06:32

## 2019-03-10 RX ADMIN — HEPARIN SODIUM 5000 UNIT(S): 5000 INJECTION INTRAVENOUS; SUBCUTANEOUS at 22:34

## 2019-03-10 RX ADMIN — MIDODRINE HYDROCHLORIDE 5 MILLIGRAM(S): 2.5 TABLET ORAL at 22:34

## 2019-03-10 RX ADMIN — MIDODRINE HYDROCHLORIDE 5 MILLIGRAM(S): 2.5 TABLET ORAL at 13:37

## 2019-03-10 RX ADMIN — ZIPRASIDONE HYDROCHLORIDE 40 MILLIGRAM(S): 20 CAPSULE ORAL at 08:27

## 2019-03-10 RX ADMIN — BUPROPION HYDROCHLORIDE 300 MILLIGRAM(S): 150 TABLET, EXTENDED RELEASE ORAL at 11:53

## 2019-03-10 RX ADMIN — Medication 2.5 MILLIGRAM(S): at 11:53

## 2019-03-10 RX ADMIN — Medication 100 MICROGRAM(S): at 06:31

## 2019-03-10 RX ADMIN — Medication 125 MILLIGRAM(S): at 18:26

## 2019-03-10 RX ADMIN — HEPARIN SODIUM 5000 UNIT(S): 5000 INJECTION INTRAVENOUS; SUBCUTANEOUS at 06:32

## 2019-03-10 RX ADMIN — HEPARIN SODIUM 5000 UNIT(S): 5000 INJECTION INTRAVENOUS; SUBCUTANEOUS at 13:37

## 2019-03-10 RX ADMIN — Medication 125 MILLIGRAM(S): at 06:31

## 2019-03-10 RX ADMIN — MIDODRINE HYDROCHLORIDE 5 MILLIGRAM(S): 2.5 TABLET ORAL at 06:32

## 2019-03-10 NOTE — PROGRESS NOTE ADULT - ATTENDING COMMENTS
I am a non participating [employer] physician seeing Pt in coverage for Dr Barraza I am a non participating [facility] physician seeing Pt in coverage for Dr Barraza. The above patient examination was reviewed with Dr. Awan and I agree with his evaluation, assessment and treatment plan.  Orlin Barraza M.D.

## 2019-03-10 NOTE — PROGRESS NOTE ADULT - SUBJECTIVE AND OBJECTIVE BOX
Pt S/E at bedside, no acute events overnight, pain controlled, denies SOB/CP/N/V.    Vital Signs Last 24 Hrs  T(C): 36.7 (10 Mar 2019 04:14), Max: 36.7 (09 Mar 2019 20:54)  T(F): 98.1 (10 Mar 2019 04:14), Max: 98.1 (10 Mar 2019 04:14)  HR: 95 (10 Mar 2019 04:14) (93 - 98)  BP: 97/63 (10 Mar 2019 04:14) (95/51 - 98/60)  BP(mean): --  RR: 18 (10 Mar 2019 04:14) (18 - 18)  SpO2: 100% (10 Mar 2019 04:14) (100% - 100%)    Gen: NAD,    Right Lower Extremity:  Dressing clean dry intact, wound VAC in place maintaining suction  +EHL/FHL/TA/GS  SILT L3-S1  +DP/PT Pulses  Compartments soft  No calf TTP B/L

## 2019-03-10 NOTE — PROGRESS NOTE ADULT - SUBJECTIVE AND OBJECTIVE BOX
69 y/o critically ill  female h/o dementia NHL presenting after unwitnessed fall. Pt found on the ground at rehab. is on lovenox after hip fracture. pt reports pain to her right leg. per EMS en route slurring speech and mildly hypotensive requesting trauma.unclear how long pt on the ground. Patient found to have C diff with worsening hypotension requiring pressors on and off and  Patient requiring the SICU for continued cardiopulmonary monitoring. Orthopedic procedure held due to  hypotension and pan colitis and need for pressors due to unstable hemodynamics. Diarrhea slowing off IV Flagyl + on oral vancomycin. No further C.diff colitis and no further hypovolemic hypotension. Patient s/p Open reduction and internal fixation of periprosthetic hip fx Mental status has greatly improved.  Patient seen in SICU prior to transfer to Medical service and regular floor  Patient  alert awake and able to speak but confused . Patient less agitated than yesterday .  On N/G alimentation for hypoproteinemic edema. Blood pressure being supported with midodrine and no diuretics are being given. Patient feels better after moving her bowels. Not agitated and cooperative on Geodan and wellbutrin.  The patient was seen by nutrition and their plan is to be followed. Should her hematocrit continued to drift lower, she will receive 1 unit of packed cells with IV Lasix to correct hypovolemic, hypoproteinemic edema with hypotension on Midodrine. No transfusion presently required with rise in Hct.  The patient is to be out of bed to chair and resume physical therapy, as per orthopedics, after right periprosthetic fracture ORIF on February 22, 2019.  Patient with wound dehiscence seen by plastics and wound vaccuum placed.  She has protein calorie malnutrition due to poor po intake  Will order calorie count to see if she takes in enough calories and protein in order to D/C   the NGT feeds, NGT may be problematic if she is to go to rehab. Patient has been OOB to chair.     MEDICATIONS  (STANDING):  buPROPion XL . 300 milliGRAM(s) Oral daily  chlorhexidine 4% Liquid 1 Application(s) Topical <User Schedule>  dronabinol 2.5 milliGRAM(s) Oral daily  heparin  Injectable 5000 Unit(s) SubCutaneous every 8 hours  influenza   Vaccine 0.5 milliLiter(s) IntraMuscular once  levothyroxine 100 MICROGram(s) Oral daily  midodrine 5 milliGRAM(s) Oral three times a day  silver sulfADIAZINE 1% Cream 1 Application(s) Topical daily  vancomycin    Solution 125 milliGRAM(s) Oral every 12 hours  ziprasidone 40 milliGRAM(s) Oral <User Schedule>    MEDICATIONS  (PRN):  acetaminophen   Tablet .. 975 milliGRAM(s) Oral every 6 hours PRN Mild Pain (1 - 3)  oxyCODONE    IR 5 milliGRAM(s) Oral every 4 hours PRN Moderate Pain (4 - 6)          VITALS:   T(C): 36.9 (03-10-19 @ 11:39), Max: 36.9 (03-10-19 @ 11:39)  HR: 99 (03-10-19 @ 13:30) (92 - 99)  BP: 96/60 (03-10-19 @ 13:32) (86/52 - 102/65)  RR: 18 (03-10-19 @ 13:30) (18 - 18)  SpO2: 99% (03-10-19 @ 13:30) (99% - 100%)  Wt(kg): --      PHYSICAL EXAM:  GENERAL: NAD, A+O x 3  HEAD:  Atraumatic  EYES: EOM, PERRLA, conjunctiva pink and sclera white  ENT: No tonsillar erythema, exudates, NG tube in place  NECK: Supple, No JVD, normal thyroid, carotids with normal upstrokes and no bruits  CHEST/LUNG: Clear to auscultation bilaterally, No rales, rhonchi, wheezing, or rubs  HEART: Regular rate and rhythm, No murmurs, rubs, or gallops  ABDOMEN: Soft, nondistended, no masses, guarding, tenderness or rebound, bowel sounds present NGT in place  EXTREMITIES:  2+ Peripheral Pulses, No clubbing, cyanosis, or edema. No arthritis of shoulders, elbows, hands, hips, knees, ankles, or feet. No DJD C spine, T spine, or L/S spine  LYMPH: No lymphadenopathy noted  SKIN: No rashes or lesions  NERVOUS SYSTEM:  Alert & Oriented X3, normal cognitive function. Motor Strength 5/5 right upper and right lower.  5/5 left upper and left lower extremities, DTRs 2+ intact and symmetric    LABS:        CBC Full  -  ( 10 Mar 2019 08:00 )  WBC Count : 9.72 K/uL  Hemoglobin : 8.3 g/dL  Hematocrit : 26.3 %  Platelet Count - Automated : 208 K/uL  Mean Cell Volume : 102.7 fl  Mean Cell Hemoglobin : 32.4 pg  Mean Cell Hemoglobin Concentration : 31.6 gm/dL  Auto Neutrophil # : x  Auto Lymphocyte # : x  Auto Monocyte # : x  Auto Eosinophil # : x  Auto Basophil # : x  Auto Neutrophil % : x  Auto Lymphocyte % : x  Auto Monocyte % : x  Auto Eosinophil % : x  Auto Basophil % : x    03-10    137  |  98  |  15  ----------------------------<  99  4.4   |  28  |  0.48<L>    Ca    8.3<L>      10 Mar 2019 05:37    TPro  4.7<L>  /  Alb  2.2<L>  /  TBili  0.3  /  DBili  x   /  AST  35  /  ALT  44  /  AlkPhos  193<H>  03-10    LIVER FUNCTIONS - ( 10 Mar 2019 05:37 )  Alb: 2.2 g/dL / Pro: 4.7 g/dL / ALK PHOS: 193 U/L / ALT: 44 U/L / AST: 35 U/L / GGT: x               CAPILLARY BLOOD GLUCOSE          RADIOLOGY & ADDITIONAL TESTS:

## 2019-03-10 NOTE — PROGRESS NOTE ADULT - ASSESSMENT
A/P: 68F s/p Revision hemiarthroplasty to right hip  Pain Control  DVT ppx  PWB for transfers only  PT/OT  Incentive Spirometry  Vac changed friday per plastics  abx per ID  Medical management appreciated  DC planning

## 2019-03-11 PROCEDURE — 99232 SBSQ HOSP IP/OBS MODERATE 35: CPT

## 2019-03-11 RX ORDER — OXYCODONE HYDROCHLORIDE 5 MG/1
5 TABLET ORAL EVERY 4 HOURS
Qty: 0 | Refills: 0 | Status: DISCONTINUED | OUTPATIENT
Start: 2019-03-11 | End: 2019-03-15

## 2019-03-11 RX ADMIN — Medication 125 MILLIGRAM(S): at 18:11

## 2019-03-11 RX ADMIN — Medication 125 MILLIGRAM(S): at 06:07

## 2019-03-11 RX ADMIN — MIDODRINE HYDROCHLORIDE 5 MILLIGRAM(S): 2.5 TABLET ORAL at 13:28

## 2019-03-11 RX ADMIN — OXYCODONE HYDROCHLORIDE 5 MILLIGRAM(S): 5 TABLET ORAL at 13:28

## 2019-03-11 RX ADMIN — MIDODRINE HYDROCHLORIDE 5 MILLIGRAM(S): 2.5 TABLET ORAL at 06:07

## 2019-03-11 RX ADMIN — BUPROPION HYDROCHLORIDE 300 MILLIGRAM(S): 150 TABLET, EXTENDED RELEASE ORAL at 12:42

## 2019-03-11 RX ADMIN — Medication 2.5 MILLIGRAM(S): at 12:42

## 2019-03-11 RX ADMIN — CHLORHEXIDINE GLUCONATE 1 APPLICATION(S): 213 SOLUTION TOPICAL at 06:07

## 2019-03-11 RX ADMIN — Medication 100 MICROGRAM(S): at 06:14

## 2019-03-11 RX ADMIN — MIDODRINE HYDROCHLORIDE 5 MILLIGRAM(S): 2.5 TABLET ORAL at 22:16

## 2019-03-11 RX ADMIN — Medication 1 APPLICATION(S): at 12:42

## 2019-03-11 RX ADMIN — HEPARIN SODIUM 5000 UNIT(S): 5000 INJECTION INTRAVENOUS; SUBCUTANEOUS at 22:16

## 2019-03-11 RX ADMIN — OXYCODONE HYDROCHLORIDE 5 MILLIGRAM(S): 5 TABLET ORAL at 14:28

## 2019-03-11 RX ADMIN — ZIPRASIDONE HYDROCHLORIDE 40 MILLIGRAM(S): 20 CAPSULE ORAL at 08:42

## 2019-03-11 RX ADMIN — HEPARIN SODIUM 5000 UNIT(S): 5000 INJECTION INTRAVENOUS; SUBCUTANEOUS at 06:06

## 2019-03-11 RX ADMIN — HEPARIN SODIUM 5000 UNIT(S): 5000 INJECTION INTRAVENOUS; SUBCUTANEOUS at 13:27

## 2019-03-11 NOTE — PROGRESS NOTE ADULT - PROBLEM SELECTOR PROBLEM 4
Encounter for palliative care
Bipolar depression
Hypotension
Bipolar depression
Bandemia

## 2019-03-11 NOTE — PROGRESS NOTE ADULT - PROBLEM SELECTOR PROBLEM 6
Severe protein-calorie malnutrition
Wound dehiscence
Severe protein-calorie malnutrition
Allergy to penicillin

## 2019-03-11 NOTE — PROGRESS NOTE ADULT - PROBLEM SELECTOR PROBLEM 2
Hypotension
Periprosthetic fracture of proximal end of femur
Clostridium difficile colitis
Hypotension
Periprosthetic fracture of proximal end of femur
Clostridium difficile colitis
Hypotension

## 2019-03-11 NOTE — PROGRESS NOTE ADULT - ASSESSMENT
68 year old female with a PMHx of bipolar depression, Hyperlipidemia, NHL (in remission), OCD, dementia, with recent hospitalization (1/20-1/26) for right hip hemiarthroplasty for right femoral neck fracture after fall. Hospital course complicated by ESBL E. Coli UTI on Ertapenem (1/23-1/29)    s/p  Hypovolemic/ Distributive shock secondary to sepsis 2/2 Pancolitis, likely secondary to C. Diff Colitis,-now resolved  Age Indeterminate L5 vertebral body fracture, Right proximal femoral periprosthetic fracture following unwitnessed fall with cdiff, bandemia, fever  s/p Open reduction and internal fixation (ORIF) of periprosthetic fracture of femur  02/22/2019  Right    Hemiarthroplasty of hip  02/22/2019  Revision--Right, posterior approach    Irrigation and debridement, thigh or hip  02/22/2019  Right  Cdiff improved and stable  No s/s any other infectious focus  OR Cx negative   Stable  Would continue Vanco po in a tapering fashion    then taper-125 mg po q12 X 2 more weeks 3/10 --> 3/23/19

## 2019-03-11 NOTE — PROGRESS NOTE ADULT - ASSESSMENT
A/P: 68F s/p Revision hemiarthroplasty to right hip  Pain Control  DVT ppx  PWB for transfers only  PT/OT  Incentive Spirometry  Vac changed friday per plastics to assess today  abx per ID  Medical management appreciated  DC planning

## 2019-03-11 NOTE — PROGRESS NOTE ADULT - PROBLEM SELECTOR PROBLEM 5
Bipolar depression
Edema
Wound dehiscence
Fever

## 2019-03-11 NOTE — STUDENT SIGN OFF DOCUMENT - DOCUMENTS STUDENTS ARE SIGNED OFF ON
Input and Output/Assessment and Intervention/Vital Signs/Plan of Care Provider Contact Note/Dietitian Initial Evaluation/Patient Profile

## 2019-03-11 NOTE — PROGRESS NOTE ADULT - ATTENDING COMMENTS
I am a non participating [facility] physician seeing Pt in coverage for Dr Barraza. The above patient examination was reviewed with Dr. Awan and I agree with his evaluation, assessment and treatment plan.  Orlin aBrraza M.D.

## 2019-03-11 NOTE — CONSULT NOTE ADULT - ASSESSMENT
case d/w dr. carrillo.  pt needs gastrostomy. will speak with family.  dr lorena knowles spoke with son.  will try to book peg on wed.

## 2019-03-11 NOTE — CONSULT NOTE ADULT - SUBJECTIVE AND OBJECTIVE BOX
Patient is a 68y Female     Patient is a 68y old  Female who presents with a chief complaint of C diff (04 Mar 2019 16:03)      HPI:  68 year old female with a PMHx of bipolar depression, Hyperlipidemia, NHL (in remission), OCD, dementia, with recent hospitalization (1/20-1/26) for right hip hemiarthroplasty for right femoral neck fracture after fall. Hospital course complicated by ESBL E. Coli UTI on Ertapenem (1/23-1/29). Patient found down in rehab, reported as possibly rolling out of bed.     On arrival to [facility] ED, Level 2 trauma activated was upgraded to a Level 1 for worsening hypotension during secondary exam despite fluid resuscitation. SBP 80s, 2 large-bore pIV placed, e-FAST done twice, with no fluid in the intraperitoneal space or pericardial space, no pneumothorax, suggestion of a very small pleural effusion on the left. (04 Feb 2019 10:32)      PAST MEDICAL & SURGICAL HISTORY:  Osteoarthritis of left knee  Lymphoma: NHL[place] [facility] [place]  Hypercholesteremia  Bipolar depression  OCD (obsessive compulsive disorder)  Depression  Osteoarthritis of right knee  S/P TKR (total knee replacement), bilateral: discharged nov 5th, 2014  S/P cataract surgery  H/O oral surgery: 2014  S/P knee surgery: 1978      MEDICATIONS  (STANDING):  buPROPion XL . 300 milliGRAM(s) Oral daily  chlorhexidine 4% Liquid 1 Application(s) Topical <User Schedule>  dronabinol 2.5 milliGRAM(s) Oral daily  heparin  Injectable 5000 Unit(s) SubCutaneous every 8 hours  influenza   Vaccine 0.5 milliLiter(s) IntraMuscular once  levothyroxine 100 MICROGram(s) Oral daily  midodrine 5 milliGRAM(s) Oral three times a day  silver sulfADIAZINE 1% Cream 1 Application(s) Topical daily  vancomycin    Solution 125 milliGRAM(s) Oral every 12 hours  ziprasidone 40 milliGRAM(s) Oral <User Schedule>      Allergies    honeydew melon (Other)  penicillin (Other; Hives)    Intolerances        SOCIAL HISTORY:  Denies ETOh,Smoking,     FAMILY HISTORY:  Family history of diabetes mellitus  Family history of prostate cancer (Grandparent)  Family history of stomach cancer (Grandparent)  Family history of breast cancer  Family history of COPD (chronic obstructive pulmonary disease)      REVIEW OF SYSTEMS:    CONSTITUTIONAL: No weakness, fevers or chills  EYES/ENT: No visual changes;  No vertigo or throat pain   NECK: No pain or stiffness  RESPIRATORY: No cough, wheezing, hemoptysis; No shortness of breath  CARDIOVASCULAR: No chest pain or palpitations  GASTROINTESTINAL: No abdominal or epigastric pain. No nausea, vomiting, or hematemesis; No diarrhea or constipation. No melena or hematochezia.  GENITOURINARY: No dysuria, frequency or hematuria  NEUROLOGICAL: No numbness or weakness  SKIN: No itching, burning, rashes, or lesions   All other review of systems is negative unless indicated above.    VITAL:  T(C): , Max: 37.2 (03-11-19 @ 11:54)  T(F): , Max: 99 (03-11-19 @ 11:54)  HR: 97 (03-11-19 @ 11:54)  BP: 96/54 (03-11-19 @ 11:54)  BP(mean): --  RR: 18 (03-11-19 @ 11:54)  SpO2: 100% (03-11-19 @ 11:54)  Wt(kg): --    I and O's:    03-09 @ 06:01  -  03-10 @ 07:00  --------------------------------------------------------  IN: 240 mL / OUT: 951 mL / NET: -711 mL    03-10 @ 07:01  -  03-11 @ 07:00  --------------------------------------------------------  IN: 410 mL / OUT: 2550 mL / NET: -2140 mL    03-11 @ 07:01  -  03-11 @ 18:28  --------------------------------------------------------  IN: 240 mL / OUT: 0 mL / NET: 240 mL          PHYSICAL EXAM:    Constitutional: NAD  HEENT: PERRLA,   Neck: No JVD  Respiratory: CTA B/L  Cardiovascular: S1 and S2  Gastrointestinal: BS+, soft, NT/ND  Extremities: No peripheral edema  Neurological: A/O x 3, no focal deficits  Psychiatric: Normal mood, normal affect  : No Munguia  Skin: No rashes  Access: Not applicable  Back: No CVA tenderness    LABS:                        8.3    9.72  )-----------( 208      ( 10 Mar 2019 08:00 )             26.3     03-10    137  |  98  |  15  ----------------------------<  99  4.4   |  28  |  0.48<L>    Ca    8.3<L>      10 Mar 2019 05:37    TPro  4.7<L>  /  Alb  2.2<L>  /  TBili  0.3  /  DBili  x   /  AST  35  /  ALT  44  /  AlkPhos  193<H>  03-10          RADIOLOGY & ADDITIONAL STUDIES:

## 2019-03-11 NOTE — PROGRESS NOTE ADULT - SUBJECTIVE AND OBJECTIVE BOX
Pt S/E at bedside, no acute events overnight, pain controlled, denies SOB/CP/N/V/Numbness/Tingling.    Vital Signs Last 24 Hrs  T(C): 36.5 (11 Mar 2019 03:46), Max: 36.9 (10 Mar 2019 11:39)  T(F): 97.7 (11 Mar 2019 03:46), Max: 98.5 (10 Mar 2019 11:39)  HR: 91 (11 Mar 2019 03:46) (91 - 99)  BP: 95/61 (11 Mar 2019 03:46) (86/52 - 102/65)  BP(mean): --  RR: 18 (11 Mar 2019 03:46) (17 - 18)  SpO2: 100% (11 Mar 2019 03:46) (98% - 100%)    Gen: NAD,    Right Lower Extremity:  Dressing clean dry intact, wound vac maintaining suction  Clean dressing to right foot  +EHL/FHL/TA/GS  SILT L3-S1  +DP/PT Pulses  Compartments soft  No calf TTP B/L

## 2019-03-11 NOTE — PROGRESS NOTE ADULT - SUBJECTIVE AND OBJECTIVE BOX
WILI PARKER 68y [id number]    Patient is a 68y old  Female who presents with a chief complaint of C diff (04 Mar 2019 16:03)      Follow Up/CC:  ID following for cdiff    Interval History/ROS: feels ok, tired, no fever    Allergies    honeydew melon (Other)  penicillin (Other; Hives)    Intolerances        ANTIMICROBIALS:  vancomycin    Solution 125 every 12 hours      MEDICATIONS  (STANDING):  buPROPion XL . 300 milliGRAM(s) Oral daily  chlorhexidine 4% Liquid 1 Application(s) Topical <User Schedule>  dronabinol 2.5 milliGRAM(s) Oral daily  heparin  Injectable 5000 Unit(s) SubCutaneous every 8 hours  influenza   Vaccine 0.5 milliLiter(s) IntraMuscular once  levothyroxine 100 MICROGram(s) Oral daily  midodrine 5 milliGRAM(s) Oral three times a day  silver sulfADIAZINE 1% Cream 1 Application(s) Topical daily  vancomycin    Solution 125 milliGRAM(s) Oral every 12 hours  ziprasidone 40 milliGRAM(s) Oral <User Schedule>    MEDICATIONS  (PRN):  acetaminophen   Tablet .. 975 milliGRAM(s) Oral every 6 hours PRN Mild Pain (1 - 3)  oxyCODONE    IR 5 milliGRAM(s) Oral every 4 hours PRN Moderate Pain (4 - 6)        Vital Signs Last 24 Hrs  T(C): 37.2 (11 Mar 2019 11:54), Max: 37.2 (11 Mar 2019 11:54)  T(F): 99 (11 Mar 2019 11:54), Max: 99 (11 Mar 2019 11:54)  HR: 97 (11 Mar 2019 11:54) (91 - 99)  BP: 96/54 (11 Mar 2019 11:54) (95/61 - 99/58)  BP(mean): --  RR: 18 (11 Mar 2019 11:54) (17 - 18)  SpO2: 100% (11 Mar 2019 11:54) (98% - 100%)    CBC Full  -  ( 10 Mar 2019 08:00 )  WBC Count : 9.72 K/uL  Hemoglobin : 8.3 g/dL  Hematocrit : 26.3 %  Platelet Count - Automated : 208 K/uL  Mean Cell Volume : 102.7 fl  Mean Cell Hemoglobin : 32.4 pg  Mean Cell Hemoglobin Concentration : 31.6 gm/dL  Auto Neutrophil # : x  Auto Lymphocyte # : x  Auto Monocyte # : x  Auto Eosinophil # : x  Auto Basophil # : x  Auto Neutrophil % : x  Auto Lymphocyte % : x  Auto Monocyte % : x  Auto Eosinophil % : x  Auto Basophil % : x    03-10    137  |  98  |  15  ----------------------------<  99  4.4   |  28  |  0.48<L>    Ca    8.3<L>      10 Mar 2019 05:37    TPro  4.7<L>  /  Alb  2.2<L>  /  TBili  0.3  /  DBili  x   /  AST  35  /  ALT  44  /  AlkPhos  193<H>  03-10    LIVER FUNCTIONS - ( 10 Mar 2019 05:37 )  Alb: 2.2 g/dL / Pro: 4.7 g/dL / ALK PHOS: 193 U/L / ALT: 44 U/L / AST: 35 U/L / GGT: x               MICROBIOLOGY:  .Body Fluid Synovial Fluid  02-23-19   No growth at 1 week.  --    No polymorphonuclear cells seen  No organisms seen  by cytocentrifuge      .Tissue Other, right hip synovium  02-23-19   No growth at 1 week.  --    Rare polymorphonuclear leukocytes seen per low power field  No organisms seen per oil power field      .Tissue Other, right femur  02-23-19   No growth at 5 days  --    Rare polymorphonuclear leukocytes seen per low power field  No organisms seen per oil power field      RADIOLOGY    < from: Xray Chest 1 View- PORTABLE-Routine (03.02.19 @ 10:14) >  There are small bilateral pleural effusions which may have   improved since the prior study likely associated with left lung base   atelectasis. There is no pneumothorax

## 2019-03-11 NOTE — PROGRESS NOTE ADULT - PROBLEM SELECTOR PROBLEM 1
Clostridium difficile colitis
Encephalopathy
Periprosthetic fracture of proximal end of femur
Clostridium difficile colitis

## 2019-03-11 NOTE — PROGRESS NOTE ADULT - SUBJECTIVE AND OBJECTIVE BOX
67 y/o critically ill  female h/o dementia NHL presenting after unwitnessed fall. Pt found on the ground at rehab. is on lovenox after hip fracture. pt reports pain to her right leg. per EMS en route slurring speech and mildly hypotensive requesting trauma.unclear how long pt on the ground. Patient found to have C diff with worsening hypotension requiring pressors on and off and  Patient requiring the SICU for continued cardiopulmonary monitoring. Orthopedic procedure held due to  hypotension and pan colitis and need for pressors due to unstable hemodynamics. Diarrhea slowing off IV Flagyl + on oral vancomycin. No further C.diff colitis and no further hypovolemic hypotension. Patient s/p Open reduction and internal fixation of periprosthetic hip fx Mental status has greatly improved.  Patient seen in SICU prior to transfer to Medical service and regular floor  Patient  alert awake and able to speak but confused . Patient less agitated than yesterday .  On N/G alimentation for hypoproteinemic edema. Blood pressure being supported with midodrine and no diuretics are being given. Patient feels better after moving her bowels. Not agitated and cooperative on Geodan and wellbutrin.  The patient was seen by nutrition and their plan is to be followed. Should her hematocrit continued to drift lower, she will receive 1 unit of packed cells with IV Lasix to correct hypovolemic, hypoproteinemic edema with hypotension on Midodrine. No transfusion presently required with rise in Hct.  The patient is to be out of bed to chair and resume physical therapy, as per orthopedics, after right periprosthetic fracture ORIF on February 22, 2019.  Patient with wound dehiscence seen by plastics and wound vaccuum placed.  She has protein calorie malnutrition due to poor po intake  Will order calorie count to see if she takes in enough calories and protein in order to D/C   the NGT feeds, NGT may be problematic if she is to go to rehab. Patient has been OOB to chair. Patient has not been tolerating PO.      MEDICATIONS  (STANDING):  buPROPion XL . 300 milliGRAM(s) Oral daily  chlorhexidine 4% Liquid 1 Application(s) Topical <User Schedule>  dronabinol 2.5 milliGRAM(s) Oral daily  heparin  Injectable 5000 Unit(s) SubCutaneous every 8 hours  influenza   Vaccine 0.5 milliLiter(s) IntraMuscular once  levothyroxine 100 MICROGram(s) Oral daily  midodrine 5 milliGRAM(s) Oral three times a day  silver sulfADIAZINE 1% Cream 1 Application(s) Topical daily  vancomycin    Solution 125 milliGRAM(s) Oral every 12 hours  ziprasidone 40 milliGRAM(s) Oral <User Schedule>    MEDICATIONS  (PRN):  acetaminophen   Tablet .. 975 milliGRAM(s) Oral every 6 hours PRN Mild Pain (1 - 3)  oxyCODONE    IR 5 milliGRAM(s) Oral every 4 hours PRN Moderate Pain (4 - 6)          VITALS:   T(C): 37.2 (03-11-19 @ 11:54), Max: 37.2 (03-11-19 @ 11:54)  HR: 97 (03-11-19 @ 11:54) (91 - 99)  BP: 96/54 (03-11-19 @ 11:54) (95/61 - 99/58)  RR: 18 (03-11-19 @ 11:54) (17 - 18)  SpO2: 100% (03-11-19 @ 11:54) (98% - 100%)  Wt(kg): --    PHYSICAL EXAM:  GENERAL: NAD, A+O x 3  HEAD:  Atraumatic  EYES: EOM, PERRLA, conjunctiva pink and sclera white  ENT: No tonsillar erythema, exudates, NG tube in place  NECK: Supple, No JVD, normal thyroid, carotids with normal upstrokes and no bruits  CHEST/LUNG: Clear to auscultation bilaterally, No rales, rhonchi, wheezing, or rubs  HEART: Regular rate and rhythm, No murmurs, rubs, or gallops  ABDOMEN: Soft, nondistended, no masses, guarding, tenderness or rebound, bowel sounds present NGT in place  EXTREMITIES:  2+ Peripheral Pulses, No clubbing, cyanosis, or edema. No arthritis of shoulders, elbows, hands, hips, knees, ankles, or feet. No DJD C spine, T spine, or L/S spine  LYMPH: No lymphadenopathy noted  SKIN: No rashes or lesions  NERVOUS SYSTEM:  Alert & Oriented X3, normal cognitive function. Motor Strength 5/5 right upper and right lower.  5/5 left upper and left lower extremities, DTRs 2+ intact and symmetric    LABS:        CBC Full  -  ( 10 Mar 2019 08:00 )  WBC Count : 9.72 K/uL  Hemoglobin : 8.3 g/dL  Hematocrit : 26.3 %  Platelet Count - Automated : 208 K/uL  Mean Cell Volume : 102.7 fl  Mean Cell Hemoglobin : 32.4 pg  Mean Cell Hemoglobin Concentration : 31.6 gm/dL  Auto Neutrophil # : x  Auto Lymphocyte # : x  Auto Monocyte # : x  Auto Eosinophil # : x  Auto Basophil # : x  Auto Neutrophil % : x  Auto Lymphocyte % : x  Auto Monocyte % : x  Auto Eosinophil % : x  Auto Basophil % : x    03-10    137  |  98  |  15  ----------------------------<  99  4.4   |  28  |  0.48<L>    Ca    8.3<L>      10 Mar 2019 05:37    TPro  4.7<L>  /  Alb  2.2<L>  /  TBili  0.3  /  DBili  x   /  AST  35  /  ALT  44  /  AlkPhos  193<H>  03-10    LIVER FUNCTIONS - ( 10 Mar 2019 05:37 )  Alb: 2.2 g/dL / Pro: 4.7 g/dL / ALK PHOS: 193 U/L / ALT: 44 U/L / AST: 35 U/L / GGT: x               CAPILLARY BLOOD GLUCOSE          RADIOLOGY & ADDITIONAL TESTS:

## 2019-03-11 NOTE — PROGRESS NOTE ADULT - ATTENDING COMMENTS
1360 clear yellow fluid removed Pierre Lomeli  Attending Physician   Division of Infectious Disease  Pager #[phone]  After 5pm/weekend or no response, call #[phone]

## 2019-03-11 NOTE — CONSULT NOTE ADULT - CONSULT REASON
Hypotension
R Hemiarthroplasty r/out periprosthetic fracture
age indeterminant compression fx
dysphagia
pre op eval
Wound
Clostridium diff

## 2019-03-11 NOTE — PROGRESS NOTE ADULT - PROBLEM SELECTOR PROBLEM 3
Delirium
Periprosthetic fracture of proximal end of femur
Clostridium difficile colitis
Hyponatremia
Hypotension
Periprosthetic fracture of proximal end of femur
Hyponatremia
Hypotension
Periprosthetic fracture of proximal end of femur

## 2019-03-11 NOTE — PROGRESS NOTE ADULT - SUBJECTIVE AND OBJECTIVE BOX
Surgery Progress Note    S: Patient seen and examined. No acute events overnight. Tolerating tube feeds. Vac change today.     O:  Vital Signs Last 24 Hrs  T(C): 37.2 (11 Mar 2019 11:54), Max: 37.2 (11 Mar 2019 11:54)  T(F): 99 (11 Mar 2019 11:54), Max: 99 (11 Mar 2019 11:54)  HR: 97 (11 Mar 2019 11:54) (91 - 99)  BP: 96/54 (11 Mar 2019 11:54) (95/61 - 99/58)  BP(mean): --  RR: 18 (11 Mar 2019 11:54) (17 - 18)  SpO2: 100% (11 Mar 2019 11:54) (98% - 100%)    I&O's Detail    10 Mar 2019 07:01  -  11 Mar 2019 07:00  --------------------------------------------------------  IN:    Oral Fluid: 410 mL  Total IN: 410 mL    OUT:    VAC (Vacuum Assisted Closure) System: 450 mL    Voided: 2100 mL  Total OUT: 2550 mL    Total NET: -2140 mL      11 Mar 2019 07:01  -  11 Mar 2019 16:23  --------------------------------------------------------  IN:    Oral Fluid: 240 mL  Total IN: 240 mL    OUT:  Total OUT: 0 mL    Total NET: 240 mL          MEDICATIONS  (STANDING):  buPROPion XL . 300 milliGRAM(s) Oral daily  chlorhexidine 4% Liquid 1 Application(s) Topical <User Schedule>  dronabinol 2.5 milliGRAM(s) Oral daily  heparin  Injectable 5000 Unit(s) SubCutaneous every 8 hours  influenza   Vaccine 0.5 milliLiter(s) IntraMuscular once  levothyroxine 100 MICROGram(s) Oral daily  midodrine 5 milliGRAM(s) Oral three times a day  silver sulfADIAZINE 1% Cream 1 Application(s) Topical daily  vancomycin    Solution 125 milliGRAM(s) Oral every 12 hours  ziprasidone 40 milliGRAM(s) Oral <User Schedule>    MEDICATIONS  (PRN):  acetaminophen   Tablet .. 975 milliGRAM(s) Oral every 6 hours PRN Mild Pain (1 - 3)  oxyCODONE    IR 5 milliGRAM(s) Oral every 4 hours PRN Moderate Pain (4 - 6)                            8.3    9.72  )-----------( 208      ( 10 Mar 2019 08:00 )             26.3       03-10    137  |  98  |  15  ----------------------------<  99  4.4   |  28  |  0.48<L>    Ca    8.3<L>      10 Mar 2019 05:37    TPro  4.7<L>  /  Alb  2.2<L>  /  TBili  0.3  /  DBili  x   /  AST  35  /  ALT  44  /  AlkPhos  193<H>  03-10      Physical Exam:  -- CONSTITUTIONAL: NAD.   -- EXTREMITIES: Wound with granulating tissue, no drainage or signs of infection, wound vac replaced

## 2019-03-11 NOTE — CONSULT NOTE ADULT - PROVIDER SPECIALTY LIST ADULT
Gastroenterology
Internal Medicine
Neurosurgery
Orthopedics
Palliative Care
Plastic Surgery
SICU
Infectious Disease

## 2019-03-11 NOTE — PROGRESS NOTE ADULT - ATTENDING COMMENTS
I agree with the above note on this patient. All pertinent films have been reviewed. Please refer to clinical documentation of the history, physical examinations, data summary, and both assessment and plan as documented above and with which I agree.    continue to maximize nutrition  appreciate prs assistance with complex wound healing    Conor George MD  Attending Orthopedic Surgeon

## 2019-03-11 NOTE — PROGRESS NOTE ADULT - ASSESSMENT
Well-Child Checkup: 5 Years     Learning to swim helps ensure your child’s lifelong safety. Teach your child to swim, or enroll your child in a swim class. Even if your child is healthy, keep taking him or her for yearly checkups.  This ensures your c Nutrition and exercise tips  Healthy eating and activity are two important keys to a healthy future. It’s not too early to start teaching your child healthy habits that will last a lifetime.  Here are some things you can do:  · Limit juice and sports drinks A/P 68F s/p hip fracture repair with wound dehiscence now s/p VAC placement    - VAC changed today  -- c/w MWF changes  - Care per primary  - Nutritional optimization · When riding a bike, your child should wear a helmet with the strap fastened. While roller-skating or using a scooter or skateboard, it’s safest to wear wrist guards, elbow pads, and knee pads, and a helmet.   · Teach your child his or her phone number, ad Your school district should be able to answer any questions you have about starting .  If you’re still not sure your child is ready, talk to the healthcare provider during this checkup.       Next checkup at: _______________________________

## 2019-03-11 NOTE — CHART NOTE - NSCHARTNOTEFT_GEN_A_CORE
Nutrition Follow Up Note  Pt seen for malnutrition follow up and calorie count assesment  Pt 69 y/o F with PMH: bipolar depression, OCD, dementia, HLD, NHL (in remission since ), osteoarthritis S/P bilateral TKR S/P fall, admitted with a right periprosthetic femoral neck fracture and C. difficile colitis, S/P ORIF of right periprosthetic femur fracture and revision of right hip hemiarthroplasty on (), transferred to SICU intubate, extubated, NGT tube feeding placed for supplemental nutrition on () due to poor PO intake, pt on Marinol.     As per 2 day calorie count, pt ate (3/9) 301 kcal and 9.1 g protein, and (3/10) 254 kcal and 2 g protein. Discussed with NP.  As per nurse, pt was hungry today and asked for food (calorie count was not taken today, 3/11), and pt is tolerating TF, currently at goal of 50 ml/hr x 24 hr.     Diet :   Regular + Ensure clear 2x daily (480 eva and 16 gm protein) + 2 Celestine (120 kcal, 28 g protein) + tube feedings     Enteral Nutrition: Jevity 1.2 through continuous feedings via NGT at 50 ml/hr x 12 hours provides 600 ml, 720 kcal, 33.5 g protein, and 463 ml water      Source for PO intake: Nurse, calorie count    Daily Weight in k.4 (-), Weight in k.1 (03-10), Weight in k.4 (), Weight in k (), Weight in k (), Weight in k.9 (), Weight in k.8 ()  wt change likely in setting of fluid shift, pt with 1+ generalized edema, 2+ edema in R hip    Pertinent Medications: MEDICATIONS  (STANDING):  buPROPion XL . 300 milliGRAM(s) Oral daily  chlorhexidine 4% Liquid 1 Application(s) Topical <User Schedule>  dronabinol 2.5 milliGRAM(s) Oral daily  heparin  Injectable 5000 Unit(s) SubCutaneous every 8 hours  influenza   Vaccine 0.5 milliLiter(s) IntraMuscular once  levothyroxine 100 MICROGram(s) Oral daily  midodrine 5 milliGRAM(s) Oral three times a day  silver sulfADIAZINE 1% Cream 1 Application(s) Topical daily  vancomycin    Solution 125 milliGRAM(s) Oral every 12 hours  ziprasidone 40 milliGRAM(s) Oral <User Schedule>    MEDICATIONS  (PRN):  acetaminophen   Tablet .. 975 milliGRAM(s) Oral every 6 hours PRN Mild Pain (1 - 3)  oxyCODONE    IR 5 milliGRAM(s) Oral every 4 hours PRN Moderate Pain (4 - 6)    Skin per nursing documentation: No pressure injuries  Edema: 1+ generalized edema, 2+ edema in R hip    Estimated Needs:   [X] no change since previous assessment  [ ] recalculated:     Previous Nutrition Diagnosis: Increased nutrient needs  Nutrition Diagnosis is: ongoing, being addressed with TF and supplemental PO feeds      Recommend  1) Increase TF to Jevity 1.2 to 60ml/hr, for a total of 1440 ml, to provide 1728 kcal, 80 g protein  1162 ml water (28.2 kcal/kg, 1.3 g protein/kg, based on dosing wt of 61.1 kg)  2) Honor PO food preferences as able  3) continue to monitor PO intake, wt, electrolytes and skin integrity  4) RD to remain available     Monitoring and Evaluation:     Continue to monitor Nutritional intake, Tolerance to diet prescription, weights, labs, skin integrity    RD remains available upon request and will follow up per protocol Nutrition Follow Up Note  Pt seen for malnutrition follow up and calorie count assesment  Pt 67 y/o F with PMH: bipolar depression, OCD, dementia, HLD, NHL (in remission since ), osteoarthritis S/P bilateral TKR S/P fall, admitted with a right periprosthetic femoral neck fracture and C. difficile colitis, S/P ORIF of right periprosthetic femur fracture and revision of right hip hemiarthroplasty on (), transferred to SICU intubate, extubated, NGT tube feeding placed for supplemental nutrition on () due to poor PO intake, pt on Marinol.     As per 2 day calorie count, pt ate (3/9) 301 kcal and 9.1 g protein, and (3/10) 254 kcal and 2 g protein. Based on calorie count, pt not meeting nutritional needs, recommend increasing TF, see below. Discussed with NP, NP to have discussion with MD to consider decreasing PO feeds and increasing EN feeds.  As per nurse, pt was hungry today and asked for food (calorie count was not taken today, 3/11), and pt is tolerating TF, currently at goal of 50 ml/hr x 12 hr.     Diet :   Regular + Ensure clear 2x daily (480 eva and 16 gm protein) + 2 Celestine+ tube feedings     Enteral Nutrition: Jevity 1.2 through continuous feedings via NGT at 50 ml/hr x 12 hours provides 600 ml, 720 kcal, 33.5 g protein, and 463 ml water      Source for PO intake: Nurse, calorie count    Daily Weight in k.4 (), Weight in k.1 (03-10), Weight in k.4 (), Weight in k (), Weight in k (), Weight in k.9 (), Weight in k.8 ()  wt change likely in setting of fluid shift, pt with 1+ generalized edema, 2+ edema in R hip    Pertinent Medications: MEDICATIONS  (STANDING):  buPROPion XL . 300 milliGRAM(s) Oral daily  chlorhexidine 4% Liquid 1 Application(s) Topical <User Schedule>  dronabinol 2.5 milliGRAM(s) Oral daily  heparin  Injectable 5000 Unit(s) SubCutaneous every 8 hours  influenza   Vaccine 0.5 milliLiter(s) IntraMuscular once  levothyroxine 100 MICROGram(s) Oral daily  midodrine 5 milliGRAM(s) Oral three times a day  silver sulfADIAZINE 1% Cream 1 Application(s) Topical daily  vancomycin    Solution 125 milliGRAM(s) Oral every 12 hours  ziprasidone 40 milliGRAM(s) Oral <User Schedule>    MEDICATIONS  (PRN):  acetaminophen   Tablet .. 975 milliGRAM(s) Oral every 6 hours PRN Mild Pain (1 - 3)  oxyCODONE    IR 5 milliGRAM(s) Oral every 4 hours PRN Moderate Pain (4 - 6)    Skin per nursing documentation: No pressure injuries  Edema: 1+ generalized edema, 2+ edema in R hip    Estimated Needs:   [X] no change since previous assessment  [ ] recalculated:     Previous Nutrition Diagnosis: Moderate Malnutrition  Nutrition Diagnosis is: ongoing, being addressed with TF and supplemental PO feeds      Recommend  1) Increase TF to Jevity 1.2  60 ml/hr x 24 hr, to provide 1440 ml, 1728 kcal, 80 g protein, 1162 ml water (28.2 kcal.kg, 1.3 g protein/kg based on dosing wt of 61.1 kg)  2)Continue to monitor PO intake, wt, electrolytes, skin integrity, TF tolerance  3) RD to remain available    Monitoring and Evaluation:     Continue to monitor Nutritional intake, Tolerance to diet prescription, weights, labs, skin integrity    RD remains available upon request and will follow up per protocol

## 2019-03-12 LAB
ANION GAP SERPL CALC-SCNC: 9 MMOL/L — SIGNIFICANT CHANGE UP (ref 5–17)
BUN SERPL-MCNC: 16 MG/DL — SIGNIFICANT CHANGE UP (ref 7–23)
CALCIUM SERPL-MCNC: 8.4 MG/DL — SIGNIFICANT CHANGE UP (ref 8.4–10.5)
CHLORIDE SERPL-SCNC: 98 MMOL/L — SIGNIFICANT CHANGE UP (ref 96–108)
CO2 SERPL-SCNC: 29 MMOL/L — SIGNIFICANT CHANGE UP (ref 22–31)
CREAT SERPL-MCNC: 0.5 MG/DL — SIGNIFICANT CHANGE UP (ref 0.5–1.3)
GLUCOSE SERPL-MCNC: 95 MG/DL — SIGNIFICANT CHANGE UP (ref 70–99)
HCT VFR BLD CALC: 26.6 % — LOW (ref 34.5–45)
HGB BLD-MCNC: 8.5 G/DL — LOW (ref 11.5–15.5)
MCHC RBC-ENTMCNC: 32 GM/DL — SIGNIFICANT CHANGE UP (ref 32–36)
MCHC RBC-ENTMCNC: 33.1 PG — SIGNIFICANT CHANGE UP (ref 27–34)
MCV RBC AUTO: 103.5 FL — HIGH (ref 80–100)
PLATELET # BLD AUTO: 227 K/UL — SIGNIFICANT CHANGE UP (ref 150–400)
POTASSIUM SERPL-MCNC: 4.4 MMOL/L — SIGNIFICANT CHANGE UP (ref 3.5–5.3)
POTASSIUM SERPL-SCNC: 4.4 MMOL/L — SIGNIFICANT CHANGE UP (ref 3.5–5.3)
RBC # BLD: 2.57 M/UL — LOW (ref 3.8–5.2)
RBC # FLD: 21.3 % — HIGH (ref 10.3–14.5)
SODIUM SERPL-SCNC: 136 MMOL/L — SIGNIFICANT CHANGE UP (ref 135–145)
WBC # BLD: 9.31 K/UL — SIGNIFICANT CHANGE UP (ref 3.8–10.5)
WBC # FLD AUTO: 9.31 K/UL — SIGNIFICANT CHANGE UP (ref 3.8–10.5)

## 2019-03-12 PROCEDURE — 99232 SBSQ HOSP IP/OBS MODERATE 35: CPT

## 2019-03-12 RX ADMIN — HEPARIN SODIUM 5000 UNIT(S): 5000 INJECTION INTRAVENOUS; SUBCUTANEOUS at 14:51

## 2019-03-12 RX ADMIN — Medication 125 MILLIGRAM(S): at 17:54

## 2019-03-12 RX ADMIN — Medication 2.5 MILLIGRAM(S): at 12:17

## 2019-03-12 RX ADMIN — MIDODRINE HYDROCHLORIDE 5 MILLIGRAM(S): 2.5 TABLET ORAL at 22:44

## 2019-03-12 RX ADMIN — MIDODRINE HYDROCHLORIDE 5 MILLIGRAM(S): 2.5 TABLET ORAL at 06:23

## 2019-03-12 RX ADMIN — MIDODRINE HYDROCHLORIDE 5 MILLIGRAM(S): 2.5 TABLET ORAL at 14:51

## 2019-03-12 RX ADMIN — Medication 1 APPLICATION(S): at 12:17

## 2019-03-12 RX ADMIN — BUPROPION HYDROCHLORIDE 300 MILLIGRAM(S): 150 TABLET, EXTENDED RELEASE ORAL at 12:17

## 2019-03-12 RX ADMIN — HEPARIN SODIUM 5000 UNIT(S): 5000 INJECTION INTRAVENOUS; SUBCUTANEOUS at 06:23

## 2019-03-12 RX ADMIN — ZIPRASIDONE HYDROCHLORIDE 40 MILLIGRAM(S): 20 CAPSULE ORAL at 10:09

## 2019-03-12 RX ADMIN — HEPARIN SODIUM 5000 UNIT(S): 5000 INJECTION INTRAVENOUS; SUBCUTANEOUS at 22:44

## 2019-03-12 RX ADMIN — Medication 100 MICROGRAM(S): at 06:23

## 2019-03-12 RX ADMIN — CHLORHEXIDINE GLUCONATE 1 APPLICATION(S): 213 SOLUTION TOPICAL at 06:25

## 2019-03-12 RX ADMIN — Medication 125 MILLIGRAM(S): at 06:23

## 2019-03-12 NOTE — PROGRESS NOTE ADULT - SUBJECTIVE AND OBJECTIVE BOX
INTERVAL HPI/OVERNIGHT EVENTS: pt currently refusing peg placement, tolerating NG feeds    MEDICATIONS  (STANDING):  buPROPion XL . 300 milliGRAM(s) Oral daily  chlorhexidine 4% Liquid 1 Application(s) Topical <User Schedule>  dronabinol 2.5 milliGRAM(s) Oral daily  heparin  Injectable 5000 Unit(s) SubCutaneous every 8 hours  influenza   Vaccine 0.5 milliLiter(s) IntraMuscular once  levothyroxine 100 MICROGram(s) Oral daily  midodrine 5 milliGRAM(s) Oral three times a day  silver sulfADIAZINE 1% Cream 1 Application(s) Topical daily  vancomycin    Solution 125 milliGRAM(s) Oral every 12 hours  ziprasidone 40 milliGRAM(s) Oral <User Schedule>    MEDICATIONS  (PRN):  acetaminophen   Tablet .. 975 milliGRAM(s) Oral every 6 hours PRN Mild Pain (1 - 3)  oxyCODONE    IR 5 milliGRAM(s) Oral every 4 hours PRN Moderate Pain (4 - 6)      Allergies    honeydew melon (Other)  penicillin (Other; Hives)    Intolerances            PHYSICAL EXAM:   Vital Signs:  Vital Signs Last 24 Hrs  T(C): 36.8 (12 Mar 2019 04:31), Max: 37.2 (11 Mar 2019 11:54)  T(F): 98.2 (12 Mar 2019 04:31), Max: 99 (11 Mar 2019 11:54)  HR: 93 (12 Mar 2019 04:31) (93 - 97)  BP: 98/62 (12 Mar 2019 04:31) (91/57 - 98/62)  BP(mean): --  RR: 18 (12 Mar 2019 04:31) (18 - 18)  SpO2: 100% (12 Mar 2019 04:31) (99% - 100%)  Daily     Daily Weight in k.3 (12 Mar 2019 06:21)    GENERAL:  no distress  HEENT:  NC/AT,  anicteric  CHEST:   no increased effort, breath sounds clear  HEART:  Regular rhythm  ABDOMEN:  Soft, non-tender, non-distended, normoactive bowel sounds,  no masses ,no hepato-splenomegaly, no signs of chronic liver disease  EXTEREMITIES:  no cyanosis      LABS:                        8.5    9.31  )-----------( 227      ( 12 Mar 2019 08:41 )             26.6     03-12    136  |  98  |  16  ----------------------------<  95  4.4   |  29  |  0.50    Ca    8.4      12 Mar 2019 06:41            RADIOLOGY & ADDITIONAL TESTS:

## 2019-03-12 NOTE — PROGRESS NOTE ADULT - ATTENDING COMMENTS
Pierre Lomeli  Attending Physician   Division of Infectious Disease  Pager #[phone]  After 5pm/weekend or no response, call #[phone]

## 2019-03-12 NOTE — PROGRESS NOTE ADULT - SUBJECTIVE AND OBJECTIVE BOX
WILI PARKER 68y [id number]    Patient is a 68y old  Female who presents with a chief complaint of c diff (11 Mar 2019 18:26)      Follow Up/CC:  ID following for cdiff    Interval History/ROS: slept well o/n, no fever    Allergies    honeydew melon (Other)  penicillin (Other; Hives)    Intolerances        ANTIMICROBIALS:  vancomycin    Solution 125 every 12 hours      MEDICATIONS  (STANDING):  buPROPion XL . 300 milliGRAM(s) Oral daily  chlorhexidine 4% Liquid 1 Application(s) Topical <User Schedule>  dronabinol 2.5 milliGRAM(s) Oral daily  heparin  Injectable 5000 Unit(s) SubCutaneous every 8 hours  influenza   Vaccine 0.5 milliLiter(s) IntraMuscular once  levothyroxine 100 MICROGram(s) Oral daily  midodrine 5 milliGRAM(s) Oral three times a day  silver sulfADIAZINE 1% Cream 1 Application(s) Topical daily  vancomycin    Solution 125 milliGRAM(s) Oral every 12 hours  ziprasidone 40 milliGRAM(s) Oral <User Schedule>    MEDICATIONS  (PRN):  acetaminophen   Tablet .. 975 milliGRAM(s) Oral every 6 hours PRN Mild Pain (1 - 3)  oxyCODONE    IR 5 milliGRAM(s) Oral every 4 hours PRN Moderate Pain (4 - 6)        Vital Signs Last 24 Hrs  T(C): 36.8 (12 Mar 2019 04:31), Max: 37.2 (11 Mar 2019 11:54)  T(F): 98.2 (12 Mar 2019 04:31), Max: 99 (11 Mar 2019 11:54)  HR: 93 (12 Mar 2019 04:31) (93 - 97)  BP: 98/62 (12 Mar 2019 04:31) (91/57 - 98/62)  BP(mean): --  RR: 18 (12 Mar 2019 04:31) (18 - 18)  SpO2: 100% (12 Mar 2019 04:31) (99% - 100%)    CBC Full  -  ( 12 Mar 2019 08:41 )  WBC Count : 9.31 K/uL  Hemoglobin : 8.5 g/dL  Hematocrit : 26.6 %  Platelet Count - Automated : 227 K/uL  Mean Cell Volume : 103.5 fl  Mean Cell Hemoglobin : 33.1 pg  Mean Cell Hemoglobin Concentration : 32.0 gm/dL  Auto Neutrophil # : x  Auto Lymphocyte # : x  Auto Monocyte # : x  Auto Eosinophil # : x  Auto Basophil # : x  Auto Neutrophil % : x  Auto Lymphocyte % : x  Auto Monocyte % : x  Auto Eosinophil % : x  Auto Basophil % : x    03-12    136  |  98  |  16  ----------------------------<  95  4.4   |  29  |  0.50    Ca    8.4      12 Mar 2019 06:41            MICROBIOLOGY:  .Body Fluid Synovial Fluid  02-23-19   No growth at 1 week.  --    No polymorphonuclear cells seen  No organisms seen  by cytocentrifuge      .Tissue Other, right hip synovium  02-23-19   No growth at 1 week.  --    Rare polymorphonuclear leukocytes seen per low power field  No organisms seen per oil power field      .Tissue Other, right femur  02-23-19   No growth at 5 days  --    Rare polymorphonuclear leukocytes seen per low power field  No organisms seen per oil power field        RADIOLOGY     Xray Chest 1 View- PORTABLE-Routine (03.02.19 @ 10:14) >  There are small bilateral pleural effusions which may have   improved since the prior study likely associated with left lung base   atelectasis. There is no pneumothorax.

## 2019-03-12 NOTE — PROGRESS NOTE ADULT - ATTENDING COMMENTS
I am a non participating [facility] physician seeing Pt in coverage for Dr Barraza. The above patient examination was reviewed with Dr. Awan and I agree with his evaluation, assessment and treatment plan.  Orlin Barraza M.D. Patient has not been tolerating PO. After a full discussion with the patient, she is now agreeing  to the PEG. GI to proceed with scheduling. Beginning to ambulate with non weight bearing and doing well. No medical complications post-op to date and to proceed with physical therapy, as tolerated. Continues pulmonary toilet to lessen atelectasis, leg exercises as taught to lessen the risk of DVT and supervised pain medications for post-op pain control. I anticipate transfer to rehabilitation to follow when cleared by orthopedics.

## 2019-03-12 NOTE — PROGRESS NOTE ADULT - SUBJECTIVE AND OBJECTIVE BOX
Pt S/E at bedside, no acute events overnight, pain controlled,  denies SOB/CP/N/V/Numbness/Tingling.    Vital Signs Last 24 Hrs  T(C): 36.8 (12 Mar 2019 04:31), Max: 37.2 (11 Mar 2019 11:54)  T(F): 98.2 (12 Mar 2019 04:31), Max: 99 (11 Mar 2019 11:54)  HR: 93 (12 Mar 2019 04:31) (93 - 97)  BP: 98/62 (12 Mar 2019 04:31) (91/57 - 98/62)  BP(mean): --  RR: 18 (12 Mar 2019 04:31) (18 - 18)  SpO2: 100% (12 Mar 2019 04:31) (99% - 100%)    Gen: NAD,      Right Lower Extremity:  Dressing clean dry intact, wound vac maintaining suction  Clean dressing to right foot  +EHL/FHL/TA/GS  SILT L3-S1  +DP/PT Pulses  Compartments soft  No calf TTP B/L

## 2019-03-12 NOTE — PROGRESS NOTE ADULT - SUBJECTIVE AND OBJECTIVE BOX
67 y/o critically ill  female h/o dementia NHL presenting after unwitnessed fall. Pt found on the ground at rehab. is on lovenox after hip fracture. pt reports pain to her right leg. per EMS en route slurring speech and mildly hypotensive requesting trauma.unclear how long pt on the ground. Patient found to have C diff with worsening hypotension requiring pressors on and off and  Patient requiring the SICU for continued cardiopulmonary monitoring. Orthopedic procedure held due to  hypotension and pan colitis and need for pressors due to unstable hemodynamics. Diarrhea slowing off IV Flagyl + on oral vancomycin. No further C.diff colitis and no further hypovolemic hypotension. Patient s/p Open reduction and internal fixation of periprosthetic hip fx Mental status has greatly improved.  Patient seen in SICU prior to transfer to Medical service and regular floor  Patient  alert awake and able to speak but confused . Patient less agitated than yesterday .  On N/G alimentation for hypoproteinemic edema. Blood pressure being supported with midodrine and no diuretics are being given. Patient feels better after moving her bowels. Not agitated and cooperative on Geodan and wellbutrin.  The patient was seen by nutrition and their plan is to be followed. Should her hematocrit continued to drift lower, she will receive 1 unit of packed cells with IV Lasix to correct hypovolemic, hypoproteinemic edema with hypotension on Midodrine. No transfusion presently required with rise in Hct.  The patient is to be out of bed to chair and resume physical therapy, as per orthopedics, after right periprosthetic fracture ORIF on February 22, 2019.  Patient with wound dehiscence seen by plastics and wound vaccuum placed.  She has protein calorie malnutrition due to poor po intake  Will order calorie count to see if she takes in enough calories and protein in order to D/C   the NGT feeds, NGT may be problematic if she is to go to rehab. Patient has been OOB to chair. Patient has not been tolerating PO.    MEDICATIONS  (STANDING):  buPROPion XL . 300 milliGRAM(s) Oral daily  chlorhexidine 4% Liquid 1 Application(s) Topical <User Schedule>  dronabinol 2.5 milliGRAM(s) Oral daily  heparin  Injectable 5000 Unit(s) SubCutaneous every 8 hours  influenza   Vaccine 0.5 milliLiter(s) IntraMuscular once  levothyroxine 100 MICROGram(s) Oral daily  midodrine 5 milliGRAM(s) Oral three times a day  silver sulfADIAZINE 1% Cream 1 Application(s) Topical daily  vancomycin    Solution 125 milliGRAM(s) Oral every 12 hours  ziprasidone 40 milliGRAM(s) Oral <User Schedule>    MEDICATIONS  (PRN):  acetaminophen   Tablet .. 975 milliGRAM(s) Oral every 6 hours PRN Mild Pain (1 - 3)  oxyCODONE    IR 5 milliGRAM(s) Oral every 4 hours PRN Moderate Pain (4 - 6)    Vital Signs Last 24 Hrs  T(C): 36.7 (12 Mar 2019 14:01), Max: 36.8 (12 Mar 2019 04:31)  T(F): 98 (12 Mar 2019 14:01), Max: 98.2 (12 Mar 2019 04:31)  HR: 99 (12 Mar 2019 14:01) (93 - 99)  BP: 124/73 (12 Mar 2019 14:01) (98/62 - 124/73)  BP(mean): --  RR: 18 (12 Mar 2019 14:01) (18 - 18)  SpO2: 99% (12 Mar 2019 14:01) (99% - 100%)       PHYSICAL EXAM:  GENERAL: NAD, A+O x 3  HEAD:  Atraumatic  EYES: EOM, PERRLA, conjunctiva pink and sclera white  ENT: No tonsillar erythema, exudates, NG tube in place  NECK: Supple, No JVD, normal thyroid, carotids with normal upstrokes and no bruits  CHEST/LUNG: Clear to auscultation bilaterally, No rales, rhonchi, wheezing, or rubs  HEART: Regular rate and rhythm, No murmurs, rubs, or gallops  ABDOMEN: Soft, nondistended, no masses, guarding, tenderness or rebound, bowel sounds present NGT in place  EXTREMITIES:  2+ Peripheral Pulses, No clubbing, cyanosis, or edema. No arthritis of shoulders, elbows, hands, hips, knees, ankles, or feet. No DJD C spine, T spine, or L/S spine  LYMPH: No lymphadenopathy noted  SKIN: No rashes or lesions  NERVOUS SYSTEM:  Alert & Oriented X3, normal cognitive function. Motor Strength 5/5 right upper and right lower.  5/5 left upper and left lower extremities, DTRs 2+ intact and symmetric    LABS:          CBC Full  -  ( 12 Mar 2019 08:41 )  WBC Count : 9.31 K/uL  Hemoglobin : 8.5 g/dL  Hematocrit : 26.6 %  Platelet Count - Automated : 227 K/uL  Mean Cell Volume : 103.5 fl  Mean Cell Hemoglobin : 33.1 pg  Mean Cell Hemoglobin Concentration : 32.0 gm/dL  Auto Neutrophil # : x  Auto Lymphocyte # : x  Auto Monocyte # : x  Auto Eosinophil # : x  Auto Basophil # : x  Auto Neutrophil % : x  Auto Lymphocyte % : x  Auto Monocyte % : x  Auto Eosinophil % : x  Auto Basophil % : x    03-12    136  |  98  |  16  ----------------------------<  95  4.4   |  29  |  0.50    Ca    8.4      12 Mar 2019 06:41 67 y/o critically ill  female h/o dementia NHL presenting after unwitnessed fall. Pt found on the ground at rehab. is on lovenox after hip fracture. pt reports pain to her right leg. per EMS en route slurring speech and mildly hypotensive requesting trauma.unclear how long pt on the ground. Patient found to have C diff with worsening hypotension requiring pressors on and off and  Patient requiring the SICU for continued cardiopulmonary monitoring. Orthopedic procedure held due to  hypotension and pan colitis and need for pressors due to unstable hemodynamics. Diarrhea slowing off IV Flagyl + on oral vancomycin. No further C.diff colitis and no further hypovolemic hypotension. Patient s/p Open reduction and internal fixation of periprosthetic hip fx Mental status has greatly improved.  Patient seen in SICU prior to transfer to Medical service and regular floor  Patient  alert awake and able to speak but confused . Patient less agitated than yesterday .  On N/G alimentation for hypoproteinemic edema. Blood pressure being supported with midodrine and no diuretics are being given. Patient feels better after moving her bowels. Not agitated and cooperative on Geodan and wellbutrin.  The patient was seen by nutrition and their plan is to be followed. Should her hematocrit continued to drift lower, she will receive 1 unit of packed cells with IV Lasix to correct hypovolemic, hypoproteinemic edema with hypotension on Midodrine. No transfusion presently required with rise in Hct.  The patient is to be out of bed to chair and resume physical therapy, as per orthopedics, after right periprosthetic fracture ORIF on February 22, 2019.  Patient with wound dehiscence seen by plastics and wound vaccuum placed.  She has protein calorie malnutrition due to poor po intake  Will order calorie count to see if she takes in enough calories and protein in order to D/C   the NGT feeds, NGT may be problematic if she is to go to rehab. Patient has been OOB to chair. Patient has not been tolerating PO. After a full discussion with the patient, she is now agreeing  to the PEG. GI to proceed with scheduling.    MEDICATIONS  (STANDING):  buPROPion XL . 300 milliGRAM(s) Oral daily  chlorhexidine 4% Liquid 1 Application(s) Topical <User Schedule>  dronabinol 2.5 milliGRAM(s) Oral daily  heparin  Injectable 5000 Unit(s) SubCutaneous every 8 hours  influenza   Vaccine 0.5 milliLiter(s) IntraMuscular once  levothyroxine 100 MICROGram(s) Oral daily  midodrine 5 milliGRAM(s) Oral three times a day  silver sulfADIAZINE 1% Cream 1 Application(s) Topical daily  vancomycin    Solution 125 milliGRAM(s) Oral every 12 hours  ziprasidone 40 milliGRAM(s) Oral <User Schedule>    MEDICATIONS  (PRN):  acetaminophen   Tablet .. 975 milliGRAM(s) Oral every 6 hours PRN Mild Pain (1 - 3)  oxyCODONE    IR 5 milliGRAM(s) Oral every 4 hours PRN Moderate Pain (4 - 6)    Vital Signs Last 24 Hrs  T(C): 36.7 (12 Mar 2019 14:01), Max: 36.8 (12 Mar 2019 04:31)  T(F): 98 (12 Mar 2019 14:01), Max: 98.2 (12 Mar 2019 04:31)  HR: 99 (12 Mar 2019 14:01) (93 - 99)  BP: 124/73 (12 Mar 2019 14:01) (98/62 - 124/73)  BP(mean): --  RR: 18 (12 Mar 2019 14:01) (18 - 18)  SpO2: 99% (12 Mar 2019 14:01) (99% - 100%)       PHYSICAL EXAM:  GENERAL: NAD, A+O x 3  HEAD:  Atraumatic  EYES: EOM, PERRLA, conjunctiva pink and sclera white  ENT: No tonsillar erythema, exudates, NG tube in place  NECK: Supple, No JVD, normal thyroid, carotids with normal upstrokes and no bruits  CHEST/LUNG: Clear to auscultation bilaterally, No rales, rhonchi, wheezing, or rubs  HEART: Regular rate and rhythm, No murmurs, rubs, or gallops  ABDOMEN: Soft, nondistended, no masses, guarding, tenderness or rebound, bowel sounds present NGT in place  EXTREMITIES:  2+ Peripheral Pulses, No clubbing, cyanosis, or edema. No arthritis of shoulders, elbows, hands, hips, knees, ankles, or feet. No DJD C spine, T spine, or L/S spine  LYMPH: No lymphadenopathy noted  SKIN: No rashes or lesions  NERVOUS SYSTEM:  Alert & Oriented X3, normal cognitive function. Motor Strength 5/5 right upper and right lower.  5/5 left upper and left lower extremities, DTRs 2+ intact and symmetric    LABS:          CBC Full  -  ( 12 Mar 2019 08:41 )  WBC Count : 9.31 K/uL  Hemoglobin : 8.5 g/dL  Hematocrit : 26.6 %  Platelet Count - Automated : 227 K/uL  Mean Cell Volume : 103.5 fl  Mean Cell Hemoglobin : 33.1 pg  Mean Cell Hemoglobin Concentration : 32.0 gm/dL  Auto Neutrophil # : x  Auto Lymphocyte # : x  Auto Monocyte # : x  Auto Eosinophil # : x  Auto Basophil # : x  Auto Neutrophil % : x  Auto Lymphocyte % : x  Auto Monocyte % : x  Auto Eosinophil % : x  Auto Basophil % : x    03-12    136  |  98  |  16  ----------------------------<  95  4.4   |  29  |  0.50    Ca    8.4      12 Mar 2019 06:41

## 2019-03-13 LAB
ANION GAP SERPL CALC-SCNC: 11 MMOL/L — SIGNIFICANT CHANGE UP (ref 5–17)
BUN SERPL-MCNC: 14 MG/DL — SIGNIFICANT CHANGE UP (ref 7–23)
CALCIUM SERPL-MCNC: 8.4 MG/DL — SIGNIFICANT CHANGE UP (ref 8.4–10.5)
CHLORIDE SERPL-SCNC: 96 MMOL/L — SIGNIFICANT CHANGE UP (ref 96–108)
CO2 SERPL-SCNC: 28 MMOL/L — SIGNIFICANT CHANGE UP (ref 22–31)
CREAT SERPL-MCNC: 0.48 MG/DL — LOW (ref 0.5–1.3)
GLUCOSE SERPL-MCNC: 91 MG/DL — SIGNIFICANT CHANGE UP (ref 70–99)
HCT VFR BLD CALC: 25.9 % — LOW (ref 34.5–45)
HGB BLD-MCNC: 8.3 G/DL — LOW (ref 11.5–15.5)
MCHC RBC-ENTMCNC: 32 GM/DL — SIGNIFICANT CHANGE UP (ref 32–36)
MCHC RBC-ENTMCNC: 32.5 PG — SIGNIFICANT CHANGE UP (ref 27–34)
MCV RBC AUTO: 101.6 FL — HIGH (ref 80–100)
PLATELET # BLD AUTO: 235 K/UL — SIGNIFICANT CHANGE UP (ref 150–400)
POTASSIUM SERPL-MCNC: 4 MMOL/L — SIGNIFICANT CHANGE UP (ref 3.5–5.3)
POTASSIUM SERPL-SCNC: 4 MMOL/L — SIGNIFICANT CHANGE UP (ref 3.5–5.3)
RBC # BLD: 2.55 M/UL — LOW (ref 3.8–5.2)
RBC # FLD: 21 % — HIGH (ref 10.3–14.5)
SODIUM SERPL-SCNC: 135 MMOL/L — SIGNIFICANT CHANGE UP (ref 135–145)
WBC # BLD: 10.07 K/UL — SIGNIFICANT CHANGE UP (ref 3.8–10.5)
WBC # FLD AUTO: 10.07 K/UL — SIGNIFICANT CHANGE UP (ref 3.8–10.5)

## 2019-03-13 PROCEDURE — 99232 SBSQ HOSP IP/OBS MODERATE 35: CPT

## 2019-03-13 RX ADMIN — Medication 1 APPLICATION(S): at 11:37

## 2019-03-13 RX ADMIN — CHLORHEXIDINE GLUCONATE 1 APPLICATION(S): 213 SOLUTION TOPICAL at 05:56

## 2019-03-13 RX ADMIN — Medication 100 MICROGRAM(S): at 05:56

## 2019-03-13 RX ADMIN — OXYCODONE HYDROCHLORIDE 5 MILLIGRAM(S): 5 TABLET ORAL at 13:16

## 2019-03-13 RX ADMIN — MIDODRINE HYDROCHLORIDE 5 MILLIGRAM(S): 2.5 TABLET ORAL at 13:15

## 2019-03-13 RX ADMIN — Medication 2.5 MILLIGRAM(S): at 11:36

## 2019-03-13 RX ADMIN — Medication 125 MILLIGRAM(S): at 05:56

## 2019-03-13 RX ADMIN — HEPARIN SODIUM 5000 UNIT(S): 5000 INJECTION INTRAVENOUS; SUBCUTANEOUS at 13:15

## 2019-03-13 RX ADMIN — OXYCODONE HYDROCHLORIDE 5 MILLIGRAM(S): 5 TABLET ORAL at 14:16

## 2019-03-13 RX ADMIN — Medication 975 MILLIGRAM(S): at 12:37

## 2019-03-13 RX ADMIN — MIDODRINE HYDROCHLORIDE 5 MILLIGRAM(S): 2.5 TABLET ORAL at 05:56

## 2019-03-13 RX ADMIN — Medication 125 MILLIGRAM(S): at 17:20

## 2019-03-13 RX ADMIN — ZIPRASIDONE HYDROCHLORIDE 40 MILLIGRAM(S): 20 CAPSULE ORAL at 10:29

## 2019-03-13 RX ADMIN — Medication 975 MILLIGRAM(S): at 11:37

## 2019-03-13 RX ADMIN — BUPROPION HYDROCHLORIDE 300 MILLIGRAM(S): 150 TABLET, EXTENDED RELEASE ORAL at 11:36

## 2019-03-13 RX ADMIN — MIDODRINE HYDROCHLORIDE 5 MILLIGRAM(S): 2.5 TABLET ORAL at 22:39

## 2019-03-13 RX ADMIN — HEPARIN SODIUM 5000 UNIT(S): 5000 INJECTION INTRAVENOUS; SUBCUTANEOUS at 22:39

## 2019-03-13 RX ADMIN — HEPARIN SODIUM 5000 UNIT(S): 5000 INJECTION INTRAVENOUS; SUBCUTANEOUS at 05:56

## 2019-03-13 NOTE — PROGRESS NOTE ADULT - SUBJECTIVE AND OBJECTIVE BOX
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[facility], in remission since 2016  Hypercholesteremia  Bipolar depression  OCD (obsessive compulsive disorder)  Depression  Osteoarthritis of right knee  S/P TKR (total knee replacement), bilateral: discharged nov 5th, 2014  S/P cataract surgery  H/O oral surgery: 2014  S/P knee surgery: 1978    FAMILY HISTORY:  Family history of diabetes mellitus  Family history of prostate cancer (Grandparent)  Family history of stomach cancer (Grandparent)  Family history of breast cancer  Family history of COPD (chronic obstructive pulmonary disease)    MEDICATIONS  (STANDING):  buPROPion XL . 300 milliGRAM(s) Oral daily  chlorhexidine 4% Liquid 1 Application(s) Topical <User Schedule>  dronabinol 2.5 milliGRAM(s) Oral daily  heparin  Injectable 5000 Unit(s) SubCutaneous every 8 hours  influenza   Vaccine 0.5 milliLiter(s) IntraMuscular once  levothyroxine 100 MICROGram(s) Oral daily  midodrine 5 milliGRAM(s) Oral three times a day  silver sulfADIAZINE 1% Cream 1 Application(s) Topical daily  vancomycin    Solution 125 milliGRAM(s) Oral every 12 hours  ziprasidone 40 milliGRAM(s) Oral <User Schedule>    MEDICATIONS  (PRN):  acetaminophen   Tablet .. 975 milliGRAM(s) Oral every 6 hours PRN Mild Pain (1 - 3)  oxyCODONE    IR 5 milliGRAM(s) Oral every 4 hours PRN Moderate Pain (4 - 6)      Vital Signs Last 24 Hrs  T(C): 37 (13 Mar 2019 05:18), Max: 37 (13 Mar 2019 05:18)  T(F): 98.6 (13 Mar 2019 05:18), Max: 98.6 (13 Mar 2019 05:18)  HR: 95 (13 Mar 2019 05:18) (95 - 99)  BP: 105/67 (13 Mar 2019 05:18) (105/67 - 124/73)  BP(mean): --  RR: 18 (13 Mar 2019 05:18) (18 - 18)  SpO2: 98% (13 Mar 2019 05:18) (98% - 99%)  nc/at  s1s2  cta  soft, nt, nd no guarding or rebound  no c/c/e    CBC Full  -  ( 13 Mar 2019 08:33 )  WBC Count : 10.07 K/uL  Hemoglobin : 8.3 g/dL  Hematocrit : 25.9 %  Platelet Count - Automated : 235 K/uL  Mean Cell Volume : 101.6 fl  Mean Cell Hemoglobin : 32.5 pg  Mean Cell Hemoglobin Concentration : 32.0 gm/dL  Auto Neutrophil # : x  Auto Lymphocyte # : x  Auto Monocyte # : x  Auto Eosinophil # : x  Auto Basophil # : x  Auto Neutrophil % : x  Auto Lymphocyte % : x  Auto Monocyte % : x  Auto Eosinophil % : x  Auto Basophil % : x    03-13    135  |  96  |  14  ----------------------------<  91  4.0   |  28  |  0.48<L>    Ca    8.4      13 Mar 2019 06:39

## 2019-03-13 NOTE — CHART NOTE - NSCHARTNOTEFT_GEN_A_CORE
Called by RN pt has mass of her arm.  Pt examined at a bedside, NAD, non-toxic appearing, pt demented and confused. New anterolateral forearm fixed tender mass noted. Unable to exam pt becomes agitated  and uncooperative. Pt with hx of NHL. unable to conduct full exam, pt becomes very agitated and aggressive. no edema noticed. no cyanosis, no ecchymosis.   - upper extremity duplex ordered  - will cont to monitor  - will cont to fully asses the pt  - endorse to primary team in AM Called by RN pt has mass of her arm.  Pt examined at a bedside, NAD, non-toxic appearing, pt demented and confused. New anteromedial forearm fixed tender mass noted. Unable to exam pt becomes agitated  and uncooperative. Pt with hx of NHL. unable to conduct full exam, pt becomes very agitated and aggressive. no edema noticed. no cyanosis, no ecchymosis.   - upper extremity duplex ordered  - will cont to monitor  - will cont to fully asses the pt  - endorse to primary team in AM Called by RN pt has mass of her arm.  Pt examined at a bedside, NAD, non-toxic appearing, pt demented and confused. New anteromedial forearm fixed tender mass noted. Unable to exam pt becomes agitated  and uncooperative. Pt with hx of NHL. unable to conduct full exam, pt becomes very agitated and aggressive. no edema noticed. no cyanosis, no ecchymosis.   - upper extremity duplex ordered  - will cont to monitor  - will cont to  try to fully asses the pt  - endorse to primary team in AM Called by RN pt has mass of her arm.  Pt examined at a bedside, NAD, non-toxic appearing, pt demented and confused. New anteromedial forearm fixed tender mass noted. Unable to exam pt becomes agitated  and uncooperative. Pt with hx of NHL. unable to conduct full exam, pt becomes very agitated and aggressive. no edema noticed. no cyanosis, no ecchymosis. VSS, pt afebrile. No leukocytosis.    - upper extremity duplex ordered  - will cont to monitor  - will cont to  try to fully asses the pt  - endorse to primary team in AM

## 2019-03-13 NOTE — PROGRESS NOTE ADULT - ATTENDING COMMENTS
Pierre Lomeli  Attending Physician   Division of Infectious Disease  Pager #[phone]  After 5pm/weekend or no response, call #[phone]

## 2019-03-13 NOTE — PROGRESS NOTE ADULT - NSICDXPROBLEM_GEN_ALL_CORE_FT
PROBLEM DIAGNOSES  Problem: Severe protein-calorie malnutrition  Assessment and Plan: NGT DCed  PEG placed  will start feeding when OK with GI    Problem: Hyponatremia  Assessment and Plan: sodium stable  continue to monitor    Problem: Wound dehiscence  Assessment and Plan: continue vaccuum dressing to wound      Problem: Delirium  Assessment and Plan: has been stable   Patient has a baseline dementia which has been stable    Problem: Clostridium difficile colitis  Assessment and Plan: has been stable  continue vanco PO

## 2019-03-13 NOTE — PROGRESS NOTE ADULT - SUBJECTIVE AND OBJECTIVE BOX
Pt S/E at bedside, no acute events overnight, pain controlled,  denies SOB/CP/N/V/Numbness/Tingling. Patient now agreeing to PEG placement; GI scheduling to follow.    Vital Signs Last 24 Hrs  T(C): 37 (13 Mar 2019 05:18), Max: 37 (13 Mar 2019 05:18)  T(F): 98.6 (13 Mar 2019 05:18), Max: 98.6 (13 Mar 2019 05:18)  HR: 95 (13 Mar 2019 05:18) (95 - 99)  BP: 105/67 (13 Mar 2019 05:18) (105/67 - 124/73)  BP(mean): --  RR: 18 (13 Mar 2019 05:18) (18 - 18)  SpO2: 98% (13 Mar 2019 05:18) (98% - 99%)    Gen: NAD,      Right Lower Extremity:  Dressing clean dry intact, wound vac maintaining suction  Clean dressing to right foot  +EHL/FHL/TA/GS  SILT L3-S1  +DP/PT Pulses  Compartments soft  No calf TTP B/L

## 2019-03-13 NOTE — PROGRESS NOTE ADULT - ATTENDING COMMENTS
Patient has not been tolerating PO. After a full discussion with the patient, she is now agreeing  to the PEG. GI to proceed with scheduling. Beginning to ambulate with non weight bearing and doing well. No medical complications post-op to date and to proceed with physical therapy, as tolerated. Continues pulmonary toilet to lessen atelectasis, leg exercises as taught to lessen the risk of DVT and supervised pain medications for post-op pain control. I anticipate transfer to rehabilitation now that PEG is in place. will need to discuss with surgery and wound care. I am a non participating [facility] physician seeing Pt in coverage for Dr Barraza Patient has not been tolerating PO. After a full discussion with the patient, she is now agreeing  to the PEG. GI to proceed with scheduling. Beginning to ambulate with non weight bearing and doing well. No medical complications post-op to date and to proceed with physical therapy, as tolerated. Continues pulmonary toilet to lessen atelectasis, leg exercises as taught to lessen the risk of DVT and supervised pain medications for post-op pain control. I anticipate transfer to rehabilitation now that PEG is in place. will need to discuss with surgery and wound care. I am a non participating [facility] physician seeing Pt in coverage for Dr Barraza. The above patient examination was reviewed with Dr. Awan and I agree with his evaluation, assessment and treatment plan.  Orlin Barraza M.D.

## 2019-03-13 NOTE — PROGRESS NOTE ADULT - SUBJECTIVE AND OBJECTIVE BOX
Pre-Endoscopy Evaluation      Referring Physician: dr. 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 300 milliGRAM(s) Oral daily  chlorhexidine 4% Liquid 1 Application(s) Topical <User Schedule>  dronabinol 2.5 milliGRAM(s) Oral daily  heparin  Injectable 5000 Unit(s) SubCutaneous every 8 hours  influenza   Vaccine 0.5 milliLiter(s) IntraMuscular once  levothyroxine 100 MICROGram(s) Oral daily  midodrine 5 milliGRAM(s) Oral three times a day  silver sulfADIAZINE 1% Cream 1 Application(s) Topical daily  vancomycin    Solution 125 milliGRAM(s) Oral every 12 hours  ziprasidone 40 milliGRAM(s) Oral <User Schedule>    MEDICATIONS  (PRN):  acetaminophen   Tablet .. 975 milliGRAM(s) Oral every 6 hours PRN Mild Pain (1 - 3)  oxyCODONE    IR 5 milliGRAM(s) Oral every 4 hours PRN Moderate Pain (4 - 6)      Smoking: [ ] yes  [X] no    AICD/PPM: [ ] yes   [X] no    Pertinent lab data:                        8.5    9.31  )-----------( 227      ( 12 Mar 2019 08:41 )             26.6     03-13    135  |  96  |  14  ----------------------------<  91  4.0   |  28  |  0.48<L>    Ca    8.4      13 Mar 2019 06:39      < from: Transthoracic Echocardiogram (02.09.19 @ 11:04) >    Observations:  Mitral Valve: Normal mitral valve. Mild mitral  regurgitation.  Aortic Valve/Aorta: Normal trileaflet aortic valve.  Aortic Root: 3.2 cm.  Left Atrium: LA volume index = 10 cc/m2.  Left Ventricle: Endocardium not well visualized; grossly  normal left ventricular systolic function. Normal left  ventricular internal dimensions and wall thicknesses.  Normal diastolic function  Right Heart: Normal right atrium. Normal right ventricular  size and function. Normal tricuspid valve. Normal pulmonic  valve.  Pericardium/Pleura: Normal pericardium with no pericardial  effusion.  Left pleural effusion.  Hemodynamic: Estimated right atrial pressure is 8 mm Hg.  ------------------------------------------------------------------------  Conclusions:  1. Normal left ventricular internal dimensions and wall  thicknesses.  2. Endocardium not well visualized; grossly normal left  ventricular systolic function.  3. Left pleural effusion.  ---------------------------------        Physical Examination:    Daily   Vital Signs Last 24 Hrs  T(C): 37 (13 Mar 2019 05:18), Max: 37 (13 Mar 2019 05:18)  T(F): 98.6 (13 Mar 2019 05:18), Max: 98.6 (13 Mar 2019 05:18)  HR: 95 (13 Mar 2019 05:18) (95 - 99)  BP: 105/67 (13 Mar 2019 05:18) (105/67 - 124/73)  BP(mean): --  RR: 18 (13 Mar 2019 05:18) (18 - 18)  SpO2: 98% (13 Mar 2019 05:18) (98% - 99%)    Drug Dosing Weight  Height (cm): 154 (22 Feb 2019 16:14)  Weight (kg): 61.1 (22 Feb 2019 16:14)  BMI (kg/m2): 25.8 (22 Feb 2019 16:14)  BSA (m2): 1.59 (22 Feb 2019 16:14)    Constitutional: NAD      Neck:  No JVD    Respiratory: CTAB/L    Cardiovascular: S1 and S2    Gastrointestinal: BS+, soft, NT/ND    Extremities: No peripheral edema    Neurological: Awake, alert    : No [place]    Skin: No rashes    Comments:    ASA Class: I [ ]  II [ ]  III [X]  IV [ ]    The patient is a suitable candidate for the planned procedure unless box checked [ ]  No, explain:

## 2019-03-13 NOTE — PROGRESS NOTE ADULT - SUBJECTIVE AND OBJECTIVE BOX
69 y/o critically ill  female h/o dementia NHL presenting after unwitnessed fall. Pt found on the ground at rehab. is on lovenox after hip fracture. pt reports pain to her right leg. per EMS en route slurring speech and mildly hypotensive requesting trauma.unclear how long pt on the ground. Patient found to have C diff with worsening hypotension requiring pressors on and off and  Patient requiring the SICU for continued cardiopulmonary monitoring. Orthopedic procedure held due to  hypotension and pan colitis and need for pressors due to unstable hemodynamics. Diarrhea slowing off IV Flagyl + on oral vancomycin. No further C.diff colitis and no further hypovolemic hypotension. Patient s/p Open reduction and internal fixation of periprosthetic hip fx Mental status has greatly improved.  Patient seen in SICU prior to transfer to Medical service and regular floor  Patient  alert awake and able to speak but confused . Patient less agitated than yesterday .  On N/G alimentation for hypoproteinemic edema. Blood pressure being supported with midodrine and no diuretics are being given. Patient feels better after moving her bowels. Not agitated and cooperative on Geodan and wellbutrin.  The patient was seen by nutrition and their plan is to be followed. Should her hematocrit continued to drift lower, she will receive 1 unit of packed cells with IV Lasix to correct hypovolemic, hypoproteinemic edema with hypotension on Midodrine. No transfusion presently required with rise in Hct.  The patient is to be out of bed to chair and resume physical therapy, as per orthopedics, after right periprosthetic fracture ORIF on February 22, 2019.  Patient with wound dehiscence seen by plastics and wound vaccuum placed.  She has protein calorie malnutrition due to poor po intake  Will order calorie count to see if she takes in enough calories and protein in order to D/C   the NGT feeds, NGT may be problematic if she is to go to rehab. Patient has been OOB to chair. Patient has not been tolerating PO. PEG placed this am without comlication.      MEDICATIONS  (STANDING):  buPROPion XL . 300 milliGRAM(s) Oral daily  chlorhexidine 4% Liquid 1 Application(s) Topical <User Schedule>  heparin  Injectable 5000 Unit(s) SubCutaneous every 8 hours  influenza   Vaccine 0.5 milliLiter(s) IntraMuscular once  levothyroxine 100 MICROGram(s) Oral daily  midodrine 5 milliGRAM(s) Oral three times a day  silver sulfADIAZINE 1% Cream 1 Application(s) Topical daily  vancomycin    Solution 125 milliGRAM(s) Oral every 12 hours  ziprasidone 40 milliGRAM(s) Oral <User Schedule>    MEDICATIONS  (PRN):  acetaminophen   Tablet .. 975 milliGRAM(s) Oral every 6 hours PRN Mild Pain (1 - 3)  oxyCODONE    IR 5 milliGRAM(s) Oral every 4 hours PRN Moderate Pain (4 - 6)          VITALS:   T(C): 36.8 (03-13-19 @ 20:42), Max: 37.1 (03-13-19 @ 12:31)  HR: 86 (03-13-19 @ 20:42) (86 - 95)  BP: 98/64 (03-13-19 @ 20:42) (98/64 - 128/77)  RR: 18 (03-13-19 @ 20:42) (18 - 18)  SpO2: 95% (03-13-19 @ 20:42) (95% - 98%)  Wt(kg): --    PHYSICAL EXAM:  GENERAL: NAD, A+O x 3  HEAD:  Atraumatic  EYES: EOM, PERRLA, conjunctiva pink and sclera white  ENT: No tonsillar erythema, exudates, NG tube in place  NECK: Supple, No JVD, normal thyroid, carotids with normal upstrokes and no bruits  CHEST/LUNG: Clear to auscultation bilaterally, No rales, rhonchi, wheezing, or rubs  HEART: Regular rate and rhythm, No murmurs, rubs, or gallops  ABDOMEN: Soft, nondistended, no masses, guarding, tenderness or rebound, bowel sounds present NGT in place  EXTREMITIES:  2+ Peripheral Pulses, No clubbing, cyanosis, or edema. No arthritis of shoulders, elbows, hands, hips, knees, ankles, or feet. No DJD C spine, T spine, or L/S spine  LYMPH: No lymphadenopathy noted  SKIN: No rashes or lesions  NERVOUS SYSTEM:  Alert & Oriented X3, normal cognitive function. Motor Strength 5/5 right upper and right lower.  5/5 left upper and left lower extremities, DTRs 2+ intact and symmetric    LABS:        CBC Full  -  ( 13 Mar 2019 08:33 )  WBC Count : 10.07 K/uL  Hemoglobin : 8.3 g/dL  Hematocrit : 25.9 %  Platelet Count - Automated : 235 K/uL  Mean Cell Volume : 101.6 fl  Mean Cell Hemoglobin : 32.5 pg  Mean Cell Hemoglobin Concentration : 32.0 gm/dL  Auto Neutrophil # : x  Auto Lymphocyte # : x  Auto Monocyte # : x  Auto Eosinophil # : x  Auto Basophil # : x  Auto Neutrophil % : x  Auto Lymphocyte % : x  Auto Monocyte % : x  Auto Eosinophil % : x  Auto Basophil % : x    03-13    135  |  96  |  14  ----------------------------<  91  4.0   |  28  |  0.48<L>    Ca    8.4      13 Mar 2019 06:39            CAPILLARY BLOOD GLUCOSE          RADIOLOGY & ADDITIONAL TESTS:

## 2019-03-13 NOTE — PHARMACOTHERAPY INTERVENTION NOTE - COMMENTS
Patient currently on dronabinol 2.5mg by mouth daily however order was recently auto-discontinued. Recommendation made to resume dronabinol.    Derek Boston, PharmD  [phone]

## 2019-03-13 NOTE — PROGRESS NOTE ADULT - SUBJECTIVE AND OBJECTIVE BOX
WILI PARKER 68y MRN-[id number]    Patient is a 68y old  Female who presents with a chief complaint of peg (13 Mar 2019 10:53)      Follow Up/CC:  ID following for cdiff    Interval History/ROS: for PEG, no fever    Allergies    honeydew melon (Other)  penicillin (Other; Hives)    Intolerances        ANTIMICROBIALS:  vancomycin    Solution 125 every 12 hours      MEDICATIONS  (STANDING):  buPROPion XL . 300 milliGRAM(s) Oral daily  chlorhexidine 4% Liquid 1 Application(s) Topical <User Schedule>  dronabinol 2.5 milliGRAM(s) Oral daily  heparin  Injectable 5000 Unit(s) SubCutaneous every 8 hours  influenza   Vaccine 0.5 milliLiter(s) IntraMuscular once  levothyroxine 100 MICROGram(s) Oral daily  midodrine 5 milliGRAM(s) Oral three times a day  silver sulfADIAZINE 1% Cream 1 Application(s) Topical daily  vancomycin    Solution 125 milliGRAM(s) Oral every 12 hours  ziprasidone 40 milliGRAM(s) Oral <User Schedule>    MEDICATIONS  (PRN):  acetaminophen   Tablet .. 975 milliGRAM(s) Oral every 6 hours PRN Mild Pain (1 - 3)  oxyCODONE    IR 5 milliGRAM(s) Oral every 4 hours PRN Moderate Pain (4 - 6)        Vital Signs Last 24 Hrs  T(C): 37 (13 Mar 2019 05:18), Max: 37 (13 Mar 2019 05:18)  T(F): 98.6 (13 Mar 2019 05:18), Max: 98.6 (13 Mar 2019 05:18)  HR: 95 (13 Mar 2019 05:18) (95 - 99)  BP: 105/67 (13 Mar 2019 05:18) (105/67 - 124/73)  BP(mean): --  RR: 18 (13 Mar 2019 05:18) (18 - 18)  SpO2: 98% (13 Mar 2019 05:18) (98% - 99%)    CBC Full  -  ( 13 Mar 2019 08:33 )  WBC Count : 10.07 K/uL  Hemoglobin : 8.3 g/dL  Hematocrit : 25.9 %  Platelet Count - Automated : 235 K/uL  Mean Cell Volume : 101.6 fl  Mean Cell Hemoglobin : 32.5 pg  Mean Cell Hemoglobin Concentration : 32.0 gm/dL  Auto Neutrophil # : x  Auto Lymphocyte # : x  Auto Monocyte # : x  Auto Eosinophil # : x  Auto Basophil # : x  Auto Neutrophil % : x  Auto Lymphocyte % : x  Auto Monocyte % : x  Auto Eosinophil % : x  Auto Basophil % : x    03-13    135  |  96  |  14  ----------------------------<  91  4.0   |  28  |  0.48<L>    Ca    8.4      13 Mar 2019 06:39            MICROBIOLOGY:  .Body Fluid Synovial Fluid  02-23-19   No growth at 1 week.  --    No polymorphonuclear cells seen  No organisms seen  by cytocentrifuge      .Tissue Other, right hip synovium  02-23-19   No growth at 1 week.  --    Rare polymorphonuclear leukocytes seen per low power field  No organisms seen per oil power field      .Tissue Other, right femur  02-23-19   No growth at 5 days  --    Rare polymorphonuclear leukocytes seen per low power field  No organisms seen per oil power field        RADIOLOGY    Xray Chest 1 View- PORTABLE-Routine (03.02.19 @ 10:14) >  There are small bilateral pleural effusions which may have   improved since the prior study likely associated with left lung base   atelectasis. There is no pneumothorax.

## 2019-03-13 NOTE — PROGRESS NOTE ADULT - ASSESSMENT
A/P: 68F s/p Revision hemiarthroplasty to right hip  Pain Control  DVT ppx  PWB for transfers only  PT/OT  Incentive Spirometry  Vac mgmt per plastics  abx per ID  Medical management appreciated  FU GI re PEG placement  DC planning

## 2019-03-14 PROCEDURE — 93971 EXTREMITY STUDY: CPT | Mod: 26

## 2019-03-14 PROCEDURE — 99232 SBSQ HOSP IP/OBS MODERATE 35: CPT

## 2019-03-14 RX ADMIN — Medication 1 APPLICATION(S): at 12:28

## 2019-03-14 RX ADMIN — MIDODRINE HYDROCHLORIDE 5 MILLIGRAM(S): 2.5 TABLET ORAL at 21:33

## 2019-03-14 RX ADMIN — Medication 125 MILLIGRAM(S): at 06:06

## 2019-03-14 RX ADMIN — ZIPRASIDONE HYDROCHLORIDE 40 MILLIGRAM(S): 20 CAPSULE ORAL at 06:47

## 2019-03-14 RX ADMIN — HEPARIN SODIUM 5000 UNIT(S): 5000 INJECTION INTRAVENOUS; SUBCUTANEOUS at 21:33

## 2019-03-14 RX ADMIN — HEPARIN SODIUM 5000 UNIT(S): 5000 INJECTION INTRAVENOUS; SUBCUTANEOUS at 14:24

## 2019-03-14 RX ADMIN — HEPARIN SODIUM 5000 UNIT(S): 5000 INJECTION INTRAVENOUS; SUBCUTANEOUS at 06:07

## 2019-03-14 RX ADMIN — CHLORHEXIDINE GLUCONATE 1 APPLICATION(S): 213 SOLUTION TOPICAL at 06:06

## 2019-03-14 RX ADMIN — Medication 125 MILLIGRAM(S): at 17:36

## 2019-03-14 RX ADMIN — BUPROPION HYDROCHLORIDE 300 MILLIGRAM(S): 150 TABLET, EXTENDED RELEASE ORAL at 12:28

## 2019-03-14 RX ADMIN — MIDODRINE HYDROCHLORIDE 5 MILLIGRAM(S): 2.5 TABLET ORAL at 06:06

## 2019-03-14 RX ADMIN — MIDODRINE HYDROCHLORIDE 5 MILLIGRAM(S): 2.5 TABLET ORAL at 14:24

## 2019-03-14 RX ADMIN — Medication 100 MICROGRAM(S): at 06:06

## 2019-03-14 NOTE — PROGRESS NOTE ADULT - ATTENDING COMMENTS
Patient has not been tolerating PO. After a full discussion with the patient, she is now agreeing  to the PEG. GI to proceed with scheduling. Beginning to ambulate with non weight bearing and doing well. No medical complications post-op to date and to proceed with physical therapy, as tolerated. Continues pulmonary toilet to lessen atelectasis, leg exercises as taught to lessen the risk of DVT and supervised pain medications for post-op pain control. I anticipate transfer to rehabilitation now that PEG is in place. will need to discuss with surgery and wound care. I am a non participating [facility] physician seeing Pt in coverage for Dr Barraza. The above patient examination was reviewed with Dr. Awan and I agree with his evaluation, assessment and treatment plan.  Orlin Barraza M.D.

## 2019-03-14 NOTE — PROGRESS NOTE ADULT - SUBJECTIVE AND OBJECTIVE BOX
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[facility] [place], in remission since 2016  Hypercholesteremia  Bipolar depression  OCD (obsessive compulsive disorder)  Depression  Osteoarthritis of right knee  S/P TKR (total knee replacement), bilateral: discharged nov 5th, 2014  S/P cataract surgery  H/O oral surgery: 2014  S/P knee surgery: 1978    FAMILY HISTORY:  Family history of diabetes mellitus  Family history of prostate cancer (Grandparent)  Family history of stomach cancer (Grandparent)  Family history of breast cancer  Family history of COPD (chronic obstructive pulmonary disease)    MEDICATIONS  (STANDING):  buPROPion XL . 300 milliGRAM(s) Oral daily  chlorhexidine 4% Liquid 1 Application(s) Topical <User Schedule>  heparin  Injectable 5000 Unit(s) SubCutaneous every 8 hours  influenza   Vaccine 0.5 milliLiter(s) IntraMuscular once  levothyroxine 100 MICROGram(s) Oral daily  midodrine 5 milliGRAM(s) Oral three times a day  silver sulfADIAZINE 1% Cream 1 Application(s) Topical daily  vancomycin    Solution 125 milliGRAM(s) Oral every 12 hours  ziprasidone 40 milliGRAM(s) Oral <User Schedule>    MEDICATIONS  (PRN):  acetaminophen   Tablet .. 975 milliGRAM(s) Oral every 6 hours PRN Mild Pain (1 - 3)  oxyCODONE    IR 5 milliGRAM(s) Oral every 4 hours PRN Moderate Pain (4 - 6)      Vital Signs Last 24 Hrs  T(C): 36.8 (13 Mar 2019 20:42), Max: 37.1 (13 Mar 2019 12:31)  T(F): 98.2 (13 Mar 2019 20:42), Max: 98.7 (13 Mar 2019 12:31)  HR: 86 (13 Mar 2019 20:42) (86 - 89)  BP: 98/64 (13 Mar 2019 20:42) (98/64 - 128/77)  BP(mean): --  RR: 18 (13 Mar 2019 20:42) (18 - 18)  SpO2: 95% (13 Mar 2019 20:42) (95% - 96%)  nc/at  s1s2  cta  soft, nt, nd no guarding or rebound  no c/c/e    CBC Full  -  ( 13 Mar 2019 08:33 )  WBC Count : 10.07 K/uL  Hemoglobin : 8.3 g/dL  Hematocrit : 25.9 %  Platelet Count - Automated : 235 K/uL  Mean Cell Volume : 101.6 fl  Mean Cell Hemoglobin : 32.5 pg  Mean Cell Hemoglobin Concentration : 32.0 gm/dL  Auto Neutrophil # : x  Auto Lymphocyte # : x  Auto Monocyte # : x  Auto Eosinophil # : x  Auto Basophil # : x  Auto Neutrophil % : x  Auto Lymphocyte % : x  Auto Monocyte % : x  Auto Eosinophil % : x  Auto Basophil % : x    03-13    135  |  96  |  14  ----------------------------<  91  4.0   |  28  |  0.48<L>    Ca    8.4      13 Mar 2019 06:39

## 2019-03-14 NOTE — STUDENT SIGN OFF DOCUMENT - DOCUMENTS STUDENTS ARE SIGNED OFF ON
Assessment and Intervention/Vital Signs/Input and Output/Plan of Care Dietitian Initial Evaluation/Patient Profile/Provider Contact Note

## 2019-03-14 NOTE — PROGRESS NOTE ADULT - SUBJECTIVE AND OBJECTIVE BOX
WILI PARKER 68y [id number]    Patient is a 68y old  Female who presents with a chief complaint of insufficent po (14 Mar 2019 08:43)      Follow Up/CC:  ID following for cdiff    Interval History/ROS: no fever    Allergies    honeydew melon (Other)  penicillin (Other; Hives)    Intolerances        ANTIMICROBIALS:  vancomycin    Solution 125 every 12 hours      MEDICATIONS  (STANDING):  buPROPion XL . 300 milliGRAM(s) Oral daily  chlorhexidine 4% Liquid 1 Application(s) Topical <User Schedule>  heparin  Injectable 5000 Unit(s) SubCutaneous every 8 hours  influenza   Vaccine 0.5 milliLiter(s) IntraMuscular once  levothyroxine 100 MICROGram(s) Oral daily  midodrine 5 milliGRAM(s) Oral three times a day  silver sulfADIAZINE 1% Cream 1 Application(s) Topical daily  vancomycin    Solution 125 milliGRAM(s) Oral every 12 hours  ziprasidone 40 milliGRAM(s) Oral <User Schedule>    MEDICATIONS  (PRN):  acetaminophen   Tablet .. 975 milliGRAM(s) Oral every 6 hours PRN Mild Pain (1 - 3)  oxyCODONE    IR 5 milliGRAM(s) Oral every 4 hours PRN Moderate Pain (4 - 6)        Vital Signs Last 24 Hrs  T(C): 36.8 (14 Mar 2019 14:22), Max: 36.9 (14 Mar 2019 10:20)  T(F): 98.2 (14 Mar 2019 14:22), Max: 98.5 (14 Mar 2019 10:20)  HR: 88 (14 Mar 2019 14:22) (86 - 88)  BP: 108/74 (14 Mar 2019 14:22) (98/64 - 108/74)  BP(mean): --  RR: 18 (14 Mar 2019 14:22) (18 - 18)  SpO2: 100% (14 Mar 2019 14:22) (95% - 100%)    CBC Full  -  ( 13 Mar 2019 08:33 )  WBC Count : 10.07 K/uL  Hemoglobin : 8.3 g/dL  Hematocrit : 25.9 %  Platelet Count - Automated : 235 K/uL  Mean Cell Volume : 101.6 fl  Mean Cell Hemoglobin : 32.5 pg  Mean Cell Hemoglobin Concentration : 32.0 gm/dL  Auto Neutrophil # : x  Auto Lymphocyte # : x  Auto Monocyte # : x  Auto Eosinophil # : x  Auto Basophil # : x  Auto Neutrophil % : x  Auto Lymphocyte % : x  Auto Monocyte % : x  Auto Eosinophil % : x  Auto Basophil % : x    03-13    135  |  96  |  14  ----------------------------<  91  4.0   |  28  |  0.48<L>    Ca    8.4      13 Mar 2019 06:39            MICROBIOLOGY:  .Body Fluid Synovial Fluid  02-23-19   No growth at 1 week.  --    No polymorphonuclear cells seen  No organisms seen  by cytocentrifuge      .Tissue Other, right hip synovium  02-23-19   No growth at 1 week.  --    Rare polymorphonuclear leukocytes seen per low power field  No organisms seen per oil power field      .Tissue Other, right femur  02-23-19   No growth at 5 days  --    Rare polymorphonuclear leukocytes seen per low power field  No organisms seen per oil power field              v            RADIOLOGY

## 2019-03-14 NOTE — PROGRESS NOTE ADULT - ATTENDING COMMENTS
I agree with the above note and have personally seen and examined this patient. All pertinent films have been reviewed. Please refer to clinical documentation of the history, physical examinations, data summary, and both assessment and plan as documented above and with which I agree.    Conor George MD  Attending Orthopedic Surgeon

## 2019-03-14 NOTE — PROGRESS NOTE ADULT - SUBJECTIVE AND OBJECTIVE BOX
Pt S/E at bedside, no acute events overnight, pain controlled,  denies SOB/CP/N/V/Numbness/Tingling. PEG placed    Vital Signs Last 24 Hrs  T(C): 36.8 (13 Mar 2019 20:42), Max: 37.1 (13 Mar 2019 12:31)  T(F): 98.2 (13 Mar 2019 20:42), Max: 98.7 (13 Mar 2019 12:31)  HR: 86 (13 Mar 2019 20:42) (86 - 89)  BP: 98/64 (13 Mar 2019 20:42) (98/64 - 128/77)  BP(mean): --  RR: 18 (13 Mar 2019 20:42) (18 - 18)  SpO2: 95% (13 Mar 2019 20:42) (95% - 96%)    Gen: NAD,      Right Lower Extremity:  Dressing clean dry intact, wound vac maintaining suction  Clean dressing to right foot  +EHL/FHL/TA/GS  SILT L3-S1  +DP/PT Pulses  Compartments soft  No calf TTP B/L      Assessment and Plan:   · Assessment		  A/P: 68F s/p Revision hemiarthroplasty to right hip  Pain Control  DVT ppx  PWB for transfers only  PT/OT  Incentive Spirometry  Vac mgmt per plastics  abx per ID  Medical management appreciated  f/u US RUE  JORJE planning

## 2019-03-14 NOTE — PROGRESS NOTE ADULT - SUBJECTIVE AND OBJECTIVE BOX
67 y/o critically ill  female h/o dementia NHL presenting after unwitnessed fall. Pt found on the ground at rehab. is on lovenox after hip fracture. pt reports pain to her right leg. per EMS en route slurring speech and mildly hypotensive requesting trauma.unclear how long pt on the ground. Patient found to have C diff with worsening hypotension requiring pressors on and off and  Patient requiring the SICU for continued cardiopulmonary monitoring. Orthopedic procedure held due to  hypotension and pan colitis and need for pressors due to unstable hemodynamics. Diarrhea slowing off IV Flagyl + on oral vancomycin. No further C.diff colitis and no further hypovolemic hypotension. Patient s/p Open reduction and internal fixation of periprosthetic hip fx Mental status has greatly improved.  Patient seen in SICU prior to transfer to Medical service and regular floor  Patient  alert awake and able to speak but confused . Patient less agitated than yesterday .  On N/G alimentation for hypoproteinemic edema. Blood pressure being supported with midodrine and no diuretics are being given. Patient feels better after moving her bowels. Not agitated and cooperative on Geodan and wellbutrin.  The patient was seen by nutrition and their plan is to be followed. Should her hematocrit continued to drift lower, she will receive 1 unit of packed cells with IV Lasix to correct hypovolemic, hypoproteinemic edema with hypotension on Midodrine. No transfusion presently required with rise in Hct.  The patient is to be out of bed to chair and resume physical therapy, as per orthopedics, after right periprosthetic fracture ORIF on February 22, 2019.  Patient with wound dehiscence seen by plastics and wound vaccuum placed.  She has protein calorie malnutrition due to poor po intake  Will order calorie count to see if she takes in enough calories and protein in order to D/C   the NGT feeds, NGT may be problematic if she is to go to rehab. Patient has been OOB to chair. Patient has not been tolerating PO. PEG placed and feeding started. Patient tolerating feeds      MEDICATIONS  (STANDING):  buPROPion XL . 300 milliGRAM(s) Oral daily  chlorhexidine 4% Liquid 1 Application(s) Topical <User Schedule>  heparin  Injectable 5000 Unit(s) SubCutaneous every 8 hours  influenza   Vaccine 0.5 milliLiter(s) IntraMuscular once  levothyroxine 100 MICROGram(s) Oral daily  midodrine 5 milliGRAM(s) Oral three times a day  silver sulfADIAZINE 1% Cream 1 Application(s) Topical daily  vancomycin    Solution 125 milliGRAM(s) Oral every 12 hours  ziprasidone 40 milliGRAM(s) Oral <User Schedule>    MEDICATIONS  (PRN):  acetaminophen   Tablet .. 975 milliGRAM(s) Oral every 6 hours PRN Mild Pain (1 - 3)  oxyCODONE    IR 5 milliGRAM(s) Oral every 4 hours PRN Moderate Pain (4 - 6)          VITALS:   T(C): 36.7 (03-14-19 @ 20:18), Max: 36.9 (03-14-19 @ 10:20)  HR: 78 (03-14-19 @ 20:18) (78 - 88)  BP: 110/72 (03-14-19 @ 20:18) (101/63 - 110/72)  RR: 17 (03-14-19 @ 20:18) (17 - 18)  SpO2: 99% (03-14-19 @ 20:18) (97% - 100%)  Wt(kg): --    PHYSICAL EXAM:  GENERAL: NAD, A+O x 3  HEAD:  Atraumatic  EYES: EOM, PERRLA, conjunctiva pink and sclera white  ENT: No tonsillar erythema, exudates, NG tube in place  NECK: Supple, No JVD, normal thyroid, carotids with normal upstrokes and no bruits  CHEST/LUNG: Clear to auscultation bilaterally, No rales, rhonchi, wheezing, or rubs  HEART: Regular rate and rhythm, No murmurs, rubs, or gallops  ABDOMEN: Soft, nondistended, no masses, guarding, tenderness or rebound, bowel sounds present NGT in place  EXTREMITIES:  2+ Peripheral Pulses, No clubbing, cyanosis, or edema. No arthritis of shoulders, elbows, hands, hips, knees, ankles, or feet. No DJD C spine, T spine, or L/S spine  LYMPH: No lymphadenopathy noted  SKIN: No rashes or lesions  NERVOUS SYSTEM:  Alert & Oriented X3, normal cognitive function. Motor Strength 5/5 right upper and right lower.  5/5 left upper and left lower extremities, DTRs 2+ intact and symmetric    LABS:        CBC Full  -  ( 13 Mar 2019 08:33 )  WBC Count : 10.07 K/uL  Hemoglobin : 8.3 g/dL  Hematocrit : 25.9 %  Platelet Count - Automated : 235 K/uL  Mean Cell Volume : 101.6 fl  Mean Cell Hemoglobin : 32.5 pg  Mean Cell Hemoglobin Concentration : 32.0 gm/dL  Auto Neutrophil # : x  Auto Lymphocyte # : x  Auto Monocyte # : x  Auto Eosinophil # : x  Auto Basophil # : x  Auto Neutrophil % : x  Auto Lymphocyte % : x  Auto Monocyte % : x  Auto Eosinophil % : x  Auto Basophil % : x    03-13    135  |  96  |  14  ----------------------------<  91  4.0   |  28  |  0.48<L>    Ca    8.4      13 Mar 2019 06:39            CAPILLARY BLOOD GLUCOSE          RADIOLOGY & ADDITIONAL TESTS:

## 2019-03-14 NOTE — PROGRESS NOTE ADULT - ATTENDING COMMENTS
Pierre Lomeli  Attending Physician   Division of Infectious Disease  Pager #[phone]  After 5pm/weekend or no response, call #[phone]

## 2019-03-14 NOTE — CHART NOTE - NSCHARTNOTEFT_GEN_A_CORE
Nutrition Follow Up Note  Pt 69 y/o F with PMH: bipolar depression, OCD, dementia, HLD, NHL (in remission since 2016), osteoarthritis S/P bilateral TKR S/P fall, admitted with a right periprosthetic femoral neck fracture and C. difficile colitis, S/P ORIF of right periprosthetic femur fracture and revision of right hip hemiarthroplasty on (), transferred to SICU intubate, extubated, NGT tube feeding placed for supplemental nutrition on () due to poor PO intake. Pt switched to oral Synthroid  PEG placed on 3/13/19.      Pt interviewed.  Reported she ate a bit of ice cream and 1/2 Ensure clear PO.  Otherwise not hungry, food untouched on tray seen at bedside.  Pt complained of constipation, however last BM recorded today (3/14).     Source: Pt, EMR    Diet/Enteral /Parenteral Nutrition: Jevity 1.2  60 mlx 24 hr, for a total of 1440 ml, with a bolus feed of Ensure BID.  Additional 2 Celestine and 2 Ensure Clear  Pt not yet at goal, currently at 20 ml/hr      Daily Weight in k (-14), Weight in k.3 (-13), Weight in k.3 (-12), Weight in k.4 (-11), Weight in k.1 (-10), Weight in k.4 (-), Weight in k (-)  Wt appears stable    Pertinent Medications: MEDICATIONS  (STANDING):  buPROPion XL . 300 milliGRAM(s) Oral daily  chlorhexidine 4% Liquid 1 Application(s) Topical <User Schedule>  heparin  Injectable 5000 Unit(s) SubCutaneous every 8 hours  influenza   Vaccine 0.5 milliLiter(s) IntraMuscular once  levothyroxine 100 MICROGram(s) Oral daily  midodrine 5 milliGRAM(s) Oral three times a day  silver sulfADIAZINE 1% Cream 1 Application(s) Topical daily  vancomycin    Solution 125 milliGRAM(s) Oral every 12 hours  ziprasidone 40 milliGRAM(s) Oral <User Schedule>    MEDICATIONS  (PRN):  acetaminophen   Tablet .. 975 milliGRAM(s) Oral every 6 hours PRN Mild Pain (1 - 3)  oxyCODONE    IR 5 milliGRAM(s) Oral every 4 hours PRN Moderate Pain (4 - 6)    Skin per nursing documentation: No pressure injuries noted  Edema: 1+ Edema, generalized and on r arm, 2+ edema on R hip    Estimated Needs:   [x] no change since previous assessment  [ ] recalculated:     Previous Nutrition Diagnosis: Moderate Malnutrition  Nutrition Diagnosis is: ongoing, being addressed with TF and supplemental PO feeds        Recommend  1) Jevity 1.2 @ 72ml x 20 hr, to provide 1440 ml, 1728 kcal, 80 g protein, 1162 ml water (28.2 kcal.kg, 1.3 g protein/kg based on dosing wt of 61.1 kg)  2)Continue to monitor PO intake, wt, electrolytes, skin integrity, TF tolerance  3) RD to remain available      Monitoring and Evaluation:     Continue to monitor Nutritional intake, Tolerance to diet prescription, weights, labs, skin integrity    RD remains available upon request and will follow up per protocol Nutrition Follow Up Note  Pt 67 y/o F with PMH: bipolar depression, OCD, dementia, HLD, NHL (in remission since 2016), osteoarthritis S/P bilateral TKR S/P fall, admitted with a right periprosthetic femoral neck fracture and C. difficile colitis, S/P ORIF of right periprosthetic femur fracture and revision of right hip hemiarthroplasty on (), transferred to SICU intubate, extubated, NGT tube feeding placed for supplemental nutrition on () due to poor PO intake. Pt switched to oral Synthroid  PEG placed on 3/13/19.      Pt interviewed.  Reported she ate a bit of ice cream and 1/2 Ensure clear PO.  Otherwise not hungry, food untouched on tray seen at bedside.  Pt complained of constipation, however last BM recorded today (3/14).     Source: Pt, EMR    Diet/Enteral /Parenteral Nutrition: Jevity 1.2  60 mlx 24 hr, for a total of 1440 ml, with a bolus feed of Ensure BID.  Additional 2 Celestine and 2 Ensure Clear  Pt not yet at goal, currently at 20 ml/hr      Daily Weight in k (-14), Weight in k.3 (-13), Weight in k.3 (-12), Weight in k.4 (-11), Weight in k.1 (-10), Weight in k.4 (-), Weight in k (-)  Wt appears stable    Pertinent Medications: MEDICATIONS  (STANDING):  buPROPion XL . 300 milliGRAM(s) Oral daily  chlorhexidine 4% Liquid 1 Application(s) Topical <User Schedule>  heparin  Injectable 5000 Unit(s) SubCutaneous every 8 hours  influenza   Vaccine 0.5 milliLiter(s) IntraMuscular once  levothyroxine 100 MICROGram(s) Oral daily  midodrine 5 milliGRAM(s) Oral three times a day  silver sulfADIAZINE 1% Cream 1 Application(s) Topical daily  vancomycin    Solution 125 milliGRAM(s) Oral every 12 hours  ziprasidone 40 milliGRAM(s) Oral <User Schedule>    MEDICATIONS  (PRN):  acetaminophen   Tablet .. 975 milliGRAM(s) Oral every 6 hours PRN Mild Pain (1 - 3)  oxyCODONE    IR 5 milliGRAM(s) Oral every 4 hours PRN Moderate Pain (4 - 6)    Skin per nursing documentation: No pressure injuries noted  Edema: 1+ Edema, generalized and on r arm, 2+ edema on R hip    Estimated Needs:   [x] no change since previous assessment  [ ] recalculated:     Previous Nutrition Diagnosis: Moderate Malnutrition  Nutrition Diagnosis is: ongoing, being addressed with TF and supplemental PO feeds        Recommend  1) Jevity 1.2 @ 72ml x 20 hr, to provide 1440 ml, 1728 kcal, 80 g protein, 1162 ml water (28.2 kcal.kg, 1.3 g protein/kg based on dosing wt of 61.1 kg)  2)Continue to monitor PO intake, wt, electrolytes, skin integrity, TF tolerance  3) D/c Ensure clear and Ensure bolus feed  4) RD to remain available      Monitoring and Evaluation:     Continue to monitor Nutritional intake, Tolerance to diet prescription, weights, labs, skin integrity    RD remains available upon request and will follow up per protocol Nutrition Follow Up Note  Pt 67 y/o F with PMH: bipolar depression, OCD, dementia, HLD, NHL (in remission since 2016), osteoarthritis S/P bilateral TKR S/P fall, admitted with a right periprosthetic femoral neck fracture and C. difficile colitis, S/P ORIF of right periprosthetic femur fracture and revision of right hip hemiarthroplasty on (), transferred to SICU intubate, extubated, NGT tube feeding placed for supplemental nutrition on () due to poor PO intake. Pt switched to oral Synthroid  PEG placed on 3/13/19.  Per GI note, Bolus feed of Ensure BID recommended.     Pt interviewed.  Reported she ate a bit of ice cream and 1/2 Ensure clear PO.  Otherwise not hungry, food untouched on tray seen at bedside.  Pt complained of constipation, however last BM recorded today (3/14).     Source: Pt, EMR    Diet/Enteral /Parenteral Nutrition:   Regular diet  Jevity 1.2  60 mlx 24 hr, for a total of 1440 ml, with a bolus feed of Ensure BID.  Additional 2 Celestine and 2 Ensure Clear  Pt not yet at goal, currently at 20 ml/hr      Daily Weight in k (-14), Weight in k.3 (-13), Weight in k.3 (-12), Weight in k.4 (-11), Weight in k.1 (-10), Weight in k.4 (-), Weight in k (-08)  Wt appears stable    Pertinent Medications: MEDICATIONS  (STANDING):  buPROPion XL . 300 milliGRAM(s) Oral daily  chlorhexidine 4% Liquid 1 Application(s) Topical <User Schedule>  heparin  Injectable 5000 Unit(s) SubCutaneous every 8 hours  influenza   Vaccine 0.5 milliLiter(s) IntraMuscular once  levothyroxine 100 MICROGram(s) Oral daily  midodrine 5 milliGRAM(s) Oral three times a day  silver sulfADIAZINE 1% Cream 1 Application(s) Topical daily  vancomycin    Solution 125 milliGRAM(s) Oral every 12 hours  ziprasidone 40 milliGRAM(s) Oral <User Schedule>    MEDICATIONS  (PRN):  acetaminophen   Tablet .. 975 milliGRAM(s) Oral every 6 hours PRN Mild Pain (1 - 3)  oxyCODONE    IR 5 milliGRAM(s) Oral every 4 hours PRN Moderate Pain (4 - 6)    Skin per nursing documentation: No pressure injuries noted  Edema: 1+ Edema, generalized and on r arm, 2+ edema on R hip    Estimated Needs:   [x] no change since previous assessment  [ ] recalculated:     Previous Nutrition Diagnosis: Moderate Malnutrition  Nutrition Diagnosis is: ongoing, being addressed with TF and supplemental PO feeds        Recommend  1) Jevity 1.2 @ 70 ml x 20 hr, to provide 1400 ml, 1680 kcal, 77 g protein, 1129 ml water (27.5 kcal/kg, 1.25 g protein/kg based on dosing wt of 61.1 kg)  2)Continue to monitor PO intake, wt, electrolytes, skin integrity, TF tolerance  3) would not recommend Ensure bolus feed via PEG as pt is meeting nutritional needs via EN regiment, reduce Ensure clear to 1/day via PO  4) RD to remain available      Monitoring and Evaluation:     Continue to monitor Nutritional intake, Tolerance to diet prescription, weights, labs, skin integrity    RD remains available upon request and will follow up per protocol

## 2019-03-14 NOTE — PROGRESS NOTE ADULT - NSICDXPROBLEM_GEN_ALL_CORE_FT
PROBLEM DIAGNOSES  Problem: Severe protein-calorie malnutrition  Assessment and Plan: NGT DCed  PEG placed  feeding started and Patient tolerating feeds    Problem: Hyponatremia  Assessment and Plan: sodium stable  continue to monitor    Problem: Wound dehiscence  Assessment and Plan: continue vaccuum dressing to wound  decreased output from vac.  dressing changes as per wound care      Problem: Delirium  Assessment and Plan: has been stable   Patient has a baseline dementia which has been stable    Problem: Clostridium difficile colitis  Assessment and Plan: has been stable  continue vanco PO

## 2019-03-14 NOTE — PROGRESS NOTE ADULT - ASSESSMENT
The patient is to be out of bed to chair and resume physical therapy, as per orthopedics, after right periprosthetic fracture ORIF on February 22, 2019.  Patient with wound dehiscence due to protein calorie malnutrition seen by plastics and wound vaccuum placed.  She has protein calorie malnutrition due to poor po intake.  NGT placed to augment po intake Will order calorie count to see  if she takes in enough calories and protein in order to D/C . PEG placed and Patient tolerating feeding.

## 2019-03-15 LAB
BLD GP AB SCN SERPL QL: NEGATIVE — SIGNIFICANT CHANGE UP
HCT VFR BLD CALC: 26.5 % — LOW (ref 34.5–45)
HCT VFR BLD CALC: 29.4 % — LOW (ref 34.5–45)
HGB BLD-MCNC: 10 G/DL — LOW (ref 11.5–15.5)
HGB BLD-MCNC: 8.8 G/DL — LOW (ref 11.5–15.5)
MCHC RBC-ENTMCNC: 33.1 GM/DL — SIGNIFICANT CHANGE UP (ref 32–36)
MCHC RBC-ENTMCNC: 33.4 PG — SIGNIFICANT CHANGE UP (ref 27–34)
MCHC RBC-ENTMCNC: 33.9 PG — SIGNIFICANT CHANGE UP (ref 27–34)
MCHC RBC-ENTMCNC: 34.1 GM/DL — SIGNIFICANT CHANGE UP (ref 32–36)
MCV RBC AUTO: 101 FL — HIGH (ref 80–100)
MCV RBC AUTO: 99.4 FL — SIGNIFICANT CHANGE UP (ref 80–100)
PLATELET # BLD AUTO: 243 K/UL — SIGNIFICANT CHANGE UP (ref 150–400)
PLATELET # BLD AUTO: 246 K/UL — SIGNIFICANT CHANGE UP (ref 150–400)
RBC # BLD: 2.63 M/UL — LOW (ref 3.8–5.2)
RBC # BLD: 2.96 M/UL — LOW (ref 3.8–5.2)
RBC # FLD: 18.9 % — HIGH (ref 10.3–14.5)
RBC # FLD: 18.9 % — HIGH (ref 10.3–14.5)
RH IG SCN BLD-IMP: POSITIVE — SIGNIFICANT CHANGE UP
WBC # BLD: 7.8 K/UL — SIGNIFICANT CHANGE UP (ref 3.8–10.5)
WBC # BLD: 9.4 K/UL — SIGNIFICANT CHANGE UP (ref 3.8–10.5)
WBC # FLD AUTO: 7.8 K/UL — SIGNIFICANT CHANGE UP (ref 3.8–10.5)
WBC # FLD AUTO: 9.4 K/UL — SIGNIFICANT CHANGE UP (ref 3.8–10.5)

## 2019-03-15 PROCEDURE — 99232 SBSQ HOSP IP/OBS MODERATE 35: CPT

## 2019-03-15 RX ORDER — SODIUM CHLORIDE 9 MG/ML
250 INJECTION INTRAMUSCULAR; INTRAVENOUS; SUBCUTANEOUS ONCE
Qty: 0 | Refills: 0 | Status: COMPLETED | OUTPATIENT
Start: 2019-03-15 | End: 2019-03-15

## 2019-03-15 RX ADMIN — HEPARIN SODIUM 5000 UNIT(S): 5000 INJECTION INTRAVENOUS; SUBCUTANEOUS at 21:26

## 2019-03-15 RX ADMIN — OXYCODONE HYDROCHLORIDE 5 MILLIGRAM(S): 5 TABLET ORAL at 11:04

## 2019-03-15 RX ADMIN — SODIUM CHLORIDE 500 MILLILITER(S): 9 INJECTION INTRAMUSCULAR; INTRAVENOUS; SUBCUTANEOUS at 10:23

## 2019-03-15 RX ADMIN — Medication 975 MILLIGRAM(S): at 08:15

## 2019-03-15 RX ADMIN — ZIPRASIDONE HYDROCHLORIDE 40 MILLIGRAM(S): 20 CAPSULE ORAL at 06:00

## 2019-03-15 RX ADMIN — Medication 1 APPLICATION(S): at 11:04

## 2019-03-15 RX ADMIN — HEPARIN SODIUM 5000 UNIT(S): 5000 INJECTION INTRAVENOUS; SUBCUTANEOUS at 06:01

## 2019-03-15 RX ADMIN — MIDODRINE HYDROCHLORIDE 5 MILLIGRAM(S): 2.5 TABLET ORAL at 06:00

## 2019-03-15 RX ADMIN — MIDODRINE HYDROCHLORIDE 5 MILLIGRAM(S): 2.5 TABLET ORAL at 13:20

## 2019-03-15 RX ADMIN — Medication 125 MILLIGRAM(S): at 17:13

## 2019-03-15 RX ADMIN — CHLORHEXIDINE GLUCONATE 1 APPLICATION(S): 213 SOLUTION TOPICAL at 06:01

## 2019-03-15 RX ADMIN — MIDODRINE HYDROCHLORIDE 5 MILLIGRAM(S): 2.5 TABLET ORAL at 21:26

## 2019-03-15 RX ADMIN — Medication 975 MILLIGRAM(S): at 07:45

## 2019-03-15 RX ADMIN — OXYCODONE HYDROCHLORIDE 5 MILLIGRAM(S): 5 TABLET ORAL at 11:34

## 2019-03-15 RX ADMIN — Medication 125 MILLIGRAM(S): at 06:00

## 2019-03-15 RX ADMIN — Medication 100 MICROGRAM(S): at 06:01

## 2019-03-15 RX ADMIN — BUPROPION HYDROCHLORIDE 300 MILLIGRAM(S): 150 TABLET, EXTENDED RELEASE ORAL at 11:04

## 2019-03-15 RX ADMIN — HEPARIN SODIUM 5000 UNIT(S): 5000 INJECTION INTRAVENOUS; SUBCUTANEOUS at 13:20

## 2019-03-15 NOTE — PROGRESS NOTE ADULT - SUBJECTIVE AND OBJECTIVE BOX
67 y/o critically ill  female h/o dementia NHL presenting after unwitnessed fall. Pt found on the ground at rehab. is on lovenox after hip fracture. pt reports pain to her right leg. per EMS en route slurring speech and mildly hypotensive requesting trauma.unclear how long pt on the ground. Patient found to have C diff with worsening hypotension requiring pressors on and off and  Patient requiring the SICU for continued cardiopulmonary monitoring. Orthopedic procedure held due to  hypotension and pan colitis and need for pressors due to unstable hemodynamics. Diarrhea slowing off IV Flagyl + on oral vancomycin. No further C.diff colitis and no further hypovolemic hypotension. Patient s/p Open reduction and internal fixation of periprosthetic hip fx Mental status has greatly improved.  Patient seen in SICU prior to transfer to Medical service and regular floor  Patient  alert awake and able to speak but confused . Patient less agitated than yesterday .  On N/G alimentation for hypoproteinemic edema. Blood pressure being supported with midodrine and no diuretics are being given. Patient feels better after moving her bowels. Not agitated and cooperative on Geodan and wellbutrin.  The patient was seen by nutrition and their plan is to be followed. Should her hematocrit continued to drift lower, she will receive 1 unit of packed cells with IV Lasix to correct hypovolemic, hypoproteinemic edema with hypotension on Midodrine. No transfusion presently required with rise in Hct.  The patient is to be out of bed to chair and resume physical therapy, as per orthopedics, after right periprosthetic fracture ORIF on February 22, 2019.  Patient with wound dehiscence seen by plastics and wound vaccuum placed.  She has protein calorie malnutrition due to poor po intake  Will order calorie count to see if she takes in enough calories and protein in order to D/C   the NGT feeds, NGT may be problematic if she is to go to rehab. Patient has been OOB to chair. Patient has not been tolerating PO. PEG placed and feeding started. Patient tolerating feeds Had episode of dizziness and low bp when out of bed.  Patient anemic and would benefit from a PRBC transfusion to prevent orthostatic BP changes. Discussed with NP and we decided to transfuse 1 unit of  PRBCs.       MEDICATIONS  (STANDING):  buPROPion XL . 300 milliGRAM(s) Oral daily  chlorhexidine 4% Liquid 1 Application(s) Topical <User Schedule>  heparin  Injectable 5000 Unit(s) SubCutaneous every 8 hours  influenza   Vaccine 0.5 milliLiter(s) IntraMuscular once  levothyroxine 100 MICROGram(s) Oral daily  midodrine 5 milliGRAM(s) Oral three times a day  silver sulfADIAZINE 1% Cream 1 Application(s) Topical daily  vancomycin    Solution 125 milliGRAM(s) Oral every 12 hours  ziprasidone 40 milliGRAM(s) Oral <User Schedule>    MEDICATIONS  (PRN):  acetaminophen   Tablet .. 975 milliGRAM(s) Oral every 6 hours PRN Mild Pain (1 - 3)  oxyCODONE    IR 5 milliGRAM(s) Oral every 4 hours PRN Moderate Pain (4 - 6)      Vital Signs Last 24 Hrs  T(C): 36.7 (15 Mar 2019 13:50), Max: 37.1 (15 Mar 2019 08:17)  T(F): 98.1 (15 Mar 2019 13:50), Max: 98.7 (15 Mar 2019 08:17)  HR: 86 (15 Mar 2019 13:50) (78 - 93)  BP: 97/62 (15 Mar 2019 13:50) (91/57 - 110/72)  BP(mean): --  RR: 18 (15 Mar 2019 13:50) (17 - 19)  SpO2: 100% (15 Mar 2019 13:50) (92% - 100%)    PHYSICAL EXAM:  GENERAL: NAD, A+O x 3  HEAD:  Atraumatic  EYES: EOM, PERRLA, conjunctiva pink and sclera white  ENT: No tonsillar erythema, exudates, NG tube in place  NECK: Supple, No JVD, normal thyroid, carotids with normal upstrokes and no bruits  CHEST/LUNG: Clear to auscultation bilaterally, No rales, rhonchi, wheezing, or rubs  HEART: Regular rate and rhythm, No murmurs, rubs, or gallops  ABDOMEN: Soft, nondistended, no masses, guarding, tenderness or rebound, bowel sounds present NGT in place  EXTREMITIES:  2+ Peripheral Pulses, No clubbing, cyanosis, or edema. No arthritis of shoulders, elbows, hands, hips, knees, ankles, or feet. No DJD C spine, T spine, or L/S spine  LYMPH: No lymphadenopathy noted  SKIN: No rashes or lesions  NERVOUS SYSTEM:  Alert & Oriented X3, normal cognitive function. Motor Strength 5/5 right upper and right lower.  5/5 left upper and left lower extremities, DTRs 2+ intact and symmetric    LABS:    CBC Full  -  ( 15 Mar 2019 15:23 )  WBC Count : 7.8 K/uL  Hemoglobin : 8.8 g/dL  Hematocrit : 26.5 %  Platelet Count - Automated : 246 K/uL  Mean Cell Volume : 101.0 fl  Mean Cell Hemoglobin : 33.4 pg  Mean Cell Hemoglobin Concentration : 33.1 gm/dL  Auto Neutrophil # : x  Auto Lymphocyte # : x  Auto Monocyte # : x  Auto Eosinophil # : x  Auto Basophil # : x  Auto Neutrophil % : x  Auto Lymphocyte % : x  Auto Monocyte % : x  Auto Eosinophil % : x  Auto Basophil % : x      CBC Full  -  ( 13 Mar 2019 08:33 )  WBC Count : 10.07 K/uL  Hemoglobin : 8.3 g/dL  Hematocrit : 25.9 %  Platelet Count - Automated : 235 K/uL  Mean Cell Volume : 101.6 fl  Mean Cell Hemoglobin : 32.5 pg  Mean Cell Hemoglobin Concentration : 32.0 gm/dL  Auto Neutrophil # : x  Auto Lymphocyte # : x  Auto Monocyte # : x  Auto Eosinophil # : x  Auto Basophil # : x  Auto Neutrophil % : x  Auto Lymphocyte % : x  Auto Monocyte % : x  Auto Eosinophil % : x  Auto Basophil % : x    03-13    135  |  96  |  14  ----------------------------<  91  4.0   |  28  |  0.48<L>    Ca    8.4      13 Mar 2019 06:39            CAPILLARY BLOOD GLUCOSE          RADIOLOGY & ADDITIONAL TESTS:

## 2019-03-15 NOTE — PROGRESS NOTE ADULT - ATTENDING COMMENTS
Pierre Lomeli  Attending Physician   Division of Infectious Disease  Pager #[phone]  After 5pm/weekend or no response, call #[phone]    Please call the ID service [phone] with questions or concerns over the weekend.

## 2019-03-15 NOTE — CHART NOTE - NSCHARTNOTEFT_GEN_A_CORE
Nutrition follow up     Pt seen for malnutrition follow up and nutrition consult received for bolus recommendations as pt S/P PEG.      Hospital course as per chart: Pt 67 y/o F with PMH: bipolar depression, OCD, dementia, HLD, NHL (in remission since 2016), osteoarthritis S/P bilateral TKR S/P fall, admitted with a right periprosthetic femoral neck fracture and C. difficile colitis, S/P ORIF of right periprosthetic femur fracture and revision of right hip hemiarthroplasty on (02/22), transferred to SICU intubated, extubated, NGT tube feeding placed for supplemental nutrition on (02/26) due to poor PO intake, S/P PEG placed on (03/13), S/P VAC changed today (03/15).     Source: Patient [ ]    Family [ ]     other [x]; Medical record; RN     Pt confused as per documentation, RN provided information. RN reports pt with poor appetite and PO intake due to tube feedings, states "pt does not eat anything". Denies pt drinking Ensure Clear and Health Shake. Reports pt tolerating tube feedings at goal of 70 ml/hr. Denies pt with nausea, vomiting, diarrhea, or constipation, reports last BM today (03/15). Attempted to obtain food preferences - pt did not have any at this time.     Diet: Regular + Ensure Clear 1x daily (180 eva and 8 gm protein) + Health Shake 1xday (500 kcal and 20-25 g protein) + tube feedings     Enteral /Parenteral Nutrition: Jevity 1.2 through continuous feedings via PEG at 70 ml/hr x 20 hours provides 1400 ml, 1680 kcal, 78 g protein, and 1134 ml water (27 kcal/kg and 1.3 g/kg protein based on dosing weight 61.1 kg).     Current Weight:  (02/04) 134.7 pounds -> (02/14) 159.8 pounds -> (02/24) 171.9 pounds -> (03/04) 156.7 pounds -> (03/10) 130.2 pounds -> (03/14) 127.8 pounds -?accuracy of weight fluctuations likely due to fluid shifts, will continue to monitor.   % Weight Change: n/a    Pertinent Medications: MEDICATIONS  (STANDING):  buPROPion XL . 300 milliGRAM(s) Oral daily  chlorhexidine 4% Liquid 1 Application(s) Topical <User Schedule>  heparin  Injectable 5000 Unit(s) SubCutaneous every 8 hours  influenza   Vaccine 0.5 milliLiter(s) IntraMuscular once  levothyroxine 100 MICROGram(s) Oral daily  midodrine 5 milliGRAM(s) Oral three times a day  silver sulfADIAZINE 1% Cream 1 Application(s) Topical daily  vancomycin    Solution 125 milliGRAM(s) Oral every 12 hours  ziprasidone 40 milliGRAM(s) Oral <User Schedule>    MEDICATIONS  (PRN):  acetaminophen   Tablet .. 975 milliGRAM(s) Oral every 6 hours PRN Mild Pain (1 - 3)  oxyCODONE    IR 5 milliGRAM(s) Oral every 4 hours PRN Moderate Pain (4 - 6)    Pertinent Labs: (03/13) Cr  0.48 mg/dL<L>     Skin: wound in right hip; no noted pressure injuries as per documentation.   Noted + 1 generalized and right arm and +2 right hip edema as per flow sheets.     Estimated Needs:   [ ] no change since previous assessment:   [x] recalculated: based on lowest weight since admission: 57.3 kg   1433 - 1719 kcal/day (25 - 30 kcal/kg)  80 - 92 g protein (1.4 - 1.6 g/kg)    Previous Nutrition Diagnosis: [x] Malnutrition; moderate     Nutrition Diagnosis is [x] ongoing - being addressed with tube feedings.   Pt currently at goal: Pt to meet >75% of estimated nutritional needs during hospital stay.     New Nutrition Diagnosis: [ ] not applicable    Interventions:     1. Recommend Jevity 1.2 through bolus feedings via PEG starting at 237 ml/hr 4 x day and advance as tolerated to goal of 355 ml/hr 4 x day to provide 1420 ml, 1704 kcal, 79 g protein, and 1150 ml water (30 kcal/kg and 1.4 g/kg protein based on lowest weight since admission 57.3 kg). Discussed with provider.   2. Recommend discontinue PO supplementations as pt not drinking them and pt receiving estimated needs via EN.  3. Continue to obtain weights to identify changes if any.     Monitoring and Evaluation:     [x] PO intake [x] Tolerance to diet prescription [x] weights [x] follow up per protocol    [x] other: EN provision/tolerance     RD remains available.  Shanika Santiago, MS, RDN, #[phone] Nutrition follow up     Pt seen for malnutrition follow up and nutrition consult received for bolus recommendations as pt S/P PEG.      Hospital course as per chart: Pt 67 y/o F with PMH: bipolar depression, OCD, dementia, HLD, NHL (in remission since 2016), osteoarthritis S/P bilateral TKR S/P fall, admitted with a right periprosthetic femoral neck fracture and C. difficile colitis, S/P ORIF of right periprosthetic femur fracture and revision of right hip hemiarthroplasty on (02/22), transferred to SICU intubated, extubated, NGT tube feeding placed for supplemental nutrition on (02/26) due to poor PO intake, S/P PEG placed on (03/13), S/P VAC changed today (03/15).     Source: Patient [ ]    Family [ ]     other [x]; Medical record; RN     Pt confused as per documentation, RN provided information. RN reports pt with poor appetite and PO intake due to tube feedings, states "pt does not eat anything". Denies pt drinking Ensure Clear and Health Shake. Reports pt tolerating tube feedings at goal of 70 ml/hr. Denies pt with nausea, vomiting, diarrhea, or constipation, reports last BM today (03/15). Attempted to obtain food preferences - pt did not have any at this time.     Diet: Regular + Ensure Clear 1x daily (180 eva and 8 gm protein) + Health Shake 1xday (500 kcal and 20-25 g protein) + tube feedings     Enteral /Parenteral Nutrition: Jevity 1.2 through continuous feedings via PEG at 70 ml/hr x 20 hours provides 1400 ml, 1680 kcal, 78 g protein, and 1134 ml water (27 kcal/kg and 1.3 g/kg protein based on dosing weight 61.1 kg).     Current Weight:  (02/04) 134.7 pounds -> (02/14) 159.8 pounds -> (02/24) 171.9 pounds -> (03/04) 156.7 pounds -> (03/10) 130.2 pounds -> (03/14) 127.8 pounds -?accuracy of weight fluctuations likely due to fluid shifts, will continue to monitor.   % Weight Change: n/a    Pertinent Medications: MEDICATIONS  (STANDING):  buPROPion XL . 300 milliGRAM(s) Oral daily  chlorhexidine 4% Liquid 1 Application(s) Topical <User Schedule>  heparin  Injectable 5000 Unit(s) SubCutaneous every 8 hours  influenza   Vaccine 0.5 milliLiter(s) IntraMuscular once  levothyroxine 100 MICROGram(s) Oral daily  midodrine 5 milliGRAM(s) Oral three times a day  silver sulfADIAZINE 1% Cream 1 Application(s) Topical daily  vancomycin    Solution 125 milliGRAM(s) Oral every 12 hours  ziprasidone 40 milliGRAM(s) Oral <User Schedule>    MEDICATIONS  (PRN):  acetaminophen   Tablet .. 975 milliGRAM(s) Oral every 6 hours PRN Mild Pain (1 - 3)  oxyCODONE    IR 5 milliGRAM(s) Oral every 4 hours PRN Moderate Pain (4 - 6)    Pertinent Labs: (03/13) Cr  0.48 mg/dL<L>     Skin: wound in right hip; no noted pressure injuries as per documentation.   Noted + 1 generalized and right arm and +2 right hip edema as per flow sheets.     Estimated Needs:   [ ] no change since previous assessment:   [x] recalculated: based on lowest weight since admission: 57.3 kg   1433 - 1719 kcal/day (25 - 30 kcal/kg)  80 - 92 g protein (1.4 - 1.6 g/kg)    Previous Nutrition Diagnosis: [x] Malnutrition; moderate     Nutrition Diagnosis is [x] ongoing - being addressed with tube feedings.   Pt currently at goal: Pt to meet >75% of estimated nutritional needs during hospital stay.     New Nutrition Diagnosis: [ ] not applicable    Interventions:     1. Recommend Jevity 1.2 through bolus feedings via PEG starting at 237 ml/hr 4 x day and advance as tolerated to goal of 355 ml/hr 4 x day to provide 1420 ml, 1704 kcal, 79 g protein, and 1150 ml water (30 kcal/kg and 1.4 g/kg protein based on lowest weight since admission 57.3 kg). Discussed with provider.   2. Recommend discontinue PO supplementations as pt not drinking them and pt receiving estimated needs via EN.  3. Continue to obtain weights to identify changes if any.     Monitoring and Evaluation:     [ ] PO intake [ ] Tolerance to diet prescription [x] weights [x] follow up per protocol    [x] other: EN provision/tolerance     RD remains available.  Shanika Santiago MS, RDN, #[phone]

## 2019-03-15 NOTE — PROGRESS NOTE ADULT - NSICDXPROBLEM_GEN_ALL_CORE_FT
PROBLEM DIAGNOSES  Problem: Severe protein-calorie malnutrition  Assessment and Plan: Peg in place  Maximize proteins s and caloires as tolerated  Nutrition  to advise    Problem: Hyponatremia  Assessment and Plan: follow BMP    Problem: Wound dehiscence  Assessment and Plan: Conitneu vac dressing changes and keepa mariano dkleesn    Problem: Edema  Assessment and Plan: Lasix prn severe edema only    Problem: Encephalopathy  Assessment and Plan: Mental status improving sl;owly    Problem: Delirium  Assessment and Plan: mental stauts is imiproving. Avoid heavy psychotropic meds    Problem: Hypotension  Assessment and Plan: Given drop in Hgn to 7.3 and relative hypotension will transfuce 1 to 2 Unite PRBC to imiporve bpand anemia.    Problem: Clostridium difficile colitis  Assessment and Plan: Continue  po vancomycin

## 2019-03-15 NOTE — PROGRESS NOTE ADULT - SUBJECTIVE AND OBJECTIVE BOX
WILI PARKER 68y [id number]    Patient is a 68y old  Female who presents with a chief complaint of insufficent po (14 Mar 2019 08:43)      Follow Up/CC:  ID following for cdiff    Interval History/ROS: no fevers, no acute issues    Allergies    honeydew melon (Other)  penicillin (Other; Hives)    Intolerances        ANTIMICROBIALS:  vancomycin    Solution 125 every 12 hours      MEDICATIONS  (STANDING):  buPROPion XL . 300 milliGRAM(s) Oral daily  chlorhexidine 4% Liquid 1 Application(s) Topical <User Schedule>  heparin  Injectable 5000 Unit(s) SubCutaneous every 8 hours  influenza   Vaccine 0.5 milliLiter(s) IntraMuscular once  levothyroxine 100 MICROGram(s) Oral daily  midodrine 5 milliGRAM(s) Oral three times a day  silver sulfADIAZINE 1% Cream 1 Application(s) Topical daily  vancomycin    Solution 125 milliGRAM(s) Oral every 12 hours  ziprasidone 40 milliGRAM(s) Oral <User Schedule>    MEDICATIONS  (PRN):  acetaminophen   Tablet .. 975 milliGRAM(s) Oral every 6 hours PRN Mild Pain (1 - 3)  oxyCODONE    IR 5 milliGRAM(s) Oral every 4 hours PRN Moderate Pain (4 - 6)        Vital Signs Last 24 Hrs  T(C): 37.1 (15 Mar 2019 08:17), Max: 37.1 (15 Mar 2019 08:17)  T(F): 98.7 (15 Mar 2019 08:17), Max: 98.7 (15 Mar 2019 08:17)  HR: 93 (15 Mar 2019 10:54) (78 - 93)  BP: 99/61 (15 Mar 2019 10:54) (91/57 - 110/72)  BP(mean): --  RR: 19 (15 Mar 2019 10:54) (17 - 19)  SpO2: 94% (15 Mar 2019 10:54) (92% - 100%)                  MICROBIOLOGY:  .Body Fluid Synovial Fluid  02-23-19   No growth at 1 week.  --    No polymorphonuclear cells seen  No organisms seen  by cytocentrifuge      .Tissue Other, right hip synovium  02-23-19   No growth at 1 week.  --    Rare polymorphonuclear leukocytes seen per low power field  No organisms seen per oil power field      .Tissue Other, right femur  02-23-19   No growth at 5 days  --    Rare polymorphonuclear leukocytes seen per low power field  No organisms seen per oil power field      RADIOLOGY    VA Duplex Upper Ext Vein Scan, Right (03.14.19 @ 15:11) >  No evidence of right upper extremity deep venous thrombosis.    Superficial thrombophlebitis involving the right cephalic vein.

## 2019-03-15 NOTE — PROGRESS NOTE ADULT - ATTENDING COMMENTS
Patient has not been tolerating PO. After a full discussion with the patient, she is now agreeing  to the PEG. GI to proceed with scheduling. Beginning to ambulate with non weight bearing and doing well. No medical complications post-op to date and to proceed with physical therapy, as tolerated. Continues pulmonary toilet to lessen atelectasis, leg exercises as taught to lessen the risk of DVT and supervised pain medications for post-op pain control. I anticipate transfer to rehabilitation now that PEG is in place and anemia and hypotension and  improved.

## 2019-03-15 NOTE — PROGRESS NOTE ADULT - SUBJECTIVE AND OBJECTIVE BOX
Surgery Progress Note    S: Patient seen and examined. No acute events overnight. Pain well controlled. VAC changed today.     O:  Vital Signs Last 24 Hrs  T(C): 37.1 (15 Mar 2019 08:17), Max: 37.1 (15 Mar 2019 08:17)  T(F): 98.7 (15 Mar 2019 08:17), Max: 98.7 (15 Mar 2019 08:17)  HR: 93 (15 Mar 2019 10:54) (78 - 93)  BP: 99/61 (15 Mar 2019 10:54) (91/57 - 110/72)  BP(mean): --  RR: 19 (15 Mar 2019 10:54) (17 - 19)  SpO2: 94% (15 Mar 2019 10:54) (92% - 100%)    I&O's Detail    14 Mar 2019 07:01  -  15 Mar 2019 07:00  --------------------------------------------------------  IN:    Enteral Tube Flush: 200 mL    ns in tub fed  [id number]: 240 mL    Oral Fluid: 250 mL  Total IN: 690 mL    OUT:    Voided: 650 mL  Total OUT: 650 mL    Total NET: 40 mL          MEDICATIONS  (STANDING):  buPROPion XL . 300 milliGRAM(s) Oral daily  chlorhexidine 4% Liquid 1 Application(s) Topical <User Schedule>  heparin  Injectable 5000 Unit(s) SubCutaneous every 8 hours  influenza   Vaccine 0.5 milliLiter(s) IntraMuscular once  levothyroxine 100 MICROGram(s) Oral daily  midodrine 5 milliGRAM(s) Oral three times a day  silver sulfADIAZINE 1% Cream 1 Application(s) Topical daily  vancomycin    Solution 125 milliGRAM(s) Oral every 12 hours  ziprasidone 40 milliGRAM(s) Oral <User Schedule>    MEDICATIONS  (PRN):  acetaminophen   Tablet .. 975 milliGRAM(s) Oral every 6 hours PRN Mild Pain (1 - 3)  oxyCODONE    IR 5 milliGRAM(s) Oral every 4 hours PRN Moderate Pain (4 - 6)                  Physical Exam:  Physical Exam:  -- CONSTITUTIONAL: NAD.   -- EXTREMITIES: Wound with granulating tissue, no drainage or signs of infection, wound vac replaced

## 2019-03-15 NOTE — PROGRESS NOTE ADULT - SUBJECTIVE AND OBJECTIVE BOX
Pt S/E at bedside, no acute events overnight, pain well controlled,  denies SOB/CP. PEG in place. RUE US showing superficial thrombophlebitis in cephalic vein.    Vital Signs Last 24 Hrs  T(C): 36.8 (15 Mar 2019 05:29), Max: 36.9 (14 Mar 2019 10:20)  T(F): 98.3 (15 Mar 2019 05:29), Max: 98.5 (14 Mar 2019 10:20)  HR: 91 (15 Mar 2019 05:29) (78 - 91)  BP: 97/60 (15 Mar 2019 05:29) (97/60 - 110/72)  BP(mean): --  RR: 18 (15 Mar 2019 05:29) (17 - 18)  SpO2: 98% (15 Mar 2019 05:29) (97% - 100%)    Gen: NAD,  Resp: nonlabored  Right Lower Extremity:  Dressing clean dry intact, wound vac maintaining suction, no erythema  Clean dressing to right foot  +EHL/FHL/TA/GS  SILT L3-S1  +DP/PT Pulses, WWP  Compartments soft  No calf TTP       Assessment and Plan:   · Assessment		  A/P: 68F s/p Revision hemiarthroplasty to right hip    Pain Control  DVT ppx  PWB for transfers only  PT/OT  Incentive Spirometry  Vac mgmt per plastics (MWF)  abx per ID  Medical management appreciated  DC planning

## 2019-03-15 NOTE — PROGRESS NOTE ADULT - SUBJECTIVE AND OBJECTIVE BOX
INTERVAL HPI/OVERNIGHT EVENTS: doing well, peg site clean, non tender, tolerating feeds    MEDICATIONS  (STANDING):  buPROPion XL . 300 milliGRAM(s) Oral daily  chlorhexidine 4% Liquid 1 Application(s) Topical <User Schedule>  heparin  Injectable 5000 Unit(s) SubCutaneous every 8 hours  influenza   Vaccine 0.5 milliLiter(s) IntraMuscular once  levothyroxine 100 MICROGram(s) Oral daily  midodrine 5 milliGRAM(s) Oral three times a day  silver sulfADIAZINE 1% Cream 1 Application(s) Topical daily  vancomycin    Solution 125 milliGRAM(s) Oral every 12 hours  ziprasidone 40 milliGRAM(s) Oral <User Schedule>    MEDICATIONS  (PRN):  acetaminophen   Tablet .. 975 milliGRAM(s) Oral every 6 hours PRN Mild Pain (1 - 3)  oxyCODONE    IR 5 milliGRAM(s) Oral every 4 hours PRN Moderate Pain (4 - 6)      Allergies    honeydew melon (Other)  penicillin (Other; Hives)    Intolerances            PHYSICAL EXAM:   Vital Signs:  Vital Signs Last 24 Hrs  T(C): 37.1 (15 Mar 2019 08:17), Max: 37.1 (15 Mar 2019 08:17)  T(F): 98.7 (15 Mar 2019 08:17), Max: 98.7 (15 Mar 2019 08:17)  HR: 92 (15 Mar 2019 08:17) (78 - 92)  BP: 91/57 (15 Mar 2019 08:17) (91/57 - 110/72)  BP(mean): --  RR: 18 (15 Mar 2019 08:17) (17 - 18)  SpO2: 92% (15 Mar 2019 08:17) (92% - 100%)  Daily     Daily     GENERAL:  no distress  HEENT:  NC/AT,  anicteric  CHEST:   no increased effort, breath sounds clear  HEART:  Regular rhythm  ABDOMEN:  Soft, non-tender, non-distended, normoactive bowel sounds,  peg site clean, nt  EXTEREMITIES:  no cyanosis      LABS:                RADIOLOGY & ADDITIONAL TESTS:

## 2019-03-16 RX ADMIN — BUPROPION HYDROCHLORIDE 300 MILLIGRAM(S): 150 TABLET, EXTENDED RELEASE ORAL at 12:15

## 2019-03-16 RX ADMIN — MIDODRINE HYDROCHLORIDE 5 MILLIGRAM(S): 2.5 TABLET ORAL at 17:37

## 2019-03-16 RX ADMIN — Medication 100 MICROGRAM(S): at 05:23

## 2019-03-16 RX ADMIN — HEPARIN SODIUM 5000 UNIT(S): 5000 INJECTION INTRAVENOUS; SUBCUTANEOUS at 05:23

## 2019-03-16 RX ADMIN — MIDODRINE HYDROCHLORIDE 5 MILLIGRAM(S): 2.5 TABLET ORAL at 05:23

## 2019-03-16 RX ADMIN — CHLORHEXIDINE GLUCONATE 1 APPLICATION(S): 213 SOLUTION TOPICAL at 05:23

## 2019-03-16 RX ADMIN — HEPARIN SODIUM 5000 UNIT(S): 5000 INJECTION INTRAVENOUS; SUBCUTANEOUS at 21:31

## 2019-03-16 RX ADMIN — Medication 125 MILLIGRAM(S): at 17:36

## 2019-03-16 RX ADMIN — ZIPRASIDONE HYDROCHLORIDE 40 MILLIGRAM(S): 20 CAPSULE ORAL at 06:24

## 2019-03-16 RX ADMIN — HEPARIN SODIUM 5000 UNIT(S): 5000 INJECTION INTRAVENOUS; SUBCUTANEOUS at 17:36

## 2019-03-16 RX ADMIN — Medication 1 APPLICATION(S): at 12:16

## 2019-03-16 RX ADMIN — MIDODRINE HYDROCHLORIDE 5 MILLIGRAM(S): 2.5 TABLET ORAL at 21:31

## 2019-03-16 RX ADMIN — Medication 125 MILLIGRAM(S): at 05:23

## 2019-03-16 NOTE — PROGRESS NOTE ADULT - SUBJECTIVE AND OBJECTIVE BOX
WILI PARKER:[id number],   68yFemale followed for:  honeydew melon (Other)  penicillin (Other; Hives)    PAST MEDICAL & SURGICAL HISTORY:  Osteoarthritis of left knee  Lymphoma: NHL, [place] [facility] [place]  Hypercholesteremia  Bipolar depression  OCD (obsessive compulsive disorder)  Depression  Osteoarthritis of right knee  S/P TKR (total knee replacement), bilateral: discharged nov 5th, 2014  S/P cataract surgery  H/O oral surgery: 2014  S/P knee surgery: 1978    FAMILY HISTORY:  Family history of diabetes mellitus  Family history of prostate cancer (Grandparent)  Family history of stomach cancer (Grandparent)  Family history of breast cancer  Family history of COPD (chronic obstructive pulmonary disease)    MEDICATIONS  (STANDING):  buPROPion XL . 300 milliGRAM(s) Oral daily  chlorhexidine 4% Liquid 1 Application(s) Topical <User Schedule>  heparin  Injectable 5000 Unit(s) SubCutaneous every 8 hours  influenza   Vaccine 0.5 milliLiter(s) IntraMuscular once  levothyroxine 100 MICROGram(s) Oral daily  midodrine 5 milliGRAM(s) Oral three times a day  silver sulfADIAZINE 1% Cream 1 Application(s) Topical daily  vancomycin    Solution 125 milliGRAM(s) Oral every 12 hours  ziprasidone 40 milliGRAM(s) Oral <User Schedule>    MEDICATIONS  (PRN):  acetaminophen   Tablet .. 975 milliGRAM(s) Oral every 6 hours PRN Mild Pain (1 - 3)  oxyCODONE    IR 5 milliGRAM(s) Oral every 4 hours PRN Moderate Pain (4 - 6)      Vital Signs Last 24 Hrs  T(C): 36.9 (16 Mar 2019 05:18), Max: 36.9 (15 Mar 2019 20:34)  T(F): 98.4 (16 Mar 2019 05:18), Max: 98.5 (15 Mar 2019 20:34)  HR: 90 (16 Mar 2019 05:18) (86 - 95)  BP: 105/66 (16 Mar 2019 05:18) (97/61 - 105/68)  BP(mean): --  RR: 18 (16 Mar 2019 05:18) (18 - 18)  SpO2: 99% (16 Mar 2019 05:18) (98% - 100%)  nc/at  s1s2  cta  soft, nt, nd no guarding or rebound  no c/c/e    CBC Full  -  ( 15 Mar 2019 22:43 )  WBC Count : 9.4 K/uL  Hemoglobin : 10.0 g/dL  Hematocrit : 29.4 %  Platelet Count - Automated : 243 K/uL  Mean Cell Volume : 99.4 fl  Mean Cell Hemoglobin : 33.9 pg  Mean Cell Hemoglobin Concentration : 34.1 gm/dL  Auto Neutrophil # : x  Auto Lymphocyte # : x  Auto Monocyte # : x  Auto Eosinophil # : x  Auto Basophil # : x  Auto Neutrophil % : x  Auto Lymphocyte % : x  Auto Monocyte % : x  Auto Eosinophil % : x  Auto Basophil % : x

## 2019-03-16 NOTE — PROGRESS NOTE ADULT - SUBJECTIVE AND OBJECTIVE BOX
69 y/o critically ill  female h/o dementia NHL presenting after unwitnessed fall. Pt found on the ground at rehab. is on lovenox after hip fracture. pt reports pain to her right leg. per EMS en route slurring speech and mildly hypotensive requesting trauma.unclear how long pt on the ground. Patient found to have C diff with worsening hypotension requiring pressors on and off and  Patient requiring the SICU for continued cardiopulmonary monitoring. Orthopedic procedure held due to  hypotension and pan colitis and need for pressors due to unstable hemodynamics. Diarrhea slowing off IV Flagyl + on oral vancomycin. No further C.diff colitis and no further hypovolemic hypotension. Patient s/p Open reduction and internal fixation of periprosthetic hip fx Mental status has greatly improved.  Patient seen in SICU prior to transfer to Medical service and regular floor  Patient  alert awake and able to speak but confused . Patient less agitated than yesterday .  On N/G alimentation for hypoproteinemic edema. Blood pressure being supported with midodrine and no diuretics are being given. Patient feels better after moving her bowels. Not agitated and cooperative on Geodan and wellbutrin.  The patient was seen by nutrition and their plan is to be followed. Should her hematocrit continued to drift lower, she will receive 1 unit of packed cells with IV Lasix to correct hypovolemic, hypoproteinemic edema with hypotension on Midodrine. No transfusion presently required with rise in Hct.  The patient is to be out of bed to chair and resume physical therapy, as per orthopedics, after right periprosthetic fracture ORIF on February 22, 2019.  Patient with wound dehiscence seen by plastics and wound vaccuum placed.  She has protein calorie malnutrition due to poor po intake  Will order calorie count to see if she takes in enough calories and protein in order to D/C   the NGT feeds, NGT may be problematic if she is to go to rehab. Patient has been OOB to chair. Patient has not been tolerating PO. PEG placed and feeding started. Patient tolerating feeds Had episode of dizziness and low bp when out of bed.  Patient anemic and would benefit from a PRBC transfusion to prevent orthostatic BP changes. Discussed with NP and we decided to transfuse 1 unit of  PRBCs.       MEDICATIONS  (STANDING):  buPROPion XL . 300 milliGRAM(s) Oral daily  chlorhexidine 4% Liquid 1 Application(s) Topical <User Schedule>  heparin  Injectable 5000 Unit(s) SubCutaneous every 8 hours  influenza   Vaccine 0.5 milliLiter(s) IntraMuscular once  levothyroxine 100 MICROGram(s) Oral daily  midodrine 5 milliGRAM(s) Oral three times a day  silver sulfADIAZINE 1% Cream 1 Application(s) Topical daily  vancomycin    Solution 125 milliGRAM(s) Oral every 12 hours  ziprasidone 40 milliGRAM(s) Oral <User Schedule>    MEDICATIONS  (PRN):  acetaminophen   Tablet .. 975 milliGRAM(s) Oral every 6 hours PRN Mild Pain (1 - 3)  oxyCODONE    IR 5 milliGRAM(s) Oral every 4 hours PRN Moderate Pain (4 - 6)      Vital Signs Last 24 Hrs  T(C): 36.7 (15 Mar 2019 13:50), Max: 37.1 (15 Mar 2019 08:17)  T(F): 98.1 (15 Mar 2019 13:50), Max: 98.7 (15 Mar 2019 08:17)  HR: 86 (15 Mar 2019 13:50) (78 - 93)  BP: 97/62 (15 Mar 2019 13:50) (91/57 - 110/72)  BP(mean): --  RR: 18 (15 Mar 2019 13:50) (17 - 19)  SpO2: 100% (15 Mar 2019 13:50) (92% - 100%)    PHYSICAL EXAM:  GENERAL: NAD, A+O x 3  HEAD:  Atraumatic  EYES: EOM, PERRLA, conjunctiva pink and sclera white  ENT: No tonsillar erythema, exudates,   NECK: Supple, No JVD, normal thyroid, carotids with normal upstrokes and no bruits  CHEST/LUNG: Clear to auscultation bilaterally, No rales, rhonchi, wheezing, or rubs  HEART: Regular rate and rhythm, No murmurs, rubs, or gallops  ABDOMEN: Soft, nondistended, no masses, guarding, tenderness or rebound, bowel sounds present; PEG  in place feedings as per GI  EXTREMITIES:  2+ Peripheral Pulses, No clubbing, cyanosis, or edema. No arthritis of shoulders, elbows, hands, hips, knees, ankles, or feet. No DJD C spine, T spine, or L/S spine PEG in place  LYMPH: No lymphadenopathy noted  SKIN: No rashes or lesions  NERVOUS SYSTEM:  Alert & Oriented X3, normal cognitive function. Motor Strength 5/5 right upper and right lower.  5/5 left upper and left lower extremities, DTRs 2+ intact and symmetric    LABS:    No labs 3/16    CBC Full  -  ( 15 Mar 2019 15:23 )  WBC Count : 7.8 K/uL  Hemoglobin : 8.8 g/dL  Hematocrit : 26.5 %  Platelet Count - Automated : 246 K/uL  Mean Cell Volume : 101.0 fl  Mean Cell Hemoglobin : 33.4 pg  Mean Cell Hemoglobin Concentration : 33.1 gm/dL  Auto Neutrophil # : x  Auto Lymphocyte # : x  Auto Monocyte # : x  Auto Eosinophil # : x  Auto Basophil # : x  Auto Neutrophil % : x  Auto Lymphocyte % : x  Auto Monocyte % : x  Auto Eosinophil % : x  Auto Basophil % : x      CBC Full  -  ( 13 Mar 2019 08:33 )  WBC Count : 10.07 K/uL  Hemoglobin : 8.3 g/dL  Hematocrit : 25.9 %  Platelet Count - Automated : 235 K/uL  Mean Cell Volume : 101.6 fl  Mean Cell Hemoglobin : 32.5 pg  Mean Cell Hemoglobin Concentration : 32.0 gm/dL  Auto Neutrophil # : x  Auto Lymphocyte # : x  Auto Monocyte # : x  Auto Eosinophil # : x  Auto Basophil # : x  Auto Neutrophil % : x  Auto Lymphocyte % : x  Auto Monocyte % : x  Auto Eosinophil % : x  Auto Basophil % : x    03-13    135  |  96  |  14  ----------------------------<  91  4.0   |  28  |  0.48<L>    Ca    8.4      13 Mar 2019 06:39            CAPILLARY BLOOD GLUCOSE          RADIOLOGY & ADDITIONAL TESTS:

## 2019-03-16 NOTE — PROGRESS NOTE ADULT - NSICDXPROBLEM_GEN_ALL_CORE_FT
PROBLEM DIAGNOSES  Problem: Severe protein-calorie malnutrition  Assessment and Plan: Peg in place  Maximize protein and calories as tolerated  Nutrition  to advise.    Problem: Hyponatremia  Assessment and Plan: follow BMP    Problem: Wound dehiscence  Assessment and Plan: Continue vac dressing changes and keep area clean    Problem: Edema  Assessment and Plan: Lasix prn severe edema only as patient has hyponatremia    Problem: Encephalopathy  Assessment and Plan: Mental status markedly improved    Problem: Delirium  Assessment and Plan: Delirium resolved.. Avoid heavy psychotropic meds    Problem: Periprosthetic fracture of proximal end of femur  Assessment and Plan: acitvity as per ortho    Problem: Hypotension  Assessment and Plan: blood presssure and H/H stabilized    Problem: Clostridium difficile colitis  Assessment and Plan: Continue  po vancomycin

## 2019-03-17 RX ADMIN — HEPARIN SODIUM 5000 UNIT(S): 5000 INJECTION INTRAVENOUS; SUBCUTANEOUS at 21:05

## 2019-03-17 RX ADMIN — ZIPRASIDONE HYDROCHLORIDE 40 MILLIGRAM(S): 20 CAPSULE ORAL at 06:22

## 2019-03-17 RX ADMIN — HEPARIN SODIUM 5000 UNIT(S): 5000 INJECTION INTRAVENOUS; SUBCUTANEOUS at 13:24

## 2019-03-17 RX ADMIN — MIDODRINE HYDROCHLORIDE 5 MILLIGRAM(S): 2.5 TABLET ORAL at 13:24

## 2019-03-17 RX ADMIN — Medication 1 APPLICATION(S): at 11:18

## 2019-03-17 RX ADMIN — CHLORHEXIDINE GLUCONATE 1 APPLICATION(S): 213 SOLUTION TOPICAL at 06:23

## 2019-03-17 RX ADMIN — Medication 100 MICROGRAM(S): at 06:22

## 2019-03-17 RX ADMIN — BUPROPION HYDROCHLORIDE 300 MILLIGRAM(S): 150 TABLET, EXTENDED RELEASE ORAL at 11:18

## 2019-03-17 RX ADMIN — Medication 125 MILLIGRAM(S): at 17:16

## 2019-03-17 RX ADMIN — MIDODRINE HYDROCHLORIDE 5 MILLIGRAM(S): 2.5 TABLET ORAL at 21:04

## 2019-03-17 RX ADMIN — MIDODRINE HYDROCHLORIDE 5 MILLIGRAM(S): 2.5 TABLET ORAL at 06:22

## 2019-03-17 RX ADMIN — HEPARIN SODIUM 5000 UNIT(S): 5000 INJECTION INTRAVENOUS; SUBCUTANEOUS at 06:22

## 2019-03-17 RX ADMIN — Medication 125 MILLIGRAM(S): at 06:22

## 2019-03-17 NOTE — PROGRESS NOTE ADULT - SUBJECTIVE AND OBJECTIVE BOX
WILI PARKER:[id number],   68yFemale followed for:  honeydew melon (Other)  penicillin (Other; Hives)    PAST MEDICAL & SURGICAL HISTORY:  Osteoarthritis of left knee  Lymphoma: NHL, [place] [facility] [place]  Hypercholesteremia  Bipolar depression  OCD (obsessive compulsive disorder)  Depression  Osteoarthritis of right knee  S/P TKR (total knee replacement), bilateral: discharged nov 5th, 2014  S/P cataract surgery  H/O oral surgery: 2014  S/P knee surgery: 1978    FAMILY HISTORY:  Family history of diabetes mellitus  Family history of prostate cancer (Grandparent)  Family history of stomach cancer (Grandparent)  Family history of breast cancer  Family history of COPD (chronic obstructive pulmonary disease)    MEDICATIONS  (STANDING):  buPROPion XL . 300 milliGRAM(s) Oral daily  chlorhexidine 4% Liquid 1 Application(s) Topical <User Schedule>  heparin  Injectable 5000 Unit(s) SubCutaneous every 8 hours  influenza   Vaccine 0.5 milliLiter(s) IntraMuscular once  levothyroxine 100 MICROGram(s) Oral daily  midodrine 5 milliGRAM(s) Oral three times a day  silver sulfADIAZINE 1% Cream 1 Application(s) Topical daily  vancomycin    Solution 125 milliGRAM(s) Oral every 12 hours  ziprasidone 40 milliGRAM(s) Oral <User Schedule>    MEDICATIONS  (PRN):  acetaminophen   Tablet .. 975 milliGRAM(s) Oral every 6 hours PRN Mild Pain (1 - 3)  oxyCODONE    IR 5 milliGRAM(s) Oral every 4 hours PRN Moderate Pain (4 - 6)      Vital Signs Last 24 Hrs  T(C): 37 (17 Mar 2019 05:22), Max: 37.1 (17 Mar 2019 00:19)  T(F): 98.6 (17 Mar 2019 05:22), Max: 98.8 (17 Mar 2019 00:19)  HR: 89 (17 Mar 2019 05:22) (85 - 93)  BP: 115/66 (17 Mar 2019 05:22) (92/59 - 115/66)  BP(mean): --  RR: 18 (17 Mar 2019 05:22) (18 - 18)  SpO2: 100% (17 Mar 2019 05:22) (98% - 100%)  nc/at  s1s2  cta  soft, nt, nd no guarding or rebound  no c/c/e    CBC Full  -  ( 15 Mar 2019 22:43 )  WBC Count : 9.4 K/uL  Hemoglobin : 10.0 g/dL  Hematocrit : 29.4 %  Platelet Count - Automated : 243 K/uL  Mean Cell Volume : 99.4 fl  Mean Cell Hemoglobin : 33.9 pg  Mean Cell Hemoglobin Concentration : 34.1 gm/dL  Auto Neutrophil # : x  Auto Lymphocyte # : x  Auto Monocyte # : x  Auto Eosinophil # : x  Auto Basophil # : x  Auto Neutrophil % : x  Auto Lymphocyte % : x  Auto Monocyte % : x  Auto Eosinophil % : x  Auto Basophil % : x

## 2019-03-17 NOTE — PROGRESS NOTE ADULT - ASSESSMENT
The patient is to be out of bed to chair and resume physical therapy, as per orthopedics, after right periprosthetic fracture ORIF on February 22, 2019.  Patient with wound dehiscence due to protein calorie malnutrition seen by plastics and wound vaccuum placed.  She has protein calorie malnutrition due to poor po intake.  . PEG placed and Patient tolerating feeding.

## 2019-03-17 NOTE — PROGRESS NOTE ADULT - ATTENDING COMMENTS
Patient has not been tolerating PO. Beginning to ambulate with non weight bearing and doing well. No medical complications post-op to date and to proceed with physical therapy, as tolerated. Continues pulmonary toilet to lessen atelectasis, leg exercises as taught to lessen the risk of DVT and supervised pain medications for post-op pain control. I anticipate transfer to rehabilitation now that PEG is in place and anemia and hypotension  improved.

## 2019-03-17 NOTE — PROGRESS NOTE ADULT - NSICDXPROBLEM_GEN_ALL_CORE_FT
PROBLEM DIAGNOSES  Problem: Severe protein-calorie malnutrition  Assessment and Plan: Peg in place  Maximize protein and calories as tolerated  Nutrition  to advise.    Problem: Hyponatremia  Assessment and Plan: follow BMP    Problem: Wound dehiscence  Assessment and Plan: Continue vac dressing changes and keep area clean    Problem: Edema  Assessment and Plan: Lasix prn severe edema only as patient has hyponatremia    Problem: Encephalopathy  Assessment and Plan: Mental status markedly improved    Problem: Delirium  Assessment and Plan: Delirium resolved.. Avoid heavy psychotropic meds    Problem: Periprosthetic fracture of proximal end of femur  Assessment and Plan: activity as per ortho    Problem: Hypotension  Assessment and Plan: blood pressure and H/H stabilized    Problem: Clostridium difficile colitis  Assessment and Plan: Continue  po vancomycin

## 2019-03-17 NOTE — PROGRESS NOTE ADULT - SUBJECTIVE AND OBJECTIVE BOX
67 y/o critically ill  female h/o dementia NHL presenting after unwitnessed fall. Pt found on the ground at rehab. is on lovenox after hip fracture. pt reports pain to her right leg. per EMS en route slurring speech and mildly hypotensive requesting trauma.unclear how long pt on the ground. Patient found to have C diff with worsening hypotension requiring pressors on and off and  Patient requiring the SICU for continued cardiopulmonary monitoring. Orthopedic procedure held due to  hypotension and pan colitis and need for pressors due to unstable hemodynamics. Diarrhea slowing off IV Flagyl + on oral vancomycin. No further C.diff colitis and no further hypovolemic hypotension. Patient s/p Open reduction and internal fixation of periprosthetic hip fx Mental status has greatly improved.  Patient seen in SICU prior to transfer to Medical service and regular floor  Patient  alert awake and able to speak but confused . Patient less agitated than yesterday .  On N/G alimentation for hypoproteinemic edema. Blood pressure being supported with midodrine and no diuretics are being given. Patient feels better after moving her bowels. Not agitated and cooperative on Geodan and wellbutrin.  The patient was seen by nutrition and their plan is to be followed. Should her hematocrit continued to drift lower, she will receive 1 unit of packed cells with IV Lasix to correct hypovolemic, hypoproteinemic edema with hypotension on Midodrine. No transfusion presently required with rise in Hct.  The patient is to be out of bed to chair and resume physical therapy, as per orthopedics, after right periprosthetic fracture ORIF on February 22, 2019.  Patient with wound dehiscence seen by plastics and wound vaccuum placed.  She has protein calorie malnutrition due to poor po intake  Will order calorie count to see if she takes in enough calories and protein in order to D/C   the NGT feeds, NGT may be problematic if she is to go to rehab. Patient has been OOB to chair. Patient has not been tolerating PO. PEG placed and feeding started. Patient tolerating feeds Had episode of dizziness and low bp when out of bed.  Patient anemic and would benefit from a PRBC transfusion to prevent orthostatic BP changes. Discussed with NP and we decided to transfuse 1 unit of  PRBCs.       MEDICATIONS  (STANDING):  buPROPion XL . 300 milliGRAM(s) Oral daily  chlorhexidine 4% Liquid 1 Application(s) Topical <User Schedule>  heparin  Injectable 5000 Unit(s) SubCutaneous every 8 hours  influenza   Vaccine 0.5 milliLiter(s) IntraMuscular once  levothyroxine 100 MICROGram(s) Oral daily  midodrine 5 milliGRAM(s) Oral three times a day  silver sulfADIAZINE 1% Cream 1 Application(s) Topical daily  vancomycin    Solution 125 milliGRAM(s) Oral every 12 hours  ziprasidone 40 milliGRAM(s) Oral <User Schedule>    MEDICATIONS  (PRN):  acetaminophen   Tablet .. 975 milliGRAM(s) Oral every 6 hours PRN Mild Pain (1 - 3)  oxyCODONE    IR 5 milliGRAM(s) Oral every 4 hours PRN Moderate Pain (4 - 6)    Vital Signs Last 24 Hrs  T(C): 37 (17 Mar 2019 05:22), Max: 37.1 (17 Mar 2019 00:19)  T(F): 98.6 (17 Mar 2019 05:22), Max: 98.8 (17 Mar 2019 00:19)  HR: 89 (17 Mar 2019 05:22) (85 - 93)  BP: 115/66 (17 Mar 2019 05:22) (92/59 - 115/66)  BP(mean): --  RR: 18 (17 Mar 2019 05:22) (18 - 18)  SpO2: 100% (17 Mar 2019 05:22) (98% - 100%)      PHYSICAL EXAM:  GENERAL: NAD, A+O x 3  HEAD:  Atraumatic  EYES: EOM, PERRLA, conjunctiva pink and sclera white  ENT: No tonsillar erythema, exudates,   NECK: Supple, No JVD, normal thyroid, carotids with normal upstrokes and no bruits  CHEST/LUNG: Clear to auscultation bilaterally, No rales, rhonchi, wheezing, or rubs  HEART: Regular rate and rhythm, No murmurs, rubs, or gallops  ABDOMEN: Soft, nondistended, no masses, guarding, tenderness or rebound, bowel sounds present; PEG  in place feedings as per GI  EXTREMITIES:  2+ Peripheral Pulses, No clubbing, cyanosis, or edema. No arthritis of shoulders, elbows, hands, hips, knees, ankles, or feet. No DJD C spine, T spine, or L/S spine PEG in place  LYMPH: No lymphadenopathy noted  SKIN: No rashes or lesions  NERVOUS SYSTEM:  Alert & Oriented X3, normal cognitive function. Motor Strength 5/5 right upper and right lower.  5/5 left upper and left lower extremities, DTRs 2+ intact and symmetric    LABS:                      No labs 3/16, 3/17    CBC Full  -  ( 15 Mar 2019 15:23 )  WBC Count : 7.8 K/uL  Hemoglobin : 8.8 g/dL  Hematocrit : 26.5 %  Platelet Count - Automated : 246 K/uL  Mean Cell Volume : 101.0 fl  Mean Cell Hemoglobin : 33.4 pg  Mean Cell Hemoglobin Concentration : 33.1 gm/dL  Auto Neutrophil # : x  Auto Lymphocyte # : x  Auto Monocyte # : x  Auto Eosinophil # : x  Auto Basophil # : x  Auto Neutrophil % : x  Auto Lymphocyte % : x  Auto Monocyte % : x  Auto Eosinophil % : x  Auto Basophil % : x      CBC Full  -  ( 13 Mar 2019 08:33 )  WBC Count : 10.07 K/uL  Hemoglobin : 8.3 g/dL  Hematocrit : 25.9 %  Platelet Count - Automated : 235 K/uL  Mean Cell Volume : 101.6 fl  Mean Cell Hemoglobin : 32.5 pg  Mean Cell Hemoglobin Concentration : 32.0 gm/dL  Auto Neutrophil # : x  Auto Lymphocyte # : x  Auto Monocyte # : x  Auto Eosinophil # : x  Auto Basophil # : x  Auto Neutrophil % : x  Auto Lymphocyte % : x  Auto Monocyte % : x  Auto Eosinophil % : x  Auto Basophil % : x    03-13    135  |  96  |  14  ----------------------------<  91  4.0   |  28  |  0.48<L>    Ca    8.4      13 Mar 2019 06:39            CAPILLARY BLOOD GLUCOSE          RADIOLOGY & ADDITIONAL TESTS:

## 2019-03-18 LAB
ANION GAP SERPL CALC-SCNC: 11 MMOL/L — SIGNIFICANT CHANGE UP (ref 5–17)
BUN SERPL-MCNC: 18 MG/DL — SIGNIFICANT CHANGE UP (ref 7–23)
CALCIUM SERPL-MCNC: 8.7 MG/DL — SIGNIFICANT CHANGE UP (ref 8.4–10.5)
CHLORIDE SERPL-SCNC: 95 MMOL/L — LOW (ref 96–108)
CO2 SERPL-SCNC: 26 MMOL/L — SIGNIFICANT CHANGE UP (ref 22–31)
CREAT SERPL-MCNC: 0.53 MG/DL — SIGNIFICANT CHANGE UP (ref 0.5–1.3)
GLUCOSE SERPL-MCNC: 215 MG/DL — HIGH (ref 70–99)
HCT VFR BLD CALC: 31.8 % — LOW (ref 34.5–45)
HGB BLD-MCNC: 10.2 G/DL — LOW (ref 11.5–15.5)
MAGNESIUM SERPL-MCNC: 1.8 MG/DL — SIGNIFICANT CHANGE UP (ref 1.6–2.6)
MCHC RBC-ENTMCNC: 32.1 GM/DL — SIGNIFICANT CHANGE UP (ref 32–36)
MCHC RBC-ENTMCNC: 32.4 PG — SIGNIFICANT CHANGE UP (ref 27–34)
MCV RBC AUTO: 101 FL — HIGH (ref 80–100)
PHOSPHATE SERPL-MCNC: 3.3 MG/DL — SIGNIFICANT CHANGE UP (ref 2.5–4.5)
PLATELET # BLD AUTO: 287 K/UL — SIGNIFICANT CHANGE UP (ref 150–400)
POTASSIUM SERPL-MCNC: 4.8 MMOL/L — SIGNIFICANT CHANGE UP (ref 3.5–5.3)
POTASSIUM SERPL-SCNC: 4.8 MMOL/L — SIGNIFICANT CHANGE UP (ref 3.5–5.3)
RBC # BLD: 3.14 M/UL — LOW (ref 3.8–5.2)
RBC # FLD: 18.7 % — HIGH (ref 10.3–14.5)
SODIUM SERPL-SCNC: 132 MMOL/L — LOW (ref 135–145)
WBC # BLD: 9.8 K/UL — SIGNIFICANT CHANGE UP (ref 3.8–10.5)
WBC # FLD AUTO: 9.8 K/UL — SIGNIFICANT CHANGE UP (ref 3.8–10.5)

## 2019-03-18 PROCEDURE — 99232 SBSQ HOSP IP/OBS MODERATE 35: CPT

## 2019-03-18 RX ADMIN — MIDODRINE HYDROCHLORIDE 5 MILLIGRAM(S): 2.5 TABLET ORAL at 22:31

## 2019-03-18 RX ADMIN — MIDODRINE HYDROCHLORIDE 5 MILLIGRAM(S): 2.5 TABLET ORAL at 14:33

## 2019-03-18 RX ADMIN — HEPARIN SODIUM 5000 UNIT(S): 5000 INJECTION INTRAVENOUS; SUBCUTANEOUS at 14:33

## 2019-03-18 RX ADMIN — Medication 125 MILLIGRAM(S): at 05:34

## 2019-03-18 RX ADMIN — CHLORHEXIDINE GLUCONATE 1 APPLICATION(S): 213 SOLUTION TOPICAL at 05:33

## 2019-03-18 RX ADMIN — Medication 975 MILLIGRAM(S): at 12:24

## 2019-03-18 RX ADMIN — Medication 125 MILLIGRAM(S): at 18:16

## 2019-03-18 RX ADMIN — BUPROPION HYDROCHLORIDE 300 MILLIGRAM(S): 150 TABLET, EXTENDED RELEASE ORAL at 11:23

## 2019-03-18 RX ADMIN — Medication 100 MICROGRAM(S): at 05:34

## 2019-03-18 RX ADMIN — ZIPRASIDONE HYDROCHLORIDE 40 MILLIGRAM(S): 20 CAPSULE ORAL at 06:06

## 2019-03-18 RX ADMIN — MIDODRINE HYDROCHLORIDE 5 MILLIGRAM(S): 2.5 TABLET ORAL at 05:34

## 2019-03-18 RX ADMIN — Medication 975 MILLIGRAM(S): at 11:24

## 2019-03-18 RX ADMIN — HEPARIN SODIUM 5000 UNIT(S): 5000 INJECTION INTRAVENOUS; SUBCUTANEOUS at 22:31

## 2019-03-18 RX ADMIN — HEPARIN SODIUM 5000 UNIT(S): 5000 INJECTION INTRAVENOUS; SUBCUTANEOUS at 05:34

## 2019-03-18 RX ADMIN — Medication 1 APPLICATION(S): at 11:24

## 2019-03-18 NOTE — PROGRESS NOTE ADULT - NEGATIVE NEUROLOGICAL SYMPTOMS
no confusion/no headache
no headache/no confusion
no headache/no confusion
no confusion/no headache
no headache/no confusion

## 2019-03-18 NOTE — PROGRESS NOTE ADULT - MS EXT PE MLT D E PC
no cyanosis/no pedal edema
no cyanosis
no cyanosis/no pedal edema
no cyanosis/pedal edema
pedal edema/no cyanosis
no cyanosis
no cyanosis/pedal edema
no pedal edema/no cyanosis

## 2019-03-18 NOTE — PROGRESS NOTE ADULT - SUBJECTIVE AND OBJECTIVE BOX
WILI PARKER 68y [id number]    Patient is a 68y old  Female who presents with a chief complaint of dysphagia, decreased po, cdiff (18 Mar 2019 10:52)      Follow Up/CC:  ID following for cdiff    Interval History/ROS: no fever, feels ok    Allergies    honeydew melon (Other)  penicillin (Other; Hives)    Intolerances        ANTIMICROBIALS:  vancomycin    Solution 125 every 12 hours      MEDICATIONS  (STANDING):  buPROPion XL . 300 milliGRAM(s) Oral daily  chlorhexidine 4% Liquid 1 Application(s) Topical <User Schedule>  heparin  Injectable 5000 Unit(s) SubCutaneous every 8 hours  influenza   Vaccine 0.5 milliLiter(s) IntraMuscular once  levothyroxine 100 MICROGram(s) Oral daily  midodrine 5 milliGRAM(s) Oral three times a day  silver sulfADIAZINE 1% Cream 1 Application(s) Topical daily  vancomycin    Solution 125 milliGRAM(s) Oral every 12 hours  ziprasidone 40 milliGRAM(s) Oral <User Schedule>    MEDICATIONS  (PRN):  acetaminophen   Tablet .. 975 milliGRAM(s) Oral every 6 hours PRN Mild Pain (1 - 3)  oxyCODONE    IR 5 milliGRAM(s) Oral every 4 hours PRN Moderate Pain (4 - 6)        Vital Signs Last 24 Hrs  T(C): 36.4 (18 Mar 2019 12:56), Max: 37.2 (18 Mar 2019 04:58)  T(F): 97.5 (18 Mar 2019 12:56), Max: 98.9 (18 Mar 2019 04:58)  HR: 96 (18 Mar 2019 12:56) (92 - 96)  BP: 110/6 (18 Mar 2019 12:56) (98/66 - 110/6)  BP(mean): --  RR: 18 (18 Mar 2019 12:56) (18 - 18)  SpO2: 99% (18 Mar 2019 12:56) (98% - 100%)    CBC Full  -  ( 18 Mar 2019 06:47 )  WBC Count : 9.8 K/uL  Hemoglobin : 10.2 g/dL  Hematocrit : 31.8 %  Platelet Count - Automated : 287 K/uL  Mean Cell Volume : 101.0 fl  Mean Cell Hemoglobin : 32.4 pg  Mean Cell Hemoglobin Concentration : 32.1 gm/dL  Auto Neutrophil # : x  Auto Lymphocyte # : x  Auto Monocyte # : x  Auto Eosinophil # : x  Auto Basophil # : x  Auto Neutrophil % : x  Auto Lymphocyte % : x  Auto Monocyte % : x  Auto Eosinophil % : x  Auto Basophil % : x    03-18    132<L>  |  95<L>  |  18  ----------------------------<  215<H>  4.8   |  26  |  0.53    Ca    8.7      18 Mar 2019 12:31  Phos  3.3     03-18  Mg     1.8     03-18            MICROBIOLOGY:  .Body Fluid Synovial Fluid  02-23-19   No growth at 1 week.  --    No polymorphonuclear cells seen  No organisms seen  by cytocentrifuge      .Tissue Other, right hip synovium  02-23-19   No growth at 1 week.  --    Rare polymorphonuclear leukocytes seen per low power field  No organisms seen per oil power field      .Tissue Other, right femur  02-23-19   No growth at 5 days  --    Rare polymorphonuclear leukocytes seen per low power field  No organisms seen per oil power field      RADIOLOGY    VA Duplex Upper Ext Vein Scan, Right (03.14.19 @ 15:11) >    No evidence of right upper extremity deep venous thrombosis.    Superficial thrombophlebitis involving the right cephalic vein.

## 2019-03-18 NOTE — PROGRESS NOTE ADULT - REASON FOR ADMISSION
C diff
Hip pain
Severe C. Diff Colitis
cdad, dysphagia
colitis
dyshpagia, cdad
dysphagia, decreased po, cdiff
insufficent po
peg
perioprosthetic fracture
periprosthetic fracture
periprosthetic fracture
C Diff
C diff
Hip Fx and C diff
Fractured hip
Fractured hip
Transfer to Medical Service of Dr Barraza
hip fx and C diff
Periprosthetic hip fracture, hypovolemic shock with C, diff pancolitis and

## 2019-03-18 NOTE — PROGRESS NOTE ADULT - NEGATIVE MUSCULOSKELETAL SYMPTOMS
no hip pain

## 2019-03-18 NOTE — PROGRESS NOTE ADULT - NOSE
no discharge
no discharge
NGT+
NGT+
no discharge
NGT+
no discharge

## 2019-03-18 NOTE — PROGRESS NOTE ADULT - NEGATIVE HEMATOLOGY SYMPTOMS
no nose bleeding

## 2019-03-18 NOTE — PROGRESS NOTE ADULT - GASTROINTESTINAL DETAILS
no rebound tenderness/soft/no distention/no rigidity/nontender
no distention/no guarding/no rebound tenderness/no rigidity/nontender/soft
no distention/no guarding/nontender/soft/no rebound tenderness/no rigidity
no guarding/no distention/soft/nontender
nontender/no rigidity/no distention/no rebound tenderness/no guarding/soft
no distention/soft/no rebound tenderness/no rigidity/nontender/no guarding
no rebound tenderness/no distention/soft/no guarding/nontender/no rigidity
no rebound tenderness/soft/no guarding/nontender/no distention/no rigidity
soft/no rebound tenderness/no rigidity/no guarding/nontender/no distention
soft/nontender/no guarding/no rebound tenderness/no distention
no rigidity/nontender/soft/no guarding
no rebound tenderness/no rigidity/nontender/no guarding/no distention/soft

## 2019-03-18 NOTE — PROGRESS NOTE ADULT - PSYCHIATRIC DETAILS
normal behavior
flat affect
flat affect
normal behavior
flat affect

## 2019-03-18 NOTE — PROGRESS NOTE ADULT - NEGATIVE OPHTHALMOLOGIC SYMPTOMS
no pain L/no pain R
no pain R/no pain L
no pain L/no pain R

## 2019-03-18 NOTE — PROGRESS NOTE ADULT - NEGATIVE GENERAL SYMPTOMS
no chills/no fever
no fever
no fever/no chills

## 2019-03-18 NOTE — PROGRESS NOTE ADULT - SUBJECTIVE AND OBJECTIVE BOX
Pt S/E at bedside, no acute events overnight, pain well controlled, Patient wants to go home.     Vital Signs Last 24 Hrs  T(C): 37.2 (18 Mar 2019 04:58), Max: 37.2 (18 Mar 2019 04:58)  T(F): 98.9 (18 Mar 2019 04:58), Max: 98.9 (18 Mar 2019 04:58)  HR: 92 (18 Mar 2019 04:58) (92 - 98)  BP: 98/66 (18 Mar 2019 04:58) (98/66 - 109/69)  BP(mean): --  RR: 18 (18 Mar 2019 04:58) (18 - 18)  SpO2: 98% (18 Mar 2019 04:58) (98% - 100%)    Gen: NAD,  Resp: nonlabored  Right Lower Extremity:  Dressing clean dry intact, wound vac maintaining suction, no erythema  Clean dressing to right foot  +EHL/FHL/TA/GS  SILT L3-S1  +AT Pulse, WWP  Compartments soft  No calf TTP       Assessment and Plan:   · Assessment		  A/P: 68F s/p Revision hemiarthroplasty to right hip    Pain Control  DVT ppx  RLE PWB for transfers only  PT/OT  Incentive Spirometry  Vac mgmt per plastics (MWF)  abx per ID  Medical management appreciated  DC planning

## 2019-03-18 NOTE — PROGRESS NOTE ADULT - NEGATIVE ALLERGIC REACTIONS
no respiratory distress

## 2019-03-18 NOTE — PROGRESS NOTE ADULT - SUBJECTIVE AND OBJECTIVE BOX
69 y/o critically ill  female h/o dementia NHL presenting after unwitnessed fall. Pt found on the ground at rehab. is on lovenox after hip fracture. pt reports pain to her right leg. per EMS en route slurring speech and mildly hypotensive requesting trauma.unclear how long pt on the ground. Patient found to have C diff with worsening hypotension requiring pressors on and off and  Patient requiring the SICU for continued cardiopulmonary monitoring. Orthopedic procedure held due to  hypotension and pan colitis and need for pressors due to unstable hemodynamics. Diarrhea slowing off IV Flagyl + on oral vancomycin. No further C.diff colitis and no further hypovolemic hypotension. Patient s/p Open reduction and internal fixation of periprosthetic hip fx Mental status has greatly improved.  Patient seen in SICU prior to transfer to Medical service and regular floor  Patient  alert awake and able to speak but confused . Patient less agitated than yesterday .  On N/G alimentation for hypoproteinemic edema. Blood pressure being supported with midodrine and no diuretics are being given. Patient feels better after moving her bowels. Not agitated and cooperative on Geodan and wellbutrin.  The patient was seen by nutrition and their plan is to be followed. Should her hematocrit continued to drift lower, she will receive 1 unit of packed cells with IV Lasix to correct hypovolemic, hypoproteinemic edema with hypotension on Midodrine. No transfusion presently required with rise in Hct.  The patient is to be out of bed to chair and resume physical therapy, as per orthopedics, after right periprosthetic fracture ORIF on February 22, 2019.  Patient with wound dehiscence seen by plastics and wound vaccuum placed.  She has protein calorie malnutrition due to poor po intake  Will order calorie count to see if she takes in enough calories and protein in order to D/C   the NGT feeds, NGT may be problematic if she is to go to rehab. Patient has been OOB to chair. Patient has not been tolerating PO. PEG placed and feeding started. Patient tolerating feeds Had episode of dizziness and low bp when out of bed.  Patient anemic and would benefit from a PRBC transfusion to prevent orthostatic BP changes. Discussed with NP and we decided to transfuse 1 unit of  PRBCs. Discussed nutrition and fluid with NP    MEDICATIONS  (STANDING):  buPROPion XL . 300 milliGRAM(s) Oral daily  chlorhexidine 4% Liquid 1 Application(s) Topical <User Schedule>  heparin  Injectable 5000 Unit(s) SubCutaneous every 8 hours  influenza   Vaccine 0.5 milliLiter(s) IntraMuscular once  levothyroxine 100 MICROGram(s) Oral daily  midodrine 5 milliGRAM(s) Oral three times a day  silver sulfADIAZINE 1% Cream 1 Application(s) Topical daily  vancomycin    Solution 125 milliGRAM(s) Oral every 12 hours  ziprasidone 40 milliGRAM(s) Oral <User Schedule>    MEDICATIONS  (PRN):  acetaminophen   Tablet .. 975 milliGRAM(s) Oral every 6 hours PRN Mild Pain (1 - 3)  oxyCODONE    IR 5 milliGRAM(s) Oral every 4 hours PRN Moderate Pain (4 - 6)          VITALS:   T(C): 36.8 (03-18-19 @ 20:24), Max: 37.2 (03-18-19 @ 04:58)  HR: 88 (03-18-19 @ 20:24) (88 - 96)  BP: 118/64 (03-18-19 @ 20:24) (98/66 - 118/64)  RR: 17 (03-18-19 @ 20:24) (17 - 18)  SpO2: 100% (03-18-19 @ 20:24) (98% - 100%)  Wt(kg): --      PHYSICAL EXAM:  GENERAL: NAD, A+O x 3  HEAD:  Atraumatic  EYES: EOM, PERRLA, conjunctiva pink and sclera white  ENT: No tonsillar erythema, exudates,   NECK: Supple, No JVD, normal thyroid, carotids with normal upstrokes and no bruits  CHEST/LUNG: Clear to auscultation bilaterally, No rales, rhonchi, wheezing, or rubs  HEART: Regular rate and rhythm, No murmurs, rubs, or gallops  ABDOMEN: Soft, nondistended, no masses, guarding, tenderness or rebound, bowel sounds present; PEG  in place feedings as per GI  EXTREMITIES:  2+ Peripheral Pulses, No clubbing, cyanosis, or edema. No arthritis of shoulders, elbows, hands, hips, knees, ankles, or feet. No DJD C spine, T spine, or L/S spine PEG in place  LYMPH: No lymphadenopathy noted  SKIN: No rashes or lesions  NERVOUS SYSTEM:  confused at time. Motor Strength 5/5 right upper and right lower.  5/5 left upper and left lower extremities, DTRs 2+ intact and symmetric    LABS:        CBC Full  -  ( 18 Mar 2019 06:47 )  WBC Count : 9.8 K/uL  Hemoglobin : 10.2 g/dL  Hematocrit : 31.8 %  Platelet Count - Automated : 287 K/uL  Mean Cell Volume : 101.0 fl  Mean Cell Hemoglobin : 32.4 pg  Mean Cell Hemoglobin Concentration : 32.1 gm/dL  Auto Neutrophil # : x  Auto Lymphocyte # : x  Auto Monocyte # : x  Auto Eosinophil # : x  Auto Basophil # : x  Auto Neutrophil % : x  Auto Lymphocyte % : x  Auto Monocyte % : x  Auto Eosinophil % : x  Auto Basophil % : x    03-18    132<L>  |  95<L>  |  18  ----------------------------<  215<H>  4.8   |  26  |  0.53    Ca    8.7      18 Mar 2019 12:31  Phos  3.3     03-18  Mg     1.8     03-18            CAPILLARY BLOOD GLUCOSE          RADIOLOGY & ADDITIONAL TESTS:

## 2019-03-18 NOTE — PROGRESS NOTE ADULT - NEGATIVE ENMT SYMPTOMS
no ear pain/no nasal congestion
no nasal congestion/no ear pain
no ear pain/no nasal congestion
no nasal congestion/no ear pain
no throat pain/no ear pain/no nasal discharge
no ear pain/no nasal congestion
no ear pain/no nasal congestion

## 2019-03-18 NOTE — PROGRESS NOTE ADULT - ATTENDING COMMENTS
Pierre Lomeli  Attending Physician   Division of Infectious Disease  Pager #[phone]  After 5pm/weekend or no response, call #[phone]

## 2019-03-18 NOTE — PROGRESS NOTE ADULT - NSICDXPROBLEM_GEN_ALL_CORE_FT
PROBLEM DIAGNOSES  Problem: Severe protein-calorie malnutrition  Assessment and Plan: NGT DCed  PEG placed  feeding started and Patient tolerating feeds  nutrition to adjust rate of feeds.  will add free water and follow sodium    Problem: Hyponatremia  Assessment and Plan: sodium stable  continue to monitor  will add free water and follow sodium    Problem: Wound dehiscence  Assessment and Plan: continue vaccuum dressing to wound  decreased output from vac.  dressing changes as per wound care      Problem: Delirium  Assessment and Plan: has been stable   Patient has a baseline dementia which has been stable    Problem: Clostridium difficile colitis  Assessment and Plan: has been stable  continue vanco PO

## 2019-03-18 NOTE — PROGRESS NOTE ADULT - NEGATIVE RESPIRATORY AND THORAX SYMPTOMS
no cough/no dyspnea
no dyspnea/no cough
no cough/no dyspnea
no dyspnea/no cough

## 2019-03-18 NOTE — PROGRESS NOTE ADULT - CONSTITUTIONAL DETAILS
no distress/tired
no distress
no distress/in chair
no distress
tired/no distress
no distress/tired
no distress

## 2019-03-18 NOTE — PROGRESS NOTE ADULT - NEUROLOGICAL DETAILS
responds to verbal commands/alert and oriented x 3
responds to verbal commands/alert and oriented x 3
alert and oriented x 3/responds to verbal commands
responds to verbal commands
responds to verbal commands
alert and oriented x 3/responds to verbal commands
responds to verbal commands
responds to verbal commands
responds to verbal commands/alert and oriented x 3
responds to verbal commands

## 2019-03-18 NOTE — PROGRESS NOTE ADULT - ENT GEN HX ROS MEA POS PC
dry mouth

## 2019-03-18 NOTE — PROGRESS NOTE ADULT - ALLERGIC/IMMUNOLOGIC
details…

## 2019-03-18 NOTE — PROGRESS NOTE ADULT - CARDIOVASCULAR DETAILS
positive S2/positive S1
positive S1/positive S2
positive S1/positive S2
positive S1/positive S2/tachycardia
positive S2/positive S1
positive S1/positive S2
positive S1/positive S2
positive S2/positive S1
positive S1/positive S2/tachycardia

## 2019-03-18 NOTE — PROGRESS NOTE ADULT - NEGATIVE CARDIOVASCULAR SYMPTOMS
no chest pain
no chest pain/no palpitations
no chest pain

## 2019-03-18 NOTE — PROGRESS NOTE ADULT - RS GEN PE MLT RESP DETAILS PC
clear to auscultation bilaterally/respirations non-labored
respirations non-labored/clear to auscultation bilaterally
clear to auscultation bilaterally/respirations non-labored
good air movement/respirations non-labored/clear to auscultation bilaterally
respirations non-labored/clear to auscultation bilaterally
respirations non-labored/clear to auscultation bilaterally
respirations non-labored/good air movement/clear to auscultation bilaterally
respirations non-labored/clear to auscultation bilaterally

## 2019-03-18 NOTE — PROGRESS NOTE ADULT - NEGATIVE SKIN SYMPTOMS
no itching/no rash
no rash/no itching
no itching/no rash
no rash/no itching

## 2019-03-18 NOTE — PROGRESS NOTE ADULT - NEGATIVE ALLERGY TYPES
no reactions to medicines

## 2019-03-18 NOTE — PROGRESS NOTE ADULT - SUBJECTIVE AND OBJECTIVE BOX
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[facility] [place], in remission since 2016  Hypercholesteremia  Bipolar depression  OCD (obsessive compulsive disorder)  Depression  Osteoarthritis of right knee  S/P TKR (total knee replacement), bilateral: discharged nov 5th, 2014  S/P cataract surgery  H/O oral surgery: 2014  S/P knee surgery: 1978    FAMILY HISTORY:  Family history of diabetes mellitus  Family history of prostate cancer (Grandparent)  Family history of stomach cancer (Grandparent)  Family history of breast cancer  Family history of COPD (chronic obstructive pulmonary disease)    MEDICATIONS  (STANDING):  buPROPion XL . 300 milliGRAM(s) Oral daily  chlorhexidine 4% Liquid 1 Application(s) Topical <User Schedule>  heparin  Injectable 5000 Unit(s) SubCutaneous every 8 hours  influenza   Vaccine 0.5 milliLiter(s) IntraMuscular once  levothyroxine 100 MICROGram(s) Oral daily  midodrine 5 milliGRAM(s) Oral three times a day  silver sulfADIAZINE 1% Cream 1 Application(s) Topical daily  vancomycin    Solution 125 milliGRAM(s) Oral every 12 hours  ziprasidone 40 milliGRAM(s) Oral <User Schedule>    MEDICATIONS  (PRN):  acetaminophen   Tablet .. 975 milliGRAM(s) Oral every 6 hours PRN Mild Pain (1 - 3)  oxyCODONE    IR 5 milliGRAM(s) Oral every 4 hours PRN Moderate Pain (4 - 6)      Vital Signs Last 24 Hrs  T(C): 37.2 (18 Mar 2019 04:58), Max: 37.2 (18 Mar 2019 04:58)  T(F): 98.9 (18 Mar 2019 04:58), Max: 98.9 (18 Mar 2019 04:58)  HR: 92 (18 Mar 2019 04:58) (92 - 98)  BP: 98/66 (18 Mar 2019 04:58) (98/66 - 109/69)  BP(mean): --  RR: 18 (18 Mar 2019 04:58) (18 - 18)  SpO2: 98% (18 Mar 2019 04:58) (98% - 100%)  nc/at  s1s2  cta  soft, nt, nd no guarding or rebound  no c/c/e    CBC Full  -  ( 18 Mar 2019 06:47 )  WBC Count : 9.8 K/uL  Hemoglobin : 10.2 g/dL  Hematocrit : 31.8 %  Platelet Count - Automated : 287 K/uL  Mean Cell Volume : 101.0 fl  Mean Cell Hemoglobin : 32.4 pg  Mean Cell Hemoglobin Concentration : 32.1 gm/dL  Auto Neutrophil # : x  Auto Lymphocyte # : x  Auto Monocyte # : x  Auto Eosinophil # : x  Auto Basophil # : x  Auto Neutrophil % : x  Auto Lymphocyte % : x  Auto Monocyte % : x  Auto Eosinophil % : x  Auto Basophil % : x

## 2019-03-18 NOTE — PROGRESS NOTE ADULT - NEGATIVE GASTROINTESTINAL SYMPTOMS
no nausea/no vomiting/no abdominal pain
no nausea/no vomiting/no abdominal pain
no abdominal pain/no nausea/no vomiting
no nausea/no abdominal pain/no vomiting
no nausea/no abdominal pain/no vomiting
no nausea/no vomiting/no abdominal pain
no vomiting/no abdominal pain/no nausea
no vomiting/no nausea
no vomiting/no nausea/no abdominal pain
no abdominal pain/no nausea/no vomiting

## 2019-03-18 NOTE — PROGRESS NOTE ADULT - GIT GEN HX ROS MEA POS PC
diarrhea
better/abdominal pain/diarrhea
diarrhea

## 2019-03-18 NOTE — PROGRESS NOTE ADULT - ATTENDING COMMENTS
Patient has not been tolerating PO. Beginning to ambulate with non weight bearing and doing well. No medical complications post-op to date and to proceed with physical therapy, as tolerated. Continues pulmonary toilet to lessen atelectasis, leg exercises as taught to lessen the risk of DVT and supervised pain medications for post-op pain control. I anticipate transfer to rehabilitation now that PEG is in place and anemia and hypotension  improved. DC planning to rehab Patient has not been tolerating PO. Beginning to ambulate with non weight bearing and doing well. No medical complications post-op to date and to proceed with physical therapy, as tolerated. Continues pulmonary toilet to lessen atelectasis, leg exercises as taught to lessen the risk of DVT and supervised pain medications for post-op pain control. I anticipate transfer to rehabilitation now that PEG is in place and anemia and hypotension  improved. DC planning to rehab I am a non participating [facility] physician seeing Pt in coverage for Dr Barraza

## 2019-03-19 VITALS
TEMPERATURE: 98 F | SYSTOLIC BLOOD PRESSURE: 117 MMHG | RESPIRATION RATE: 18 BRPM | OXYGEN SATURATION: 96 % | HEART RATE: 98 BPM | DIASTOLIC BLOOD PRESSURE: 72 MMHG

## 2019-03-19 LAB
ANION GAP SERPL CALC-SCNC: 10 MMOL/L — SIGNIFICANT CHANGE UP (ref 5–17)
BUN SERPL-MCNC: 16 MG/DL — SIGNIFICANT CHANGE UP (ref 7–23)
CALCIUM SERPL-MCNC: 9 MG/DL — SIGNIFICANT CHANGE UP (ref 8.4–10.5)
CHLORIDE SERPL-SCNC: 95 MMOL/L — LOW (ref 96–108)
CO2 SERPL-SCNC: 29 MMOL/L — SIGNIFICANT CHANGE UP (ref 22–31)
CREAT SERPL-MCNC: 0.54 MG/DL — SIGNIFICANT CHANGE UP (ref 0.5–1.3)
GLUCOSE SERPL-MCNC: 83 MG/DL — SIGNIFICANT CHANGE UP (ref 70–99)
HCT VFR BLD CALC: 31 % — LOW (ref 34.5–45)
HGB BLD-MCNC: 9.7 G/DL — LOW (ref 11.5–15.5)
MCHC RBC-ENTMCNC: 31.3 GM/DL — LOW (ref 32–36)
MCHC RBC-ENTMCNC: 32.4 PG — SIGNIFICANT CHANGE UP (ref 27–34)
MCV RBC AUTO: 103.7 FL — HIGH (ref 80–100)
PLATELET # BLD AUTO: 280 K/UL — SIGNIFICANT CHANGE UP (ref 150–400)
POTASSIUM SERPL-MCNC: 4.2 MMOL/L — SIGNIFICANT CHANGE UP (ref 3.5–5.3)
POTASSIUM SERPL-SCNC: 4.2 MMOL/L — SIGNIFICANT CHANGE UP (ref 3.5–5.3)
RBC # BLD: 2.99 M/UL — LOW (ref 3.8–5.2)
RBC # FLD: 21 % — HIGH (ref 10.3–14.5)
SODIUM SERPL-SCNC: 134 MMOL/L — LOW (ref 135–145)
WBC # BLD: 11.25 K/UL — HIGH (ref 3.8–10.5)
WBC # FLD AUTO: 11.25 K/UL — HIGH (ref 3.8–10.5)

## 2019-03-19 PROCEDURE — 97162 PT EVAL MOD COMPLEX 30 MIN: CPT

## 2019-03-19 PROCEDURE — 77002 NEEDLE LOCALIZATION BY XRAY: CPT

## 2019-03-19 PROCEDURE — P9047: CPT

## 2019-03-19 PROCEDURE — 82553 CREATINE MB FRACTION: CPT

## 2019-03-19 PROCEDURE — P9045: CPT

## 2019-03-19 PROCEDURE — 84484 ASSAY OF TROPONIN QUANT: CPT

## 2019-03-19 PROCEDURE — 82435 ASSAY OF BLOOD CHLORIDE: CPT

## 2019-03-19 PROCEDURE — 88300 SURGICAL PATH GROSS: CPT

## 2019-03-19 PROCEDURE — 84133 ASSAY OF URINE POTASSIUM: CPT

## 2019-03-19 PROCEDURE — 99285 EMERGENCY DEPT VISIT HI MDM: CPT | Mod: 25

## 2019-03-19 PROCEDURE — 97110 THERAPEUTIC EXERCISES: CPT

## 2019-03-19 PROCEDURE — 85730 THROMBOPLASTIN TIME PARTIAL: CPT

## 2019-03-19 PROCEDURE — 87116 MYCOBACTERIA CULTURE: CPT

## 2019-03-19 PROCEDURE — 86923 COMPATIBILITY TEST ELECTRIC: CPT

## 2019-03-19 PROCEDURE — 82570 ASSAY OF URINE CREATININE: CPT

## 2019-03-19 PROCEDURE — 84540 ASSAY OF URINE/UREA-N: CPT

## 2019-03-19 PROCEDURE — 97605 NEG PRS WND THER DME<=50SQCM: CPT

## 2019-03-19 PROCEDURE — 84443 ASSAY THYROID STIM HORMONE: CPT

## 2019-03-19 PROCEDURE — 84481 FREE ASSAY (FT-3): CPT

## 2019-03-19 PROCEDURE — G0390: CPT

## 2019-03-19 PROCEDURE — 36430 TRANSFUSION BLD/BLD COMPNT: CPT

## 2019-03-19 PROCEDURE — 73502 X-RAY EXAM HIP UNI 2-3 VIEWS: CPT

## 2019-03-19 PROCEDURE — 76705 ECHO EXAM OF ABDOMEN: CPT

## 2019-03-19 PROCEDURE — 73501 X-RAY EXAM HIP UNI 1 VIEW: CPT

## 2019-03-19 PROCEDURE — P9011: CPT

## 2019-03-19 PROCEDURE — 87324 CLOSTRIDIUM AG IA: CPT

## 2019-03-19 PROCEDURE — 84145 PROCALCITONIN (PCT): CPT

## 2019-03-19 PROCEDURE — 82803 BLOOD GASES ANY COMBINATION: CPT

## 2019-03-19 PROCEDURE — 83690 ASSAY OF LIPASE: CPT

## 2019-03-19 PROCEDURE — 84100 ASSAY OF PHOSPHORUS: CPT

## 2019-03-19 PROCEDURE — 97530 THERAPEUTIC ACTIVITIES: CPT

## 2019-03-19 PROCEDURE — P9040: CPT

## 2019-03-19 PROCEDURE — 72170 X-RAY EXAM OF PELVIS: CPT

## 2019-03-19 PROCEDURE — 82947 ASSAY GLUCOSE BLOOD QUANT: CPT

## 2019-03-19 PROCEDURE — 80076 HEPATIC FUNCTION PANEL: CPT

## 2019-03-19 PROCEDURE — 87102 FUNGUS ISOLATION CULTURE: CPT

## 2019-03-19 PROCEDURE — 87449 NOS EACH ORGANISM AG IA: CPT

## 2019-03-19 PROCEDURE — 86140 C-REACTIVE PROTEIN: CPT

## 2019-03-19 PROCEDURE — 20610 DRAIN/INJ JOINT/BURSA W/O US: CPT

## 2019-03-19 PROCEDURE — 97164 PT RE-EVAL EST PLAN CARE: CPT

## 2019-03-19 PROCEDURE — 86022 PLATELET ANTIBODIES: CPT

## 2019-03-19 PROCEDURE — C1889: CPT

## 2019-03-19 PROCEDURE — 94003 VENT MGMT INPAT SUBQ DAY: CPT

## 2019-03-19 PROCEDURE — 76377 3D RENDER W/INTRP POSTPROCES: CPT

## 2019-03-19 PROCEDURE — 84295 ASSAY OF SERUM SODIUM: CPT

## 2019-03-19 PROCEDURE — 80053 COMPREHEN METABOLIC PANEL: CPT

## 2019-03-19 PROCEDURE — 31720 CLEARANCE OF AIRWAYS: CPT

## 2019-03-19 PROCEDURE — 84560 ASSAY OF URINE/URIC ACID: CPT

## 2019-03-19 PROCEDURE — 93005 ELECTROCARDIOGRAM TRACING: CPT

## 2019-03-19 PROCEDURE — 84300 ASSAY OF URINE SODIUM: CPT

## 2019-03-19 PROCEDURE — 84134 ASSAY OF PREALBUMIN: CPT

## 2019-03-19 PROCEDURE — 86850 RBC ANTIBODY SCREEN: CPT

## 2019-03-19 PROCEDURE — 85610 PROTHROMBIN TIME: CPT

## 2019-03-19 PROCEDURE — 71260 CT THORAX DX C+: CPT

## 2019-03-19 PROCEDURE — 89060 EXAM SYNOVIAL FLUID CRYSTALS: CPT

## 2019-03-19 PROCEDURE — 85652 RBC SED RATE AUTOMATED: CPT

## 2019-03-19 PROCEDURE — L8699: CPT

## 2019-03-19 PROCEDURE — 94640 AIRWAY INHALATION TREATMENT: CPT

## 2019-03-19 PROCEDURE — 93971 EXTREMITY STUDY: CPT

## 2019-03-19 PROCEDURE — 83735 ASSAY OF MAGNESIUM: CPT

## 2019-03-19 PROCEDURE — 97116 GAIT TRAINING THERAPY: CPT

## 2019-03-19 PROCEDURE — 80048 BASIC METABOLIC PNL TOTAL CA: CPT

## 2019-03-19 PROCEDURE — 89051 BODY FLUID CELL COUNT: CPT

## 2019-03-19 PROCEDURE — 72192 CT PELVIS W/O DYE: CPT

## 2019-03-19 PROCEDURE — 82565 ASSAY OF CREATININE: CPT

## 2019-03-19 PROCEDURE — 94002 VENT MGMT INPAT INIT DAY: CPT

## 2019-03-19 PROCEDURE — 70450 CT HEAD/BRAIN W/O DYE: CPT

## 2019-03-19 PROCEDURE — 97166 OT EVAL MOD COMPLEX 45 MIN: CPT

## 2019-03-19 PROCEDURE — 87075 CULTR BACTERIA EXCEPT BLOOD: CPT

## 2019-03-19 PROCEDURE — 84156 ASSAY OF PROTEIN URINE: CPT

## 2019-03-19 PROCEDURE — 83935 ASSAY OF URINE OSMOLALITY: CPT

## 2019-03-19 PROCEDURE — 87206 SMEAR FLUORESCENT/ACID STAI: CPT

## 2019-03-19 PROCEDURE — 83605 ASSAY OF LACTIC ACID: CPT

## 2019-03-19 PROCEDURE — C1776: CPT

## 2019-03-19 PROCEDURE — 82140 ASSAY OF AMMONIA: CPT

## 2019-03-19 PROCEDURE — 93306 TTE W/DOPPLER COMPLETE: CPT

## 2019-03-19 PROCEDURE — 87040 BLOOD CULTURE FOR BACTERIA: CPT

## 2019-03-19 PROCEDURE — 97606 NEG PRS WND THER DME>50 SQCM: CPT

## 2019-03-19 PROCEDURE — 82330 ASSAY OF CALCIUM: CPT

## 2019-03-19 PROCEDURE — 85027 COMPLETE CBC AUTOMATED: CPT

## 2019-03-19 PROCEDURE — 87205 SMEAR GRAM STAIN: CPT

## 2019-03-19 PROCEDURE — 87015 SPECIMEN INFECT AGNT CONCNTJ: CPT

## 2019-03-19 PROCEDURE — 73552 X-RAY EXAM OF FEMUR 2/>: CPT

## 2019-03-19 PROCEDURE — 93321 DOPPLER ECHO F-UP/LMTD STD: CPT

## 2019-03-19 PROCEDURE — 74018 RADEX ABDOMEN 1 VIEW: CPT

## 2019-03-19 PROCEDURE — 80202 ASSAY OF VANCOMYCIN: CPT

## 2019-03-19 PROCEDURE — 86920 COMPATIBILITY TEST SPIN: CPT

## 2019-03-19 PROCEDURE — 85014 HEMATOCRIT: CPT

## 2019-03-19 PROCEDURE — 87070 CULTURE OTHR SPECIMN AEROBIC: CPT

## 2019-03-19 PROCEDURE — 82550 ASSAY OF CK (CPK): CPT

## 2019-03-19 PROCEDURE — 73700 CT LOWER EXTREMITY W/O DYE: CPT

## 2019-03-19 PROCEDURE — 86900 BLOOD TYPING SEROLOGIC ABO: CPT

## 2019-03-19 PROCEDURE — 71045 X-RAY EXAM CHEST 1 VIEW: CPT

## 2019-03-19 PROCEDURE — 84132 ASSAY OF SERUM POTASSIUM: CPT

## 2019-03-19 PROCEDURE — 82533 TOTAL CORTISOL: CPT

## 2019-03-19 PROCEDURE — 86901 BLOOD TYPING SEROLOGIC RH(D): CPT

## 2019-03-19 PROCEDURE — 81001 URINALYSIS AUTO W/SCOPE: CPT

## 2019-03-19 PROCEDURE — 74177 CT ABD & PELVIS W/CONTRAST: CPT

## 2019-03-19 PROCEDURE — 72125 CT NECK SPINE W/O DYE: CPT

## 2019-03-19 PROCEDURE — 93308 TTE F-UP OR LMTD: CPT

## 2019-03-19 PROCEDURE — 84439 ASSAY OF FREE THYROXINE: CPT

## 2019-03-19 RX ORDER — BUPROPION HYDROCHLORIDE 150 MG/1
1 TABLET, EXTENDED RELEASE ORAL
Qty: 0 | Refills: 0 | DISCHARGE
Start: 2019-03-19

## 2019-03-19 RX ORDER — LEVOTHYROXINE SODIUM 125 MCG
1 TABLET ORAL
Qty: 0 | Refills: 0 | DISCHARGE
Start: 2019-03-19

## 2019-03-19 RX ORDER — MIDODRINE HYDROCHLORIDE 2.5 MG/1
2 TABLET ORAL
Qty: 0 | Refills: 0 | DISCHARGE
Start: 2019-03-19

## 2019-03-19 RX ORDER — LEVOTHYROXINE SODIUM 125 MCG
1 TABLET ORAL
Qty: 0 | Refills: 0 | COMMUNITY

## 2019-03-19 RX ORDER — ALPRAZOLAM 0.25 MG
1 TABLET ORAL
Qty: 0 | Refills: 0 | COMMUNITY

## 2019-03-19 RX ORDER — HEPARIN SODIUM 5000 [USP'U]/ML
5000 INJECTION INTRAVENOUS; SUBCUTANEOUS
Qty: 0 | Refills: 0 | DISCHARGE
Start: 2019-03-19

## 2019-03-19 RX ORDER — GABAPENTIN 400 MG/1
1 CAPSULE ORAL
Qty: 0 | Refills: 0 | COMMUNITY

## 2019-03-19 RX ORDER — MIDODRINE HYDROCHLORIDE 2.5 MG/1
1 TABLET ORAL
Qty: 0 | Refills: 0 | DISCHARGE
Start: 2019-03-19

## 2019-03-19 RX ORDER — GABAPENTIN 400 MG/1
2 CAPSULE ORAL
Qty: 0 | Refills: 0 | COMMUNITY

## 2019-03-19 RX ADMIN — HEPARIN SODIUM 5000 UNIT(S): 5000 INJECTION INTRAVENOUS; SUBCUTANEOUS at 14:53

## 2019-03-19 RX ADMIN — MIDODRINE HYDROCHLORIDE 5 MILLIGRAM(S): 2.5 TABLET ORAL at 14:53

## 2019-03-19 RX ADMIN — Medication 125 MILLIGRAM(S): at 06:11

## 2019-03-19 RX ADMIN — ZIPRASIDONE HYDROCHLORIDE 40 MILLIGRAM(S): 20 CAPSULE ORAL at 06:16

## 2019-03-19 RX ADMIN — HEPARIN SODIUM 5000 UNIT(S): 5000 INJECTION INTRAVENOUS; SUBCUTANEOUS at 06:12

## 2019-03-19 RX ADMIN — BUPROPION HYDROCHLORIDE 300 MILLIGRAM(S): 150 TABLET, EXTENDED RELEASE ORAL at 11:57

## 2019-03-19 RX ADMIN — MIDODRINE HYDROCHLORIDE 5 MILLIGRAM(S): 2.5 TABLET ORAL at 06:12

## 2019-03-19 RX ADMIN — CHLORHEXIDINE GLUCONATE 1 APPLICATION(S): 213 SOLUTION TOPICAL at 06:15

## 2019-03-19 RX ADMIN — Medication 1 APPLICATION(S): at 11:57

## 2019-03-19 RX ADMIN — Medication 100 MICROGRAM(S): at 06:15

## 2019-03-19 NOTE — PROGRESS NOTE ADULT - ATTENDING COMMENTS
Patient has not been tolerating PO. Beginning to ambulate with non weight bearing and doing well. No medical complications post-op to date and to proceed with physical therapy, as tolerated. Continues pulmonary toilet to lessen atelectasis, leg exercises as taught to lessen the risk of DVT and supervised pain medications for post-op pain control. I anticipate transfer to rehabilitation now that PEG is in place and anemia and hypotension  improved. Cleared by orthopedics for discharge to rehabilitation.  Full instructions provided regarding diagnosis, treatment, discharge medications, diet and follow up care on transfer sheet. Medically stable and for discharge to rehabilitation today as planned.

## 2019-03-19 NOTE — PROGRESS NOTE ADULT - ASSESSMENT
The patient is to be out of bed to chair and resume physical therapy, as per orthopedics, after right periprosthetic fracture ORIF on February 22, 2019.  Patient with wound dehiscence due to protein calorie malnutrition seen by plastics and wound vaccuum placed.  She has protein calorie malnutrition due to poor po intake . PEG placed and Patient tolerating feeding. Very alert and to transfer to rehabilitation upon bed availability.

## 2019-03-19 NOTE — PROGRESS NOTE ADULT - SUBJECTIVE AND OBJECTIVE BOX
INTERVAL HPI/OVERNIGHT EVENTS: stable from GI standpoint, tolerating peg feeds    MEDICATIONS  (STANDING):  buPROPion XL . 300 milliGRAM(s) Oral daily  chlorhexidine 4% Liquid 1 Application(s) Topical <User Schedule>  heparin  Injectable 5000 Unit(s) SubCutaneous every 8 hours  influenza   Vaccine 0.5 milliLiter(s) IntraMuscular once  levothyroxine 100 MICROGram(s) Oral daily  midodrine 5 milliGRAM(s) Oral three times a day  silver sulfADIAZINE 1% Cream 1 Application(s) Topical daily  vancomycin    Solution 125 milliGRAM(s) Oral every 12 hours  ziprasidone 40 milliGRAM(s) Oral <User Schedule>    MEDICATIONS  (PRN):  acetaminophen   Tablet .. 975 milliGRAM(s) Oral every 6 hours PRN Mild Pain (1 - 3)      Allergies    honeydew melon (Other)  penicillin (Other; Hives)    Intolerances            PHYSICAL EXAM:   Vital Signs:  Vital Signs Last 24 Hrs  T(C): 36.7 (19 Mar 2019 06:48), Max: 36.8 (18 Mar 2019 20:24)  T(F): 98 (19 Mar 2019 06:48), Max: 98.2 (18 Mar 2019 20:24)  HR: 92 (19 Mar 2019 06:48) (88 - 96)  BP: 113/69 (19 Mar 2019 06:48) (110/6 - 118/64)  BP(mean): --  RR: 18 (19 Mar 2019 06:48) (17 - 18)  SpO2: 97% (19 Mar 2019 06:48) (97% - 100%)  Daily     Daily     GENERAL:  no distress  HEENT:  NC/AT,  anicteric  CHEST:   no increased effort, breath sounds clear  HEART:  Regular rhythm  ABDOMEN:  Soft, tenderness at PEG site, non-distended, normoactive bowel sounds,  no masses ,  EXTEREMITIES:  no cyanosis      LABS:                        10.2   9.8   )-----------( 287      ( 18 Mar 2019 06:47 )             31.8     03-19    134<L>  |  95<L>  |  16  ----------------------------<  83  4.2   |  29  |  0.54    Ca    9.0      19 Mar 2019 07:18  Phos  3.3     03-18  Mg     1.8     03-18            RADIOLOGY & ADDITIONAL TESTS:

## 2019-03-19 NOTE — PROGRESS NOTE ADULT - SUBJECTIVE AND OBJECTIVE BOX
69 y/o critically ill  female h/o dementia NHL presenting after unwitnessed fall. Pt found on the ground at rehab. is on lovenox after hip fracture. pt reports pain to her right leg. per EMS en route slurring speech and mildly hypotensive requesting trauma.unclear how long pt on the ground. Patient found to have C diff with worsening hypotension requiring pressors on and off and  Patient requiring the SICU for continued cardiopulmonary monitoring. Orthopedic procedure held due to  hypotension and pan colitis and need for pressors due to unstable hemodynamics. Diarrhea slowing off IV Flagyl + on oral vancomycin. No further C.diff colitis and no further hypovolemic hypotension. Patient s/p Open reduction and internal fixation of periprosthetic hip fx Mental status has greatly improved.  Patient seen in SICU prior to transfer to Medical service and regular floor  Patient  alert awake and able to speak but confused . Patient less agitated than yesterday .  On N/G alimentation for hypoproteinemic edema. Blood pressure being supported with midodrine and no diuretics are being given. Patient feels better after moving her bowels. Not agitated and cooperative on Geodan and wellbutrin.  The patient was seen by nutrition and their plan is to be followed. Should her hematocrit continued to drift lower, she will receive 1 unit of packed cells with IV Lasix to correct hypovolemic, hypoproteinemic edema with hypotension on Midodrine. No transfusion presently required with rise in Hct.  The patient is to be out of bed to chair and resume physical therapy, as per orthopedics, after right periprosthetic fracture ORIF on February 22, 2019.  Patient with wound dehiscence seen by plastics and wound vaccuum placed.  She has protein calorie malnutrition due to poor po intake  Will order calorie count to see if she takes in enough calories and protein in order to D/C   the NGT feeds, NGT may be problematic if she is to go to rehab. Patient has been OOB to chair. Patient has not been tolerating PO. PEG placed and feeding started. Patient tolerating feeds Had episode of dizziness and low bp when out of bed.  Patient anemic and would benefit from a PRBC transfusion to prevent orthostatic BP changes. Discussed with NP and we decided to transfuse 1 unit of  PRBCs. Discussed nutrition and fluid with NP    MEDICATIONS  (STANDING):  buPROPion XL . 300 milliGRAM(s) Oral daily  chlorhexidine 4% Liquid 1 Application(s) Topical <User Schedule>  heparin  Injectable 5000 Unit(s) SubCutaneous every 8 hours  influenza   Vaccine 0.5 milliLiter(s) IntraMuscular once  levothyroxine 100 MICROGram(s) Oral daily  midodrine 5 milliGRAM(s) Oral three times a day  silver sulfADIAZINE 1% Cream 1 Application(s) Topical daily  vancomycin    Solution 125 milliGRAM(s) Oral every 12 hours  ziprasidone 40 milliGRAM(s) Oral <User Schedule>    MEDICATIONS  (PRN):  acetaminophen   Tablet .. 975 milliGRAM(s) Oral every 6 hours PRN Mild Pain (1 - 3)  oxyCODONE    IR 5 milliGRAM(s) Oral every 4 hours PRN Moderate Pain (4 - 6)  MEDICATIONS  (STANDING):            Vital Signs Last 24 Hrs  T(C): 36.6 (19 Mar 2019 12:07), Max: 36.7 (19 Mar 2019 06:48)  T(F): 97.9 (19 Mar 2019 12:07), Max: 98 (19 Mar 2019 06:48)  HR: 98 (19 Mar 2019 12:07) (92 - 98)  BP: 117/72 (19 Mar 2019 12:07) (113/69 - 117/72)  BP(mean): --  RR: 18 (19 Mar 2019 12:07) (18 - 18)  SpO2: 96% (19 Mar 2019 12:07) (96% - 97%)      PHYSICAL EXAM:  GENERAL: NAD, A+O x 3  HEAD:  Atraumatic  EYES: EOM, PERRLA, conjunctiva pink and sclera white  ENT: No tonsillar erythema, exudates,   NECK: Supple, No JVD, normal thyroid, carotids with normal upstrokes and no bruits  CHEST/LUNG: Clear to auscultation bilaterally, No rales, rhonchi, wheezing, or rubs  HEART: Regular rate and rhythm, No murmurs, rubs, or gallops  ABDOMEN: Soft, nondistended, no masses, guarding, tenderness or rebound, bowel sounds present; PEG  in place feedings as per GI  EXTREMITIES:  2+ Peripheral Pulses, No clubbing, cyanosis, or edema. No arthritis of shoulders, elbows, hands, hips, knees, ankles, or feet. No DJD C spine, T spine, or L/S spine PEG in place  LYMPH: No lymphadenopathy noted  SKIN: No rashes or lesions  NERVOUS SYSTEM:  confused at time. Motor Strength 5/5 right upper and right lower.  5/5 left upper and left lower extremities, DTRs 2+ intact and symmetric    LABS:          CBC Full  -  ( 19 Mar 2019 12:22 )  WBC Count : 11.25 K/uL  Hemoglobin : 9.7 g/dL  Hematocrit : 31.0 %  Platelet Count - Automated : 280 K/uL  Mean Cell Volume : 103.7 fl  Mean Cell Hemoglobin : 32.4 pg  Mean Cell Hemoglobin Concentration : 31.3 gm/dL  Auto Neutrophil # : x  Auto Lymphocyte # : x  Auto Monocyte # : x  Auto Eosinophil # : x  Auto Basophil # : x  Auto Neutrophil % : x  Auto Lymphocyte % : x  Auto Monocyte % : x  Auto Eosinophil % : x  Auto Basophil % : x    03-19    134<L>  |  95<L>  |  16  ----------------------------<  83  4.2   |  29  |  0.54    Ca    9.0      19 Mar 2019 07:18  Phos  3.3     03-18  Mg     1.8     03-18

## 2019-03-23 LAB
CULTURE RESULTS: SIGNIFICANT CHANGE UP
SPECIMEN SOURCE: SIGNIFICANT CHANGE UP

## 2019-03-25 ENCOUNTER — INBOUND DOCUMENT (OUTPATIENT)
Age: 69
End: 2019-03-25

## 2019-03-28 NOTE — PATIENT PROFILE ADULT - NSPROPTRIGHTBILLOFRIGHTS_GEN_A_NUR
"Subjective:       Patient ID: Niya Mccabe is a 56 y.o. female.    Vitals:  height is 5' 4" (1.626 m) and weight is 72.6 kg (160 lb). Her oral temperature is 97.9 °F (36.6 °C). Her blood pressure is 138/83 and her pulse is 63. Her oxygen saturation is 100%.     Chief Complaint: Sore Throat    Niya woke up with a very sore throat on Saturday. Since then its gotten worse and she lost her voice today.    Sore Throat    This is a new problem. The current episode started in the past 7 days. The problem has been unchanged. Neither side of throat is experiencing more pain than the other. There has been no fever. The pain is at a severity of 7/10. The pain is moderate. Pertinent negatives include no congestion, coughing, ear pain, shortness of breath, stridor or vomiting. She has tried nothing for the symptoms.       Constitution: Negative for chills, sweating, fatigue and fever.   HENT: Positive for sore throat. Negative for ear pain, congestion, sinus pain, sinus pressure and voice change.    Neck: Negative for painful lymph nodes.   Eyes: Negative for eye redness.   Respiratory: Negative for chest tightness, cough, sputum production, bloody sputum, COPD, shortness of breath, stridor, wheezing and asthma.    Gastrointestinal: Negative for nausea and vomiting.   Musculoskeletal: Negative for muscle ache.   Skin: Negative for rash.   Allergic/Immunologic: Negative for seasonal allergies and asthma.   Hematologic/Lymphatic: Negative for swollen lymph nodes.       Objective:      Physical Exam   Constitutional: She is oriented to person, place, and time. She appears well-developed and well-nourished. She is cooperative.  Non-toxic appearance. She does not appear ill. No distress.   HENT:   Head: Normocephalic and atraumatic.   Right Ear: Hearing, tympanic membrane, external ear and ear canal normal.   Left Ear: Hearing, tympanic membrane, external ear and ear canal normal.   Nose: Nose normal. No mucosal edema, rhinorrhea or " nasal deformity. No epistaxis. Right sinus exhibits no maxillary sinus tenderness and no frontal sinus tenderness. Left sinus exhibits no maxillary sinus tenderness and no frontal sinus tenderness.   Mouth/Throat: Uvula is midline and mucous membranes are normal. No trismus in the jaw. Normal dentition. No uvula swelling. Posterior oropharyngeal erythema present.   Eyes: Conjunctivae and lids are normal. No scleral icterus.   Sclera clear bilat   Neck: Trachea normal, full passive range of motion without pain and phonation normal. Neck supple.   Cardiovascular: Normal rate, regular rhythm, normal heart sounds, intact distal pulses and normal pulses.   Pulmonary/Chest: Effort normal and breath sounds normal. No respiratory distress.   Abdominal: Soft. Normal appearance and bowel sounds are normal. She exhibits no distension. There is no tenderness.   Musculoskeletal: Normal range of motion. She exhibits no edema or deformity.   Neurological: She is alert and oriented to person, place, and time. She exhibits normal muscle tone. Coordination normal.   Skin: Skin is warm, dry and intact. She is not diaphoretic. No pallor.   Psychiatric: She has a normal mood and affect. Her speech is normal and behavior is normal. Judgment and thought content normal. Cognition and memory are normal.   Nursing note and vitals reviewed.      Assessment:       1. Laryngotracheitis    2. Sore throat        Plan:         Laryngotracheitis  -     dexamethasone injection 10 mg  -     benzonatate (TESSALON) 200 MG capsule; Take 1 capsule (200 mg total) by mouth 3 (three) times daily as needed for Cough.  Dispense: 60 capsule; Refill: 1  -     (Magic mouthwash) 1:1:1 Benadryl 12.5mg/5ml liq, aluminum & magnesium hydroxide-simehticone (Maalox), lidocaine viscous 2%; Swish and spit 10 mLs every 4 (four) hours as needed. for mouth sores  Dispense: 360 mL; Refill: 0    Sore throat  -     POCT rapid strep A      Results for orders placed or performed  "in visit on 03/28/19   POCT rapid strep A   Result Value Ref Range    Rapid Strep A Screen Negative Negative     Acceptable Yes       We discussed ENT referral--patient wants to "wait and see if it comes back after this illness".     Patient Instructions       Self-Care for Sore Throats    Sore throats happen for many reasons, such as colds, allergies, and infections caused by viruses or bacteria. In any case, your throat becomes red and sore. Your goal for self-care is to reduce your discomfort while giving your throat a chance to heal.  Moisten and soothe your throat  Tips include the following:  · Try a sip of water first thing after waking up.  · Keep your throat moist by drinking 6 or more glasses of clear liquids every day.  · Run a cool-air humidifier in your room overnight.  · Avoid cigarette smoke.   · Suck on throat lozenges, cough drops, hard candy, ice chips, or frozen fruit-juice bars. Use the sugar-free versions if your diet or medical condition requires them.  Gargle to ease irritation  Gargling every hour or 2 can ease irritation. Try gargling with 1 of these solutions:  · 1/4 teaspoon of salt in 1/2 cup of warm water  · An over-the-counter anesthetic gargle  Use medicine for more relief  Over-the-counter medicine can reduce sore throat symptoms. Ask your pharmacist if you have questions about which medicine to use:  · Ease pain with anesthetic sprays. Aspirin or an aspirin substitute also helps. Remember, never give aspirin to anyone 18 or younger, or if you are already taking blood thinners.   · For sore throats caused by allergies, try antihistamines to block the allergic reaction.  · Remember: unless a sore throat is caused by a bacterial infection, antibiotics wont help you.  Prevent future sore throats  Prevention tips include the following:  · Stop smoking or reduce contact with secondhand smoke. Smoke irritates the tender throat lining.  · Limit contact with pets and with " allergy-causing substances, such as pollen and mold.  · When youre around someone with a sore throat or cold, wash your hands often to keep viruses or bacteria from spreading.  · Dont strain your vocal cords.  Call your healthcare provider  Contact your healthcare provider if you have:  · A temperature over 101°F (38.3°C)  · White spots on the throat  · Great difficulty swallowing  · Trouble breathing  · A skin rash  · Recent exposure to someone else with strep bacteria  · Severe hoarseness and swollen glands in the neck or jaw   Date Last Reviewed: 8/1/2016 © 2000-2017 [employer]. [address] [place] [address]. All rights reserved. This information is not intended as a substitute for professional medical care. Always follow your healthcare professional's instructions.        Laryngitis    Laryngitis is a swelling of the tissues around the vocal cords. Symptoms include a hoarse (scratchy) voice. The voice may be lost completely. It may be caused by a viral illness, such as a head or chest cold. It may also be due to overuse and strain of the voice. Smoking, drinking alcohol, acid reflux, allergies, or inhaling harsh chemicals may also lead to symptoms. This condition will usually resolve in 1-2 weeks.  Home care  · Rest your voice until it recovers. Talk as little as possible. If your symptoms are severe, rest at home for a day or so.  · Breathing cool steam from a humidifier/vaporizer or in a steamy shower may be helpful.  · Drink plenty of fluids to stay well hydrated.  · Do not smoke  Follow-up care  Follow up with your healthcare provider or this facility if you have not improved after one week.  When to seek medical advice  Contact your healthcare provider for any of the following:  · Severe pain with swallowing  · Trouble opening mouth  · Neck swelling, neck pain, or trouble moving neck  · Noisy breathing or trouble breathing  · Fever of 100.4°F (38.ºC) or higher, or as directed  by your healthcare provider  · Drooling  · Symptoms do not resolve in 2 weeks  Date Last Reviewed: 4/26/2015  © 8363-9292 [employer] "[employer]". [address] [place] [address], [place] [address]. All rights reserved. This information is not intended as a substitute for professional medical care. Always follow your healthcare professional's instructions.       If not allergic,take tylenol (acetominophen) for fever control, chills, or body aches every 4 hours. Do not exceed 4000 mg/ day.If not allergic, take Motrin (Ibuprofen) every 4 hours for fever, chills, pain or inflammation. Do not exceed 2400 mg/day. You can alternate taking tylenol and motrin.  If you were prescribed a narcotic medication, do not drive or operate heavy equipment or machinery while taking these medications.  You must understand that you've received an Urgent Care treatment only and that you may be released before all your medical problems are known or treated. You, the patient, will arrange for follow up care as instructed.  Follow up with your PCP or specialty clinic as directed in the next 1-2 weeks if not improved or as needed.  You can call [phone] to schedule an appointment with the appropriate provider.  If your condition worsens we recommend that you receive another evaluation at the emergency room immediately or contact your primary medical clinics after hours call service to discuss your concerns.  Please return here or go to the Emergency Department for any concerns or worsening of condition.            patient

## 2019-03-29 ENCOUNTER — EMERGENCY (EMERGENCY)
Facility: HOSPITAL | Age: 69
LOS: 1 days | Discharge: HOME | End: 2019-03-29
Attending: EMERGENCY MEDICINE
Payer: MEDICARE

## 2019-03-29 VITALS
OXYGEN SATURATION: 99 % | SYSTOLIC BLOOD PRESSURE: 108 MMHG | HEART RATE: 110 BPM | TEMPERATURE: 98 F | DIASTOLIC BLOOD PRESSURE: 78 MMHG | RESPIRATION RATE: 20 BRPM

## 2019-03-29 DIAGNOSIS — Z98.89 OTHER SPECIFIED POSTPROCEDURAL STATES: Chronic | ICD-10-CM

## 2019-03-29 DIAGNOSIS — Z98.49 CATARACT EXTRACTION STATUS, UNSPECIFIED EYE: Chronic | ICD-10-CM

## 2019-03-29 DIAGNOSIS — Z96.653 PRESENCE OF ARTIFICIAL KNEE JOINT, BILATERAL: Chronic | ICD-10-CM

## 2019-03-29 PROCEDURE — 93010 ELECTROCARDIOGRAM REPORT: CPT

## 2019-03-29 PROCEDURE — 99284 EMERGENCY DEPT VISIT MOD MDM: CPT | Mod: GC,25

## 2019-03-29 RX ORDER — ACETAMINOPHEN 500 MG
650 TABLET ORAL ONCE
Qty: 0 | Refills: 0 | Status: COMPLETED | OUTPATIENT
Start: 2019-03-29 | End: 2019-03-29

## 2019-03-29 NOTE — ED CLERICAL - NS ED CLERK NOTE PRE-ARRIVAL INFORMATION; ADDITIONAL PRE-ARRIVAL INFORMATION
This patient is enrolled in the comprehensive joint replacement (CJR) program and has active care navigation.  This patient can be followed up by the care navigation team within 24 hours. To arrange close follow-up or to obtain additional clinical information about this patient, please call the contact number above.   Please call the orthopedic resident ([phone]) for ALL patients who are admitted or placed in observation.

## 2019-03-29 NOTE — ED PROVIDER NOTE - ATTENDING CONTRIBUTION TO CARE
69 yo female with complex recent PMH as noted presents after hitting her head at rehab when reaching over for her cell phone.  Denies LOC.  Forehead hematoma on exam.  Will CT, r/o traumatic injury and reassess.

## 2019-03-29 NOTE — ED ADULT NURSE NOTE - PMH
Bipolar depression    Depression    Hypercholesteremia    Lymphoma  NHL, treated with chemo @ [facility] [place], in remission since 2016  OCD (obsessive compulsive disorder)    Osteoarthritis of left knee    Osteoarthritis of right knee

## 2019-03-29 NOTE — ED PROVIDER NOTE - MUSCULOSKELETAL, MLM
No midline spinal tenderness. Limited lower extremity ROM consistent with pt. baseline, full B/L UE ROM.

## 2019-03-29 NOTE — ED ADULT NURSE NOTE - NSIMPLEMENTINTERV_GEN_ALL_ED
Implemented All Fall with Harm Risk Interventions:  [place] to call system. Call bell, personal items and telephone within reach. Instruct patient to call for assistance. Room bathroom lighting operational. Non-slip footwear when patient is off stretcher. Physically safe environment: no spills, clutter or unnecessary equipment. Stretcher in lowest position, wheels locked, appropriate side rails in place. Provide visual cue, wrist band, yellow gown, etc. Monitor gait and stability. Monitor for mental status changes and reorient to person, place, and time. Review medications for side effects contributing to fall risk. Reinforce activity limits and safety measures with patient and family. Provide visual clues: red socks.

## 2019-03-29 NOTE — ED PROVIDER NOTE - PMH
Bipolar depression    Depression    Hypercholesteremia    Lymphoma  NHL, [place] with [place] [facility] [place] in [place] since [place]  OCD (obsessive compulsive disorder)    Osteoarthritis of left knee    Osteoarthritis of right knee

## 2019-03-29 NOTE — ED PROVIDER NOTE - OBJECTIVE STATEMENT
67yo F pmhx osteoarthritis, bipolar, depression, recent R hip replacement with prolonged hospital stay complicated by c-diff now coming from [facility] p/w CC mechanical fall. Pt. states that she was reaching for something from bed when she rolled out and hit her head, complaining of L frontal headache, denies LOC, on heparin. Pt. denies other injury. Denies visual changes cp sob n/v/d numbness/tingling.

## 2019-03-29 NOTE — ED ADULT NURSE NOTE - OBJECTIVE STATEMENT
68F bibems from [facility] s/p fall while reaching for a phone. Patient on [facility] s/p hip replacement, been in [facility] for 2 weeks now. On heparin for anticoagulation. Hit head on the floor, noted a bruise in the left side of the forehead, denies any dizziness or LOC. h/o Bipolar depression  Depression Hypercholesteremia  Lymphoma  NHL, treated with chemo @ [facility] [place], in remission since 2016OCD (obsessive compulsive disorder)  Osteoarthritis of left knee    Osteoarthritis of right knee.

## 2019-03-29 NOTE — ED PROVIDER NOTE - CLINICAL SUMMARY MEDICAL DECISION MAKING FREE TEXT BOX
69yo F pmhx bipolar, depression, osteoarthritis, R hip replacement p/w CC fall from bed/head injury, on heparin, will CT head/c-spine, pain control and reassess.

## 2019-03-30 VITALS
RESPIRATION RATE: 16 BRPM | SYSTOLIC BLOOD PRESSURE: 98 MMHG | TEMPERATURE: 98 F | HEART RATE: 100 BPM | DIASTOLIC BLOOD PRESSURE: 60 MMHG | OXYGEN SATURATION: 96 %

## 2019-03-30 PROCEDURE — 72125 CT NECK SPINE W/O DYE: CPT | Mod: 26

## 2019-03-30 PROCEDURE — 70450 CT HEAD/BRAIN W/O DYE: CPT | Mod: 26

## 2019-03-30 RX ADMIN — Medication 650 MILLIGRAM(S): at 00:01

## 2019-04-01 LAB
ANION GAP SERPL CALC-SCNC: 12 MMOL/L — SIGNIFICANT CHANGE UP (ref 5–17)
ANISOCYTOSIS BLD QL: SLIGHT — SIGNIFICANT CHANGE UP
BASOPHILS # BLD AUTO: 0 K/UL — SIGNIFICANT CHANGE UP (ref 0–0.2)
BASOPHILS NFR BLD AUTO: 0 % — SIGNIFICANT CHANGE UP (ref 0–2)
BUN SERPL-MCNC: 20 MG/DL — SIGNIFICANT CHANGE UP (ref 7–23)
CALCIUM SERPL-MCNC: 8.5 MG/DL — SIGNIFICANT CHANGE UP (ref 8.4–10.5)
CHLORIDE SERPL-SCNC: 99 MMOL/L — SIGNIFICANT CHANGE UP (ref 96–108)
CO2 SERPL-SCNC: 23 MMOL/L — SIGNIFICANT CHANGE UP (ref 22–31)
CREAT SERPL-MCNC: 0.58 MG/DL — SIGNIFICANT CHANGE UP (ref 0.5–1.3)
EOSINOPHIL # BLD AUTO: 0 K/UL — SIGNIFICANT CHANGE UP (ref 0–0.5)
EOSINOPHIL NFR BLD AUTO: 0 % — SIGNIFICANT CHANGE UP (ref 0–6)
GLUCOSE SERPL-MCNC: 80 MG/DL — SIGNIFICANT CHANGE UP (ref 70–99)
HCT VFR BLD CALC: 27.7 % — LOW (ref 34.5–45)
HGB BLD-MCNC: 8.9 G/DL — LOW (ref 11.5–15.5)
HYPOCHROMIA BLD QL: SLIGHT — SIGNIFICANT CHANGE UP
LYMPHOCYTES # BLD AUTO: 0.51 K/UL — LOW (ref 1–3.3)
LYMPHOCYTES # BLD AUTO: 4.4 % — LOW (ref 13–44)
MACROCYTES BLD QL: SLIGHT — SIGNIFICANT CHANGE UP
MANUAL SMEAR VERIFICATION: SIGNIFICANT CHANGE UP
MCHC RBC-ENTMCNC: 32.1 GM/DL — SIGNIFICANT CHANGE UP (ref 32–36)
MCHC RBC-ENTMCNC: 33.8 PG — SIGNIFICANT CHANGE UP (ref 27–34)
MCV RBC AUTO: 105.3 FL — HIGH (ref 80–100)
MONOCYTES # BLD AUTO: 0.9 K/UL — SIGNIFICANT CHANGE UP (ref 0–0.9)
MONOCYTES NFR BLD AUTO: 7.8 % — SIGNIFICANT CHANGE UP (ref 2–14)
MYELOCYTES NFR BLD: 1.7 % — HIGH (ref 0–0)
NEUTROPHILS # BLD AUTO: 9.79 K/UL — HIGH (ref 1.8–7.4)
NEUTROPHILS NFR BLD AUTO: 74.8 % — SIGNIFICANT CHANGE UP (ref 43–77)
NEUTS BAND # BLD: 10.4 % — HIGH (ref 0–8)
PLAT MORPH BLD: NORMAL — SIGNIFICANT CHANGE UP
PLATELET # BLD AUTO: 260 K/UL — SIGNIFICANT CHANGE UP (ref 150–400)
POLYCHROMASIA BLD QL SMEAR: SLIGHT — SIGNIFICANT CHANGE UP
POTASSIUM SERPL-MCNC: 4.1 MMOL/L — SIGNIFICANT CHANGE UP (ref 3.5–5.3)
POTASSIUM SERPL-SCNC: 4.1 MMOL/L — SIGNIFICANT CHANGE UP (ref 3.5–5.3)
RBC # BLD: 2.63 M/UL — LOW (ref 3.8–5.2)
RBC # FLD: 21.4 % — HIGH (ref 10.3–14.5)
RBC BLD AUTO: ABNORMAL
SODIUM SERPL-SCNC: 134 MMOL/L — LOW (ref 135–145)
VARIANT LYMPHS # BLD: 0.9 % — SIGNIFICANT CHANGE UP (ref 0–6)
WBC # BLD: 11.49 K/UL — HIGH (ref 3.8–10.5)
WBC # FLD AUTO: 11.49 K/UL — HIGH (ref 3.8–10.5)

## 2019-04-01 PROCEDURE — 85027 COMPLETE CBC AUTOMATED: CPT

## 2019-04-01 PROCEDURE — 80048 BASIC METABOLIC PNL TOTAL CA: CPT

## 2019-04-01 PROCEDURE — 36415 COLL VENOUS BLD VENIPUNCTURE: CPT

## 2019-04-01 PROCEDURE — 72125 CT NECK SPINE W/O DYE: CPT

## 2019-04-01 PROCEDURE — 99284 EMERGENCY DEPT VISIT MOD MDM: CPT | Mod: 25

## 2019-04-01 PROCEDURE — 93005 ELECTROCARDIOGRAM TRACING: CPT

## 2019-04-01 PROCEDURE — 70450 CT HEAD/BRAIN W/O DYE: CPT

## 2019-04-02 NOTE — ED POST DISCHARGE NOTE - DETAILS
spoke with ALEXA Henley at [facility] who will relay results to care team - Tejal Lora PA-C spoke with ALEXA Henley at [facility] rehab who will relay results to care team , discussed important to let providers know as she may need to be sent back to the hospital given recent post up status for further eval of bandemia- Tejal Lora PA-C

## 2019-04-03 NOTE — PROGRESS NOTE ADULT - SUBJECTIVE AND OBJECTIVE BOX
I connected the Mom with scheduling, for an appointment.  Coughing so hard he vomited last night. This morning he has petechia around his eyes.. Mom hung up before I could connect with scheduling.  Sarah Bess RN-[facility]      Reason for Disposition    [1] Coughing has caused chest pain AND [2] present even when not coughing    Additional Information    Negative: [1] Difficulty breathing AND [2] SEVERE (struggling for each breath, unable to speak or cry, grunting sounds, severe retractions) AND [3] present when not coughing (Triage tip: Listen to the child's breathing.)    Negative: Slow, shallow, weak breathing    Negative: Passed out or stopped breathing    Negative: [1] Bluish lips, tongue or face now AND [2] persists when not coughing    Negative: [1] Age < 1 year AND [2] very weak (doesn't move or make eye contact)    Negative: Sounds like a life-threatening emergency to the triager    Negative: Stridor (harsh sound with breathing in) is present    Negative: Constant hoarse voice AND deep barky cough    Negative: Choked on a small object or food that could be caught in the throat    Negative: Previous diagnosis of asthma (or RAD) OR regular use of asthma medicines for wheezing    Negative: Bronchiolitis or RSV has been diagnosed within the last 2 weeks    Negative: [1] Age < 2 years AND [2] given albuterol inhaler or neb for home treatment within the last 2 weeks    Negative: [1] Age > 2 years AND [2] given albuterol inhaler or neb for home treatment within the last 2 weeks    Negative: Wheezing is present, but NO previous diagnosis of asthma (RAD) or regular use of asthma medicines for wheezing    Negative: Whooping cough (pertussis) has been diagnosed    Negative: [1] Coughing occurs AND [2] within 21 days of whooping cough EXPOSURE    Negative: [1] Coughed up blood AND [2] large amount    Negative: Ribs are pulling in with each breath (retractions) when not coughing AND [2] severe or  Patient is a 68y old  Female who presents with a chief complaint of Severe C. Diff Colitis (14 Feb 2019 01:12)        MEDICATIONS  (STANDING):  buPROPion XL . 300 milliGRAM(s) Oral daily  chlorhexidine 4% Liquid 1 Application(s) Topical <User Schedule>  enoxaparin Injectable 40 milliGRAM(s) SubCutaneous daily  furosemide Solution 20 milliGRAM(s) Oral daily  influenza   Vaccine 0.5 milliLiter(s) IntraMuscular once  levothyroxine Injectable 37.5 MICROGram(s) IV Push at bedtime  metoprolol tartrate 12.5 milliGRAM(s) Oral every 12 hours  metroNIDAZOLE  IVPB 500 milliGRAM(s) IV Intermittent every 8 hours  potassium chloride   Solution 20 milliEquivalent(s) Oral two times a day  spironolactone Suspension 50 milliGRAM(s) Oral daily  vancomycin    Solution 500 milliGRAM(s) Oral every 6 hours  ziprasidone 40 milliGRAM(s) Oral <User Schedule>    MEDICATIONS  (PRN):  acetaminophen   Tablet .. 650 milliGRAM(s) Oral every 6 hours PRN Mild Pain (1 - 3)  ALBUTerol/ipratropium for Nebulization 3 milliLiter(s) Nebulizer every 6 hours PRN Shortness of Breath and/or Wheezing          VITALS:   T(C): 36.4 (02-13-19 @ 20:00), Max: 36.9 (02-13-19 @ 03:00)  HR: 102 (02-13-19 @ 22:00) (86 - 115)  BP: 104/54 (02-13-19 @ 22:00) (87/51 - 113/80)  RR: 23 (02-13-19 @ 22:00) (20 - 35)  SpO2: 96% (02-13-19 @ 22:00) (90% - 100%)  Wt(kg): --        LABS:        CBC Full  -  ( 13 Feb 2019 03:39 )  WBC Count : 10.2 K/uL  Hemoglobin : 11.5 g/dL  Hematocrit : 34.2 %  Platelet Count - Automated : 57 K/uL  Mean Cell Volume : 98.8 fl  Mean Cell Hemoglobin : 33.1 pg  Mean Cell Hemoglobin Concentration : 33.5 gm/dL  Auto Neutrophil # : x  Auto Lymphocyte # : x  Auto Monocyte # : x  Auto Eosinophil # : x  Auto Basophil # : x  Auto Neutrophil % : x  Auto Lymphocyte % : x  Auto Monocyte % : x  Auto Eosinophil % : x  Auto Basophil % : x    02-13    139  |  98  |  19  ----------------------------<  127<H>  4.2   |  30  |  0.60    Ca    7.3<L>      13 Feb 2019 03:39  Phos  2.4     02-13  Mg     2.3     02-13            CAPILLARY BLOOD GLUCOSE          RADIOLOGY & ADDITIONAL TESTS: pronounced    Negative: Stridor (harsh sound with breathing in) is present    Negative: [1] Lips or face have turned bluish BUT [2] only during coughing fits    Negative: [1] Age < 12 weeks AND [2] fever 100.4 F (38.0 C) or higher rectally    Negative: [1] Difficulty breathing AND [2] not severe AND [3] still present when not coughing (Triage tip: Listen to the child's breathing.)    Negative: Wheezing (purring or whistling sound) occurs    Negative: [1] Age < 3 years AND [2] continuous coughing AND [3] sudden onset today AND [4] no fever or symptoms of a cold    Negative: Rapid breathing (Breaths/min > 60 if < 2 mo; > 50 if 2-12 mo; > 40 if 1-5 years; > 30 if 6-12 years; > 20 if > 12 years old)    Negative: [1] Age < 6 months AND [2] wheezing is present BUT [3] no severe trouble breathing    Negative: [1] SEVERE chest pain (excruciating) AND [2] present now    Negative: [1] Drooling or spitting out saliva AND [2] can't swallow fluids    Negative: [1] Shaking chills AND [2] present > 30 minutes    Negative: [1] Fever AND [2] > 105 F (40.6 C) by any route OR axillary > 104 F (40 C)    Negative: [1] Fever AND [2] weak immune system (sickle cell disease, HIV, splenectomy, chemotherapy, organ transplant, chronic oral steroids, etc)    Negative: Child sounds very sick or weak to the triager    Negative: [1] Age < 1 month old AND [2] lots of coughing    Negative: [1] MODERATE chest pain (by caller's report) AND [2] can't take a deep breath    Negative: [1] Age < 1 year AND [2] continuous (non-stop) coughing keeps from feeding and sleeping AND [3] no improvement using cough treatment per guideline    Negative: High-risk child (e.g., underlying lung, heart or severe neuromuscular disease)    Negative: Age < 3 months old  (Exception: coughs a few times)    Negative: [1] Age 6 months or older AND [2] mild wheezing is present BUT [3] no trouble breathing    Negative: [1] Blood-tinged sputum has been coughed up AND [2] more  67 y/o critically ill  female h/o dementia NHL presenting after unwitnessed fall. Pt found on the ground at rehab. is on lovenox after hip fracture. pt reports pain to her right leg. per EMS en route slurring speech and mildly hypotensive requesting trauma.unclear how long pt on the ground. Patient found to have C diff with worsening hypotension requiring pressors on and off and  Patient requiring the SICU for continued cardiopulmonary monitoring. Orthopedic procedure on hold due to  hypotension and pan colitis and need for pressors due to unstable hemodynamics. Diarrhea slowing with IV Flagyl + oral and rectal vancomycin. Patient now has  rectal tube removed with no diarrhea..  Stable leukocytosis and improving but still confused.      MEDICATIONS  (STANDING):  buPROPion XL . 300 milliGRAM(s) Oral daily  chlorhexidine 4% Liquid 1 Application(s) Topical <User Schedule>  enoxaparin Injectable 40 milliGRAM(s) SubCutaneous daily  furosemide Solution 20 milliGRAM(s) Oral daily  influenza   Vaccine 0.5 milliLiter(s) IntraMuscular once  levothyroxine Injectable 37.5 MICROGram(s) IV Push at bedtime  metoprolol tartrate 12.5 milliGRAM(s) Oral every 12 hours  metroNIDAZOLE  IVPB 500 milliGRAM(s) IV Intermittent every 8 hours  potassium chloride   Solution 20 milliEquivalent(s) Oral two times a day  spironolactone Suspension 50 milliGRAM(s) Oral daily  vancomycin    Solution 500 milliGRAM(s) Oral every 6 hours  ziprasidone 40 milliGRAM(s) Oral <User Schedule>    MEDICATIONS  (PRN):  acetaminophen   Tablet .. 650 milliGRAM(s) Oral every 6 hours PRN Mild Pain (1 - 3)  ALBUTerol/ipratropium for Nebulization 3 milliLiter(s) Nebulizer every 6 hours PRN Shortness of Breath and/or Wheezing      PHYSICAL EXAM:  GENERAL: NAD, well-groomed, well-developed  HEAD:  Atraumatic, Normocephalic  EYES: EOMI, PERRLA, conjunctiva and sclera clear  ENMT: No tonsillar erythema, exudates, or enlargement; Moist mucous membranes, Good dentition, No lesions  NECK: Supple, No JVD, Normal thyroid  NERVOUS SYSTEM:  Alert & Oriented X3, Good concentration; Motor Strength 5/5 B/L upper and lower extremities; DTRs 2+ intact and symmetric  CHEST/LUNG: Clear to percussion bilaterally; No rales, rhonchi, wheezing, or rubs  HEART: Regular rate and rhythm; No murmurs, rubs, or gallops  ABDOMEN: Soft, Nontender, Nondistended; Bowel sounds present  EXTREMITIES:  2+ Peripheral Pulses, No clubbing, cyanosis, or edema  LYMPH: No lymphadenopathy noted  SKIN: No rashes or lesions    VITALS:   T(C): 36.4 (02-13-19 @ 20:00), Max: 36.9 (02-13-19 @ 03:00)  HR: 102 (02-13-19 @ 22:00) (86 - 115)  BP: 104/54 (02-13-19 @ 22:00) (87/51 - 113/80)  RR: 23 (02-13-19 @ 22:00) (20 - 35)  SpO2: 96% (02-13-19 @ 22:00) (90% - 100%)  Wt(kg): --        LABS:        CBC Full  -  ( 13 Feb 2019 03:39 )  WBC Count : 10.2 K/uL  Hemoglobin : 11.5 g/dL  Hematocrit : 34.2 %  Platelet Count - Automated : 57 K/uL  Mean Cell Volume : 98.8 fl  Mean Cell Hemoglobin : 33.1 pg  Mean Cell Hemoglobin Concentration : 33.5 gm/dL  Auto Neutrophil # : x  Auto Lymphocyte # : x  Auto Monocyte # : x  Auto Eosinophil # : x  Auto Basophil # : x  Auto Neutrophil % : x  Auto Lymphocyte % : x  Auto Monocyte % : x  Auto Eosinophil % : x  Auto Basophil % : x    02-13    139  |  98  |  19  ----------------------------<  127<H>  4.2   |  30  |  0.60    Ca    7.3<L>      13 Feb 2019 03:39  Phos  2.4     02-13  Mg     2.3     02-13            CAPILLARY BLOOD GLUCOSE          RADIOLOGY & ADDITIONAL TESTS: than once    Negative: [1] Age > 1 year  AND [2] continuous (non-stop) coughing keeps from feeding and sleeping AND [3] no improvement using cough treatment per guideline    Negative: Earache is also present    Negative: [1] Age > 5 years AND [2] sinus pain (not just congestion) is also present    Negative: Fever present > 3 days (72 hours)    Negative: [1] Age 3 to 6 months old AND [2] fever with the cough    Negative: [1] Fever returns after gone for over 24 hours AND [2] symptoms worse    Negative: [1] New fever develops after having cough for 3 or more days (over 72 hours) AND [2] symptoms worse    Protocols used: COUGH-PEDIATRIC-

## 2019-04-13 LAB
CULTURE RESULTS: SIGNIFICANT CHANGE UP
SPECIMEN SOURCE: SIGNIFICANT CHANGE UP

## 2019-04-15 ENCOUNTER — APPOINTMENT (OUTPATIENT)
Age: 69
End: 2019-04-15
Payer: COMMERCIAL

## 2019-04-15 DIAGNOSIS — M97.01XA PERIPROSTHETIC FRACTURE AROUND INTERNAL PROSTHETIC RIGHT HIP JOINT, INITIAL ENCOUNTER: ICD-10-CM

## 2019-04-15 PROCEDURE — 99024 POSTOP FOLLOW-UP VISIT: CPT

## 2019-04-15 PROCEDURE — 73502 X-RAY EXAM HIP UNI 2-3 VIEWS: CPT | Mod: RT

## 2019-04-15 NOTE — REASON FOR VISIT
[Post Operative Visit] : a post operative visit for [Artificial Hip Joint] : artificial hip joint [Family Member] : family member

## 2019-04-15 NOTE — HISTORY OF PRESENT ILLNESS
[de-identified] : Status-post right hip hemiarthroplasty, revision of the femoral component for fracture with open reduction internal fixation of the proximal femur fracture and irrigation and debridement of a surgical wound here for postoperative evaluation now approximately 2 months from surgery.  Postop recovery was complicated by malnutrition from her C. difficile colitis which preceded her surgery.  She did require a feeding tube insertion and a gastrostomy tube conversion during her recovery as well as plastic surgery consultation for wound breakdown superficially.  She is still at rehab and is making excellent progress with physical therapy.  She has had some restricted weightbearing status but is not compliant with this.  She has minimal pain.  She feels like things are improving.  No fevers or chills.  Uses a walker.  Wound care at [facility] is still managing her wound.

## 2019-04-15 NOTE — PHYSICAL EXAM
[de-identified] : Examination reveals mostly satisfactory wound healing with some delayed wound healing distally about the wound although there is no significant dehiscence noted.  Fibrinous tissue was noted with some sutures still in place.  No expressible purulence.. No surrounding erythema.  No fluctuance.  Motion is good and relatively pain free. Leg lengths are acceptable. [de-identified] : AP pelvis, AP hip, and lateral x-rays of the right hip were reviewed. Satisfactory position and alignment of the components are present. No signs of loosening are seen.

## 2019-04-15 NOTE — DISCUSSION/SUMMARY
[de-identified] : 2-month status post right hip hemiarthroplasty revision femoral component, open reduction internal fixation of a proximal femur fracture and irrigation and debridement after previously failed hemiarthroplasty for displaced femoral neck fracture complicated by C. difficile colitis and prolonged ICU stay with prolonged hospitalization.  She continues to have some delayed wound healing which is significantly improved with local wound care being performed by the wound consultation team at [facility] rehabilitation [facility].  She is instructed to continue the local wound care.  We will continue with DVT thromboembolism prophylaxis while she is still less ambulatory at rehab.  She can advance to weightbearing as tolerated at this point.  Aquasol dressings were applied today.  Recommended for starting to remove the remainder of her surgical sutures.  Follow-up is recommended in 1 month.

## 2019-05-28 ENCOUNTER — APPOINTMENT (OUTPATIENT)
Dept: ORTHOPEDIC SURGERY | Facility: CLINIC | Age: 69
End: 2019-05-28
Payer: MEDICARE

## 2019-05-28 VITALS — HEIGHT: 61 IN

## 2019-05-28 PROCEDURE — 99212 OFFICE O/P EST SF 10 MIN: CPT

## 2019-05-28 PROCEDURE — 73502 X-RAY EXAM HIP UNI 2-3 VIEWS: CPT | Mod: RT

## 2019-05-28 NOTE — REASON FOR VISIT
[Follow-Up Visit] : a follow-up visit for [Artificial Hip Joint] : artificial hip joint [Family Member] : family member

## 2019-05-28 NOTE — PHYSICAL EXAM
[de-identified] : Patient is well nourished, well-developed, in no acute distress, with appropriate mood and affect. The patient is oriented to time, place, and person. Respirations are even and unlabored. Gait evaluation does not reveal a limp. There is no inguinal adenopathy. Examination of the contralateral hip shows normal range of motion, strength, no tenderness, and intact skin. The affected limb is well-perfused and showed 2+ dp/pt pulses, without skin lesions, shows a grossly normal motor and sensory examination. Examination of the hip shows a well-healed posterior lateral surgical scar. Hip motion is full and painless from 0-90 degrees extension to flexion, 20 degrees adduction and 20 degrees abduction, and 15 degrees internal and 30 degrees external rotation. Leg lengths are approximately equal. FADIR is negative and SERENA is negative. Stinchfield test is negative. Both hips are stable and muscle strength is normal with good strength with resisted abduction and adduction. Pedal pulses are palpable. [de-identified] : AP pelvis, AP hip, and lateral x-rays of the right hip were reviewed. Satisfactory position and alignment of the components are present. No signs of loosening are seen.  The fracture is now healed.

## 2019-05-28 NOTE — DISCUSSION/SUMMARY
[de-identified] : 3-month status post right hip hemiarthroplasty revision with periprosthetic fracture status post open reduction internal fixation.  She is doing well.  She will continue to be weightbearing as tolerated and use a walker and transition to a cane.  Continue to work with physical therapy.  Follow-up is recommended in 6 months.

## 2019-05-28 NOTE — HISTORY OF PRESENT ILLNESS
[de-identified] : This is very nice 68-year-old female who is now 3 months status post right hip hemiarthroplasty arthroplasty revision for periprosthetic fracture with open reduction internal fixation of the proximal femur fracture.  She has no hip pain.  Her wound is now healed.  She is eating again and her PEG is planning and being removed soon.  She was recently found to have 100% carotid stenosis on one side of her neck requiring medical management but a long-term admission to the hospital again which delayed her rehab.  She now ambulates with a walker.  She is very pleased with her outcome thus far.  Her weakness in the legs limit her activities of daily living and walking tolerance.  No fevers or chills.  The patient denies any radiation of the pain to the feet and it is not associated with numbness, tingling, or weakness.

## 2019-05-28 NOTE — REVIEW OF SYSTEMS
[Arthralgia] : no arthralgia [Joint Stiffness] : no joint stiffness [Joint Pain] : no joint pain [Negative] : Heme/Lymph

## 2019-09-16 NOTE — PATIENT PROFILE ADULT - BRADEN MOBILITY
As certified below, I, or a nurse practitioner or physician assistant working with me, had a face-to-face encounter that meets the physician face-to-face encounter requirements.
(3) slightly limited

## 2019-12-03 ENCOUNTER — APPOINTMENT (OUTPATIENT)
Dept: ORTHOPEDIC SURGERY | Facility: CLINIC | Age: 69
End: 2019-12-03

## 2020-01-09 NOTE — H&P ADULT - NSHPPOAPRESSUREULCER_GEN_ALL_CORE
Marielos Price is a 66 year old female presenting with back and knee pain  Pain Score Currently 8  Medication reconciliation reviewed  Allergies reviewed  Latex Allergy none   Patient signed [employer] paper agreeing to supply a urine sample before leaving office.  Urine sample collected from patient and sent to [employer].  Req#[id number]           no

## 2020-03-02 NOTE — ED PROVIDER NOTE - MUSCULOSKELETAL, MLM
Addended by: LILIANA MATOS on: 3/2/2020 10:57 AM     Modules accepted: Orders    
Spine appears normal. +Right leg short and externally rotated. +TTP with movement. B/L knee surgical scars.

## 2020-06-08 NOTE — CONSULT NOTE ADULT - NEGATIVE GENERAL SYMPTOMS
Vitals  T(F): 98.8 (06-08-20 @ 00:14), Max: 101.2 (06-07-20 @ 21:21)  HR: 96 (06-08-20 @ 00:50) (96 - 128)  BP: 148/86 (06-08-20 @ 00:14) (148/86 - 156/84)  RR: 20 (06-08-20 @ 00:50) (19 - 28)  SpO2: 97% (06-08-20 @ 00:50) (95% - 99%)    Physical Exam   Gen: NAD, comfortable  HENT: atraumatic head and ears, no gross abnormalities of ears, mucous membranes moist, no oral lesions, neck supple without masses/goiter/lymphadenopathy  CV: RRR, nl s1/s2, no M/R/G  Pulm: nl respiratory effort, CTAx2,no wheezes/crackles/rhonchi  Abd: obese, normoactive bowel sounds in all 4 quadrants, distended but depressible. No tenderness or guarding. Palpable  and reducible ventral incisional hernia.   Extremities: multiple varicoses vein. + 2 terry leg swelling in the medial aspect.   Skin: nl warm and dry, no wounds   Neuro: A&Ox3, answering questions appropriately, PERRL, EOMI, face symmetric, sensation equal bilaterally in face, tongue midline, no dysarthria, 5/5 strength in upper and lower extremities bilaterally, sensation intact in upper and lower extremities bilaterally  Pysch: no depression, no SI, no HI Vitals  T(F): 98.8 (06-08-20 @ 00:14), Max: 101.2 (06-07-20 @ 21:21)  HR: 96 (06-08-20 @ 00:50) (96 - 128)  BP: 148/86 (06-08-20 @ 00:14) (148/86 - 156/84)  RR: 20 (06-08-20 @ 00:50) (19 - 28)  SpO2: 97% (06-08-20 @ 00:50) (95% - 99%)    Physical Exam   Gen: NAD, comfortable  HENT: atraumatic head and ears, no gross abnormalities of ears, mucous membranes moist, no oral lesions, neck supple without masses/goiter/lymphadenopathy  CV: RRR, nl s1/s2, no M/R/G  Pulm: nl respiratory effort, mild rales in the right base. No wheezes/crackles/rhonchi  Abd: obese, normoactive bowel sounds in all 4 quadrants, distended but depressible. No tenderness or guarding. Palpable and reducible ventral incisional hernia. Midline abdominal incision.   Extremities: multiple varicoses vein. + 2 terry leg swelling in the medial aspect.   Skin: nl warm and dry, no wounds   Neuro: A&Ox3, answering questions appropriately, PERRL, EOMI, face symmetric, sensation equal bilaterally in face, tongue midline, no dysarthria, 5/5 strength in upper and lower extremities bilaterally, sensation intact in upper and lower extremities bilaterally  Pysch: no depression, no SI, no HI no fever

## 2020-06-10 NOTE — ED ADULT NURSE NOTE - TEMPLATE LIST FOR HEAD TO TOE ASSESSMENT
Abdominal Pain, N/V/D No Residual Tumor Seen Histology Text: There were no malignant cells seen in the sections examined.

## 2020-06-15 NOTE — ED ADULT NURSE NOTE - NSSISCREENINGQ1_ED_A_ED
Problem: SAFETY  Goal: Free from accidental physical injury  Outcome: Met This Shift  Goal: Free from intentional harm  Outcome: Met This Shift No

## 2020-09-15 NOTE — PACU DISCHARGE NOTE - PAIN:
Controlled with current regime
Detail Level: Detailed
Medical Necessity Information: It is in your best interest to select a reason for this procedure from the list below. All of these items fulfill various CMS LCD requirements except the new and changing color options.
Detail Level: Simple
Post-Care Instructions: I reviewed with the patient in detail post-care instructions. Patient is to wear sunprotection, and avoid picking at any of the treated lesions. Pt may apply Vaseline to crusted or scabbing areas.
Consent: The patient's consent was obtained including but not limited to risks of crusting, scabbing, blistering, scarring, darker or lighter pigmentary change, recurrence, incomplete removal and infection.
Medical Necessity Clause: This procedure was medically necessary because the lesions that were treated were:
Render Post-Care Instructions In Note?: no
Duration Of Freeze Thaw-Cycle (Seconds): 3
Number Of Freeze-Thaw Cycles: 2 freeze-thaw cycles

## 2021-01-01 NOTE — PHYSICAL THERAPY INITIAL EVALUATION ADULT - STRENGTHENING, PT EVAL
Statement Selected GOAL: Pt will increase BUE and BLE strength by full grade throughout extremities in 4 weeks

## 2021-04-08 NOTE — PROGRESS NOTE ADULT - PROBLEM SELECTOR PROBLEM 2
Patient was seen in the office today for an EKG per . Chuck Crandall     She's feeling better since cardioversion, she is now taking Magnesium 400 mg daily now and wants to make sure Dr Lizzette Crandall knows. Anemia due to blood loss

## 2021-06-22 ENCOUNTER — APPOINTMENT (OUTPATIENT)
Dept: ORTHOPEDIC SURGERY | Facility: CLINIC | Age: 71
End: 2021-06-22
Payer: MEDICARE

## 2021-06-22 VITALS — WEIGHT: 136 LBS | BODY MASS INDEX: 25.7 KG/M2

## 2021-06-22 DIAGNOSIS — Z96.649 PRESENCE OF UNSPECIFIED ARTIFICIAL HIP JOINT: ICD-10-CM

## 2021-06-22 DIAGNOSIS — M25.551 PAIN IN RIGHT HIP: ICD-10-CM

## 2021-06-22 PROCEDURE — 99214 OFFICE O/P EST MOD 30 MIN: CPT

## 2021-06-22 PROCEDURE — 73502 X-RAY EXAM HIP UNI 2-3 VIEWS: CPT | Mod: RT

## 2021-06-22 RX ORDER — NITROFURANTOIN (MONOHYDRATE/MACROCRYSTALS) 25; 75 MG/1; MG/1
100 CAPSULE ORAL
Qty: 14 | Refills: 0 | Status: ACTIVE | COMMUNITY
Start: 2021-01-04

## 2021-06-22 RX ORDER — ONDANSETRON 8 MG/1
8 TABLET ORAL
Qty: 90 | Refills: 0 | Status: ACTIVE | COMMUNITY
Start: 2021-05-15

## 2021-06-22 RX ORDER — LEVETIRACETAM 500 MG/1
500 TABLET, FILM COATED ORAL
Qty: 180 | Refills: 0 | Status: ACTIVE | COMMUNITY
Start: 2021-01-23

## 2021-06-22 RX ORDER — LORAZEPAM 1 MG/1
1 TABLET ORAL
Qty: 60 | Refills: 0 | Status: ACTIVE | COMMUNITY
Start: 2021-03-08

## 2021-06-22 RX ORDER — OLANZAPINE 10 MG/1
10 TABLET, FILM COATED ORAL
Qty: 90 | Refills: 0 | Status: ACTIVE | COMMUNITY
Start: 2020-12-29

## 2021-06-22 RX ORDER — MIDODRINE HYDROCHLORIDE 5 MG/1
5 TABLET ORAL
Qty: 540 | Refills: 0 | Status: ACTIVE | COMMUNITY
Start: 2021-03-22

## 2021-06-22 RX ORDER — PANTOPRAZOLE 40 MG/1
40 TABLET, DELAYED RELEASE ORAL
Qty: 90 | Refills: 0 | Status: ACTIVE | COMMUNITY
Start: 2021-01-25

## 2021-06-22 RX ORDER — BUPROPION HYDROCHLORIDE 150 MG/1
150 TABLET, FILM COATED, EXTENDED RELEASE ORAL
Qty: 90 | Refills: 0 | Status: ACTIVE | COMMUNITY
Start: 2020-12-29

## 2021-06-22 RX ORDER — SULFAMETHOXAZOLE AND TRIMETHOPRIM 400; 80 MG/1; MG/1
400-80 TABLET ORAL
Qty: 20 | Refills: 0 | Status: ACTIVE | COMMUNITY
Start: 2021-01-15

## 2021-06-22 RX ORDER — MELOXICAM 15 MG/1
15 TABLET ORAL
Qty: 30 | Refills: 2 | Status: ACTIVE | COMMUNITY
Start: 2021-06-22 | End: 1900-01-01

## 2021-06-22 NOTE — PHYSICAL EXAM
[de-identified] : Patient is well nourished, well-developed, in no acute distress, with appropriate mood and affect. The patient is oriented to time, place, and person. Respirations are even and unlabored. Gait evaluation does not reveal a limp. There is no inguinal adenopathy. Examination of the contralateral hip shows normal range of motion, strength, no tenderness, and intact skin. The affected limb is well-perfused and showed 2+ dp/pt pulses, without skin lesions, shows a grossly normal motor and sensory examination. Examination of the hip shows a well healed surgical scar. Hip motion is full and painless from 0-90 degrees extension to flexion, 20 degrees adduction and 20 degrees abduction, and 15 degrees internal and 30 degrees external rotation. Leg lengths are approximately equal. Both hips are stable and muscle strength is normal with good strength with resisted abduction and adduction. Pedal pulses are palpable.  She has pain throughout the right lower extremity as well as the left lower extremity in nonanatomic distributions. [de-identified] : AP pelvis, AP hip, and lateral x-rays of the right hip were ordered and obtained in the office and demonstrate satisfactory position and alignment of the components are present. No signs of loosening are seen.  Fracture is healed.  Cables are seen and in proper position.

## 2021-06-22 NOTE — REASON FOR VISIT
[Follow-Up Visit] : a follow-up visit for [Artificial Hip Joint] : artificial hip joint [Hip Pain] : hip pain [Family Member] : family member

## 2021-06-22 NOTE — DISCUSSION/SUMMARY
[de-identified] : This patient is well-functioning right hip hemiarthroplasty status post open reduction internal fixation as well.  She has some heterotopic ossification around this.  She does have nonanatomic pain throughout the lower extremity which is likely related to her chemotherapy as the pain started soon after starting her chemotherapy.  No evidence of infection on examination today that would warrant work-up.  I prescribed her meloxicam and she will check with her chemotherapy oncologist if this is acceptable.  Follow-up with me in 1 year.

## 2021-06-22 NOTE — HISTORY OF PRESENT ILLNESS
[de-identified] : Is a very pleasant 70-year-old female with a history of a right hip hemiarthroplasty by another surgeon complicated by periprosthetic fracture and then she was transferred to me where I performed an open reduction internal fixation of her right proximal femur as well as a revision hemiarthroplasty.  She had a complicated postoperative course but ultimately healed her wound.  She is been doing well but recently over the last several months he was recently diagnosed with recurrence of lymphoma and started chemotherapy.  Starting the chemotherapy she has been developing total body pain including pain in the bilateral lower extremities.  She comes in for follow-up of her right hip hemiarthroplasty.  Today she is in a wheelchair and she has not walked in several months and started chemotherapy.  No fevers or chills.  No groin pain.

## 2021-09-02 ENCOUNTER — INPATIENT (INPATIENT)
Facility: HOSPITAL | Age: 71
LOS: 6 days | Discharge: HOME HEALTH | DRG: 872 | End: 2021-09-09
Attending: INTERNAL MEDICINE | Admitting: INTERNAL MEDICINE
Payer: MEDICARE

## 2021-09-02 VITALS
SYSTOLIC BLOOD PRESSURE: 69 MMHG | DIASTOLIC BLOOD PRESSURE: 55 MMHG | WEIGHT: 139.99 LBS | RESPIRATION RATE: 20 BRPM | OXYGEN SATURATION: 98 % | HEIGHT: 61 IN | HEART RATE: 110 BPM | TEMPERATURE: 98 F

## 2021-09-02 DIAGNOSIS — Z98.89 OTHER SPECIFIED POSTPROCEDURAL STATES: Chronic | ICD-10-CM

## 2021-09-02 DIAGNOSIS — R50.9 FEVER, UNSPECIFIED: ICD-10-CM

## 2021-09-02 DIAGNOSIS — Z96.653 PRESENCE OF ARTIFICIAL KNEE JOINT, BILATERAL: Chronic | ICD-10-CM

## 2021-09-02 DIAGNOSIS — Z98.49 CATARACT EXTRACTION STATUS, UNSPECIFIED EYE: Chronic | ICD-10-CM

## 2021-09-02 LAB
APPEARANCE UR: ABNORMAL
APTT BLD: 23.8 SEC — LOW (ref 27.5–35.5)
BASE EXCESS BLDV CALC-SCNC: 1.3 MMOL/L — SIGNIFICANT CHANGE UP (ref -2–2)
BASOPHILS # BLD AUTO: 0 K/UL — SIGNIFICANT CHANGE UP (ref 0–0.2)
BASOPHILS NFR BLD AUTO: 0 % — SIGNIFICANT CHANGE UP (ref 0–2)
BILIRUB UR-MCNC: NEGATIVE — SIGNIFICANT CHANGE UP
CA-I SERPL-SCNC: 1.01 MMOL/L — LOW (ref 1.15–1.33)
CHLORIDE BLDV-SCNC: 97 MMOL/L — SIGNIFICANT CHANGE UP (ref 96–108)
CO2 BLDV-SCNC: 29 MMOL/L — HIGH (ref 22–26)
COLOR SPEC: YELLOW — SIGNIFICANT CHANGE UP
DIFF PNL FLD: ABNORMAL
EOSINOPHIL # BLD AUTO: 0 K/UL — SIGNIFICANT CHANGE UP (ref 0–0.5)
EOSINOPHIL NFR BLD AUTO: 0 % — SIGNIFICANT CHANGE UP (ref 0–6)
GAS PNL BLDV: 130 MMOL/L — LOW (ref 136–145)
GAS PNL BLDV: SIGNIFICANT CHANGE UP
GAS PNL BLDV: SIGNIFICANT CHANGE UP
GLUCOSE BLDV-MCNC: 146 MG/DL — HIGH (ref 70–99)
GLUCOSE UR QL: ABNORMAL
HCO3 BLDV-SCNC: 27 MMOL/L — SIGNIFICANT CHANGE UP (ref 22–29)
HCT VFR BLD CALC: 22 % — LOW (ref 34.5–45)
HCT VFR BLD CALC: 29.4 % — LOW (ref 34.5–45)
HCT VFR BLDA CALC: 19 % — CRITICAL LOW (ref 34.5–46.5)
HGB BLD CALC-MCNC: 6.4 G/DL — CRITICAL LOW (ref 11.7–16.1)
HGB BLD-MCNC: 7.3 G/DL — LOW (ref 11.5–15.5)
HGB BLD-MCNC: 9.5 G/DL — LOW (ref 11.5–15.5)
INR BLD: 1.43 RATIO — HIGH (ref 0.88–1.16)
KETONES UR-MCNC: SIGNIFICANT CHANGE UP
LACTATE BLDV-MCNC: 1.4 MMOL/L — SIGNIFICANT CHANGE UP (ref 0.7–2)
LEUKOCYTE ESTERASE UR-ACNC: ABNORMAL
LYMPHOCYTES # BLD AUTO: 0.34 K/UL — LOW (ref 1–3.3)
LYMPHOCYTES # BLD AUTO: 24 % — SIGNIFICANT CHANGE UP (ref 13–44)
MAGNESIUM SERPL-MCNC: 1.4 MG/DL — LOW (ref 1.6–2.6)
MCHC RBC-ENTMCNC: 32.3 GM/DL — SIGNIFICANT CHANGE UP (ref 32–36)
MCHC RBC-ENTMCNC: 33.2 GM/DL — SIGNIFICANT CHANGE UP (ref 32–36)
MCHC RBC-ENTMCNC: 33.5 PG — SIGNIFICANT CHANGE UP (ref 27–34)
MCHC RBC-ENTMCNC: 34.1 PG — HIGH (ref 27–34)
MCV RBC AUTO: 102.8 FL — HIGH (ref 80–100)
MCV RBC AUTO: 103.5 FL — HIGH (ref 80–100)
MONOCYTES # BLD AUTO: 0.11 K/UL — SIGNIFICANT CHANGE UP (ref 0–0.9)
MONOCYTES NFR BLD AUTO: 8 % — SIGNIFICANT CHANGE UP (ref 2–14)
NEUTROPHILS # BLD AUTO: 0.85 K/UL — LOW (ref 1.8–7.4)
NEUTROPHILS NFR BLD AUTO: 56 % — SIGNIFICANT CHANGE UP (ref 43–77)
NITRITE UR-MCNC: POSITIVE
NT-PROBNP SERPL-SCNC: 1897 PG/ML — HIGH (ref 0–300)
PCO2 BLDV: 49 MMHG — HIGH (ref 39–42)
PH BLDV: 7.35 — SIGNIFICANT CHANGE UP (ref 7.32–7.43)
PH UR: 6 — SIGNIFICANT CHANGE UP (ref 5–8)
PLATELET # BLD AUTO: 57 K/UL — LOW (ref 150–400)
PLATELET # BLD AUTO: 80 K/UL — LOW (ref 150–400)
PO2 BLDV: 24 MMHG — LOW (ref 25–45)
POTASSIUM BLDV-SCNC: 2.5 MMOL/L — CRITICAL LOW (ref 3.5–5.1)
PROT UR-MCNC: ABNORMAL
PROTHROM AB SERPL-ACNC: 16.9 SEC — HIGH (ref 10.6–13.6)
RAPID RVP RESULT: SIGNIFICANT CHANGE UP
RBC # BLD: 2.14 M/UL — LOW (ref 3.8–5.2)
RBC # BLD: 2.84 M/UL — LOW (ref 3.8–5.2)
RBC # FLD: 15.3 % — HIGH (ref 10.3–14.5)
RBC # FLD: 15.3 % — HIGH (ref 10.3–14.5)
SAO2 % BLDV: 31.3 % — LOW (ref 67–88)
SARS-COV-2 RNA SPEC QL NAA+PROBE: SIGNIFICANT CHANGE UP
SP GR SPEC: 1.02 — SIGNIFICANT CHANGE UP (ref 1.01–1.02)
TROPONIN T, HIGH SENSITIVITY RESULT: 36 NG/L — SIGNIFICANT CHANGE UP (ref 0–51)
UROBILINOGEN FLD QL: ABNORMAL
WBC # BLD: 1.01 K/UL — CRITICAL LOW (ref 3.8–10.5)
WBC # BLD: 1.4 K/UL — LOW (ref 3.8–10.5)
WBC # FLD AUTO: 1.01 K/UL — CRITICAL LOW (ref 3.8–10.5)
WBC # FLD AUTO: 1.4 K/UL — LOW (ref 3.8–10.5)

## 2021-09-02 PROCEDURE — 71275 CT ANGIOGRAPHY CHEST: CPT | Mod: 26,MG

## 2021-09-02 PROCEDURE — G1004: CPT

## 2021-09-02 PROCEDURE — 99291 CRITICAL CARE FIRST HOUR: CPT | Mod: GC

## 2021-09-02 PROCEDURE — 71045 X-RAY EXAM CHEST 1 VIEW: CPT | Mod: 26

## 2021-09-02 PROCEDURE — 93010 ELECTROCARDIOGRAM REPORT: CPT

## 2021-09-02 PROCEDURE — 74177 CT ABD & PELVIS W/CONTRAST: CPT | Mod: 26,MG

## 2021-09-02 PROCEDURE — 99232 SBSQ HOSP IP/OBS MODERATE 35: CPT

## 2021-09-02 PROCEDURE — 70450 CT HEAD/BRAIN W/O DYE: CPT | Mod: 26,MA

## 2021-09-02 RX ORDER — ERTAPENEM SODIUM 1 G/1
1000 INJECTION, POWDER, LYOPHILIZED, FOR SOLUTION INTRAMUSCULAR; INTRAVENOUS ONCE
Refills: 0 | Status: COMPLETED | OUTPATIENT
Start: 2021-09-02 | End: 2021-09-02

## 2021-09-02 RX ORDER — ACETAMINOPHEN 500 MG
650 TABLET ORAL ONCE
Refills: 0 | Status: COMPLETED | OUTPATIENT
Start: 2021-09-02 | End: 2021-09-02

## 2021-09-02 RX ORDER — SODIUM CHLORIDE 9 MG/ML
1950 INJECTION INTRAMUSCULAR; INTRAVENOUS; SUBCUTANEOUS ONCE
Refills: 0 | Status: COMPLETED | OUTPATIENT
Start: 2021-09-02 | End: 2021-09-02

## 2021-09-02 RX ORDER — MAGNESIUM SULFATE 500 MG/ML
2 VIAL (ML) INJECTION ONCE
Refills: 0 | Status: COMPLETED | OUTPATIENT
Start: 2021-09-02 | End: 2021-09-02

## 2021-09-02 RX ORDER — VANCOMYCIN HCL 1 G
1000 VIAL (EA) INTRAVENOUS ONCE
Refills: 0 | Status: COMPLETED | OUTPATIENT
Start: 2021-09-02 | End: 2021-09-02

## 2021-09-02 RX ORDER — POTASSIUM CHLORIDE 20 MEQ
10 PACKET (EA) ORAL ONCE
Refills: 0 | Status: COMPLETED | OUTPATIENT
Start: 2021-09-02 | End: 2021-09-02

## 2021-09-02 RX ORDER — SODIUM CHLORIDE 9 MG/ML
250 INJECTION, SOLUTION INTRAVENOUS ONCE
Refills: 0 | Status: COMPLETED | OUTPATIENT
Start: 2021-09-02 | End: 2021-09-02

## 2021-09-02 RX ADMIN — Medication 650 MILLIGRAM(S): at 17:13

## 2021-09-02 RX ADMIN — SODIUM CHLORIDE 500 MILLILITER(S): 9 INJECTION, SOLUTION INTRAVENOUS at 22:58

## 2021-09-02 RX ADMIN — Medication 100 MILLIEQUIVALENT(S): at 20:49

## 2021-09-02 RX ADMIN — Medication 250 MILLIGRAM(S): at 18:09

## 2021-09-02 RX ADMIN — SODIUM CHLORIDE 1950 MILLILITER(S): 9 INJECTION INTRAMUSCULAR; INTRAVENOUS; SUBCUTANEOUS at 17:12

## 2021-09-02 RX ADMIN — ERTAPENEM SODIUM 120 MILLIGRAM(S): 1 INJECTION, POWDER, LYOPHILIZED, FOR SOLUTION INTRAMUSCULAR; INTRAVENOUS at 17:13

## 2021-09-02 RX ADMIN — Medication 50 GRAM(S): at 22:01

## 2021-09-02 NOTE — ED ADULT NURSE NOTE - NSICDXFAMILYHX_GEN_ALL_CORE_FT
FAMILY HISTORY:  Family history of breast cancer  Family history of COPD (chronic obstructive pulmonary disease)  Family history of diabetes mellitus    Grandparent  Still living? No  Family history of prostate cancer, Age at diagnosis: Age Unknown  Family history of stomach cancer, Age at diagnosis: Age Unknown

## 2021-09-02 NOTE — ED ADULT NURSE NOTE - NSICDXPASTMEDICALHX_GEN_ALL_CORE_FT
PAST MEDICAL HISTORY:  Bipolar depression     Depression     Hypercholesteremia     Lymphoma NHL, treated with chemo @ [facility], in remission since 2016    OCD (obsessive compulsive disorder)     Osteoarthritis of left knee     Osteoarthritis of right knee

## 2021-09-02 NOTE — ED PROVIDER NOTE - NS ED ROS FT
Review of Systems    Constitutional: (-) fever, (-) chills, (+) fatigue  HEENT: (-) sore throat, (-) hearing loss, (-) nasal congestion  Cardiovascular: (-) chest pain, (-) syncope  Respiratory: (-) cough, (-) shortness of breath  Gastrointestinal: (+) vomiting, (-) diarrhea, (-) abdominal pain  Musculoskeletal: (-) neck pain, (-) back pain, (-) joint pain  Integumentary: (-) rash, (-) edema, (-) wound  Neurological: (-) headache, (+) altered mental status    Except as documented in the HPI, all other systems are negative.

## 2021-09-02 NOTE — ED ADULT NURSE REASSESSMENT NOTE - NS ED NURSE REASSESS COMMENT FT1
Received report @ 1900, pt repositioned in bed and cleaned. Pt made aware of plan of care with VSS. Pt awaiting a bed assignment upstairs

## 2021-09-02 NOTE — ED PROVIDER NOTE - ATTENDING CONTRIBUTION TO CARE
70 F w/ hx of lymphoma (diffuse large b cell lymphoma) on tafasitamab-lenalidomide last dose on 8/26, bipolar, depression, HLD, OCD, OA, presents to the ER w/ altereded mental status and fagitue. Pt w/ no cp, no sob. hx limited as pt is minimally verbal due to clinical condition, she is aaox2, she reports that she is not drinking due to not wanting to drink and states she Is dehydrated. Pt follows w/ Dr. najera. at [facility]. On exam pt is chronically ill appearing, moving arms and legs, she has diminished breath sounds bilaterally, soft abdomen, ? tenderness, she has 2+ ulse in the bilateral upper extremities, she has no lower extremity edema. no sacral wounds, will have labs and admission to medicine 70 F w/ hx of lymphoma (diffuse large b cell lymphoma) on tafasitamab-lenalidomide last dose on 8/26, bipolar, depression, HLD, OCD, OA, presents to the ER w/ altereded mental status and fagitue. Pt w/ no cp, no sob. hx limited as pt is minimally verbal due to clinical condition, she is aaox2, she reports that she is not drinking due to not wanting to drink and states she Is dehydrated. Pt follows w/ Dr. najera. at [facility]. On exam pt is chronically ill appearing, moving arms and legs, she has diminished breath sounds bilaterally, soft abdomen, ? tenderness, she has 2+ ulse in the bilateral upper extremities, she has no lower extremity edema. no sacral wounds, will have labs and admission to medicine infectious eitiology of hypotension vs hypovolemia, given antibiotics. GOC discussed w/ family MOLST completed

## 2021-09-02 NOTE — ED CLERICAL - NS ED CLERK NOTE PRE-ARRIVAL INFORMATION; ADDITIONAL PRE-ARRIVAL INFORMATION
CC/Reason For referral: HYPERTENSION; VOMITING; LEUKEMIA  Preferred Consultant(if applicable):  Who admits for you (if needed):  Do you have documents you would like to fax over?  Would you still like to speak to an ED attending? Y

## 2021-09-02 NOTE — CHART NOTE - NSCHARTNOTEFT_GEN_A_CORE
This is a patient with history of DLBCL [place] [facility] [place] to ED with hypotension, tachycardia, vomiting and poor po intake  Please admit to Dr Papo Barber

## 2021-09-02 NOTE — ED ADULT NURSE NOTE - NSICDXPASTSURGICALHX_GEN_ALL_CORE_FT
PAST SURGICAL HISTORY:  H/O oral surgery 2014    S/P cataract surgery     S/P knee surgery 1978    S/P TKR (total knee replacement), bilateral discharged nov 5th, 2014

## 2021-09-02 NOTE — ED ADULT NURSE REASSESSMENT NOTE - NS ED NURSE REASSESS COMMENT FT1
Straight urinary catheter inserted using sterile technique. Procedure, risks, and benefits of catheter explained to patient, patient verbalized understanding. Second RN present to confirm sterility. Pt tolerated well. Urinary catheter drained  100 mL dark talha urine, no clots visualized. Urinalysis and urine cultures obtained. Catheter removed promptly.

## 2021-09-02 NOTE — ED PROVIDER NOTE - CLINICAL SUMMARY MEDICAL DECISION MAKING FREE TEXT BOX
Angelique-PGY3: 70 year old female with PMH DLBCL on PO and IV chemo (last IV chemo 8/2621), ulcerative colitis, HLD presents with lethargy x 1 week. Pt reports feeling more tired than usual over the past week with difficulty ambulating secondary to weakness. Pt admits to feeling more tired than usual and occasional episodes of nonbloody nonbilious emesis. Per son at bedside, reports patient has been more confused than usual over past week. Denies any fevers, chest pain, shortness of breath, abdominal pain, diarrhea, bloody stools, black tarry stools, dysuria, headache, vision change, numbness, weakness, or rash. Denies any recent injury or fall. Pt with history of recurrent UTIs, noted to be febrile and hypotensive in ED. Likely infectious etiology. Blood pressure and HR improved with IV fluids. Will obtain labs, imaging, empiric abx, anticipate likely admission for further evaluation and management.

## 2021-09-02 NOTE — ED PROVIDER NOTE - NSICDXPASTMEDICALHX_GEN_ALL_CORE_FT
PAST MEDICAL HISTORY:  Bipolar depression     Depression     Hypercholesteremia     Lymphoma NHL, treated with chemo @ [facility] [place], in remission since 2016    OCD (obsessive compulsive disorder)     Osteoarthritis of left knee     Osteoarthritis of right knee

## 2021-09-02 NOTE — ED ADULT NURSE NOTE - OBJECTIVE STATEMENT
69 y/o male arrives to the ER brought in by ambulance from  complaining of hypotension.      PMH of      Pt is A&Ox2 speaking coherently. Pt states having generalized weakness for a week, feeling confused, unable to do her daily activities as usuak. Pt denies SOB, chest pain, dizziness, N/V/D, urinary symptoms, fevers, chills.    On assessment airway is patent, breathing spontaneously and unlabored. Skin is dry, warm and color appropiate to race.  Abdomen is soft, no distended, no tender. Full ROM in all extremities. Comfort measures provided. call bell within reach, bed locked in the lowest position. will continue to reassess . 71 y/o male arrives to the ER brought in by ambulance from home complaining of hypotension.  PMH of Depression, bipolar disorder, HLD, Lymphoma, last chemo on 08/26/2021. Pt meets sepsis criteria. Two large bore IV's in place. Fluids running. 2 sets of blood cultures sent to lab. VBG sent. See sepsis flow sheet.  Will continue to monitor. Pt is A&Ox2 speaking coherently. Pt states having generalized weakness , feeling confused, unable to do her daily activities as usual, decrease fluid intake for a week . Pt denies SOB, chest pain, dizziness, N/V/D, urinary symptoms, fevers, chills.   On assessment airway is patent, breathing spontaneously and labored. Skin is dry, warm and color appropriate to race.  Abdomen is soft, no distended, no tender. Full ROM in all extremities. Comfort measures provided. call bell within reach, bed locked in the lowest position. will continue to reassess .

## 2021-09-02 NOTE — ED PROVIDER NOTE - PROGRESS NOTE DETAILS
Lucks-PGY3: confirmed GOC with pt and son, DNR/DNI (did not bring MOLST). New MOLST completed and added to chart. Lucks-PGY3: spoke to Dr. Barber, updated on clinical status. Pt hemoglobin 7.3 <- 9.5, attribute to hemodilution as pt dehydrated upon presentation and received 2L crystalloid. EKG showing prolonged QTc 530, repeat electrolytes sent. Discussed with Dr. Barber, accepted for admission.

## 2021-09-02 NOTE — ED PROVIDER NOTE - PHYSICAL EXAMINATION
CONSTITUTIONAL: non-toxic, well appearing  SKIN: no rash, no petechiae.  EYES: PERRL, EOMI, pink conjunctiva, anicteric  ENT: tongue and uvular midline, no exudates, dry mucous membranes  NECK: Supple; no meningismus, no JVD  CARD: RRR, no murmurs, equal radial pulses bilaterally 2+  RESP: CTAB, no respiratory distress  ABD: Soft, non-tender, non-distended, no peritoneal signs  EXT: Normal ROM x4. No edema. No calf tenderness  NEURO: Alert, oriented. Neuro exam nonfocal.  PSYCH: Cooperative, appropriate.

## 2021-09-03 DIAGNOSIS — E87.6 HYPOKALEMIA: ICD-10-CM

## 2021-09-03 DIAGNOSIS — R09.89 OTHER SPECIFIED SYMPTOMS AND SIGNS INVOLVING THE CIRCULATORY AND RESPIRATORY SYSTEMS: ICD-10-CM

## 2021-09-03 DIAGNOSIS — A41.9 SEPSIS, UNSPECIFIED ORGANISM: ICD-10-CM

## 2021-09-03 DIAGNOSIS — R94.31 ABNORMAL ELECTROCARDIOGRAM [ECG] [EKG]: ICD-10-CM

## 2021-09-03 DIAGNOSIS — F31.9 BIPOLAR DISORDER, UNSPECIFIED: ICD-10-CM

## 2021-09-03 DIAGNOSIS — N30.00 ACUTE CYSTITIS WITHOUT HEMATURIA: ICD-10-CM

## 2021-09-03 DIAGNOSIS — N17.9 ACUTE KIDNEY FAILURE, UNSPECIFIED: ICD-10-CM

## 2021-09-03 LAB
ANION GAP SERPL CALC-SCNC: 10 MMOL/L — SIGNIFICANT CHANGE UP (ref 5–17)
ANION GAP SERPL CALC-SCNC: 11 MMOL/L — SIGNIFICANT CHANGE UP (ref 5–17)
BASOPHILS # BLD AUTO: 0 K/UL — SIGNIFICANT CHANGE UP (ref 0–0.2)
BASOPHILS NFR BLD AUTO: 0 % — SIGNIFICANT CHANGE UP (ref 0–2)
BLD GP AB SCN SERPL QL: NEGATIVE — SIGNIFICANT CHANGE UP
BUN SERPL-MCNC: 15 MG/DL — SIGNIFICANT CHANGE UP (ref 7–23)
BUN SERPL-MCNC: 17 MG/DL — SIGNIFICANT CHANGE UP (ref 7–23)
CALCIUM SERPL-MCNC: 7.5 MG/DL — LOW (ref 8.4–10.5)
CALCIUM SERPL-MCNC: 8.1 MG/DL — LOW (ref 8.4–10.5)
CHLORIDE SERPL-SCNC: 100 MMOL/L — SIGNIFICANT CHANGE UP (ref 96–108)
CHLORIDE SERPL-SCNC: 100 MMOL/L — SIGNIFICANT CHANGE UP (ref 96–108)
CO2 SERPL-SCNC: 25 MMOL/L — SIGNIFICANT CHANGE UP (ref 22–31)
CO2 SERPL-SCNC: 29 MMOL/L — SIGNIFICANT CHANGE UP (ref 22–31)
COVID-19 SPIKE DOMAIN AB INTERP: POSITIVE
COVID-19 SPIKE DOMAIN ANTIBODY RESULT: 3.74 U/ML — HIGH
CREAT SERPL-MCNC: 0.75 MG/DL — SIGNIFICANT CHANGE UP (ref 0.5–1.3)
CREAT SERPL-MCNC: 0.86 MG/DL — SIGNIFICANT CHANGE UP (ref 0.5–1.3)
EOSINOPHIL # BLD AUTO: 0.22 K/UL — SIGNIFICANT CHANGE UP (ref 0–0.5)
EOSINOPHIL NFR BLD AUTO: 2.6 % — SIGNIFICANT CHANGE UP (ref 0–6)
GLUCOSE SERPL-MCNC: 102 MG/DL — HIGH (ref 70–99)
GLUCOSE SERPL-MCNC: 113 MG/DL — HIGH (ref 70–99)
HCT VFR BLD CALC: 23.3 % — LOW (ref 34.5–45)
HCT VFR BLD CALC: 24.8 % — LOW (ref 34.5–45)
HGB BLD-MCNC: 7.6 G/DL — LOW (ref 11.5–15.5)
HGB BLD-MCNC: 7.7 G/DL — LOW (ref 11.5–15.5)
LYMPHOCYTES # BLD AUTO: 1.58 K/UL — SIGNIFICANT CHANGE UP (ref 1–3.3)
LYMPHOCYTES # BLD AUTO: 18.4 % — SIGNIFICANT CHANGE UP (ref 13–44)
MAGNESIUM SERPL-MCNC: 2.5 MG/DL — SIGNIFICANT CHANGE UP (ref 1.6–2.6)
MCHC RBC-ENTMCNC: 28 PG — SIGNIFICANT CHANGE UP (ref 27–34)
MCHC RBC-ENTMCNC: 30.6 GM/DL — LOW (ref 32–36)
MCHC RBC-ENTMCNC: 33 GM/DL — SIGNIFICANT CHANGE UP (ref 32–36)
MCHC RBC-ENTMCNC: 33.6 PG — SIGNIFICANT CHANGE UP (ref 27–34)
MCV RBC AUTO: 101.7 FL — HIGH (ref 80–100)
MCV RBC AUTO: 91.5 FL — SIGNIFICANT CHANGE UP (ref 80–100)
MONOCYTES # BLD AUTO: 0.53 K/UL — SIGNIFICANT CHANGE UP (ref 0–0.9)
MONOCYTES NFR BLD AUTO: 6.2 % — SIGNIFICANT CHANGE UP (ref 2–14)
NEUTROPHILS # BLD AUTO: 6.27 K/UL — SIGNIFICANT CHANGE UP (ref 1.8–7.4)
NEUTROPHILS NFR BLD AUTO: 70.2 % — SIGNIFICANT CHANGE UP (ref 43–77)
NRBC # BLD: 0 /100 WBCS — SIGNIFICANT CHANGE UP (ref 0–0)
PLATELET # BLD AUTO: 241 K/UL — SIGNIFICANT CHANGE UP (ref 150–400)
PLATELET # BLD AUTO: 64 K/UL — LOW (ref 150–400)
POTASSIUM SERPL-MCNC: 2.7 MMOL/L — CRITICAL LOW (ref 3.5–5.3)
POTASSIUM SERPL-MCNC: 3.3 MMOL/L — LOW (ref 3.5–5.3)
POTASSIUM SERPL-SCNC: 2.7 MMOL/L — CRITICAL LOW (ref 3.5–5.3)
POTASSIUM SERPL-SCNC: 3.3 MMOL/L — LOW (ref 3.5–5.3)
RBC # BLD: 2.29 M/UL — LOW (ref 3.8–5.2)
RBC # BLD: 2.71 M/UL — LOW (ref 3.8–5.2)
RBC # FLD: 15.2 % — HIGH (ref 10.3–14.5)
RBC # FLD: 24.1 % — HIGH (ref 10.3–14.5)
RH IG SCN BLD-IMP: POSITIVE — SIGNIFICANT CHANGE UP
SARS-COV-2 IGG+IGM SERPL QL IA: 3.74 U/ML — HIGH
SARS-COV-2 IGG+IGM SERPL QL IA: POSITIVE
SODIUM SERPL-SCNC: 136 MMOL/L — SIGNIFICANT CHANGE UP (ref 135–145)
SODIUM SERPL-SCNC: 139 MMOL/L — SIGNIFICANT CHANGE UP (ref 135–145)
TSH SERPL-MCNC: 2.99 UIU/ML — SIGNIFICANT CHANGE UP (ref 0.27–4.2)
WBC # BLD: 1.18 K/UL — LOW (ref 3.8–10.5)
WBC # BLD: 8.61 K/UL — SIGNIFICANT CHANGE UP (ref 3.8–10.5)
WBC # FLD AUTO: 1.18 K/UL — LOW (ref 3.8–10.5)
WBC # FLD AUTO: 8.61 K/UL — SIGNIFICANT CHANGE UP (ref 3.8–10.5)

## 2021-09-03 PROCEDURE — 99223 1ST HOSP IP/OBS HIGH 75: CPT

## 2021-09-03 RX ORDER — ATORVASTATIN CALCIUM 80 MG/1
40 TABLET, FILM COATED ORAL AT BEDTIME
Refills: 0 | Status: DISCONTINUED | OUTPATIENT
Start: 2021-09-03 | End: 2021-09-09

## 2021-09-03 RX ORDER — ATORVASTATIN CALCIUM 80 MG/1
1 TABLET, FILM COATED ORAL
Qty: 0 | Refills: 0 | DISCHARGE

## 2021-09-03 RX ORDER — PANTOPRAZOLE SODIUM 20 MG/1
1 TABLET, DELAYED RELEASE ORAL
Qty: 0 | Refills: 0 | DISCHARGE

## 2021-09-03 RX ORDER — LEVETIRACETAM 250 MG/1
1 TABLET, FILM COATED ORAL
Qty: 0 | Refills: 0 | DISCHARGE

## 2021-09-03 RX ORDER — VANCOMYCIN HCL 1 G
125 VIAL (EA) INTRAVENOUS
Qty: 0 | Refills: 0 | DISCHARGE

## 2021-09-03 RX ORDER — BUPROPION HYDROCHLORIDE 150 MG/1
150 TABLET, EXTENDED RELEASE ORAL DAILY
Refills: 0 | Status: DISCONTINUED | OUTPATIENT
Start: 2021-09-03 | End: 2021-09-09

## 2021-09-03 RX ORDER — OLANZAPINE 15 MG/1
1 TABLET, FILM COATED ORAL
Qty: 0 | Refills: 0 | DISCHARGE

## 2021-09-03 RX ORDER — ACETAMINOPHEN 500 MG
650 TABLET ORAL EVERY 6 HOURS
Refills: 0 | Status: DISCONTINUED | OUTPATIENT
Start: 2021-09-03 | End: 2021-09-09

## 2021-09-03 RX ORDER — ZIPRASIDONE HYDROCHLORIDE 20 MG/1
1 CAPSULE ORAL
Qty: 0 | Refills: 0 | DISCHARGE

## 2021-09-03 RX ORDER — VANCOMYCIN HCL 1 G
750 VIAL (EA) INTRAVENOUS
Refills: 0 | Status: DISCONTINUED | OUTPATIENT
Start: 2021-09-03 | End: 2021-09-03

## 2021-09-03 RX ORDER — INFLUENZA VIRUS VACCINE 15; 15; 15; 15 UG/.5ML; UG/.5ML; UG/.5ML; UG/.5ML
0.5 SUSPENSION INTRAMUSCULAR ONCE
Refills: 0 | Status: DISCONTINUED | OUTPATIENT
Start: 2021-09-03 | End: 2021-09-09

## 2021-09-03 RX ORDER — LENALIDOMIDE 5 MG/1
0 CAPSULE ORAL
Qty: 0 | Refills: 0 | DISCHARGE

## 2021-09-03 RX ORDER — BUPROPION HYDROCHLORIDE 150 MG/1
1 TABLET, EXTENDED RELEASE ORAL
Qty: 0 | Refills: 0 | DISCHARGE

## 2021-09-03 RX ORDER — POTASSIUM CHLORIDE 20 MEQ
40 PACKET (EA) ORAL ONCE
Refills: 0 | Status: COMPLETED | OUTPATIENT
Start: 2021-09-03 | End: 2021-09-03

## 2021-09-03 RX ORDER — FILGRASTIM 480MCG/1.6
300 VIAL (ML) INJECTION DAILY
Refills: 0 | Status: DISCONTINUED | OUTPATIENT
Start: 2021-09-03 | End: 2021-09-06

## 2021-09-03 RX ORDER — LEVOTHYROXINE SODIUM 125 MCG
100 TABLET ORAL DAILY
Refills: 0 | Status: DISCONTINUED | OUTPATIENT
Start: 2021-09-03 | End: 2021-09-09

## 2021-09-03 RX ORDER — MEROPENEM 1 G/30ML
1000 INJECTION INTRAVENOUS EVERY 8 HOURS
Refills: 0 | Status: DISCONTINUED | OUTPATIENT
Start: 2021-09-03 | End: 2021-09-05

## 2021-09-03 RX ORDER — PANTOPRAZOLE SODIUM 20 MG/1
40 TABLET, DELAYED RELEASE ORAL
Refills: 0 | Status: DISCONTINUED | OUTPATIENT
Start: 2021-09-03 | End: 2021-09-09

## 2021-09-03 RX ORDER — ERTAPENEM SODIUM 1 G/1
1000 INJECTION, POWDER, LYOPHILIZED, FOR SOLUTION INTRAMUSCULAR; INTRAVENOUS EVERY 24 HOURS
Refills: 0 | Status: DISCONTINUED | OUTPATIENT
Start: 2021-09-03 | End: 2021-09-03

## 2021-09-03 RX ORDER — LEVETIRACETAM 250 MG/1
500 TABLET, FILM COATED ORAL
Refills: 0 | Status: DISCONTINUED | OUTPATIENT
Start: 2021-09-03 | End: 2021-09-09

## 2021-09-03 RX ORDER — POTASSIUM CHLORIDE 20 MEQ
10 PACKET (EA) ORAL
Refills: 0 | Status: COMPLETED | OUTPATIENT
Start: 2021-09-03 | End: 2021-09-03

## 2021-09-03 RX ORDER — MIDODRINE HYDROCHLORIDE 2.5 MG/1
10 TABLET ORAL THREE TIMES A DAY
Refills: 0 | Status: DISCONTINUED | OUTPATIENT
Start: 2021-09-03 | End: 2021-09-09

## 2021-09-03 RX ORDER — ZIPRASIDONE HYDROCHLORIDE 20 MG/1
40 CAPSULE ORAL
Refills: 0 | Status: DISCONTINUED | OUTPATIENT
Start: 2021-09-03 | End: 2021-09-09

## 2021-09-03 RX ORDER — MIDODRINE HYDROCHLORIDE 2.5 MG/1
10 TABLET ORAL ONCE
Refills: 0 | Status: COMPLETED | OUTPATIENT
Start: 2021-09-03 | End: 2021-09-03

## 2021-09-03 RX ORDER — SODIUM CHLORIDE 9 MG/ML
500 INJECTION, SOLUTION INTRAVENOUS
Refills: 0 | Status: DISCONTINUED | OUTPATIENT
Start: 2021-09-03 | End: 2021-09-09

## 2021-09-03 RX ADMIN — Medication 250 MILLIGRAM(S): at 17:25

## 2021-09-03 RX ADMIN — Medication 300 MICROGRAM(S): at 15:05

## 2021-09-03 RX ADMIN — Medication 250 MILLIGRAM(S): at 05:41

## 2021-09-03 RX ADMIN — Medication 100 MILLIEQUIVALENT(S): at 02:16

## 2021-09-03 RX ADMIN — Medication 40 MILLIEQUIVALENT(S): at 02:16

## 2021-09-03 RX ADMIN — PANTOPRAZOLE SODIUM 40 MILLIGRAM(S): 20 TABLET, DELAYED RELEASE ORAL at 06:45

## 2021-09-03 RX ADMIN — Medication 100 MICROGRAM(S): at 05:40

## 2021-09-03 RX ADMIN — MIDODRINE HYDROCHLORIDE 10 MILLIGRAM(S): 2.5 TABLET ORAL at 05:41

## 2021-09-03 RX ADMIN — MEROPENEM 100 MILLIGRAM(S): 1 INJECTION INTRAVENOUS at 21:33

## 2021-09-03 RX ADMIN — MIDODRINE HYDROCHLORIDE 10 MILLIGRAM(S): 2.5 TABLET ORAL at 12:05

## 2021-09-03 RX ADMIN — Medication 100 MILLIEQUIVALENT(S): at 04:18

## 2021-09-03 RX ADMIN — ERTAPENEM SODIUM 120 MILLIGRAM(S): 1 INJECTION, POWDER, LYOPHILIZED, FOR SOLUTION INTRAMUSCULAR; INTRAVENOUS at 05:41

## 2021-09-03 RX ADMIN — MIDODRINE HYDROCHLORIDE 10 MILLIGRAM(S): 2.5 TABLET ORAL at 02:16

## 2021-09-03 RX ADMIN — Medication 100 MILLIEQUIVALENT(S): at 03:19

## 2021-09-03 RX ADMIN — MIDODRINE HYDROCHLORIDE 10 MILLIGRAM(S): 2.5 TABLET ORAL at 17:23

## 2021-09-03 RX ADMIN — Medication 40 MILLIEQUIVALENT(S): at 17:23

## 2021-09-03 RX ADMIN — LEVETIRACETAM 500 MILLIGRAM(S): 250 TABLET, FILM COATED ORAL at 17:23

## 2021-09-03 RX ADMIN — ATORVASTATIN CALCIUM 40 MILLIGRAM(S): 80 TABLET, FILM COATED ORAL at 21:31

## 2021-09-03 RX ADMIN — LEVETIRACETAM 500 MILLIGRAM(S): 250 TABLET, FILM COATED ORAL at 05:41

## 2021-09-03 RX ADMIN — ZIPRASIDONE HYDROCHLORIDE 40 MILLIGRAM(S): 20 CAPSULE ORAL at 21:31

## 2021-09-03 RX ADMIN — BUPROPION HYDROCHLORIDE 150 MILLIGRAM(S): 150 TABLET, EXTENDED RELEASE ORAL at 12:05

## 2021-09-03 RX ADMIN — SODIUM CHLORIDE 60 MILLILITER(S): 9 INJECTION, SOLUTION INTRAVENOUS at 02:22

## 2021-09-03 NOTE — PROVIDER CONTACT NOTE (CRITICAL VALUE NOTIFICATION) - BACKGROUND
patient admitted for Fever due to unspecified condition. PMH of Lymphoma, BiPolar depression, Hypercholesterolemia

## 2021-09-03 NOTE — PHYSICAL THERAPY INITIAL EVALUATION ADULT - MANUAL MUSCLE TESTING RESULTS, REHAB EVAL
Strength is grossly at least 3/5 throughout/grossly assessed due to Strength is grossly at least 3/5 throughout, except RLE 3-/5, LLE 2+/5/grossly assessed due to

## 2021-09-03 NOTE — CHART NOTE - NSCHARTNOTEFT_GEN_A_CORE
MEDICINE NP    WILI PARKER  70y Female    Patient is a 70y old  Female who presents with a chief complaint of Nausea, vomiting, poor PO intake, Hypotension (02 Sep 2021 17:19)         > HPI:  Notified by RN, Patient new arrival to floor from ER with critical lab values of wbc 1.18, Hgb 7.1, and BP 88/58, asymptomatic.  Patient seen at bedside.  AAOX3, NAD.  Denies dizziness, sob, chest pain, palpitations, or occult bleeding.    -Vital Signs Last 24 Hrs  T(C): 36.6 (03 Sep 2021 00:35), Max: 38.6 (02 Sep 2021 16:20)  T(F): 97.9 (03 Sep 2021 00:35), Max: 101.4 (02 Sep 2021 16:20)  HR: 81 (03 Sep 2021 00:35) (73 - 112)  BP: 99/50 (03 Sep 2021 00:35) (69/55 - 116/46)  BP(mean): 71 (02 Sep 2021 20:06) (71 - 71)  RR: 19 (03 Sep 2021 00:35) (19 - 30)  SpO2: 98% (03 Sep 2021 00:35) (97% - 100%)    >EXAM:  General: AOX3, NAD  CVS: +S1,S2, RRR.  Trace dependent edema b/l LE  Pulm:  Resp even and unlabored.     > LABS:                        7.7    1.18  )-----------( 64       ( 03 Sep 2021 00:36 )             23.3     09-03    136  |  100  |  15  ----------------------------<  113<H>  2.7<LL>   |  25  |  0.86    Ca    7.5<L>      03 Sep 2021 00:36  Mg     2.5     09-03    TPro  5.7<L>  /  Alb  3.4  /  TBili  1.3<H>  /  DBili  x   /  AST  34  /  ALT  37  /  AlkPhos  141<H>  09-02      >Assessment & Plan:  70 year old female with PMH arrives to the ER brought in by Ambulance from home complaining of Hypotension, and AMS..  PMH of Depression, bipolar disorder, HLD, OCD, OA, DLBCL on PO and IV chemo (last IV chemo 8/2621), ulcerative colitis. Reports  with lethargy x 1 week, not drinking due to not wanting to drink and states she Is dehydrated.  Admitted with UTI/ Sepsis s/p Vanco and Ertapenem x1, IVF in ED with resolved lactate, Hypomagnesemia and Hypokalemia.  Now with Hypotensive, and HypoKalemia.      1. Hypotensive - Likely 2/2 Sepsis  BP repeated by me 92/57.  Patient remains asymptomatic.   >LR 250ml bolus x1 and trend.    >Repeat BP now improved 99/50.    >Gentle ivf LR @60ml/hr started and re-eval in AM  >Consider ID     2. HypoKalemia K+ 2.7  >Potassium 40mEq po x1 follow by KCL IVPB x1  >F/u Repeat BMP        >Spoke with [facility], H&P to be completed by hospitalist,  pending.          Keisha Waters, [facility]  Medicine Department  #[id number] MEDICINE NP    WILI PARKER  70y Female    Patient is a 70y old  Female who presents with a chief complaint of Nausea, vomiting, poor PO intake, Hypotension (02 Sep 2021 17:19)         > HPI:  Notified by RN, Patient new arrival to floor from ER with critical lab values of wbc 1.01, Hgb 7.3, and BP 88/58, asymptomatic.  Patient seen at bedside.  AAOX3, NAD.  Denies dizziness, sob, chest pain, palpitations, or occult bleeding.    -Vital Signs Last 24 Hrs  T(C): 36.6 (03 Sep 2021 00:35), Max: 38.6 (02 Sep 2021 16:20)  T(F): 97.9 (03 Sep 2021 00:35), Max: 101.4 (02 Sep 2021 16:20)  HR: 81 (03 Sep 2021 00:35) (73 - 112)  BP: 99/50 (03 Sep 2021 00:35) (69/55 - 116/46)  BP(mean): 71 (02 Sep 2021 20:06) (71 - 71)  RR: 19 (03 Sep 2021 00:35) (19 - 30)  SpO2: 98% (03 Sep 2021 00:35) (97% - 100%)    >EXAM:  General: AOX3, NAD  CVS: +S1,S2, RRR.  Trace dependent edema b/l LE  Pulm:  Resp even and unlabored.     > LABS:                        7.7    1.18  )-----------( 64       ( 03 Sep 2021 00:36 )             23.3     09-03    136  |  100  |  15  ----------------------------<  113<H>  2.7<LL>   |  25  |  0.86    Ca    7.5<L>      03 Sep 2021 00:36  Mg     2.5     09-03    TPro  5.7<L>  /  Alb  3.4  /  TBili  1.3<H>  /  DBili  x   /  AST  34  /  ALT  37  /  AlkPhos  141<H>  09-02      >Assessment & Plan:  70 year old female with PMH arrives to the ER brought in by Ambulance from home complaining of Hypotension, and AMS..  PMH of Depression, bipolar disorder, HLD, OCD, OA, DLBCL on PO and IV chemo (last IV chemo 8/2621), ulcerative colitis. Reports  with lethargy x 1 week, not drinking due to not wanting to drink and states she Is dehydrated.  Admitted with UTI/ Sepsis s/p Vanco and Ertapenem x1, IVF in ED with resolved lactate, Hypomagnesemia and Hypokalemia.  Now with Hypotensive, Anemia, and HypoKalemia.      1. Hypotensive - Likely 2/2 Sepsis  BP repeated by me 92/57.  Patient remains asymptomatic.   >LR 250ml bolus x1 and trend.    >Repeat BP now improved 99/50.    >Gentle ivf LR @60ml/hr started and re-eval in AM  >Consider ID     Spoke with [facility], H&P to be completed by hospitalist,  pending.        2. Anemia - Concern 2/2 Dilutional s/p IVF    >F/u repeat Hgb 7.1 -->7.7.  Prior baseline 9.4.  No acute s/s of bleeding, c/w trending  >F/u with Heme-Onc   >Transfuse as needed     3. Leukopenia - Likely in Dilutional s/p IV / DLBCL  >Rpt WBC 1.01 --> 1.18.  Prior baseline WBC 1.40.  C/w  monitor and trend   >Heme-Onc following      4. HypoKalemia K+ 2.7  >Potassium 40mEq po x1 follow by KCL IVPB x1  >F/u Repeat BMP            Keisha Waters, [facility]  Medicine Department  #[id number] MEDICINE NP    WILI PARKER  70y Female    Patient is a 70y old  Female who presents with a chief complaint of Nausea, vomiting, poor PO intake, Hypotension (02 Sep 2021 17:19)         > HPI:  Notified by RN, Patient new arrival to floor from ER with critical lab values of wbc 1.01, Hgb 7.3, and BP 88/58, asymptomatic.  Patient seen at bedside.  AAOX3, NAD.  Denies dizziness, sob, chest pain, palpitations, or occult bleeding.    -Vital Signs Last 24 Hrs  T(C): 36.6 (03 Sep 2021 00:35), Max: 38.6 (02 Sep 2021 16:20)  T(F): 97.9 (03 Sep 2021 00:35), Max: 101.4 (02 Sep 2021 16:20)  HR: 81 (03 Sep 2021 00:35) (73 - 112)  BP: 99/50 (03 Sep 2021 00:35) (69/55 - 116/46)  BP(mean): 71 (02 Sep 2021 20:06) (71 - 71)  RR: 19 (03 Sep 2021 00:35) (19 - 30)  SpO2: 98% (03 Sep 2021 00:35) (97% - 100%)    >EXAM:  General: AOX3, NAD  CVS: +S1,S2, RRR.  Trace dependent edema b/l LE  Pulm:  Resp even and unlabored.     > LABS:                        7.7    1.18  )-----------( 64       ( 03 Sep 2021 00:36 )             23.3     09-03    136  |  100  |  15  ----------------------------<  113<H>  2.7<LL>   |  25  |  0.86    Ca    7.5<L>      03 Sep 2021 00:36  Mg     2.5     09-03    TPro  5.7<L>  /  Alb  3.4  /  TBili  1.3<H>  /  DBili  x   /  AST  34  /  ALT  37  /  AlkPhos  141<H>  09-02      >Assessment & Plan:  70 year old female with PMH arrives to the ER brought in by Ambulance from home complaining of Hypotension, and AMS..  PMH of Depression, bipolar disorder, HLD, OCD, OA, DLBCL on PO and IV chemo (last IV chemo 8/2621), ulcerative colitis. Reports  with lethargy x 1 week, not drinking due to not wanting to drink and states she Is dehydrated.  Admitted with UTI/ Sepsis s/p Vanco and Ertapenem x1, IVF in ED with resolved lactate, Hypomagnesemia and Hypokalemia.  Now with Hypotensive, Anemia, and HypoKalemia.      1. Hypotensive - Likely 2/2 Sepsis /Dehydration from poor po intake.    BP repeated by me 92/57.  Patient remains asymptomatic.   >LR 250ml bolus x1 and trend.    >Repeat BP now improved 99/50 s/p ivf.    >Gentle ivf LR @60ml/hr started and re-eval in AM  > C/w ABX  >Consider ID     2. HypoKalemia K+ 2.7  >Potassium 40mEq po x1 follow by KCL IVPB x1  >F/u Repeat BMP  >Tele     3. Anemia - Concern 2/2 Dilutional s/p IVF    >F/u repeat Hgb 7.1 -->7.7.  Prior baseline 9.4.  No acute s/s of bleeding, c/w trending  >F/u with Heme-Onc   >Transfuse as needed     4. Leukopenia - Likely in Dilutional s/p IV / DLBCL  >Rpt WBC 1.01 --> 1.18.  Prior baseline WBC 1.40.  C/w  monitor and trend   >Heme-Onc following              Spoke with [facility], H&P to be completed by hospitalist - Pending.      Keisha Waters, [facility]  Medicine Department  #[id number]

## 2021-09-03 NOTE — H&P ADULT - PROBLEM SELECTOR PLAN 6
c/w ziprasidone, buproprion and keppra   will need to clarify medications with son in AM to confirm doses

## 2021-09-03 NOTE — H&P ADULT - PROBLEM SELECTOR PLAN 5
prolonged QTC >530  likely in setting of electrolyte abn  correct lytes and repeat EKG in AM  monitor on tele

## 2021-09-03 NOTE — H&P ADULT - PROBLEM SELECTOR PLAN 1
septic with fever, tachycardia, leukopenia with +UA; CXR, CT A/P without other source of infection noted. Pt with h/o UTI, including ESBL E coli in 2019  - severe sepsis with lactate 4.8 on admission and SHAGUFTA and hypotension   - improved after IVF - lactate now normalized, BP soft but improved  - c/w gentle IVF overnight   - resume home midodrine dose  - abx with vanco and ertapenem for now - monitor vanco levels, monitor Cr closely   - f/u blood and urine cultures, tailor abx to course  - vitals q4h for now

## 2021-09-03 NOTE — H&P ADULT - PROBLEM SELECTOR PLAN 3
SHAGUFTA with Cr 1.2 with baseline ~0.6, in setting of hypotension and sepsis   improving now with ongoing IVF hydration   c/t trend Cr, renally dose medications, avoid nephrotoxins   treat underlying sepsis as above

## 2021-09-03 NOTE — H&P ADULT - HISTORY OF PRESENT ILLNESS
70 year old female with history of late relapse of DLBCL on chemo, bipolar depression, HLD p/w weakness and poor PO intake. Per pt, she has been feeling very tired and fatigued for the past 1-2 weeks - has gotten so bad she is unable to even walk or stand up at this point (normally walks with rollator). She endorses associated poor PO intake and a few episodes of NBNB emesis in the 2-3 days prior to admission; endorses dysuria for the past 4 days without associated hematuria, polyuria or abdominal pain, fevers or chills  or diarrhea while at home. Pt endorses history fo freq UTIs in the past. Denies any associated lightheadedness or dizziness, no cough or SOB or wheezing; no CP or palpitations. She endorses RLE pain from hip down to her feet, which has been going on for 6 weeks and has also been limiting her mobility - of note pt had R ORIF with complicated post-op course and had ortho f/u at the end of June.  Was in remission for DLBCL, but noted with POD earlier this year and recently restarted on chemo, last dose 8/26/21.

## 2021-09-03 NOTE — H&P ADULT - PROBLEM SELECTOR PLAN 4
hypoK 2.7, likely in setting of poor PO and GI losses; also with hypoMg, s/p 2g Mg IV   will c/t aggressively replete with PO and IV K   repeat BMP in AM

## 2021-09-03 NOTE — PROGRESS NOTE ADULT - ASSESSMENT
Hematology/Oncology consulted on this 70 year old female with history of late relapse of tDLBCL stage III. Initially diagnosed in 2006 stage 4 low burden, followed by with mild progression until transformation/POD tDLBCL in 2015 . She was treated with G-CHOP+venetoclax x 6 on CR. Incidentally identified POD in april 2021. BMB shows known DNM T3A mutation. She presents today with hypotension, tachycardia, vomiting and poor PO intake.     DLBCL  --undergoing care at [facility] [place]. Eduardo Jauregui  --Currently on tafasitamib (LD 8/23/2021) and PO lenalidomide 25 mg PO daily 21/28 days (LD 8/28)  --Recent PET/CT shows response to current chemo regimen  --Chemo on hold during hospitalization  --Treatment will resume after discharge    Hypotension/weakness  --S/P IVF  --Feeling better this am  --CT head negative    Sepsis  --UA consistent with UTI - cultures pending  --On Ertapenem and Vancomycin   --Afebrile  --Blood cultures pending  --ID consult may be of benefit    Leukopenia  --Have ordered Filgastrim 300 mcg SQ daily to help raise ANC  --Please get daily CBC with diff and may DC after 2 daily ANC's >1000    Anemia  --Secondary to chemotherapy  --Will monitor - if worsens will check for treatable causes  --Please transfuse PRBCs if Hgb <7.0 grams    Thrombocytopenia  --Secondary to chemotherapy  --Please transfuse platelets if <10K or <50K with bleeding or prior to any procedure or <100K prior to any high risk procedures    GOC  --Patient is currently DNR  --GOC has been discussed with patient and her sonLiang by Dr. Jauregui  --Patient may benefit from palliative care consult while in hospital    Patient will be seen daily while admitted and we will continue to coordinate care with [facility]    Tyson Dugan PA-C  Hematology/Oncology  [facility]   [phone] (cell)  [phone] (office)  [phone] (alt office)  Evenings and weekends please call MD on call or office

## 2021-09-03 NOTE — H&P ADULT - NSICDXPASTMEDICALHX_GEN_ALL_CORE_FT
PAST MEDICAL HISTORY:  Bipolar depression     Depression     Hypercholesteremia     Lymphoma NHL, treated with chemo @ [facility] [place], in remission since 2016    OCD (obsessive compulsive disorder)     Osteoarthritis of left knee     Osteoarthritis of right knee

## 2021-09-03 NOTE — PROGRESS NOTE ADULT - PROBLEM SELECTOR PLAN 1
septic with fever, tachycardia, leukopenia with +UA; CXR, CT A/P without other source of infection noted. Pt with h/o UTI, including ESBL E coli in 2019  - severe sepsis with lactate 4.8 on admission and SHAGUFTA and hypotension   - improved after IVF - lactate now normalized, BP soft but improved  - c/w gentle IVF overnight   - resume home midodrine dose  - abx with vanco and ertapenem for now - monitor vanco levels, monitor Cr closely   - f/u blood and urine cultures, tailor abx to course  - ID eval called

## 2021-09-03 NOTE — H&P ADULT - NSHPREVIEWOFSYSTEMS_GEN_ALL_CORE
REVIEW OF SYSTEMS:    CONSTITUTIONAL: +weakness, no fevers, no chills  EYES/ENT: No visual changes;  no throat pain   NECK: No pain or stiffness  RESPIRATORY: No cough, no shortness of breath  CARDIOVASCULAR: No chest pain or palpitations  GASTROINTESTINAL: + nausea, +vomiting, no abdominal pain, no BRBPR  GENITOURINARY: no polyuria, +dysuria  NEUROLOGICAL: no numbness, no headaches  MUSCULOSKELETAL: no back pain, +RLE pain   SKIN: No itching, burning, rashes, or lesions   PSYCH: no anxiety, depression  HEME: no gum bleeding, no bruising

## 2021-09-03 NOTE — H&P ADULT - NSHPLABSRESULTS_GEN_ALL_CORE
Labs, imaging and EKG personally reviewed and interpreted by me.   Labs notable for leukopenia, anemia ~7, thrombocytopenia 64 (likely related to chemo), K 3.1-->2.7, SHAGUFTA with Cr 1.2-->0.86, procalcitonin >1  CXR personally reviewed and interpreted - no consolidations.   EKG sinus tachycardia 110, non specific TWI changes, no KAYLIE, prolonged                          7.7    1.18  )-----------( 64       ( 03 Sep 2021 00:36 )             23.3         136  |  100  |  15  ----------------------------<  113<H>  2.7<LL>   |  25  |  0.86    Ca    7.5<L>      03 Sep 2021 00:36  Mg     2.5         TPro  5.7<L>  /  Alb  3.4  /  TBili  1.3<H>  /  DBili  x   /  AST  34  /  ALT  37  /  AlkPhos  141<H>          PT/INR - ( 02 Sep 2021 16:43 )   PT: 16.9 sec;   INR: 1.43 ratio         PTT - ( 02 Sep 2021 16:43 )  PTT:23.8 sec    Urinalysis Basic - ( 02 Sep 2021 17:07 )    Color: Yellow / Appearance: Slightly Turbid / S.016 / pH: x  Gluc: x / Ketone: Trace  / Bili: Negative / Urobili: 4 mg/dL   Blood: x / Protein: 100 mg/dL / Nitrite: Positive   Leuk Esterase: Moderate / RBC: 5 /hpf / WBC 17 /HPF   Sq Epi: x / Non Sq Epi: 3 /hpf / Bacteria: Many      < from: CT Angio Chest PE Protocol w/ IV Cont (21 @ 18:47) >    IMPRESSION:    Two hypodense splenic lesions asnoted above, which are new from 2019, most likely related to patient's known diffuse large B-cell lymphoma.    No pulmonary embolism.    No acute septic pathology or fluid collection.    < end of copied text >

## 2021-09-03 NOTE — H&P ADULT - NSHPPHYSICALEXAM_GEN_ALL_CORE
Vital Signs Last 24 Hrs  T(C): 36.6 (03 Sep 2021 00:35), Max: 38.6 (02 Sep 2021 16:20)  T(F): 97.9 (03 Sep 2021 00:35), Max: 101.4 (02 Sep 2021 16:20)  HR: 81 (03 Sep 2021 00:35) (73 - 112)  BP: 99/50 (03 Sep 2021 00:35) (69/55 - 116/46)  BP(mean): 71 (02 Sep 2021 20:06) (71 - 71)  RR: 19 (03 Sep 2021 00:35) (19 - 30)  SpO2: 98% (03 Sep 2021 00:35) (97% - 100%)    PHYSICAL EXAM:  GENERAL: NAD, elderly woman appearing stated age   HEAD:  Atraumatic, normocephalic  EYES: EOMI, PERRLA, conjunctiva and sclera clear  NECK: Supple, no JVD  CHEST/LUNG: Clear to auscultation bilaterally; no wheezing or rales  HEART: Regular rate and rhythm; no murmurs  ABDOMEN: Soft, nontender, nondistended; bowel sounds present  EXTREMITIES:  2+ Peripheral Pulses, no edema  PSYCH: calm affect, not anxious  NEUROLOGY:  AAOx3, generalized weakness in all extremities, grossly intact strength   SKIN: No rashes or lesions  MUSCULOSKELETAL: no back pain, unable to lift b/l LE against gravity

## 2021-09-03 NOTE — H&P ADULT - ASSESSMENT
70 year old female with history of late relapse of DLBCL on chemo, bipolar depression, HLD p/w weakness and poor PO intake, found to be septic with hypotension and elevated lactate 2/2 UTI.

## 2021-09-03 NOTE — PHYSICAL THERAPY INITIAL EVALUATION ADULT - PERTINENT HX OF CURRENT PROBLEM, REHAB EVAL
Pt is a 70 year old female with history of late relapse of DLBCL on chemo, bipolar depression, HLD p/w weakness and poor PO intake, found to be septic with hypotension and elevated lactate 2/2 UTI.

## 2021-09-03 NOTE — PHYSICAL THERAPY INITIAL EVALUATION ADULT - ADDITIONAL COMMENTS
As per pt, pt lives in a private house alone and ramp access . PTA pt required assist w/ all mobility and ADLs. Pt transferred bed to wheel chair w/ assist. Pt owns RW, WC, hospital bed, has 24/7 HHA.

## 2021-09-04 LAB
ANION GAP SERPL CALC-SCNC: 10 MMOL/L — SIGNIFICANT CHANGE UP (ref 5–17)
BUN SERPL-MCNC: 12 MG/DL — SIGNIFICANT CHANGE UP (ref 7–23)
CALCIUM SERPL-MCNC: 8.3 MG/DL — LOW (ref 8.4–10.5)
CHLORIDE SERPL-SCNC: 104 MMOL/L — SIGNIFICANT CHANGE UP (ref 96–108)
CO2 SERPL-SCNC: 24 MMOL/L — SIGNIFICANT CHANGE UP (ref 22–31)
CREAT SERPL-MCNC: 0.67 MG/DL — SIGNIFICANT CHANGE UP (ref 0.5–1.3)
GLUCOSE SERPL-MCNC: 106 MG/DL — HIGH (ref 70–99)
HCT VFR BLD CALC: 23.7 % — LOW (ref 34.5–45)
HCV AB S/CO SERPL IA: 0.11 S/CO — SIGNIFICANT CHANGE UP (ref 0–0.99)
HCV AB SERPL-IMP: SIGNIFICANT CHANGE UP
HGB BLD-MCNC: 7.5 G/DL — LOW (ref 11.5–15.5)
MAGNESIUM SERPL-MCNC: 2.1 MG/DL — SIGNIFICANT CHANGE UP (ref 1.6–2.6)
MCHC RBC-ENTMCNC: 31.6 GM/DL — LOW (ref 32–36)
MCHC RBC-ENTMCNC: 33.3 PG — SIGNIFICANT CHANGE UP (ref 27–34)
MCV RBC AUTO: 105.3 FL — HIGH (ref 80–100)
MRSA PCR RESULT.: SIGNIFICANT CHANGE UP
NRBC # BLD: 0 /100 WBCS — SIGNIFICANT CHANGE UP (ref 0–0)
PLATELET # BLD AUTO: 64 K/UL — LOW (ref 150–400)
POTASSIUM SERPL-MCNC: 3.9 MMOL/L — SIGNIFICANT CHANGE UP (ref 3.5–5.3)
POTASSIUM SERPL-SCNC: 3.9 MMOL/L — SIGNIFICANT CHANGE UP (ref 3.5–5.3)
RBC # BLD: 2.25 M/UL — LOW (ref 3.8–5.2)
RBC # FLD: 15.3 % — HIGH (ref 10.3–14.5)
S AUREUS DNA NOSE QL NAA+PROBE: SIGNIFICANT CHANGE UP
SODIUM SERPL-SCNC: 138 MMOL/L — SIGNIFICANT CHANGE UP (ref 135–145)
WBC # BLD: 2.91 K/UL — LOW (ref 3.8–10.5)
WBC # FLD AUTO: 2.91 K/UL — LOW (ref 3.8–10.5)

## 2021-09-04 RX ADMIN — Medication 300 MICROGRAM(S): at 12:38

## 2021-09-04 RX ADMIN — BUPROPION HYDROCHLORIDE 150 MILLIGRAM(S): 150 TABLET, EXTENDED RELEASE ORAL at 12:37

## 2021-09-04 RX ADMIN — MEROPENEM 100 MILLIGRAM(S): 1 INJECTION INTRAVENOUS at 05:52

## 2021-09-04 RX ADMIN — ZIPRASIDONE HYDROCHLORIDE 40 MILLIGRAM(S): 20 CAPSULE ORAL at 21:24

## 2021-09-04 RX ADMIN — MEROPENEM 100 MILLIGRAM(S): 1 INJECTION INTRAVENOUS at 14:38

## 2021-09-04 RX ADMIN — LEVETIRACETAM 500 MILLIGRAM(S): 250 TABLET, FILM COATED ORAL at 05:52

## 2021-09-04 RX ADMIN — MIDODRINE HYDROCHLORIDE 10 MILLIGRAM(S): 2.5 TABLET ORAL at 17:35

## 2021-09-04 RX ADMIN — Medication 100 MICROGRAM(S): at 05:52

## 2021-09-04 RX ADMIN — MIDODRINE HYDROCHLORIDE 10 MILLIGRAM(S): 2.5 TABLET ORAL at 05:52

## 2021-09-04 RX ADMIN — PANTOPRAZOLE SODIUM 40 MILLIGRAM(S): 20 TABLET, DELAYED RELEASE ORAL at 06:41

## 2021-09-04 RX ADMIN — MIDODRINE HYDROCHLORIDE 10 MILLIGRAM(S): 2.5 TABLET ORAL at 13:25

## 2021-09-04 RX ADMIN — MEROPENEM 100 MILLIGRAM(S): 1 INJECTION INTRAVENOUS at 21:24

## 2021-09-04 RX ADMIN — ATORVASTATIN CALCIUM 40 MILLIGRAM(S): 80 TABLET, FILM COATED ORAL at 21:24

## 2021-09-04 RX ADMIN — LEVETIRACETAM 500 MILLIGRAM(S): 250 TABLET, FILM COATED ORAL at 17:35

## 2021-09-04 NOTE — DIETITIAN INITIAL EVALUATION ADULT. - PERTINENT MEDS FT
MEDICATIONS  (STANDING):  atorvastatin 40 milliGRAM(s) Oral at bedtime  buPROPion XL (24-Hour) . 150 milliGRAM(s) Oral daily  filgrastim-sndz (ZARXIO) Injectable 300 MICROGram(s) SubCutaneous daily  influenza   Vaccine 0.5 milliLiter(s) IntraMuscular once  lactated ringers. 500 milliLiter(s) (60 mL/Hr) IV Continuous <Continuous>  levETIRAcetam 500 milliGRAM(s) Oral two times a day  levothyroxine 100 MICROGram(s) Oral daily  meropenem  IVPB 1000 milliGRAM(s) IV Intermittent every 8 hours  midodrine. 10 milliGRAM(s) Oral three times a day  pantoprazole    Tablet 40 milliGRAM(s) Oral before breakfast  ziprasidone 40 milliGRAM(s) Oral <User Schedule>    MEDICATIONS  (PRN):  acetaminophen   Tablet .. 650 milliGRAM(s) Oral every 6 hours PRN Temp greater or equal to 38.5C (101.3F), Mild Pain (1 - 3)

## 2021-09-04 NOTE — DIETITIAN INITIAL EVALUATION ADULT. - ADD RECOMMEND
1. Recommend Ensure Enlive x 2 daily (350kcal, 20g protein per 8 ounces). 2. Continue to provide assistance and encouragement c all meals and snacks. 3. Will continue to provide Mighty Shakes and Magic Cup to provide quick and easy sources of calories and protein for pt.

## 2021-09-04 NOTE — DIETITIAN INITIAL EVALUATION ADULT. - OTHER INFO
Noted dosing wt of about 140 pounds. Noted wt of about 135 pounds in 2019 as per previous RD note. Noted dosing wt of about 140 pounds. Noted wt of about 135 pounds in 2019 as per previous RD note. Pt reports she does not weigh herself at home but does feel she has been losing wt since her clothing has been looser and looser at home. States she thinks she is 115 pounds, noted bed scale wt at this time reading about 140 pounds, questionable if zeroed out or not.     Nutrition focused physical exam deferred at this time as pt eating lunch.     Pt c Mighty shake and Magic Cup on tray, tried Mighty Shake and is willing to take it, will try Magic Cup after trying entree.

## 2021-09-04 NOTE — DIETITIAN INITIAL EVALUATION ADULT. - REASON FOR ADMISSION
weakness; pt c DLBCL, recently on chemo, follows [facility]. Pt currently c severe sepsis per team.

## 2021-09-04 NOTE — PROGRESS NOTE ADULT - ASSESSMENT
Hematology/Oncology consulted on this 70 year old female with history of late relapse of tDLBCL stage III. Initially diagnosed in 2006 stage 4 low burden, followed by with mild progression until transformation/POD tDLBCL in 2015 . She was treated with G-CHOP+venetoclax x 6 on CR. Incidentally identified POD in april 2021. BMB shows known DNM T3A mutation. She presents today with hypotension, tachycardia, vomiting and poor PO intake.     DLBCL  --undergoing care at [facility] Dr. Eduardo Jauregui  --Currently on tafasitamib (LD 8/23/2021) and PO lenalidomide 25 mg PO daily 21/28 days (LD 8/28)  --Recent PET/CT shows response to current chemo regimen  --Chemo on hold during hospitalization  --Treatment will resume after discharge    Hypotension/weakness  --S/P IVF  --Feeling better this am  --CT head negative    Sepsis  --UA consistent with UTI - UCx showed Ecoli, BCx NGTD  --On mereopenem  --Afebrile    Leukopenia  --Have ordered Filgastrim 300 mcg SQ daily to help raise ANC  --Please get daily CBC with diff and may DC after 2 daily ANC's >1000  --cont filgrastim for now    Anemia  --Secondary to chemotherapy  --Will monitor - if worsens will check for treatable causes  --Please transfuse PRBCs if Hgb <7.0 grams  --stable    Thrombocytopenia  --Secondary to chemotherapy, infection, abx  --stable  --Please transfuse platelets if <10K or <50K with bleeding or prior to any procedure or <100K prior to any high risk procedures    GOC  --Patient is currently DNR  --GOC has been discussed with patient and her son, Liang by Dr. Jauregui  --Patient may benefit from palliative care consult while in hospital    D/w pt, will follow, total time spent 35min, >50% spent in coordination of care.

## 2021-09-04 NOTE — DIETITIAN INITIAL EVALUATION ADULT. - ORAL INTAKE PTA/DIET HISTORY
Pt reports decreased appetite and intake for about 2-3 weeks PTA. States she was consuming less than half of her meals and was taking Ensure shakes x 2 daily since she was eating poorly. Reports she was consuming some salads, some eggs. Reports NKFA. States no micronutrient coverage at home. Pt reports decreased appetite and intake for about 2-3 weeks PTA. States she was consuming less than half of her meals and was taking Ensure shakes x 2 daily since she was eating poorly. Reports she was consuming some salads, some eggs. Confirms allergy to honeydew melon. States no micronutrient coverage at home.

## 2021-09-04 NOTE — DIETITIAN INITIAL EVALUATION ADULT. - PERTINENT LABORATORY DATA
09-04 @ 06:57: Na 138, BUN 12, Cr 0.67, <H>, K+ 3.9, Phos --, Mg 2.1, Alk Phos --, ALT/SGPT --, AST/SGOT --, HbA1c --  09-03 @ 15:41: Na 139, BUN 17, Cr 0.75, <H>, K+ 3.3<L>, Phos --, Mg 2.0, Alk Phos --, ALT/SGPT --, AST/SGOT --, HbA1c --

## 2021-09-04 NOTE — DIETITIAN INITIAL EVALUATION ADULT. - FACTORS AFF FOOD INTAKE
pt reports in house she is eating a little better but still does not have much of an appetite; consumed more than 50% of breakfast per PCA; pt seen while trying to eat lunch; denies any chewing/swallowing issues; noted pt c 3 BMs yesterday and a BM earlier today, small in volume per PCA and pt reports she feels she is somewhat constipated, declined any prunes or prune juice at this time

## 2021-09-04 NOTE — PROGRESS NOTE ADULT - PROBLEM SELECTOR PLAN 1
septic with fever, tachycardia, leukopenia with +UA; CXR, CT A/P without other source of infection noted. Pt with h/o UTI, including ESBL E coli in 2019  - severe sepsis with lactate 4.8 on admission and SHAGUFTA and hypotension   - improved after IVF - lactate now normalized, BP soft but improved  - c/w gentle IVF overnight   - resume home midodrine dose  - S/P vancoa dn merre. D/C vanc and cont merremf ornow  - f/u blood and urine cultures, tailor abx to course  - ID eval appreciated

## 2021-09-05 LAB
-  AMIKACIN: SIGNIFICANT CHANGE UP
-  AMOXICILLIN/CLAVULANIC ACID: SIGNIFICANT CHANGE UP
-  AMPICILLIN/SULBACTAM: SIGNIFICANT CHANGE UP
-  AMPICILLIN: SIGNIFICANT CHANGE UP
-  AZTREONAM: SIGNIFICANT CHANGE UP
-  CEFAZOLIN: SIGNIFICANT CHANGE UP
-  CEFEPIME: SIGNIFICANT CHANGE UP
-  CEFOXITIN: SIGNIFICANT CHANGE UP
-  CEFTRIAXONE: SIGNIFICANT CHANGE UP
-  CIPROFLOXACIN: SIGNIFICANT CHANGE UP
-  ERTAPENEM: SIGNIFICANT CHANGE UP
-  GENTAMICIN: SIGNIFICANT CHANGE UP
-  IMIPENEM: SIGNIFICANT CHANGE UP
-  LEVOFLOXACIN: SIGNIFICANT CHANGE UP
-  MEROPENEM: SIGNIFICANT CHANGE UP
-  NITROFURANTOIN: SIGNIFICANT CHANGE UP
-  PIPERACILLIN/TAZOBACTAM: SIGNIFICANT CHANGE UP
-  TIGECYCLINE: SIGNIFICANT CHANGE UP
-  TOBRAMYCIN: SIGNIFICANT CHANGE UP
-  TRIMETHOPRIM/SULFAMETHOXAZOLE: SIGNIFICANT CHANGE UP
ANION GAP SERPL CALC-SCNC: 10 MMOL/L — SIGNIFICANT CHANGE UP (ref 5–17)
BUN SERPL-MCNC: 17 MG/DL — SIGNIFICANT CHANGE UP (ref 7–23)
CALCIUM SERPL-MCNC: 8.3 MG/DL — LOW (ref 8.4–10.5)
CHLORIDE SERPL-SCNC: 104 MMOL/L — SIGNIFICANT CHANGE UP (ref 96–108)
CO2 SERPL-SCNC: 26 MMOL/L — SIGNIFICANT CHANGE UP (ref 22–31)
CREAT SERPL-MCNC: 0.63 MG/DL — SIGNIFICANT CHANGE UP (ref 0.5–1.3)
CULTURE RESULTS: SIGNIFICANT CHANGE UP
GLUCOSE SERPL-MCNC: 114 MG/DL — HIGH (ref 70–99)
HCT VFR BLD CALC: 23.9 % — LOW (ref 34.5–45)
HGB BLD-MCNC: 7.5 G/DL — LOW (ref 11.5–15.5)
MCHC RBC-ENTMCNC: 31.4 GM/DL — LOW (ref 32–36)
MCHC RBC-ENTMCNC: 33.2 PG — SIGNIFICANT CHANGE UP (ref 27–34)
MCV RBC AUTO: 105.8 FL — HIGH (ref 80–100)
METHOD TYPE: SIGNIFICANT CHANGE UP
NRBC # BLD: 0 /100 WBCS — SIGNIFICANT CHANGE UP (ref 0–0)
ORGANISM # SPEC MICROSCOPIC CNT: SIGNIFICANT CHANGE UP
ORGANISM # SPEC MICROSCOPIC CNT: SIGNIFICANT CHANGE UP
PLATELET # BLD AUTO: 64 K/UL — LOW (ref 150–400)
POTASSIUM SERPL-MCNC: 4.1 MMOL/L — SIGNIFICANT CHANGE UP (ref 3.5–5.3)
POTASSIUM SERPL-SCNC: 4.1 MMOL/L — SIGNIFICANT CHANGE UP (ref 3.5–5.3)
RBC # BLD: 2.26 M/UL — LOW (ref 3.8–5.2)
RBC # FLD: 15.3 % — HIGH (ref 10.3–14.5)
SODIUM SERPL-SCNC: 140 MMOL/L — SIGNIFICANT CHANGE UP (ref 135–145)
SPECIMEN SOURCE: SIGNIFICANT CHANGE UP
WBC # BLD: 4.24 K/UL — SIGNIFICANT CHANGE UP (ref 3.8–10.5)
WBC # FLD AUTO: 4.24 K/UL — SIGNIFICANT CHANGE UP (ref 3.8–10.5)

## 2021-09-05 PROCEDURE — 99232 SBSQ HOSP IP/OBS MODERATE 35: CPT

## 2021-09-05 RX ORDER — ERTAPENEM SODIUM 1 G/1
1000 INJECTION, POWDER, LYOPHILIZED, FOR SOLUTION INTRAMUSCULAR; INTRAVENOUS EVERY 24 HOURS
Refills: 0 | Status: COMPLETED | OUTPATIENT
Start: 2021-09-05 | End: 2021-09-08

## 2021-09-05 RX ADMIN — Medication 100 MICROGRAM(S): at 05:29

## 2021-09-05 RX ADMIN — MEROPENEM 100 MILLIGRAM(S): 1 INJECTION INTRAVENOUS at 13:56

## 2021-09-05 RX ADMIN — PANTOPRAZOLE SODIUM 40 MILLIGRAM(S): 20 TABLET, DELAYED RELEASE ORAL at 05:26

## 2021-09-05 RX ADMIN — LEVETIRACETAM 500 MILLIGRAM(S): 250 TABLET, FILM COATED ORAL at 17:17

## 2021-09-05 RX ADMIN — ERTAPENEM SODIUM 120 MILLIGRAM(S): 1 INJECTION, POWDER, LYOPHILIZED, FOR SOLUTION INTRAMUSCULAR; INTRAVENOUS at 21:36

## 2021-09-05 RX ADMIN — BUPROPION HYDROCHLORIDE 150 MILLIGRAM(S): 150 TABLET, EXTENDED RELEASE ORAL at 12:05

## 2021-09-05 RX ADMIN — LEVETIRACETAM 500 MILLIGRAM(S): 250 TABLET, FILM COATED ORAL at 05:26

## 2021-09-05 RX ADMIN — ZIPRASIDONE HYDROCHLORIDE 40 MILLIGRAM(S): 20 CAPSULE ORAL at 21:09

## 2021-09-05 RX ADMIN — MIDODRINE HYDROCHLORIDE 10 MILLIGRAM(S): 2.5 TABLET ORAL at 12:05

## 2021-09-05 RX ADMIN — Medication 300 MICROGRAM(S): at 12:06

## 2021-09-05 RX ADMIN — ATORVASTATIN CALCIUM 40 MILLIGRAM(S): 80 TABLET, FILM COATED ORAL at 21:09

## 2021-09-05 RX ADMIN — MIDODRINE HYDROCHLORIDE 10 MILLIGRAM(S): 2.5 TABLET ORAL at 17:17

## 2021-09-05 RX ADMIN — MIDODRINE HYDROCHLORIDE 10 MILLIGRAM(S): 2.5 TABLET ORAL at 05:26

## 2021-09-05 RX ADMIN — MEROPENEM 100 MILLIGRAM(S): 1 INJECTION INTRAVENOUS at 05:26

## 2021-09-05 NOTE — PROGRESS NOTE ADULT - ASSESSMENT
70 year old female with history of late relapse of DLBCL on chemo, bipolar depression, HLD p/w weakness and poor PO intake.     Overall severe sepsis, fever, tachycardia, hypotension, neutropenia, pancytopenia,   Abnormal U/A. Lactic acidosis. Allergy to PCN. Concern for UTI.     Plan:   Will switch back to Ertapenem (ESBL e coli on UCx) and no longer neutropenic  trend CBC for pancytopenia  follow up blood cx   no obvious source on CT chest/abd/pelvis.   Hematology following     Chalino Mancia M.D.  [facility] Division of Infectious Disease  8AM-5PM: Pager Number [phone]  After Hours (or if no response): Please contact the Infectious Diseases Office at [phone]

## 2021-09-05 NOTE — PROGRESS NOTE ADULT - PROBLEM SELECTOR PLAN 1
septic with fever, tachycardia, leukopenia with +UA; CXR, CT A/P without other source of infection noted. Pt with h/o UTI, including ESBL E coli in 2019  - severe sepsis with lactate 4.8 on admission and SHAGUFTA and hypotension   - improved after IVF - lactate now normalized, BP soft but improved  - c/w gentle IVF overnight   - resume home midodrine dose  - S/P vancoa dn merre. D/C vanc and cont merrem for now till sensitivity results   - f/u blood and urine cultures,  - ID eval appreciated

## 2021-09-05 NOTE — PROGRESS NOTE ADULT - ASSESSMENT
Hematology/Oncology consulted on this 70 year old female with history of late relapse of tDLBCL stage III. Initially diagnosed in 2006 stage 4 low burden, followed by with mild progression until transformation/POD tDLBCL in 2015 . She was treated with G-CHOP+venetoclax x 6 on CR. Incidentally identified POD in april 2021. BMB shows known DNM T3A mutation. She presents today with hypotension, tachycardia, vomiting and poor PO intake.     DLBCL  --undergoing care at [facility] Dr. Eduardo Jauregui  --Currently on tafasitamib (LD 8/23/2021) and PO lenalidomide 25 mg PO daily 21/28 days (LD 8/28)  --Recent PET/CT shows response to current chemo regimen  --Chemo on hold during hospitalization  --Treatment will resume after discharge      Sepsis  --UA consistent with UTI - UCx showed Ecoli, BCx NGTD  --On mereopenem  --Afebrile  -- ID following   Leukopenia  -- improving today   -- Dc filgastrim tomorrow if anc >1500  -- obtain cbc with diff daily       Anemia  --Secondary to chemotherapy  --Will monitor - if worsens will check for treatable causes  --Please transfuse PRBCs if Hgb <7.0 grams  --stable    Thrombocytopenia  --Secondary to chemotherapy, infection, abx  --stable  --Please transfuse platelets if <10K or <50K with bleeding or prior to any procedure or <100K prior to any high risk procedures    GOC  --Patient is currently DNR  --GOC has been discussed with patient and her son, Liang by Dr. Jauregui  --Patient may benefit from palliative care consult while in hospital    Papo Espinoza MD  Hematology Oncology   O: [phone]  C: [phone]

## 2021-09-06 LAB
ANION GAP SERPL CALC-SCNC: 9 MMOL/L — SIGNIFICANT CHANGE UP (ref 5–17)
BUN SERPL-MCNC: 16 MG/DL — SIGNIFICANT CHANGE UP (ref 7–23)
CALCIUM SERPL-MCNC: 8.4 MG/DL — SIGNIFICANT CHANGE UP (ref 8.4–10.5)
CHLORIDE SERPL-SCNC: 101 MMOL/L — SIGNIFICANT CHANGE UP (ref 96–108)
CO2 SERPL-SCNC: 27 MMOL/L — SIGNIFICANT CHANGE UP (ref 22–31)
CREAT SERPL-MCNC: 0.53 MG/DL — SIGNIFICANT CHANGE UP (ref 0.5–1.3)
GLUCOSE SERPL-MCNC: 104 MG/DL — HIGH (ref 70–99)
HCT VFR BLD CALC: 23.6 % — LOW (ref 34.5–45)
HGB BLD-MCNC: 7.5 G/DL — LOW (ref 11.5–15.5)
MAGNESIUM SERPL-MCNC: 1.7 MG/DL — SIGNIFICANT CHANGE UP (ref 1.6–2.6)
MCHC RBC-ENTMCNC: 31.8 GM/DL — LOW (ref 32–36)
MCHC RBC-ENTMCNC: 33.5 PG — SIGNIFICANT CHANGE UP (ref 27–34)
MCV RBC AUTO: 105.4 FL — HIGH (ref 80–100)
NRBC # BLD: 0 /100 WBCS — SIGNIFICANT CHANGE UP (ref 0–0)
PLATELET # BLD AUTO: 69 K/UL — LOW (ref 150–400)
POTASSIUM SERPL-MCNC: 4 MMOL/L — SIGNIFICANT CHANGE UP (ref 3.5–5.3)
POTASSIUM SERPL-SCNC: 4 MMOL/L — SIGNIFICANT CHANGE UP (ref 3.5–5.3)
RBC # BLD: 2.24 M/UL — LOW (ref 3.8–5.2)
RBC # FLD: 15.1 % — HIGH (ref 10.3–14.5)
SODIUM SERPL-SCNC: 137 MMOL/L — SIGNIFICANT CHANGE UP (ref 135–145)
WBC # BLD: 5.46 K/UL — SIGNIFICANT CHANGE UP (ref 3.8–10.5)
WBC # FLD AUTO: 5.46 K/UL — SIGNIFICANT CHANGE UP (ref 3.8–10.5)

## 2021-09-06 RX ORDER — MAGNESIUM SULFATE 500 MG/ML
2 VIAL (ML) INJECTION ONCE
Refills: 0 | Status: COMPLETED | OUTPATIENT
Start: 2021-09-06 | End: 2021-09-06

## 2021-09-06 RX ADMIN — LEVETIRACETAM 500 MILLIGRAM(S): 250 TABLET, FILM COATED ORAL at 05:28

## 2021-09-06 RX ADMIN — ERTAPENEM SODIUM 120 MILLIGRAM(S): 1 INJECTION, POWDER, LYOPHILIZED, FOR SOLUTION INTRAMUSCULAR; INTRAVENOUS at 21:15

## 2021-09-06 RX ADMIN — LEVETIRACETAM 500 MILLIGRAM(S): 250 TABLET, FILM COATED ORAL at 17:50

## 2021-09-06 RX ADMIN — PANTOPRAZOLE SODIUM 40 MILLIGRAM(S): 20 TABLET, DELAYED RELEASE ORAL at 05:27

## 2021-09-06 RX ADMIN — MIDODRINE HYDROCHLORIDE 10 MILLIGRAM(S): 2.5 TABLET ORAL at 17:50

## 2021-09-06 RX ADMIN — ATORVASTATIN CALCIUM 40 MILLIGRAM(S): 80 TABLET, FILM COATED ORAL at 21:16

## 2021-09-06 RX ADMIN — BUPROPION HYDROCHLORIDE 150 MILLIGRAM(S): 150 TABLET, EXTENDED RELEASE ORAL at 12:04

## 2021-09-06 RX ADMIN — Medication 100 MICROGRAM(S): at 05:28

## 2021-09-06 RX ADMIN — MIDODRINE HYDROCHLORIDE 10 MILLIGRAM(S): 2.5 TABLET ORAL at 12:04

## 2021-09-06 RX ADMIN — Medication 50 GRAM(S): at 12:04

## 2021-09-06 RX ADMIN — ZIPRASIDONE HYDROCHLORIDE 40 MILLIGRAM(S): 20 CAPSULE ORAL at 21:15

## 2021-09-06 RX ADMIN — MIDODRINE HYDROCHLORIDE 10 MILLIGRAM(S): 2.5 TABLET ORAL at 05:27

## 2021-09-06 NOTE — PROGRESS NOTE ADULT - PROBLEM SELECTOR PLAN 1
septic with fever, tachycardia, leukopenia with +UA; CXR, CT A/P without other source of infection noted. Pt with h/o UTI, including ESBL E coli in 2019  - severe sepsis with lactate 4.8 on admission and SHAGUFTA and hypotension   - improved after IVF - lactate now normalized, BP soft but improved  - c/w gentle IVF overnight   - resume home midodrine dose  - S/P vancoa dn merre. switched to ertapenem ESBL on culture   - f/u blood and urine cultures,  - ID eval appreciated

## 2021-09-06 NOTE — PROGRESS NOTE ADULT - ASSESSMENT
Hematology/Oncology consulted on this 70 year old female with history of late relapse of tDLBCL stage III. Initially diagnosed in 2006 stage 4 low burden, followed by with mild progression until transformation/POD tDLBCL in 2015 . She was treated with G-CHOP+venetoclax x 6 on CR. Incidentally identified POD in april 2021. BMB shows known DNM T3A mutation. She presents today with hypotension, tachycardia, vomiting and poor PO intake.     DLBCL  --undergoing care at [facility] [place]. Eduardo Jauregui  --Currently on tafasitamib (LD 8/23/2021) and PO lenalidomide 25 mg PO daily 21/28 days (LD 8/28)  --Recent PET/CT shows response to current chemo regimen  --Chemo on hold during hospitalization  --Treatment will resume after discharge      Sepsis  --UA consistent with UTI - UCx showed Ecoli, BCx NGTD  --switched to ertapenem   --Afebrile  -- ID following     Leukopenia  -- improved   -- Dc filgastrim  -- obtain cbc with diff daily please       Anemia  --Secondary to chemotherapy  --Will monitor - if worsens will check for treatable causes  --Please transfuse PRBCs if Hgb <7.0 grams  --stable    Thrombocytopenia  --Secondary to chemotherapy, infection, abx  --stable  --Please transfuse platelets if <10K or <50K with bleeding or prior to any procedure or <100K prior to any high risk procedures    GOC  --Patient is currently DNR  --GOC has been discussed with patient and her son, Liang by Dr. Jauregui  --Patient may benefit from palliative care consult while in hospital    Papo Espinoza MD  Hematology Oncology   O: [phone]  C: [phone]

## 2021-09-07 DIAGNOSIS — R09.02 HYPOXEMIA: ICD-10-CM

## 2021-09-07 DIAGNOSIS — C83.30 DIFFUSE LARGE B-CELL LYMPHOMA, UNSPECIFIED SITE: ICD-10-CM

## 2021-09-07 DIAGNOSIS — N39.0 URINARY TRACT INFECTION, SITE NOT SPECIFIED: ICD-10-CM

## 2021-09-07 LAB
CULTURE RESULTS: SIGNIFICANT CHANGE UP
CULTURE RESULTS: SIGNIFICANT CHANGE UP
HCT VFR BLD CALC: 23.4 % — LOW (ref 34.5–45)
HGB BLD-MCNC: 7.5 G/DL — LOW (ref 11.5–15.5)
MCHC RBC-ENTMCNC: 32.1 GM/DL — SIGNIFICANT CHANGE UP (ref 32–36)
MCHC RBC-ENTMCNC: 33.6 PG — SIGNIFICANT CHANGE UP (ref 27–34)
MCV RBC AUTO: 104.9 FL — HIGH (ref 80–100)
NRBC # BLD: 0 /100 WBCS — SIGNIFICANT CHANGE UP (ref 0–0)
PLATELET # BLD AUTO: 88 K/UL — LOW (ref 150–400)
RBC # BLD: 2.23 M/UL — LOW (ref 3.8–5.2)
RBC # FLD: 15 % — HIGH (ref 10.3–14.5)
SPECIMEN SOURCE: SIGNIFICANT CHANGE UP
SPECIMEN SOURCE: SIGNIFICANT CHANGE UP
WBC # BLD: 6.99 K/UL — SIGNIFICANT CHANGE UP (ref 3.8–10.5)
WBC # FLD AUTO: 6.99 K/UL — SIGNIFICANT CHANGE UP (ref 3.8–10.5)

## 2021-09-07 PROCEDURE — 99232 SBSQ HOSP IP/OBS MODERATE 35: CPT

## 2021-09-07 RX ADMIN — ZIPRASIDONE HYDROCHLORIDE 40 MILLIGRAM(S): 20 CAPSULE ORAL at 21:58

## 2021-09-07 RX ADMIN — MIDODRINE HYDROCHLORIDE 10 MILLIGRAM(S): 2.5 TABLET ORAL at 11:04

## 2021-09-07 RX ADMIN — LEVETIRACETAM 500 MILLIGRAM(S): 250 TABLET, FILM COATED ORAL at 17:04

## 2021-09-07 RX ADMIN — MIDODRINE HYDROCHLORIDE 10 MILLIGRAM(S): 2.5 TABLET ORAL at 17:04

## 2021-09-07 RX ADMIN — LEVETIRACETAM 500 MILLIGRAM(S): 250 TABLET, FILM COATED ORAL at 05:01

## 2021-09-07 RX ADMIN — ERTAPENEM SODIUM 120 MILLIGRAM(S): 1 INJECTION, POWDER, LYOPHILIZED, FOR SOLUTION INTRAMUSCULAR; INTRAVENOUS at 21:36

## 2021-09-07 RX ADMIN — Medication 100 MICROGRAM(S): at 05:02

## 2021-09-07 RX ADMIN — Medication 650 MILLIGRAM(S): at 09:53

## 2021-09-07 RX ADMIN — PANTOPRAZOLE SODIUM 40 MILLIGRAM(S): 20 TABLET, DELAYED RELEASE ORAL at 05:02

## 2021-09-07 RX ADMIN — Medication 650 MILLIGRAM(S): at 08:53

## 2021-09-07 RX ADMIN — BUPROPION HYDROCHLORIDE 150 MILLIGRAM(S): 150 TABLET, EXTENDED RELEASE ORAL at 11:04

## 2021-09-07 RX ADMIN — MIDODRINE HYDROCHLORIDE 10 MILLIGRAM(S): 2.5 TABLET ORAL at 05:01

## 2021-09-07 RX ADMIN — ATORVASTATIN CALCIUM 40 MILLIGRAM(S): 80 TABLET, FILM COATED ORAL at 21:36

## 2021-09-07 NOTE — CONSULT NOTE ADULT - PROBLEM SELECTOR RECOMMENDATION 9
-Pt noted to be on 1-2LNC throughout stay. Placed on RA - sats maintained >90%.  -CTA chest neg PE, grossly clear lung parenchyma  -No c/o dyspnea   -Incentive spirometry  -Keep sats >90% with supplemental O2 PRN

## 2021-09-07 NOTE — PROGRESS NOTE ADULT - ASSESSMENT
Hematology/Oncology consulted on this 70 year old female with history of late relapse of tDLBCL stage III. Initially diagnosed in 2006 stage 4 low burden, followed by with mild progression until transformation/POD tDLBCL in 2015 . She was treated with G-CHOP+venetoclax x 6 on CR. Incidentally identified POD in april 2021. BMB shows known DNM T3A mutation. She presents today with hypotension, tachycardia, vomiting and poor PO intake.     DLBCL  --[place] [facility] [place] Eduardo Jauregui  --Currently on tafasitamib (LD 8/23/2021) and PO lenalidomide 25 mg PO daily 21/28 days (LD 8/28)  --Recent PET/CT shows response to current chemo regimen  --Chemo on hold during hospitalization  --Treatment will resume after discharge      Sepsis  --UA consistent with UTI - UCx showed Ecoli, BCx NGTD  --c/w ertapenem  --remains afebrile  -- ID following     Leukopenia  -- improved   -- Dc filgastrim  -- obtain cbc with diff daily please       Anemia  --Secondary to chemotherapy  --Will monitor - if worsens will check for treatable causes  --Please transfuse PRBCs if Hgb <7.0 grams  --stable    Thrombocytopenia  --Secondary to chemotherapy, infection, abx  --improvement from 69k yesterday to 88k today  --Please transfuse platelets if <10K or <50K with bleeding or prior to any procedure or <100K prior to any high risk procedures    GOC  --Patient is currently DNR  --GOC has been discussed with patient and her sonLiang by Dr. Jauregui  --Patient may benefit from palliative care consult while in hospital    Viri Means LOGAN  Hematology/Oncology  [facility]  [phone] (Cell)  [phone] (Office)  [phone] (Alt office)  Evenings and weekends please call MD on call or office

## 2021-09-07 NOTE — CONSULT NOTE ADULT - SUBJECTIVE AND OBJECTIVE BOX
PULMONARY CONSULT    HPI: 71 y/o F with PMH of late relapse of DLBCL on chemo (last chemo 8/26/21), bipolar depression, HLD p/w weakness and poor PO intake. Found to have UTI, UC +ESBL E.Coli. Called to consult for O2 use - pt noted to be on 1-2LNC throughout day. CTA chest with grossly clear lung parenchyma, neg PE. Denies cough, SOB, CP, pleuritic CP.    PAST MEDICAL & SURGICAL HISTORY:  Osteoarthritis of right knee  Depression  OCD (obsessive compulsive disorder)  Bipolar depression  Hypercholesteremia  [place] [facility] [place]  Osteoarthritis of left knee  S/P knee surgery 1978  H/O oral surgery 2014  S/P cataract surgery  S/P TKR (total knee replacement), bilateral discharged nov 5th, 2014    Allergies  honeydew melon (Other)  penicillin (Other; Hives)    FAMILY HISTORY:  Family history of COPD (chronic obstructive pulmonary disease)  Family history of breast cancer  Family history of stomach cancer (Grandparent)  Family history of prostate cancer (Grandparent)  Family history of diabetes mellitus    Social history: never a smoker     Review of Systems:  CONSTITUTIONAL: Per above   EYES: No eye pain, visual disturbances, or discharge  ENMT:  No difficulty hearing, tinnitus, vertigo; No sinus or throat pain  NECK: No pain or stiffness  RESPIRATORY: Per above  CARDIOVASCULAR: No chest pain, palpitations, dizziness, or leg swelling  GASTROINTESTINAL: No abdominal or epigastric pain. No nausea, vomiting, or hematemesis; No diarrhea or constipation. No melena or hematochezia.  GENITOURINARY: No dysuria, frequency, hematuria, or incontinence  NEUROLOGICAL: No headaches, memory loss, loss of strength, numbness, or tremors  SKIN: No itching, burning, rashes, or lesions   MUSCULOSKELETAL: No joint pain or swelling; No muscle, back, or extremity pain  PSYCHIATRIC: No depression, anxiety, mood swings, or difficulty sleeping      Medications:  MEDICATIONS  (STANDING):  atorvastatin 40 milliGRAM(s) Oral at bedtime  buPROPion XL (24-Hour) . 150 milliGRAM(s) Oral daily  ertapenem  IVPB 1000 milliGRAM(s) IV Intermittent every 24 hours  influenza   Vaccine 0.5 milliLiter(s) IntraMuscular once  lactated ringers. 500 milliLiter(s) (60 mL/Hr) IV Continuous <Continuous>  levETIRAcetam 500 milliGRAM(s) Oral two times a day  levothyroxine 100 MICROGram(s) Oral daily  midodrine. 10 milliGRAM(s) Oral three times a day  pantoprazole    Tablet 40 milliGRAM(s) Oral before breakfast  ziprasidone 40 milliGRAM(s) Oral <User Schedule>    MEDICATIONS  (PRN):  acetaminophen   Tablet .. 650 milliGRAM(s) Oral every 6 hours PRN Temp greater or equal to 38.5C (101.3F), Mild Pain (1 - 3)            Vital Signs Last 24 Hrs  T(C): 36.8 (07 Sep 2021 11:02), Max: 98.3 (07 Sep 2021 08:45)  T(F): 98.2 (07 Sep 2021 11:02), Max: 208.9 (07 Sep 2021 08:45)  HR: 72 (07 Sep 2021 11:02) (72 - 86)  BP: 93/62 (07 Sep 2021 11:02) (93/62 - 118/56)  BP(mean): --  RR: 18 (07 Sep 2021 11:02) (18 - 18)  SpO2: 98% (07 Sep 2021 11:02) (98% - 100%)            09-06 @ 07:01  -  09-07 @ 07:00  --------------------------------------------------------  IN: 510 mL / OUT: 650 mL / NET: -140 mL          LABS:                        7.5    6.99  )-----------( 88       ( 07 Sep 2021 06:37 )             23.4     09-06    137  |  101  |  16  ----------------------------<  104<H>  4.0   |  27  |  0.53    Ca    8.4      06 Sep 2021 07:14  Mg     1.7     09-06              CULTURES:      (collected 09-02-21 @ 21:00)  Source: .Blood Blood-Peripheral  Preliminary Report (09-03-21 @ 21:01):    No growth to date.     (collected 09-02-21 @ 21:00)  Source: .Blood Blood-Peripheral  Preliminary Report (09-03-21 @ 21:01):    No growth to date.        Culture - Urine (collected 09-02-21 @ 21:31)  Source: Clean Catch Clean Catch (Midstream)  Final Report (09-05-21 @ 11:49):    >100,000 CFU/ml Escherichia coli ESBL  Organism: Escherichia coli ESBL (09-05-21 @ 11:49)  Organism: Escherichia coli ESBL (09-05-21 @ 11:49)      -  Amikacin: S <=16      -  Amoxicillin/Clavulanic Acid: S <=8/4      -  Ampicillin: R >16 These ampicillin results predict results for amoxicillin      -  Ampicillin/Sulbactam: S 8/4 Enterobacter, Citrobacter, and Serratia may develop resistance during prolonged therapy (3-4 days)      -  Aztreonam: R >16      -  Cefazolin: R >16 (MIC_CL_COM_ENTERIC_[id number]) For uncomplicated UTI with K. pneumoniae, E. coli, or P. mirablis: LUCY <=16 is sensitive and LUCY >=32 is resistant. This also predicts results for oral agents cefaclor, cefdinir, cefpodoxime, cefprozil, cefuroxime axetil, cephalexin and locarbef for uncomplicated UTI. Note that some isolates may be susceptible to these agents while testing resistant to cefazolin.      -  Cefepime: R >16      -  Cefoxitin: S <=8      -  Ceftriaxone: R >32 Enterobacter, Citrobacter, and Serratia may develop resistance during prolonged therapy      -  Ciprofloxacin: R >2      -  Ertapenem: S <=0.5      -  Gentamicin: S <=2      -  Imipenem: S <=1      -  Levofloxacin: R >4      -  Meropenem: S <=1      -  Nitrofurantoin: S <=32 Should not be used to treat pyelonephritis      -  Piperacillin/Tazobactam: S <=8      -  Tigecycline: S <=2      -  Tobramycin: S <=2      -  Trimethoprim/Sulfamethoxazole: R >2/38      Method Type: LUCY          Physical Examination:    General: No acute distress.      HEENT: Pupils equal, reactive to light.  Symmetric.    PULM: Clear to auscultation bilaterally, no significant sputum production    CVS: S1, S2    ABD: Soft, nondistended, nontender, normoactive bowel sounds, no masses    EXT: No edema, nontender    SKIN: Warm and well perfused, no rashes noted.    NEURO: Alert, oriented, interactive, nonfocal      RADIOLOGY REVIEWED  CT chest: < from: CT Angio Chest PE Protocol w/ IV Cont (09.02.21 @ 18:47) >    FINDINGS:  CHEST:  LUNGS AND LARGE AIRWAYS: Patent central airways. No pulmonary nodules or parenchymal consolidation.  PLEURA: No pleural effusion.  VESSELS: No pulmonary embolism.  HEART: Heart size is normal. No pericardial effusion.  MEDIASTINUM AND RAFAEL: No lymphadenopathy.  CHEST WALL AND LOWER NECK: Within normal limits.    ABDOMEN AND PELVIS:  LIVER: Within normal limits.  BILE DUCTS: Normal caliber.  GALLBLADDER: Within normal limits.  SPLEEN: There are two hypodense splenic lesions. One of which is peripherally located and well circumscribed measuring 1.8 x 2.0 cm. The other lesion is ill-defined and measures 1.5 x 1.8 cm. Both are new from 2019.  PANCREAS: Within normal limits.  ADRENALS: Within normal limits.  KIDNEYS/URETERS: Evaluation of distal ureters is limited by streak artifact. Visualized portions are within normal limits.    BLADDER: Evaluation limited by streak artifact. Visualized portions within normal limits.  REPRODUCTIVE ORGANS: There is limited by streak artifact.    BOWEL: No bowel obstruction. Appendix is normal.  PERITONEUM: No ascites.  VESSELS: Atherosclerotic changes.  RETROPERITONEUM/LYMPH NODES: No lymphadenopathy.  ABDOMINAL WALL: Tiny fat-containing umbilical hernia.  BONES: Status post right hip arthroplasty. Degenerative changes.    IMPRESSION:    Two hypodense splenic lesions asnoted above, which are new from 2019, most likely related to patient's known diffuse large B-cell lymphoma.    No pulmonary embolism.    No acute septic pathology or fluid collection.    < end of copied text >      TTE:  
Patient is a 70y old  Female who presents with a chief complaint of weakness (03 Sep 2021 10:50)      HPI:  70 year old female with history of late relapse of DLBCL on chemo, bipolar depression, HLD p/w weakness and poor PO intake. Per pt, she has been feeling very tired and fatigued for the past 1-2 weeks - has gotten so bad she is unable to even walk or stand up at this point (normally walks with rollator). She endorses associated poor PO intake and a few episodes of NBNB emesis in the 2-3 days prior to admission; endorses dysuria for the past 4 days without associated hematuria, polyuria or abdominal pain, fevers or chills  or diarrhea while at home. Pt endorses history fo freq UTIs in the past. Denies any associated lightheadedness or dizziness, no cough or SOB or wheezing; no CP or palpitations. She endorses RLE pain from hip down to her feet, which has been going on for 6 weeks and has also been limiting her mobility - of note pt had R ORIF with complicated post-op course and had ortho f/u at the end of June.  Was in remission for DLBCL, but noted with POD earlier this year and recently restarted on chemo, last dose 8/26/21.   (03 Sep 2021 01:45)  Above reviewed:   Pt with N/V for 2 days prior to admission, dysuria for 4 days. Offers no other complains.       PAST MEDICAL & SURGICAL HISTORY:  Osteoarthritis of right knee    Depression    OCD (obsessive compulsive disorder)    Bipolar depression    Hypercholesteremia    Lymphoma  NHL, treated with chemo @ [facility] [place], in remission since 2016    Osteoarthritis of left knee    S/P knee surgery  1978    H/O oral surgery  2014    S/P cataract surgery    S/P TKR (total knee replacement), bilateral  discharged nov 5th, 2014        REVIEW OF SYSTEMS    General: + Fevers    Skin: No rash  	  Ophthalmologic: Denies any discharge, redness.  	  ENT: No nasal congestion or throat pain.     Respiratory and Thorax: No cough, sputum, Denies shortness of breath.  	  Cardiovascular: No chest pain,     Gastrointestinal: per hpi     Genitourinary: per hpi     Musculoskeletal: No joint swelling     Neurological: No extremity weakness.    Psychiatric: No hallucinations	    Extremities: No extremities    Endocrine: No polyuria or polydipsia     Allergic/Immunologic:	No hives      Social history:  lives alone, no smoker.       FAMILY HISTORY:  Family history of COPD (chronic obstructive pulmonary disease)    Family history of breast cancer    Family history of stomach cancer (Grandparent)    Family history of prostate cancer (Grandparent)    Family history of diabetes mellitus        Allergies  honeydew melon (Other)  penicillin (Other; Hives)      Antimicrobials:  ertapenem  IVPB 1000 milliGRAM(s) IV Intermittent every 24 hours  vancomycin  IVPB 750 milliGRAM(s) IV Intermittent two times a day        Vital Signs Last 24 Hrs  T(C): 36.8 (03 Sep 2021 16:03), Max: 36.8 (02 Sep 2021 22:36)  T(F): 98.2 (03 Sep 2021 16:03), Max: 98.3 (02 Sep 2021 22:36)  HR: 82 (03 Sep 2021 16:03) (73 - 97)  BP: 100/52 (03 Sep 2021 16:03) (88/58 - 112/55)  BP(mean): 71 (02 Sep 2021 20:06) (71 - 71)  RR: 17 (03 Sep 2021 16:03) (17 - 21)  SpO2: 100% (03 Sep 2021 16:03) (96% - 100%)      PHYSICAL EXAM: Patient in no acute distress.    Constitutional: Comfortable. Awake and alert    Eyes: No discharge or conjunctival injection    ENT: No thrush. No pharyngeal exudate    Neck: Supple,     Respiratory: + air entry bilaterally.    Cardiovascular: S1 S2 wnl,     Gastrointestinal: Soft BS(+) no tenderness, non distended.    Genitourinary: No CVA tenderness     Extremities: No edema.    Vascular: peripheral pulses felt    Neurological: No gross focal deficits.    Skin: No rash     Musculoskeletal: No joint swelling.    Psychiatric: Affect normal.                              7.6    8.61  )-----------( 241      ( 03 Sep 2021 15:41 )             24.8       09-03    139  |  100  |  17  ----------------------------<  102<H>  3.3<L>   |  29  |  0.75    Ca    8.1<L>      03 Sep 2021 15:41  Mg     2.0     09-03    TPro  5.7<L>  /  Alb  3.4  /  TBili  1.3<H>  /  DBili  x   /  AST  34  /  ALT  37  /  AlkPhos  141<H>  09-02      Urinalysis (09.02.21 @ 17:07)   pH Urine: 6.0   Glucose Qualitative, Urine: Trace   Blood, Urine: Large   Color: Yellow   Urine Appearance: Slightly Turbid   Bilirubin: Negative   Ketone - Urine: Trace   Specific Gravity: 1.016   Protein, Urine: 100 mg/dL   Urobilinogen: 4 mg/dL   Nitrite: Positive   Leukocyte Esterase Concentration: Moderate   Urine Microscopic-Add On (NC) (09.02.21 @ 17:07)   Epithelial Cells: 3 /hpf   Red Blood Cell - Urine: 5 /hpf   White Blood Cell - Urine: 17 /HPF   Hyaline Casts: 15 /LPF   Bacteria: Many       Radiology: Imaging reviewed and visualized personally [ x]    < from: Xray Chest 1 View- PORTABLE-Urgent (09.02.21 @ 16:31) >  IMPRESSION:  Clear lungs.      < from: CT Head No Cont (09.02.21 @ 18:41) >  IMPRESSION:    No acute intracranial findings.      < from: CT Abdomen and Pelvis w/ IV Cont (09.02.21 @ 18:47) >  IMPRESSION:    Two hypodense splenic lesions asnoted above, which are new from 2019, most likely related to patient's known diffuse large B-cell lymphoma.    No pulmonary embolism.    No acute septic pathology or fluid collection.                  
Reason for consult: DLBCL    HPI: 70 year old female with PMH DLBCL on PO and IV chemo (last IV chemo 8/2621), ulcerative colitis, HLD presents with lethargy x 1 week. Pt reports feeling more tired than usual over the past week with difficulty ambulating secondary to weakness. Pt admits to feeling more tired than usual and occasional episodes of nonbloody nonbilious emesis.     Hematology/Oncology consulted on this 70 year old female with history of late relapse of tDLBCL stage III. Initially diagnosed in 2006 stage 4 low burden, followed by with mild progression until transformation/POD tDLBCL in 2015 . She was treated with G-CHOP+venetoclax x 6 on CR. Incidentally identified POD in april 2021. BMB shows known DNM T3A mutation. She presents today with hypotension, tachycardia, vomiting and poor PO intake.       PAST MEDICAL & SURGICAL HISTORY:  Osteoarthritis of right knee    Depression    OCD (obsessive compulsive disorder)    Bipolar depression    Hypercholesteremia    Lymphoma  NHL, treated with chemo @ [facility] [place], in remission since 2016    Osteoarthritis of left knee    S/P knee surgery  1978    H/O oral surgery  2014    S/P cataract surgery    S/P TKR (total knee replacement), bilateral  discharged nov 5th, 2014        FAMILY HISTORY:  Family history of COPD (chronic obstructive pulmonary disease)    Family history of breast cancer    Family history of stomach cancer (Grandparent)    Family history of prostate cancer (Grandparent)    Family history of diabetes mellitus        Alcohol Denied  Smoking: Nonsmoker  Drug Use: Denied  Marital Status:         Allergies    honeydew melon (Other)  penicillin (Other; Hives)    Intolerances        MEDICATIONS  (STANDING):  vancomycin  IVPB. 1000 milliGRAM(s) IV Intermittent once    MEDICATIONS  (PRN):      ROS  No fever, sweats, chills  No epistaxis, HA, sore throat  No CP, SOB, cough, sputum  No n/v/d, abd pain, melena, hematochezia  No edema  No rash  No anxiety  No back pain, joint pain  No bleeding, bruising  No dysuria, hematuria    T(C): 38.6 (09-02-21 @ 16:20), Max: 38.6 (09-02-21 @ 16:20)  HR: 95 (09-02-21 @ 17:00) (95 - 112)  BP: 100/40 (09-02-21 @ 17:00) (69/55 - 116/46)  RR: 22 (09-02-21 @ 17:00) (20 - 30)  SpO2: 100% (09-02-21 @ 17:00) (98% - 100%)  Wt(kg): --    PE  NAD  Awake, alert  Anicteric, MMM  RRR  CTAB  Abd soft, NT, ND  No c/c/e  No rash grossly  FROM                          9.5    1.40  )-----------( x        ( 02 Sep 2021 16:43 )             29.4

## 2021-09-07 NOTE — CONSULT NOTE ADULT - ASSESSMENT
Hematology/Oncology consulted on this 70 year old female with history of late relapse of tDLBCL stage III. Initially diagnosed in 2006 stage 4 low burden, followed by with mild progression until transformation/POD tDLBCL in 2015 . She was treated with G-CHOP+venetoclax x 6 on CR. Incidentally identified POD in april 2021. BMB shows known DNM T3A mutation. She presents today with hypotension, tachycardia, vomiting and poor PO intake.     [place]  [place] [facility] [place] up with Dr Jauregui    Hypotension/weakness  --IVF Bolus x 1  --CT head pending    Sepsis  --Ertapenem and Vancomycin given in ED  --Afebrile  --Blood cultures pending  --Urine culture pending  --CT Angio/CTAP pending    Case discussed with Dr Goodson    Patient will be seen daily while admitted and we will continue to [place] [facility] [place]    Viri Means NP  Hematology/Oncology  [facility]  [phone] (Cell)  [phone] (Office)  [phone] (Alt office)  Evenings and weekends please call MD on call or office      
70 year old female with history of late relapse of DLBCL on chemo, bipolar depression, HLD p/w weakness and poor PO intake.     Overall severe sepsis, fever, tachycardia, hypotension, neutropenia, pancytopenia,   Abnormal U/A. Lactic acidosis. Allergy to PCN. Concern for UTI.       Plan:   switched ertapenem to meropenem   stopped vancomycin   MRSA PCR   trend CBC for pancytopenia  follow up blood cx   follow up urine cx   no obvious source on CT chest/abd/pelvis.   Hematology following       Plan discussed with Medicine Attending.     Please call ID service for questions over weekend at [phone].     Imelda Kenney  Pager: [phone]. If no response or past 5 pm call [phone].          
69 y/o F with PMH of late relapse of DLBCL on chemo (last chemo 8/26/21), bipolar depression, HLD p/w weakness and poor PO intake. Found to have UTI, UC +ESBL E.Coli. Called to consult for O2 use - pt noted to be on 1-2LNC throughout day. CTA chest with grossly clear lung parenchyma, neg PE.

## 2021-09-07 NOTE — PROGRESS NOTE ADULT - PROBLEM SELECTOR PLAN 1
septic with fever, tachycardia, leukopenia with +UA; CXR, CT A/P without other source of infection noted. Pt with h/o UTI, including ESBL E coli in 2019  - severe sepsis with lactate 4.8 on admission and SHAGUFTA and hypotension   - improved after IVF - lactate now normalized, BP soft but improved  - c/w gentle IVF overnight   - abx as per ID

## 2021-09-07 NOTE — PROGRESS NOTE ADULT - ASSESSMENT
70F with late relapse of DLBCL on chemo, bipolar depression, HLD p/w weakness and poor PO intake.   Course complicated by severe sepsis, fever, tachycardia, hypotension, neutropenia, pancytopenia.  UC with ESBL E coli.  Neutropenia resolved.  PCN allergic.  Tolerating carbapenem    ESBL UTI  - ertapenem (D#6 including meropenem)  - can stop after tomorrow's dose    Neutropenia  - better  - monitor CBC  - f/u hematology   70F with late relapse of DLBCL on chemo, bipolar depression, HLD p/w weakness and poor PO intake.   Course complicated by severe sepsis, fever, tachycardia, hypotension, neutropenia, pancytopenia.  UC with ESBL E coli.  Neutropenia resolved.  PCN allergic.  Tolerating carbapenem    ESBL UTI  - ertapenem (D#6 including meropenem)  - can stop after tomorrow's dose    Neutropenia  - better  - monitor CBC  - f/u hematology    Please call Infectious Diseases if there is a change in status.  Thank you.  [phone].

## 2021-09-08 LAB
ANION GAP SERPL CALC-SCNC: 10 MMOL/L — SIGNIFICANT CHANGE UP (ref 5–17)
BASOPHILS # BLD AUTO: 0.19 K/UL — SIGNIFICANT CHANGE UP (ref 0–0.2)
BASOPHILS NFR BLD AUTO: 3.5 % — HIGH (ref 0–2)
BUN SERPL-MCNC: 11 MG/DL — SIGNIFICANT CHANGE UP (ref 7–23)
CALCIUM SERPL-MCNC: 9 MG/DL — SIGNIFICANT CHANGE UP (ref 8.4–10.5)
CHLORIDE SERPL-SCNC: 100 MMOL/L — SIGNIFICANT CHANGE UP (ref 96–108)
CO2 SERPL-SCNC: 25 MMOL/L — SIGNIFICANT CHANGE UP (ref 22–31)
CREAT SERPL-MCNC: 0.52 MG/DL — SIGNIFICANT CHANGE UP (ref 0.5–1.3)
EOSINOPHIL # BLD AUTO: 0.19 K/UL — SIGNIFICANT CHANGE UP (ref 0–0.5)
EOSINOPHIL NFR BLD AUTO: 3.5 % — SIGNIFICANT CHANGE UP (ref 0–6)
GIANT PLATELETS BLD QL SMEAR: PRESENT — SIGNIFICANT CHANGE UP
GLUCOSE SERPL-MCNC: 97 MG/DL — SIGNIFICANT CHANGE UP (ref 70–99)
HCT VFR BLD CALC: 23.6 % — LOW (ref 34.5–45)
HGB BLD-MCNC: 7.6 G/DL — LOW (ref 11.5–15.5)
LYMPHOCYTES # BLD AUTO: 1.59 K/UL — SIGNIFICANT CHANGE UP (ref 1–3.3)
LYMPHOCYTES # BLD AUTO: 28.7 % — SIGNIFICANT CHANGE UP (ref 13–44)
MAGNESIUM SERPL-MCNC: 1.9 MG/DL — SIGNIFICANT CHANGE UP (ref 1.6–2.6)
MANUAL SMEAR VERIFICATION: SIGNIFICANT CHANGE UP
MCHC RBC-ENTMCNC: 32.2 GM/DL — SIGNIFICANT CHANGE UP (ref 32–36)
MCHC RBC-ENTMCNC: 33.9 PG — SIGNIFICANT CHANGE UP (ref 27–34)
MCV RBC AUTO: 105.4 FL — HIGH (ref 80–100)
MONOCYTES # BLD AUTO: 0.19 K/UL — SIGNIFICANT CHANGE UP (ref 0–0.9)
MONOCYTES NFR BLD AUTO: 3.5 % — SIGNIFICANT CHANGE UP (ref 2–14)
NEUTROPHILS # BLD AUTO: 3.27 K/UL — SIGNIFICANT CHANGE UP (ref 1.8–7.4)
NEUTROPHILS NFR BLD AUTO: 59.1 % — SIGNIFICANT CHANGE UP (ref 43–77)
PLAT MORPH BLD: ABNORMAL
PLATELET # BLD AUTO: 111 K/UL — LOW (ref 150–400)
POTASSIUM SERPL-MCNC: 4.3 MMOL/L — SIGNIFICANT CHANGE UP (ref 3.5–5.3)
POTASSIUM SERPL-SCNC: 4.3 MMOL/L — SIGNIFICANT CHANGE UP (ref 3.5–5.3)
RBC # BLD: 2.24 M/UL — LOW (ref 3.8–5.2)
RBC # FLD: 14.9 % — HIGH (ref 10.3–14.5)
RBC BLD AUTO: SIGNIFICANT CHANGE UP
SODIUM SERPL-SCNC: 135 MMOL/L — SIGNIFICANT CHANGE UP (ref 135–145)
VARIANT LYMPHS # BLD: 1.7 % — SIGNIFICANT CHANGE UP (ref 0–6)
WBC # BLD: 5.54 K/UL — SIGNIFICANT CHANGE UP (ref 3.8–10.5)
WBC # FLD AUTO: 5.54 K/UL — SIGNIFICANT CHANGE UP (ref 3.8–10.5)

## 2021-09-08 RX ADMIN — MIDODRINE HYDROCHLORIDE 10 MILLIGRAM(S): 2.5 TABLET ORAL at 06:03

## 2021-09-08 RX ADMIN — MIDODRINE HYDROCHLORIDE 10 MILLIGRAM(S): 2.5 TABLET ORAL at 18:08

## 2021-09-08 RX ADMIN — PANTOPRAZOLE SODIUM 40 MILLIGRAM(S): 20 TABLET, DELAYED RELEASE ORAL at 06:04

## 2021-09-08 RX ADMIN — MIDODRINE HYDROCHLORIDE 10 MILLIGRAM(S): 2.5 TABLET ORAL at 11:23

## 2021-09-08 RX ADMIN — Medication 100 MICROGRAM(S): at 06:03

## 2021-09-08 RX ADMIN — BUPROPION HYDROCHLORIDE 150 MILLIGRAM(S): 150 TABLET, EXTENDED RELEASE ORAL at 12:08

## 2021-09-08 RX ADMIN — LEVETIRACETAM 500 MILLIGRAM(S): 250 TABLET, FILM COATED ORAL at 18:09

## 2021-09-08 RX ADMIN — LEVETIRACETAM 500 MILLIGRAM(S): 250 TABLET, FILM COATED ORAL at 06:03

## 2021-09-08 RX ADMIN — ATORVASTATIN CALCIUM 40 MILLIGRAM(S): 80 TABLET, FILM COATED ORAL at 21:20

## 2021-09-08 RX ADMIN — ZIPRASIDONE HYDROCHLORIDE 40 MILLIGRAM(S): 20 CAPSULE ORAL at 21:20

## 2021-09-08 RX ADMIN — ERTAPENEM SODIUM 120 MILLIGRAM(S): 1 INJECTION, POWDER, LYOPHILIZED, FOR SOLUTION INTRAMUSCULAR; INTRAVENOUS at 21:19

## 2021-09-08 NOTE — PROGRESS NOTE ADULT - ASSESSMENT
Hematology/Oncology consulted on this 70 year old female with history of late relapse of tDLBCL stage III. Initially diagnosed in 2006 stage 4 low burden, followed by with mild progression until transformation/POD tDLBCL in 2015 . She was treated with G-CHOP+venetoclax x 6 on CR. Incidentally identified POD in april 2021. BMB shows known DNM T3A mutation. She presents today with hypotension, tachycardia, vomiting and poor PO intake.     DLBCL  --undergoing care at [facility] [place] Dr. Eduardo Jauregui  --Currently on tafasitamib (LD 8/23/2021) and PO lenalidomide 25 mg PO daily 21/28 days (LD 8/28)  --Recent PET/CT shows response to current chemo regimen  --Chemo on hold during hospitalization  --Treatment will resume after discharge      Sepsis  --UA consistent with UTI , neg blood cx  --c/w ertapenem  --remains afebrile  -- ID following     Leukopenia  -- resolved  -- obtain cbc with diff daily please       Anemia  --Secondary to chemotherapy  --Will monitor - if worsens will check for treatable causes  --Please transfuse PRBCs if Hgb <7.0 grams  --stable    Thrombocytopenia  --Secondary to chemotherapy, infection, abx  --improved to 11K  --Please transfuse platelets if <10K or <50K with bleeding or prior to any procedure or <100K prior to any high risk procedures    GOC  --Patient is currently DNR  --GOC has been discussed with patient and her son, Liang by Dr. Jauregui  --Patient may benefit from palliative care consult while in hospital    Viri Means LOGAN  Hematology/Oncology  [facility]  [phone] (Cell)  [phone] (Office)  [phone] (Alt office)  Evenings and weekends please call MD on call or office     Hematology/Oncology consulted on this 70 year old female with history of late relapse of tDLBCL stage III. Initially diagnosed in 2006 stage 4 low burden, followed by with mild progression until transformation/POD tDLBCL in 2015 . She was treated with G-CHOP+venetoclax x 6 on CR. Incidentally identified POD in april 2021. BMB shows known DNM T3A mutation. She presents today with hypotension, tachycardia, vomiting and poor PO intake.     DLBCL  --undergoing care at [facility] [place] Dr. Eduardo Jauregui  --Currently on tafasitamib (LD 8/23/2021) and PO lenalidomide 25 mg PO daily 21/28 days (LD 8/28)  --Recent PET/CT shows response to current chemo regimen  --Chemo on hold during hospitalization  --Treatment will resume after discharge      Sepsis  --UA consistent with UTI , neg blood cx  --Last day of ABX  --remains afebrile  -- ID following     Leukopenia  -- resolved  -- obtain cbc with diff daily please       Anemia  --Secondary to chemotherapy  --Will monitor - if worsens will check for treatable causes  --Please transfuse PRBCs if Hgb <7.0 grams  --stable    Thrombocytopenia  --Secondary to chemotherapy, infection, abx  --improved to 111K  --Please transfuse platelets if <10K or <50K with bleeding or prior to any procedure or <100K prior to any high risk procedures    GOC  --Patient is currently DNR  --GOC has been discussed with patient and her son, Liang by Dr. Jauregui  --Patient may benefit from palliative care consult while in hospital    Viri Means LOGAN  Hematology/Oncology  [facility]  [phone] (Cell)  [phone] (Office)  [phone] (Alt office)  Evenings and weekends please call MD on call or office     Hematology/Oncology consulted on this 70 year old female with history of late relapse of tDLBCL stage III. Initially diagnosed in 2006 stage 4 low burden, followed by with mild progression until transformation/POD tDLBCL in 2015 . She was treated with G-CHOP+venetoclax x 6 on CR. Incidentally identified POD in april 2021. BMB shows known DNM T3A mutation. She presents today with hypotension, tachycardia, vomiting and poor PO intake.     DLBCL  --undergoing care at [facility] [place] Dr. Eduardo Jauregui  --Currently on tafasitamib (LD 8/23/2021) and PO lenalidomide 25 mg PO daily 21/28 days (LD 8/28)  --Recent PET/CT shows response to current chemo regimen  --Chemo on hold during hospitalization  --Treatment will resume after discharge upon f/u with [facility]      Sepsis  --UA consistent with UTI , neg blood cx  --Last day of ABX  --remains afebrile  -- ID following     Leukopenia  -- resolved  -- obtain cbc with diff daily please       Anemia  --Secondary to chemotherapy  --Will monitor - if worsens will check for treatable causes  --Please transfuse PRBCs if Hgb <7.0 grams  --stable    Thrombocytopenia  --Secondary to chemotherapy, infection, abx  --improved to 111K  --Please transfuse platelets if <10K or <50K with bleeding or prior to any procedure or <100K prior to any high risk procedures    GOC  --Patient is currently DNR  --GOC has been discussed with patient and her son, Liang by Dr. Jauregui  --Patient may benefit from palliative care consult while in hospital    Viri Means LOGAN  Hematology/Oncology  [facility]  [phone] (Cell)  [phone] (Office)  [phone] (Alt office)  Evenings and weekends please call MD on call or office

## 2021-09-08 NOTE — DIETITIAN NUTRITION RISK NOTIFICATION - MALNUTRITION EVALUATION AS DEMONSTRATED BY (ADULTS > 20 YEARS OF AGE)
Inadequate energy intake.../Loss of subcutaneous fat.../Loss of muscle...
Weight loss.../Inadequate energy intake...

## 2021-09-08 NOTE — PROGRESS NOTE ADULT - PROBLEM SELECTOR PLAN 1
-Pt noted to be on 1-2LNC throughout stay. Placed on RA - sats maintained >90%.  -CTA chest neg PE, grossly clear lung parenchyma  -No c/o dyspnea   -Incentive spirometry  -Normoxic, keep sats >90% with supplemental O2 PRN. -Pt noted to be on 1-2LNC throughout stay. Placed on RA yesterday - sats maintained >90%.  -CTA chest neg PE, grossly clear lung parenchyma  -No c/o dyspnea   -Incentive spirometry  -Normoxic, keep sats >90% with supplemental O2 PRN.

## 2021-09-08 NOTE — PROGRESS NOTE ADULT - ATTENDING COMMENTS
Tomorrow last day of abx, doing well, no complaints. D/w team, will follow, total time spent 35min, >50% spent in discussion and coordination of care.
d/c planning. Total time spent 35min, >50% spent in discussion and coordination of care.

## 2021-09-08 NOTE — PROGRESS NOTE ADULT - ASSESSMENT
71 y/o F with PMH of late relapse of DLBCL on chemo (last chemo 8/26/21), bipolar depression, HLD p/w weakness and poor PO intake. Found to have UTI, UC +ESBL E.Coli. Called to consult for O2 use - pt noted to be on 1-2LNC throughout day. CTA chest with grossly clear lung parenchyma, neg PE.

## 2021-09-08 NOTE — CHART NOTE - NSCHARTNOTEFT_GEN_A_CORE
Nutrition Follow Up Note  Patient seen for: nutrition consult/malnutrition follow up.     Chart reviewed, events noted. Pt is a 70 year old female with PMH of Depression, bipolar disorder, HLD, OCD, OA, DLBCL on PO and IV chemo (last IV chemo 8/2021), ulcerative colitis to the ER brought in by ambulance from home complaining of hypotension, and AMS.     Source: [x] Patient       [x] EMR        [x] RN        [] Family at bedside       [] Other:    -If unable to interview patient: [] Trach/Vent/BiPAP  [] Disoriented/confused/inappropriate to interview    Diet Order:   Diet, Regular (09-03-21)    - Is current order appropriate/adequate? [x] Yes  []  No:     - PO intake :   [] >75%  Adequate    [x] 50-75%  Fair       [] <50%  Poor    - Nutrition-related concerns:    - Pt reports fair appetite and PO intake, consuming 50% of meals.    - Pt states she has been drinking Mighty Shakes on meal trays, unsure if she has had Magic Cup - pt requesting Ensure as she drinks this at home.    GI:  Last BM today.   Bowel Regimen? [] Yes   [x] No    Weights:   no updated weights available at this time, will continue to monitor weights for changes if any     Nutrition focused physical exam performed with pt consent - mild muscle loss noted to temples and clavicles. Mild fat loss noted to triceps, orbital fat pads, and buccal areas.     Nutritionally Pertinent MEDICATIONS  (STANDING):  atorvastatin  ertapenem  IVPB  lactated ringers.  levothyroxine  midodrine.  pantoprazole    Tablet    Pertinent Labs: 09-08 @ 06:36: Na 135, BUN 11, Cr 0.52, BG 97, K+ 4.3, Phos --, Mg 1.9, Alk Phos --, ALT/SGPT --, AST/SGOT --, HbA1c --    Finger Sticks: n/a    Skin per nursing documentation: No pressure injuries noted.   Edema: No noted edema as per flow sheets.     Estimated Needs:   [x] no change since previous assessment  [] recalculated:     Previous Nutrition Diagnosis: Mild Malnutrition   Nutrition Diagnosis is: [x] ongoing  [] resolved [] not applicable     New Nutrition Diagnosis: [x] Moderate acute malnutrition related to inadequate protein energy intake in setting of increased physiological demand for nutrients 2/2 DLBCL as evidenced by pt meeting <75% EER > 1 week, mild muscle and fat loss.     Nutrition Care Plan:  [x] In Progress  [] Achieved  [] Not applicable    Nutrition Interventions:     Education Provided:       [x] Yes: Provided recommendations to optimize PO and protein intake, recommended small frequent meals by ordering nutrient-dense snacks and leaving non-perishable food away from tray for later consumption during the day or between meals, to start with protein, and sips of supplement throughout the day; reviewed foods with protein.    Recommendations:      1. Continue regular diet.  2. Ensure Enlive 2x daily (700 eva and 40 gm protein).  3. Consider multivitamin supplementation if not otherwise contraindicated.   4. Provide encouragement with PO intake, menu selections, and assistance with meals as needed.     Monitoring and Evaluation:   Continue to monitor nutritional intake, tolerance to diet prescription, weights, labs, skin integrity    RD remains available upon request and will follow up per protocol    Josefa Keys MS, RD, CDN Pager# [phone]

## 2021-09-09 ENCOUNTER — TRANSCRIPTION ENCOUNTER (OUTPATIENT)
Age: 71
End: 2021-09-09

## 2021-09-09 VITALS
HEART RATE: 93 BPM | TEMPERATURE: 98 F | SYSTOLIC BLOOD PRESSURE: 105 MMHG | RESPIRATION RATE: 18 BRPM | OXYGEN SATURATION: 98 % | DIASTOLIC BLOOD PRESSURE: 68 MMHG

## 2021-09-09 PROCEDURE — 93005 ELECTROCARDIOGRAM TRACING: CPT

## 2021-09-09 PROCEDURE — 86769 SARS-COV-2 COVID-19 ANTIBODY: CPT

## 2021-09-09 PROCEDURE — 83880 ASSAY OF NATRIURETIC PEPTIDE: CPT

## 2021-09-09 PROCEDURE — 71045 X-RAY EXAM CHEST 1 VIEW: CPT

## 2021-09-09 PROCEDURE — 87186 SC STD MICRODIL/AGAR DIL: CPT

## 2021-09-09 PROCEDURE — 80053 COMPREHEN METABOLIC PANEL: CPT

## 2021-09-09 PROCEDURE — 87040 BLOOD CULTURE FOR BACTERIA: CPT

## 2021-09-09 PROCEDURE — 85027 COMPLETE CBC AUTOMATED: CPT

## 2021-09-09 PROCEDURE — G1004: CPT

## 2021-09-09 PROCEDURE — 85014 HEMATOCRIT: CPT

## 2021-09-09 PROCEDURE — 85025 COMPLETE CBC W/AUTO DIFF WBC: CPT

## 2021-09-09 PROCEDURE — 84295 ASSAY OF SERUM SODIUM: CPT

## 2021-09-09 PROCEDURE — 70450 CT HEAD/BRAIN W/O DYE: CPT | Mod: MA

## 2021-09-09 PROCEDURE — 83735 ASSAY OF MAGNESIUM: CPT

## 2021-09-09 PROCEDURE — 86901 BLOOD TYPING SEROLOGIC RH(D): CPT

## 2021-09-09 PROCEDURE — 86900 BLOOD TYPING SEROLOGIC ABO: CPT

## 2021-09-09 PROCEDURE — 82803 BLOOD GASES ANY COMBINATION: CPT

## 2021-09-09 PROCEDURE — 82947 ASSAY GLUCOSE BLOOD QUANT: CPT

## 2021-09-09 PROCEDURE — 86850 RBC ANTIBODY SCREEN: CPT

## 2021-09-09 PROCEDURE — 82962 GLUCOSE BLOOD TEST: CPT

## 2021-09-09 PROCEDURE — 85018 HEMOGLOBIN: CPT

## 2021-09-09 PROCEDURE — 97161 PT EVAL LOW COMPLEX 20 MIN: CPT

## 2021-09-09 PROCEDURE — 87640 STAPH A DNA AMP PROBE: CPT

## 2021-09-09 PROCEDURE — 99291 CRITICAL CARE FIRST HOUR: CPT | Mod: 25

## 2021-09-09 PROCEDURE — 0225U NFCT DS DNA&RNA 21 SARSCOV2: CPT

## 2021-09-09 PROCEDURE — 97530 THERAPEUTIC ACTIVITIES: CPT

## 2021-09-09 PROCEDURE — 84443 ASSAY THYROID STIM HORMONE: CPT

## 2021-09-09 PROCEDURE — 84145 PROCALCITONIN (PCT): CPT

## 2021-09-09 PROCEDURE — 87086 URINE CULTURE/COLONY COUNT: CPT

## 2021-09-09 PROCEDURE — 71275 CT ANGIOGRAPHY CHEST: CPT | Mod: MG

## 2021-09-09 PROCEDURE — 83605 ASSAY OF LACTIC ACID: CPT

## 2021-09-09 PROCEDURE — 81001 URINALYSIS AUTO W/SCOPE: CPT

## 2021-09-09 PROCEDURE — 86803 HEPATITIS C AB TEST: CPT

## 2021-09-09 PROCEDURE — 84484 ASSAY OF TROPONIN QUANT: CPT

## 2021-09-09 PROCEDURE — 97110 THERAPEUTIC EXERCISES: CPT

## 2021-09-09 PROCEDURE — 74177 CT ABD & PELVIS W/CONTRAST: CPT | Mod: MG

## 2021-09-09 PROCEDURE — 85610 PROTHROMBIN TIME: CPT

## 2021-09-09 PROCEDURE — 80048 BASIC METABOLIC PNL TOTAL CA: CPT

## 2021-09-09 PROCEDURE — 93010 ELECTROCARDIOGRAM REPORT: CPT

## 2021-09-09 PROCEDURE — 87641 MR-STAPH DNA AMP PROBE: CPT

## 2021-09-09 PROCEDURE — 85730 THROMBOPLASTIN TIME PARTIAL: CPT

## 2021-09-09 PROCEDURE — 82435 ASSAY OF BLOOD CHLORIDE: CPT

## 2021-09-09 PROCEDURE — 82330 ASSAY OF CALCIUM: CPT

## 2021-09-09 PROCEDURE — 84132 ASSAY OF SERUM POTASSIUM: CPT

## 2021-09-09 RX ADMIN — Medication 100 MICROGRAM(S): at 05:51

## 2021-09-09 RX ADMIN — PANTOPRAZOLE SODIUM 40 MILLIGRAM(S): 20 TABLET, DELAYED RELEASE ORAL at 05:51

## 2021-09-09 RX ADMIN — MIDODRINE HYDROCHLORIDE 10 MILLIGRAM(S): 2.5 TABLET ORAL at 05:51

## 2021-09-09 RX ADMIN — LEVETIRACETAM 500 MILLIGRAM(S): 250 TABLET, FILM COATED ORAL at 05:51

## 2021-09-09 RX ADMIN — BUPROPION HYDROCHLORIDE 150 MILLIGRAM(S): 150 TABLET, EXTENDED RELEASE ORAL at 12:24

## 2021-09-09 RX ADMIN — MIDODRINE HYDROCHLORIDE 10 MILLIGRAM(S): 2.5 TABLET ORAL at 12:24

## 2021-09-09 NOTE — PROGRESS NOTE ADULT - PROBLEM SELECTOR PROBLEM 1
Severe sepsis
Severe sepsis
Hypoxia
Hypoxia
Severe sepsis

## 2021-09-09 NOTE — DISCHARGE NOTE NURSING/CASE MANAGEMENT/SOCIAL WORK - NSDCPEFALRISK_GEN_ALL_CORE
For information on Fall & injury Prevention, visit [email or web address]

## 2021-09-09 NOTE — PROGRESS NOTE ADULT - NUTRITIONAL ASSESSMENT
This patient has been assessed with a concern for Malnutrition and has been determined to have a diagnosis/diagnoses of Mild protein-calorie malnutrition.    This patient is being managed with:   Diet Regular-  Entered: Sep  3 2021  1:44AM    
This patient has been assessed with a concern for Malnutrition and has been determined to have a diagnosis/diagnoses of Moderate protein-calorie malnutrition.    This patient is being managed with:   Diet Regular-  Supplement Feeding Modality:  Oral  Ensure Enlive Cans or Servings Per Day:  1       Frequency:  Two Times a day  Entered: Sep  8 2021  4:24PM    
This patient has been assessed with a concern for Malnutrition and has been determined to have a diagnosis/diagnoses of Moderate protein-calorie malnutrition.    This patient is being managed with:   Diet Regular-  Supplement Feeding Modality:  Oral  Ensure Enlive Cans or Servings Per Day:  1       Frequency:  Two Times a day  Entered: Sep  8 2021  4:24PM      This patient has been assessed with a concern for Malnutrition and has been determined to have a diagnosis/diagnoses of Moderate protein-calorie malnutrition.    This patient is being managed with:   Diet Regular-  Supplement Feeding Modality:  Oral  Ensure Enlive Cans or Servings Per Day:  1       Frequency:  Two Times a day  Entered: Sep  8 2021  4:24PM    
This patient has been assessed with a concern for Malnutrition and has been determined to have a diagnosis/diagnoses of Mild protein-calorie malnutrition.    This patient is being managed with:   Diet Regular-  Entered: Sep  3 2021  1:44AM

## 2021-09-09 NOTE — DISCHARGE NOTE PROVIDER - NSDCADMDATE_GEN_ALL_CORE_FT
02-Sep-2021 20:33
Develop coping skills.  For example, strategies and lifestyle changes that reduce negative moods, stress, and exposure to smoking cues.

## 2021-09-09 NOTE — PROGRESS NOTE ADULT - PROBLEM SELECTOR PLAN 3
improving
-Per heme/onc.
SHAGUFTA with Cr 1.2 with baseline ~0.6, in setting of hypotension and sepsis   improving now with ongoing IVF hydration   c/t trend Cr, renally dose medications, avoid nephrotoxins   treat underlying sepsis as above
improving
SHAGUFTA with Cr 1.2 with baseline ~0.6, in setting of hypotension and sepsis   improving now with ongoing IVF hydration   c/t trend Cr, renally dose medications, avoid nephrotoxins   treat underlying sepsis as above
-Per heme/onc.
SHAGUFTA with Cr 1.2 with baseline ~0.6, in setting of hypotension and sepsis   improving now with ongoing IVF hydration   c/t trend Cr, renally dose medications, avoid nephrotoxins   treat underlying sepsis as above

## 2021-09-09 NOTE — PROGRESS NOTE ADULT - PROBLEM SELECTOR PROBLEM 3
DLBCL (diffuse large B cell lymphoma)
DLBCL (diffuse large B cell lymphoma)
SHAGUFTA (acute kidney injury)

## 2021-09-09 NOTE — PROGRESS NOTE ADULT - SUBJECTIVE AND OBJECTIVE BOX
Follow Up: UTI     Interval History:  no fever.  denies abdominal pain.  loose small bm this morning, not diarrhea.  Remainder of ROS otherwise negative.    PAST MEDICAL & SURGICAL HISTORY:  Osteoarthritis of right knee  Depression  OCD (obsessive compulsive disorder)  Bipolar depression  Hypercholesteremia  NHL, treated with chemo @ [facility] [place], in remission since 2016  Osteoarthritis of left knee  S/P knee surgery 1978  H/O oral surgery 2014  S/P cataract surgery  S/P TKR (total knee replacement), bilateral discharged nov 5th, 2014    Allergies  honeydew melon (Other)  penicillin (Other; Hives)    ANTIMICROBIALS:    meropenem  IVPB (9/3-9/5)    active:  ertapenem  IVPB 1000 every 24 hours (9/2-9/3, 9/5-)  influenza   Vaccine 0.5 once    MEDICATIONS  (STANDING):  atorvastatin 40 at bedtime  buPROPion XL (24-Hour) . 150 daily  levETIRAcetam 500 two times a day  levothyroxine 100 daily  midodrine. 10 three times a day  pantoprazole    Tablet 40 before breakfast  ziprasidone 40 <User Schedule>    Vital Signs Last 24 Hrs  T(F): 208.9 (09-07-21 @ 08:45), Max: 208.9 (09-07-21 @ 08:45)  HR: 84 (09-07-21 @ 08:45)  BP: 95/61 (09-07-21 @ 08:45)  RR: 18 (09-07-21 @ 08:45)  SpO2: 100% (09-07-21 @ 08:45) (99% - 100%)    PHYSICAL EXAM:  Constitutional: non-toxic  HEAD/EYES: anicteric  ENT:  supple, no thrush  Cardiovascular:   normal S1, S2  Respiratory:  clear BS bilaterally  GI:  soft, non-tender, normal bowel sounds  :  no yanes  Musculoskeletal:  no synovitis  Neurologic: awake and alert, normal strength, no focal findings  Skin:  no rash  Psychiatric:  awake, alert, appropriate mood                        7.5    6.99  )-----------( 88       ( 07 Sep 2021 06:37 )             23.4 09-06    137  |  101  |  16  ----------------------------<  104  4.0   |  27  |  0.53  Ca    8.4      06 Sep 2021 07:14Mg     1.7     09-06    MICROBIOLOGY:  Culture - Urine (collected 09-02-21 @ 21:31)  Source: Clean Catch Clean Catch (Midstream)  Final Report (09-05-21 @ 11:49):    >100,000 CFU/ml Escherichia coli ESBL  Organism: Escherichia coli ESBL (09-05-21 @ 11:49)  Organism: Escherichia coli ESBL (09-05-21 @ 11:49)      -  Amikacin: S <=16      -  Amoxicillin/Clavulanic Acid: S <=8/4      -  Ampicillin: R >16 These ampicillin results predict results for amoxicillin      -  Ampicillin/Sulbactam: S 8/4 Enterobacter, Citrobacter, and Serratia may develop resistance during prolonged therapy (3-4 days)      -  Aztreonam: R >16      -  Cefazolin: R >16 ([id number]) For uncomplicated UTI with K. pneumoniae, E. coli, or P. mirablis: LUCY <=16 is sensitive and LUCY >=32 is resistant. This also predicts results for oral agents cefaclor, cefdinir, cefpodoxime, cefprozil, cefuroxime axetil, cephalexin and locarbef for uncomplicated UTI. Note that some isolates may be susceptible to these agents while testing resistant to cefazolin.      -  Cefepime: R >16      -  Cefoxitin: S <=8      -  Ceftriaxone: R >32 Enterobacter, Citrobacter, and Serratia may develop resistance during prolonged therapy      -  Ciprofloxacin: R >2      -  Ertapenem: S <=0.5      -  Gentamicin: S <=2      -  Imipenem: S <=1      -  Levofloxacin: R >4      -  Meropenem: S <=1      -  Nitrofurantoin: S <=32 Should not be used to treat pyelonephritis      -  Piperacillin/Tazobactam: S <=8      -  Tigecycline: S <=2      -  Tobramycin: S <=2      -  Trimethoprim/Sulfamethoxazole: R >2/38      Method Type: LUCY    Culture - Blood (collected 09-02-21 @ 21:00)  Source: .Blood Blood-Peripheral  Preliminary Report (09-03-21 @ 21:01):    No growth to date.    Culture - Blood (collected 09-02-21 @ 21:00)  Source: .Blood Blood-Peripheral  Preliminary Report (09-03-21 @ 21:01):    No growth to date.    Rapid RVP Result: NotDetec (09-02 @ 16:43)    RADIOLOGY:  below radiology personally reviewed and agree with finding    CT Angio Chest PE Protocol w/ IV Cont (09.02.21 @ 18:47) >  SPLEEN: There are two hypodense splenic lesions. One of which is peripherally located and well circumscribed measuring 1.8 x 2.0 cm. The other lesion is ill-defined and measures 1.5 x 1.8 cm. Both are new from 2019.  IMPRESSION:  Two hypodense splenic lesions as noted above, which are new from 2019, most likely related to patient's known diffuse large B-cell lymphoma.  No pulmonary embolism.  No acute septic pathology or fluid collection.  
Patient seen and examined at bedside. no overnight events     MEDICATIONS  (STANDING):  atorvastatin 40 milliGRAM(s) Oral at bedtime  buPROPion XL (24-Hour) . 150 milliGRAM(s) Oral daily  filgrastim-sndz (ZARXIO) Injectable 300 MICROGram(s) SubCutaneous daily  influenza   Vaccine 0.5 milliLiter(s) IntraMuscular once  lactated ringers. 500 milliLiter(s) (60 mL/Hr) IV Continuous <Continuous>  levETIRAcetam 500 milliGRAM(s) Oral two times a day  levothyroxine 100 MICROGram(s) Oral daily  meropenem  IVPB 1000 milliGRAM(s) IV Intermittent every 8 hours  midodrine. 10 milliGRAM(s) Oral three times a day  pantoprazole    Tablet 40 milliGRAM(s) Oral before breakfast  ziprasidone 40 milliGRAM(s) Oral <User Schedule>    MEDICATIONS  (PRN):  acetaminophen   Tablet .. 650 milliGRAM(s) Oral every 6 hours PRN Temp greater or equal to 38.5C (101.3F), Mild Pain (1 - 3)        Vital Signs Last 24 Hrs  T(C): 36.6 (05 Sep 2021 08:16), Max: 37.2 (04 Sep 2021 21:02)  T(F): 97.9 (05 Sep 2021 08:16), Max: 98.9 (04 Sep 2021 21:02)  HR: 83 (05 Sep 2021 08:16) (56 - 87)  BP: 92/59 (05 Sep 2021 08:16) (92/59 - 135/70)  BP(mean): --  RR: 18 (05 Sep 2021 08:16) (18 - 18)  SpO2: 100% (05 Sep 2021 08:16) (95% - 100%)      PHYSICAL EXAM:     GENERAL:  Appears stated age, well-groomed  HEENT:  NC/AT,  conjunctivae clear and pink  CHEST:  CTA b/l  HEART:  S1 s2+   ABDOMEN:  Soft,   NEURO:  Alert, oriented, no asterixis                            7.5    4.24  )-----------( 64       ( 05 Sep 2021 06:49 )             23.9       09-05    140  |  104  |  17  ----------------------------<  114<H>  4.1   |  26  |  0.63    Ca    8.3<L>      05 Sep 2021 06:49  Mg     2.1     09-04        
Patient is a 70y old  Female who presents with a chief complaint of weakness (03 Sep 2021 01:45)    Patient seen this morning. She states that she is feeling a bit stronger this morning. No other complaints.    MEDICATIONS  (STANDING):  atorvastatin 40 milliGRAM(s) Oral at bedtime  buPROPion XL (24-Hour) . 150 milliGRAM(s) Oral daily  ertapenem  IVPB 1000 milliGRAM(s) IV Intermittent every 24 hours  filgrastim-sndz (ZARXIO) Injectable 300 MICROGram(s) SubCutaneous daily  influenza   Vaccine 0.5 milliLiter(s) IntraMuscular once  lactated ringers. 500 milliLiter(s) (60 mL/Hr) IV Continuous <Continuous>  levETIRAcetam 500 milliGRAM(s) Oral two times a day  levothyroxine 100 MICROGram(s) Oral daily  midodrine. 10 milliGRAM(s) Oral three times a day  pantoprazole    Tablet 40 milliGRAM(s) Oral before breakfast  vancomycin  IVPB 750 milliGRAM(s) IV Intermittent two times a day  ziprasidone 40 milliGRAM(s) Oral <User Schedule>    MEDICATIONS  (PRN):  acetaminophen   Tablet .. 650 milliGRAM(s) Oral every 6 hours PRN Temp greater or equal to 38.5C (101.3F), Mild Pain (1 - 3)      ROS  No fever, sweats, chills  No epistaxis, HA, sore throat  No CP, SOB, cough, sputum  No n/v/d, abd pain, melena, hematochezia  No edema  No rash  No anxiety  No back pain, joint pain  No bleeding, bruising  No dysuria, hematuria    Vital Signs Last 24 Hrs  T(C): 36.5 (03 Sep 2021 08:18), Max: 38.6 (02 Sep 2021 16:20)  T(F): 97.7 (03 Sep 2021 08:18), Max: 101.4 (02 Sep 2021 16:20)  HR: 83 (03 Sep 2021 08:18) (73 - 112)  BP: 96/64 (03 Sep 2021 08:18) (69/55 - 116/46)  BP(mean): 71 (02 Sep 2021 20:06) (71 - 71)  RR: 18 (03 Sep 2021 08:18) (18 - 30)  SpO2: 100% (03 Sep 2021 08:18) (96% - 100%)    PE  NAD  Awake, alert  Anicteric, MMM  No c/c/e  No rash grossly                            7.7    1.18  )-----------( 64       ( 03 Sep 2021 00:36 )             23.3           136  |  100  |  15  ----------------------------<  113<H>  2.7<LL>   |  25  |  0.86    Ca    7.5<L>      03 Sep 2021 00:36  Mg     2.5         TPro  5.7<L>  /  Alb  3.4  /  TBili  1.3<H>  /  DBili  x   /  AST  34  /  ALT  37  /  AlkPhos  141<H>        EXAM:  CT ABDOMEN AND PELVIS IC                          EXAM:  [facility]ST [facility]ULM [facility]                            PROCEDURE DATE:  2021            INTERPRETATION:  CLINICAL INFORMATION: Hypotensive and septic. History of few centimeters B-cell lymphoma.    COMPARISON: CT chest abdomen and pelvis 2019.    CONTRAST/COMPLICATIONS:  IV Contrast: Omnipaque 350. 90 cc yesterday. 10 cc discarded.  Oral Contrast: None.  Complications: None.    PROCEDURE:  CT Angiography of the Chest was performed followed by portal venous phase imaging of the Abdomen and Pelvis.  Sagittal and coronal reformats were performed as well as 3D (MIP) reconstructions.    FINDINGS:  CHEST:  LUNGS AND LARGE AIRWAYS: Patent central airways. No pulmonary nodules or parenchymal consolidation.  PLEURA: No pleural effusion.  VESSELS: No pulmonary embolism.  HEART: Heart size is normal. No pericardial effusion.  MEDIASTINUM AND RAFAEL: No lymphadenopathy.  CHEST WALL AND LOWER NECK: Within normal limits.    ABDOMEN AND PELVIS:  LIVER: Within normal limits.  BILE DUCTS: Normal caliber.  GALLBLADDER: Within normal limits.  SPLEEN: There are two hypodense splenic lesions. One of which is peripherally located and well circumscribed measuring 1.8 x 2.0 cm. The other lesion is ill-defined and measures 1.5 x 1.8 cm. Both are new from 2019.  PANCREAS: Within normal limits.  ADRENALS: Within normal limits.  KIDNEYS/URETERS: Evaluation of distal ureters is limited by streak artifact. Visualized portions are within normal limits.    BLADDER: Evaluation limited by streak artifact. Visualized portions within normal limits.  REPRODUCTIVE ORGANS: There is limited by streak artifact.    BOWEL: No bowel obstruction. Appendix is normal.  PERITONEUM: No ascites.  VESSELS: Atherosclerotic changes.  RETROPERITONEUM/LYMPH NODES: No lymphadenopathy.  ABDOMINAL WALL: Tiny fat-containing umbilical hernia.  BONES: Status post right hip arthroplasty. Degenerative changes.    IMPRESSION:    Two hypodense splenic lesions as noted above, which are new from 2019, most likely related to patient's known diffuse large B-cell lymphoma.    No pulmonary embolism.    No acute septic pathology or fluid collection.      EXAM:  CT BRAIN                            PROCEDURE DATE:  2021            INTERPRETATION:  CLINICAL INFORMATION: Altered mental status.    TECHNIQUE:  Noncontrast CT of the head was performed.  Sagittal and coronal reformats were created.    COMPARISON STUDY: CT head from 3/30/2019.    FINDINGS:    PARENCHYMA: No acute intracranial hemorrhage, mass effect, or midline shift. Age appropriate involutional changes and small vessel white matter ischemic type changes.  Basal cisterns are patent.  Bilateral basal ganglia calcifications.  VENTRICLES: No hydrocephalus.  EXTRA-AXIAL: No abnormal extraaxial collection. Right vertebral artery calcification.  PARANASAL SINUSES: Redemonstration of hyperostosis of the left sphenoid sinus with mucosal thickening, suggestive of chronic sinusitis, similar to prior.  TYMPANOMASTOID CAVITIES: Within normal limits.  ORBITS: Bilateral cataract surgery.  BONES: Within normal limits.    IMPRESSION:  No acute intracranial hemorrhage, mass effect, or midline shift.    Exam Date: 2021 6:41 PM    CT head without IV contrast    CLINICAL INFORMATION:  AMS    TECHNIQUE: Contiguous axial sections were obtained through the head.   Coronal and sagittal reformats were obtained.    COMPARISON: CT head 3/30/2019    FINDINGS:    There is no evidence of intraparenchymal or extraaxial hemorrhage.   There is no CT evidence of large vessel acute infarct. No mass effect is found in the brain.  No evidence of midline shift or herniation pattern.    The ventricles, sulci and basal cisterns appear unremarkable.    Visualized paranasal sinuses are clear.    IMPRESSION:    No acute intracranial findings.    [facility]   DEPARTMENT OF RADIOLOGY   [address] [id number] [phone]     REPORT ADDENDED   NUCLEAR MEDICINE   Report of Consultation   Name: WILI PARKER Acc No: [id number]   MRN: [id number] Date of Service: 2021   : 1950 Sex: F Pt Loc: [facility] [place]   Ordered by: BRITTON BESS MD Date of Report: 2021 01:11 PM   Procedure: NM PET/CT NECK/CAP - FDG Account: [id number]   PRID #: [id number]     FINAL ADDENDED REPORT   Addendum Begins   This addendum is to include under the impression a finding mentioned in   the body of the report. Impression should read as follows:   IMPRESSION: Since 2021   1. Decreased avidity of splenic mass, difficult to assess on CT in the   absence of intravenous contrast.   2. Unchanged partially exophytic anterior splenic capsular lesion with   avidity similar to spleen.   3. Mild avidity associated with new healing right anterior seventh rib   fracture.   \H\4. FDG uptake in the buccal soft tissues anterior to the left   mandible, probably inflammatory/gingivitis. Clinical correlation is   advised.\N\   Addendum Ends   2021 Body FDG PET/CT     CLINICAL STATEMENT: Lymphoma. Patient is referred for restaging.   TECHNIQUE:   Radiopharmaceutical: 11.9 mCi F-18 FDG   Uptake time: Body: 59 minutes   Field of view: base of skull to the upper thighs   Equipment: GE Discovery MI (MSKNAS Room 1436) (Station: [id number])   Plasma glucose: 179 mg/dl   IV Contrast: Not administered   Oral Contrast: Administered     The CT protocol used for this PET/CT study is designed for attenuation   correction and anatomic localization of PET abnormalities. This    CT is not designed to produce, and cannot replace, state-of-the-art   diagnostic CT scans with specific imaging protocols for different body   parts and indications .     The standardized uptake values (SUV) are normalized to patient body weight   and indicate the highest activity concentration (SUVmax) in a given   disease site.   COMPARISON: Body FDG PET/CT 2021.    CORRELATION: None.   FINDINGS:   REFERENCE REGIONS: SUVmax and mean in a reference region in the   liver are 3.4 and 3.1, previously 3.3 and 3.0.   HEAD/FACE: Physiologic FDG uptake is seen in   the visualized regions of the brain, large salivary glands and oropharynx.   Mild uptake in the buccal soft tissues anterior to the left mandible   (image 57), SUV 6.0, probably inflammatory/gingivitis   NECK: Physiologic FDG uptake is seen   in neck muscles. Increased uptake in the base of neck musculature,   possibly inflammatory or possibly due to muscle spasm.   CHEST: Physiologic FDG avidity is seen   in mediastinal blood pool, myocardium. Multivessel calcific coronary   atherosclerosis.   LUNGS: No abnormal uptake.   PLEURA/PERICARDIUM: No abnormal uptake.   THORACIC NODES: No abnormal uptake.   HEPATOBILIARY: No abnormal uptake.   SPLEEN: Decreased uptake associated with   splenic mass, SUV 5.0 (previously 26.2). Lesion is difficult to evaluate   on CT due to absence of intravenous contrast. Unchanged exophytic anterior   splenic capsular lesion with uptake similar to splenic background (image   130), 2.0 x 2.0 cm SUV 3.1, previously 2.1 x 2.0 cm SUV 2.8     PANCREAS: No abnormal uptake.   ADRENAL GLANDS: No abnormal uptake.   KIDNEYS/URETERS/   BLADDER: Excreted activity is present.   ABDOMINOPELVIC NODES: No abnormal uptake.   BOWEL/PERITONEUM/   MESENTERY: Increased probable physiologic uptake   in the collapsed segments of the colon, for for example in the ascending,   proximal to mid transverse colon, and rectum. No pericolonic inflammatory   changes seen.     PELVIC ORGANS: No abnormal uptake. Streak artifact   from right hip arthroplasty hardware obscures visualization.   BONES/SOFT TISSUES: No suspicious osseous uptake. Decreased bone   mineralization and degenerative changes of the vertebral spine. Unchanged   mild L1 and and moderate L5 compression fracture deformities. Unchanged   photopenic left puboacetabular and left inferior pubic ramus healed   fracture deformities. Mild avidity associated with healing right anterior   seventh rib fracture. Redemonstration of non-avid healed bilateral rib   cage fractures with subtle sclerosis.     Right hip arthroplasty hardware with persistent periprosthetic uptake,   probably inflammatory. Decreased bone mineralization.   Increased uptake in the left subscapular musculature and bilateral   thoracic paraspinal muscles, possibly inflammatory or possibly due to   muscle spasm.   OTHER FINDINGS: Non-aneurysmal aortobiiliac calcific   atherosclerosis.   IMPRESSION: Since 2021   1. Decreased avidity of splenic mass, difficult to assess on CT in the   absence of intravenous contrast.   2. Unchanged partially exophytic anterior splenic capsular lesion with   avidity similar to spleen.   3. Mild avidity associated with new healing right anterior seventh rib   fracture.     FINAL ADDENDED REPORT   Dictated By:   Addendum Dictated By: FRANK GARSIA MD   
Patient is a 70y old  Female who presents with a chief complaint of weakness (06 Sep 2021 17:44)  Patient seen today, no new complaints    MEDICATIONS  (STANDING):  atorvastatin 40 milliGRAM(s) Oral at bedtime  buPROPion XL (24-Hour) . 150 milliGRAM(s) Oral daily  ertapenem  IVPB 1000 milliGRAM(s) IV Intermittent every 24 hours  influenza   Vaccine 0.5 milliLiter(s) IntraMuscular once  lactated ringers. 500 milliLiter(s) (60 mL/Hr) IV Continuous <Continuous>  levETIRAcetam 500 milliGRAM(s) Oral two times a day  levothyroxine 100 MICROGram(s) Oral daily  midodrine. 10 milliGRAM(s) Oral three times a day  pantoprazole    Tablet 40 milliGRAM(s) Oral before breakfast  ziprasidone 40 milliGRAM(s) Oral <User Schedule>    MEDICATIONS  (PRN):  acetaminophen   Tablet .. 650 milliGRAM(s) Oral every 6 hours PRN Temp greater or equal to 38.5C (101.3F), Mild Pain (1 - 3)      ROS  No fever, sweats, chills  No epistaxis, HA, sore throat  No CP, SOB, cough, sputum  No n/v/d, abd pain, melena, hematochezia  No edema  No rash  No anxiety  No back pain, joint pain  No bleeding, bruising  No dysuria, hematuria    Vital Signs Last 24 Hrs  T(C): 98.3 (07 Sep 2021 08:45), Max: 98.3 (07 Sep 2021 08:45)  T(F): 208.9 (07 Sep 2021 08:45), Max: 208.9 (07 Sep 2021 08:45)  HR: 84 (07 Sep 2021 08:45) (73 - 86)  BP: 95/61 (07 Sep 2021 08:45) (95/61 - 118/56)  BP(mean): --  RR: 18 (07 Sep 2021 08:45) (18 - 18)  SpO2: 100% (07 Sep 2021 08:45) (99% - 100%)    PE  NAD  Awake, alert  Anicteric, MMM  RRR  CTAB  Abd soft, NT, ND  No c/c/e  FROM                          7.5    6.99  )-----------( 88       ( 07 Sep 2021 06:37 )             23.4       09-06    137  |  101  |  16  ----------------------------<  104<H>  4.0   |  27  |  0.53    Ca    8.4      06 Sep 2021 07:14  Mg     1.7     09-06        
Follow Up: UTI     Interval History: afebrile, no acute events.     REVIEW OF SYSTEMS  [  ] ROS unobtainable because:    [ x ] All other systems negative except as noted below    Constitutional:  [ ] fever [ ] chills  [ ] weight loss  [ ] weakness  Skin:  [ ] rash [ ] phlebitis	  Eyes: [ ] icterus [ ] pain  [ ] discharge	  ENMT: [ ] sore throat  [ ] thrush [ ] ulcers [ ] exudates  Respiratory: [ ] dyspnea [ ] hemoptysis [ ] cough [ ] sputum	  Cardiovascular:  [ ] chest pain [ ] palpitations [ ] edema	  Gastrointestinal:  [ ] nausea [ ] vomiting [ ] diarrhea [ ] constipation [ ] pain	  Genitourinary:  [ ] dysuria [ ] frequency [ ] hematuria [ ] discharge [ ] flank pain  [ ] incontinence  Musculoskeletal:  [ ] myalgias [ ] arthralgias [ ] arthritis  [ ] back pain  Neurological:  [ ] headache [ ] seizures  [ ] confusion/altered mental status    Allergies  honeydew melon (Other)  penicillin (Other; Hives)        ANTIMICROBIALS:  meropenem  IVPB 1000 every 8 hours      OTHER MEDS:  MEDICATIONS  (STANDING):  acetaminophen   Tablet .. 650 every 6 hours PRN  atorvastatin 40 at bedtime  buPROPion XL (24-Hour) . 150 daily  filgrastim-sndz (ZARXIO) Injectable 300 daily  influenza   Vaccine 0.5 once  levETIRAcetam 500 two times a day  levothyroxine 100 daily  midodrine. 10 three times a day  pantoprazole    Tablet 40 before breakfast  ziprasidone 40 <User Schedule>      Vital Signs Last 24 Hrs  T(C): 36.6 (05 Sep 2021 08:16), Max: 37.2 (04 Sep 2021 21:02)  T(F): 97.9 (05 Sep 2021 08:16), Max: 98.9 (04 Sep 2021 21:02)  HR: 78 (05 Sep 2021 12:02) (58 - 87)  BP: 90/55 (05 Sep 2021 12:02) (90/55 - 108/66)  BP(mean): --  RR: 18 (05 Sep 2021 08:16) (18 - 18)  SpO2: 100% (05 Sep 2021 08:16) (96% - 100%)    PHYSICAL EXAMINATION:  General: Alert and Awake, NAD  Cardiac: RRR, No M/R/G  Resp: CTAB, No Wh/Rh/Ra  Abdomen: NBS, NT/ND, No HSM, No rigidity or guarding  MSK: No LE edema. No Calf tenderness  Skin: No rashes or lesions. Skin is warm and dry to the touch.   Neuro: Alert and Awake. CN 2-12 Grossly intact. Moves all four extremities spontaneously.  Psych: Calm, Pleasant, Cooperative                          7.5    4.24  )-----------( 64       ( 05 Sep 2021 06:49 )             23.9       09-05    140  |  104  |  17  ----------------------------<  114<H>  4.1   |  26  |  0.63    Ca    8.3<L>      05 Sep 2021 06:49  Mg     2.1     09-04            MICROBIOLOGY:    Clean Catch Clean Catch (Midstream)  09-02-21   >100,000 CFU/ml Escherichia coli ESBL  --  Escherichia coli ESBL    .Blood Blood-Peripheral  09-02-21   No growth to date.  --  --    Rapid RVP Result: NotDetec (09-02 @ 16:43)    RADIOLOGY:    <The imaging below has been reviewed and visualized by me independently. Findings as detailed in report below>    < from: CT Angio Chest PE Protocol w/ IV Cont (09.02.21 @ 18:47) >  IMPRESSION:    Two hypodense splenic lesions asnoted above, which are new from 2019, most likely related to patient's known diffuse large B-cell lymphoma.    No pulmonary embolism.    No acute septic pathology or fluid collection.    < end of copied text >  
Patient is a 70y old  Female who presents with a chief complaint of weakness (07 Sep 2021 13:16)  Patient seen today, awake, feeling well.      MEDICATIONS  (STANDING):  atorvastatin 40 milliGRAM(s) Oral at bedtime  buPROPion XL (24-Hour) . 150 milliGRAM(s) Oral daily  ertapenem  IVPB 1000 milliGRAM(s) IV Intermittent every 24 hours  influenza   Vaccine 0.5 milliLiter(s) IntraMuscular once  lactated ringers. 500 milliLiter(s) (60 mL/Hr) IV Continuous <Continuous>  levETIRAcetam 500 milliGRAM(s) Oral two times a day  levothyroxine 100 MICROGram(s) Oral daily  midodrine. 10 milliGRAM(s) Oral three times a day  pantoprazole    Tablet 40 milliGRAM(s) Oral before breakfast  ziprasidone 40 milliGRAM(s) Oral <User Schedule>    MEDICATIONS  (PRN):  acetaminophen   Tablet .. 650 milliGRAM(s) Oral every 6 hours PRN Temp greater or equal to 38.5C (101.3F), Mild Pain (1 - 3)      ROS  No fever, sweats, chills  No epistaxis, HA, sore throat  No CP, SOB, cough, sputum  No n/v/d, abd pain, melena, hematochezia  No edema  No rash  No anxiety  No back pain, joint pain  No bleeding, bruising  No dysuria, hematuria    Vital Signs Last 24 Hrs  T(C): 36.2 (08 Sep 2021 04:07), Max: 36.8 (07 Sep 2021 11:02)  T(F): 97.1 (08 Sep 2021 04:07), Max: 98.2 (07 Sep 2021 11:02)  HR: 78 (08 Sep 2021 04:07) (70 - 78)  BP: 102/58 (08 Sep 2021 04:07) (93/62 - 134/63)  BP(mean): --  RR: 18 (08 Sep 2021 04:07) (18 - 18)  SpO2: 94% (08 Sep 2021 04:07) (94% - 98%)    PE  NAD  Awake, alert  Anicteric, MMM  RRR  CTAB  Abd soft, NT, ND  No c/c/e  No rash grossly  FROM                          7.6    5.54  )-----------( 111      ( 08 Sep 2021 06:36 )             23.6       09-08    135  |  100  |  11  ----------------------------<  97  4.3   |  25  |  0.52    Ca    9.0      08 Sep 2021 06:36  Mg     1.9     09-08        
Patient is a 70y old  Female who presents with a chief complaint of weakness (08 Sep 2021 12:27)    Patient seen this morning. No complaints. Afebrile. Antibiotics have been DCd.  Hoping for discharge home today.    MEDICATIONS  (STANDING):  atorvastatin 40 milliGRAM(s) Oral at bedtime  buPROPion XL (24-Hour) . 150 milliGRAM(s) Oral daily  influenza   Vaccine 0.5 milliLiter(s) IntraMuscular once  lactated ringers. 500 milliLiter(s) (60 mL/Hr) IV Continuous <Continuous>  levETIRAcetam 500 milliGRAM(s) Oral two times a day  levothyroxine 100 MICROGram(s) Oral daily  midodrine. 10 milliGRAM(s) Oral three times a day  pantoprazole    Tablet 40 milliGRAM(s) Oral before breakfast  ziprasidone 40 milliGRAM(s) Oral <User Schedule>    MEDICATIONS  (PRN):  acetaminophen   Tablet .. 650 milliGRAM(s) Oral every 6 hours PRN Temp greater or equal to 38.5C (101.3F), Mild Pain (1 - 3)      ROS  No fever, sweats, chills  No epistaxis, HA, sore throat  No CP, SOB, cough, sputum  No n/v/d, abd pain, melena, hematochezia  No edema  No rash  No anxiety  No back pain, joint pain  No bleeding, bruising  No dysuria, hematuria    Vital Signs Last 24 Hrs  T(C): 36.6 (09 Sep 2021 08:30), Max: 36.9 (08 Sep 2021 20:39)  T(F): 97.9 (09 Sep 2021 08:30), Max: 98.4 (08 Sep 2021 20:39)  HR: 84 (09 Sep 2021 08:30) (72 - 84)  BP: 100/47 (09 Sep 2021 08:30) (100/47 - 115/72)  BP(mean): --  RR: 18 (09 Sep 2021 08:30) (18 - 18)  SpO2: 97% (09 Sep 2021 08:30) (94% - 97%)    PE  NAD  Awake, alert  Anicteric, MMM  No c/c/e  No rash grossly                          7.6    5.54  )-----------( 111      ( 08 Sep 2021 06:36 )             23.6       09-08    135  |  100  |  11  ----------------------------<  97  4.3   |  25  |  0.52    Ca    9.0      08 Sep 2021 06:36  Mg     1.9     09-08        
Patient seen and examined at bedside. feels well. eating breakfast     MEDICATIONS  (STANDING):  atorvastatin 40 milliGRAM(s) Oral at bedtime  buPROPion XL (24-Hour) . 150 milliGRAM(s) Oral daily  ertapenem  IVPB 1000 milliGRAM(s) IV Intermittent every 24 hours  influenza   Vaccine 0.5 milliLiter(s) IntraMuscular once  lactated ringers. 500 milliLiter(s) (60 mL/Hr) IV Continuous <Continuous>  levETIRAcetam 500 milliGRAM(s) Oral two times a day  levothyroxine 100 MICROGram(s) Oral daily  midodrine. 10 milliGRAM(s) Oral three times a day  pantoprazole    Tablet 40 milliGRAM(s) Oral before breakfast  ziprasidone 40 milliGRAM(s) Oral <User Schedule>    MEDICATIONS  (PRN):  acetaminophen   Tablet .. 650 milliGRAM(s) Oral every 6 hours PRN Temp greater or equal to 38.5C (101.3F), Mild Pain (1 - 3)        Vital Signs Last 24 Hrs  T(C): 36.6 (06 Sep 2021 07:56), Max: 37.2 (06 Sep 2021 04:18)  T(F): 97.9 (06 Sep 2021 07:56), Max: 98.9 (06 Sep 2021 04:18)  HR: 77 (06 Sep 2021 07:56) (51 - 93)  BP: 100/56 (06 Sep 2021 07:56) (90/55 - 104/66)  BP(mean): --  RR: 18 (06 Sep 2021 07:56) (18 - 18)  SpO2: 96% (06 Sep 2021 07:56) (94% - 100%)      PHYSICAL EXAM:     GENERAL:  Appears stated age, well-groomed  HEENT:  NC/AT,    CHEST:  CTA b/l  HEART:  S1 s2+   ABDOMEN:  Soft,   NEURO:  Alert, oriented, no asterixis                            7.5    5.46  )-----------( 69       ( 06 Sep 2021 07:14 )             23.6       09-06    137  |  101  |  16  ----------------------------<  104<H>  4.0   |  27  |  0.53    Ca    8.4      06 Sep 2021 07:14  Mg     1.7     09-06        
Pt seen, no complaints today    MEDICATIONS  (STANDING):  atorvastatin 40 milliGRAM(s) Oral at bedtime  buPROPion XL (24-Hour) . 150 milliGRAM(s) Oral daily  filgrastim-sndz (ZARXIO) Injectable 300 MICROGram(s) SubCutaneous daily  influenza   Vaccine 0.5 milliLiter(s) IntraMuscular once  lactated ringers. 500 milliLiter(s) (60 mL/Hr) IV Continuous <Continuous>  levETIRAcetam 500 milliGRAM(s) Oral two times a day  levothyroxine 100 MICROGram(s) Oral daily  meropenem  IVPB 1000 milliGRAM(s) IV Intermittent every 8 hours  midodrine. 10 milliGRAM(s) Oral three times a day  pantoprazole    Tablet 40 milliGRAM(s) Oral before breakfast  ziprasidone 40 milliGRAM(s) Oral <User Schedule>    MEDICATIONS  (PRN):  acetaminophen   Tablet .. 650 milliGRAM(s) Oral every 6 hours PRN Temp greater or equal to 38.5C (101.3F), Mild Pain (1 - 3)      ROS  no pain, no bleeding, limited 2/2 participation    Vital Signs Last 24 Hrs  T(C): 37 (04 Sep 2021 16:19), Max: 37.1 (04 Sep 2021 08:20)  T(F): 98.6 (04 Sep 2021 16:19), Max: 98.7 (04 Sep 2021 08:20)  HR: 82 (04 Sep 2021 16:19) (56 - 88)  BP: 95/65 (04 Sep 2021 16:19) (91/59 - 135/70)  BP(mean): --  RR: 18 (04 Sep 2021 16:19) (17 - 18)  SpO2: 98% (04 Sep 2021 16:19) (95% - 100%)    PE  NAD  Awake, alert  remainder of exam limited 2/2 participation                          7.5    2.91  )-----------( 64       ( 04 Sep 2021 06:57 )             23.7       09-04    138  |  104  |  12  ----------------------------<  106<H>  3.9   |  24  |  0.67    Ca    8.3<L>      04 Sep 2021 06:57  Mg     2.1     09-04        
Follow-up Pulm Progress Note    No new respiratory events overnight.  Denies SOB/CP.   Sats 98% RA    Medications:  MEDICATIONS  (STANDING):  atorvastatin 40 milliGRAM(s) Oral at bedtime  buPROPion XL (24-Hour) . 150 milliGRAM(s) Oral daily  influenza   Vaccine 0.5 milliLiter(s) IntraMuscular once  lactated ringers. 500 milliLiter(s) (60 mL/Hr) IV Continuous <Continuous>  levETIRAcetam 500 milliGRAM(s) Oral two times a day  levothyroxine 100 MICROGram(s) Oral daily  midodrine. 10 milliGRAM(s) Oral three times a day  pantoprazole    Tablet 40 milliGRAM(s) Oral before breakfast  ziprasidone 40 milliGRAM(s) Oral <User Schedule>    MEDICATIONS  (PRN):  acetaminophen   Tablet .. 650 milliGRAM(s) Oral every 6 hours PRN Temp greater or equal to 38.5C (101.3F), Mild Pain (1 - 3)          Vital Signs Last 24 Hrs  T(C): 36.6 (09 Sep 2021 08:30), Max: 36.9 (08 Sep 2021 20:39)  T(F): 97.9 (09 Sep 2021 08:30), Max: 98.4 (08 Sep 2021 20:39)  HR: 84 (09 Sep 2021 08:30) (74 - 84)  BP: 100/47 (09 Sep 2021 08:30) (100/47 - 115/72)  BP(mean): --  RR: 18 (09 Sep 2021 08:30) (18 - 18)  SpO2: 97% (09 Sep 2021 08:30) (94% - 97%)          09-08 @ 07:01  -  09-09 @ 07:00  --------------------------------------------------------  IN: 400 mL / OUT: 850 mL / NET: -450 mL          LABS:                        7.6    5.54  )-----------( 111      ( 08 Sep 2021 06:36 )             23.6     09-08    135  |  100  |  11  ----------------------------<  97  4.3   |  25  |  0.52    Ca    9.0      08 Sep 2021 06:36  Mg     1.9     09-08              CULTURES:     Culture - Blood (collected 09-02-21 @ 21:00)  Source: .Blood Blood-Peripheral  Final Report (09-07-21 @ 22:00):    No Growth Final    Culture - Blood (collected 09-02-21 @ 21:00)  Source: .Blood Blood-Peripheral  Final Report (09-07-21 @ 22:00):    No Growth Final        Culture - Urine (collected 09-02-21 @ 21:31)  Source: Clean Catch Clean Catch (Midstream)  Final Report (09-05-21 @ 11:49):    >100,000 CFU/ml Escherichia coli ESBL  Organism: Escherichia coli ESBL (09-05-21 @ 11:49)  Organism: Escherichia coli ESBL (09-05-21 @ 11:49)      -  Amikacin: S <=16      -  Amoxicillin/Clavulanic Acid: S <=8/4      -  Ampicillin: R >16 These ampicillin results predict results for amoxicillin      -  Ampicillin/Sulbactam: S 8/4 Enterobacter, Citrobacter, and Serratia may develop resistance during prolonged therapy (3-4 days)      -  Aztreonam: R >16      -  Cefazolin: R >16 (MIC_CL_[id number]) For uncomplicated UTI with K. pneumoniae, E. coli, or P. mirablis: LUCY <=16 is sensitive and LUCY >=32 is resistant. This also predicts results for oral agents cefaclor, cefdinir, cefpodoxime, cefprozil, cefuroxime axetil, cephalexin and locarbef for uncomplicated UTI. Note that some isolates may be susceptible to these agents while testing resistant to cefazolin.      -  Cefepime: R >16      -  Cefoxitin: S <=8      -  Ceftriaxone: R >32 Enterobacter, Citrobacter, and Serratia may develop resistance during prolonged therapy      -  Ciprofloxacin: R >2      -  Ertapenem: S <=0.5      -  Gentamicin: S <=2      -  Imipenem: S <=1      -  Levofloxacin: R >4      -  Meropenem: S <=1      -  Nitrofurantoin: S <=32 Should not be used to treat pyelonephritis      -  Piperacillin/Tazobactam: S <=8      -  Tigecycline: S <=2      -  Tobramycin: S <=2      -  Trimethoprim/Sulfamethoxazole: R >2/38      Method Type: LUCY              Physical Examination:  PULM: Clear to auscultation bilaterally, no significant sputum production  CVS: S1, S2 heard    RADIOLOGY REVIEWED    CT chest: < from: CT Angio Chest PE Protocol w/ IV Cont (09.02.21 @ 18:47) >  FINDINGS:  CHEST:  LUNGS AND LARGE AIRWAYS: Patent central airways. No pulmonary nodules or parenchymal consolidation.  PLEURA: No pleural effusion.  VESSELS: No pulmonary embolism.  HEART: Heart size is normal. No pericardial effusion.  MEDIASTINUM AND RAFAEL: No lymphadenopathy.  CHEST WALL AND LOWER NECK: Within normal limits.    ABDOMEN AND PELVIS:  LIVER: Within normal limits.  BILE DUCTS: Normal caliber.  GALLBLADDER: Within normal limits.  SPLEEN: There are two hypodense splenic lesions. One of which is peripherally located and well circumscribed measuring 1.8 x 2.0 cm. The other lesion is ill-defined and measures 1.5 x 1.8 cm. Both are new from 2019.  PANCREAS: Within normal limits.  ADRENALS: Within normal limits.  KIDNEYS/URETERS: Evaluation of distal ureters is limited by streak artifact. Visualized portions are within normal limits.    BLADDER: Evaluation limited by streak artifact. Visualized portions within normal limits.  REPRODUCTIVE ORGANS: There is limited by streak artifact.    BOWEL: No bowel obstruction. Appendix is normal.  PERITONEUM: No ascites.  VESSELS: Atherosclerotic changes.  RETROPERITONEUM/LYMPH NODES: No lymphadenopathy.  ABDOMINAL WALL: Tiny fat-containing umbilical hernia.  BONES: Status post right hip arthroplasty. Degenerative changes.    IMPRESSION:    Two hypodense splenic lesions asnoted above, which are new from 2019, most likely related to patient's known diffuse large B-cell lymphoma.    No pulmonary embolism.    No acute septic pathology or fluid collection.        < end of copied text >    
Name of Patient : WILI PARKER  MRN: [id number]  Date of visit: 09-04-21       Subjective: Patient seen and examined. No new events except as noted.   doing okay  feeling betetr  afebrile     REVIEW OF SYSTEMS:    CONSTITUTIONAL: No weakness, fevers or chills  EYES/ENT: No visual changes;  No vertigo or throat pain   NECK: No pain or stiffness  RESPIRATORY: No cough, wheezing, hemoptysis; No shortness of breath  CARDIOVASCULAR: No chest pain or palpitations  GASTROINTESTINAL: No abdominal or epigastric pain. No nausea, vomiting, or hematemesis; No diarrhea or constipation. No melena or hematochezia.  GENITOURINARY: No dysuria, frequency or hematuria  NEUROLOGICAL: No numbness or weakness  SKIN: No itching, burning, rashes, or lesions   All other review of systems is negative unless indicated above.    MEDICATIONS:  MEDICATIONS  (STANDING):  atorvastatin 40 milliGRAM(s) Oral at bedtime  buPROPion XL (24-Hour) . 150 milliGRAM(s) Oral daily  filgrastim-sndz (ZARXIO) Injectable 300 MICROGram(s) SubCutaneous daily  influenza   Vaccine 0.5 milliLiter(s) IntraMuscular once  lactated ringers. 500 milliLiter(s) (60 mL/Hr) IV Continuous <Continuous>  levETIRAcetam 500 milliGRAM(s) Oral two times a day  levothyroxine 100 MICROGram(s) Oral daily  meropenem  IVPB 1000 milliGRAM(s) IV Intermittent every 8 hours  midodrine. 10 milliGRAM(s) Oral three times a day  pantoprazole    Tablet 40 milliGRAM(s) Oral before breakfast  ziprasidone 40 milliGRAM(s) Oral <User Schedule>      PHYSICAL EXAM:  T(C): 37 (09-04-21 @ 16:19), Max: 37.1 (09-04-21 @ 08:20)  HR: 82 (09-04-21 @ 16:19) (56 - 88)  BP: 95/65 (09-04-21 @ 16:19) (91/59 - 135/70)  RR: 18 (09-04-21 @ 16:19) (17 - 18)  SpO2: 98% (09-04-21 @ 16:19) (95% - 100%)  Wt(kg): --  I&O's Summary    03 Sep 2021 07:01  -  04 Sep 2021 07:00  --------------------------------------------------------  IN: 600 mL / OUT: 1350 mL / NET: -750 mL    04 Sep 2021 07:01  -  04 Sep 2021 18:41  --------------------------------------------------------  IN: 465 mL / OUT: 400 mL / NET: 65 mL          Appearance: Normal	  HEENT:  PERRLA   Lymphatic: No lymphadenopathy   Cardiovascular: Normal S1 S2, no JVD  Respiratory: normal effort , clear  Gastrointestinal:  Soft, Non-tender  Skin: No rashes,  warm to touch  Psychiatry:  Mood & affect appropriate  Musculuskeletal: No edema      All labs, Imaging and EKGs personally reviewed       09-03-21 @ 07:01  -  09-04-21 @ 07:00  --------------------------------------------------------  IN: 600 mL / OUT: 1350 mL / NET: -750 mL    09-04-21 @ 07:01  -  09-04-21 @ 18:41  --------------------------------------------------------  IN: 465 mL / OUT: 400 mL / NET: 65 mL                            7.5    2.91  )-----------( 64       ( 04 Sep 2021 06:57 )             23.7               09-04    138  |  104  |  12  ----------------------------<  106<H>  3.9   |  24  |  0.67    Ca    8.3<L>      04 Sep 2021 06:57  Mg     2.1     09-04                                 
Name of Patient : WILI PARKER  MRN: [id number]  Date of visit: 09-06-21 @ 17:44      Subjective: Patient seen and examined. No new events except as noted.   doing okay     REVIEW OF SYSTEMS:    CONSTITUTIONAL: No weakness, fevers or chills  EYES/ENT: No visual changes;  No vertigo or throat pain   NECK: No pain or stiffness  RESPIRATORY: No cough, wheezing, hemoptysis; No shortness of breath  CARDIOVASCULAR: No chest pain or palpitations  GASTROINTESTINAL: No abdominal or epigastric pain.   GENITOURINARY: No dysuria, frequency or hematuria  NEUROLOGICAL: No numbness or weakness  SKIN: No itching, burning, rashes, or lesions   All other review of systems is negative unless indicated above.    MEDICATIONS:  MEDICATIONS  (STANDING):  atorvastatin 40 milliGRAM(s) Oral at bedtime  buPROPion XL (24-Hour) . 150 milliGRAM(s) Oral daily  ertapenem  IVPB 1000 milliGRAM(s) IV Intermittent every 24 hours  influenza   Vaccine 0.5 milliLiter(s) IntraMuscular once  lactated ringers. 500 milliLiter(s) (60 mL/Hr) IV Continuous <Continuous>  levETIRAcetam 500 milliGRAM(s) Oral two times a day  levothyroxine 100 MICROGram(s) Oral daily  midodrine. 10 milliGRAM(s) Oral three times a day  pantoprazole    Tablet 40 milliGRAM(s) Oral before breakfast  ziprasidone 40 milliGRAM(s) Oral <User Schedule>      PHYSICAL EXAM:  T(C): 36.6 (09-06-21 @ 15:59), Max: 37.2 (09-06-21 @ 04:18)  HR: 81 (09-06-21 @ 15:59) (51 - 93)  BP: 118/56 (09-06-21 @ 15:59) (90/55 - 118/56)  RR: 18 (09-06-21 @ 15:59) (18 - 18)  SpO2: 100% (09-06-21 @ 15:59) (94% - 100%)  Wt(kg): --  I&O's Summary    05 Sep 2021 07:01  -  06 Sep 2021 07:00  --------------------------------------------------------  IN: 970 mL / OUT: 950 mL / NET: 20 mL    06 Sep 2021 07:01  -  06 Sep 2021 17:44  --------------------------------------------------------  IN: 360 mL / OUT: 350 mL / NET: 10 mL          Appearance: Normal	  HEENT:  PERRLA   Lymphatic: No lymphadenopathy   Cardiovascular: Normal S1 S2, no JVD  Respiratory: normal effort , clear  Gastrointestinal:  Soft, Non-tender  Skin: No rashes,  warm to touch  Psychiatry:  Mood & affect appropriate  Musculuskeletal: No edema      All labs, Imaging and EKGs personally reviewed     09-05-21 @ 07:01  -  09-06-21 @ 07:00  --------------------------------------------------------  IN: 970 mL / OUT: 950 mL / NET: 20 mL    09-06-21 @ 07:01  -  09-06-21 @ 17:44  --------------------------------------------------------  IN: 360 mL / OUT: 350 mL / NET: 10 mL                          7.5    5.46  )-----------( 69       ( 06 Sep 2021 07:14 )             23.6               09-06    137  |  101  |  16  ----------------------------<  104<H>  4.0   |  27  |  0.53    Ca    8.4      06 Sep 2021 07:14  Mg     1.7     09-06                                   
Name of Patient : WILI PARKER  MRN: [id number]  Date of visit:       Subjective: Patient seen and examined. No new events except as noted.   doing okay     REVIEW OF SYSTEMS:    CONSTITUTIONAL: No weakness, fevers or chills  EYES/ENT: No visual changes;  No vertigo or throat pain   NECK: No pain or stiffness  RESPIRATORY: No cough, wheezing, hemoptysis; No shortness of breath  CARDIOVASCULAR: No chest pain or palpitations  GASTROINTESTINAL: No abdominal or epigastric pain.   GENITOURINARY: No dysuria, frequency or hematuria  NEUROLOGICAL: No numbness or weakness  SKIN: No itching, burning, rashes, or lesions   All other review of systems is negative unless indicated above.    MEDICATIONS  (STANDING):  atorvastatin 40 milliGRAM(s) Oral at bedtime  buPROPion XL (24-Hour) . 150 milliGRAM(s) Oral daily  ertapenem  IVPB 1000 milliGRAM(s) IV Intermittent every 24 hours  influenza   Vaccine 0.5 milliLiter(s) IntraMuscular once  lactated ringers. 500 milliLiter(s) (60 mL/Hr) IV Continuous <Continuous>  levETIRAcetam 500 milliGRAM(s) Oral two times a day  levothyroxine 100 MICROGram(s) Oral daily  midodrine. 10 milliGRAM(s) Oral three times a day  pantoprazole    Tablet 40 milliGRAM(s) Oral before breakfast  ziprasidone 40 milliGRAM(s) Oral <User Schedule>    MEDICATIONS  (PRN):  acetaminophen   Tablet .. 650 milliGRAM(s) Oral every 6 hours PRN Temp greater or equal to 38.5C (101.3F), Mild Pain (1 - 3)    Vital Signs Last 24 Hrs  T(C): 36.7 (21 @ 16:17), Max: 98.3 (21 @ 08:45)  T(F): 98 (21 @ 16:17), Max: 208.9 (21 @ 08:45)  HR: 76 (21 @ 16:17) (72 - 86)  BP: 94/56 (21 @ 16:17) (93/62 - 103/48)  BP(mean): --  RR: 18 (21 @ 16:17) (18 - 18)  SpO2: 95% (21 @ 16:17) (94% - 100%)    Appearance: Normal	  HEENT:  PERRLA   Lymphatic: No lymphadenopathy   Cardiovascular: Normal S1 S2, no JVD  Respiratory: normal effort , clear  Gastrointestinal:  Soft, Non-tender  Skin: No rashes,  warm to touch  Psychiatry:  Mood & affect appropriate  Musculuskeletal: No edema      All labs, Imaging and EKGs personally reviewed     LABS:      137  |  101  |  16  ----------------------------<  104<H>  4.0   |  27  |  0.53    Ca    8.4      06 Sep 2021 07:14  Mg     1.7           Creatinine Trend: 0.53 <--, 0.63 <--, 0.67 <--, 0.75 <--, 0.86 <--, 1.16 <--                        7.5    6.99  )-----------( 88       ( 07 Sep 2021 06:37 )             23.4     Urine Studies:  Urinalysis Basic - ( 02 Sep 2021 17:07 )    Color: Yellow / Appearance: Slightly Turbid / S.016 / pH:   Gluc:  / Ketone: Trace  / Bili: Negative / Urobili: 4 mg/dL   Blood:  / Protein: 100 mg/dL / Nitrite: Positive   Leuk Esterase: Moderate / RBC: 5 /hpf / WBC 17 /HPF   Sq Epi:  / Non Sq Epi: 3 /hpf / Bacteria: Many                                        
Follow-up Pulm Progress Note    No new respiratory events overnight.  Denies SOB/CP.   Sats 95% RA    Medications:  MEDICATIONS  (STANDING):  atorvastatin 40 milliGRAM(s) Oral at bedtime  buPROPion XL (24-Hour) . 150 milliGRAM(s) Oral daily  ertapenem  IVPB 1000 milliGRAM(s) IV Intermittent every 24 hours  influenza   Vaccine 0.5 milliLiter(s) IntraMuscular once  lactated ringers. 500 milliLiter(s) (60 mL/Hr) IV Continuous <Continuous>  levETIRAcetam 500 milliGRAM(s) Oral two times a day  levothyroxine 100 MICROGram(s) Oral daily  midodrine. 10 milliGRAM(s) Oral three times a day  pantoprazole    Tablet 40 milliGRAM(s) Oral before breakfast  ziprasidone 40 milliGRAM(s) Oral <User Schedule>    MEDICATIONS  (PRN):  acetaminophen   Tablet .. 650 milliGRAM(s) Oral every 6 hours PRN Temp greater or equal to 38.5C (101.3F), Mild Pain (1 - 3)          Vital Signs Last 24 Hrs  T(C): 36.7 (08 Sep 2021 11:23), Max: 36.7 (07 Sep 2021 16:17)  T(F): 98.1 (08 Sep 2021 11:23), Max: 98.1 (08 Sep 2021 11:23)  HR: 72 (08 Sep 2021 11:23) (70 - 78)  BP: 102/69 (08 Sep 2021 11:23) (94/56 - 134/63)  BP(mean): --  RR: 18 (08 Sep 2021 11:23) (18 - 18)  SpO2: 96% (08 Sep 2021 11:23) (94% - 96%)          09-07 @ 07:01  -  09-08 @ 07:00  --------------------------------------------------------  IN: 300 mL / OUT: 750 mL / NET: -450 mL          LABS:                        7.6    5.54  )-----------( 111      ( 08 Sep 2021 06:36 )             23.6     09-08    135  |  100  |  11  ----------------------------<  97  4.3   |  25  |  0.52    Ca    9.0      08 Sep 2021 06:36  Mg     1.9     09-08                CULTURES:     Culture - Blood (collected 09-02-21 @ 21:00)  Source: .Blood Blood-Peripheral  Final Report (09-07-21 @ 22:00):    No Growth Final    Culture - Blood (collected 09-02-21 @ 21:00)  Source: .Blood Blood-Peripheral  Final Report (09-07-21 @ 22:00):    No Growth Final        Culture - Urine (collected 09-02-21 @ 21:31)  Source: Clean Catch Clean Catch (Midstream)  Final Report (09-05-21 @ 11:49):    >100,000 CFU/ml Escherichia coli ESBL  Organism: Escherichia coli ESBL (09-05-21 @ 11:49)  Organism: Escherichia coli ESBL (09-05-21 @ 11:49)      -  Amikacin: S <=16      -  Amoxicillin/Clavulanic Acid: S <=8/4      -  Ampicillin: R >16 These ampicillin results predict results for amoxicillin      -  Ampicillin/Sulbactam: S 8/4 Enterobacter, Citrobacter, and Serratia may develop resistance during prolonged therapy (3-4 days)      -  Aztreonam: R >16      -  Cefazolin: R >16 (MIC_CL_COM_ENTERIC_CEFAZU) For uncomplicated UTI with K. pneumoniae, E. coli, or P. mirablis: LUCY <=16 is sensitive and LUCY >=32 is resistant. This also predicts results for oral agents cefaclor, cefdinir, cefpodoxime, cefprozil, cefuroxime axetil, cephalexin and locarbef for uncomplicated UTI. Note that some isolates may be susceptible to these agents while testing resistant to cefazolin.      -  Cefepime: R >16      -  Cefoxitin: S <=8      -  Ceftriaxone: R >32 Enterobacter, Citrobacter, and Serratia may develop resistance during prolonged therapy      -  Ciprofloxacin: R >2      -  Ertapenem: S <=0.5      -  Gentamicin: S <=2      -  Imipenem: S <=1      -  Levofloxacin: R >4      -  Meropenem: S <=1      -  Nitrofurantoin: S <=32 Should not be used to treat pyelonephritis      -  Piperacillin/Tazobactam: S <=8      -  Tigecycline: S <=2      -  Tobramycin: S <=2      -  Trimethoprim/Sulfamethoxazole: R >2/38      Method Type: LUCY                Physical Examination:  PULM: Clear to auscultation bilaterally, no significant sputum production  CVS: S1, S2 heard    RADIOLOGY REVIEWED    CT chest: < from: CT Angio Chest PE Protocol w/ IV Cont (09.02.21 @ 18:47) >  FINDINGS:  CHEST:  LUNGS AND LARGE AIRWAYS: Patent central airways. No pulmonary nodules or parenchymal consolidation.  PLEURA: No pleural effusion.  VESSELS: No pulmonary embolism.  HEART: Heart size is normal. No pericardial effusion.  MEDIASTINUM AND RAFAEL: No lymphadenopathy.  CHEST WALL AND LOWER NECK: Within normal limits.    ABDOMEN AND PELVIS:  LIVER: Within normal limits.  BILE DUCTS: Normal caliber.  GALLBLADDER: Within normal limits.  SPLEEN: There are two hypodense splenic lesions. One of which is peripherally located and well circumscribed measuring 1.8 x 2.0 cm. The other lesion is ill-defined and measures 1.5 x 1.8 cm. Both are new from 2019.  PANCREAS: Within normal limits.  ADRENALS: Within normal limits.  KIDNEYS/URETERS: Evaluation of distal ureters is limited by streak artifact. Visualized portions are within normal limits.    BLADDER: Evaluation limited by streak artifact. Visualized portions within normal limits.  REPRODUCTIVE ORGANS: There is limited by streak artifact.    BOWEL: No bowel obstruction. Appendix is normal.  PERITONEUM: No ascites.  VESSELS: Atherosclerotic changes.  RETROPERITONEUM/LYMPH NODES: No lymphadenopathy.  ABDOMINAL WALL: Tiny fat-containing umbilical hernia.  BONES: Status post right hip arthroplasty. Degenerative changes.    IMPRESSION:    Two hypodense splenic lesions asnoted above, which are new from 2019, most likely related to patient's known diffuse large B-cell lymphoma.    No pulmonary embolism.    No acute septic pathology or fluid collection.        < end of copied text >        
Name of Patient : WILI PARKER  MRN: [id number]  Date of visit: 21      Subjective: Patient seen and examined.     MEDICATIONS  (STANDING):  atorvastatin 40 milliGRAM(s) Oral at bedtime  buPROPion XL (24-Hour) . 150 milliGRAM(s) Oral daily  influenza   Vaccine 0.5 milliLiter(s) IntraMuscular once  lactated ringers. 500 milliLiter(s) (60 mL/Hr) IV Continuous <Continuous>  levETIRAcetam 500 milliGRAM(s) Oral two times a day  levothyroxine 100 MICROGram(s) Oral daily  midodrine. 10 milliGRAM(s) Oral three times a day  pantoprazole    Tablet 40 milliGRAM(s) Oral before breakfast  ziprasidone 40 milliGRAM(s) Oral <User Schedule>    MEDICATIONS  (PRN):  acetaminophen   Tablet .. 650 milliGRAM(s) Oral every 6 hours PRN Temp greater or equal to 38.5C (101.3F), Mild Pain (1 - 3)    Vital Signs Last 24 Hrs  T(C): 36.9 (08 Sep 2021 20:39), Max: 36.9 (08 Sep 2021 20:39)  T(F): 98.4 (08 Sep 2021 20:39), Max: 98.4 (08 Sep 2021 20:39)  HR: 78 (08 Sep 2021 20:39) (72 - 78)  BP: 115/72 (08 Sep 2021 20:39) (102/58 - 134/63)  BP(mean): --  RR: 18 (08 Sep 2021 20:39) (18 - 18)  SpO2: 95% (08 Sep 2021 20:39) (94% - 96%)  Appearance: Normal	  HEENT:  PERRLA   Lymphatic: No lymphadenopathy   Cardiovascular: Normal S1 S2, no JVD  Respiratory: normal effort , clear  Gastrointestinal:  Soft, Non-tender  Skin: No rashes,  warm to touch  Psychiatry:  Mood & affect appropriate  Musculuskeletal: No edema      All labs, Imaging and EKGs personally reviewed     LABS:      135  |  100  |  11  ----------------------------<  97  4.3   |  25  |  0.52    Ca    9.0      08 Sep 2021 06:36  Mg     1.9           Creatinine Trend: 0.52 <--, 0.53 <--, 0.63 <--, 0.67 <--, 0.75 <--, 0.86 <--, 1.16 <--                        7.6    5.54  )-----------( 111      ( 08 Sep 2021 06:36 )             23.6     Urine Studies:  Urinalysis Basic - ( 02 Sep 2021 17:07 )    Color: Yellow / Appearance: Slightly Turbid / S.016 / pH:   Gluc:  / Ketone: Trace  / Bili: Negative / Urobili: 4 mg/dL   Blood:  / Protein: 100 mg/dL / Nitrite: Positive   Leuk Esterase: Moderate / RBC: 5 /hpf / WBC 17 /HPF   Sq Epi:  / Non Sq Epi: 3 /hpf / Bacteria: Many                                          
Name of Patient : WILI PARKER  MRN: [id number]  Date of visit: 21       Subjective: Patient seen and examined. No new events except as noted.     REVIEW OF SYSTEMS:    CONSTITUTIONAL: No weakness, fevers or chills  EYES/ENT: No visual changes;  No vertigo or throat pain   NECK: No pain or stiffness  RESPIRATORY: No cough, wheezing, hemoptysis; No shortness of breath  CARDIOVASCULAR: No chest pain or palpitations  GASTROINTESTINAL: No abdominal or epigastric pain. No nausea, vomiting, or hematemesis; No diarrhea or constipation. No melena or hematochezia.  GENITOURINARY: No dysuria, frequency or hematuria  NEUROLOGICAL: No numbness or weakness  SKIN: No itching, burning, rashes, or lesions   All other review of systems is negative unless indicated above.    MEDICATIONS:  MEDICATIONS  (STANDING):  atorvastatin 40 milliGRAM(s) Oral at bedtime  buPROPion XL (24-Hour) . 150 milliGRAM(s) Oral daily  filgrastim-sndz (ZARXIO) Injectable 300 MICROGram(s) SubCutaneous daily  influenza   Vaccine 0.5 milliLiter(s) IntraMuscular once  lactated ringers. 500 milliLiter(s) (60 mL/Hr) IV Continuous <Continuous>  levETIRAcetam 500 milliGRAM(s) Oral two times a day  levothyroxine 100 MICROGram(s) Oral daily  meropenem  IVPB 1000 milliGRAM(s) IV Intermittent every 8 hours  midodrine. 10 milliGRAM(s) Oral three times a day  pantoprazole    Tablet 40 milliGRAM(s) Oral before breakfast  ziprasidone 40 milliGRAM(s) Oral <User Schedule>      PHYSICAL EXAM:  T(C): 36.8 (21 @ 16:03), Max: 36.8 (21 @ 22:36)  HR: 82 (21 @ 16:03) (73 - 97)  BP: 100/52 (21 @ 16:03) (88/58 - 108/58)  RR: 17 (21 @ 16:03) (17 - 21)  SpO2: 100% (21 @ 16:03) (96% - 100%)  Wt(kg): --  I&O's Summary    03 Sep 2021 07:01  -  03 Sep 2021 19:47  --------------------------------------------------------  IN: 600 mL / OUT: 600 mL / NET: 0 mL          Appearance: Normal	  HEENT:  PERRLA   Lymphatic: No lymphadenopathy   Cardiovascular: Normal S1 S2, no JVD  Respiratory: normal effort , clear  Gastrointestinal:  Soft, Non-tender  Skin: No rashes,  warm to touch  Psychiatry:  Mood & affect appropriate  Musculuskeletal: No edema      All labs, Imaging and EKGs personally reviewed     21 @ 07:01  -  21 @ 19:47  --------------------------------------------------------  IN: 600 mL / OUT: 600 mL / NET: 0 mL                          7.6    8.61  )-----------( 241      ( 03 Sep 2021 15:41 )             24.8                   139  |  100  |  17  ----------------------------<  102<H>  3.3<L>   |  29  |  0.75    Ca    8.1<L>      03 Sep 2021 15:41  Mg     2.0         TPro  5.7<L>  /  Alb  3.4  /  TBili  1.3<H>  /  DBili  x   /  AST  34  /  ALT  37  /  AlkPhos  141<H>      PT/INR - ( 02 Sep 2021 16:43 )   PT: 16.9 sec;   INR: 1.43 ratio         PTT - ( 02 Sep 2021 16:43 )  PTT:23.8 sec                   Urinalysis Basic - ( 02 Sep 2021 17:07 )    Color: Yellow / Appearance: Slightly Turbid / S.016 / pH: x  Gluc: x / Ketone: Trace  / Bili: Negative / Urobili: 4 mg/dL   Blood: x / Protein: 100 mg/dL / Nitrite: Positive   Leuk Esterase: Moderate / RBC: 5 /hpf / WBC 17 /HPF   Sq Epi: x / Non Sq Epi: 3 /hpf / Bacteria: Many              
Name of Patient : WILI PARKER  MRN: [id number]  Date of visit: 09-09-21       Subjective: Patient seen and examined. No new events except as noted.   Doing okay  D/C Planing         REVIEW OF SYSTEMS:    CONSTITUTIONAL: No weakness, fevers or chills  EYES/ENT: No visual changes;  No vertigo or throat pain   NECK: No pain or stiffness  RESPIRATORY: No cough, wheezing, hemoptysis; No shortness of breath  CARDIOVASCULAR: No chest pain or palpitations  GASTROINTESTINAL: No abdominal or epigastric pain. No nausea, vomiting, or hematemesis; No diarrhea or constipation. No melena or hematochezia.  GENITOURINARY: No dysuria, frequency or hematuria  NEUROLOGICAL: No numbness or weakness  SKIN: No itching, burning, rashes, or lesions   All other review of systems is negative unless indicated above.    MEDICATIONS:  MEDICATIONS  (STANDING):  atorvastatin 40 milliGRAM(s) Oral at bedtime  buPROPion XL (24-Hour) . 150 milliGRAM(s) Oral daily  influenza   Vaccine 0.5 milliLiter(s) IntraMuscular once  lactated ringers. 500 milliLiter(s) (60 mL/Hr) IV Continuous <Continuous>  levETIRAcetam 500 milliGRAM(s) Oral two times a day  levothyroxine 100 MICROGram(s) Oral daily  midodrine. 10 milliGRAM(s) Oral three times a day  pantoprazole    Tablet 40 milliGRAM(s) Oral before breakfast  ziprasidone 40 milliGRAM(s) Oral <User Schedule>      PHYSICAL EXAM:  T(C): 36.6 (09-09-21 @ 11:35), Max: 36.8 (09-09-21 @ 00:12)  HR: 93 (09-09-21 @ 11:35) (74 - 93)  BP: 105/68 (09-09-21 @ 11:35) (100/47 - 105/68)  RR: 18 (09-09-21 @ 11:35) (18 - 18)  SpO2: 98% (09-09-21 @ 11:35) (94% - 98%)  Wt(kg): --  I&O's Summary    08 Sep 2021 07:01  -  09 Sep 2021 07:00  --------------------------------------------------------  IN: 400 mL / OUT: 850 mL / NET: -450 mL          Appearance: Normal	  HEENT:  PERRLA   Lymphatic: No lymphadenopathy   Cardiovascular: Normal S1 S2, no JVD  Respiratory: normal effort , clear  Gastrointestinal:  Soft, Non-tender  Skin: No rashes,  warm to touch  Psychiatry:  Mood & affect appropriate  Musculuskeletal: No edema      All labs, Imaging and EKGs personally reviewed     09-08-21 @ 07:01  -  09-09-21 @ 07:00  --------------------------------------------------------  IN: 400 mL / OUT: 850 mL / NET: -450 mL                          7.6    5.54  )-----------( 111      ( 08 Sep 2021 06:36 )             23.6               09-08    135  |  100  |  11  ----------------------------<  97  4.3   |  25  |  0.52    Ca    9.0      08 Sep 2021 06:36  Mg     1.9     09-08                                   
Name of Patient : WILI PARKER  MRN: [id number]  Date of visit: 09-05-21    Subjective: Patient seen and examined. No new events except as noted.   doing okay   stable and afebrile     REVIEW OF SYSTEMS:    CONSTITUTIONAL: No weakness, fevers or chills  EYES/ENT: No visual changes;  No vertigo or throat pain   NECK: No pain or stiffness  RESPIRATORY: No cough, wheezing, hemoptysis; No shortness of breath  CARDIOVASCULAR: No chest pain or palpitations  GASTROINTESTINAL: No abdominal or epigastric pain. No nausea, vomiting, or hematemesis; No diarrhea or constipation. No melena or hematochezia.  GENITOURINARY: No dysuria, frequency or hematuria  NEUROLOGICAL: No numbness or weakness  SKIN: No itching, burning, rashes, or lesions   All other review of systems is negative unless indicated above.    MEDICATIONS:  MEDICATIONS  (STANDING):  atorvastatin 40 milliGRAM(s) Oral at bedtime  buPROPion XL (24-Hour) . 150 milliGRAM(s) Oral daily  ertapenem  IVPB 1000 milliGRAM(s) IV Intermittent every 24 hours  filgrastim-sndz (ZARXIO) Injectable 300 MICROGram(s) SubCutaneous daily  influenza   Vaccine 0.5 milliLiter(s) IntraMuscular once  lactated ringers. 500 milliLiter(s) (60 mL/Hr) IV Continuous <Continuous>  levETIRAcetam 500 milliGRAM(s) Oral two times a day  levothyroxine 100 MICROGram(s) Oral daily  midodrine. 10 milliGRAM(s) Oral three times a day  pantoprazole    Tablet 40 milliGRAM(s) Oral before breakfast  ziprasidone 40 milliGRAM(s) Oral <User Schedule>      PHYSICAL EXAM:  T(C): 36.6 (09-05-21 @ 16:45), Max: 37.2 (09-04-21 @ 21:02)  HR: 70 (09-05-21 @ 16:45) (58 - 87)  BP: 95/42 (09-05-21 @ 16:45) (90/55 - 108/66)  RR: 18 (09-05-21 @ 16:45) (18 - 18)  SpO2: 100% (09-05-21 @ 16:45) (96% - 100%)  Wt(kg): --  I&O's Summary    04 Sep 2021 07:01  -  05 Sep 2021 07:00  --------------------------------------------------------  IN: 865 mL / OUT: 400 mL / NET: 465 mL    05 Sep 2021 07:01  -  05 Sep 2021 18:22  --------------------------------------------------------  IN: 520 mL / OUT: 500 mL / NET: 20 mL          Appearance: Normal	  HEENT:  PERRLA   Lymphatic: No lymphadenopathy   Cardiovascular: Normal S1 S2, no JVD  Respiratory: normal effort , clear  Gastrointestinal:  Soft, Non-tender  Skin: No rashes,  warm to touch  Psychiatry:  Mood & affect appropriate  Musculuskeletal: No edema      All labs, Imaging and EKGs personally reviewed     09-04-21 @ 07:01  -  09-05-21 @ 07:00  --------------------------------------------------------  IN: 865 mL / OUT: 400 mL / NET: 465 mL    09-05-21 @ 07:01  -  09-05-21 @ 18:22  --------------------------------------------------------  IN: 520 mL / OUT: 500 mL / NET: 20 mL                            7.5    4.24  )-----------( 64       ( 05 Sep 2021 06:49 )             23.9               09-05    140  |  104  |  17  ----------------------------<  114<H>  4.1   |  26  |  0.63    Ca    8.3<L>      05 Sep 2021 06:49  Mg     2.1     09-04

## 2021-09-09 NOTE — DISCHARGE NOTE PROVIDER - DETAILS OF MALNUTRITION DIAGNOSIS/DIAGNOSES
This patient has been assessed with a concern for Malnutrition and was treated during this hospitalization for the following Nutrition diagnosis/diagnoses:     -  09/08/2021: Moderate protein-calorie malnutrition   -  09/04/2021: Mild protein-calorie malnutrition

## 2021-09-09 NOTE — DISCHARGE NOTE NURSING/CASE MANAGEMENT/SOCIAL WORK - PATIENT PORTAL LINK FT
You can access the FollowMyHealth Patient Portal offered by [facility] by registering at the following website: [email or web address]. By joining [facility]’s FollowMyHealth portal, you will also be able to view your health information using other applications (apps) compatible with our system.

## 2021-09-09 NOTE — PROGRESS NOTE ADULT - PROVIDER SPECIALTY LIST ADULT
Heme/Onc
Infectious Disease
Heme/Onc
Infectious Disease
Internal Medicine
Pulmonology
Internal Medicine
Pulmonology
Internal Medicine

## 2021-09-09 NOTE — DISCHARGE NOTE PROVIDER - NSDCFUADDAPPT_GEN_ALL_CORE_FT
APPTS ARE READY TO BE MADE: [x] YES    Best Family or Patient Contact (if needed):  Liang bell [phone]  Additional Information about above appointments (if needed):    1:   2:   3:     Other comments or requests:    APPTS ARE READY TO BE MADE: [x] YES    Best Family or Patient Contact (if needed):  Liang bell [phone]  Additional Information about above appointments (if needed):    1: Patient was previously scheduled with Dr. Eduardo Jauregui on 09/16  2:   3:     Other comments or requests:

## 2021-09-09 NOTE — PROGRESS NOTE ADULT - PROBLEM SELECTOR PROBLEM 2
Acute UTI
Acute cystitis
Acute UTI
Acute cystitis

## 2021-09-09 NOTE — PROGRESS NOTE ADULT - PROBLEM SELECTOR PLAN 2
UC +ESBL E.Coli  -BC NGTD   -Abx per ID.
pt with dysuria and +UA, found to be septic  - plan as above
pt with dysuria and +UA, found to be septic  - plan as above
- ertapenem as per ID   today last dose
pt with dysuria and +UA, found to be septic  - plan as above
UC +ESBL E.Coli  -BC NGTD   -S/p abx per ID.
pt with dysuria and +UA, found to be septic  - plan as above
pt with dysuria and +UA, found to be septic  - plan as above
- ertapenem as per ID   today last dose

## 2021-09-09 NOTE — PROGRESS NOTE ADULT - PROBLEM SELECTOR PLAN 5
prolonged QTC >530  likely in setting of electrolyte abn  correct lytes and repeat EKG   monitor on tele
prolonged QTC >530  likely in setting of electrolyte abn  correct lytes and repeat EKG in AM  monitor on tele

## 2021-09-09 NOTE — PROGRESS NOTE ADULT - PROBLEM SELECTOR PLAN 4
hypoK 2.7, likely in setting of poor PO and GI losses; also with hypoMg, s/p 2g Mg IV   will c/t aggressively replete with PO and IV K   monitor daily electrolytes
REPLETED   will f/u
hypoK 2.7, likely in setting of poor PO and GI losses; also with hypoMg, s/p 2g Mg IV   will c/t aggressively replete with PO and IV K   monitor daily electrolytes
hypoK 2.7, likely in setting of poor PO and GI losses; also with hypoMg, s/p 2g Mg IV   will c/t aggressively replete with PO and IV K   repeat BMP in AM
hypoK 2.7, likely in setting of poor PO and GI losses; also with hypoMg, s/p 2g Mg IV   will c/t aggressively replete with PO and IV K   monitor daily electrolytes
hypoK 2.7, likely in setting of poor PO and GI losses; also with hypoMg, s/p 2g Mg IV   will c/t aggressively replete with PO and IV K   monitor daily electrolytes
REPLETED   will f/u

## 2021-09-09 NOTE — DISCHARGE NOTE NURSING/CASE MANAGEMENT/SOCIAL WORK - NSDCFUADDAPPT_GEN_ALL_CORE_FT
APPTS ARE READY TO BE MADE: [x] YES    Best Family or Patient Contact (if needed):  Liang bell [phone]  Additional Information about above appointments (if needed):    1:   2:   3:     Other comments or requests:

## 2021-09-09 NOTE — PROGRESS NOTE ADULT - ASSESSMENT
Hematology/Oncology consulted on this 70 year old female with history of late relapse of tDLBCL stage III. Initially diagnosed in 2006 stage 4 low burden, followed by with mild progression until transformation/POD tDLBCL in 2015 . She was treated with G-CHOP+venetoclax x 6 on CR. Incidentally identified POD in april 2021. BMB shows known DNM T3A mutation. She presents today with hypotension, tachycardia, vomiting and poor PO intake.     DLBCL  --under[place] [facility] [place] Eduardo Jauregui  --Currently on tafasitamib (LD 8/23/2021) and PO lenalidomide 25 mg PO daily 21/28 days (LD 8/28)  --Recent PET/CT shows response to current chemo regimen  --Chemo on hold during hospitalization  --Treatment will resume after discharge upon f/u with [facility]    Sepsis  --Urine culture was positive for Ecoli , neg blood cx  --Last day of ABX yesterday 9/8/2021  --remains afebrile  -- ID following     Leukopenia  --Received several days of Neupogen - has been DCd  -- resolved    Anemia  --Secondary to chemotherapy  --Will monitor - if worsens will check for treatable causes  --Please transfuse PRBCs if Hgb <7.0 grams  --stable    Thrombocytopenia  --Secondary to chemotherapy, infection, abx  --improved to 111K  --Please transfuse platelets if <10K or <50K with bleeding or prior to any procedure or <100K prior to any high risk procedures    GOC  --Patient is currently DNR  --GOC has been discussed with patient and her son, Liang by Dr. Jauregui  --Patient may benefit from palliative care consult while in hospital    We will continue to follow patient and coordinate with [facility]    Tyson Dugan PA-C  Hematology/Oncology  [facility]   [phone] (cell)  [phone] (office)  [phone] (alt office)  Evenings and weekends please call MD on call or office

## 2021-09-09 NOTE — PROGRESS NOTE ADULT - PROBLEM SELECTOR PLAN 6
c/w ziprasidone, buproprion and keppra   will need to clarify medications with son in AM to confirm doses
c/w ziprasidone, buproprion and keppra
c/w ziprasidone, buproprion and keppra
c/w ziprasidone, buproprion and keppra   will need to clarify medications with son in AM to confirm doses
c/w ziprasidone, buproprion and keppra   will need to clarify medications with son in AM to confirm doses
c/w ziprasidone, buproprion and keppra
c/w ziprasidone, buproprion and keppra

## 2021-09-09 NOTE — PROGRESS NOTE ADULT - PROBLEM SELECTOR PROBLEM 6
Bipolar depression

## 2021-09-09 NOTE — PROGRESS NOTE ADULT - PROBLEM SELECTOR PLAN 1
-Pt noted to be on 1-2LNC throughout stay. Placed on RA yesterday - sats maintained >90%.  -CTA chest neg PE, grossly clear lung parenchyma  -No c/o dyspnea   -Incentive spirometry  -Normoxic, keep sats >90% with supplemental O2 PRN.

## 2021-09-09 NOTE — DISCHARGE NOTE PROVIDER - NSDCCPCAREPLAN_GEN_ALL_CORE_FT
PRINCIPAL DISCHARGE DIAGNOSIS  Diagnosis: Acute UTI  Assessment and Plan of Treatment: HOME CARE INSTRUCTIONS  If you were prescribed antibiotics, take them exactly as your caregiver instructs you. Finish the medication even if you feel better after you have only taken some of the medication.  Drink enough water and fluids to keep your urine clear or pale yellow.  Avoid caffeine, tea, and carbonated beverages. They tend to irritate your bladder.  Empty your bladder often. Avoid holding urine for long periods of time.  Empty your bladder before and after sexual intercourse.  After a bowel movement, women should cleanse from front to back. Use each tissue only once.  SEEK MEDICAL CARE IF:  You have back pain.  You develop a fever.  Your symptoms do not begin to resolve within 3 days.  SEEK IMMEDIATE MEDICAL CARE IF:  You have severe back pain or lower abdominal pain.  You develop chills.  You have nausea or vomiting.  You have continued burning or discomfort with urination.        SECONDARY DISCHARGE DIAGNOSES  Diagnosis: HLD (hyperlipidemia)  Assessment and Plan of Treatment: Follow up with PCP for treatment goals, continue medication, have liver function testing every 3 months as anti lipid medications can cause liver irritation, eat low fat, low cholesterol meals      Diagnosis: DLBCL (diffuse large B cell lymphoma)  Assessment and Plan of Treatment: Chemo per oncology

## 2021-09-09 NOTE — PROGRESS NOTE ADULT - PROBLEM SELECTOR PROBLEM 5
Prolonged QT interval

## 2021-09-09 NOTE — DISCHARGE NOTE PROVIDER - NSDCMRMEDTOKEN_GEN_ALL_CORE_FT
Ativan 1 mg oral tablet: 1 tab(s) orally 2 times a day, As Needed  buPROPion 150 mg/12 hours (SR) oral tablet, extended release: 1 tab(s) orally once a day  Keppra 500 mg oral tablet: 1 tab(s) orally 2 times a day  levothyroxine 100 mcg (0.1 mg) oral tablet: 1 tab(s) orally once a day  Lipitor 40 mg oral tablet: 1 tab(s) orally once a day  midodrine 5 mg oral tablet: 2 tab(s) orally 3 times a day  OLANZapine 10 mg oral tablet: 1 tab(s) orally once a day  Protonix 40 mg oral delayed release tablet: 1 tab(s) orally once a day  Revlimid:   ziprasidone 40 mg oral capsule: 1 tab(s) orally once a day (at bedtime)

## 2021-09-09 NOTE — DISCHARGE NOTE PROVIDER - HOSPITAL COURSE
70 year old female with history of late relapse of DLBCL on chemo, bipolar depression, HLD p/w weakness and poor PO intake, found to be septic with hypotension and elevated lactate 2/2 UTI. Patient is now s/p IV antibiotics. Electrolyte abnormalities have been corrected. Patient is stable for discharge home with home PT.

## 2022-02-22 NOTE — ED ADULT TRIAGE NOTE - PRO INTERPRETER NEED 2
Patient: Maximo Gautam       MRN: [id number]      : 1947     Age: 75 y.o.  [address] [facility] [address]    Providers  Hepatologists: JULIEN Tain  July at [facility]. MRI on 10/14/Tian.   English

## 2022-02-23 NOTE — PRE-ANESTHESIA EVALUATION ADULT - ANESTHESIA, PREVIOUS REACTION, PROFILE
Peds Outpatient BP  1) Rested for 5 minutes, BP taken on bare arm, patient sitting (or supine for infants) w/ legs uncrossed?   Yes  2) Right arm used?  Right arm   Yes  3) Arm circumference of largest part of upper arm (in cm): 21.5  4) BP cuff sized used: Small Adult (20-25cm)   If used different size cuff then what was recommended why? N/A  5) First BP reading:machine   BP Readings from Last 1 Encounters:   02/23/22 116/72 (95 %, Z = 1.64 /  88 %, Z = 1.17)*     *BP percentiles are based on the 2017 AAP Clinical Practice Guideline for girls      Is reading >90%?Yes   (90% for <1 years is 90/50)  (90% for >18 years is 140/90)  *If a machine BP is at or above 90% take manual BP  6) Manual BP reading: Little nervous, will try again before leaving.  7) Other comments: None    Clair Acuña CMA.    
none
none

## 2022-02-25 NOTE — PROGRESS NOTE ADULT - PROBLEM SELECTOR PLAN 3
follow output and keep up with output  continue flagyl and Emigdio  Patient with decreased diarrhea assumed presence of pain
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
## 2022-03-29 ENCOUNTER — APPOINTMENT (OUTPATIENT)
Dept: ORTHOPEDIC SURGERY | Facility: CLINIC | Age: 72
End: 2022-03-29

## 2022-04-02 NOTE — PRE-OP CHECKLIST - CHLOROHEXIDINE WASH IN ASU
Past Medical History:   Diagnosis Date    Hidradenitis suppurativa     Nocturnal enuresis     Sickle cell anemia        No past surgical history on file.    Review of patient's allergies indicates:  No Known Allergies    No current facility-administered medications on file prior to encounter.     Current Outpatient Medications on File Prior to Encounter   Medication Sig    [DISCONTINUED] albuterol (PROVENTIL/VENTOLIN HFA) 90 mcg/actuation inhaler Inhale 2 puffs into the lungs every 4 (four) hours as needed for Wheezing. Rescue    [DISCONTINUED] amoxicillin-clavulanate 875-125mg (AUGMENTIN) 875-125 mg per tablet Take 1 tablet by mouth 2 (two) times daily.     Family History       Problem Relation (Age of Onset)    No Known Problems Mother, Father          Tobacco Use    Smoking status: Never Smoker    Smokeless tobacco: Never Used   Substance and Sexual Activity    Alcohol use: No     Alcohol/week: 0.0 standard drinks    Drug use: Never    Sexual activity: Yes     Review of Systems   Constitutional:  Positive for activity change and fatigue.   HENT: Negative.     Eyes: Negative.    Respiratory: Negative.     Cardiovascular:  Positive for chest pain.   Gastrointestinal: Negative.    Endocrine: Negative.    Musculoskeletal:  Positive for arthralgias and myalgias.   Allergic/Immunologic: Negative.    Neurological:  Positive for weakness.   Hematological: Negative.    Psychiatric/Behavioral: Negative.     Objective:     Vital Signs (Most Recent):  Temp: 98.9 °F (37.2 °C) (04/02/22 0740)  Pulse: 83 (04/02/22 1503)  Resp: 17 (04/02/22 1503)  BP: (!) 121/56 (04/02/22 1503)  SpO2: 100 % (04/02/22 1503)   Vital Signs (24h Range):  Temp:  [98.9 °F (37.2 °C)] 98.9 °F (37.2 °C)  Pulse:  [] 83  Resp:  [17-26] 17  SpO2:  [95 %-100 %] 100 %  BP: (110-131)/(56-67) 121/56     Weight: 49.9 kg (110 lb)  Body mass index is 20.12 kg/m².    Physical Exam  Vitals reviewed.   Constitutional:       General: She is not in acute  distress.     Appearance: Normal appearance. She is ill-appearing.   HENT:      Head: Normocephalic and atraumatic.      Nose: Nose normal.      Mouth/Throat:      Mouth: Mucous membranes are moist.      Pharynx: Oropharynx is clear.   Eyes:      Extraocular Movements: Extraocular movements intact.      Conjunctiva/sclera: Conjunctivae normal.      Pupils: Pupils are equal, round, and reactive to light.   Cardiovascular:      Rate and Rhythm: Normal rate and regular rhythm.      Pulses: Normal pulses.      Heart sounds: Normal heart sounds.   Pulmonary:      Effort: Pulmonary effort is normal. No respiratory distress.      Breath sounds: Normal breath sounds. No stridor. No wheezing or rhonchi.   Abdominal:      General: Abdomen is flat. Bowel sounds are normal. There is no distension.      Palpations: Abdomen is soft. There is no mass.      Tenderness: There is no abdominal tenderness.      Hernia: No hernia is present.   Musculoskeletal:         General: No swelling, tenderness, deformity or signs of injury. Normal range of motion.      Cervical back: Normal range of motion and neck supple.   Skin:     General: Skin is warm.      Coloration: Skin is not pale.      Findings: No bruising or erythema.   Neurological:      General: No focal deficit present.      Mental Status: She is alert and oriented to person, place, and time. Mental status is at baseline.      Cranial Nerves: No cranial nerve deficit.      Sensory: No sensory deficit.      Motor: No weakness.      Coordination: Coordination normal.         CRANIAL NERVES     CN III, IV, VI   Pupils are equal, round, and reactive to light.     Significant Labs: All pertinent labs within the past 24 hours have been reviewed.      Significant Imaging: I have reviewed all pertinent imaging results/findings within the past 24 hours.     22-Feb-2019

## 2022-06-24 NOTE — PROGRESS NOTE ADULT - PROBLEM/PLAN-5
DISPLAY PLAN FREE TEXT
self-care/home management/community/leisure
DISPLAY PLAN FREE TEXT
DISPLAY PLAN FREE TEXT

## 2022-06-29 NOTE — PROGRESS NOTE ADULT - PROVIDER SPECIALTY LIST ADULT
Orthopedics Cheek Interpolation Flap Division And Inset Text: Division and inset of the cheek interpolation flap was performed to achieve optimal aesthetic result, restore normal anatomic appearance and avoid distortion of normal anatomy, expedite and facilitate wound healing, achieve optimal functional result and because linear closure either not possible or would produce suboptimal result. The patient was prepped and draped in the usual manner. The pedicle was infiltrated with local anesthesia. The pedicle was sectioned with a #15 blade. The pedicle was de-bulked and trimmed to match the shape of the defect. Hemostasis was achieved. The flap donor site and free margin of the flap were secured with deep buried sutures and the wound edges were re-approximated.

## 2022-08-10 ENCOUNTER — APPOINTMENT (OUTPATIENT)
Dept: RADIOLOGY | Facility: CLINIC | Age: 72
End: 2022-08-10

## 2022-08-10 PROCEDURE — 71046 X-RAY EXAM CHEST 2 VIEWS: CPT

## 2022-08-26 NOTE — PROGRESS NOTE ADULT - PROBLEM SELECTOR PLAN 3
"Assessment and Plan     Patient Instructions    Problem List Items Addressed This Visit        Endocrine and Metabolic    B12 deficiency    Overview     Lillie 10/2/2020  Labs next Friday         Relevant Orders    Vitamin B12      Other Visit Diagnoses     Healthcare maintenance    -  Primary    Relevant Orders    CBC (No Diff)    Comprehensive Metabolic Panel    Lipid Panel With LDL / HDL Ratio    Hepatitis C Antibody    Vitamin B12                  Nick is a 39 y.o. being seen today for  Annual Exam   Subjective   History of the Present Illness   Nick Beatty 39 y.o. male who presents for yearly preventive exam.  He exercises at work -- he has a fairly physical job..    Immunizations: up to date  He does not see his dentist regularly  His diet is in general, a \"healthy\" diet    He describes his alcohol intake as none  Practicing social distancing, handwashing and keeping hands from face? yes  COVID vaccine UTD? yes  His cardiovascular risk is: LDL goal is under 130  This patient has ever been tested for HepC: no but drawn today.      Social History  He  reports that he quit smoking about 7 years ago. His smoking use included electronic cigarette and electronic cigarette. He started smoking about 23 years ago. He quit after 15.00 years of use. He has never used smokeless tobacco. He reports that he does not drink alcohol and does not use drugs.  Objective   Vital Signs        BP Readings from Last 1 Encounters:   08/26/22 126/92     Wt Readings from Last 3 Encounters:   08/26/22 68.9 kg (152 lb)   04/04/22 72.7 kg (160 lb 3.2 oz)   08/05/21 72.6 kg (160 lb)   Body mass index is 23.81 kg/m².     Physical Exam  Vitals reviewed.   Constitutional:       General: He is not in acute distress.     Appearance: He is well-developed.   HENT:      Head: Normocephalic and atraumatic.      Right Ear: Tympanic membrane, ear canal and external ear normal.      Left Ear: Tympanic membrane, ear canal and external ear normal.      " Mouth/Throat:      Comments: Mask in place  Eyes:      Conjunctiva/sclera: Conjunctivae normal.   Neck:      Thyroid: No thyromegaly.   Cardiovascular:      Rate and Rhythm: Normal rate and regular rhythm.      Pulses:           Dorsalis pedis pulses are 1+ on the right side and 1+ on the left side.        Posterior tibial pulses are 1+ on the right side and 1+ on the left side.      Heart sounds: Normal heart sounds. No murmur heard.  Pulmonary:      Effort: Pulmonary effort is normal. No respiratory distress.      Breath sounds: Normal breath sounds. No wheezing or rales.   Abdominal:      General: There is no distension.      Palpations: Abdomen is soft. There is no mass.      Tenderness: There is no abdominal tenderness.   Musculoskeletal:         General: No deformity. Normal range of motion.      Cervical back: Neck supple.   Lymphadenopathy:      Cervical: No cervical adenopathy.   Skin:     General: Skin is warm and dry.   Neurological:      Mental Status: He is alert and oriented to person, place, and time.   Psychiatric:         Behavior: Behavior normal.         Thought Content: Thought content normal.         Judgment: Judgment normal.               Patient was given instructions and counseling regarding his condition or for health maintenance advice. Please see specific information pulled into the AVS if appropriate.     follow output and keep up with output  continue flagyl and Emigdio  Patient with decreased diarrhea

## 2022-09-17 NOTE — H&P ADULT - PROBLEM SELECTOR PLAN 5
Problem: Safety - Adult  Goal: Free from fall injury  Outcome: Progressing     Problem: ABCDS Injury Assessment  Goal: Absence of physical injury  Outcome: Progressing  Flowsheets (Taken 9/16/2022 [id number] by Lala Morales RN)  Absence of Physical Injury: Implement safety measures based on patient assessment IMPROVE VTE Individual Risk Assessment          RISK                                                          Points  [  ] Previous VTE                                                 3  [  ] Thrombophilia                                              2  [  ] Lower limb paralysis                                    2        (unable to hold up >15 seconds)    [  ] Current Cancer                                             2         (within 6 months)  [  ] Immobilization > 24 hrs                              1  [  ] ICU/CCU stay > 24 hours                            1  [X] Age > 60                                                        1    IMPROVE VTE Score 1    - will use lovenox for DVT ppx - cont with synthroid  - check TSH Stable  - cont with synthroid  - check TSH
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
## 2023-01-26 NOTE — ED PROVIDER NOTE - OBJECTIVE STATEMENT
70 year old female with PMH DLBCL on PO and IV chemo (last IV chemo 8/2621), ulcerative colitis, HLD presents with lethargy x 1 week. Pt reports feeling more tired than usual over the past week with difficulty ambulating secondary to weakness. Pt admits to feeling more tired than usual and occasional episodes of nonbloody nonbilious emesis.     TO BE COMPLETED 70 year old female with PMH DLBCL on PO and IV chemo (last IV chemo 8/2621), ulcerative colitis, HLD presents with lethargy x 1 week. Pt reports feeling more tired than usual over the past week with difficulty ambulating secondary to weakness. Pt admits to feeling more tired than usual and occasional episodes of nonbloody nonbilious emesis. Per son at bedside, reports patient has been more confused than usual over past week. Denies any fevers, chest pain, shortness of breath, abdominal pain, diarrhea, bloody stools, black tarry stools, dysuria, headache, vision change, numbness, weakness, or rash. Denies any recent injury or fall. Pt reports decreased PO intake over the past week. Denies any additional complaints. Paramedian Forehead Flap Text: A decision was made to reconstruct the defect utilizing an interpolation axial flap and a staged reconstruction.  A telfa template was made of the defect.  This telfa template was then used to outline the paramedian forehead pedicle flap.  The donor area for the pedicle flap was then injected with anesthesia.  The flap was excised through the skin and subcutaneous tissue down to the layer of the underlying musculature.  The pedicle flap was carefully excised within this deep plane to maintain its blood supply.  The edges of the donor site were undermined.   The donor site was closed in a primary fashion.  The pedicle was then rotated into position and sutured.  Once the tube was sutured into place, adequate blood supply was confirmed with blanching and refill.  The pedicle was then wrapped with xeroform gauze and dressed appropriately with a telfa and gauze bandage to ensure continued blood supply and protect the attached pedicle.

## 2023-02-11 NOTE — DIETITIAN INITIAL EVALUATION ADULT. - REASON INDICATOR FOR ASSESSMENT
Pt seen for consult for significant decrease in PO intake PTA.   Source: pt, EMR 11-Feb-2023 Pt seen for consult for significant decrease in PO intake PTA.   Source: pt, EMR, PCA

## 2023-04-26 NOTE — ED ADULT NURSE NOTE - NS ED NURSE REPORT GIVEN TO FT
Arava Counseling:  Patient counseled regarding adverse effects of Arava including but not limited to nausea, vomiting, abnormalities in liver function tests. Patients may develop mouth sores, rash, diarrhea, and abnormalities in blood counts. The patient understands that monitoring is required including LFTs and blood counts.  There is a rare possibility of scarring of the liver and lung problems that can occur when taking methotrexate. Persistent nausea, loss of appetite, pale stools, dark urine, cough, and shortness of breath should be reported immediately. Patient advised to discontinue Arava treatment and consult with a physician prior to attempting conception. The patient will have to undergo a treatment to eliminate Arava from the body prior to conception. michell talavera

## 2023-06-16 NOTE — PROGRESS NOTE ADULT - PROVIDER SPECIALTY LIST ADULT
Trauma Surgery Topical Clindamycin Pregnancy And Lactation Text: This medication is Pregnancy Category B and is considered safe during pregnancy. It is unknown if it is excreted in breast milk.

## 2023-08-05 NOTE — ED ADULT NURSE NOTE - NS ED NURSE DISCH DISPOSITION
Spoke to pt who states she needs assistance getting hosp f/u appt scheduled with her PCP. Message forwarded to PCP staff requesting hosp f/u appt, nurse advised pt she should expect a call next week. Pt verbalized understanding.  Nurse unable to schedule hosp f/u or message PCP staff, he is not [facility] provider. Nurse called pt back who confirmed PCP is not with [facility], she visits him at [facility], nurse advised pt to call office Monday morning to schedule hosp f/u appt. Pt verbalized understanding.   Admitted

## 2023-11-08 NOTE — PROGRESS NOTE ADULT - PROBLEM SELECTOR PLAN 1
Patient notified via [employer] regarding test results by Dr Soto Ruby.   Electronically Signed By: [facility] Much improved mental status pre-op May worsen if she is hemianosmically unstable post op

## 2024-01-01 NOTE — DIETITIAN INITIAL EVALUATION ADULT. - PROBLEM SELECTOR PLAN 5
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

## 2024-01-22 NOTE — ED PROVIDER NOTE - CPE EDP CARDIAC NORM
01-21-24 @ 07:01  -  01-22-24 @ 07:00  --------------------------------------------------------  OUT:    Chest Tube (mL): 60 mL  Total OUT: 60 mL    Total NET: -60 mL      
normal...

## 2024-03-08 NOTE — ED ADULT TRIAGE NOTE - PRO INTERPRETER NEED 2
Episodes of diarrhea (more than 3 times a day), or if you are unable to tolerate food or fluids English

## 2024-03-08 NOTE — PROGRESS NOTE ADULT - SUBJECTIVE AND OBJECTIVE BOX
Diagnosis: AML  Regimen: Vidaza subq D1-7  Cycle/Day: C7D1  Is this a C1D1 appt?  No    Antonio Calvillo MD is supervising clinician today.    ECOG:   ECOG   ECOG Performance Status      Review and verified Advanced Directives: Yes: Advance Directive is in chart     Verified if patient has state DNR bracelet on: No; Full Code    Nursing Assessment:   A focused nursing assessment addressing the toxicity of chemotherapy was performed and the patient reports the following:    Anxiety/Depression/Insomnia: Anxiety: No, Depression: No, and Insomnia: No  Pain: NO    Toxicity Assessment    Auditory/Ear  Assessment: Yes (Within Defined Limits)    Cardiac General  Assessment: Yes (Within Defined Limits)    Constitutional  Assessment: Yes (w/ Exceptions to WDL)  Fatigue: Grade 1    Dermatology/Skin  Assessment: Yes (Within Defined Limits)    Endocrine  Assessment: Yes (Within Defined Limits)    Gastrointestinal  Assessment: Yes (w/ Exceptions to WDL)  Constipation: Grade 1    Hemorrhage/Bleeding  Assessment: Yes (Within Defined Limits)    Infection  Assessment: Yes (Within Defined Limits)    Lymphatics  Assessment: Yes (Within Defined Limits)    Musculoskeletal  Assessment: Yes (Within Defined Limits)    Neurology  Assessment: Yes (Within Defined Limits)    Ocular  Assessment: Yes (Within Defined Limits)    Pain  Assessment: Yes (Within Defined Limits)    Pulmonary/Upper Respiratory  Assessment: Yes (Within Defined Limits)    Genitourinary  Assessment: Yes (Within Defined Limits)        Patient confirms receipt of a signed copy of Anti-Cancer Treatment consent and verbalizes understanding of treatment plan: Yes.     Pre-Treatment: Treatment consent signed  Patient has valid pre-authorization  VS completed  Height and weight verified  BSA independently double checked & verified by two practitioners  Premed orders, including hydration, are verified prior to administration  Treatment parameters verified in patient  Pt S/E at bedside, no acute events overnight, pain controlled, plastics placed VAC yesterday    Vital Signs Last 24 Hrs  T(C): 36.6 (06 Mar 2019 23:41), Max: 36.9 (06 Mar 2019 19:20)  T(F): 97.9 (06 Mar 2019 23:41), Max: 98.4 (06 Mar 2019 19:20)  HR: 93 (06 Mar 2019 23:41) (93 - 97)  BP: 101/58 (06 Mar 2019 23:41) (96/57 - 105/69)  BP(mean): --  RR: 18 (06 Mar 2019 23:41) (18 - 18)  SpO2: 100% (06 Mar 2019 23:41) (100% - 100%)    Gen: NAD,    Right Lower Extremity:  Dressing clean dry intact  +EHL/FHL/TA/GS  SILT L3-S1  +DP/PT Pulses  Compartments soft  No calf TTP B/L protocol  Chemotherapy doses independently doubled checked & verified by two practitioners    Treatment: I have reviewed the following with the patient:  Name of chemo drug, duration and route of infusion, and reportable infusion-related symptoms.  Chemotherapy has not ; double checked & verified by two practitioners  Appearance and physical integrity of drugs meets standard of drug monograph; double checked & verified by two practitioners  Drugs were administered in proper sequencing  Refer to LDA and MAR for line assessment and medication administration  SubQ/IM Injection: Yes: Needle Size: 27 g. /\" Site: Abdomen, RLQ    Post Treatment: Treatment tolerated well; no adverse reaction    Transfusion: Not needed    Integrative Medicine: No    Oral Chemotherapy: Yes, Patient is taking medication as prescribed.    Education: No new instructions needed    Next appointment scheduled:   Future Appointments   Date Time Provider Department Center   3/12/2024  1:45 PM [facility] ACL [facility]   3/12/2024  2:00 PM [facility] INFUSION RN [facility]   3/14/2024  1:30 PM [facility]   3/14/2024  2:00 PM [facility] INFUSION RN [facility]   3/28/2024 11:45 AM [facility]   3/28/2024 12:15 PM Antonio Calvillo MD [facility]   3/29/2024  1:30 PM [facility] INFUSION RN [facility]   2024  9:15 AM Nicholas Rivera DO [facility]   7/10/2024  8:00 AM Joni Phillips MD [facility]   2024  3:15 PM Luz Cardozo MD [facility]      Patient instructed to call the office with any questions or concerns.    Patient Discharged: patient discharged to home per self, ambulatory   Pt S/E at bedside, no acute events overnight, pain controlled, plastics placed VAC yesterday    Vital Signs Last 24 Hrs  T(C): 36.6 (06 Mar 2019 23:41), Max: 36.9 (06 Mar 2019 19:20)  T(F): 97.9 (06 Mar 2019 23:41), Max: 98.4 (06 Mar 2019 19:20)  HR: 93 (06 Mar 2019 23:41) (93 - 97)  BP: 101/58 (06 Mar 2019 23:41) (96/57 - 105/69)  BP(mean): --  RR: 18 (06 Mar 2019 23:41) (18 - 18)  SpO2: 100% (06 Mar 2019 23:41) (100% - 100%)    Gen: NAD,    Right Lower Extremity:  vac intact  +EHL/FHL/TA/GS  SILT L3-S1  +DP/PT Pulses  Compartments soft  No calf TTP B/L

## 2024-04-08 NOTE — PROGRESS NOTE ADULT - PROBLEM SELECTOR PLAN 2
6 follow output and keep up with output  continue flagyl and Emigdio  Patient with decreased diarrhea

## 2024-05-17 NOTE — PROGRESS NOTE ADULT - PROBLEM SELECTOR PLAN 3
Hospital Medicine History & Physical Note    Date of Service  5/16/2024    Primary Care Physician  Anum Gatica M.D.      Code Status  Full Code    Chief Complaint  Chief Complaint   Patient presents with    Vaginal Bleeding     Pt states she was discharged from the hospital Monday and started having vaginal bleeding that hasn't stopped         History of Presenting Illness  April Alicia is a 62 y.o. female with past med history of anemia, history of type 2 diabetes is well-controlled, alcoholic cirrhosis with ascites, atrial fibrillation on anticoagulation, history of left-sided BKA, who had a recent hospitalization at [facility] for anasarca and worsening shortness of breath who presented 5/16/2024 with fevers, chills and shortness of breath.  Patient states that over the last 2 days, she has had a multitude of symptoms.  She has been noticing some worsening palpitations, and has been experiencing some abdominal pain as well.  She reports having fevers at home as well, send to be febrile with a Tmax of 103.8 while in the emergency department.  She has been experiencing nausea and vomiting and diarrhea over the last 1 to 2 days as well.  She does endorse some shortness of breath difficulty breathing along with some congestion as well.  She is saturating appropriately on room air.  CT imaging was obtained the emergency department showing a left lower lobe consolidation suggestive of pneumonia.   Vitals were notable for a Tmax of 103.6, heart rate in the 120s.  Currently saturating appropriate on room air.  Labs remarkable for leukocytosis greater than 14,000, sodium 131, creatinine 1.42, lactic acid of 2.6.  Patient will be admitted for management of sepsis secondary to pneumonia.    I discussed the plan of care with patient.    Review of Systems  Review of Systems   Constitutional:  Positive for chills, fever and malaise/fatigue. Negative for diaphoresis and weight loss.   HENT:   Negative for congestion, ear discharge, ear pain, nosebleeds, sinus pain and tinnitus.    Eyes:  Negative for blurred vision, double vision, photophobia, pain and discharge.   Respiratory:  Positive for cough and shortness of breath. Negative for hemoptysis, sputum production and stridor.    Cardiovascular:  Negative for chest pain, palpitations and orthopnea.   Gastrointestinal:  Positive for abdominal pain, diarrhea, nausea and vomiting. Negative for blood in stool and constipation.   Genitourinary:  Positive for hematuria. Negative for dysuria, frequency and urgency.   Musculoskeletal:  Negative for back pain, joint pain and neck pain.   Neurological:  Negative for dizziness, tingling, tremors, sensory change and headaches.   Psychiatric/Behavioral:  Negative for hallucinations, substance abuse and suicidal ideas. The patient is not nervous/anxious and does not have insomnia.        Past Medical History   has a past medical history of Abnormal finding of lung (12/27/2022), Acute exacerbation of chronic obstructive pulmonary disease (COPD) ([facility]) (01/27/2020), Alcoholic cirrhosis ([facility]), Arthritis, ASTHMA, Atrial fibrillation ([facility]), Dry eyes (11/16/2017), Edentulous, Emphysema of lung ([facility]), H/O: hysterectomy (12/05/2019), Heart burn, Hepatic encephalopathy ([facility]) (12/27/2022), Hepatitis C infection (07/28/2022), History of rheumatic fever (09/04/2017), Hypertension, Infected prosthetic knee joint ([facility]), Medication overuse headache (08/15/2017), Mitral regurgitation, Pancytopenia ([facility]) (07/26/2022), Pneumonia (02/2020), Primary osteoarthritis of left knee (08/25/2020), Prosthetic joint infection ([facility]) (2018), Protein-calorie malnutrition, severe ([facility]) (08/01/2022), Right leg DVT ([facility]) (2018), Seizure disorder ([facility]), Septic arthritis of knee, left ([facility]) (07/19/2022), Septic arthritis of shoulder, left ([facility]) (07/17/2022), Septic shock due to Pseudomonas species ([facility]) (10/30/2023), and Type 2 diabetes mellitus ([facility])  (12/27/2022).    Surgical History   has a past surgical history that includes gyn surgery (1982); gyn surgery (2003); colonoscopy with clipping (10/28/2015); colonoscopy with sclerotherapy (10/28/2015); colonoscopy with tattooing (10/28/2015); irrigation & debridement ortho (Right, 9/3/2017); knee arthroplasty total (Right, 2018); pr total knee arthroplasty (Left, 8/24/2020); irrigation & debridement general (Left, 7/17/2022); pr revise knee joint replace,all parts (Left, 7/19/2022); pr revise knee joint replace,all parts (Left, 12/28/2022); irrigation & debridement general (Left, 12/28/2022); pr total knee arthroplasty (Left, 10/30/2023); and knee amputation above (Left, 1/16/2024).     Family History  family history includes Alcohol abuse in her brother; Dementia in her brother; Diabetes in her brother, brother, and mother; Heart Attack (age of onset: 45) in her father; Seizures in her father.   Family history reviewed with patient. There is no family history that is pertinent to the chief complaint.     Social History   reports that she quit smoking about 7 years ago. Her smoking use included cigarettes. She quit smokeless tobacco use about 3 years ago. She reports that she does not currently use alcohol. She reports that she does not currently use drugs.    Allergies  No Known Allergies    Medications  Prior to Admission Medications   Prescriptions Last Dose Informant Patient Reported? Taking?   SPIRIVA HANDIHALER 18 MCG Cap  MAR from Other Facility Yes No   Sig: Place 1 Capsule into inhaler and inhale every day.   apixaban (ELIQUIS) 5mg Tab   No No   Sig: Take 1 Tablet by mouth 2 times a day. Indications: Thromboembolism secondary to Atrial Fibrillation   atorvastatin (LIPITOR) 20 MG Tab  MAR from Other Facility No No   Sig: Take 1 Tablet by mouth every evening.   ferrous sulfate 325 (65 Fe) MG tablet  MAR from Other Facility Yes No   Sig: Take 325 mg by mouth every day.   furosemide (LASIX) 40 MG Tab   No No    Sig: Take 1 Tablet by mouth every day for 30 days.   gabapentin (NEURONTIN) 300 MG Cap  MAR from Other Facility No No   Sig: Take 2 Capsules by mouth every 8 hours.   lactulose 20 GM/30ML Solution  MAR from Other Facility No No   Sig: Take 15 mL by mouth every day.   losartan (COZAAR) 100 MG Tab  MAR from Other Facility Yes No   Sig: Take 100 mg by mouth every day.   metoprolol SR (TOPROL XL) 25 MG TABLET SR 24 HR  MAR from Other Facility No No   Sig: Take 1 Tablet by mouth every evening.   ondansetron (ZOFRAN ODT) 4 MG TABLET DISPERSIBLE   No No   Sig: Take 1 Tablet by mouth every four hours as needed for Nausea/Vomiting (give PO if no IV route available).   oxyCODONE immediate release (ROXICODONE) 10 MG immediate release tablet   Yes No   Sig: Take 10 mg by mouth every 6 hours as needed for Severe Pain.   pantoprazole (PROTONIX) 20 MG tablet  MAR from Other Facility Yes No   Sig: Take 20 mg by mouth every day.   spironolactone (ALDACTONE) 25 MG Tab   No No   Sig: Take 1 Tablet by mouth every day for 30 days.      Facility-Administered Medications: None       Physical Exam  Temp:  [38.8 °C (101.8 °F)-39.9 °C (103.8 °F)] 38.8 °C (101.8 °F)  Pulse:  [109-151] 126  Resp:  [17-23] 17  BP: (117-142)/() 117/75  SpO2:  [90 %-95 %] 94 %  Blood Pressure: 117/75   Temperature: (!) 38.8 °C (101.8 °F)   Pulse: (!) 126   Respiration: 17   Pulse Oximetry: 94 %       Physical Exam  Constitutional:       General: She is not in acute distress.     Appearance: Normal appearance. She is normal weight. She is not ill-appearing, toxic-appearing or diaphoretic.   HENT:      Head: Normocephalic and atraumatic.      Nose: Nose normal.      Mouth/Throat:      Mouth: Mucous membranes are moist.   Eyes:      Extraocular Movements: Extraocular movements intact.      Pupils: Pupils are equal, round, and reactive to light.   Cardiovascular:      Rate and Rhythm: Regular rhythm. Tachycardia present.      Pulses: Normal pulses.      Heart  "sounds: Normal heart sounds. No murmur heard.     No friction rub. No gallop.   Pulmonary:      Effort: Pulmonary effort is normal. No respiratory distress.      Breath sounds: No stridor. No wheezing, rhonchi or rales.   Chest:      Chest wall: No tenderness.   Abdominal:      General: Abdomen is flat. There is no distension.      Palpations: Abdomen is soft. There is no mass.      Tenderness: There is abdominal tenderness. There is no guarding or rebound.      Hernia: No hernia is present.   Musculoskeletal:         General: No swelling, tenderness, deformity or signs of injury.      Right lower leg: No edema.      Comments: Left BKA    Skin:     General: Skin is warm and dry.      Capillary Refill: Capillary refill takes less than 2 seconds.      Coloration: Skin is not jaundiced or pale.      Findings: No bruising, erythema, lesion or rash.   Neurological:      General: No focal deficit present.      Mental Status: She is alert and oriented to person, place, and time. Mental status is at baseline.      Cranial Nerves: No cranial nerve deficit.      Sensory: No sensory deficit.      Motor: No weakness.      Coordination: Coordination normal.   Psychiatric:         Mood and Affect: Mood normal.         Behavior: Behavior normal.         Laboratory:  Recent Labs     05/14/24 0251 05/16/24 1911   WBC 11.3* 14.2*   RBC 3.27* 3.64*   HEMOGLOBIN 8.8* 9.8*   HEMATOCRIT 26.8* 29.4*   MCV 82.0 80.8*   MCH 26.9* 26.9*   MCHC 32.8 33.3   RDW 47.6 47.4   PLATELETCT 113* 106*   MPV 10.9 11.4     Recent Labs     05/14/24 0251 05/16/24 1911   SODIUM 136 131*   POTASSIUM 4.1 4.1   CHLORIDE 106 100   CO2 21 18*   GLUCOSE 120* 161*   BUN 13 28*   CREATININE 0.70 1.42*   CALCIUM 7.6* 7.4*     Recent Labs     05/14/24 0251 05/16/24 1911   ALTSGPT 14 18   ASTSGOT 17 38   ALKPHOSPHAT 113* 160*   TBILIRUBIN 0.9 2.0*   LIPASE  --  16   GLUCOSE 120* 161*         No results for input(s): \"NTPROBNP\" in the last 72 hours.      No " "results for input(s): \"TROPONINT\" in the last 72 hours.    Imaging:  CT-ABDOMEN-PELVIS WITH   Final Result         1. New left lower lobe consolidation and small left pleural effusion. This is likely due to pneumonia.   2. Cirrhosis, portal hypertension, and splenomegaly.   3. Atherosclerosis including coronary artery disease.   4. Ascites.   5. Possible colitis of the cecum.      DX-CHEST-PORTABLE (1 VIEW)   Final Result      Hypoinflation and cardiomegaly. Mild edema cannot be excluded.          X-Ray:  I have personally reviewed the images and compared with prior images.  EKG:  I have personally reviewed the images and compared with prior images.    Assessment/Plan:  Justification for Admission Status  I anticipate this patient will require at least two midnights for appropriate medical management, necessitating inpatient admission because sepsis 2/2 pneumonia    Patient will need a Telemetry bed on MEDICAL service .  The need is secondary to sepsis 2/2 pneumonia .    * Sepsis (HCC)- (present on admission)  Assessment & Plan  This is Sepsis Present on admission  SIRS criteria identified on my evaluation include: Fever, with temperature greater than 100.9 deg F, Tachycardia, with heart rate greater than 90 BPM, and Leukocytosis, with WBC greater than 12,000  Clinical indicators of end organ dysfunction include Lactic Acid greater than 2  Source is pulmonary  Sepsis protocol initiated  Crystalloid Fluid Administration: Resuscitation volume of 2L ordered. Reason that resuscitation volume of less than 30ml/kg was ordered concern for fluid overload  IV antibiotics as appropriate for source of sepsis  Reassessment: I have reassessed the patient's hemodynamic status    Hospital-acquired pneumonia  Assessment & Plan  Patient with recent discharge from the [facility] several days ago.  Now presenting with a Tmax of 103.6, tachycardia, leukocytosis.  CT imaging -- New left lower lobe consolidation and small " left pleural effusion. This is likely due to pneumonia.   Will empirically cover the patient with linezolid and Zosyn IV  Follow-up MRSA nares  Follow-up cultures  De-escalate antibiotics as appropriate  Follow-up procalcitonin level    AF (atrial fibrillation) (HCC)  Assessment & Plan  Continue home dose metoprolol.  Holding apixaban in the setting of hematuria      Hematuria  Assessment & Plan  Patient states that she is noticed some blood in urine over the last several days.  She was recently started on apixaban  Her hemoglobin appears to be stable, if not improved compared to her prior  Holding home dose apixaban  Possible urology consult in the morning if no resolution    Hyperglycemia  Assessment & Plan  Per chart review, it was noted the patient has a history of type 2 diabetes.  She does have elevated blood glucose on presentation.  However her last A1c in January 2024 was 5.7  Follow-up repeat hemoglobin A1c    Nausea & vomiting- (present on admission)  Assessment & Plan  IV and PO antiemetics     Lactic acid acidosis- (present on admission)  Assessment & Plan  LA: 2.6 on admission  1L IVF given in the ED; continue with gentle maintenance IV fluids  Follow-up repeat lactic acid level    Anemia of chronic disease- (present on admission)  Assessment & Plan  Prior iron studies was suggestive of iron deficiency anemia     Hyponatremia- (present on admission)  Assessment & Plan  Likely secondary to Lasix and her diuretics.  She is also had poor p.o. intake.  Continue with gentle IV fluids with normal saline  Continue to monitor with daily BMPs      Acute kidney injury (HCC)- (present on admission)  Assessment & Plan  Patient presents with ELIZABETH.  Creatinine level on admission was 1.42  This is likely prerenal in nature, as patient was recently discharged on Lasix and Aldactone.  She is also been experiencing 2 days of poor p.o. intake along with nausea, vomiting and diarrhea.  Holding home dose Lasix, Aldactone,  ACE/ARB    Alcoholic cirrhosis of liver with ascites (HCC)- (present on admission)  Assessment & Plan  Patient had recent hospitalization for her anasarca and worsening abdominal pain.  CT abdomen pelvis was obtained in the emergency department.  Did show cirrhosis, portal hypertension as well as ascites  She was recently started on Aldactone and furosemide which she has been taking over the last few days.  Given her sepsis, lactic acidosis and ELIZABETH, holding her diuretics.        VTE prophylaxis: SCDs/TEDs   Fluid Cx negative  Low suspicion for infectious etiology  Given severe C diff no antimicrobials unless definite s/s infection  Monitor site  Will defer to ortho on surgical plan, family reconsidering surgical options

## 2024-07-17 NOTE — PROGRESS NOTE ADULT - SUBJECTIVE AND OBJECTIVE BOX
----- Message from Jordi Lin sent at 2024  8:19 AM CDT -----  Contact: pt  Jessi Linton  MRN: [id number]  : 1966  PCP: David Allred  Home Phone      [phone]  Work Phone      Not on file.  Mobile          [phone]      MESSAGE:     Pt called requesting more refills of medication metFORMIN (GLUCOPHAGE) 500 MG tablet. Pt would like medication to be sent to [id number] - [place] - [address]        Please advise  [phone]   Patient seen and examined.  No acute events overnight.    Vital Signs Last 24 Hrs  T(C): 36.9 (02 Mar 2019 03:45), Max: 36.9 (01 Mar 2019 07:00)  T(F): 98.4 (02 Mar 2019 03:45), Max: 98.4 (01 Mar 2019 07:00)  HR: 102 (02 Mar 2019 03:45) (91 - 107)  BP: 95/60 (02 Mar 2019 03:45) (81/54 - 124/61)  BP(mean): 66 (01 Mar 2019 14:00) (62 - 84)  RR: 18 (02 Mar 2019 03:45) (17 - 36)  SpO2: 98% (02 Mar 2019 03:45) (98% - 100%)    02-28 @ 07:01  -  03-01 @ 07:00  --------------------------------------------------------  IN: 1987.5 mL / OUT: 3361 mL / NET: -1373.5 mL    03-01 @ 07:01  -  03-02 @ 06:57  --------------------------------------------------------  IN: 1540 mL / OUT: 45 mL / NET: 1495 mL                            9.4    8.7   )-----------( 74       ( 01 Mar 2019 03:45 )             27.2   03-02    135  |  95<L>  |  18  ----------------------------<  141<H>  4.6   |  33<H>  |  0.53    Ca    7.7<L>      02 Mar 2019 05:27  Phos  3.5     03-02  Mg     1.7     03-02    TPro  4.1<L>  /  Alb  1.8<L>  /  TBili  0.4  /  DBili  0.2  /  AST  56<H>  /  ALT  39  /  AlkPhos  152<H>  03-02      Exam:  Gen: NAD  RLE:  Wound c/d/i, dressing changed  DION drains   Motor: 5/5 EHL/FHL/TA/Gastrocnemius  Sensory: SILT DP/SP/S/S/T nerve distributions  Vascular: 2+ Dorsalis Pedis pulse        Assessment/Plan:  68y Female s/p revision R hip hemiarthroplasty POD 8    -will change dressings today  - Pain control  - NWB RLE OOBTC  - Posterior dislocation precautions  - PT/OT  - DVT ppx  - FU nutrition consult, need nutrition optimized albumin >2.5 as this contributing to fluid retention  - care per SICU appreciated

## 2024-08-17 NOTE — PROGRESS NOTE ADULT - PROBLEM SELECTOR PLAN 5
SUBJECTIVE:    Patient ID: Mara Mueller is a 64 y.o. female.    Chief Complaint: Follow-up (6 mo f/u//no med bottles//complains of constant sinus drainage and phlegm in throat w/clear mucus //tc)    Pt here to checkup on acute and chronic conditions.    Has been having some sinus issues for a months. Takes otc med every now and then like claritin, zyrtec or mucinex. Thinks she is allergic to pollens and thinks she has issues with AC being on all the time.  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     Her left pinky finger has been getting stuck at times (left handed) still. Uses bengay on it, that helps.       Right knee has still been bothering her. ([place]).  No injections lately. Puts bengay on it at times as well.  Told needs a knee replacement, but she is trying to put it off as long as she can. Taking tylenol and patches as needed.  Fell at work while walking, had a bruise, able to walk normal.     BP is doing ok today. ([place]). Denies CP/SOB/HA.    Denies heartburn. Continues to take protonix.    Has occasional HSV breakout, takes valtrex as needed.    Had labs done: Ma/Cr 29.9, TSH <0.01, free T4 1.17, A1c 6.0%, fBS 95, Calcium 10.8, LDL 88, Hct 36.3,  (No diarrhea, sleeping ok, denies palpitations)  -------------------------------------------------------------  Cscope 8/2023 ([place]) To repeat cscope in march due to large polyp  Pap and mammo (4/2024), Dr. Collado  Eye exam 2023 ([facility])        Lab Visit on 08/16/2024   Component Date Value Ref Range Status    Specimen UA 08/16/2024 Urine, Clean Catch   Final    Color, UA 08/16/2024 Yellow  Yellow, Straw, Irais Final    Appearance, UA 08/16/2024 Clear  Clear Final    pH, UA 08/16/2024 6.0  5.0 - 8.0 Final    Specific Gravity, UA 08/16/2024 1.020  1.005 - 1.030 Final    Protein, UA 08/16/2024 Negative  Negative Final    Glucose, UA 08/16/2024 Negative  Negative Final    Ketones, UA 08/16/2024 Negative  Negative Final    Bilirubin (UA)  08/16/2024 Negative  Negative Final    Occult Blood UA 08/16/2024 Negative  Negative Final    Nitrite, UA 08/16/2024 Negative  Negative Final    Urobilinogen, UA 08/16/2024 Negative  Negative EU/dL Final    Leukocytes, UA 08/16/2024 2+ (A)  Negative Final    WBC 08/16/2024 5.55  3.90 - 12.70 K/uL Final    RBC 08/16/2024 3.88 (L)  4.00 - 5.40 M/uL Final    Hemoglobin 08/16/2024 11.7 (L)  12.0 - 16.0 g/dL Final    Hematocrit 08/16/2024 36.3 (L)  37.0 - 48.5 % Final    MCV 08/16/2024 94  82 - 98 fL Final    MCH 08/16/2024 30.2  27.0 - 31.0 pg Final    MCHC 08/16/2024 32.2  32.0 - 36.0 g/dL Final    RDW 08/16/2024 12.3  11.5 - 14.5 % Final    Platelets 08/16/2024 284  150 - 450 K/uL Final    MPV 08/16/2024 11.2  9.2 - 12.9 fL Final    Immature Granulocytes 08/16/2024 0.2  0.0 - 0.5 % Final    Gran # (ANC) 08/16/2024 2.8  1.8 - 7.7 K/uL Final    Immature Grans (Abs) 08/16/2024 0.01  0.00 - 0.04 K/uL Final    Lymph # 08/16/2024 2.1  1.0 - 4.8 K/uL Final    Mono # 08/16/2024 0.5  0.3 - 1.0 K/uL Final    Eos # 08/16/2024 0.1  0.0 - 0.5 K/uL Final    Baso # 08/16/2024 0.04  0.00 - 0.20 K/uL Final    nRBC 08/16/2024 0  0 /100 WBC Final    Gran % 08/16/2024 50.5  38.0 - 73.0 % Final    Lymph % 08/16/2024 36.9  18.0 - 48.0 % Final    Mono % 08/16/2024 9.5  4.0 - 15.0 % Final    Eosinophil % 08/16/2024 2.2  0.0 - 8.0 % Final    Basophil % 08/16/2024 0.7  0.0 - 1.9 % Final    Differential Method 08/16/2024 Automated   Final    Cholesterol 08/16/2024 162  120 - 199 mg/dL Final    Triglycerides 08/16/2024 95  30 - 150 mg/dL Final    HDL 08/16/2024 55  40 - 75 mg/dL Final    LDL Cholesterol 08/16/2024 88.0  63.0 - 159.0 mg/dL Final    HDL/Cholesterol Ratio 08/16/2024 34.0  20.0 - 50.0 % Final    Total Cholesterol/HDL Ratio 08/16/2024 2.9  2.0 - 5.0 Final    Non-HDL Cholesterol 08/16/2024 107  mg/dL Final    Sodium 08/16/2024 140  136 - 145 mmol/L Final    Potassium 08/16/2024 3.9  3.5 - 5.1 mmol/L Final    Chloride 08/16/2024 107   95 - 110 mmol/L Final    CO2 08/16/2024 29  23 - 29 mmol/L Final    Glucose 08/16/2024 95  70 - 110 mg/dL Final    BUN 08/16/2024 26 (H)  8 - 23 mg/dL Final    Creatinine 08/16/2024 0.9  0.5 - 1.4 mg/dL Final    Calcium 08/16/2024 10.8 (H)  8.7 - 10.5 mg/dL Final    Total Protein 08/16/2024 7.3  6.0 - 8.4 g/dL Final    Albumin 08/16/2024 3.8  3.5 - 5.2 g/dL Final    Total Bilirubin 08/16/2024 0.6  0.1 - 1.0 mg/dL Final    Alkaline Phosphatase 08/16/2024 107  55 - 135 U/L Final    AST 08/16/2024 12  10 - 40 U/L Final    ALT 08/16/2024 8 (L)  10 - 44 U/L Final    eGFR 08/16/2024 >60.0  >60 mL/min/1.73 m^2 Final    Anion Gap 08/16/2024 4 (L)  8 - 16 mmol/L Final    Microalbumin, Urine 08/16/2024 25.3 (H)  <19.9 ug/mL Final    Creatinine, Urine 08/16/2024 84.5  15.0 - 325.0 mg/dL Final    Microalb/Creat Ratio 08/16/2024 29.9  0.0 - 30.0 ug/mg Final    Hemoglobin A1C 08/16/2024 6.0  4.5 - 6.2 % Final    Estimated Avg Glucose 08/16/2024 126  68 - 131 mg/dL Final    TSH 08/16/2024 <0.010 (L)  0.340 - 5.600 uIU/mL Final    Free T4 08/16/2024 1.17  0.71 - 1.51 ng/dL Final    RBC, UA 08/16/2024 0  0 - 4 /hpf Final    WBC, UA 08/16/2024 4  0 - 5 /hpf Final    Bacteria 08/16/2024 Few (A)  None-Occ /hpf Final    Squam Epithel, UA 08/16/2024 9  /hpf Final    Microscopic Comment 08/16/2024 SEE COMMENT   Final   Hospital Outpatient Visit on 08/01/2024   Component Date Value Ref Range Status    BSA 08/01/2024 2.25  m2 Final    LVOT stroke volume 08/01/2024 79.97  cm3 Final    LVIDd 08/01/2024 4.61  3.5 - 6.0 cm Final    LV Systolic Volume 08/01/2024 65.91  mL Final    LV Systolic Volume Index 08/01/2024 30.4  mL/m2 Final    LVIDs 08/01/2024 3.90  2.1 - 4.0 cm Final    LV ESV A2C 08/01/2024 88.90  mL Final    LV Diastolic Volume 08/01/2024 97.83  mL Final    LV ESV A4C 08/01/2024 93.00  mL Final    LV Diastolic Volume Index 08/01/2024 45.08  mL/m2 Final    Left Ventricular End Systolic Volu* 08/01/2024 65.91  mL Final    Left  Ventricular End Diastolic Vol* 08/01/2024 97.83  mL Final    IVS 08/01/2024 1.12 (A)  0.6 - 1.1 cm Final    LVOT diameter 08/01/2024 2.10  cm Final    LVOT area 08/01/2024 3.5  cm2 Final    FS 08/01/2024 15 (A)  28 - 44 % Final    Left Ventricle Relative Wall Thick* 08/01/2024 0.49  cm Final    Posterior Wall 08/01/2024 1.12 (A)  0.6 - 1.1 cm Final    LV mass 08/01/2024 186.51  g Final    LV Mass Index 08/01/2024 86  g/m2 Final    MV Peak E Andres 08/01/2024 0.64  m/s Final    TDI LATERAL 08/01/2024 0.08  m/s Final    TDI SEPTAL 08/01/2024 0.08  m/s Final    E/E' ratio 08/01/2024 8.00  m/s Final    MV Peak A Andres 08/01/2024 1.15  m/s Final    TR Max Andres 08/01/2024 2.61  m/s Final    E/A ratio 08/01/2024 0.56   Final    IVRT 08/01/2024 106.00  msec Final    E wave deceleration time 08/01/2024 224.00  msec Final    LV SEPTAL E/E' RATIO 08/01/2024 8.00  m/s Final    LV LATERAL E/E' RATIO 08/01/2024 8.00  m/s Final    LVOT peak andres 08/01/2024 1.07  m/s Final    Left Ventricular Outflow Tract Arlene* 08/01/2024 0.71  cm/s Final    Left Ventricular Outflow Tract Arlene* 08/01/2024 2.00  mmHg Final    RV S' 08/01/2024 9.25  cm/s Final    TAPSE 08/01/2024 1.92  cm Final    RV/LV Ratio 08/01/2024 0.50  cm Final    LA size 08/01/2024 4.10  cm Final    LA volume (mod) 08/01/2024 92.80  cm3 Final    LA Volume Index (Mod) 08/01/2024 42.8  mL/m2 Final    AV mean gradient 08/01/2024 5  mmHg Final    AV peak gradient 08/01/2024 9  mmHg Final    Ao peak andres 08/01/2024 1.50  m/s Final    Ao VTI 08/01/2024 32.40  cm Final    LVOT peak VTI 08/01/2024 23.10  cm Final    AV valve area 08/01/2024 2.47  cm² Final    AV Velocity Ratio 08/01/2024 0.71   Final    AV index (prosthetic) 08/01/2024 0.71   Final    GEOVANI by Velocity Ratio 08/01/2024 2.47  cm² Final    Mr max andres 08/01/2024 4.60  m/s Final    MV mean gradient 08/01/2024 3  mmHg Final    MV peak gradient 08/01/2024 9  mmHg Final    MV valve area by continuity eq 08/01/2024 2.84  cm2 Final     MV VTI 08/01/2024 28.2  cm Final    Triscuspid Valve Regurgitation Pea* 08/01/2024 27  mmHg Final    PV PEAK VELOCITY 08/01/2024 0.89  m/s Final    PV peak gradient 08/01/2024 3  mmHg Final    Pulmonary Valve Mean Velocity 08/01/2024 0.60  m/s Final    Ao root annulus 08/01/2024 3.00  cm Final    IVC diameter 08/01/2024 1.82  cm Final    Mean e' 08/01/2024 0.08  m/s Final    ZLVIDS 08/01/2024 -0.90   Final    ZLVIDD 08/01/2024 -4.43   Final    LA area A4C 08/01/2024 27.00  cm2 Final    LA area A2C 08/01/2024 25.10  cm2 Final    RVDD 08/01/2024 2.30  cm Final    AORTIC VALVE CUSP SEPERATION 08/01/2024 2.00  cm Final    TV resting pulmonary artery pressu* 08/01/2024 30  mmHg Final    RV TB RVSP 08/01/2024 6  mmHg Final    Est. RA pres 08/01/2024 3  mmHg Final   Hospital Outpatient Visit on 03/20/2024   Component Date Value Ref Range Status    QRS Duration 03/20/2024 162  ms Final    [facility] QTC Calculation 03/20/2024 466  ms Final    Sodium 03/20/2024 139  136 - 145 mmol/L Final    Potassium 03/20/2024 4.1  3.5 - 5.1 mmol/L Final    Chloride 03/20/2024 104  95 - 110 mmol/L Final    CO2 03/20/2024 31 (H)  23 - 29 mmol/L Final    Glucose 03/20/2024 98  70 - 110 mg/dL Final    BUN 03/20/2024 17  8 - 23 mg/dL Final    Creatinine 03/20/2024 1.0  0.5 - 1.4 mg/dL Final    Calcium 03/20/2024 10.8 (H)  8.7 - 10.5 mg/dL Final    Anion Gap 03/20/2024 4 (L)  8 - 16 mmol/L Final    eGFR 03/20/2024 >60.0  >60 mL/min/1.73 m^2 Final    WBC 03/20/2024 5.83  3.90 - 12.70 K/uL Final    RBC 03/20/2024 4.19  4.00 - 5.40 M/uL Final    Hemoglobin 03/20/2024 12.4  12.0 - 16.0 g/dL Final    Hematocrit 03/20/2024 39.4  37.0 - 48.5 % Final    MCV 03/20/2024 94  82 - 98 fL Final    MCH 03/20/2024 29.6  27.0 - 31.0 pg Final    MCHC 03/20/2024 31.5 (L)  32.0 - 36.0 g/dL Final    RDW 03/20/2024 13.0  11.5 - 14.5 % Final    Platelets 03/20/2024 317  150 - 450 K/uL Final    MPV 03/20/2024 10.6  9.2 - 12.9 fL Final    Immature Granulocytes 03/20/2024 0.2   0.0 - 0.5 % Final    Gran # (ANC) 03/20/2024 3.2  1.8 - 7.7 K/uL Final    Immature Grans (Abs) 03/20/2024 0.01  0.00 - 0.04 K/uL Final    Lymph # 03/20/2024 2.1  1.0 - 4.8 K/uL Final    Mono # 03/20/2024 0.4  0.3 - 1.0 K/uL Final    Eos # 03/20/2024 0.1  0.0 - 0.5 K/uL Final    Baso # 03/20/2024 0.05  0.00 - 0.20 K/uL Final    nRBC 03/20/2024 0  0 /100 WBC Final    Gran % 03/20/2024 54.4  38.0 - 73.0 % Final    Lymph % 03/20/2024 35.2  18.0 - 48.0 % Final    Mono % 03/20/2024 7.4  4.0 - 15.0 % Final    Eosinophil % 03/20/2024 1.9  0.0 - 8.0 % Final    Basophil % 03/20/2024 0.9  0.0 - 1.9 % Final    Differential Method 03/20/2024 Automated   Final       Past Medical History:   Diagnosis Date    Bursitis     (R) trochanteric; injected 10/7/13 (Kenalog/Xylo)    DJD (degenerative joint disease)     right hip and knee    Hyperlipidemia     Hypertension     Pericarditis     Personal history of colonic polyps      Social History     Socioeconomic History    Marital status:    Tobacco Use    Smoking status: Never    Smokeless tobacco: Never   Substance and Sexual Activity    Alcohol use: No     Alcohol/week: 0.0 standard drinks of alcohol    Drug use: Yes     Types: Hydrocodone     Past Surgical History:   Procedure Laterality Date    COLONOSCOPY  09/05/2023    COLONOSCOPY N/A 09/05/2023    Procedure: COLONOSCOPY WITH EMR;  Surgeon: Deven Brandt III, MD;  Location: [facility] ENDO;  Service: Endoscopy;  Laterality: N/A;    COLONOSCOPY  03/22/2024    COLONOSCOPY N/A 3/22/2024    Procedure: COLONOSCOPY;  Surgeon: Deven Brandt III, MD;  Location: [facility] ENDO;  Service: Endoscopy;  Laterality: N/A;    FOOT SURGERY Left     bunionectomy    KNEE ARTHROSCOPY Right 01/01/2011    (R) knee per Dr. French    PATELLA SURGERY Left 01/01/1974    TOTAL HIP ARTHROPLASTY Right 03/08/2016    VINCE      Family History   Problem Relation Name Age of Onset    Coronary artery disease Mother      Cancer Father      Breast cancer  Paternal Aunt      Breast cancer Paternal Grandmother         Review of patient's allergies indicates:   Allergen Reactions    Tramadol Nausea Only     dizzy       Current Outpatient Medications:     cetirizine (ZYRTEC) 10 MG tablet, Take 1 tablet (10 mg total) by mouth once daily. (Patient taking differently: Take 10 mg by mouth daily as needed.), Disp: 30 tablet, Rfl: 0    coenzyme Q10 200 mg capsule, Take 200 mg by mouth once daily., Disp: , Rfl:     ezetimibe (ZETIA) 10 mg tablet, Take 1 tablet (10 mg total) by mouth every evening., Disp: 90 tablet, Rfl: 3    losartan (COZAAR) 100 MG tablet, Take 1 tablet (100 mg total) by mouth once daily., Disp: 90 tablet, Rfl: 3    magnesium 250 mg Tab, Take 1 tablet by mouth once daily., Disp: , Rfl:     multivitamin with minerals tablet, Take 1 tablet by mouth once daily., Disp: , Rfl:     pantoprazole (PROTONIX) 40 MG tablet, Take 1 tablet (40 mg total) by mouth once daily. (Patient taking differently: Take 40 mg by mouth daily as needed.), Disp: 90 tablet, Rfl: 3    potassium chloride (MICRO-K) 10 MEQ CpSR, Take 1 capsule (10 mEq total) by mouth once daily., Disp: 90 capsule, Rfl: 3    rosuvastatin (CRESTOR) 20 MG tablet, Take 1 tablet (20 mg total) by mouth every evening., Disp: 90 tablet, Rfl: 3    triamterene-hydrochlorothiazide 37.5-25 mg (DYAZIDE) 37.5-25 mg per capsule, Take 1 capsule by mouth once daily., Disp: 90 capsule, Rfl: 3    thyroid, pork, (NP THYROID) 30 mg Tab, Take 1 tablet (30 mg total) by mouth before breakfast., Disp: 90 tablet, Rfl: 3    valACYclovir (VALTREX) 1000 MG tablet, Take 1 tablet (1,000 mg total) by mouth 3 (three) times daily., Disp: 21 tablet, Rfl: 0    Review of Systems   Constitutional:  Negative for appetite change, fatigue, fever and unexpected weight change.   HENT:  Positive for postnasal drip and sneezing.    Respiratory:  Negative for cough, chest tightness, shortness of breath and wheezing.    Cardiovascular:  Negative for chest  "pain and leg swelling.   Gastrointestinal:  Negative for abdominal pain, constipation, diarrhea, nausea and vomiting.        -heartburn   Genitourinary:  Negative for difficulty urinating, dysuria, frequency and urgency.   Musculoskeletal:  Positive for arthralgias. Negative for back pain, myalgias and neck pain.   Skin:  Negative for rash.   Neurological:  Negative for dizziness, weakness, numbness and headaches.   Hematological:  Does not bruise/bleed easily.   Psychiatric/Behavioral:  Negative for dysphoric mood, sleep disturbance and suicidal ideas. The patient is not nervous/anxious.    All other systems reviewed and are negative.         Objective:      Vitals:    08/21/24 0816   BP: 114/62   Pulse: 74   Weight: 106.1 kg (234 lb)   Height: 5' 7" (1.702 m)           Wt Readings from Last 3 Encounters:   08/21/24 106.1 kg (234 lb)   08/01/24 107 kg (235 lb 14.3 oz)   07/03/24 107 kg (236 lb)       Physical Exam  Vitals reviewed.   Constitutional:       General: She is not in acute distress.     Appearance: Normal appearance. She is well-developed. She is obese.   HENT:      Head: Normocephalic and atraumatic.   Neck:      Thyroid: No thyromegaly.      Vascular: No carotid bruit.   Cardiovascular:      Rate and Rhythm: Normal rate and regular rhythm.      Heart sounds: Normal heart sounds. No murmur heard.     No friction rub.      Comments: Large varicose veins in both lower extremities  Pulmonary:      Effort: Pulmonary effort is normal.      Breath sounds: Normal breath sounds. No wheezing or rales.   Abdominal:      General: Bowel sounds are normal. There is no distension.      Palpations: Abdomen is soft.      Tenderness: There is no abdominal tenderness.   Musculoskeletal:      Cervical back: Neck supple.   Lymphadenopathy:      Cervical: No cervical adenopathy.   Skin:     General: Skin is warm and dry.      Findings: No rash.   Neurological:      Mental Status: She is alert and oriented to person, place, " and time.   Psychiatric:         Attention and Perception: She is attentive.         Speech: Speech normal.         Behavior: Behavior normal.         Thought Content: Thought content normal.         Judgment: Judgment normal.           Assessment:       1. Essential hypertension    2. Chronic rhinitis    3. Gastroesophageal reflux disease without esophagitis    4. Thyroid dysfunction    5. Herpes zoster without complication               Plan:       Essential hypertension  Comments:  Controlled. Will continue to monitor BP on current medication regimen.    Chronic rhinitis  Comments:  Symptomatic. To take otc antihistamine daily.    Gastroesophageal reflux disease without esophagitis  Comments:  Controlled. Will continue to monitor symptoms    Thyroid dysfunction  Comments:  Supratherapuetic. TSH<0.01, free T4 1.17. Will adjust NP thyroid.  Orders:  -     thyroid, pork, (NP THYROID) 30 mg Tab; Take 1 tablet (30 mg total) by mouth before breakfast.  Dispense: 90 tablet; Refill: 3  -     TSH; Future; Expected date: 08/21/2024  -     T4, FREE; Future; Expected date: 08/21/2024  -     T3, FREE; Future; Expected date: 08/21/2024  -     BASIC METABOLIC PANEL; Future; Expected date: 08/21/2024    Herpes zoster without complication  Comments:  Resolving. To continue valtrex course.   Orders:  -     valACYclovir (VALTREX) 1000 MG tablet; Take 1 tablet (1,000 mg total) by mouth 3 (three) times daily.  Dispense: 21 tablet; Refill: 0      Other  Lab results discussed and reviewed with patient.  Labs and/or tests have been ordered for the evaluation/monitoring of acute/chronic conditions, to be done just before next visit.    Follow up in about 6 months (around 2/21/2025) for HTN, Allergies, GERD.        8/21/2024 Yohannes Adams           consider psych eval and follow up when more stable

## 2024-10-02 NOTE — ED ADULT NURSE NOTE - [id number]_ED_A_ED
Randi Dumont Patient Age: 66 year old  MESSAGE: Interpreting service used: No    Insurance on file confirmed with caller: Yes    IM/FP- Hospital/ER/Skilled Nursing Facility/Rehab Follow Up- ER      Date patient was in the ER: 9-30-24    Reason patient was in the ER: Blood clots in left leg    Patient was seen at [facility]      Is the patient currently in the ER? No-     Is patient having new or worsening symptoms? No-       Appointment: Patient scheduled an appointment on 10-28-24  at 10:40 a.m. with Dr. Carl  at [facility].         Records Request-  PCP Site: [facility] Routed message to Non-Clinical PSR Pool ([id number])    Message read back to caller for accuracy: Yes       ALLERGIES:  Patient has no known allergies.  Current Outpatient Medications   Medication Sig Dispense Refill    Probiotic Product (PROBIOTIC DAILY PO)       MULTIPLE VITAMIN PO None Entered - Oral      CALCIUM CARBONATE PO None Entered - Oral       No current facility-administered medications for this visit.     PHARMACY to use:           Pharmacy preference(s) on file:   [id number] - [place], [place] - [address] [facility] [address]  [address]  [facility] [address]  Phone: [phone] Fax: [phone]      CALL BACK INFO: Ok to leave response (including medical information) on answering machine      PCP: Jena Delgado DO         INS: Payor: [employer] [place] / Plan: PPO [id number] / Product Type: PPO MISC   PATIENT ADDRESS:  [address] [place] [id number]      
Records are viewable under chart review/encounter.  
No

## 2025-04-21 NOTE — PATIENT PROFILE ADULT - CONTRAINDICATIONS & PRECAUTIONS (SELECT ALL THAT APPLY)
04/21/25 1000   Home Oxygen Assessment (RN/RT ONLY)   Does patient have oxygen at home? No   1. SpO2 on room air at rest while awake 93       Oxygen LPM at rest N/A   3. SpO2 on room air during Activity/with exercise 88   4. SpO2 with oxygen during activity/with exercise N/A       Oxygen LPM during activity/with exercise N/A   Does patient qualify for Home O2? No      none...

## 2025-05-13 NOTE — PROGRESS NOTE ADULT - SUBJECTIVE AND OBJECTIVE BOX
[facility] ATP SURGERY DAILY PROGRESS NOTE    SUBJECTIVE:  -  diarrhea improving  -  awaiting OR procedure with ORTHO when stabilized    OBJECTIVE:    Vital Signs Last 24 Hrs  T(C): 36.7 (14 Feb 2019 07:00), Max: 36.7 (14 Feb 2019 00:00)  T(F): 98.1 (14 Feb 2019 07:00), Max: 98.1 (14 Feb 2019 04:00)  HR: 85 (14 Feb 2019 11:00) (85 - 115)  BP: 105/54 (14 Feb 2019 11:00) (83/50 - 122/53)  BP(mean): 74 (14 Feb 2019 11:00) (59 - 91)  RR: 22 (14 Feb 2019 11:00) (18 - 35)  SpO2: 96% (14 Feb 2019 11:00) (94% - 100%)    I&O's Detail    13 Feb 2019 07:01  -  14 Feb 2019 07:00  --------------------------------------------------------  IN:    Solution: 125 mL    Solution: 300 mL    Solution: 500 mL  Total IN: 925 mL    OUT:    Stool: 5 mL    Voided: 900 mL  Total OUT: 905 mL    Total NET: 20 mL      14 Feb 2019 07:01  -  14 Feb 2019 12:00  --------------------------------------------------------  IN:    Solution: 100 mL  Total IN: 100 mL    OUT:  Total OUT: 0 mL    Total NET: 100 mL        LABS:                        11.5   10.2  )-----------( 57       ( 13 Feb 2019 03:39 )             34.2     02-14    136  |  99  |  16  ----------------------------<  115<H>  4.9   |  25  |  0.49<L>    Ca    7.0<L>      14 Feb 2019 03:19  Phos  3.0     02-14  Mg     2.1     02-14      EXAM:  NAD, awake and alert, sitting up in bed, expressive  Respirations nonlabored  Abdomen soft, nontender, moderately distended  No guarding or rebound tenderness no

## 2025-06-02 NOTE — PROGRESS NOTE ADULT - EYES
Spoke with patient. Appointment scheduled.     ----- Message -----   From: Imani [facility] MD   Sent: 6/2/2025   8:38 AM CDT   To: [facility] Nurse Msg Pool     Please schedule TCM follow up in 1-2 weeks.     Imani Bert[facility] MD   ----- Message -----   From: Rogelio Chong MD   Sent: 5/31/2025   8:11 PM CDT   To: Rogelio Chong MD; Imani Bertrand MD    detailed exam EOMI/conjunctiva clear